# Patient Record
Sex: FEMALE | Race: BLACK OR AFRICAN AMERICAN | NOT HISPANIC OR LATINO | Employment: OTHER | URBAN - METROPOLITAN AREA
[De-identification: names, ages, dates, MRNs, and addresses within clinical notes are randomized per-mention and may not be internally consistent; named-entity substitution may affect disease eponyms.]

---

## 2017-10-13 ENCOUNTER — HOSPITAL ENCOUNTER (EMERGENCY)
Facility: HOSPITAL | Age: 72
Discharge: HOME | End: 2017-10-13
Attending: EMERGENCY MEDICINE | Admitting: OPHTHALMOLOGY
Payer: COMMERCIAL

## 2017-10-13 VITALS
TEMPERATURE: 99 F | BODY MASS INDEX: 23.99 KG/M2 | HEIGHT: 65 IN | DIASTOLIC BLOOD PRESSURE: 88 MMHG | HEART RATE: 62 BPM | SYSTOLIC BLOOD PRESSURE: 176 MMHG | OXYGEN SATURATION: 99 % | WEIGHT: 144 LBS | RESPIRATION RATE: 18 BRPM

## 2017-10-13 DIAGNOSIS — H21.01 HYPHEMA OF RIGHT EYE: ICD-10-CM

## 2017-10-13 DIAGNOSIS — H20.9 TRAUMATIC IRITIS: Primary | ICD-10-CM

## 2017-10-13 PROBLEM — S05.11XA TRAUMATIC HYPHEMA OF RIGHT EYE: Status: ACTIVE | Noted: 2017-10-13

## 2017-10-13 PROCEDURE — 25000003 PHARM REV CODE 250: Performed by: EMERGENCY MEDICINE

## 2017-10-13 PROCEDURE — 99284 EMERGENCY DEPT VISIT MOD MDM: CPT | Mod: ,,, | Performed by: EMERGENCY MEDICINE

## 2017-10-13 PROCEDURE — 99284 EMERGENCY DEPT VISIT MOD MDM: CPT

## 2017-10-13 RX ORDER — ATROPINE SULFATE 10 MG/ML
1 SOLUTION/ DROPS OPHTHALMIC 2 TIMES DAILY
Qty: 1 BOTTLE | Refills: 0 | Status: SHIPPED | OUTPATIENT
Start: 2017-10-13 | End: 2017-10-14

## 2017-10-13 RX ORDER — HYDROCHLOROTHIAZIDE 12.5 MG/1
12.5 TABLET ORAL DAILY
COMMUNITY
End: 2017-10-16

## 2017-10-13 RX ORDER — METOPROLOL TARTRATE 100 MG/1
100 TABLET ORAL 2 TIMES DAILY
COMMUNITY
End: 2017-10-16

## 2017-10-13 RX ORDER — TIMOLOL MALEATE 5 MG/ML
1 SOLUTION/ DROPS OPHTHALMIC 2 TIMES DAILY
Qty: 10 ML | Refills: 0 | Status: SHIPPED | OUTPATIENT
Start: 2017-10-13 | End: 2017-10-14 | Stop reason: ALTCHOICE

## 2017-10-13 RX ORDER — ACETAMINOPHEN 500 MG
1000 TABLET ORAL
Status: COMPLETED | OUTPATIENT
Start: 2017-10-13 | End: 2017-10-13

## 2017-10-13 RX ORDER — PREDNISOLONE ACETATE 10 MG/ML
1 SUSPENSION/ DROPS OPHTHALMIC EVERY 4 HOURS
Qty: 1 BOTTLE | Refills: 0 | Status: SHIPPED | OUTPATIENT
Start: 2017-10-13 | End: 2017-10-14

## 2017-10-13 RX ORDER — PREDNISOLONE ACETATE 10 MG/ML
1 SUSPENSION/ DROPS OPHTHALMIC
Status: DISCONTINUED | OUTPATIENT
Start: 2017-10-13 | End: 2017-10-14 | Stop reason: HOSPADM

## 2017-10-13 RX ORDER — PROPARACAINE HYDROCHLORIDE 5 MG/ML
1 SOLUTION/ DROPS OPHTHALMIC
Status: COMPLETED | OUTPATIENT
Start: 2017-10-13 | End: 2017-10-13

## 2017-10-13 RX ORDER — ATROPINE SULFATE 10 MG/ML
1 SOLUTION/ DROPS OPHTHALMIC 2 TIMES DAILY
Status: DISCONTINUED | OUTPATIENT
Start: 2017-10-13 | End: 2017-10-14 | Stop reason: HOSPADM

## 2017-10-13 RX ORDER — AMLODIPINE AND BENAZEPRIL HYDROCHLORIDE 10; 20 MG/1; MG/1
1 CAPSULE ORAL DAILY
COMMUNITY
End: 2017-10-16

## 2017-10-13 RX ORDER — PROPARACAINE HYDROCHLORIDE 5 MG/ML
1 SOLUTION/ DROPS OPHTHALMIC
Status: DISCONTINUED | OUTPATIENT
Start: 2017-10-13 | End: 2017-10-13

## 2017-10-13 RX ORDER — TIMOLOL MALEATE 5 MG/ML
1 SOLUTION/ DROPS OPHTHALMIC 2 TIMES DAILY
Status: DISCONTINUED | OUTPATIENT
Start: 2017-10-13 | End: 2017-10-14 | Stop reason: HOSPADM

## 2017-10-13 RX ORDER — ASPIRIN 81 MG/1
81 TABLET ORAL DAILY
COMMUNITY
End: 2019-01-16

## 2017-10-13 RX ORDER — ROSUVASTATIN CALCIUM 5 MG/1
5 TABLET, COATED ORAL DAILY
COMMUNITY
End: 2017-10-16

## 2017-10-13 RX ADMIN — FLUORESCEIN SODIUM 1 EACH: 1 STRIP OPHTHALMIC at 05:10

## 2017-10-13 RX ADMIN — PROPARACAINE HYDROCHLORIDE 1 DROP: 5 SOLUTION/ DROPS OPHTHALMIC at 05:10

## 2017-10-13 RX ADMIN — ACETAMINOPHEN 1000 MG: 500 TABLET ORAL at 05:10

## 2017-10-13 NOTE — ED PROVIDER NOTES
Encounter Date: 10/13/2017    SCRIBE #1 NOTE: I, Ledy Bass, am scribing for, and in the presence of,  Dr. Tyler. I have scribed the entire note.       History     Chief Complaint   Patient presents with    Eye Problem     R eye, hit in R eye w/ exercise band x1.5 hr ago. progressive vision loss in R eye gradual whitening of foeld from lateral to medial     Time seen by provider: 4:51 PM    This is a 72 y.o. female who presents with complaint of right eye problem s/p trauma. She reports onset of incident was about 1.5 hr ago. The patient states she was exercising with elastic band when she accidentally struck herself in the eye. She states within an half hour after trauma she began to have changes in vision. The patient notes she has cloudiness extending from the outer portion of visual field to interior which is gradually worsening. The patient notes she can not distinctly see objects only light and shadows. She reports usually she is able to see with nearly perfect vision. The patient denies any eyelid swelling, headache, neck pain, nausea or vomiting but admits right eye pain      The history is provided by the patient.     Review of patient's allergies indicates:  No Known Allergies  Past Medical History:   Diagnosis Date    Anemia     GERD (gastroesophageal reflux disease)     Hyperlipidemia     Hypertension      Past Surgical History:   Procedure Laterality Date    ANKLE FRACTURE SURGERY      HYSTERECTOMY       No family history on file.  Social History   Substance Use Topics    Smoking status: Never Smoker    Smokeless tobacco: Never Used    Alcohol use No     Review of Systems   Constitutional: Negative for chills and fever.   Eyes: Positive for pain (right) and visual disturbance (right).   Respiratory: Negative for cough and shortness of breath.    Cardiovascular: Negative for chest pain and palpitations.   Gastrointestinal: Negative for abdominal pain, diarrhea and vomiting.    Genitourinary: Negative for dysuria and vaginal discharge.   Musculoskeletal: Negative for joint swelling, neck pain and neck stiffness.   Skin: Negative for rash.   Neurological: Negative for weakness, numbness and headaches.   Psychiatric/Behavioral: Negative for confusion.       Physical Exam     Initial Vitals [10/13/17 1643]   BP Pulse Resp Temp SpO2   (!) 140/82 66 16 98.4 °F (36.9 °C) 98 %      MAP       101.33         Physical Exam    Nursing note and vitals reviewed.  Constitutional: She appears well-developed and well-nourished. She is not diaphoretic. No distress.   HENT:   Head: Normocephalic and atraumatic.   Right Ear: External ear normal.   Left Ear: External ear normal.   Eyes: EOM are normal.   Slit lamp exam:       The right eye shows no corneal abrasion and no fluorescein uptake.   Pupils are equal bilaterally. Left pupil is reactive to light. Right pupil is minimally reactive to light. Anterior chamber is cloudy to direct visualization. On slit exam the patient has 100% hyphema. Intraocular pressure is 17 on right and 30 on left. Visual acuity is 20/50 on left. Movement in black and white on the right   Neck: Normal range of motion. Neck supple.   Cardiovascular: Normal rate, regular rhythm and normal heart sounds. Exam reveals no gallop and no friction rub.    No murmur heard.  Pulmonary/Chest: Breath sounds normal. She has no wheezes. She has no rhonchi. She has no rales.   Abdominal: Soft. Bowel sounds are normal. There is no tenderness. There is no rebound and no guarding.   Musculoskeletal: Normal range of motion. She exhibits no edema or tenderness.   Lymphadenopathy:     She has no cervical adenopathy.   Neurological: She is alert and oriented to person, place, and time. She has normal strength.   Skin: Skin is warm and dry. No rash noted.         ED Course   Procedures  Labs Reviewed - No data to display     ---------------------------BEDSIDE US LIMITED---------------------  Interpreted  by the emergency provider  Time: 5:00 PM  Indication: eye trauma  Structures/organs investigated: right eye  Interpretation: blood in anterior chamber of eye  Post-procedure: Patient tolerated the procedure well with no immediate complications.        Medical Decision Making:   ED Management:  Based on my assessment the patient has a traumatic hyphema possible iritis.  Based on the ultrasound I do not see any signs of a retinal detachment.  We'll speak with ophthalmology.    5:10 PM Called transfer center to discuss case with Opthalmology.    5:17 PM Case discussed with Dr. Marrero, states the patient can come to [facility] for evaluation today or be seen as outpatient in clinic on Monday    5:35 PM I have discussed with family about options, the patient will go to [facility] for evaluation.     5:36 PM Spoke with Freida from transfer center states they will be awaiting the patient to come by private vehicle for evaluation.             Scribe Attestation:   Scribe #1: I performed the above scribed service and the documentation accurately describes the services I performed. I attest to the accuracy of the note.    I, Dr. Adama Tyler, personally performed the services described in this documentation. All medical record entries made by the scribe were at my direction and in my presence.  I have reviewed the chart and agree that the record reflects my personal performance and is accurate and complete. Adama Tyler DO.  5:53 PM 10/13/2017         ED Course      Clinical Impression:     1. Hyphema of right eye    2. Traumatic iritis                               Adama Tyler DO  10/13/17 1754

## 2017-10-13 NOTE — ED NOTES
Pt ambulatory out of department, instructed to drive directly to [facility]. Pt v/u. Ambulatory without difficulty, family present to drive pt to hospital.

## 2017-10-13 NOTE — ED PROVIDER NOTES
Encounter Date: 10/13/2017       History     Chief Complaint   Patient presents with    Transfer: Eye Problem     R eye, hit in R eye w/ exercise band x1.5 hr ago. progressive vision loss in R eye gradual whitening of foeld from lateral to medial     Cc: Eye pain    History of present illness:  72 y.o. female 's medical history of hypertension presents with right eye blindness and pain after an exercise band hit her in the face.  Seen outside institution and transferred to our institution for operative evaluation.  She feels like her right eye vision has a haziness to it.           Review of patient's allergies indicates:  No Known Allergies  Past Medical History:   Diagnosis Date    Anemia     GERD (gastroesophageal reflux disease)     Hyperlipidemia     Hypertension      Past Surgical History:   Procedure Laterality Date    ANKLE FRACTURE SURGERY      HYSTERECTOMY       No family history on file.  Social History   Substance Use Topics    Smoking status: Never Smoker    Smokeless tobacco: Never Used    Alcohol use No     Review of Systems   HENT: Negative for ear discharge and ear pain.    Eyes: Positive for pain, redness and visual disturbance. Negative for photophobia, discharge and itching.   Respiratory: Negative for apnea, cough, choking and chest tightness.    Cardiovascular: Negative.    Gastrointestinal: Negative for abdominal distention, abdominal pain and anal bleeding.   Neurological: Negative.    Psychiatric/Behavioral: Negative for agitation, behavioral problems, confusion and decreased concentration.   All other systems reviewed and are negative.      Physical Exam     Initial Vitals [10/13/17 1643]   BP Pulse Resp Temp SpO2   (!) 140/82 66 16 98.4 °F (36.9 °C) 98 %      MAP       101.33         Physical Exam  PHYSICAL EXAM  Vital signs and nurse note to review.    GEN:  NAD, A & O X 3, atraumatic, well appearing, nontoxic  HEENT:   R periorbital swelling, PERRLA, EOMI, nl conjunciva, no  node/nodule, neck supple, no rigidity, moist membranes  CV:   RRR 2+ radial pulses, <2sec cap refill  RESP:  No obvious wheezing, equal and bilateral chest rise, no respiratory distress  ABD:   soft, Nondistended,  no guarding/rebound  BACK:  FROM, no midline tenderness, no paraspinal tenderness  :   Deferred  EXT:   FROM, [place]UZ x 4, no edema, no calf tenderness, no swelling  LYMPH:  no gross adenopathy  NEURO:  CN II-XII grossly intact, no obvious motor/sensory deficit, no tremor ,negative Romberg,  nl gait/coordination  PSYCH:   no SI/HI, no anxiety, nl mood/affect, nl judgement/thought process  SKIN:  Warm, dry, intact, no rashes/lesions or masses, nl color, no pallor      ED Course   Procedures  Labs Reviewed - No data to display          Medical Decision Making:   Differential Diagnosis:   Red eye differential diagnosis includes but not exclusive to: open globe, hyphema conjunctivitis (viral, allergic or bacterial), keratitis, iritis, angle closure glaucoma    ED Management:  Treatment plan includes physical exam,  pain control,  and supportive care.  1850 Ophthalmology consult pending.    Patient improved after treatment and tolerating PO    Family and patient updated on care and given return precautions.                     ED Course      Clinical Impression:   The primary encounter diagnosis was Traumatic iritis. A diagnosis of Hyphema of right eye was also pertinent to this visit.    Disposition:   Disposition: Discharged  Condition: Stable                        Raul Lyn MD  10/18/17 6219

## 2017-10-13 NOTE — ED NOTES
Verbal report given to JYOTI Sarmiento, at [facility] ER. Pt to be transported via private vehicle.

## 2017-10-13 NOTE — ED TRIAGE NOTES
"C/o "cloudiness" to vision in right eye s/p getting hit with exercise band 1.5 hours ago. Reports initial loss of peripheral vision, but now states "fogginess" has spread to entire field of vision. Denies loc. Questionable swelling under right eye. Mild redness to sclera. No conjunctival hemorrhage noted.  "

## 2017-10-14 RX ORDER — PREDNISOLONE ACETATE 10 MG/ML
1 SUSPENSION/ DROPS OPHTHALMIC EVERY 4 HOURS
Qty: 15 ML | Refills: 1 | Status: SHIPPED | OUTPATIENT
Start: 2017-10-14 | End: 2017-10-24

## 2017-10-14 RX ORDER — ATROPINE SULFATE 10 MG/ML
1 SOLUTION/ DROPS OPHTHALMIC 2 TIMES DAILY
Qty: 1 BOTTLE | Refills: 1 | Status: SHIPPED | OUTPATIENT
Start: 2017-10-14 | End: 2017-10-16 | Stop reason: ALTCHOICE

## 2017-10-14 NOTE — CONSULTS
"[facility]  Ophthalmology  Consult Note    Patient Name: Valarie Colindres  MRN: [id number]  Admission Date: 10/13/2017  Hospital Length of Stay: 0 days  Attending Provider: No att. providers found   Primary Care Physician: No primary care provider on file.  Principal Problem:<principal problem not specified>    Inpatient consult to Ophthalmology  Consult performed by: FIDEL BARTLETT  Consult ordered by: MRATIN TORRES        Subjective:     Chief Complaint:  R eye pain after injury     HPI:   72 y.o. F with PMHx HTN presents with right eye fogginess and pain after an exercise band hit her in the face. Patient states she was using an exercise elastic band to stretch her le around 4:30pm , but it accidentally slipped and hit her across the eyes. Right after, patient states her R temporal vision was foggy and then her entire R visual field was foggy "as if looking through a cloud." Patient presented to [facility] ED and was transferred to [facility] [place]ho eval. Patient states she had macular degeneration OD years ago, which resolved with laser with Dr. Maldonado. Receives annual eye exams and wears glasses. No previous eye surgeries or injections. Positive fam hx of glaucoma in uncle.     No new subjective & objective note has been filed under this hospital service since the last note was generated.      Base Eye Exam     Visual Acuity (Snellen - Linear)       Right Left    Dist cc 20/800 20/25 -2    Correction:  Glasses          Tonometry (tonopen , 10:24 PM)       Right Left    Pressure 27 16          Pupils       Dark Light Shape React APD    Right 2 1.5 Round Sluggish None    Left 3 2 Round Brisk none           Visual Fields       Right Left      Full    Unable in R eye due to decreased vision          Extraocular Movement       Right Left     Full, Ortho Full, Ortho          Dilation     Both eyes:  1.0% Mydriacyl, 2.5% phenylephrine @ 10:24 PM            Additional Notes     B scan OD shows " vit heme, no RD     Gonioscopy shows narrow, but open angles OU    Slit Lamp and Fundus Exam     External Exam       Right Left    External mild periorbital edema wnl          Slit Lamp Exam       Right Left    Lids/Lashes mild periorbital edema wnl    Conjunctiva/Sclera clear clear    Cornea clear clear    Anterior Chamber 3+ cell/flare with inferior settling hyphema quiet    Iris flat flat, no NVI     Lens 2+ NSC 2+ NSC    Vitreous VH clear          Fundus Exam       Right Left    C/D Ratio  0.3    Macula  flat    Vessels  wnl    Periphery  wnl    Poor view in right eye              Assessment and Plan:     Traumatic hyphema of right eye    -patient presents after trauma to R eye with elastic exercise band   -OD no open globe, IOP elevated with iritis and settling hyphema ~10%  -patient reports no hx of sickle cell disease and no breathing problems  -will start pred forte q4hrs, atropine BID, and timolol BID  -bedrest, no strenuous activity, no heavy lifting, no bending or valsalva maneuvers, keep head of bed elevated  -eye shield provided   -will follow patient closely and have patient follow up in clinic tomorrow. Contact information exchanged.     Discussed with Dr. Gardner.             Thank you for your consult. I will follow-up with patient. Please contact us if you have any additional questions.    Maranda Mcneil MD  Ophthalmology  [facility]

## 2017-10-14 NOTE — PROGRESS NOTES
Patient examined in clinic today. VA improved OD 20/25-2, Ta 14, no hyphema, still 3+ cell/2+ flare. Patient did not get eyedrops last night. No need for timolol at this time. Pred forte q4hrs and atropine BID prescribed to patient's pharmacy. Will have patient follow up on Monday. Discussed with Dr. Abdi.     Maranda Mcneil MD  PGY-2

## 2017-10-14 NOTE — ED NOTES
"Pt states "I don't want to wait for the eyedrops here, I would prefer to go home and fill my prescriptions myself". Dr. DOE notified and received clearance to discharge patient.  "

## 2017-10-14 NOTE — HPI
"72 y.o. F with PMHx HTN presents with right eye fogginess and pain after an exercise band hit her in the face. Patient states she was using an exercise elastic band to stretch her le around 4:30pm , but it accidentally slipped and hit her across the eyes. Right after, patient states her R temporal vision was foggy and then her entire R visual field was foggy "as if looking through a cloud." Patient presented to [facility] ED and was transferred to [facility] for ophtho eval. Patient states she had macular degeneration OD years ago, which resolved with laser with Dr. Maldonado. Receives annual eye exams and wears glasses. No previous eye surgeries or injections. Positive fam hx of glaucoma in uncle.   "

## 2017-10-14 NOTE — ASSESSMENT & PLAN NOTE
-patient presents after trauma to R eye with elastic exercise band   -OD no open globe, IOP elevated with iritis and settling hyphema ~10%  -patient reports no hx of sickle cell disease and no breathing problems  -will start pred forte q4hrs, atropine BID, and timolol BID  -bedrest, no strenuous activity, no heavy lifting, no bending or valsalva maneuvers, keep head of bed elevated  -eye shield provided   -will follow patient closely and have patient follow up in clinic tomorrow. Contact information exchanged.     Discussed with Dr. Gardner.

## 2017-10-16 ENCOUNTER — OFFICE VISIT (OUTPATIENT)
Dept: OPHTHALMOLOGY | Facility: CLINIC | Age: 72
End: 2017-10-16
Payer: COMMERCIAL

## 2017-10-16 DIAGNOSIS — S05.11XD TRAUMATIC HYPHEMA OF RIGHT EYE, SUBSEQUENT ENCOUNTER: Primary | ICD-10-CM

## 2017-10-16 PROCEDURE — 99999 PR PBB SHADOW E&M-EST. PATIENT-LVL III: CPT | Mod: PBBFAC,,, | Performed by: OPHTHALMOLOGY

## 2017-10-16 PROCEDURE — 92004 COMPRE OPH EXAM NEW PT 1/>: CPT | Mod: S$GLB,,, | Performed by: OPHTHALMOLOGY

## 2017-10-16 RX ORDER — THYROID, PORCINE 120 MG/1
TABLET ORAL
COMMUNITY
Start: 2017-10-12 | End: 2018-09-20 | Stop reason: SDUPTHER

## 2017-10-16 RX ORDER — PANTOPRAZOLE SODIUM 40 MG/1
TABLET, DELAYED RELEASE ORAL
COMMUNITY
Start: 2017-10-15 | End: 2018-05-24

## 2017-10-16 RX ORDER — ALENDRONATE SODIUM 70 MG/1
TABLET ORAL
COMMUNITY
Start: 2017-10-01 | End: 2018-09-20 | Stop reason: SDUPTHER

## 2017-10-16 RX ORDER — CHLORHEXIDINE GLUCONATE ORAL RINSE 1.2 MG/ML
SOLUTION DENTAL
Refills: 1 | COMMUNITY
Start: 2017-10-06 | End: 2018-05-24

## 2017-10-16 RX ORDER — AMLODIPINE BESYLATE 10 MG/1
TABLET ORAL
COMMUNITY
Start: 2017-10-01 | End: 2018-05-24 | Stop reason: ALTCHOICE

## 2017-10-16 NOTE — PROGRESS NOTES
HPI     Follow-up    Additional comments: Hyphema of right eye            Comments   Patient here for ED follow up OU traumatic iritis.  Pt states here for follow up hyphema of OD.  Pt states OD has improved with vision, but still slightly blurry.  3 on pain scale.    Eye Drops:Atropine BID OD                    Pred Q4H OD    I have personally interviewed the patient, reviewed the history and   examined the patient and agree with the technician's exam.       Last edited by Phill Capone MD on 10/16/2017  1:51 PM. (History)            Assessment /Plan     For exam results, see Encounter Report.    Traumatic hyphema of right eye, subsequent encounter      Doing well. Still at risk for delayed retinal detachment and angle recession glaucoma.  Discontinue atropine drops.  Decrease prednisolone to four times a day for two days, then three times a day for two days, then twice a day for two days, then once a day for two days, then stop.  Appointments with retina and glaucoma services in one month for scleral depressed exam and gonioscopy.

## 2017-10-16 NOTE — PATIENT INSTRUCTIONS
Discontinue atropine drops.  Decrease prednisolone to four times a day for two days, then three times a day for two days, then twice a day for two days, then once a day for two days, then stop.  Appointments with retina and glaucoma services in one month for scleral depressed exam and gonioscopy.

## 2017-11-30 ENCOUNTER — INITIAL CONSULT (OUTPATIENT)
Dept: OPHTHALMOLOGY | Facility: CLINIC | Age: 72
End: 2017-11-30
Payer: COMMERCIAL

## 2017-11-30 DIAGNOSIS — H43.811 POSTERIOR VITREOUS DETACHMENT OF RIGHT EYE: Primary | ICD-10-CM

## 2017-11-30 DIAGNOSIS — H35.363 RETINAL DRUSEN OF BOTH EYES: ICD-10-CM

## 2017-11-30 DIAGNOSIS — H25.13 NUCLEAR SCLEROSIS OF BOTH EYES: ICD-10-CM

## 2017-11-30 PROCEDURE — 92226 PR SPECIAL EYE EXAM, SUBSEQUENT: CPT | Mod: RT,S$GLB,, | Performed by: OPHTHALMOLOGY

## 2017-11-30 PROCEDURE — 99999 PR PBB SHADOW E&M-EST. PATIENT-LVL II: CPT | Mod: PBBFAC,,, | Performed by: OPHTHALMOLOGY

## 2017-11-30 PROCEDURE — 92014 COMPRE OPH EXAM EST PT 1/>: CPT | Mod: S$GLB,,, | Performed by: OPHTHALMOLOGY

## 2017-11-30 NOTE — PROGRESS NOTES
"HPI     Eye Problem    Additional comments: Traume OU           Comments   73 y/o female presents for retina evaluation s/p Trauma OU, Traumatic   Hyphema OD per Dr Capone.   Pt states 1 month ago while working out hit herself in OU with exercise   band.  At the time was "blind" in OD.  Came in to ED was for Traumatic   Iritis/Hyphema OD and put her on tapering dose of Prednisolone.  Has been   off of now for x 5 wks.  Feels OU for the most part have returned to   normal, still slightly blurry.  Although OD has a "deadening" feeling   behind it.  Hard to describe.  Intermittent longstanding floaters OU   without change.  No flashes OU.  Gets glare at night but otherwise vision   good.    AT's prn OU        Last edited by Torsten Hsu MD on 11/30/2017  9:33 AM. (History)        OCT good contour OU.  Documentation purposes only    Assessment /Plan     For exam results, see Encounter Report.    Posterior vitreous detachment of right eye    Nuclear sclerosis of both eyes    Retinal drusen of both eyes      RD precautions, observe.  F/u Retina PRN.      H/o blunt trauma/hyphema OD.  Has gl appt sched for next wk.    NS OU with good Va.  Observe                     "

## 2017-11-30 NOTE — LETTER
November 30, 2017      Phill Capone MD  [address]  [facility] [address]           [facility]  [address]  [facility] [address]  Phone: [phone]  Fax: [phone]          Patient: Valarie Colindres   MR Number: [id number]   YOB: 1945   Date of Visit: 11/30/2017       Dear Dr. Phill Capone:    Thank you for referring Valarie Colindres to me for evaluation. Attached you will find relevant portions of my assessment and plan of care.    If you have questions, please do not hesitate to call me. I look forward to following Valarie Colindres along with you.    Sincerely,    Torsten Hsu MD    Enclosure  CC:  No Recipients    If you would like to receive this communication electronically, please contact [email or web address] or [phone] to request more information on [facility] [employer] Link access.    For providers and/or their staff who would like to refer a patient to [facility], please contact us through our one-stop-shop provider referral line, [facility], at [phone].    If you feel you have received this communication in error or would no longer like to receive these types of communications, please e-mail [email or web address]

## 2017-12-04 ENCOUNTER — INITIAL CONSULT (OUTPATIENT)
Dept: OPHTHALMOLOGY | Facility: CLINIC | Age: 72
End: 2017-12-04
Payer: COMMERCIAL

## 2017-12-04 DIAGNOSIS — S05.11XD TRAUMATIC HYPHEMA OF RIGHT EYE, SUBSEQUENT ENCOUNTER: ICD-10-CM

## 2017-12-04 DIAGNOSIS — H21.551 ANGLE RECESSION OF RIGHT EYE: Primary | ICD-10-CM

## 2017-12-04 DIAGNOSIS — H25.13 NUCLEAR SCLEROTIC CATARACT OF BOTH EYES: ICD-10-CM

## 2017-12-04 PROCEDURE — 92012 INTRM OPH EXAM EST PATIENT: CPT | Mod: S$GLB,,, | Performed by: OPHTHALMOLOGY

## 2017-12-04 PROCEDURE — 99999 PR PBB SHADOW E&M-EST. PATIENT-LVL I: CPT | Mod: PBBFAC,,, | Performed by: OPHTHALMOLOGY

## 2017-12-04 PROCEDURE — 92020 GONIOSCOPY: CPT | Mod: S$GLB,,, | Performed by: OPHTHALMOLOGY

## 2017-12-04 NOTE — LETTER
December 4, 2017      Phill Capone MD  [address]  [facility] [id number]           [facility]  [address]  [facility] [address]  Phone: [phone]  Fax: [phone]          Patient: Valraie Colindres   MR Number: [id number]   YOB: 1945   Date of Visit: 12/4/2017       Dear Dr. Phill Capone:    Thank you for referring Valarie Colindres to me for evaluation. Attached you will find relevant portions of my assessment and plan of care.    If you have questions, please do not hesitate to call me. I look forward to following Valarie Colindres along with you.    Sincerely,    Serg Jarrett MD    Enclosure  CC:  No Recipients    If you would like to receive this communication electronically, please contact [email or web address] or [phone] to request more information on If[employer] Link access.    For providers and/or their staff who would like to refer a patient to [facility], please contact us through our one-stop-shop provider referral line, [facility], at [phone].    If you feel you have received this communication in error or would no longer like to receive these types of communications, please e-mail [email or web address]

## 2017-12-04 NOTE — PROGRESS NOTES
HPI     72 year old female  DLS: 11/30/2017-Aracelis  10/16/2017-Estelita  Pt has a history trauma to both eyes-hit eye with exercise band  Traumatic [id number] checked by Dr. Aracelis Capone request glaucoma evaluation   Uncle has a history of glaucoma        Last edited by Francesca Sanabria on 12/4/2017  9:40 AM. (History)            Assessment /Plan     For exam results, see Encounter Report.    Angle recession of right eye    Nuclear sclerotic cataract of both eyes    Traumatic hyphema of right eye, subsequent encounter        Remedial       Hx Blunt Trauma OU    Hyphema OD  Resolved    Small recession OD  + Uncle glc    Observation    NSC OU  Observe    RD precautions:  Discussed symptoms of RD with increased flashes, floaters, decreasing vision.  Patient/Family to call and return immediately to clinic should the symptoms of RD occur. Voiced good understanding with Q & A.      Plan  RTC 6 month IOP  Keep Retina fu 1 year  Consider Optometry FU  RTC sooner prn with good understanding

## 2017-12-29 LAB — CRC RECOMMENDATION EXT: NORMAL

## 2018-05-24 ENCOUNTER — OFFICE VISIT (OUTPATIENT)
Dept: OPHTHALMOLOGY | Facility: CLINIC | Age: 73
End: 2018-05-24
Payer: COMMERCIAL

## 2018-05-24 DIAGNOSIS — H25.13 NUCLEAR SCLEROSIS OF BOTH EYES: ICD-10-CM

## 2018-05-24 DIAGNOSIS — H35.363 RETINAL DRUSEN OF BOTH EYES: ICD-10-CM

## 2018-05-24 DIAGNOSIS — H02.103 ECTROPION DUE TO LAXITY OF EYELID, RIGHT: Primary | ICD-10-CM

## 2018-05-24 DIAGNOSIS — H43.811 POSTERIOR VITREOUS DETACHMENT OF RIGHT EYE: ICD-10-CM

## 2018-05-24 DIAGNOSIS — H21.551 ANGLE RECESSION OF RIGHT EYE: ICD-10-CM

## 2018-05-24 PROCEDURE — 92226 PR SPECIAL EYE EXAM, SUBSEQUENT: CPT | Mod: LT,S$GLB,, | Performed by: OPHTHALMOLOGY

## 2018-05-24 PROCEDURE — 92014 COMPRE OPH EXAM EST PT 1/>: CPT | Mod: S$GLB,,, | Performed by: OPHTHALMOLOGY

## 2018-05-24 PROCEDURE — 99999 PR PBB SHADOW E&M-EST. PATIENT-LVL II: CPT | Mod: PBBFAC,,, | Performed by: OPHTHALMOLOGY

## 2018-05-24 RX ORDER — VALSARTAN 160 MG/1
160 TABLET ORAL DAILY
Refills: 5 | COMMUNITY
Start: 2018-04-25 | End: 2018-09-20

## 2018-05-24 RX ORDER — FLUCONAZOLE 150 MG/1
TABLET ORAL
COMMUNITY
End: 2018-05-24

## 2018-05-24 NOTE — PROGRESS NOTES
HPI     71 y/o female  Here to day for c/o pressure feeling and 4 wks of tearing   OD.  Had tearing OS which stopped.     Va stable OU.   No f/f/pain OU.    Pt c/o 4 weeks of tearing    (-)Pain  (-)Flashes  (-)Floaters  (-)Double Vision     Eye Med(s): none    Last edited by Torsten Hsu MD on 5/29/2018  9:38 AM. (History)            Assessment /Plan     For exam results, see Encounter Report.    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  Could be associated with ectropion of punctum RLL.  Will refer to Dr Grewal for eval and management    Has h/o angle recession OD secondary to trauma.  IOP good and no sign of glaucoma.  Needs ongoing comprehensive care for periodic evaluation (at least yearly)    Retinal findings stable.  No pathology needing Rx and this time.    RD precautions.  RTC retina PRN only    NS OU.  Good Va.  Recommend observation

## 2018-05-30 ENCOUNTER — TELEPHONE (OUTPATIENT)
Dept: OPHTHALMOLOGY | Facility: CLINIC | Age: 73
End: 2018-05-30

## 2018-05-30 NOTE — TELEPHONE ENCOUNTER
----- Message from Mili Ceballos MA sent at 5/30/2018  4:25 PM CDT -----  Good afternoon ladies,    Dr. Hsu want the above mentioned pt to see Dr. Grewal for Ectropion I tried scheduling it the way you guys showed me I got the appointment it showed September 2018,  Pt states she could wait that long to see if we can speak to the doctor to get her in sooner. Can you guys please assist.    Thank you,   Mili

## 2018-09-19 PROBLEM — K58.9 IRRITABLE BOWEL SYNDROME: Status: ACTIVE | Noted: 2018-09-19

## 2018-09-19 PROBLEM — I10 ESSENTIAL HYPERTENSION: Status: ACTIVE | Noted: 2018-09-19

## 2018-09-19 PROBLEM — M54.2 NECK PAIN: Status: ACTIVE | Noted: 2018-09-19

## 2018-09-19 PROBLEM — M54.50 LOW BACK PAIN: Status: ACTIVE | Noted: 2018-09-19

## 2018-09-19 PROBLEM — Z87.11 HISTORY OF GASTRIC ULCER: Status: ACTIVE | Noted: 2018-09-19

## 2018-09-19 PROBLEM — K21.9 GASTROESOPHAGEAL REFLUX DISEASE: Status: ACTIVE | Noted: 2018-09-19

## 2018-09-20 ENCOUNTER — OFFICE VISIT (OUTPATIENT)
Dept: INTERNAL MEDICINE | Facility: CLINIC | Age: 73
End: 2018-09-20
Payer: MEDICARE

## 2018-09-20 VITALS
HEART RATE: 79 BPM | HEIGHT: 65 IN | WEIGHT: 149.25 LBS | BODY MASS INDEX: 24.87 KG/M2 | OXYGEN SATURATION: 97 % | SYSTOLIC BLOOD PRESSURE: 140 MMHG | DIASTOLIC BLOOD PRESSURE: 80 MMHG

## 2018-09-20 DIAGNOSIS — R10.13 EPIGASTRIC ABDOMINAL PAIN: ICD-10-CM

## 2018-09-20 DIAGNOSIS — E03.9 HYPOTHYROIDISM, UNSPECIFIED TYPE: ICD-10-CM

## 2018-09-20 DIAGNOSIS — L98.9 SKIN LESION OF RIGHT ARM: ICD-10-CM

## 2018-09-20 DIAGNOSIS — K21.9 GASTROESOPHAGEAL REFLUX DISEASE, ESOPHAGITIS PRESENCE NOT SPECIFIED: ICD-10-CM

## 2018-09-20 DIAGNOSIS — I10 ESSENTIAL HYPERTENSION: Primary | ICD-10-CM

## 2018-09-20 DIAGNOSIS — M85.80 OSTEOPENIA, UNSPECIFIED LOCATION: ICD-10-CM

## 2018-09-20 DIAGNOSIS — Z78.0 ASYMPTOMATIC MENOPAUSAL STATE: ICD-10-CM

## 2018-09-20 PROCEDURE — 99214 OFFICE O/P EST MOD 30 MIN: CPT | Mod: PBBFAC | Performed by: FAMILY MEDICINE

## 2018-09-20 PROCEDURE — 99204 OFFICE O/P NEW MOD 45 MIN: CPT | Mod: S$GLB,,, | Performed by: FAMILY MEDICINE

## 2018-09-20 PROCEDURE — 99999 PR PBB SHADOW E&M-EST. PATIENT-LVL IV: CPT | Mod: PBBFAC,,, | Performed by: FAMILY MEDICINE

## 2018-09-20 RX ORDER — HYDROCHLOROTHIAZIDE 12.5 MG/1
12.5 TABLET ORAL DAILY
Qty: 30 TABLET | Refills: 1 | Status: SHIPPED | OUTPATIENT
Start: 2018-09-20 | End: 2018-11-18 | Stop reason: SDUPTHER

## 2018-09-20 RX ORDER — LEVOTHYROXINE SODIUM 125 UG/1
125 TABLET ORAL
Qty: 30 TABLET | Refills: 1 | Status: SHIPPED | OUTPATIENT
Start: 2018-09-20 | End: 2018-11-18 | Stop reason: SDUPTHER

## 2018-09-20 RX ORDER — VALSARTAN 320 MG/1
TABLET ORAL
COMMUNITY
Start: 2018-09-05 | End: 2019-01-10 | Stop reason: SDUPTHER

## 2018-09-20 RX ORDER — ALENDRONATE SODIUM 70 MG/1
70 TABLET ORAL
Qty: 4 TABLET | Refills: 2 | Status: SHIPPED | OUTPATIENT
Start: 2018-09-20 | End: 2019-05-30

## 2018-09-20 NOTE — PROGRESS NOTES
Subjective:       Patient ID: Valarie Colindres is a 73 y.o. female.    Chief Complaint: Establish Care; Rash (charisma on right arm); GI Problem (upper-3 months); Night Sweats (3-4 night a week- 1 years ); and Hypertension (uncontrolled- on bp medication )    HPI  This patient is new to me.   Valarie Colindres is a 73 y.o. year old female with HTN, hypothyroid who presents today to establish care. She also complains of chronic epigastric pain, HTN, and a lesion on her right arm.     Epigastric pain - first began many years ago (since childhood). Comes and goes. Lasts for 1hr or up to whole day. Not every time with eating. Not associated with certain foods. Had colonoscopy done last year (17) - polyp found - repeat in 5 years. Denies diarrhea.   Patient made diet changes - which helped. Eats less red meat, more veggies/fruit, more grains, more fish/chicken.   Patient is lactose intolerant. Mostly avoids dairy.   Does have Hx of GERD - was taking Prilosec. Stopped after a few months. Thinks maybe it helped some. Typically gets burning in throat with GERD flares.   Recently saw some streaks of blood in toilet. Sometimes eats beets, cherries. No rectal pain. Not straining. She does have internal hemorrhoids found on colonoscopy.   Had CT abd/pelvis 2018 at outside facility - no abnormalities  Has abdominal U/S 2016 - normal   No Hx pancreatitis    Right forearm skin lesion - Present for many years. Now having some lines/marks inside it. Not getting bigger. No pain or tenderness.     HTN - at home BP is in 140s systolic. Takes valsartan 320mg daily. Doesn't add salt to foods. Rarely eats meals out. Doesn't use canned foods. Rare frozen dinner.     Hypothyroid - patient takes [place] thyroid 120mg daily. Has been taking for years. Unsure when last TSH was.     Exercise - line dancing, strength training     Sees optho yearly - normal.     OB/GYN History     Patient denies history of pregnancy complications.  Patient  "is post-menopausal.  Sexually active: no  Last Pap smear: s/p hysterectomy due to uterine CA/precancerous lesion? Last pap 2018 - normal  Mammogram: 2018 - normal   DEXA - 2 years ago - osteopenia    I personally reviewed Past Medical History, Past Surgical History, Social History, and Family History    Review of Systems   Constitutional: Negative for chills, fatigue, fever and unexpected weight change.   HENT: Negative for congestion, hearing loss, rhinorrhea and sore throat.    Eyes: Negative for visual disturbance.   Respiratory: Negative for cough, shortness of breath and wheezing.    Cardiovascular: Negative for chest pain, palpitations and leg swelling.   Gastrointestinal: Positive for blood in stool. Negative for constipation, diarrhea, nausea and vomiting.        +chronic epigastric pain   Genitourinary: Negative for dysuria, frequency and urgency.   Musculoskeletal: Negative for arthralgias and myalgias.   Skin:        + skin lesion   Allergic/Immunologic: Positive for environmental allergies.   Neurological: Negative for dizziness, syncope and headaches.   Psychiatric/Behavioral: Negative for dysphoric mood and sleep disturbance. The patient is not nervous/anxious.        Objective:      Vitals:    09/20/18 1407 09/20/18 1902   BP: (!) 142/70 (!) 140/80   Pulse: 79    SpO2: 97%    Weight: 67.7 kg (149 lb 4 oz)    Height: 5' 5" (1.651 m)      Physical Exam   Constitutional: She is oriented to person, place, and time. She appears well-developed and well-nourished. No distress.   HENT:   Head: Normocephalic and atraumatic.   Right Ear: Hearing and external ear normal.   Left Ear: Hearing and external ear normal.   Nose: Nose normal.   Mouth/Throat: Oropharynx is clear and moist and mucous membranes are normal. No oropharyngeal exudate.   Eyes: Conjunctivae and lids are normal. Pupils are equal, round, and reactive to light.   Neck: Normal range of motion. No thyroid mass and no thyromegaly present. "   Cardiovascular: Normal rate, regular rhythm, S1 normal, S2 normal and intact distal pulses.   No murmur heard.  No lower extremity edema.    Pulmonary/Chest: Effort normal and breath sounds normal. No respiratory distress.   Abdominal: Soft. Normal appearance and bowel sounds are normal. There is no tenderness.   Lymphadenopathy:     She has no cervical adenopathy.        Right: No supraclavicular adenopathy present.        Left: No supraclavicular adenopathy present.   Neurological: She is alert and oriented to person, place, and time.   Skin: Skin is warm and dry. No rash noted.   4mm circular lesion on medial aspect of right forearm. Lesion is hyperpigmented, flat. Presence of scale/flaky skin over top of the lesion.    Psychiatric: She has a normal mood and affect. Her behavior is normal. Thought content normal.   Nursing note and vitals reviewed.      Assessment:       1. Essential hypertension    2. Epigastric abdominal pain    3. Hypothyroidism, unspecified type    4. Skin lesion of right arm    5. Gastroesophageal reflux disease, esophagitis presence not specified    6. Osteopenia, unspecified location    7. Asymptomatic menopausal state         Plan:   Diagnoses and all orders for this visit:    Essential hypertension      - Not controlled. Will add HCTZ 12.5mg daily. Continue valsartan 320mg daily. RTC in 1-2 weeks for BP check.       - Counseled patient on DASH diet and low salt diet. Also recommended patient check blood pressure at home with an arm cuff and keep a log.    -     CBC auto differential; Future  -     Comprehensive metabolic panel; Future  -     Lipid panel; Future  -     Microalbumin/creatinine urine ratio; Future  -     TSH; Future  -     hydroCHLOROthiazide (HYDRODIURIL) 12.5 MG Tab; Take 1 tablet (12.5 mg total) by mouth once daily.    Epigastric abdominal pain        - Has been chronic for many years. Patient has had this worked up in the past with colonoscopy, CT abd/pelvis,  endoscopy, and abdominal U/S without significant findings. Patient does have a Hx of GERD - pain possibly related to this. Will try 6-8 weeks of ranitidine. Also recommended avoiding GERD trigger foods.        - Will also check lipase -consider chronic pancreatitis, although this is less likely   -     Lipase; Future       - advised patient that I would like for her to follow-up with GI in the next few months to make sure occasional blood in stool is related to internal hemorrhoids vs other. Also to consider further GI workup for epigastric pain.     Hypothyroidism, unspecified type       - Patient's insurance does not cover [place] thyroid - will switch to levothyroxine. Will likely need to titrate levothyroxine up - will monitor with TSH now and repeat in 2 months.   -     TSH; Future  -     levothyroxine (SYNTHROID) 125 MCG tablet; Take 1 tablet (125 mcg total) by mouth before breakfast.    Skin lesion of right arm        - Likely seborrheic keratosis. Offered shave biopsy, but patient would like to have derm eval.   -     Ambulatory referral to Dermatology    Gastroesophageal reflux disease, esophagitis presence not specified  -     ranitidine (ZANTAC) 150 MG tablet; Take 1 tablet (150 mg total) by mouth 2 (two) times daily.  - GERD diet changes also discussed    Osteopenia, unspecified location  Asymptomatic menopausal state   -     DXA Bone Density Spine And Hip; Future  -     alendronate (FOSAMAX) 70 MG tablet; Take 1 tablet (70 mg total) by mouth every 7 days.

## 2018-09-20 NOTE — PATIENT INSTRUCTIONS
Low-Salt Diet  This diet removes foods that are high in salt. It also limits the amount of salt you use when cooking. It is most often used for people with high blood pressure, edema (fluid retention), and kidney, liver, or heart disease.  Table salt contains the mineral sodium. Your body needs sodium to work normally. But too much sodium can make your health problems worse. Your healthcare provider is recommending a low-salt (also called low-sodium) diet for you. Your total daily allowance of salt is 1,500 to 2,300 milligrams (mg). It is less than 1 teaspoon of table salt. This means you can have only about 500 to 700 mg of sodium at each meal. People with certain health problems should limit salt intake to the lower end of the recommended range.    When you cook, dont add much salt. If you can cook without using salt, even better. Dont add salt to your food at the table.  When shopping, read food labels. Salt is often called sodium on the label. Choose foods that are salt-free, low salt, or very low salt. Note that foods with reduced salt may not lower your salt intake enough.    Beans, potatoes, and pasta  Ok: Dry beans, split peas, lentils, potatoes, rice, macaroni, pasta, spaghetti without added salt  Avoid: Potato chips, tortilla chips, and similar products  Breads and cereals  Ok: Low-sodium breads, rolls, cereals, and cakes; low-salt crackers, matzo crackers  Avoid: Salted crackers, pretzels, popcorn, Portuguese toast, pancakes, muffins  Dairy  Ok: Milk, chocolate milk, hot chocolate mix, low-salt cheeses, and yogurt  Avoid: Processed cheese and cheese spreads; Roquefort, Camembert, and cottage cheese; buttermilk, instant breakfast drink  Desserts  Ok: Ice cream, frozen yogurt, juice bars, gelatin, cookies and pies, sugar, honey, jelly, hard candy  Avoid: Most pies, cakes and cookies prepared or processed with salt; instant pudding  Drinks  Ok: Tea, coffee, fizzy (carbonated) drinks, juices  Avoid: Flavored  coffees, electrolyte replacement drinks, sports drinks  Meats  Ok: All fresh meat, fish, poultry, low-salt tuna, eggs, egg substitute  Avoid: Smoked, pickled, brine-cured, or salted meats and fish. This includes bautista, chipped beef, corned beef, hot dogs, deli meats, ham, kosher meats, salt pork, sausage, canned tuna, salted codfish, smoked salmon, herring, sardines, or anchovies.  Seasonings and spices  Ok: Most seasonings are okay. Good substitutes for salt include: fresh herb blends, hot sauce, lemon, garlic, chambers, vinegar, dry mustard, parsley, cilantro, horseradish, tomato paste, regular margarine, mayonnaise, unsalted butter, cream cheese, vegetable oil, cream, low-salt salad dressing and gravy.  Avoid: Regular ketchup, relishes, pickles, soy sauce, teriyaki sauce, Worcestershire sauce, BBQ sauce, tartar sauce, meat tenderizer, chili sauce, regular gravy, regular salad dressing, salted butter  Soups  Ok: Low-salt soups and broths made with allowed foods  Avoid: Bouillon cubes, soups with smoked or salted meats, regular soup and broth  Vegetables  Ok: Most vegetables are okay; also low-salt tomato and vegetable juices  Avoid: Sauerkraut and other brine-soaked vegetables; pickles and other pickled vegetables; tomato juice, olives  Date Last Reviewed: 8/1/2016 © 2000-2017 [employer]. [address] [place] [address]. All rights reserved. This information is not intended as a substitute for professional medical care. Always follow your healthcare professional's instructions.        Tips for Using Less Salt    Most people with heart problems need to eat less salt (sodium). Reducing the amount of salt you eat may help control your blood pressure. The higher your blood pressure, the greater your risk for heart disease, stroke, blindness, and kidney problems.  At the store  · Make low-salt choices by reading labels carefully. Look for the total amount of sodium per serving.  · Use more fresh  food. Buy more fruits and vegetables. Select lean meats, fish, and poultry.  · Use fewer frozen, canned, and packaged foods which often contain a lot of sodium.  · Use plain frozen vegetables without sauces or toppings. These products are often low- or no-sodium.  · Opt for reduced-sodium or no-salt-added versions of canned vegetables and soups.  In the kitchen  · Don't add salt to food when you're cooking. Season with flavorings such as onion, garlic, pepper, salt-free herbal blends, and lemon or lime juice.  · Use a cookbook containing low-salt recipes. It can give you ideas for tasty meals that are healthy for your heart.  · Sprinkle salt-free herbal blends on vegetables and meat.  · Drain and rinse canned foods, such as canned beans and vegetables, before cooking or eating.  Eating out  · Tell the  you're on a low-salt diet. Ask questions about the menu.  · Order fish, chicken, and meat broiled, baked, poached, or grilled without salt, butter, or breading.  · Use lemon, pepper, and salt-free herb mixes to add flavor.  · Choose plain steamed rice, boiled noodles, and baked or boiled potatoes. Top potatoes with chives and a little sour cream.     Beware! Salt goes by many other names. Limit foods with these words listed as ingredients: salt, sodium, soy sauce, baking soda, baking powder, MSG, monosodium, Na (the chemical symbol for sodium). Some antacids are also high in salt.   Date Last Reviewed: 6/19/2015  © 9940-7579 [employer]. [address] [place] [address], [place] [address]. All rights reserved. This information is not intended as a substitute for professional medical care. Always follow your healthcare professional's instructions.

## 2018-09-24 ENCOUNTER — HOSPITAL ENCOUNTER (OUTPATIENT)
Dept: RADIOLOGY | Facility: OTHER | Age: 73
Discharge: HOME | End: 2018-09-24
Attending: FAMILY MEDICINE
Payer: MEDICARE

## 2018-09-24 ENCOUNTER — TELEPHONE (OUTPATIENT)
Dept: INTERNAL MEDICINE | Facility: CLINIC | Age: 73
End: 2018-09-24

## 2018-09-24 DIAGNOSIS — Z78.0 ASYMPTOMATIC MENOPAUSAL STATE: ICD-10-CM

## 2018-09-24 DIAGNOSIS — M85.80 OSTEOPENIA, UNSPECIFIED LOCATION: ICD-10-CM

## 2018-09-24 PROCEDURE — 77080 DXA BONE DENSITY AXIAL: CPT | Mod: TC

## 2018-09-24 PROCEDURE — 77080 DXA BONE DENSITY AXIAL: CPT | Mod: 26,,, | Performed by: RADIOLOGY

## 2018-09-24 NOTE — TELEPHONE ENCOUNTER
Called pt and informed her that io have to schedule an 2 weeks bp check. She informed me that she will be home in 10 mins and give her an call back since she is driving.

## 2018-09-26 ENCOUNTER — TELEPHONE (OUTPATIENT)
Dept: INTERNAL MEDICINE | Facility: CLINIC | Age: 73
End: 2018-09-26

## 2018-09-26 NOTE — TELEPHONE ENCOUNTER
This patient was referred to dermatology at her last visit. I just spoke with the patient and she was wondering if she needs to call them for an appointment or if they will call her.  Please let patient know what to do.    thanks

## 2018-09-26 NOTE — TELEPHONE ENCOUNTER
Spoke with patient about her lab results. Will recheck TSH in 2-3 months. Patient was previously taking [place] Thyroid and was switched to levothyroxine at last visit. [place] thyroid dose likely too high.   Also, advised patient her ASCVD risk is elevated and she meets criteria to start a statin. Patient not interested at this time. Will focus on lifestyle changes. Recommended mediterranean diet.   Also, patient has thalassemia, which explains her chronic anemia.    vomiting

## 2018-09-26 NOTE — TELEPHONE ENCOUNTER
lvm for pt to call office back in regards of   Call to schedule pt derma appt please schedule pt for dermatology

## 2018-10-08 ENCOUNTER — CLINICAL SUPPORT (OUTPATIENT)
Dept: INTERNAL MEDICINE | Facility: CLINIC | Age: 73
End: 2018-10-08
Payer: MEDICARE

## 2018-10-08 VITALS — DIASTOLIC BLOOD PRESSURE: 80 MMHG | OXYGEN SATURATION: 98 % | HEART RATE: 81 BPM | SYSTOLIC BLOOD PRESSURE: 110 MMHG

## 2018-10-08 PROCEDURE — 99999 PR PBB SHADOW E&M-EST. PATIENT-LVL II: CPT | Mod: PBBFAC,,,

## 2018-10-08 PROCEDURE — 99212 OFFICE O/P EST SF 10 MIN: CPT | Mod: PBBFAC

## 2018-10-08 NOTE — PATIENT INSTRUCTIONS
Valarie Colindres 73 y.o. female is here today for Blood Pressure check.   History of HTN yes.    Review of patient's allergies indicates:  No Known Allergies  Creatinine   Date Value Ref Range Status   09/24/2018 0.8 0.5 - 1.4 mg/dL Final     Sodium   Date Value Ref Range Status   09/24/2018 141 136 - 145 mmol/L Final     Potassium   Date Value Ref Range Status   09/24/2018 4.1 3.5 - 5.1 mmol/L Final   ]  Patient verifies taking blood pressure medications on a regular basis at the same time of the day.     Current Outpatient Medications:     hydroCHLOROthiazide (HYDRODIURIL) 12.5 MG Tab, Take 1 tablet (12.5 mg total) by mouth once daily., Disp: 30 tablet, Rfl: 1    valsartan (DIOVAN) 320 MG tablet, , Disp: , Rfl:     alendronate (FOSAMAX) 70 MG tablet, Take 1 tablet (70 mg total) by mouth every 7 days., Disp: 4 tablet, Rfl: 2    aspirin (ECOTRIN) 81 MG EC tablet, Take 81 mg by mouth once daily., Disp: , Rfl:     levothyroxine (SYNTHROID) 125 MCG tablet, Take 1 tablet (125 mcg total) by mouth before breakfast., Disp: 30 tablet, Rfl: 1    ranitidine (ZANTAC) 150 MG tablet, Take 1 tablet (150 mg total) by mouth 2 (two) times daily., Disp: 60 tablet, Rfl: 2  Does patient have record of home blood pressure readings yes. Readings have been averaging 109/72, 11/73, 129/81.   Last dose of blood pressure medication was taken at 7:00 am.  Patient is asymptomatic.   Complains of being a little dizzy at times. Denies any c/o headaches, chest pains, nausea , sob, numbness or tingling to extremities.    Pt brought in home blood pressure monitor. Was educated on the use of monitor.     BP: 110/80 , Pulse: 81 .     notified.

## 2018-10-18 ENCOUNTER — TELEPHONE (OUTPATIENT)
Dept: INTERNAL MEDICINE | Facility: CLINIC | Age: 73
End: 2018-10-18

## 2018-10-18 DIAGNOSIS — I10 ESSENTIAL HYPERTENSION: Primary | ICD-10-CM

## 2018-10-18 NOTE — TELEPHONE ENCOUNTER
----- Message from Nury Luciano sent at 10/18/2018 11:40 AM CDT -----  Contact: pt   Name of Who is Calling: VALARIE DENT [[id number]]    What is the request in detail: Patient is requesting a referral for [facility].....Please contact to further discuss and advise      Can the clinic reply by [facility]: No    What Number to Call Back if not [facility]: [phone]./[phone].

## 2018-10-19 NOTE — TELEPHONE ENCOUNTER
Called pt and inforemd her that her fitness referral have been enter.  pt showed verbal understanding

## 2018-11-18 ENCOUNTER — TELEPHONE (OUTPATIENT)
Dept: INTERNAL MEDICINE | Facility: CLINIC | Age: 73
End: 2018-11-18

## 2018-11-18 DIAGNOSIS — E03.9 HYPOTHYROIDISM, UNSPECIFIED TYPE: ICD-10-CM

## 2018-11-18 DIAGNOSIS — I10 ESSENTIAL HYPERTENSION: ICD-10-CM

## 2018-11-19 RX ORDER — HYDROCHLOROTHIAZIDE 12.5 MG/1
TABLET ORAL
Qty: 30 TABLET | Refills: 2 | Status: SHIPPED | OUTPATIENT
Start: 2018-11-19 | End: 2019-01-10 | Stop reason: SDUPTHER

## 2018-11-19 RX ORDER — LEVOTHYROXINE SODIUM 125 UG/1
125 TABLET ORAL
Qty: 30 TABLET | Refills: 0 | Status: SHIPPED | OUTPATIENT
Start: 2018-11-19 | End: 2018-11-27 | Stop reason: DRUGHIGH

## 2018-11-19 NOTE — TELEPHONE ENCOUNTER
Please call patient and inform that she is due to have her thyroid level checked, since we had to change her medication back in September. Please help patient schedule time to have lab done.    thanks

## 2018-11-20 NOTE — TELEPHONE ENCOUNTER
Called pt and informed her that due to have her thyroid level checked, since we had to change her medication back in September.  Schedule pt for an appt for 11/23/18.  pt showed verbal understanding

## 2018-11-23 ENCOUNTER — LAB VISIT (OUTPATIENT)
Dept: LAB | Facility: OTHER | Age: 73
End: 2018-11-23
Payer: MEDICARE

## 2018-11-23 DIAGNOSIS — E03.9 HYPOTHYROIDISM, UNSPECIFIED TYPE: ICD-10-CM

## 2018-11-23 LAB
T4 FREE SERPL-MCNC: 1.02 NG/DL
TSH SERPL DL<=0.005 MIU/L-ACNC: 0.26 UIU/ML

## 2018-11-23 PROCEDURE — 84439 ASSAY OF FREE THYROXINE: CPT

## 2018-11-23 PROCEDURE — 36415 COLL VENOUS BLD VENIPUNCTURE: CPT

## 2018-11-23 PROCEDURE — 84443 ASSAY THYROID STIM HORMONE: CPT

## 2018-11-27 ENCOUNTER — PATIENT MESSAGE (OUTPATIENT)
Dept: INTERNAL MEDICINE | Facility: CLINIC | Age: 73
End: 2018-11-27

## 2018-11-27 RX ORDER — LEVOTHYROXINE SODIUM 100 UG/1
100 TABLET ORAL DAILY
Qty: 30 TABLET | Refills: 2 | Status: SHIPPED | OUTPATIENT
Start: 2018-11-27 | End: 2019-03-02 | Stop reason: SDUPTHER

## 2018-11-27 NOTE — TELEPHONE ENCOUNTER
Please call patient and make sure she reads the [employer] message. It is about changing her thyroid medication.    thanks

## 2018-11-27 NOTE — TELEPHONE ENCOUNTER
lvm for pt to call office back in regards of   To take a look at her my chart Dr. Marinelli sent her a message

## 2018-11-29 ENCOUNTER — PATIENT MESSAGE (OUTPATIENT)
Dept: INTERNAL MEDICINE | Facility: CLINIC | Age: 73
End: 2018-11-29

## 2018-12-22 DIAGNOSIS — K21.9 GASTROESOPHAGEAL REFLUX DISEASE, ESOPHAGITIS PRESENCE NOT SPECIFIED: ICD-10-CM

## 2019-01-01 ENCOUNTER — PATIENT MESSAGE (OUTPATIENT)
Dept: INTERNAL MEDICINE | Facility: CLINIC | Age: 74
End: 2019-01-01

## 2019-01-10 ENCOUNTER — OFFICE VISIT (OUTPATIENT)
Dept: INTERNAL MEDICINE | Facility: CLINIC | Age: 74
End: 2019-01-10
Payer: MEDICARE

## 2019-01-10 VITALS
WEIGHT: 149.69 LBS | BODY MASS INDEX: 24.94 KG/M2 | HEIGHT: 65 IN | HEART RATE: 75 BPM | SYSTOLIC BLOOD PRESSURE: 116 MMHG | DIASTOLIC BLOOD PRESSURE: 68 MMHG | OXYGEN SATURATION: 98 %

## 2019-01-10 DIAGNOSIS — I10 ESSENTIAL HYPERTENSION: ICD-10-CM

## 2019-01-10 DIAGNOSIS — J02.9 SORE THROAT: Primary | ICD-10-CM

## 2019-01-10 DIAGNOSIS — M85.88 OSTEOPENIA OF LUMBAR SPINE: ICD-10-CM

## 2019-01-10 DIAGNOSIS — Z13.5 SCREENING FOR EYE CONDITION: ICD-10-CM

## 2019-01-10 DIAGNOSIS — E03.9 HYPOTHYROIDISM, UNSPECIFIED TYPE: ICD-10-CM

## 2019-01-10 DIAGNOSIS — R09.81 NASAL CONGESTION: ICD-10-CM

## 2019-01-10 PROBLEM — M54.2 NECK PAIN: Status: RESOLVED | Noted: 2018-09-19 | Resolved: 2019-01-10

## 2019-01-10 PROBLEM — S05.11XA TRAUMATIC HYPHEMA OF RIGHT EYE: Status: RESOLVED | Noted: 2017-10-13 | Resolved: 2019-01-10

## 2019-01-10 PROBLEM — H21.551: Status: RESOLVED | Noted: 2017-12-04 | Resolved: 2019-01-10

## 2019-01-10 PROBLEM — M54.50 LOW BACK PAIN: Status: RESOLVED | Noted: 2018-09-19 | Resolved: 2019-01-10

## 2019-01-10 LAB
CTP QC/QA: YES
S PYO RRNA THROAT QL PROBE: NEGATIVE

## 2019-01-10 PROCEDURE — 87880 STREP A ASSAY W/OPTIC: CPT | Mod: QW,S$GLB,, | Performed by: FAMILY MEDICINE

## 2019-01-10 PROCEDURE — 1101F PR PT FALLS ASSESS DOC 0-1 FALLS W/OUT INJ PAST YR: ICD-10-PCS | Mod: S$GLB,,, | Performed by: FAMILY MEDICINE

## 2019-01-10 PROCEDURE — 3078F PR MOST RECENT DIASTOLIC BLOOD PRESSURE < 80 MM HG: ICD-10-PCS | Mod: S$GLB,,, | Performed by: FAMILY MEDICINE

## 2019-01-10 PROCEDURE — 87880 POCT RAPID STREP A: ICD-10-PCS | Mod: QW,S$GLB,, | Performed by: FAMILY MEDICINE

## 2019-01-10 PROCEDURE — 99999 PR PBB SHADOW E&M-EST. PATIENT-LVL IV: CPT | Mod: PBBFAC,,, | Performed by: FAMILY MEDICINE

## 2019-01-10 PROCEDURE — 3074F SYST BP LT 130 MM HG: CPT | Mod: S$GLB,,, | Performed by: FAMILY MEDICINE

## 2019-01-10 PROCEDURE — 3074F PR MOST RECENT SYSTOLIC BLOOD PRESSURE < 130 MM HG: ICD-10-PCS | Mod: S$GLB,,, | Performed by: FAMILY MEDICINE

## 2019-01-10 PROCEDURE — 99214 OFFICE O/P EST MOD 30 MIN: CPT | Mod: S$GLB,,, | Performed by: FAMILY MEDICINE

## 2019-01-10 PROCEDURE — 99999 PR PBB SHADOW E&M-EST. PATIENT-LVL IV: ICD-10-PCS | Mod: PBBFAC,,, | Performed by: FAMILY MEDICINE

## 2019-01-10 PROCEDURE — 99214 PR OFFICE/OUTPT VISIT, EST, LEVL IV, 30-39 MIN: ICD-10-PCS | Mod: S$GLB,,, | Performed by: FAMILY MEDICINE

## 2019-01-10 PROCEDURE — 1101F PT FALLS ASSESS-DOCD LE1/YR: CPT | Mod: S$GLB,,, | Performed by: FAMILY MEDICINE

## 2019-01-10 PROCEDURE — 3078F DIAST BP <80 MM HG: CPT | Mod: S$GLB,,, | Performed by: FAMILY MEDICINE

## 2019-01-10 RX ORDER — VALSARTAN 320 MG/1
320 TABLET ORAL DAILY
Qty: 90 TABLET | Refills: 1 | Status: SHIPPED | OUTPATIENT
Start: 2019-01-10 | End: 2019-05-30 | Stop reason: SDUPTHER

## 2019-01-10 RX ORDER — HYDROCHLOROTHIAZIDE 12.5 MG/1
12.5 TABLET ORAL DAILY
Qty: 90 TABLET | Refills: 1 | Status: SHIPPED | OUTPATIENT
Start: 2019-01-10 | End: 2019-05-30 | Stop reason: SDUPTHER

## 2019-01-10 NOTE — PROGRESS NOTES
Patient ID: Valarie Colindres is a 73 y.o. female.    Chief Complaint: Sore Throat    HPI  Valarie Colindres is a 73 y.o. year old female with HTN, hypothyroid, osteopenia who presents today complaining of sore throat and nasal congestion.     Complains of loss of voice, nasal congestion, sore throat, and left neck soreness. Also frequently clearing throat, watery/itchy eyes. Began 2 weeks ago. Feeling better overall now. Voice is now back, much of the symptoms have improved. She wanted to make sure everything is ok.  Felt warm one night, but never checked temp.   Tried cough drops, Vicks VapoRub, Vicks cough drop - helps  Coricidin - helps  Benadryl - helps  Had sick contact with a family member with strep throat and a viral URI.     Osteopenia - has been taking Fosamax for about 5 years. Patient is unsure exactly, but thinks she has records at home.     Hypothyroid - dose was decreased to 100 mcg. Began taking about 1 month ago.   Lab Results   Component Value Date    TSH 0.258 (L) 2018     Hypertension - compliant with meds.     Exercise - [facility]: aerobic exercise, strength training   Diet - Doesn't add salt to foods. Rarely eats meals out. Doesn't use canned foods. Rare frozen dinner.     OB/GYN History     Patient denies history of pregnancy complications.  Patient is post-menopausal.  Sexually active: no  Last Pap smear: s/p hysterectomy due to uterine CA/precancerous lesion? Last pap 2018 - normal  Mammogram: 2018 - normal   DEXA - 18 - osteopenia of lumbar spine     Refused all vaccines today.     I personally reviewed Past Medical History, Past Surgical History, Social History, and Family History    Review of Systems   Constitutional: Negative for appetite change, chills, fatigue, fever and unexpected weight change.   HENT: Positive for congestion, rhinorrhea, sinus pressure, sneezing and sore throat. Negative for ear pain, hearing loss, sinus pain and trouble swallowing.   "  Eyes: Negative for visual disturbance.        +watery eyes   Respiratory: Negative for cough, shortness of breath and wheezing.    Cardiovascular: Negative for chest pain, palpitations and leg swelling.   Gastrointestinal: Negative for abdominal pain, constipation, diarrhea, nausea and vomiting.   Genitourinary: Negative for dysuria, frequency, menstrual problem and urgency.   Musculoskeletal: Negative for arthralgias and myalgias.   Skin: Negative for rash.   Neurological: Negative for dizziness, syncope and headaches.   Psychiatric/Behavioral: Negative for dysphoric mood and sleep disturbance. The patient is not nervous/anxious.        Objective:      Vitals:    01/10/19 1135   BP: 116/68   Pulse: 75   SpO2: 98%   Weight: 67.9 kg (149 lb 11.1 oz)   Height: 5' 5" (1.651 m)     Physical Exam   Constitutional: She is oriented to person, place, and time. She appears well-developed and well-nourished. No distress.   HENT:   Head: Normocephalic and atraumatic.   Right Ear: Hearing, tympanic membrane, external ear and ear canal normal.   Left Ear: Hearing, tympanic membrane, external ear and ear canal normal.   Nose: Nose normal.   Mouth/Throat: Oropharynx is clear and moist and mucous membranes are normal. No oropharyngeal exudate.   Eyes: Conjunctivae and lids are normal. Pupils are equal, round, and reactive to light.   Neck: Normal range of motion. No thyroid mass and no thyromegaly present.   Cardiovascular: Normal rate, regular rhythm, S1 normal, S2 normal and intact distal pulses.   No murmur heard.  No lower extremity edema.    Pulmonary/Chest: Effort normal and breath sounds normal. No respiratory distress.   Lymphadenopathy:     She has no cervical adenopathy.        Right: No supraclavicular adenopathy present.        Left: No supraclavicular adenopathy present.   Neurological: She is alert and oriented to person, place, and time.   Skin: Skin is warm and dry. No rash noted.   Psychiatric: She has a normal " mood and affect. Her behavior is normal. Thought content normal.   Nursing note and vitals reviewed.      Assessment:       1. Sore throat    2. Nasal congestion    3. Essential hypertension    4. Hypothyroidism, unspecified type    5. Osteopenia of lumbar spine    6. Screening for eye condition        Plan:   Diagnoses and all orders for this visit:    Sore throat  Nasal congestion        - Rapid strep negative today. Patient is improving and no concerning physical exam findings. Symptoms likely due to viral URI vs allergies vs both. Recommended she take anti-histamine until she feels better. Also try Mucinex. Discussed using humidity, warm liquids, hydration to help as well.  RTC if worsening or not improving.  -     POCT Rapid Strep A    Essential hypertension         - Controlled. Continue current medication regimen.   -     hydroCHLOROthiazide (HYDRODIURIL) 12.5 MG Tab; Take 1 tablet (12.5 mg total) by mouth once daily.  -     valsartan (DIOVAN) 320 MG tablet; Take 1 tablet (320 mg total) by mouth once daily.  -     Ambulatory Referral to Nutrition - [facility]    Hypothyroidism, unspecified type  - Continue levothyroxine 100 mcg for another month, then recheck TSH  -     TSH; Future     Osteopenia of lumbar spine        - Patient will look to see in her records how long she has been taking Fosamax for. May be time to stop the medication. Continue exercise and healthy diet.     Screening for eye condition  -     Ambulatory Referral to Optometry

## 2019-01-11 ENCOUNTER — TELEPHONE (OUTPATIENT)
Dept: INTERNAL MEDICINE | Facility: CLINIC | Age: 74
End: 2019-01-11

## 2019-01-16 ENCOUNTER — OFFICE VISIT (OUTPATIENT)
Dept: OPTOMETRY | Facility: CLINIC | Age: 74
End: 2019-01-16
Payer: MEDICARE

## 2019-01-16 DIAGNOSIS — I10 ESSENTIAL HYPERTENSION: ICD-10-CM

## 2019-01-16 DIAGNOSIS — H52.4 HYPEROPIA WITH PRESBYOPIA OF BOTH EYES: ICD-10-CM

## 2019-01-16 DIAGNOSIS — H25.13 NUCLEAR SCLEROSIS OF BOTH EYES: Primary | ICD-10-CM

## 2019-01-16 DIAGNOSIS — H02.132 SENILE ECTROPION OF RIGHT LOWER EYELID: ICD-10-CM

## 2019-01-16 DIAGNOSIS — H21.551 ANGLE RECESSION OF RIGHT EYE: ICD-10-CM

## 2019-01-16 DIAGNOSIS — H52.03 HYPEROPIA WITH PRESBYOPIA OF BOTH EYES: ICD-10-CM

## 2019-01-16 PROCEDURE — 99999 PR PBB SHADOW E&M-EST. PATIENT-LVL II: CPT | Mod: PBBFAC,,, | Performed by: OPTOMETRIST

## 2019-01-16 PROCEDURE — 92015 PR REFRACTION: ICD-10-PCS | Mod: S$GLB,,, | Performed by: OPTOMETRIST

## 2019-01-16 PROCEDURE — 99999 PR PBB SHADOW E&M-EST. PATIENT-LVL II: ICD-10-PCS | Mod: PBBFAC,,, | Performed by: OPTOMETRIST

## 2019-01-16 PROCEDURE — 92014 PR EYE EXAM, EST PATIENT,COMPREHESV: ICD-10-PCS | Mod: S$GLB,,, | Performed by: OPTOMETRIST

## 2019-01-16 PROCEDURE — 92014 COMPRE OPH EXAM EST PT 1/>: CPT | Mod: S$GLB,,, | Performed by: OPTOMETRIST

## 2019-01-16 PROCEDURE — 92015 DETERMINE REFRACTIVE STATE: CPT | Mod: S$GLB,,, | Performed by: OPTOMETRIST

## 2019-01-16 NOTE — LETTER
January 16, 2019      Shannan Marinelli MD  [address]  [address]  [facility] [address]           [facility]  [address]  [facility] [address]  Phone: [phone]  Fax: [phone]          Patient: Valarie Colindres   MR Number: [id number]   YOB: 1945   Date of Visit: 1/16/2019       Dear Dr. Shannan Marinelli:    Thank you for referring Valarie Colindres to me for evaluation. Attached you will find relevant portions of my assessment and plan of care.    If you have questions, please do not hesitate to call me. I look forward to following Valarie Colindres along with you.    Sincerely,    Zonia Cruz, OD    Enclosure  CC:  No Recipients    If you would like to receive this communication electronically, please contact [email or web address] or [phone] to request more information on [employer] access.    For providers and/or their staff who would like to refer a patient to [facility], please contact us through our one-stop-shop provider referral line, [facility], at [phone].    If you feel you have received this communication in error or would no longer like to receive these types of communications, please e-mail [email or web address]

## 2019-01-16 NOTE — PROGRESS NOTES
HPI     Last eye exam was 5/24/18 with Dr. Hsu.  Patient states most recent glasses broke and wanted to get an updated rx   today. No vision complaints prior to glasses breaking. Occasionally sees   floaters OU. Will also gets headaches but thinks they are related to   sinuses. Wakes up and feels like a film over OD but goes away after   blinking a few times.   Patient denies diplopia, flashes, and pain.        Last edited by Leonila Phillips on 1/16/2019  9:49 AM. (History)            Assessment /Plan     For exam results, see Encounter Report.    Nuclear sclerosis of both eyes    Essential hypertension    Senile ectropion of right lower eyelid    Angle recession of right eye    Hyperopia with presbyopia of both eyes            1.  Educated on cataracts and affects on vision.  Early-monitor.  2.  No retinopathy--monitor yearly.  BP control.  3.  Ectropion repair done last year but still has mild punta eversion.  Patient reports some tearing.  Monitor.  4.  Mild angle recession secondary to trauma--hit with exercise band.  IOPs normal and optic nerves healthy.  Monitor.   5.  Bifocal rx given

## 2019-01-25 ENCOUNTER — NUTRITION (OUTPATIENT)
Dept: NUTRITION | Facility: CLINIC | Age: 74
End: 2019-01-25
Payer: MEDICARE

## 2019-01-25 DIAGNOSIS — I10 ESSENTIAL HYPERTENSION: Primary | ICD-10-CM

## 2019-01-25 DIAGNOSIS — Z71.3 DIETARY COUNSELING AND SURVEILLANCE: ICD-10-CM

## 2019-01-25 PROCEDURE — 97802 MEDICAL NUTRITION INDIV IN: CPT | Mod: S$GLB,,, | Performed by: DIETITIAN, REGISTERED

## 2019-01-25 PROCEDURE — 97802 PR MED NUTR THER, 1ST, INDIV, EA 15 MIN: ICD-10-PCS | Mod: S$GLB,,, | Performed by: DIETITIAN, REGISTERED

## 2019-01-25 NOTE — PROGRESS NOTES
"Nutrition Assessment for Initial Medical Nutrition Therapy    Referring professional: self    Reason for MNT visit: Pt in for education and nutrition counseling regarding dietary approaches for hypertension.       ASSESSMENT    Age: 73 y.o.  Wt:   Wt Readings from Last 1 Encounters:   01/10/19 67.9 kg (149 lb 11.1 oz)     Ht:   Ht Readings from Last 1 Encounters:   01/10/19 5' 5" (1.651 m)     BMI:   BMI Readings from Last 1 Encounters:   01/10/19 24.91 kg/m²       Clinical signs/symptoms:   N/V/D: denies  Appetite: good     Medical History:   Past Medical History:   Diagnosis Date    Angle recession of right eye     Blunt trauma of both eyes     hit across eyes with bungee exercise band    Cataract     Ectropion due to laxity of eyelid, right     GERD (gastroesophageal reflux disease)     Hyperlipidemia     Hypertension     PVD (posterior vitreous detachment), right eye     Retinal drusen of both eyes     Thalassemia major      Problem List           Resolved    Nuclear sclerotic cataract of both eyes         Essential hypertension         Gastroesophageal reflux disease         History of gastric ulcer         Irritable bowel syndrome         Hypothyroidism         Osteopenia of lumbar spine               Medications:   Current Outpatient Medications:     alendronate (FOSAMAX) 70 MG tablet, Take 1 tablet (70 mg total) by mouth every 7 days., Disp: 4 tablet, Rfl: 2    hydroCHLOROthiazide (HYDRODIURIL) 12.5 MG Tab, Take 1 tablet (12.5 mg total) by mouth once daily., Disp: 90 tablet, Rfl: 1    levothyroxine (SYNTHROID) 100 MCG tablet, Take 1 tablet (100 mcg total) by mouth once daily., Disp: 30 tablet, Rfl: 2    ranitidine (ZANTAC) 150 MG tablet, TAKE 1 TABLET BY MOUTH TWICE A DAY, Disp: 60 tablet, Rfl: 0    valsartan (DIOVAN) 320 MG tablet, Take 1 tablet (320 mg total) by mouth once daily., Disp: 90 tablet, Rfl: 1    Supplements: [employer] power pack herbs 3 times per week "     Food/medication interactions:   Hydrodiuril- Avoid natural licorice; caution with Ca or Vit D supplement, may need K or Mg supp (check, is taking KCl)  losartan - Caution w/ K supplement or salt sub; decrease sodium, decrease calories may be recommended; Avoid natural licorice; Caution w/ grapefruit + related citrus  Synthroid - Take Fe, Ca, or Mg supplement separately from drug by >/ 4 hr (may decrease absorption); Decreased absorption also reported w/ soy, soy milk, soy infant formula, walnuts, cottonseed meal and high fiber foods; Caution w/ grapefruit + related citrus.      Food allergies  intolerances: lactose     Labs:  Reviewed   No results found for: HGBA1C  Cholesterol   Date Value Ref Range Status   09/24/2018 210 (H) 120 - 199 mg/dL Final     Comment:     The National Cholesterol Education Program (NCEP) has set the  following guidelines (reference ranges) for Cholesterol:  Optimal.....................<200 mg/dL  Borderline High.............200-239 mg/dL  High........................> or = 240 mg/dL       Triglycerides   Date Value Ref Range Status   09/24/2018 87 30 - 150 mg/dL Final     Comment:     The National Cholesterol Education Program (NCEP) has set the  following guidelines (reference values) for triglycerides:  Normal......................<150 mg/dL  Borderline High.............150-199 mg/dL  High........................200-499 mg/dL       HDL   Date Value Ref Range Status   09/24/2018 60 40 - 75 mg/dL Final     Comment:     The National Cholesterol Education Program (NCEP) has set the  following guidelines (reference values) for HDL Cholesterol:  Low...............<40 mg/dL  Optimal...........>60 mg/dL       LDL Cholesterol   Date Value Ref Range Status   09/24/2018 132.6 63.0 - 159.0 mg/dL Final     Comment:     The National Cholesterol Education Program (NCEP) has set the  following guidelines (reference values) for LDL Cholesterol:  Optimal.......................<130 mg/dL  Borderline  High...............130-159 mg/dL  High..........................160-189 mg/dL  Very High.....................>190 mg/dL         Typical day recall:    Not consistent- sometimes very little- sometimes a lot on weekly basis she reports   Up around 8am    8-9am  Breakfast   Gadiel chicken chris or apple sausage - 3 links  With waffle or two (eggo blueberry)  Or   Pancakes- mix - Bisquik Heart Smart with egg + lactaid sprinkle cinnamon or vanilla - sometimes honey   Coconut nectar from whole foods     Homemade juice or green drink as she called it with kale- nelly-parsley- celery,  pineapple (3-4 sticks), apples, oranges,  Could have any time of day- am, afternoon, with meal, etc    1-2pm lunch   5-7pm dinner  Hart with mashed potatoes sometimes, broccoli or mixed veggies  Doesnt have a set type of food for a set time of day   Typically 3meals with a bedtime snack of something like grilled cheese or PB & J  - Natures Own Honey Wheat or 100% wheat - lately. Sometimes Brioche   Sunchips     8pm -9pm   Has a sweet tooth,   If buys at the store then its gone - no self control -   Dark chocolate   Brownie  Chocolate cake  Apple cake  Couple days, whenever feeling hits  Not daily, not weekly though    10-12pm bed     Beverages:     Water  green tea, peppermint, chamomile, (honey in tea and abad when has)   rarely coke   Sometimes ice tea (Nena)  lactaid  Protein drink but not often -plant based powder from whole foods. mixed with banana   Bnanana, blueberries or strawberries not even weekly       Dining out: very selfom    Alcohol: Toddy if has a cold, but not often     Lifestyle Influences  Support System: self, sister    Meal preparation/shopping: self; [employer], [employer], [employer]     Current Activity Level: 3-4 days per week at the fitness center plus 1 day per week strength training    Patient motivation, anticipated barriers, expected compliance: Patient verbalized her understanding and intent to comply      DIAGNOSIS   Problem:  Food and nutrition related knowledge deficit  Etiology: lack of prior exposure to accurate nutrition related information   Signs/Symptoms: patient reports as related to blood pressure, sweets consumption, whole grains, etc  hyperlipidemia  hypertension     INTERVENTION  Nutrition prescription: estimated energy requirements: 5054-5600 calories per day (22 kcal/kg)   Estimated minimum protein needs: 54-68 g/kg bw (.8-1 g/kg bw)    Recommendations & Goals:  Patient goals and recommendations are tailored to the specific patient's needs, readiness to change, lifestyle, culture, skills, resources, & abilities. Strategies to help achieve these nutrition-related goals were discussed which can include but are not limited to SMART goal setting & mindful eating.    Continue with regular exercise  Continue to eat breakfast within 1-2 hours of waking up  Aim to have meals or snacks approx every 3-4 hours, trying to incorporate fruits and /or veggies (specific examples were discussed and provided in customized meal plan)  At each meal and snack, try to include a source of fiber + lean protein + healthy fat.   Discussed lower sodium techniques for when cooking (I.e. Pinch of sea salt at the end of cooking, not using the salt shaker, no salt, no sugar seasoning blends)  Discussed DASH diet principles  Discussed heart healthier red meats that can be incorporated per patient questions as well as how to optimize plant-based iron intake with dark leafy greens + vitamin C rich foods  Discussed goal of 75 oz fluid per day    Written Materials Provided  These resources are intended to assist the patient in making it easier to choose recommended options when eating out & to identify better-for-you brands at the grocery store:     Customized meal plan    Fast Food Lite Guide   Eat Fit Grocery Product list    RD contact information- for patient to contact regarding any questions, needs, and/or concerns that  may arise     MONITORING & EVALUATION    Communicated with healthcare provider: cc'd PCP on note     Documented plan for referral to appropriate agency/healthcare provider as needed:  Not needed at this time     Comprehension: good     Motivation to change: high    Follow-up: 2-3 months     Counseling time: 1 Hour 30 Minutes

## 2019-03-02 DIAGNOSIS — E03.9 HYPOTHYROIDISM, UNSPECIFIED TYPE: Primary | ICD-10-CM

## 2019-03-03 RX ORDER — LEVOTHYROXINE SODIUM 100 UG/1
TABLET ORAL
Qty: 30 TABLET | Refills: 0 | Status: SHIPPED | OUTPATIENT
Start: 2019-03-03 | End: 2019-04-07 | Stop reason: SDUPTHER

## 2019-03-03 NOTE — TELEPHONE ENCOUNTER
Please inform patient that she is due to have her thyroid lab rechecked. Order is in. Please have her go to the lab as soon as she can    Thanks!

## 2019-03-04 ENCOUNTER — PATIENT MESSAGE (OUTPATIENT)
Dept: INTERNAL MEDICINE | Facility: CLINIC | Age: 74
End: 2019-03-04

## 2019-04-02 RX ORDER — LEVOTHYROXINE SODIUM 100 UG/1
TABLET ORAL
Qty: 30 TABLET | Refills: 0 | OUTPATIENT
Start: 2019-04-02

## 2019-04-02 NOTE — TELEPHONE ENCOUNTER
Please contact patient and inform that she is due to have her thyroid lab rechecked prior to refills. Since we had to adjust her medication last time.   She can go to the lab at any time to have this done.     thanks

## 2019-04-02 NOTE — TELEPHONE ENCOUNTER
zoilam for pt to call office back in regards of   Crow, this is Micky calling from  clinic at [facility]. I just wanted to let you know that she is due for a thyroid medication refill but we need her labs done before medication can be refill

## 2019-04-07 RX ORDER — LEVOTHYROXINE SODIUM 100 UG/1
TABLET ORAL
Qty: 30 TABLET | Refills: 0 | Status: SHIPPED | OUTPATIENT
Start: 2019-04-07 | End: 2019-05-12 | Stop reason: SDUPTHER

## 2019-04-07 NOTE — TELEPHONE ENCOUNTER
Please CALL (not mychart message) patient and inform that she is due to come in to have her thyroid lab rechecked. She can come to the lab anytime to have that done.    thanks

## 2019-04-09 ENCOUNTER — PATIENT MESSAGE (OUTPATIENT)
Dept: INTERNAL MEDICINE | Facility: CLINIC | Age: 74
End: 2019-04-09

## 2019-04-09 ENCOUNTER — LAB VISIT (OUTPATIENT)
Dept: LAB | Facility: OTHER | Age: 74
End: 2019-04-09
Attending: FAMILY MEDICINE
Payer: MEDICARE

## 2019-04-09 DIAGNOSIS — E03.9 HYPOTHYROIDISM, UNSPECIFIED TYPE: ICD-10-CM

## 2019-04-09 LAB — TSH SERPL DL<=0.005 MIU/L-ACNC: 0.75 UIU/ML (ref 0.4–4)

## 2019-04-09 PROCEDURE — 36415 COLL VENOUS BLD VENIPUNCTURE: CPT

## 2019-04-09 PROCEDURE — 84443 ASSAY THYROID STIM HORMONE: CPT

## 2019-04-09 NOTE — TELEPHONE ENCOUNTER
Please CALL (not mychart) patient and inform that her thyroid blood test was normal. She can continue taking levothyroxine 100 mcg daily and we will recheck her thyroid in 1 year.    Thanks!

## 2019-04-09 NOTE — TELEPHONE ENCOUNTER
zeynep for pt to call office back in regards of     Hello, this is Micky calling from  clinic at [facility]. I just wanted to let you know that your thyroid lab came back normal and you can continue taking levothyroxine 100 mcg daily and we will recheck her thyroid in 1 year.

## 2019-05-12 RX ORDER — LEVOTHYROXINE SODIUM 100 UG/1
TABLET ORAL
Qty: 30 TABLET | Refills: 9 | Status: SHIPPED | OUTPATIENT
Start: 2019-05-12 | End: 2019-05-30

## 2019-05-27 ENCOUNTER — TELEPHONE (OUTPATIENT)
Dept: INTERNAL MEDICINE | Facility: CLINIC | Age: 74
End: 2019-05-27

## 2019-05-27 DIAGNOSIS — Z12.31 ENCOUNTER FOR SCREENING MAMMOGRAM FOR BREAST CANCER: Primary | ICD-10-CM

## 2019-05-27 DIAGNOSIS — Z12.39 SCREENING FOR BREAST CANCER: ICD-10-CM

## 2019-05-27 NOTE — TELEPHONE ENCOUNTER
----- Message from Manuela Garcia sent at 5/24/2019  4:16 PM CDT -----  Contact: Self   Name of Who is Calling: VALARIE DENT [[id number]]      What is the request in detail: Pt would like to get a order so that she can get her annual mammo, last one was done at [facility] in 4/18/2018. Please contact to further discuss and advise.      Can the clinic reply by [facility]: N       What Number to Call Back [facility] not [facility]: [phone]

## 2019-05-27 NOTE — TELEPHONE ENCOUNTER
Signed pt mammo orders      lvm for pt to call office back in regards of       Crow, this is Micky calling from Dr.Voithofer oleary at [facility]. I just wanted to let you know that your mammo orders have been signed and can call [phone] to schedule a appt    Sent my chart message.

## 2019-05-29 ENCOUNTER — PATIENT OUTREACH (OUTPATIENT)
Dept: ADMINISTRATIVE | Facility: HOSPITAL | Age: 74
End: 2019-05-29

## 2019-05-30 ENCOUNTER — OFFICE VISIT (OUTPATIENT)
Dept: INTERNAL MEDICINE | Facility: CLINIC | Age: 74
End: 2019-05-30
Payer: MEDICARE

## 2019-05-30 ENCOUNTER — LAB VISIT (OUTPATIENT)
Dept: LAB | Facility: OTHER | Age: 74
End: 2019-05-30
Attending: FAMILY MEDICINE
Payer: MEDICARE

## 2019-05-30 VITALS
BODY MASS INDEX: 24.72 KG/M2 | OXYGEN SATURATION: 98 % | WEIGHT: 148.38 LBS | DIASTOLIC BLOOD PRESSURE: 70 MMHG | HEIGHT: 65 IN | SYSTOLIC BLOOD PRESSURE: 102 MMHG | HEART RATE: 60 BPM

## 2019-05-30 DIAGNOSIS — M85.88 OSTEOPENIA OF LUMBAR SPINE: ICD-10-CM

## 2019-05-30 DIAGNOSIS — I10 ESSENTIAL HYPERTENSION: ICD-10-CM

## 2019-05-30 DIAGNOSIS — M70.61 TROCHANTERIC BURSITIS OF RIGHT HIP: Primary | ICD-10-CM

## 2019-05-30 DIAGNOSIS — E03.9 ACQUIRED HYPOTHYROIDISM: ICD-10-CM

## 2019-05-30 DIAGNOSIS — Z01.419 VISIT FOR PELVIC EXAM: ICD-10-CM

## 2019-05-30 LAB
25(OH)D3+25(OH)D2 SERPL-MCNC: 31 NG/ML (ref 30–96)
ALBUMIN SERPL BCP-MCNC: 4.3 G/DL (ref 3.5–5.2)
ALP SERPL-CCNC: 40 U/L (ref 55–135)
ALT SERPL W/O P-5'-P-CCNC: 12 U/L (ref 10–44)
ANION GAP SERPL CALC-SCNC: 8 MMOL/L (ref 8–16)
AST SERPL-CCNC: 15 U/L (ref 10–40)
BILIRUB SERPL-MCNC: 0.5 MG/DL (ref 0.1–1)
BUN SERPL-MCNC: 19 MG/DL (ref 8–23)
CALCIUM SERPL-MCNC: 9.8 MG/DL (ref 8.7–10.5)
CHLORIDE SERPL-SCNC: 101 MMOL/L (ref 95–110)
CO2 SERPL-SCNC: 31 MMOL/L (ref 23–29)
CREAT SERPL-MCNC: 0.9 MG/DL (ref 0.5–1.4)
EST. GFR  (AFRICAN AMERICAN): >60 ML/MIN/1.73 M^2
EST. GFR  (NON AFRICAN AMERICAN): >60 ML/MIN/1.73 M^2
GLUCOSE SERPL-MCNC: 95 MG/DL (ref 70–110)
POTASSIUM SERPL-SCNC: 4.3 MMOL/L (ref 3.5–5.1)
PROT SERPL-MCNC: 7.7 G/DL (ref 6–8.4)
SODIUM SERPL-SCNC: 140 MMOL/L (ref 136–145)

## 2019-05-30 PROCEDURE — 99214 PR OFFICE/OUTPT VISIT, EST, LEVL IV, 30-39 MIN: ICD-10-PCS | Mod: S$GLB,,, | Performed by: FAMILY MEDICINE

## 2019-05-30 PROCEDURE — 3078F PR MOST RECENT DIASTOLIC BLOOD PRESSURE < 80 MM HG: ICD-10-PCS | Mod: S$GLB,,, | Performed by: FAMILY MEDICINE

## 2019-05-30 PROCEDURE — 99999 PR PBB SHADOW E&M-EST. PATIENT-LVL IV: ICD-10-PCS | Mod: PBBFAC,,, | Performed by: FAMILY MEDICINE

## 2019-05-30 PROCEDURE — 82306 VITAMIN D 25 HYDROXY: CPT

## 2019-05-30 PROCEDURE — 3078F DIAST BP <80 MM HG: CPT | Mod: S$GLB,,, | Performed by: FAMILY MEDICINE

## 2019-05-30 PROCEDURE — 1101F PT FALLS ASSESS-DOCD LE1/YR: CPT | Mod: S$GLB,,, | Performed by: FAMILY MEDICINE

## 2019-05-30 PROCEDURE — 99214 OFFICE O/P EST MOD 30 MIN: CPT | Mod: S$GLB,,, | Performed by: FAMILY MEDICINE

## 2019-05-30 PROCEDURE — 36415 COLL VENOUS BLD VENIPUNCTURE: CPT

## 2019-05-30 PROCEDURE — 3074F PR MOST RECENT SYSTOLIC BLOOD PRESSURE < 130 MM HG: ICD-10-PCS | Mod: S$GLB,,, | Performed by: FAMILY MEDICINE

## 2019-05-30 PROCEDURE — 3074F SYST BP LT 130 MM HG: CPT | Mod: S$GLB,,, | Performed by: FAMILY MEDICINE

## 2019-05-30 PROCEDURE — 99999 PR PBB SHADOW E&M-EST. PATIENT-LVL IV: CPT | Mod: PBBFAC,,, | Performed by: FAMILY MEDICINE

## 2019-05-30 PROCEDURE — 80053 COMPREHEN METABOLIC PANEL: CPT

## 2019-05-30 PROCEDURE — 1101F PR PT FALLS ASSESS DOC 0-1 FALLS W/OUT INJ PAST YR: ICD-10-PCS | Mod: S$GLB,,, | Performed by: FAMILY MEDICINE

## 2019-05-30 RX ORDER — LEVOTHYROXINE SODIUM 100 UG/1
100 TABLET ORAL DAILY
Qty: 30 TABLET | Refills: 10 | Status: SHIPPED | OUTPATIENT
Start: 2019-05-30 | End: 2020-06-08

## 2019-05-30 RX ORDER — DICLOFENAC SODIUM 10 MG/G
4 GEL TOPICAL 4 TIMES DAILY PRN
Qty: 100 G | Refills: 1 | Status: SHIPPED | OUTPATIENT
Start: 2019-05-30 | End: 2019-05-31

## 2019-05-30 RX ORDER — THYROID 120 MG/1
TABLET ORAL
COMMUNITY
Start: 2018-09-21 | End: 2019-05-30

## 2019-05-30 RX ORDER — HYDROCHLOROTHIAZIDE 12.5 MG/1
12.5 TABLET ORAL DAILY
Qty: 30 TABLET | Refills: 5 | Status: SHIPPED | OUTPATIENT
Start: 2019-05-30 | End: 2019-11-18 | Stop reason: SDUPTHER

## 2019-05-30 RX ORDER — VALSARTAN 320 MG/1
320 TABLET ORAL DAILY
Qty: 30 TABLET | Refills: 5 | Status: SHIPPED | OUTPATIENT
Start: 2019-05-30 | End: 2019-11-18 | Stop reason: SDUPTHER

## 2019-05-30 NOTE — PATIENT INSTRUCTIONS
Understanding Trochanteric Bursitis    A bursa is a thin, slippery, sac-like film. It contains a small amount of fluid. This structure is found between bones and soft tissues in and around joints. A bursa cushions and protects a joint. It keeps parts of a joint from rubbing against each other. If a bursa becomes inflamed and irritated, it is known as bursitis.  The trochanteric bursa is found on the hip joint. It lies on top of the bump at the top of the thighbone called the greater trochanter. Inflammation of this bursa is called trochanteric bursitis.     How to say it  [id number]   Causes of trochanteric bursitis  Causes may include:  · Overuse of the hip during running or other sports, dance, or work  · Falling on or irritation to the side of the hip  This condition may occur along with other problems, such as osteoarthritis of the hip or knee, or low back problems. In rare cases, it may occur after hip surgery.  Symptoms of trochanteric bursitis  · Pain or aching on the side of the hip. The pain may travel down the leg.  · Swelling, tenderness, or warmth on the side of the hip at the bony bump at the top of the thigh  Treatment for trochanteric bursitis  These may include:  · Resting the hip. This allows the bursa to heal.  · Prescription or over-the-counter pain medicines. These help reduce inflammation, swelling, and pain.  · Cold packs and heat packs. These help reduce pain and swelling.  · Stretching and strengthening exercises. These improve flexibility and strength around the hip.  · Physical therapy. This includes exercises or other treatments.  · Injections of medicine into the bursa. This may help reduce inflammation and relieve symptoms.  Possible complications  If you dont give your hip time to heal, the problem may not go away, may return, or may get worse. Rest and treat your hip as directed.     When to call your healthcare provider  Call your healthcare provider right away if you have  any of these:  · Fever of 100.4°F (38°C) or higher, or as directed  · Redness, swelling, or warmth that gets worse  · Symptoms that dont get better with prescribed medicines, or get worse  · New symptoms   Date Last Reviewed: 3/29/2016  © 1556-7323 [employer] [address] [place] [address], [place] [address]. All rights reserved. This information is not intended as a substitute for professional medical care. Always follow your healthcare professional's instructions.

## 2019-05-30 NOTE — PROGRESS NOTES
Patient ID: Valarie Colindres is a 74 y.o. female.    Chief Complaint: Hip Pain (3 months )    HPI  Valarie Colindres is a 74 y.o. year old female with HTN, hypothyroid, osteopenia who presents today complaining of right hip pain.     Hip Pain    Incident onset: Pain began 2.5 months ago. There was no injury mechanism. The pain is present in the right hip. Located on lateral aspect. Pain worse when she lays on the right side. The quality of the pain is described as aching. The pain is at a severity of 7/10. The pain has been constant since onset. Pertinent negatives include no inability to bear weight, muscle weakness, numbness or tingling. Nothing aggravates the symptoms. She has tried nothing for the symptoms.     Osteopenia - currently taking Fosamax for at least 5 years.      Hypothyroid - compliant with levothyroxine 100 mcg daily  Lab Results   Component Value Date    TSH 0.752 2019     Hypertension - compliant with meds.      Exercise - [employer] [employer]: aerobic exercise, strength training   Diet - Doesn't add salt to foods. Rarely eats meals out. Doesn't use canned foods. Rare frozen dinner.     OB/GYN History     Patient denies history of pregnancy complications.  Patient is post-menopausal.  Sexually active: no    Health Maintenance  Pap smear: s/p hysterectomy due to uterine CA/precancerous lesion? Last pap 2018 - normal  Mammogram: 2018 - normal. Has another scheduled.   Colon Cancer Screening: colonoscopy 4 years ago or so - normal. Done with Dr. Mabry (EJ)  DEXA:  18 - osteopenia of lumbar spine  Hepatitis C screening: n/a  Flu vaccine:   Tetanus vaccine:  PNA vaccine:   Shingles vaccine:      Refused all vaccines today.      I personally reviewed Past Medical History, Past Surgical History, Social History, and Family History    Review of Systems   Constitutional: Negative for chills, fatigue, fever and unexpected weight change.   HENT: Negative for congestion, hearing  "loss, rhinorrhea and sore throat.    Eyes: Negative for visual disturbance.   Respiratory: Negative for cough, shortness of breath and wheezing.    Cardiovascular: Negative for chest pain, palpitations and leg swelling.   Gastrointestinal: Negative for abdominal pain, constipation, diarrhea, nausea and vomiting.   Genitourinary: Negative for dysuria, frequency, menstrual problem and urgency.   Musculoskeletal: Positive for neck pain. Negative for arthralgias and myalgias.        + right thigh pain   Skin: Negative for rash.   Neurological: Negative for dizziness, tingling, syncope, numbness and headaches.   Psychiatric/Behavioral: Negative for dysphoric mood and sleep disturbance. The patient is not nervous/anxious.        Objective:      Vitals:    05/30/19 0919   BP: 102/70   Pulse: 60   SpO2: 98%   Weight: 67.3 kg (148 lb 5.9 oz)   Height: 5' 5" (1.651 m)     Physical Exam   Constitutional: She is oriented to person, place, and time. She appears well-developed and well-nourished. No distress.   HENT:   Head: Normocephalic and atraumatic.   Eyes: Conjunctivae and EOM are normal.   Neck: Normal range of motion.   Cardiovascular: Normal rate, regular rhythm and normal heart sounds.   No murmur heard.  Pulmonary/Chest: Effort normal and breath sounds normal. No respiratory distress.   Musculoskeletal:        Right hip: She exhibits normal range of motion, normal strength and no tenderness.        Cervical back: She exhibits no tenderness.   Tenderness to palpation of right lateral hip over trochanteric bursa    Mild TTP of left shoulder/trapezius, presence of muscle spasm    Neurological: She is alert and oriented to person, place, and time.   Skin: Skin is warm and dry. No rash noted.   Psychiatric: She has a normal mood and affect. Her behavior is normal. Thought content normal.   Nursing note and vitals reviewed.      Assessment:       1. Trochanteric bursitis of right hip    2. Essential hypertension    3. " Osteopenia of lumbar spine    4. Acquired hypothyroidism    5. Visit for pelvic exam        Plan:   Valarie was seen today for hip pain.    Diagnoses and all orders for this visit:    Trochanteric bursitis of right hip  - Patient not interested in steroid injection into the bursa or oral steroids. She will try topical medications - will try voltaren gel. Unsure if it will penetrate to the area. Also gave handout on home Tx to try. RTC if worsening or not improving.  -     diclofenac sodium (VOLTAREN) 1 % Gel; Apply 4 g topically 4 (four) times daily as needed.    Essential hypertension  - Controlled. Continue current medication regimen.   -     hydroCHLOROthiazide (HYDRODIURIL) 12.5 MG Tab; Take 1 tablet (12.5 mg total) by mouth once daily.  -     valsartan (DIOVAN) 320 MG tablet; Take 1 tablet (320 mg total) by mouth once daily.  -     Comprehensive metabolic panel; Future    Osteopenia of lumbar spine  - Advised patient to stop alendronate due to duration of therapy. Will check vitamin D. Will recheck DEXA in 2020. If worse, will consider another agent other than bisphosphate.   -     Vitamin D; Future    Acquired hypothyroidism  - Controlled. Continue current medication regimen.   -     levothyroxine (SYNTHROID) 100 MCG tablet; Take 1 tablet (100 mcg total) by mouth once daily.    Visit for pelvic exam  -     Ambulatory Referral to Obstetrics / Gynecology

## 2019-05-31 ENCOUNTER — HOSPITAL ENCOUNTER (OUTPATIENT)
Dept: RADIOLOGY | Facility: OTHER | Age: 74
Discharge: HOME | End: 2019-05-31
Attending: FAMILY MEDICINE
Payer: MEDICARE

## 2019-05-31 ENCOUNTER — OFFICE VISIT (OUTPATIENT)
Dept: OBSTETRICS AND GYNECOLOGY | Facility: CLINIC | Age: 74
End: 2019-05-31
Payer: MEDICARE

## 2019-05-31 ENCOUNTER — PATIENT MESSAGE (OUTPATIENT)
Dept: INTERNAL MEDICINE | Facility: CLINIC | Age: 74
End: 2019-05-31

## 2019-05-31 VITALS — SYSTOLIC BLOOD PRESSURE: 102 MMHG | DIASTOLIC BLOOD PRESSURE: 58 MMHG

## 2019-05-31 DIAGNOSIS — Z01.419 WOMEN'S ANNUAL ROUTINE GYNECOLOGICAL EXAMINATION: Primary | ICD-10-CM

## 2019-05-31 DIAGNOSIS — Z12.31 ENCOUNTER FOR SCREENING MAMMOGRAM FOR BREAST CANCER: ICD-10-CM

## 2019-05-31 PROCEDURE — G0101 PR CA SCREEN;PELVIC/BREAST EXAM: ICD-10-PCS | Mod: S$GLB,,, | Performed by: NURSE PRACTITIONER

## 2019-05-31 PROCEDURE — 3078F DIAST BP <80 MM HG: CPT | Mod: S$GLB,,, | Performed by: NURSE PRACTITIONER

## 2019-05-31 PROCEDURE — 3078F PR MOST RECENT DIASTOLIC BLOOD PRESSURE < 80 MM HG: ICD-10-PCS | Mod: S$GLB,,, | Performed by: NURSE PRACTITIONER

## 2019-05-31 PROCEDURE — 77063 MAMMO DIGITAL SCREENING BILAT WITH TOMOSYNTHESIS_CAD: ICD-10-PCS | Mod: 26,,, | Performed by: INTERNAL MEDICINE

## 2019-05-31 PROCEDURE — 77063 BREAST TOMOSYNTHESIS BI: CPT | Mod: 26,,, | Performed by: INTERNAL MEDICINE

## 2019-05-31 PROCEDURE — 3074F PR MOST RECENT SYSTOLIC BLOOD PRESSURE < 130 MM HG: ICD-10-PCS | Mod: S$GLB,,, | Performed by: NURSE PRACTITIONER

## 2019-05-31 PROCEDURE — 99999 PR PBB SHADOW E&M-EST. PATIENT-LVL II: ICD-10-PCS | Mod: PBBFAC,,, | Performed by: NURSE PRACTITIONER

## 2019-05-31 PROCEDURE — 99999 PR PBB SHADOW E&M-EST. PATIENT-LVL II: CPT | Mod: PBBFAC,,, | Performed by: NURSE PRACTITIONER

## 2019-05-31 PROCEDURE — 77067 MAMMO DIGITAL SCREENING BILAT WITH TOMOSYNTHESIS_CAD: ICD-10-PCS | Mod: 26,,, | Performed by: INTERNAL MEDICINE

## 2019-05-31 PROCEDURE — G0101 CA SCREEN;PELVIC/BREAST EXAM: HCPCS | Mod: S$GLB,,, | Performed by: NURSE PRACTITIONER

## 2019-05-31 PROCEDURE — 77067 SCR MAMMO BI INCL CAD: CPT | Mod: TC

## 2019-05-31 PROCEDURE — 3074F SYST BP LT 130 MM HG: CPT | Mod: S$GLB,,, | Performed by: NURSE PRACTITIONER

## 2019-05-31 PROCEDURE — 77067 SCR MAMMO BI INCL CAD: CPT | Mod: 26,,, | Performed by: INTERNAL MEDICINE

## 2019-05-31 NOTE — PROGRESS NOTES
CC: Annual  HPI: Pt is a 74 y.o.  female who presents for routine annual exam. She is s/p hysterectomy in  due to abnormal pap and cone biopsy- ovaries remain. She does not want STD screening.  She is no longer sexually active-  in  19 yrs ago from lung cancer.  They were  4 yrs. Daughter and grand kids live in [place] - kids visit every summer.    Denies any GYN complaints.  The patient participates in regular exercise: yws.  The patient does not smoke.  The patient wears seatbelts.   Pt denies any domestic violence.  Screening mammo done today- WNL.  Colonoscopy is UTD- - with need to repeat in 10 years. DEXA scan in  with osteopenia.      FH:  Breast cancer: maternal aunt  Colon cancer: father and brother X 2  Ovarian cancer: none  Endometrial cancer: none      ROS:  GENERAL: Feeling well overall.   SKIN: Denies rash or lesions.   HEAD: Denies head injury or headache.   NODES: Denies enlarged lymph nodes.   CHEST: Denies chest pain or shortness of breath.   CARDIOVASCULAR: Denies palpitations or left sided chest pain.   ABDOMEN: No abdominal pain, nausea, vomiting or rectal bleeding.   URINARY: No dysuria or hematuria.  REPRODUCTIVE: See HPI.   BREASTS: Denies pain, lumps, or nipple discharge.   HEMATOLOGIC: No easy bruisability or excessive bleeding.   MUSCULOSKELETAL: Denies joint pain or swelling.   NEUROLOGIC: Denies syncope or weakness.   PSYCHIATRIC: Denies depression.    PE:    APPEARANCE: Well nourished, well developed, in no acute distress.  NODES: No inguinal lymph node enlargement.  ABDOMEN: Soft. No tenderness or masses. No hernias.  BREASTS: Symmetrical, no skin changes or visible lesions. No palpable masses, nipple discharge or adenopathy bilaterally.  PELVIC: Normal external female genitalia without lesions. Normal hair distribution. Adequate perineal body, normal urethral meatus. Vagina without lesions or discharge. No significant cystocele or rectocele. Uterus and  cervix surgically absent. Bimanual exam revealed no masses, tenderness or abnormality.  ANUS: Normal.    Diagnosis:  1. Women's annual routine gynecological examination          PLAN:  Discussed Ca and vit D supplementation and weight bear exercises for bone health/ reduction of fracture rate  Mammo due 5/2020         Patient was counseled today on postmenopausal issues.     Follow-up in 1 year.    Mary Arshad, VIDAL-C

## 2019-05-31 NOTE — TELEPHONE ENCOUNTER
Please contact patient and inform that her lab work looked good.  Her vitamin D is at a good level. I would recommend taking 800 units of vitamin D daily to maintain that level.     thanks

## 2019-05-31 NOTE — LETTER
May 31, 2019      Shannan Marinelli MD  [address]  [address]  [facility] [address]           [facility] [id number]  [address] [facility] [address]  [facility] [address]  Phone: [phone]  Fax: [phone]          Patient: Valarie Colindres   MR Number: [id number]   YOB: 1945   Date of Visit: 5/31/2019       Dear Dr. Shannan Marinelli:    Thank you for referring Valarie Colindres to me for evaluation. Attached you will find relevant portions of my assessment and plan of care.    If you have questions, please do not hesitate to call me. I look forward to following Valarie Colindres along with you.    Sincerely,    Mary Arshad, NP    Enclosure  CC:  No Recipients    If you would like to receive this communication electronically, please contact [email or web address] or [phone] to request more information on [employer] Link access.    For providers and/or their staff who would like to refer a patient to [facility], please contact us through our one-stop-shop provider referral line, [facility], at [phone].    If you feel you have received this communication in error or would no longer like to receive these types of communications, please e-mail [email or web address]

## 2019-05-31 NOTE — TELEPHONE ENCOUNTER
Spoke to the Pt and told her MD states labs are good but to maintain her vitamin D please take 800 units daily

## 2019-06-25 ENCOUNTER — HOSPITAL ENCOUNTER (OUTPATIENT)
Dept: RADIOLOGY | Facility: OTHER | Age: 74
Discharge: HOME | End: 2019-06-25
Attending: INTERNAL MEDICINE
Payer: MEDICARE

## 2019-06-25 ENCOUNTER — TELEPHONE (OUTPATIENT)
Dept: INTERNAL MEDICINE | Facility: CLINIC | Age: 74
End: 2019-06-25

## 2019-06-25 DIAGNOSIS — M70.61 TROCHANTERIC BURSITIS OF RIGHT HIP: Primary | ICD-10-CM

## 2019-06-25 DIAGNOSIS — M70.61 TROCHANTERIC BURSITIS OF RIGHT HIP: ICD-10-CM

## 2019-06-25 DIAGNOSIS — M25.551 PAIN OF RIGHT HIP JOINT: ICD-10-CM

## 2019-06-25 PROCEDURE — 73502 X-RAY EXAM HIP UNI 2-3 VIEWS: CPT | Mod: TC,FY,RT

## 2019-06-25 PROCEDURE — 73502 X-RAY EXAM HIP UNI 2-3 VIEWS: CPT | Mod: 26,RT,, | Performed by: RADIOLOGY

## 2019-06-25 PROCEDURE — 73502 XR HIP 2 VIEW RIGHT: ICD-10-PCS | Mod: 26,RT,, | Performed by: RADIOLOGY

## 2019-06-25 NOTE — TELEPHONE ENCOUNTER
Spoke to pt and she just had her hip xray done  Pt is scheduled to see ortho at [place] at 3pm tomorrow after she works out at the fitness center

## 2019-06-26 ENCOUNTER — OFFICE VISIT (OUTPATIENT)
Dept: SPORTS MEDICINE | Facility: CLINIC | Age: 74
End: 2019-06-26
Payer: MEDICARE

## 2019-06-26 VITALS
HEART RATE: 76 BPM | BODY MASS INDEX: 24.66 KG/M2 | WEIGHT: 148 LBS | HEIGHT: 65 IN | SYSTOLIC BLOOD PRESSURE: 110 MMHG | DIASTOLIC BLOOD PRESSURE: 67 MMHG

## 2019-06-26 DIAGNOSIS — M70.61 TROCHANTERIC BURSITIS, RIGHT HIP: Primary | ICD-10-CM

## 2019-06-26 DIAGNOSIS — M25.551 HIP PAIN, ACUTE, RIGHT: ICD-10-CM

## 2019-06-26 PROCEDURE — 99999 PR PBB SHADOW E&M-EST. PATIENT-LVL III: CPT | Mod: PBBFAC,,, | Performed by: PHYSICIAN ASSISTANT

## 2019-06-26 PROCEDURE — 99999 PR PBB SHADOW E&M-EST. PATIENT-LVL III: ICD-10-PCS | Mod: PBBFAC,,, | Performed by: PHYSICIAN ASSISTANT

## 2019-06-26 PROCEDURE — 3078F DIAST BP <80 MM HG: CPT | Mod: S$GLB,,, | Performed by: PHYSICIAN ASSISTANT

## 2019-06-26 PROCEDURE — 3074F PR MOST RECENT SYSTOLIC BLOOD PRESSURE < 130 MM HG: ICD-10-PCS | Mod: S$GLB,,, | Performed by: PHYSICIAN ASSISTANT

## 2019-06-26 PROCEDURE — 99204 PR OFFICE/OUTPT VISIT, NEW, LEVL IV, 45-59 MIN: ICD-10-PCS | Mod: 25,S$GLB,, | Performed by: PHYSICIAN ASSISTANT

## 2019-06-26 PROCEDURE — 3074F SYST BP LT 130 MM HG: CPT | Mod: S$GLB,,, | Performed by: PHYSICIAN ASSISTANT

## 2019-06-26 PROCEDURE — 1101F PR PT FALLS ASSESS DOC 0-1 FALLS W/OUT INJ PAST YR: ICD-10-PCS | Mod: S$GLB,,, | Performed by: PHYSICIAN ASSISTANT

## 2019-06-26 PROCEDURE — 99204 OFFICE O/P NEW MOD 45 MIN: CPT | Mod: 25,S$GLB,, | Performed by: PHYSICIAN ASSISTANT

## 2019-06-26 PROCEDURE — 3078F PR MOST RECENT DIASTOLIC BLOOD PRESSURE < 80 MM HG: ICD-10-PCS | Mod: S$GLB,,, | Performed by: PHYSICIAN ASSISTANT

## 2019-06-26 PROCEDURE — 20610 PR DRAIN/INJECT LARGE JOINT/BURSA: ICD-10-PCS | Mod: RT,S$GLB,, | Performed by: PHYSICIAN ASSISTANT

## 2019-06-26 PROCEDURE — 1101F PT FALLS ASSESS-DOCD LE1/YR: CPT | Mod: S$GLB,,, | Performed by: PHYSICIAN ASSISTANT

## 2019-06-26 PROCEDURE — 20610 DRAIN/INJ JOINT/BURSA W/O US: CPT | Mod: RT,S$GLB,, | Performed by: PHYSICIAN ASSISTANT

## 2019-06-26 RX ORDER — LIDOCAINE HYDROCHLORIDE 10 MG/ML
2 INJECTION INFILTRATION; PERINEURAL
Status: COMPLETED | OUTPATIENT
Start: 2019-06-26 | End: 2019-06-26

## 2019-06-26 RX ORDER — BUPIVACAINE HYDROCHLORIDE 2.5 MG/ML
2 INJECTION, SOLUTION INFILTRATION; PERINEURAL
Status: COMPLETED | OUTPATIENT
Start: 2019-06-26 | End: 2019-06-26

## 2019-06-26 RX ORDER — TRIAMCINOLONE ACETONIDE 40 MG/ML
40 INJECTION, SUSPENSION INTRA-ARTICULAR; INTRAMUSCULAR
Status: COMPLETED | OUTPATIENT
Start: 2019-06-26 | End: 2019-06-26

## 2019-06-26 RX ADMIN — BUPIVACAINE HYDROCHLORIDE 5 MG: 2.5 INJECTION, SOLUTION INFILTRATION; PERINEURAL at 05:06

## 2019-06-26 RX ADMIN — TRIAMCINOLONE ACETONIDE 40 MG: 40 INJECTION, SUSPENSION INTRA-ARTICULAR; INTRAMUSCULAR at 05:06

## 2019-06-26 RX ADMIN — LIDOCAINE HYDROCHLORIDE 2 ML: 10 INJECTION INFILTRATION; PERINEURAL at 05:06

## 2019-06-26 NOTE — LETTER
June 26, 2019      Kayy Frankel MD  [address]  [address]  [facility] [address]           [facility] [place]  [address]  [facility] [address]  Phone: [phone]          Patient: Valraie Colindres   MR Number: [id number]   YOB: 1945   Date of Visit: 6/26/2019       Dear Dr. Kayy Frankel:    Thank you for referring Valarie Colindres to me for evaluation. Attached you will find relevant portions of my assessment and plan of care.    If you have questions, please do not hesitate to call me. I look forward to following Valarie Colindres along with you.    Sincerely,    Jovani Valencia PA-C    Enclosure  CC:  No Recipients    If you would like to receive this communication electronically, please contact [email or web address] or [phone] to request more information on [employer] Link access.    For providers and/or their staff who would like to refer a patient to [facility], please contact us through our one-stop-shop provider referral line, [facility] , at [phone].    If you feel you have received this communication in error or would no longer like to receive these types of communications, please e-mail [email or web address]

## 2019-06-26 NOTE — PROGRESS NOTES
Subjective:          Chief Complaint: Valarie Colindres is a 74 y.o. female who had concerns including Pain of the Right Hip.    Valarie Colindres is an active pleasant retiree.The pain started 3-4 months ago and is becoming progressively worse last few months. Pain is located over (points to) lateral hip. She reports that the pain is a 5 /10 aching, throbbing and radiating pain today and not responding adequately to conservative measures which have included activity modifications, rest, and oral/topical medication. Is affecting ADLs and limiting desired level of activity. Denies numbness, tingling, radiation, and inability to bear weight. Baseline lower back pain   Pain is 7 /10 at its worst    Mechanical symptoms: none  Subjective instability: (--)   Worse with palpation  Better with rest.   Nocturnal symptoms: (+)    No previous surgeries or trauma on hips            Review of Systems   Constitution: Negative for chills and fever.   HENT: Negative for congestion and sore throat.    Eyes: Negative for discharge and double vision.   Cardiovascular: Negative for chest pain, palpitations and syncope.   Respiratory: Negative for cough and shortness of breath.    Endocrine: Negative for cold intolerance and heat intolerance.   Skin: Negative for dry skin and rash.   Musculoskeletal: Positive for joint pain. Negative for falls, gout and joint swelling.   Gastrointestinal: Negative for abdominal pain, nausea and vomiting.   Neurological: Negative for focal weakness, numbness and paresthesias.       Pain Related Questions  Over the past 3 days, what was your average pain during activity? (I.e. running, jogging, walking, climbing stairs, getting dressed, ect.): 8  Over the past 3 days, what was your highest pain level?: 8  Over the past 3 days, what was your lowest pain level? : 7    Other  How many nights a week are you awakened by your affected body part?: 2  Was the patient's HEIGHT measured or patient reported?: Patient  Reported  Was the patient's WEIGHT measured or patient reported?: Measured      Objective:        General: [place] is well-developed, well-nourished, appears stated age, in no acute distress, alert and oriented to time, place and person.     General    Constitutional: She is oriented to person, place, and time. She appears well-developed and well-nourished. No distress.   HENT:   Head: Normocephalic and atraumatic.   Nose: Nose normal.   Eyes: Conjunctivae and EOM are normal. Pupils are equal, round, and reactive to light.   Neck: No JVD present.   Cardiovascular: Normal rate, regular rhythm and normal heart sounds.    Pulmonary/Chest: Effort normal and breath sounds normal. No respiratory distress.   Abdominal: Soft. Bowel sounds are normal. She exhibits no distension. There is no tenderness.   Neurological: She is alert and oriented to person, place, and time. She has normal reflexes. Coordination normal.   Psychiatric: She has a normal mood and affect. Her behavior is normal. Judgment and thought content normal.     General Musculoskeletal Exam   Gait: normal       Right Knee Exam     Inspection   Effusion: absent    Left Knee Exam     Inspection   Effusion: absent    Right Hip Exam     Inspection   Swelling: absent  Bruising: absent  No deformity of hip.  Erythema: absent    Tenderness   The patient tender to palpation of the trochanteric bursa.    Range of Motion   Abduction: 45   Adduction: 30   Extension: 10   Flexion: 120   External rotation: 60   Internal rotation: 30     Tests   Pain w/ forced internal rotation (DONNA): absent  Pain w/ forced external rotation (FADIR): absent  Boo: negative  Log Roll: negative  Step-down test: negative  Left Hip Exam     Inspection   Swelling: absent  No deformity of hip.  Erythema: absent  Bruising: absent    Range of Motion   Abduction: 45   Adduction: 30   Extension: 10   Flexion: 120   External rotation: 60   Internal rotation: 30     Tests   Pain w/ forced internal  rotation (DONNA): absent  Pain w/ forced external rotation (FADIR): absent  Stinchfield test: negative  Log Roll: negative  Step-down test: negative          Muscle Strength   Right Lower Extremity   Hip Abduction: 5/5   Hip Adduction: 5/5   Hip Flexion: 5/5   Ankle Dorsiflexion:  5/5   Left Lower Extremity   Hip Abduction: 5/5   Hip Adduction: 5/5   Hip Flexion: 5/5   Ankle Dorsiflexion:  5/5     Radiographic Findings 06/26/2019:    There is no evidence of acute fracture, dislocation, or bone destruction.  There is mild degenerative change at the sacroiliac joints and lower lumbar spine.  Phleboliths are present in the pelvis.    Xrays of the hips/pelvis were reviewed by me today. These findings were discussed and reviewed with the patient.        Assessment:       Encounter Diagnoses   Name Primary?    Trochanteric bursitis, right hip Yes    Hip pain, acute, right           Plan:       The total face-to-face encounter time with this patient was 45 min and greater than 50% of of the encounter time was spent counseling the patient, coordinating care, and education regarding the pathology and natural history of her diagnosis. We have discussed a variety of treatment options including medications, injections, physical therapy and other alternative treatments. Pt is requesting CSI at this time.    I made the decision to obtain old records of the patient including previous notes and imaging. I independently reviewed and interpreted lab results today as well as prior imaging.     1. Injection Procedure  A time out was performed, including verification of patient ID, procedure, site and side, availability of information and equipment, review of safety issues, and agreement with consent, the procedure site was marked.    After time out was performed, the patient was prepped aseptically with chloraprep swabsticks. A diagnostic and therapeutic injection of 1:4cc Kenalog/Lidocaine/Marcaine was given under sterile technique  using a 22g x 1.5 needle from the Lateral  aspect of the right Greater trochanteric bursa in the left lateral position.      Valarie Colindres had no adverse reactions to the medication. Pain decreased. She was instructed to apply ice to the joint for 20 minutes and avoid strenuous activities for 24-36 hours following the injection. She was warned of possible blood sugar and/or blood pressure changes during that time. Following that time, she can resume regular activities.    She was reminded to call the clinic immediately for any adverse side effects as explained in clinic today.       Valarie Colindres had no adverse reactions to the medication. Pain decreased. She was instructed to apply ice to the joint for 20 minutes and avoid strenuous activities for 24-36 hours following the injection. She was warned of possible blood sugar and/or blood pressure changes during that time. Following that time, she can resume regular activities.    She was reminded to call the clinic immediately for any adverse side effects as explained in clinic today.    2. Continue topical NSAID as needed.  3. Ice compress to the affected area 2-3x a day for 15-20 minutes as needed for pain management.  4. RTC to see Michael Valencia PA-C in 8 weeks for follow-up.    All of the patient's questions were answered and the patient will contact us if they have any questions or concerns in the interim.          Patient questionnaires may have been collected.

## 2019-07-15 ENCOUNTER — HOSPITAL ENCOUNTER (OUTPATIENT)
Dept: RADIOLOGY | Facility: OTHER | Age: 74
Discharge: HOME | End: 2019-07-15
Attending: FAMILY MEDICINE
Payer: MEDICARE

## 2019-07-15 ENCOUNTER — PATIENT MESSAGE (OUTPATIENT)
Dept: INTERNAL MEDICINE | Facility: CLINIC | Age: 74
End: 2019-07-15

## 2019-07-15 ENCOUNTER — OFFICE VISIT (OUTPATIENT)
Dept: INTERNAL MEDICINE | Facility: CLINIC | Age: 74
End: 2019-07-15
Attending: FAMILY MEDICINE
Payer: MEDICARE

## 2019-07-15 VITALS
OXYGEN SATURATION: 98 % | HEART RATE: 67 BPM | DIASTOLIC BLOOD PRESSURE: 60 MMHG | WEIGHT: 151.44 LBS | SYSTOLIC BLOOD PRESSURE: 92 MMHG | BODY MASS INDEX: 25.23 KG/M2 | HEIGHT: 65 IN

## 2019-07-15 DIAGNOSIS — T14.8XXA MUSCLE STRAIN: ICD-10-CM

## 2019-07-15 DIAGNOSIS — I10 HYPERTENSION, UNSPECIFIED TYPE: Primary | ICD-10-CM

## 2019-07-15 DIAGNOSIS — D50.9 MICROCYTIC ANEMIA: Primary | ICD-10-CM

## 2019-07-15 DIAGNOSIS — I10 HYPERTENSION, UNSPECIFIED TYPE: ICD-10-CM

## 2019-07-15 DIAGNOSIS — S29.011D MUSCLE STRAIN OF CHEST WALL, SUBSEQUENT ENCOUNTER: ICD-10-CM

## 2019-07-15 DIAGNOSIS — R07.9 CHEST PAIN, UNSPECIFIED TYPE: ICD-10-CM

## 2019-07-15 PROCEDURE — 3078F PR MOST RECENT DIASTOLIC BLOOD PRESSURE < 80 MM HG: ICD-10-PCS | Mod: S$GLB,,, | Performed by: FAMILY MEDICINE

## 2019-07-15 PROCEDURE — 99999 PR PBB SHADOW E&M-EST. PATIENT-LVL III: ICD-10-PCS | Mod: PBBFAC,,, | Performed by: FAMILY MEDICINE

## 2019-07-15 PROCEDURE — 3078F DIAST BP <80 MM HG: CPT | Mod: S$GLB,,, | Performed by: FAMILY MEDICINE

## 2019-07-15 PROCEDURE — 99214 PR OFFICE/OUTPT VISIT, EST, LEVL IV, 30-39 MIN: ICD-10-PCS | Mod: S$GLB,,, | Performed by: FAMILY MEDICINE

## 2019-07-15 PROCEDURE — 1101F PR PT FALLS ASSESS DOC 0-1 FALLS W/OUT INJ PAST YR: ICD-10-PCS | Mod: S$GLB,,, | Performed by: FAMILY MEDICINE

## 2019-07-15 PROCEDURE — 3074F SYST BP LT 130 MM HG: CPT | Mod: S$GLB,,, | Performed by: FAMILY MEDICINE

## 2019-07-15 PROCEDURE — 71046 XR CHEST PA AND LATERAL: ICD-10-PCS | Mod: 26,,, | Performed by: RADIOLOGY

## 2019-07-15 PROCEDURE — 99999 PR PBB SHADOW E&M-EST. PATIENT-LVL III: CPT | Mod: PBBFAC,,, | Performed by: FAMILY MEDICINE

## 2019-07-15 PROCEDURE — 1101F PT FALLS ASSESS-DOCD LE1/YR: CPT | Mod: S$GLB,,, | Performed by: FAMILY MEDICINE

## 2019-07-15 PROCEDURE — 3074F PR MOST RECENT SYSTOLIC BLOOD PRESSURE < 130 MM HG: ICD-10-PCS | Mod: S$GLB,,, | Performed by: FAMILY MEDICINE

## 2019-07-15 PROCEDURE — 71046 X-RAY EXAM CHEST 2 VIEWS: CPT | Mod: TC,FY

## 2019-07-15 PROCEDURE — 99214 OFFICE O/P EST MOD 30 MIN: CPT | Mod: S$GLB,,, | Performed by: FAMILY MEDICINE

## 2019-07-15 PROCEDURE — 71046 X-RAY EXAM CHEST 2 VIEWS: CPT | Mod: 26,,, | Performed by: RADIOLOGY

## 2019-07-15 RX ORDER — ACETAMINOPHEN 500 MG
1000 TABLET ORAL EVERY 8 HOURS PRN
Qty: 90 TABLET | Refills: 1 | Status: SHIPPED | OUTPATIENT
Start: 2019-07-15 | End: 2020-10-15

## 2019-07-15 RX ORDER — CYCLOBENZAPRINE HCL 5 MG
5-10 TABLET ORAL NIGHTLY PRN
Qty: 30 TABLET | Refills: 1 | Status: SHIPPED | OUTPATIENT
Start: 2019-07-15 | End: 2019-07-25

## 2019-07-15 NOTE — PROGRESS NOTES
"Subjective:      Patient ID: Valarie Colindres is a 74 y.o. female.    Chief Complaint: Lumbar Spine Pain (L-Spine) (left side; somewhat feels like a pulled muscle; lasting a week now)    HPI   This is a new patient to me. Patient here today for one week of left side/back pain, ache pain daily. Movement to the left side does make it worse, she has been exercising and using machinary with stretching the back, chest presses. No pain with urination or constipation. She has tried ice and heat and this has helped  No coughing or difficutly breathing. 2 weeks ago noticed only with walking pain in right thigh. She is drinking about 64 fl oz water daily     Review of Systems   Constitutional: Negative for activity change, appetite change, chills, diaphoresis, fatigue, fever and unexpected weight change.   HENT: Negative for congestion, ear discharge, ear pain, hearing loss, postnasal drip, rhinorrhea, sinus pressure and sore throat.    Respiratory: Negative for cough, shortness of breath and wheezing.    Cardiovascular: Negative for chest pain.   Gastrointestinal: Negative for abdominal pain, constipation, diarrhea, nausea and vomiting.   Genitourinary: Negative for dysuria and frequency.   Musculoskeletal: Positive for arthralgias.   Psychiatric/Behavioral: Negative for suicidal ideas.     I personally reviewed Past Medical History, Past Surgical history,  Past Social History and Family History      Objective:   BP 92/60 (BP Location: Left arm, Patient Position: Sitting)   Pulse 67   Ht 5' 5" (1.651 m)   Wt 68.7 kg (151 lb 7.3 oz)   SpO2 98%   BMI 25.20 kg/m²     Physical Exam   Constitutional: She is oriented to person, place, and time. She appears well-developed and well-nourished. No distress.   HENT:   Head: Normocephalic and atraumatic.   Right Ear: Hearing, tympanic membrane, external ear and ear canal normal.   Left Ear: Hearing, tympanic membrane, external ear and ear canal normal.   Nose: Nose normal. "   Mouth/Throat: Uvula is midline and oropharynx is clear and moist. No oropharyngeal exudate.   Eyes: Pupils are equal, round, and reactive to light. Conjunctivae and EOM are normal. Right eye exhibits no discharge. Left eye exhibits no discharge. No scleral icterus.   Neck: Normal range of motion. Neck supple.   Cardiovascular: Normal rate, regular rhythm, normal heart sounds and intact distal pulses. Exam reveals no gallop.   No murmur heard.  Pulmonary/Chest: Effort normal and breath sounds normal. No respiratory distress. She has no wheezes. She has no rales. She exhibits no tenderness.   Abdominal: Soft. Bowel sounds are normal. She exhibits no distension and no mass. There is no tenderness. There is no rebound and no guarding.   Musculoskeletal:        Cervical back: Normal.        Thoracic back: Normal.        Lumbar back: Normal.        Right upper leg: Normal.        Left upper leg: Normal.   Neurological: She is alert and oriented to person, place, and time.   Skin: Skin is warm and dry.   Vitals reviewed.      1. Hypertension, unspecified type    2. Chest pain, unspecified type    3. Muscle strain    4. Muscle strain of chest wall, subsequent encounter        1. stable, cont current regimen   2. Will check stress test for typical chest pain   3.labs, cxr, handout of exercises given, call if any worsening or no improvement in 2 weeks    Orders Placed This Encounter   Procedures    X-Ray Chest PA And Lateral    Basic metabolic panel    CK    CBC auto differential    Echocardiogram stress test     Medications Ordered This Encounter   Medications    acetaminophen (TYLENOL) 500 MG tablet     Sig: Take 2 tablets (1,000 mg total) by mouth every 8 (eight) hours as needed for Pain.     Dispense:  90 tablet     Refill:  1    cyclobenzaprine (FLEXERIL) 5 MG tablet     Sig: Take 1-2 tablets (5-10 mg total) by mouth nightly as needed for Muscle spasms.     Dispense:  30 tablet     Refill:  1

## 2019-07-16 ENCOUNTER — INITIAL CONSULT (OUTPATIENT)
Dept: HEMATOLOGY/ONCOLOGY | Facility: CLINIC | Age: 74
End: 2019-07-16
Attending: FAMILY MEDICINE
Payer: MEDICARE

## 2019-07-16 ENCOUNTER — TELEPHONE (OUTPATIENT)
Dept: INTERNAL MEDICINE | Facility: CLINIC | Age: 74
End: 2019-07-16

## 2019-07-16 ENCOUNTER — HOSPITAL ENCOUNTER (OUTPATIENT)
Dept: CARDIOLOGY | Facility: CLINIC | Age: 74
Discharge: HOME | End: 2019-07-16
Attending: FAMILY MEDICINE
Payer: MEDICARE

## 2019-07-16 VITALS
WEIGHT: 151 LBS | TEMPERATURE: 98 F | BODY MASS INDEX: 25.16 KG/M2 | BODY MASS INDEX: 24.72 KG/M2 | HEART RATE: 73 BPM | SYSTOLIC BLOOD PRESSURE: 97 MMHG | DIASTOLIC BLOOD PRESSURE: 55 MMHG | WEIGHT: 148.38 LBS | HEIGHT: 65 IN | OXYGEN SATURATION: 98 % | RESPIRATION RATE: 16 BRPM | HEIGHT: 65 IN

## 2019-07-16 DIAGNOSIS — T14.8XXA MUSCLE STRAIN: ICD-10-CM

## 2019-07-16 DIAGNOSIS — I10 HYPERTENSION, UNSPECIFIED TYPE: ICD-10-CM

## 2019-07-16 DIAGNOSIS — D64.9 ANEMIA, UNSPECIFIED TYPE: Primary | ICD-10-CM

## 2019-07-16 DIAGNOSIS — D56.3 THALASSEMIA MINOR: ICD-10-CM

## 2019-07-16 DIAGNOSIS — R07.9 CHEST PAIN, UNSPECIFIED TYPE: ICD-10-CM

## 2019-07-16 DIAGNOSIS — S29.011D MUSCLE STRAIN OF CHEST WALL, SUBSEQUENT ENCOUNTER: ICD-10-CM

## 2019-07-16 LAB
ASCENDING AORTA: 3.14 CM
BSA FOR ECHO PROCEDURE: 1.77 M2
CV ECHO LV RWT: 0.34 CM
CV STRESS BASE HR: 62 BPM
DIASTOLIC BLOOD PRESSURE: 75 MMHG
DOP CALC LVOT AREA: 3 CM2
DOP CALC LVOT DIAMETER: 1.97 CM
DOP CALC LVOT PEAK VEL: 0.98 M/S
DOP CALC LVOT STROKE VOLUME: 59.07 CM3
DOP CALCLVOT PEAK VEL VTI: 19.39 CM
E WAVE DECELERATION TIME: 278.28 MSEC
E/A RATIO: 0.56
E/E' RATIO: 6.77 M/S
ECHO LV POSTERIOR WALL: 0.76 CM (ref 0.6–1.1)
FRACTIONAL SHORTENING: 37 % (ref 28–44)
INTERVENTRICULAR SEPTUM: 0.89 CM (ref 0.6–1.1)
LA MAJOR: 4.63 CM
LA MINOR: 4.6 CM
LA WIDTH: 2.86 CM
LEFT ATRIUM SIZE: 3.54 CM
LEFT ATRIUM VOLUME INDEX: 22.6 ML/M2
LEFT ATRIUM VOLUME: 39.72 CM3
LEFT INTERNAL DIMENSION IN SYSTOLE: 2.78 CM (ref 2.1–4)
LEFT VENTRICLE DIASTOLIC VOLUME INDEX: 50.52 ML/M2
LEFT VENTRICLE DIASTOLIC VOLUME: 88.68 ML
LEFT VENTRICLE MASS INDEX: 65 G/M2
LEFT VENTRICLE SYSTOLIC VOLUME INDEX: 16.6 ML/M2
LEFT VENTRICLE SYSTOLIC VOLUME: 29.11 ML
LEFT VENTRICULAR INTERNAL DIMENSION IN DIASTOLE: 4.42 CM (ref 3.5–6)
LEFT VENTRICULAR MASS: 114.83 G
LV LATERAL E/E' RATIO: 7.33 M/S
LV SEPTAL E/E' RATIO: 6.29 M/S
MV PEAK A VEL: 0.78 M/S
MV PEAK E VEL: 0.44 M/S
OHS CV CPX 1 MINUTE RECOVERY HEART RATE: 105 BPM
OHS CV CPX 85 PERCENT MAX PREDICTED HEART RATE MALE: 119
OHS CV CPX ESTIMATED METS: 9
OHS CV CPX MAX PREDICTED HEART RATE: 140
OHS CV CPX PATIENT IS FEMALE: 1
OHS CV CPX PATIENT IS MALE: 0
OHS CV CPX PEAK DIASTOLIC BLOOD PRESSURE: 64 MMHG
OHS CV CPX PEAK HEAR RATE: 133 BPM
OHS CV CPX PEAK RATE PRESSURE PRODUCT: NORMAL
OHS CV CPX PEAK SYSTOLIC BLOOD PRESSURE: 171 MMHG
OHS CV CPX PERCENT MAX PREDICTED HEART RATE ACHIEVED: 95
OHS CV CPX RATE PRESSURE PRODUCT PRESENTING: 6882
PULM VEIN S/D RATIO: 2.37
PV PEAK D VEL: 0.19 M/S
PV PEAK S VEL: 0.45 M/S
RA MAJOR: 4.26 CM
RA WIDTH: 2.18 CM
RV TISSUE DOPPLER FREE WALL SYSTOLIC VELOCITY 1 (APICAL 4 CHAMBER VIEW): 10.9 CM/S
SINUS: 2.7 CM
STJ: 2.79 CM
STRESS ECHO POST EXERCISE DUR MIN: 5 MINUTES
STRESS ECHO POST EXERCISE DUR SEC: 54 SECONDS
SYSTOLIC BLOOD PRESSURE: 111 MMHG
TDI LATERAL: 0.06 M/S
TDI SEPTAL: 0.07 M/S
TDI: 0.07 M/S
TRICUSPID ANNULAR PLANE SYSTOLIC EXCURSION: 1.74 CM

## 2019-07-16 PROCEDURE — 99204 OFFICE O/P NEW MOD 45 MIN: CPT | Mod: S$GLB,,, | Performed by: INTERNAL MEDICINE

## 2019-07-16 PROCEDURE — 93351 STRESS TTE COMPLETE: CPT

## 2019-07-16 PROCEDURE — 3074F PR MOST RECENT SYSTOLIC BLOOD PRESSURE < 130 MM HG: ICD-10-PCS | Mod: S$GLB,,, | Performed by: INTERNAL MEDICINE

## 2019-07-16 PROCEDURE — 93351 STRESS TTE COMPLETE: CPT | Mod: 26,,, | Performed by: INTERNAL MEDICINE

## 2019-07-16 PROCEDURE — 1101F PT FALLS ASSESS-DOCD LE1/YR: CPT | Mod: S$GLB,,, | Performed by: INTERNAL MEDICINE

## 2019-07-16 PROCEDURE — 1101F PR PT FALLS ASSESS DOC 0-1 FALLS W/OUT INJ PAST YR: ICD-10-PCS | Mod: S$GLB,,, | Performed by: INTERNAL MEDICINE

## 2019-07-16 PROCEDURE — 93351 ECHOCARDIOGRAM STRESS TEST (CUPID ONLY): ICD-10-PCS | Mod: 26,,, | Performed by: INTERNAL MEDICINE

## 2019-07-16 PROCEDURE — 99204 PR OFFICE/OUTPT VISIT, NEW, LEVL IV, 45-59 MIN: ICD-10-PCS | Mod: S$GLB,,, | Performed by: INTERNAL MEDICINE

## 2019-07-16 PROCEDURE — 3074F SYST BP LT 130 MM HG: CPT | Mod: S$GLB,,, | Performed by: INTERNAL MEDICINE

## 2019-07-16 PROCEDURE — 3078F DIAST BP <80 MM HG: CPT | Mod: S$GLB,,, | Performed by: INTERNAL MEDICINE

## 2019-07-16 PROCEDURE — 99999 PR PBB SHADOW E&M-EST. PATIENT-LVL III: ICD-10-PCS | Mod: PBBFAC,,, | Performed by: INTERNAL MEDICINE

## 2019-07-16 PROCEDURE — 99999 PR PBB SHADOW E&M-EST. PATIENT-LVL III: CPT | Mod: PBBFAC,,, | Performed by: INTERNAL MEDICINE

## 2019-07-16 PROCEDURE — 3078F PR MOST RECENT DIASTOLIC BLOOD PRESSURE < 80 MM HG: ICD-10-PCS | Mod: S$GLB,,, | Performed by: INTERNAL MEDICINE

## 2019-07-16 RX ORDER — CHLORHEXIDINE GLUCONATE ORAL RINSE 1.2 MG/ML
SOLUTION DENTAL
Refills: 1 | COMMUNITY
Start: 2019-06-17

## 2019-07-16 RX ORDER — ASPIRIN 81 MG/1
81 TABLET ORAL DAILY
COMMUNITY
End: 2022-07-25 | Stop reason: ALTCHOICE

## 2019-07-16 NOTE — PROGRESS NOTES
CC:  anemia    HPI:  Ms Colindres is a 73 yo woman who presents today for further evaluation of microcytic anemia. Her Hb was 11.4 on 18 with MCV of 61. CBC on 7/15/19 showed WBC 7.15, Hb 10.7, MCV 62, plt count 260. CMP on 19 was normal. TSH on 19 normal. Patient currently feels fine. 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 Was told that no treatment is needed. She has occasional fatigue but goes to the gym 3 times a week. She had chest pain after she ate Fritos which did not recur after she stopped eating Fritos. She just had a stress test done.     ECO     Past Medical History:   Diagnosis Date    Angle recession of right eye     Blunt trauma of both eyes     hit across eyes with bungee exercise band    Cataract     Ectropion due to laxity of eyelid, right     GERD (gastroesophageal reflux disease)     Hyperlipidemia     Hypertension     PVD (posterior vitreous detachment), right eye     Retinal drusen of both eyes     Thalassemia minor         Past Surgical History:   Procedure Laterality Date    ABDOMINAL ADHESION SURGERY      ANKLE FRACTURE SURGERY Right 1996    ECTROPION REPAIR Bilateral 2018    Dr. Flaherty    HYSTERECTOMY  1977    possible cancerous lesion       Family History   Problem Relation Age of Onset    Stroke Mother     Heart disease Mother     Prostate cancer Father     Colon cancer Father     Prostate cancer Brother     Prostate cancer Brother     Glaucoma Maternal Uncle     Blindness Maternal Uncle     Breast cancer Maternal Aunt     Cataracts Neg Hx     Macular degeneration Neg Hx        Social History     Socioeconomic History    Marital status:      Spouse name: Not on file    Number of children: Not on file    Years of education: Not on file    Highest education level: Not on file   Occupational History     Comment: Retired in 2018 -    Social Needs    Financial resource strain: Not on file    Food  insecurity:     Worry: Not on file     Inability: Not on file    Transportation needs:     Medical: Not on file     Non-medical: Not on file   Tobacco Use    Smoking status: Never Smoker    Smokeless tobacco: Never Used   Substance and Sexual Activity    Alcohol use: Yes     Frequency: Monthly or less     Drinks per session: 1 or 2    Drug use: No    Sexual activity: Not Currently   Lifestyle    Physical activity:     Days per week: Not on file     Minutes per session: Not on file    Stress: Not on file   Relationships    Social connections:     Talks on phone: Not on file     Gets together: Not on file     Attends [employer] service: Not on file     Active member of club or organization: Not on file     Attends meetings of clubs or organizations: Not on file     Relationship status: Not on file   Other Topics Concern    Not on file   Social History Narrative    Not on file       Review of Systems   Constitutional: Positive for fatigue (intermittent). Negative for appetite change, chills, fever and unexpected weight change.   HENT: Negative for mouth sores, nosebleeds, tinnitus, trouble swallowing and voice change.    Eyes: Negative for pain, redness and visual disturbance.   Respiratory: Negative for cough, shortness of breath and wheezing.    Cardiovascular: Positive for chest pain (resolved after she stopped eating Frito). Negative for palpitations and leg swelling.   Gastrointestinal: Negative for abdominal distention, abdominal pain, blood in stool, constipation, diarrhea, nausea and vomiting.   Endocrine: Negative for polydipsia, polyphagia and polyuria.   Genitourinary: Negative for flank pain, frequency and hematuria.   Musculoskeletal: Negative for arthralgias, back pain, gait problem, joint swelling, myalgias, neck pain and neck stiffness.   Skin: Negative for color change, pallor, rash and wound.   Neurological: Negative for tremors, seizures, syncope, speech difficulty, weakness,  light-headedness, numbness and headaches.   Hematological: Negative for adenopathy. Does not bruise/bleed easily.   Psychiatric/Behavioral: Negative for confusion, dysphoric mood and self-injury. The patient is not nervous/anxious.    All other systems reviewed and are negative.      Objective:  Physical Exam   Constitutional: She is oriented to person, place, and time. She appears well-developed and well-nourished. No distress.   HENT:   Head: Normocephalic and atraumatic.   Mouth/Throat: Oropharynx is clear and moist. No oropharyngeal exudate.   Eyes: Pupils are equal, round, and reactive to light. Conjunctivae and EOM are normal. No scleral icterus.   Neck: Normal range of motion. Neck supple. No JVD present. No thyromegaly present.   Cardiovascular: Normal rate, regular rhythm, normal heart sounds and intact distal pulses. Exam reveals no gallop and no friction rub.   No murmur heard.  Pulmonary/Chest: Effort normal and breath sounds normal. No respiratory distress. She has no wheezes. She has no rales.   Abdominal: Soft. Bowel sounds are normal. She exhibits no distension and no mass. There is no hepatosplenomegaly. There is no tenderness. There is no rebound. No hernia.   Musculoskeletal: Normal range of motion. She exhibits no edema, tenderness or deformity.   Lymphadenopathy:     She has no cervical adenopathy.        Right: No supraclavicular adenopathy present.        Left: No supraclavicular adenopathy present.   Neurological: She is alert and oriented to person, place, and time. No cranial nerve deficit. She exhibits normal muscle tone.   Skin: Skin is warm and dry. No rash noted. She is not diaphoretic. No erythema. No pallor.   Psychiatric: She has a normal mood and affect. Her behavior is normal. Judgment and thought content normal.   Vitals reviewed.      Labs:  Her Hb was 11.4 on 9/24/18 with MCV of 61. CBC on 7/15/19 showed WBC 7.15, Hb 10.7, MCV 62, plt count 260. CMP on 5/30/19 was normal. TSH on  4/9/19 normal.      Assessment and plan:  1. Anemia, unspecified type    2. Thalassemia minor      - Ms Colindres is a 73 yo woman who presents today for further evaluation of chronic microcytic anemia. She was told that she has thalassemia minor (thalassmie trait) in her 20s. She is feeling well with no symptoms from anemia  - check ferritin, iron, B12, folate, hapto, LDH, SPEP/IFX, free light chains, Hb analysis, alpha globulin gene analysis  - discussed with patient that I will call her with the results. If results are consistent with thalassemia trait and no other abnormalities, she does not need any treatment or intervention. I plan to monitor CBC in 6 months to ensure stability and see her back in the office in 12 months in that case  - all questions were answered in the office. Knows to call should issues arise.

## 2019-07-16 NOTE — LETTER
July 16, 2019      Sarah Sultana MD  [address]  [facility] [address]           [facility] [place] [address]  [address], [address]  [facility] [address]  Phone: [phone]          Patient: Valarie Colindres   MR Number: [id number]   YOB: 1945   Date of Visit: 7/16/2019       Dear Dr. Sarah Sultana:    Thank you for referring Valarie Colindres to me for evaluation. Attached you will find relevant portions of my assessment and plan of care.    If you have questions, please do not hesitate to call me. I look forward to following Valarie Colindres along with you.    Sincerely,    Pedro Kirk MD    Enclosure  CC:  No Recipients    If you would like to receive this communication electronically, please contact [email or web address] or [phone] to request more information on [employer] Link access.    For providers and/or their staff who would like to refer a patient to [facility], please contact us through our one-stop-shop provider referral line, [facility], at [phone].    If you feel you have received this communication in error or would no longer like to receive these types of communications, please e-mail [email or web address]

## 2019-07-16 NOTE — Clinical Note
Check CMP, iron, ferritin, hapto, LDH, B12, folate, SPEP, immunofixation, free light chain, Hb electrophoresis, alpha globulin test today. Check CBC in 6 months. RTC in 12 months with CBC

## 2019-07-16 NOTE — TELEPHONE ENCOUNTER
Patient brought in colonoscopy records from [facility].    Colonoscopy done 12/29/17 - polyp found in cecum, diverticulosis, internal hemorrhoids    Repeat in 5 years

## 2019-07-29 ENCOUNTER — TELEPHONE (OUTPATIENT)
Dept: HEMATOLOGY/ONCOLOGY | Facility: CLINIC | Age: 74
End: 2019-07-29

## 2019-07-29 NOTE — TELEPHONE ENCOUNTER
----- Message from Pedro Kirk MD sent at 7/29/2019  2:57 PM CDT -----  Please schedule patient to return this Friday morning to discuss test results. Please call patient and let her know

## 2019-07-30 ENCOUNTER — OFFICE VISIT (OUTPATIENT)
Dept: INTERNAL MEDICINE | Facility: CLINIC | Age: 74
End: 2019-07-30
Payer: MEDICARE

## 2019-07-30 VITALS
BODY MASS INDEX: 24.91 KG/M2 | HEART RATE: 63 BPM | WEIGHT: 149.69 LBS | SYSTOLIC BLOOD PRESSURE: 122 MMHG | OXYGEN SATURATION: 98 % | DIASTOLIC BLOOD PRESSURE: 76 MMHG

## 2019-07-30 DIAGNOSIS — I10 ESSENTIAL HYPERTENSION: ICD-10-CM

## 2019-07-30 DIAGNOSIS — E03.9 ACQUIRED HYPOTHYROIDISM: ICD-10-CM

## 2019-07-30 DIAGNOSIS — M25.551 RIGHT HIP PAIN: Primary | ICD-10-CM

## 2019-07-30 PROCEDURE — 3078F PR MOST RECENT DIASTOLIC BLOOD PRESSURE < 80 MM HG: ICD-10-PCS | Mod: S$GLB,,, | Performed by: FAMILY MEDICINE

## 2019-07-30 PROCEDURE — 99999 PR PBB SHADOW E&M-EST. PATIENT-LVL III: CPT | Mod: PBBFAC,,, | Performed by: FAMILY MEDICINE

## 2019-07-30 PROCEDURE — 99214 OFFICE O/P EST MOD 30 MIN: CPT | Mod: S$GLB,,, | Performed by: FAMILY MEDICINE

## 2019-07-30 PROCEDURE — 1101F PT FALLS ASSESS-DOCD LE1/YR: CPT | Mod: S$GLB,,, | Performed by: FAMILY MEDICINE

## 2019-07-30 PROCEDURE — 1101F PR PT FALLS ASSESS DOC 0-1 FALLS W/OUT INJ PAST YR: ICD-10-PCS | Mod: S$GLB,,, | Performed by: FAMILY MEDICINE

## 2019-07-30 PROCEDURE — 3074F PR MOST RECENT SYSTOLIC BLOOD PRESSURE < 130 MM HG: ICD-10-PCS | Mod: S$GLB,,, | Performed by: FAMILY MEDICINE

## 2019-07-30 PROCEDURE — 3074F SYST BP LT 130 MM HG: CPT | Mod: S$GLB,,, | Performed by: FAMILY MEDICINE

## 2019-07-30 PROCEDURE — 99999 PR PBB SHADOW E&M-EST. PATIENT-LVL III: ICD-10-PCS | Mod: PBBFAC,,, | Performed by: FAMILY MEDICINE

## 2019-07-30 PROCEDURE — 3078F DIAST BP <80 MM HG: CPT | Mod: S$GLB,,, | Performed by: FAMILY MEDICINE

## 2019-07-30 PROCEDURE — 99214 PR OFFICE/OUTPT VISIT, EST, LEVL IV, 30-39 MIN: ICD-10-PCS | Mod: S$GLB,,, | Performed by: FAMILY MEDICINE

## 2019-07-30 NOTE — PROGRESS NOTES
Patient ID: Valarie Colindres is a 74 y.o. female.    Chief Complaint: Hip Pain    HPI  Valarie Colindres is a 74 y.o. year old female with HTN, hypothyroid, osteopenia, microcytic anemia who presents today complaining of right hip pain.     Patient found to have trochanteric bursitis on the right. She is s/p CS injection by ortho, which helped. Says that is still feeling better, but that she now has right hip pain on the side that extends down the side of her leg. Says it is impairing her functional ability and her regular exercise is limited now. The pain often wakes her up at night. Pain is worse when walking. Better when sitting. Pain is 8/10 in severity. Pain is overall constant. Denies groin pain. No recent trauma.   Has tried heat and Salon Pas patch - helps  voltaren gel - not helping    Right hip xray 19 - Mild degenerative change of the hips.  No acute fracture.    Osteopenia - currently taking Fosamax for at least 5 years.      Hypothyroid - compliant with levothyroxine 100 mcg daily  Lab Results   Component Value Date    TSH 0.752 2019     Hypertension - compliant with HCTZ 12.5 mg and valsartan 320 mg daily     Exercise - [employer] [place]: aerobic exercise, strength training   Diet - Doesn't add salt to foods. Rarely eats meals out. Doesn't use canned foods. Rare frozen dinner.     OB/GYN History     Patient denies history of pregnancy complications.  Patient is post-menopausal.  Sexually active: no    Health Maintenance  Pap smear: s/p hysterectomy due to uterine CA/precancerous lesion? Last pap 2018 - normal  Mammogram:19 - normal   Colon Cancer Screening: colonoscopy 4 years ago or so - normal. Done with Dr. Mabry (EJ)  DEXA:  18 - osteopenia of lumbar spine  Hepatitis C screening: n/a  Flu vaccine:   Tetanus vaccine:  PNA vaccine:   Shingles vaccine:    Refused all vaccines today     I personally reviewed Past Medical History, Past Surgical History, Social History,  and Family History    Review of Systems   Constitutional: Negative for chills, fatigue, fever and unexpected weight change.   HENT: Negative for congestion, hearing loss, rhinorrhea and sore throat.    Eyes: Negative for visual disturbance.   Respiratory: Negative for cough, shortness of breath and wheezing.    Cardiovascular: Negative for chest pain, palpitations and leg swelling.   Gastrointestinal: Negative for abdominal pain, constipation, diarrhea, nausea and vomiting.   Genitourinary: Negative for dysuria, frequency, menstrual problem and urgency.   Musculoskeletal: Negative for arthralgias, myalgias and neck pain.        + right hip pain   Skin: Negative for rash.   Neurological: Negative for dizziness, syncope, numbness and headaches.   Psychiatric/Behavioral: Negative for dysphoric mood and sleep disturbance. The patient is not nervous/anxious.        Objective:      Vitals:    07/30/19 0812   BP: 122/76   Pulse: 63   SpO2: 98%   Weight: 67.9 kg (149 lb 11.1 oz)     Physical Exam   Constitutional: She is oriented to person, place, and time. She appears well-developed and well-nourished. No distress.   HENT:   Head: Normocephalic and atraumatic.   Eyes: Conjunctivae and EOM are normal.   Neck: Normal range of motion.   Cardiovascular: Normal rate, regular rhythm and normal heart sounds.   No murmur heard.  Pulmonary/Chest: Effort normal and breath sounds normal. No respiratory distress.   Musculoskeletal:        Right hip: She exhibits normal range of motion and normal strength.        Legs:  Full ROM of hips. No groin pain with passive ROM of hips.      Neurological: She is alert and oriented to person, place, and time.   Skin: Skin is warm and dry. No rash noted.   Psychiatric: She has a normal mood and affect. Her behavior is normal. Thought content normal.   Nursing note and vitals reviewed.      Assessment:       1. Right hip pain    2. Essential hypertension    3. Acquired hypothyroidism        Plan:      Right hip pain  - Possibly tendonitis vs overuse from exercise vs IT band syndrome. Not trochanteric bursitis. No pain in groin to suggest hip OA. Will refer to PT. Advised to use ice/heat. Try Tylenol.   -     Ambulatory Referral to Physical/Occupational Therapy    Essential hypertension  - Controlled. Continue current medication regimen.     Acquired hypothyroidism  - Controlled. Continue current medication regimen.

## 2019-08-02 ENCOUNTER — OFFICE VISIT (OUTPATIENT)
Dept: HEMATOLOGY/ONCOLOGY | Facility: CLINIC | Age: 74
End: 2019-08-02
Payer: MEDICARE

## 2019-08-02 VITALS
HEART RATE: 70 BPM | SYSTOLIC BLOOD PRESSURE: 127 MMHG | BODY MASS INDEX: 24.75 KG/M2 | DIASTOLIC BLOOD PRESSURE: 66 MMHG | WEIGHT: 148.56 LBS | TEMPERATURE: 98 F | RESPIRATION RATE: 16 BRPM | HEIGHT: 65 IN | OXYGEN SATURATION: 97 %

## 2019-08-02 DIAGNOSIS — D56.3 THALASSEMIA TRAIT: ICD-10-CM

## 2019-08-02 DIAGNOSIS — D64.9 ANEMIA, UNSPECIFIED TYPE: Primary | ICD-10-CM

## 2019-08-02 DIAGNOSIS — D47.2 MGUS (MONOCLONAL GAMMOPATHY OF UNKNOWN SIGNIFICANCE): ICD-10-CM

## 2019-08-02 PROCEDURE — 3074F SYST BP LT 130 MM HG: CPT | Mod: S$GLB,,, | Performed by: INTERNAL MEDICINE

## 2019-08-02 PROCEDURE — 3074F PR MOST RECENT SYSTOLIC BLOOD PRESSURE < 130 MM HG: ICD-10-PCS | Mod: S$GLB,,, | Performed by: INTERNAL MEDICINE

## 2019-08-02 PROCEDURE — 99214 OFFICE O/P EST MOD 30 MIN: CPT | Mod: S$GLB,,, | Performed by: INTERNAL MEDICINE

## 2019-08-02 PROCEDURE — 99999 PR PBB SHADOW E&M-EST. PATIENT-LVL III: ICD-10-PCS | Mod: PBBFAC,,, | Performed by: INTERNAL MEDICINE

## 2019-08-02 PROCEDURE — 1101F PR PT FALLS ASSESS DOC 0-1 FALLS W/OUT INJ PAST YR: ICD-10-PCS | Mod: S$GLB,,, | Performed by: INTERNAL MEDICINE

## 2019-08-02 PROCEDURE — 3078F PR MOST RECENT DIASTOLIC BLOOD PRESSURE < 80 MM HG: ICD-10-PCS | Mod: S$GLB,,, | Performed by: INTERNAL MEDICINE

## 2019-08-02 PROCEDURE — 99999 PR PBB SHADOW E&M-EST. PATIENT-LVL III: CPT | Mod: PBBFAC,,, | Performed by: INTERNAL MEDICINE

## 2019-08-02 PROCEDURE — 1101F PT FALLS ASSESS-DOCD LE1/YR: CPT | Mod: S$GLB,,, | Performed by: INTERNAL MEDICINE

## 2019-08-02 PROCEDURE — 3078F DIAST BP <80 MM HG: CPT | Mod: S$GLB,,, | Performed by: INTERNAL MEDICINE

## 2019-08-02 PROCEDURE — 99214 PR OFFICE/OUTPT VISIT, EST, LEVL IV, 30-39 MIN: ICD-10-PCS | Mod: S$GLB,,, | Performed by: INTERNAL MEDICINE

## 2019-08-02 NOTE — Clinical Note
Please schedule for skeletal survey next week, Monday preferred. RTC in 6 months. Get CBC, CMP, immunoglobulins, free light chain, SPEP one week prior

## 2019-08-02 NOTE — PROGRESS NOTES
PROGRESS NOTE    Subjective:       Patient ID: Valarie Colindres is a 74 y.o. female.    Chief Complaint: follow up for test results    Diagnosis:  1. Thalassemia trait  2. IgG kappa monoclonal gammopathy of undetermined significance    Hematologic History:  1. Ms Colindres is a 73 yo woman who initially saw me on 7/16/19 for further evaluation of microcytic anemia. Her Hb was 11.4 on 9/24/18 with MCV of 61. CBC on 7/15/19 showed WBC 7.15, Hb 10.7, MCV 62, plt count 260. CMP on 5/30/19 was normal. TSH on 4/9/19 normal. Patient currently feels fine. 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 Was told that no treatment is needed. She has occasional fatigue but goes to the gym 3 times a week. She had chest pain after she ate Fritos which did not recur after she stopped eating Fritos. She just had a stress test done.   2. Labs on 7/16/19 showed creatinine 1.0, calcium 9.8, albumin 4.3, protein 7.5, ferritin 128, folate 7.2, vitamin B12 424, haptoglobin 66, iron 86, TIBC 340, iron saturation 25%, . SPEP showed a 0.77 g/dL M protein at IgG kappa. kappa light chain normal 1.11, lambda 0.97, K/L ratio 1.14. Hb electrophoresis showed 4.7% A2, 95.3% A, c/w beta thal trait. Alpha globulin-gene analysis showed one copy of gene deletion.    Interval History:   Ms Colindres returns to discuss test results. Feeling well. Chronic hip pain from degenerative hip disease but denies other complaints.       ROS:   A ten-point system review is obtained and negative except for what was stated in the Interval History.     Physical Examination:   Vital signs reviewed.   General: well hydrated, well developed, in no acute distress  HEENT: normocephalic, PERRLA, EOMI, anicteric sclerae, oropharynx clear  Neck: supple, no JVD, thyromegaly, cervical or supraclavicular lymphadenopathy  Lungs: clear breath sounds bilaterally, no wheezing, rales, or rhonchi  Heart: RRR, no  M/R/G  Abdomen: soft, no tenderness, non-distended, no hepatosplenomegaly, mass, or hernia. BS present  Extremities: no clubbing, cyanosis, or edema  Skin: no rash, ulcer, or open wounds  Neuro: alert and oriented x 4, no focal neuro deficit  Psych: pleasant and appropriate mood and affect    Objective:     Laboratory Data:   Labs on 7/16/19 showed creatinine 1.0, calcium 9.8, albumin 4.3, protein 7.5, ferritin 128, folate 7.2, vitamin B12 424, haptoglobin 66, iron 86, TIBC 340, iron saturation 25%, . SPEP showed a 0.77 g/dL M protein at IgG kappa. kappa light chain normal 1.11, lambda 0.97, K/L ratio 1.14. Hb electrophoresis showed 4.7% A2, 95.3% A, c/w beta thal trait. Alpha globulin-gene analysis showed one copy of gene deletion.      Assessment and Plan:     1. Anemia, unspecified type    2. Thalassemia trait    3. MGUS (monoclonal gammopathy of unknown significance)      1-2  - Discussed with Ms Colindres that she has thalassemia trait with deletion in both beta and alpha globulin. This should not cause her any problem.     3.  - discussed with Ms Colindres re lab results. She has monoclonal gammopathy, low risk disease. No e/o end organ damage. Will get skeletal survey to r/o bone lesions  - we discussed the pathophysiology and clinical manifestations of multiple myeloma. She has low risk MGUS. I recommend monitoring with repeat labs in 6 months. She understands and agrees with the plan.   - all questions were answered in the clinic.     Follow-up:     RTC in 6 months  Knows to call in the interval if any problems arise.    Electronically signed by Pedro Kirk

## 2019-08-05 ENCOUNTER — TELEPHONE (OUTPATIENT)
Dept: HEMATOLOGY/ONCOLOGY | Facility: CLINIC | Age: 74
End: 2019-08-05

## 2019-08-05 ENCOUNTER — HOSPITAL ENCOUNTER (OUTPATIENT)
Dept: RADIOLOGY | Facility: HOSPITAL | Age: 74
Discharge: HOME | End: 2019-08-05
Attending: INTERNAL MEDICINE
Payer: MEDICARE

## 2019-08-05 DIAGNOSIS — D47.2 MGUS (MONOCLONAL GAMMOPATHY OF UNKNOWN SIGNIFICANCE): ICD-10-CM

## 2019-08-05 PROCEDURE — 77075 RADEX OSSEOUS SURVEY COMPL: CPT | Mod: TC,PN

## 2019-08-05 PROCEDURE — 77075 RADEX OSSEOUS SURVEY COMPL: CPT | Mod: 26,,, | Performed by: RADIOLOGY

## 2019-08-05 PROCEDURE — 77075 XR METASTATIC SURVEY: ICD-10-PCS | Mod: 26,,, | Performed by: RADIOLOGY

## 2019-08-05 NOTE — TELEPHONE ENCOUNTER
Called Ms Colindres. NA. Left VM. Xray today did not show any bone lesions. She does have degenerative spine disease and weakened bones in some part. But no concerning findings for cancer. Will see her in 3 months. Encouraged to call should questions arise.

## 2019-08-15 ENCOUNTER — CLINICAL SUPPORT (OUTPATIENT)
Dept: REHABILITATION | Facility: OTHER | Age: 74
End: 2019-08-15
Attending: FAMILY MEDICINE
Payer: MEDICARE

## 2019-08-15 DIAGNOSIS — M25.551 RIGHT HIP PAIN: ICD-10-CM

## 2019-08-15 DIAGNOSIS — R53.1 WEAKNESS: ICD-10-CM

## 2019-08-15 PROCEDURE — 97161 PT EVAL LOW COMPLEX 20 MIN: CPT | Mod: PN | Performed by: PHYSICAL THERAPIST

## 2019-08-15 PROCEDURE — G8978 MOBILITY CURRENT STATUS: HCPCS | Mod: CJ,PN | Performed by: PHYSICAL THERAPIST

## 2019-08-15 PROCEDURE — G8979 MOBILITY GOAL STATUS: HCPCS | Mod: CJ,PN | Performed by: PHYSICAL THERAPIST

## 2019-08-15 NOTE — PLAN OF CARE
[facility]  Physical Therapy Initial Evaluation    Name: Valarie Colindres  Clinic Number: [id number]    Therapy Diagnosis:   Encounter Diagnoses   Name Primary?    Right hip pain     Weakness      Physician: Shannan Marinelli, *    Physician Orders: PT Eval and Treat   Medical Diagnosis from Referral: M25.551 (ICD-10-CM) - Right hip pain   Evaluation Date: 8/15/2019  Authorization Period Expiration: 12/31/2019  Plan of Care Expiration: 11/8/2019  Visit # / Visits authorized: 1/ 12    Time In: 2:10 PM  Time Out: 3:00 PM  Total Billable Time: 50 minutes    Precautions: Standard    Subjective   Date of onset: July 2019  History of current condition - Valarie reports: was having R GT bursitis in June, went to [facility] and had injection which helped. A few weeks later she started having pain to lateral hip and thigh. She has been alternating heat and ice which give some relief. She tried taking a Tylenol but did not see relief. She was going to [facility] 3 days a week: walked 1 mile on track, weights for legs (leg extension, abd/adductor, leg press), back extension machine, chest press, abdominal curl machine, bicep curl machine. She has backed off of exercise with R hip pain  - she says weights weren't bothering her, but walking was very painful. She says she has also cut back on her usual housework. Pain was waking her up at night initially, but that has improved over the past several day       Past Medical History:   Diagnosis Date    Angle recession of right eye     Blunt trauma of both eyes     hit across eyes with bungee exercise band    Cataract     Ectropion due to laxity of eyelid, right     GERD (gastroesophageal reflux disease)     Hyperlipidemia     Hypertension     PVD (posterior vitreous detachment), right eye     Retinal drusen of both eyes     Thalassemia major      Valarie Colindres  has a past surgical history that includes Hysterectomy (1977); Ankle fracture surgery  "(Right, 1996); Abdominal adhesion surgery (1978); and Ectropion repair (Bilateral, 06/2018).    Valarie has a current medication list which includes the following prescription(s): acetaminophen, aspirin, chlorhexidine, diphenhydramine hcl, hydrochlorothiazide, levothyroxine, ranitidine, and valsartan.    Review of patient's allergies indicates:  No Known Allergies     Imaging, bone scan films: FINDINGS:  There is no evidence of acute fracture, dislocation, or bone destruction.  There is mild degenerative change at the sacroiliac joints and lower lumbar spine.  Phleboliths are present in the pelvis       Prior Therapy: none for c/c  Social History: Pt lives with her sister in single story home. Prior to onset of pain, went to [facility] 3x/week for walking and weight lifting  Occupation: retired  Prior Level of Function: I with all ADL's and driving, very active  Current Level of Function: I with ADL's and driving. Some pain getting in/out of car. Pain with prolonged walking and feels gait is not quite right. Has decreased housework due to pain. Has stopped going to gym due to pain     Pain:  Current 5/10, worst 8/10, best 3/10   Location: R lateral hip, sometimes down lateral thigh to just distal to knee  Description: "like a toothache"   Aggravating Factors: Standing and Walking  Easing Factors: ice, hot shower, Salonpas patch    Pts goals: get back to her gym routine    Objective     Observation: pt is very pleasant, alert and oriented, good historian      Hip Range of Motion: WFL and equal B. Min c/o pain to R hip           Lower Extremity Strength  Right LE  Left LE    Knee extension: 5/5 Knee extension: 5/5   Knee flexion: 5/5 Knee flexion: 5/5   Hip flexion: 5/5 Hip flexion: 5/5   Hip Internal Rotation:  4/5    Hip Internal Rotation: 5/5      Hip External Rotation: 4/5    Hip External Rotation: 5/5      Hip extension:  4/5 Hip extension: 4+/5   Hip abduction: 4/5 Hip abduction: 5/5   Hip adduction: 4/5 Hip adduction " "5/5   Ankle dorsiflexion: 5/5 Ankle dorsiflexion: 5/5   Ankle plantarflexion: 5/5 Ankle plantarflexion: 5/5         Special Tests:  DONNA: motion WFL B, min c/o posterolateral R hip pain  FADIR: -    Flexibility:    Hamstring: R = full; L = full   Quad: R = slight; L = slight   Piriformis: R: slight; L full   Boo's test: R = mild ITB restriction, min c/o pain ; L = mild ITB restriction       Joint Mobility: WFL B    Palpation: tenderness to B ITB, TFL, glut med (R>L)    Sensation: grossly intact to light touch B LE    Edema: none noted          CMS Impairment/Limitation/Restriction for FOTO Hip Survey    Therapist reviewed FOTO scores for Valarie Colindres on 8/15/2019.   FOTO documents entered into [employer] - see Media section.    Limitation Score: 34%  Category: Mobility    Current : CJ = at least 20% but < 40% impaired, limited or restricted  Goal: CJ = at least 20% but < 40% impaired, limited or restricted  Discharge: n/a         TREATMENT   Treatment Time In: 2:55 PM  Treatment Time Out: 3:00 PM  Total Treatment time separate from Evaluation: 5 minutes    Valarie received therapeutic exercises to develop strength and ROM for 5 minutes including:  Reviewed and demonstrated sidelying clam with 5" hold for HEP      Home Exercises and Patient Education Provided    Education provided:   - Therapy rationale and plan of care. Discussed option of trigger point dry needling for pain relief.     Written Home Exercises Provided: yes.  Exercises were reviewed and Valarie was able to demonstrate them prior to the end of the session.  Valarie demonstrated good  understanding of the education provided.     See EMR under Patient Instructions for exercises provided 8/15/2019.    Assessment   Valarie is a 74 y.o. female referred to outpatient Physical Therapy with a medical diagnosis of M25.551 (ICD-10-CM) - Right hip pain. Pt presents with pain to R TFL and ITB. ITB tightness and tenderness to L LE as well, though this side is " asymptomatic. Mild weakness to R hip with MMT. Mildly antalgic gait pattern. Pt has limited household work and ceased gym exercise and walking due to pain.     Pt prognosis is Good.   Pt will benefit from skilled outpatient Physical Therapy to address the deficits stated above and in the chart below, provide pt/family education, and to maximize pt's level of independence.     Plan of care discussed with patient: Yes  Pt's spiritual, cultural and educational needs considered and patient is agreeable to the plan of care and goals as stated below:     Anticipated Barriers for therapy: none    Medical Necessity is demonstrated by the following  History  Co-morbidities and personal factors that may impact the plan of care Co-morbidities:   HTN    Personal Factors:   no deficits     low   Examination  Body Structures and Functions, activity limitations and participation restrictions that may impact the plan of care Body Regions:   lower extremities    Body Systems:    gross symmetry  ROM  strength  balance  gait  transfers    Participation Restrictions:   Walking, standing, transfers    Activity limitations:   Learning and applying knowledge  no deficits    General Tasks and Commands  no deficits    Communication  no deficits    Mobility  walking    Self care  no deficits    Domestic Life  doing house work (cleaning house, washing dishes, laundry)    Interactions/Relationships  no deficits    Life Areas  no deficits    Community and Social Life  community life  recreation and leisure         moderate   Clinical Presentation evolving clinical presentation with changing clinical characteristics moderate   Decision Making/ Complexity Score: low     Goals:  Short Term Goals (4 Weeks):  1. Pt able to stand >=30 minutes with ADLs with <4/10 pain.  2. Pt able to walk >=30 minutes with household chores with <4/10 pain.  3. Pt will demonstrate increased strength by a half grade to allow patient to walk with increased ease.  4. Pt  to demonstrate improved functional ability with FOTO limitation <=32% disability.    Long Term Goals (12 Weeks):  1. Pt able to stand >=1 hour with with ADLs with <2/10 pain.  2. Pt able to walk >=1 hour with household chores with <2/10 pain.  3. Pt able to sleep a full night without pain disturbance.  4. Pt demonstrates strength WFL / WNL to allow patient to ambulate with min to no difficulty.  5. Pt able to return to full gym activities with min difficulty and pain <2/10.  6. Pt will be independent with HEP and self management of symptoms.   7. Pt to demonstrate improved functional ability with FOTO limitation <=28% disability.    Plan   Plan of care Certification: 8/15/2019 to 11/8/2019.    Outpatient Physical Therapy 2 times weekly for 12 weeks to include the following interventions: Manual Therapy, Moist Heat/ Ice, Neuromuscular Re-ed, Patient Education, Therapeutic Exercise and Dry Needling.     Domi Mcneil, PT   no

## 2019-08-21 ENCOUNTER — OFFICE VISIT (OUTPATIENT)
Dept: SPORTS MEDICINE | Facility: CLINIC | Age: 74
End: 2019-08-21
Payer: MEDICARE

## 2019-08-21 VITALS
SYSTOLIC BLOOD PRESSURE: 111 MMHG | DIASTOLIC BLOOD PRESSURE: 68 MMHG | HEART RATE: 70 BPM | BODY MASS INDEX: 24.66 KG/M2 | HEIGHT: 65 IN | WEIGHT: 148 LBS

## 2019-08-21 DIAGNOSIS — M70.61 TROCHANTERIC BURSITIS, RIGHT HIP: Primary | ICD-10-CM

## 2019-08-21 DIAGNOSIS — M25.551 HIP PAIN, ACUTE, RIGHT: ICD-10-CM

## 2019-08-21 PROCEDURE — 3078F DIAST BP <80 MM HG: CPT | Mod: S$GLB,,, | Performed by: PHYSICIAN ASSISTANT

## 2019-08-21 PROCEDURE — 99213 PR OFFICE/OUTPT VISIT, EST, LEVL III, 20-29 MIN: ICD-10-PCS | Mod: S$GLB,,, | Performed by: PHYSICIAN ASSISTANT

## 2019-08-21 PROCEDURE — 1101F PT FALLS ASSESS-DOCD LE1/YR: CPT | Mod: S$GLB,,, | Performed by: PHYSICIAN ASSISTANT

## 2019-08-21 PROCEDURE — 99999 PR PBB SHADOW E&M-EST. PATIENT-LVL III: ICD-10-PCS | Mod: PBBFAC,,, | Performed by: PHYSICIAN ASSISTANT

## 2019-08-21 PROCEDURE — 3078F PR MOST RECENT DIASTOLIC BLOOD PRESSURE < 80 MM HG: ICD-10-PCS | Mod: S$GLB,,, | Performed by: PHYSICIAN ASSISTANT

## 2019-08-21 PROCEDURE — 99213 OFFICE O/P EST LOW 20 MIN: CPT | Mod: S$GLB,,, | Performed by: PHYSICIAN ASSISTANT

## 2019-08-21 PROCEDURE — 99999 PR PBB SHADOW E&M-EST. PATIENT-LVL III: CPT | Mod: PBBFAC,,, | Performed by: PHYSICIAN ASSISTANT

## 2019-08-21 PROCEDURE — 3074F SYST BP LT 130 MM HG: CPT | Mod: S$GLB,,, | Performed by: PHYSICIAN ASSISTANT

## 2019-08-21 PROCEDURE — 1101F PR PT FALLS ASSESS DOC 0-1 FALLS W/OUT INJ PAST YR: ICD-10-PCS | Mod: S$GLB,,, | Performed by: PHYSICIAN ASSISTANT

## 2019-08-21 PROCEDURE — 3074F PR MOST RECENT SYSTOLIC BLOOD PRESSURE < 130 MM HG: ICD-10-PCS | Mod: S$GLB,,, | Performed by: PHYSICIAN ASSISTANT

## 2019-08-21 NOTE — PROGRESS NOTES
"Subjective:          Chief Complaint: Valarie Colindres is a 74 y.o. female who had concerns including Pain of the Right Hip.    Valarie Colindres is an active pleasant retiree.    She reports 100% relief of lateral hip pain after trochanteric bursa injection. She has not started PT yet due to scheduling conflicts. Her first appt is scheduled this week. Pain now present "inside hip".    Previous HPI: The pain started 3-4 months ago and is becoming progressively worse last few months. Pain is located over (points to) lateral hip. She reports that the pain is a 5 /10 aching, throbbing and radiating pain today and not responding adequately to conservative measures which have included activity modifications, rest, and oral/topical medication. Is affecting ADLs and limiting desired level of activity. Denies numbness, tingling, radiation, and inability to bear weight. Baseline lower back pain   Pain is 7 /10 at its worst    Mechanical symptoms: none  Subjective instability: (--)   Worse with palpation  Better with rest.   Nocturnal symptoms: (+)    No previous surgeries or trauma on hips            Review of Systems   Constitution: Negative for chills and fever.   HENT: Negative for congestion and sore throat.    Eyes: Negative for discharge and double vision.   Cardiovascular: Negative for chest pain, palpitations and syncope.   Respiratory: Negative for cough and shortness of breath.    Endocrine: Negative for cold intolerance and heat intolerance.   Skin: Negative for dry skin and rash.   Musculoskeletal: Positive for joint pain. Negative for falls, gout and joint swelling.   Gastrointestinal: Negative for abdominal pain, nausea and vomiting.   Neurological: Negative for focal weakness, numbness and paresthesias.                   Objective:        General: Valarie is well-developed, well-nourished, appears stated age, in no acute distress, alert and oriented to time, place and person.     General    Constitutional: She " is oriented to person, place, and time. She appears well-developed and well-nourished. No distress.   HENT:   Head: Normocephalic and atraumatic.   Nose: Nose normal.   Eyes: Conjunctivae and EOM are normal. Pupils are equal, round, and reactive to light.   Neck: No JVD present.   Cardiovascular: Normal rate, regular rhythm and normal heart sounds.    Pulmonary/Chest: Effort normal and breath sounds normal. No respiratory distress.   Abdominal: Soft. Bowel sounds are normal. She exhibits no distension. There is no tenderness.   Neurological: She is alert and oriented to person, place, and time. She has normal reflexes. Coordination normal.   Psychiatric: She has a normal mood and affect. Her behavior is normal. Judgment and thought content normal.     General Musculoskeletal Exam   Gait: normal       Right Knee Exam     Inspection   Effusion: absent    Left Knee Exam     Inspection   Effusion: absent    Right Hip Exam     Inspection   Swelling: absent  Bruising: absent  No deformity of hip.  Erythema: absent    Range of Motion   Abduction: 45   Adduction: 30   Extension: 10   Flexion: 120   External rotation: 60   Internal rotation: 30     Tests   Pain w/ forced internal rotation (DONNA): present  Pain w/ forced external rotation (FADIR): absent  Boo: negative  Log Roll: negative  Step-down test: negative    Other   Sensation: normal    Comments:  +TTP over gluteus medius  +bridge test.  Left Hip Exam     Inspection   Swelling: absent  No deformity of hip.  Erythema: absent  Bruising: absent    Range of Motion   Abduction: 45   Adduction: 30   Extension: 10   Flexion: 120   External rotation: 60   Internal rotation: 30     Tests   Pain w/ forced internal rotation (DONNA): absent  Pain w/ forced external rotation (FADIR): absent  Stinchfield test: negative  Log Roll: negative  Step-down test: negative    Other   Sensation: normal          Muscle Strength   Right Lower Extremity   Hip Abduction: 5/5   Hip Adduction:  5/5   Hip Flexion: 5/5   Ankle Dorsiflexion:  5/5   Left Lower Extremity   Hip Abduction: 5/5   Hip Adduction: 5/5   Hip Flexion: 5/5   Ankle Dorsiflexion:  5/5     Vascular Exam     Right Pulses  Dorsalis Pedis:      2+  Posterior Tibial:      2+        Left Pulses    Posterior Tibial:      2+        Capillary Refill  Right Hand: normal capillary refill  Left Hand: normal capillary refill    Radiographic Findings:    There is no evidence of acute fracture, dislocation, or bone destruction.  There is mild degenerative change at the sacroiliac joints and lower lumbar spine.  Phleboliths are present in the pelvis.    Xrays of the hips/pelvis were reviewed by me today. These findings were discussed and reviewed with the patient.        Assessment:       Encounter Diagnoses   Name Primary?    Trochanteric bursitis, right hip Yes    Hip pain, acute, right           Plan:       The total face-to-face encounter time with this patient was 45 min and greater than 50% of of the encounter time was spent counseling the patient, coordinating care, and education regarding the pathology and natural history of her diagnosis. We have discussed a variety of treatment options including medications, injections, physical therapy and other alternative treatments. Pt wants to try PT at this time.    I made the decision to obtain old records of the patient including previous notes and imaging. I independently reviewed and interpreted lab results today as well as prior imaging.     1. Initiate PT for hip strength and conditioning, dry needling.  2. Continue topical NSAID as needed.  3. Ice compress to the affected area 2-3x a day for 15-20 minutes as needed for pain management.  4. RTC to see Michael Valencia PA-C in 3 months for follow-up.    All of the patient's questions were answered and the patient will contact us if they have any questions or concerns in the interim.          Patient questionnaires may have been collected.

## 2019-08-26 ENCOUNTER — CLINICAL SUPPORT (OUTPATIENT)
Dept: REHABILITATION | Facility: OTHER | Age: 74
End: 2019-08-26
Payer: MEDICARE

## 2019-08-26 DIAGNOSIS — M25.551 RIGHT HIP PAIN: ICD-10-CM

## 2019-08-26 DIAGNOSIS — R53.1 WEAKNESS: ICD-10-CM

## 2019-08-26 PROCEDURE — 97110 THERAPEUTIC EXERCISES: CPT | Mod: PN

## 2019-08-26 NOTE — PROGRESS NOTES
"                            Physical Therapy Daily Treatment Note     Name: Valarie Colindres  Clinic Number: [id number]    Therapy Diagnosis:   Encounter Diagnoses   Name Primary?    Right hip pain     Weakness      Physician: Shannan Marinelli, *    Visit Date: 8/26/2019  Physician Orders: PT Eval and Treat   Medical Diagnosis from Referral: M25.551 (ICD-10-CM) - Right hip pain   Evaluation Date: 8/15/2019  Authorization Period Expiration: 12/31/2019  Plan of Care Expiration: 11/8/2019  Visit # / Visits authorized: 2/ 12    Time In: 9:04 AM  Time Out: 9:55 AM  Total Billable Time: 51 minutes    Precautions: Standard    Subjective   Pt reports: she has started her HEP and hasn't noticed a change in her pain. Patient inquiring about walking on the track. States she has less pain in her hip when she would do the exercise machines..  She was compliant with home exercise program.  Response to previous treatment: no adverse effects  Functional change: no change reported     Pain: 6/10  Location: hip/LE  right    Objective     Valarie received therapeutic exercises to develop strength, ROM and flexibility for 51 minutes including:  The stick stm to R IT band x 5'  IT band stretch 3 x 30"  Piriformis stretch 3 x 30"  Side lying clams 20 x 3"  Reverse clams 20 x 3"  Side lying hip abd 2 x 10 reps  Gluteal sets 10 x 5"  Bridging 10 x 5"    Home Exercises Provided and Patient Education Provided     Education provided:   - discussed use of ice or heating pad for pain reduction. Discussed dry needling     Piriformis stretch   Reverse clams  Side lying hip abd   Gluteal sets   Bridging     Written Home Exercises Provided: yes.  Exercises were reviewed and Valarie was able to demonstrate them prior to the end of the session.  Valarie demonstrated good  understanding of the education provided.     See EMR under Patient Instructions for exercises provided 8/26/2019.    Assessment     Patient interested in dry needling. PT " discussed use and goals with dry needling. Pt indicated she would be interested next session in proceeding. Added exercises to HEP.    Valarie is progressing well towards her goals.   Pt prognosis is Good.     Pt will continue to benefit from skilled outpatient physical therapy to address the deficits listed in the problem list box on initial evaluation, provide pt/family education and to maximize pt's level of independence in the home and community environment.     Pt's spiritual, cultural and educational needs considered and pt agreeable to plan of care and goals.    Anticipated barriers to physical therapy: none    Goals:   Short Term Goals (4 Weeks):  1. Pt able to stand >=30 minutes with ADLs with <4/10 pain. (in progress)  2. Pt able to walk >=30 minutes with household chores with <4/10 pain. (in progress)  3. Pt will demonstrate increased strength by a half grade to allow patient to walk with increased ease. (in progress)  4. Pt to demonstrate improved functional ability with FOTO limitation <=32% disability. (in progress)     Long Term Goals (12 Weeks):  1. Pt able to stand >=1 hour with with ADLs with <2/10 pain. (in progress)   2. Pt able to walk >=1 hour with household chores with <2/10 pain. (in progress)  3. Pt able to sleep a full night without pain disturbance. (in progress)  4. Pt demonstrates strength WFL / WNL to allow patient to ambulate with min to no difficulty. (in progress)  5. Pt able to return to full gym activities with min difficulty and pain <2/10. (in progress)  6. Pt will be independent with HEP and self management of symptoms. (in progress)  7. Pt to demonstrate improved functional ability with FOTO limitation <=28% disability. (in progress)    Plan     Continue with B LE strengthening and stretching. Patient interested in dry needling.    Jovani Gong, PT

## 2019-08-26 NOTE — PATIENT INSTRUCTIONS
"Piriformis Stretch         Lie on your back, back straight, one leg straight, other leg bent, ankle on opposite thigh. Grasp knee and ankle of crossed leg. Pull leg toward trunk. Feel stretch in gluteals.     Hold _30_ seconds. Repeat _3_ times per session. Do _1-2__ sessions per day.    Copyright © I. All rights reserved.     SIDELYING REVERSE CLAM SHELL - REVERSE CLAMSHELL        While lying on your side with your knees bent, raise your top foot towards the ceiling while keeping contact of your knees together. Then, lower back down to original position.     Do not let your pelvis roll forward during the lifting movement.      Hold for 3-5 seconds, Perform 20 reps, 1-2 times a day.    HIP ABDUCTION - SIDELYING        While lying on your side, slowly raise up your top leg to the side. Keep your knee straight and maintain your toes pointed forward the entire time. Keep your leg in-line with your body.    The bottom leg can be bent to stabilize your body.    Hold for 3 seconds, Perform 2 sets of 10 reps, 1-2 times a day.    Copyright © 4884-5695 [employer]    PRONE HIP EXTENSION        While lying face down with your knee straight, slowly raise up leg off the ground. Maintain a straight knee the entire time.     Hold for 3 seconds, Perform 2 sets of 10 reps, 1-2 times a day.    Copyright © 1382-6637 [employer]    GLUTE SET - SUPINE        While lying on your back, squeeze your buttocks and hold. Repeat. The opposite knee can be bent or straight at your therapists discretion. Perform 10 reps holding for 5 seconds.    Perform twice a day.    Copyright © 5383-8719 [employer]    BRIDGING        While lying on your back, tighten your lower abdominals, squeeze your buttocks and then raise your buttocks off the floor/bed as creating a "Bridge" with your body. Hold and then lower yourself and repeat. Perform 10 reps holding for 5 seconds.    Perform twice a day.    Copyright © 6248-6890 [employer]      HIP ABDUCTION - " SIDELYING            While lying on your side, slowly raise up your top leg to the side. Keep your knee straight and maintain your toes pointed forward the entire time. Keep your leg in-line with your body.  The bottom leg can be bent to stabilize your body.    Hold and then lower yourself and repeat. Perform 2 sets of 10 reps holding for 3 seconds.    Perform 1-2 a day.    Copyright © 7907-9913 [employer]

## 2019-09-04 ENCOUNTER — CLINICAL SUPPORT (OUTPATIENT)
Dept: REHABILITATION | Facility: OTHER | Age: 74
End: 2019-09-04
Payer: MEDICARE

## 2019-09-04 DIAGNOSIS — M25.551 RIGHT HIP PAIN: ICD-10-CM

## 2019-09-04 DIAGNOSIS — R53.1 WEAKNESS: ICD-10-CM

## 2019-09-04 PROCEDURE — 97110 THERAPEUTIC EXERCISES: CPT | Mod: PN | Performed by: PHYSICAL THERAPIST

## 2019-09-04 NOTE — PROGRESS NOTES
"                            Physical Therapy Daily Treatment Note     Name: Valarie Colindres  Clinic Number: [id number]    Therapy Diagnosis:   Encounter Diagnoses   Name Primary?    Right hip pain     Weakness      Physician: Shannan Marinelli, *    Visit Date: 2019  Physician Orders: PT Eval and Treat   Medical Diagnosis from Referral: M25.551 (ICD-10-CM) - Right hip pain   Evaluation Date: 8/15/2019  Authorization Period Expiration: 2019  Plan of Care Expiration: 2019  Visit # / Visits authorized: 3/ 12    Time In: 3:05 PM  Time Out: 4:05 PM  Total Billable Time: 60 minutes    Precautions: Standard    Subjective   Pt reports: "it comes and goes. It's not as bad today." Pt requests to trial dry needling today.   She was compliant with home exercise program.  Response to previous treatment: no adverse effects  Functional change: no change reported     Pain: 5/10  Location: hip/LE  right    Objective     Valarie received therapeutic exercises to develop strength, ROM and flexibility for 40 minutes including:  The stick stm to R IT band x 5' - NP  IT band stretch 3 x 30"  Piriformis stretch 3 x 30"  Side lying clams 20 x 3" - NP  Reverse clams 20 x 3" - NP  Side lying hip abd 2 x 10 reps  Gluteal sets 10 x 5"  Bridging 2 x 10 x 5"      Valarie received manual therapy to R hip includin min x Application of TDN: Pt educated on benefits and potential side effects of dry needling. Educated pt on benefits, precautions, side effects following TDN. Educated pt to use heat following treatment sessions if pt is experiencing pain or soreness. Pt verbalized good understanding of education.  Pt signed written consent to dry needling. Pt gave verbal consent for DN    Pt received dry needling to the below listed muscles using 60 mm needles.  In sidelying: R glut med/max at iliac crest, TFL, proximal and distal piriformis    Winding performed every 5 minutes during treatment.    Home Exercises Provided and " Patient Education Provided     Education provided:   - discussed use of ice or heating pad for pain reduction. Discussed dry needling     Piriformis stretch   Reverse clams  Side lying hip abd   Gluteal sets   Bridging     Written Home Exercises Provided: yes.  Exercises were reviewed and Valarie was able to demonstrate them prior to the end of the session.  Valarie demonstrated good  understanding of the education provided.     See EMR under Patient Instructions for exercises provided 8/26/2019.    Assessment     Dry needling initiated today with pt's written and verbal consent. Good soft tissue response to dry needling evident by increased grasp with unilateral winding at all insertion points, particularly at TFL. Winding performed every 5 minutes during treatment. No adverse effects following treatment.    Valarie is progressing well towards her goals.   Pt prognosis is Good.     Pt will continue to benefit from skilled outpatient physical therapy to address the deficits listed in the problem list box on initial evaluation, provide pt/family education and to maximize pt's level of independence in the home and community environment.     Pt's spiritual, cultural and educational needs considered and pt agreeable to plan of care and goals.    Anticipated barriers to physical therapy: none    Goals:   Short Term Goals (4 Weeks):  1. Pt able to stand >=30 minutes with ADLs with <4/10 pain. (in progress)  2. Pt able to walk >=30 minutes with household chores with <4/10 pain. (in progress)  3. Pt will demonstrate increased strength by a half grade to allow patient to walk with increased ease. (in progress)  4. Pt to demonstrate improved functional ability with FOTO limitation <=32% disability. (in progress)     Long Term Goals (12 Weeks):  1. Pt able to stand >=1 hour with with ADLs with <2/10 pain. (in progress)   2. Pt able to walk >=1 hour with household chores with <2/10 pain. (in progress)  3. Pt able to sleep a  full night without pain disturbance. (in progress)  4. Pt demonstrates strength WFL / WNL to allow patient to ambulate with min to no difficulty. (in progress)  5. Pt able to return to full gym activities with min difficulty and pain <2/10. (in progress)  6. Pt will be independent with HEP and self management of symptoms. (in progress)  7. Pt to demonstrate improved functional ability with FOTO limitation <=28% disability. (in progress)    Plan     Continue with B LE strengthening and stretching. Patient interested in dry needling.    Domi Mcneil, PT

## 2019-09-05 NOTE — PROGRESS NOTES
"                            Physical Therapy Daily Treatment Note     Name: Valarie Colindres  Clinic Number: [id number]    Therapy Diagnosis:   Encounter Diagnoses   Name Primary?    Right hip pain     Weakness      Physician: Shannan Marinelli, *    Visit Date: 9/6/2019  Physician Orders: PT Eval and Treat   Medical Diagnosis from Referral: M25.551 (ICD-10-CM) - Right hip pain   Evaluation Date: 8/15/2019  Authorization Period Expiration: 12/31/2019  Plan of Care Expiration: 11/8/2019  Visit # / Visits authorized: 4/ 12    Time In: 10:00 AM  Time Out: 11:00 AM  Total Billable Time: 60 minutes    Precautions: Standard    Subjective   Pt reports: she's still feeling a little sore from the dry needling, and would like to hold off on that today. She says she purchased a rolling pin and feels it is helping with the tightness along the side of her leg.    She was compliant with home exercise program.  Response to previous treatment: no adverse effects  Functional change: no change reported     Pain: 6/10  Location: hip/LE  right    Objective     Valarie received therapeutic exercises to develop strength, ROM and flexibility for 40 minutes including:    IT band stretch 3 x 30"  Piriformis stretch 3 x 30"  Side lying clams 20 x 3" - NP  Reverse clams 20 x 3" - NP  Side lying hip abd 2 x 10 reps  Gluteal sets 10 x 5"  Bridging 2 x 10 x 5"      Valarie received manual therapy to R hip including:  15 min x The stick stm to R IT band, and manual release to piriformis    00 min x Application of TDN: Pt educated on benefits and potential side effects of dry needling. Educated pt on benefits, precautions, side effects following TDN. Educated pt to use heat following treatment sessions if pt is experiencing pain or soreness. Pt verbalized good understanding of education.  Pt signed written consent to dry needling. Pt gave verbal consent for DN    Pt received dry needling to the below listed muscles using 60 mm needles.  In " sidelying: R glut med/max at iliac crest, TFL, proximal and distal piriformis    Winding performed every 5 minutes during treatment.    Home Exercises Provided and Patient Education Provided     Education provided:   - discussed use of ice or heating pad for pain reduction. Discussed dry needling     Piriformis stretch   Reverse clams  Side lying hip abd   Gluteal sets   Bridging     Written Home Exercises Provided: yes.  Exercises were reviewed and Valarie was able to demonstrate them prior to the end of the session.  Valarie demonstrated good  understanding of the education provided.     See EMR under Patient Instructions for exercises provided 8/26/2019.    Assessment     Good tolerance to treatment today. Tissue restriction noted to R piriformis and ITB with STM. Verbal and tactile cuing for pelvic stability with hip ER/IR isotonics, with good return demonstration.     Valarie is progressing well towards her goals.   Pt prognosis is Good.     Pt will continue to benefit from skilled outpatient physical therapy to address the deficits listed in the problem list box on initial evaluation, provide pt/family education and to maximize pt's level of independence in the home and community environment.     Pt's spiritual, cultural and educational needs considered and pt agreeable to plan of care and goals.    Anticipated barriers to physical therapy: none    Goals:   Short Term Goals (4 Weeks):  1. Pt able to stand >=30 minutes with ADLs with <4/10 pain. (in progress)  2. Pt able to walk >=30 minutes with household chores with <4/10 pain. (in progress)  3. Pt will demonstrate increased strength by a half grade to allow patient to walk with increased ease. (in progress)  4. Pt to demonstrate improved functional ability with FOTO limitation <=32% disability. (in progress)     Long Term Goals (12 Weeks):  1. Pt able to stand >=1 hour with with ADLs with <2/10 pain. (in progress)   2. Pt able to walk >=1 hour with household  chores with <2/10 pain. (in progress)  3. Pt able to sleep a full night without pain disturbance. (in progress)  4. Pt demonstrates strength WFL / WNL to allow patient to ambulate with min to no difficulty. (in progress)  5. Pt able to return to full gym activities with min difficulty and pain <2/10. (in progress)  6. Pt will be independent with HEP and self management of symptoms. (in progress)  7. Pt to demonstrate improved functional ability with FOTO limitation <=28% disability. (in progress)    Plan     Continue with B LE strengthening and stretching, dry needling as needed.     Domi Mcneil, PT

## 2019-09-06 ENCOUNTER — CLINICAL SUPPORT (OUTPATIENT)
Dept: REHABILITATION | Facility: OTHER | Age: 74
End: 2019-09-06
Payer: MEDICARE

## 2019-09-06 DIAGNOSIS — R53.1 WEAKNESS: ICD-10-CM

## 2019-09-06 DIAGNOSIS — M25.551 RIGHT HIP PAIN: ICD-10-CM

## 2019-09-06 PROCEDURE — 97140 MANUAL THERAPY 1/> REGIONS: CPT | Mod: PN | Performed by: PHYSICAL THERAPIST

## 2019-09-06 PROCEDURE — 97110 THERAPEUTIC EXERCISES: CPT | Mod: PN | Performed by: PHYSICAL THERAPIST

## 2019-09-11 ENCOUNTER — CLINICAL SUPPORT (OUTPATIENT)
Dept: REHABILITATION | Facility: OTHER | Age: 74
End: 2019-09-11
Payer: MEDICARE

## 2019-09-11 DIAGNOSIS — R53.1 WEAKNESS: ICD-10-CM

## 2019-09-11 DIAGNOSIS — M25.551 RIGHT HIP PAIN: ICD-10-CM

## 2019-09-11 PROCEDURE — 97110 THERAPEUTIC EXERCISES: CPT | Mod: PN | Performed by: PHYSICAL THERAPIST

## 2019-09-11 PROCEDURE — 97140 MANUAL THERAPY 1/> REGIONS: CPT | Mod: PN | Performed by: PHYSICAL THERAPIST

## 2019-09-11 NOTE — PROGRESS NOTES
"                            Physical Therapy Daily Treatment Note     Name: Valarie Colindres  Clinic Number: [id number]    Therapy Diagnosis:   Encounter Diagnoses   Name Primary?    Right hip pain     Weakness      Physician: Shannan Marinelli, *    Visit Date: 9/11/2019  Physician Orders: PT Eval and Treat   Medical Diagnosis from Referral: M25.551 (ICD-10-CM) - Right hip pain   Evaluation Date: 8/15/2019  Authorization Period Expiration: 12/31/2019  Plan of Care Expiration: 11/8/2019  Visit # / Visits authorized: 5/ 12    Time In: 11:00 AM  Time Out: 12:00 PM  Total Billable Time: 60 minutes    Precautions: Standard    Subjective   Pt reports: she is feeling some improvement in severity and duration of pain, but continues with aching pain to R hip. She would like to hold on dry needling today and perform on Friday.     She was compliant with home exercise program.  Response to previous treatment: no adverse effects  Functional change: no change reported     Pain: 5/10  Location: hip/LE  right    Objective     Valarie received therapeutic exercises to develop strength, ROM and flexibility for 35 minutes including:    IT band stretch 3 x 30"  Piriformis stretch 3 x 30"  Side lying clams 20 x 3" - NP  Reverse clams 20 x 3" - NP  Side lying hip abd 2 x 10 reps  Gluteal sets 10 x 5"  Bridging 2 x 10 x 5"      Valarie received manual therapy to R hip including:  15 min x The stick stm to R IT band, lacrosse ball to TFL, and manual release to piriformis and ITB    00 min x Application of TDN: Pt educated on benefits and potential side effects of dry needling. Educated pt on benefits, precautions, side effects following TDN. Educated pt to use heat following treatment sessions if pt is experiencing pain or soreness. Pt verbalized good understanding of education.  Pt signed written consent to dry needling. Pt gave verbal consent for DN    Pt received dry needling to the below listed muscles using 60 mm needles.  In " sidelying: R glut med/max at iliac crest, TFL, proximal and distal piriformis. Winding performed every 5 minutes during treatment.      Valarie received hot back to R hip in sidelying for 10 minutes    Home Exercises Provided and Patient Education Provided     Education provided:   - discussed use of ice or heating pad for pain reduction. Discussed dry needling     Piriformis stretch   Reverse clams  Side lying hip abd   Gluteal sets   Bridging     Written Home Exercises Provided: yes.  Exercises were reviewed and Valarie was able to demonstrate them prior to the end of the session.  Valarie demonstrated good  understanding of the education provided.     See EMR under Patient Instructions for exercises provided 8/26/2019.    Assessment     Good tolerance to treatment today. Pt continues with tissue restriction noted to R piriformis, TFL, and ITB. Reports feeling improved flexibility with piriformis stretch following manual therapy.     Valarie is progressing well towards her goals.   Pt prognosis is Good.     Pt will continue to benefit from skilled outpatient physical therapy to address the deficits listed in the problem list box on initial evaluation, provide pt/family education and to maximize pt's level of independence in the home and community environment.     Pt's spiritual, cultural and educational needs considered and pt agreeable to plan of care and goals.    Anticipated barriers to physical therapy: none    Goals:   Short Term Goals (4 Weeks):  1. Pt able to stand >=30 minutes with ADLs with <4/10 pain. (in progress)  2. Pt able to walk >=30 minutes with household chores with <4/10 pain. (in progress)  3. Pt will demonstrate increased strength by a half grade to allow patient to walk with increased ease. (in progress)  4. Pt to demonstrate improved functional ability with FOTO limitation <=32% disability. (in progress)     Long Term Goals (12 Weeks):  1. Pt able to stand >=1 hour with with ADLs with <2/10  pain. (in progress)   2. Pt able to walk >=1 hour with household chores with <2/10 pain. (in progress)  3. Pt able to sleep a full night without pain disturbance. (in progress)  4. Pt demonstrates strength WFL / WNL to allow patient to ambulate with min to no difficulty. (in progress)  5. Pt able to return to full gym activities with min difficulty and pain <2/10. (in progress)  6. Pt will be independent with HEP and self management of symptoms. (in progress)  7. Pt to demonstrate improved functional ability with FOTO limitation <=28% disability. (in progress)    Plan     Continue with B LE strengthening and stretching, dry needling as needed.     Domi Mcneil, PT

## 2019-09-13 ENCOUNTER — TELEPHONE (OUTPATIENT)
Dept: INTERNAL MEDICINE | Facility: CLINIC | Age: 74
End: 2019-09-13

## 2019-09-13 ENCOUNTER — CLINICAL SUPPORT (OUTPATIENT)
Dept: REHABILITATION | Facility: OTHER | Age: 74
End: 2019-09-13
Payer: MEDICARE

## 2019-09-13 DIAGNOSIS — M25.551 RIGHT HIP PAIN: ICD-10-CM

## 2019-09-13 DIAGNOSIS — R53.1 WEAKNESS: ICD-10-CM

## 2019-09-13 PROCEDURE — 97140 MANUAL THERAPY 1/> REGIONS: CPT | Mod: PN | Performed by: PHYSICAL THERAPIST

## 2019-09-13 PROCEDURE — 97110 THERAPEUTIC EXERCISES: CPT | Mod: PN | Performed by: PHYSICAL THERAPIST

## 2019-09-13 NOTE — PROGRESS NOTES
"                            Physical Therapy Daily Treatment Note     Name: Valarie Colindres  Clinic Number: [id number]    Therapy Diagnosis:   Encounter Diagnoses   Name Primary?    Right hip pain     Weakness      Physician: Shannan Marinelli, *    Visit Date: 2019  Physician Orders: PT Eval and Treat   Medical Diagnosis from Referral: M25.551 (ICD-10-CM) - Right hip pain   Evaluation Date: 8/15/2019  Authorization Period Expiration: 2019  Plan of Care Expiration: 2019  Visit # / Visits authorized:     Time In: 10:00 AM  Time Out: 11:00 AM  Total Billable Time: 60 minutes    Precautions: Standard    Subjective   Pt reports: feeling " a tad bit better today."     She was compliant with home exercise program.  Response to previous treatment: no adverse effects  Functional change: no change reported     Pain: 5/10  Location: hip/LE  right    Objective     Valarie received therapeutic exercises to develop strength, ROM and flexibility for 45 minutes including:    IT band stretch 3 x 30" - standing  Piriformis stretch 3 x 30"  Side lying clams 20 x 3"   Reverse clams 20 x 3"   Side lying hip abd 2 x 10 reps  Gluteal sets 10 x 5" - NP  Bridging 2 x 10 x 5"      Valarie received manual therapy to R hip includin min x The stick stm to R IT band, lacrosse ball to TFL, and manual release to piriformis and ITB    15 min x Application of TDN: Pt educated on benefits and potential side effects of dry needling. Educated pt on benefits, precautions, side effects following TDN. Educated pt to use heat following treatment sessions if pt is experiencing pain or soreness. Pt verbalized good understanding of education.  Pt signed written consent to dry needling. Pt gave verbal consent for DN    Pt received dry needling to the below listed muscles using 60 mm needles.  In sidelying: R glut med/max at iliac crest, TFL, proximal and distal piriformis. Winding performed every 5 minutes during " treatment.      Valarie received hot back to R hip in sidelying for 00 minutes    Home Exercises Provided and Patient Education Provided     Education provided:   - discussed use of ice or heating pad for pain reduction. Discussed dry needling     Piriformis stretch   Reverse clams  Side lying hip abd   Gluteal sets   Bridging     Written Home Exercises Provided: yes.  Exercises were reviewed and Valarie was able to demonstrate them prior to the end of the session.  Valarie demonstrated good  understanding of the education provided.     See EMR under Patient Instructions for exercises provided 8/26/2019.    Assessment     Good tolerance to treatment today. Min c/o fatigue with sidelying isotonics, but able to complete. Dry needling performed today at pt request. Good soft tissue response to dry needling evident by increased grasp with unilateral winding at all insertion points. Winding performed every 5 minutes during treatment. No adverse effects following treatment.    Valarie is progressing well towards her goals.   Pt prognosis is Good.     Pt will continue to benefit from skilled outpatient physical therapy to address the deficits listed in the problem list box on initial evaluation, provide pt/family education and to maximize pt's level of independence in the home and community environment.     Pt's spiritual, cultural and educational needs considered and pt agreeable to plan of care and goals.    Anticipated barriers to physical therapy: none    Goals:   Short Term Goals (4 Weeks):  1. Pt able to stand >=30 minutes with ADLs with <4/10 pain. (in progress)  2. Pt able to walk >=30 minutes with household chores with <4/10 pain. (in progress)  3. Pt will demonstrate increased strength by a half grade to allow patient to walk with increased ease. (in progress)  4. Pt to demonstrate improved functional ability with FOTO limitation <=32% disability. (in progress)     Long Term Goals (12 Weeks):  1. Pt able to stand  >=1 hour with with ADLs with <2/10 pain. (in progress)   2. Pt able to walk >=1 hour with household chores with <2/10 pain. (in progress)  3. Pt able to sleep a full night without pain disturbance. (in progress)  4. Pt demonstrates strength WFL / WNL to allow patient to ambulate with min to no difficulty. (in progress)  5. Pt able to return to full gym activities with min difficulty and pain <2/10. (in progress)  6. Pt will be independent with HEP and self management of symptoms. (in progress)  7. Pt to demonstrate improved functional ability with FOTO limitation <=28% disability. (in progress)    Plan     Continue with B LE strengthening and stretching, dry needling as needed.     Domi Mcneil, PT

## 2019-09-17 NOTE — PROGRESS NOTES
"                            Physical Therapy Daily Treatment Note     Name: Valarie Colindres  Clinic Number: [id number]    Therapy Diagnosis:   Encounter Diagnoses   Name Primary?    Right hip pain     Weakness      Physician: Shannan Marinelli, *    Visit Date: 9/18/2019  Physician Orders: PT Eval and Treat   Medical Diagnosis from Referral: M25.551 (ICD-10-CM) - Right hip pain   Evaluation Date: 8/15/2019  Authorization Period Expiration: 12/31/2019  Plan of Care Expiration: 11/8/2019  Visit # / Visits authorized: 7/ 12    Time In: 9:00 AM  Time Out: 10:00 AM  Total Billable Time: 60 minutes    Precautions: Standard    Subjective   Pt reports: "It's feeling a tad bit better." She says she went to [facility] and had a stretch session, and feels the combination and that and therapy have been helping.    She was compliant with home exercise program.  Response to previous treatment: no soreness following dry needling last visit  Functional change: no change reported     Pain: 4/10  Location: hip/LE  right    Objective     Valarie received therapeutic exercises to develop strength, ROM and flexibility for 40 minutes including:    IT band stretch 3 x 30" - standing  Piriformis stretch 3 x 30"  Side lying clams 20 x 3"   Reverse clams 20 x 3"   Side lying hip abd 2 x 10 reps  Gluteal sets 10 x 5" - NP  Bridging 2 x 10 x 5"      Valarie received manual therapy to R hip including:  10 min x The stick stm to R IT band, lacrosse ball to TFL, and manual release to piriformis and ITB    00 min x Application of TDN: Pt educated on benefits and potential side effects of dry needling. Educated pt on benefits, precautions, side effects following TDN. Educated pt to use heat following treatment sessions if pt is experiencing pain or soreness. Pt verbalized good understanding of education.  Pt signed written consent to dry needling. Pt gave verbal consent for DN    Pt received dry needling to the below listed muscles using 60 " mm needles.  In sidelying: R glut med/max at iliac crest, TFL, proximal and distal piriformis. Winding performed every 5 minutes during treatment.      Valarie received hot back to R hip in sidelying for 10 minutes    Home Exercises Provided and Patient Education Provided     Education provided:   - discussed use of ice or heating pad for pain reduction. Discussed dry needling     Piriformis stretch   Reverse clams  Side lying hip abd   Gluteal sets   Bridging     Written Home Exercises Provided: yes.  Exercises were reviewed and Valarie was able to demonstrate them prior to the end of the session.  Valarie demonstrated good  understanding of the education provided.     See EMR under Patient Instructions for exercises provided 8/26/2019.    Assessment     Good tolerance to all treatment today. Verbal cuing with set up of therex, demonstrates good training effect with continued performance. Pt continues with tightness along length of ITB, particularly distally. Tenderness to TFL and piriformis is gradually improving.     Valarie is progressing well towards her goals.   Pt prognosis is Good.     Pt will continue to benefit from skilled outpatient physical therapy to address the deficits listed in the problem list box on initial evaluation, provide pt/family education and to maximize pt's level of independence in the home and community environment.     Pt's spiritual, cultural and educational needs considered and pt agreeable to plan of care and goals.    Anticipated barriers to physical therapy: none    Goals:   Short Term Goals (4 Weeks):  1. Pt able to stand >=30 minutes with ADLs with <4/10 pain. (in progress)  2. Pt able to walk >=30 minutes with household chores with <4/10 pain. (in progress)  3. Pt will demonstrate increased strength by a half grade to allow patient to walk with increased ease. (in progress)  4. Pt to demonstrate improved functional ability with FOTO limitation <=32% disability. (in  progress)     Long Term Goals (12 Weeks):  1. Pt able to stand >=1 hour with with ADLs with <2/10 pain. (in progress)   2. Pt able to walk >=1 hour with household chores with <2/10 pain. (in progress)  3. Pt able to sleep a full night without pain disturbance. (in progress)  4. Pt demonstrates strength WFL / WNL to allow patient to ambulate with min to no difficulty. (in progress)  5. Pt able to return to full gym activities with min difficulty and pain <2/10. (in progress)  6. Pt will be independent with HEP and self management of symptoms. (in progress)  7. Pt to demonstrate improved functional ability with FOTO limitation <=28% disability. (in progress)    Plan     Continue with B LE strengthening and stretching, dry needling as needed.     Domi Mcneil, PT

## 2019-09-18 ENCOUNTER — CLINICAL SUPPORT (OUTPATIENT)
Dept: REHABILITATION | Facility: OTHER | Age: 74
End: 2019-09-18
Payer: MEDICARE

## 2019-09-18 DIAGNOSIS — M25.551 RIGHT HIP PAIN: ICD-10-CM

## 2019-09-18 DIAGNOSIS — R53.1 WEAKNESS: ICD-10-CM

## 2019-09-18 PROCEDURE — 97110 THERAPEUTIC EXERCISES: CPT | Mod: PN | Performed by: PHYSICAL THERAPIST

## 2019-09-20 ENCOUNTER — CLINICAL SUPPORT (OUTPATIENT)
Dept: REHABILITATION | Facility: OTHER | Age: 74
End: 2019-09-20
Payer: MEDICARE

## 2019-09-20 DIAGNOSIS — M25.551 RIGHT HIP PAIN: ICD-10-CM

## 2019-09-20 DIAGNOSIS — R53.1 WEAKNESS: ICD-10-CM

## 2019-09-20 PROCEDURE — 97110 THERAPEUTIC EXERCISES: CPT | Mod: PN | Performed by: PHYSICAL THERAPIST

## 2019-09-20 NOTE — PROGRESS NOTES
"                            Physical Therapy Daily Treatment Note     Name: Valarie Colindres  Clinic Number: [id number]    Therapy Diagnosis:   Encounter Diagnoses   Name Primary?    Right hip pain     Weakness      Physician: Shannan Marinelli, *    Visit Date: 9/20/2019  Physician Orders: PT Eval and Treat   Medical Diagnosis from Referral: M25.551 (ICD-10-CM) - Right hip pain   Evaluation Date: 8/15/2019  Authorization Period Expiration: 12/31/2019  Plan of Care Expiration: 11/8/2019  Visit # / Visits authorized: 9/ 12    Time In: 9:10 AM  Time Out: 10:10 AM  Total Billable Time: 60 minutes    Precautions: Standard    Subjective   Pt reports: noticing more flexibility in R hip. Pain is about the same and interested in trying the dry needling a 3rd time.   She was compliant with home exercise program.  Response to previous treatment: no soreness following dry needling last visit  Functional change: no change reported     Pain: 4/10  Location: hip/LE  right    Objective     Valarie received therapeutic exercises to develop strength, ROM and flexibility for 40 minutes including:    IT band stretch 3 x 30" - standing  Piriformis stretch 3 x 30"  Side lying clams 20 x 3"   Reverse clams 20 x 3"   Side lying hip abd 2 x 10 reps  Gluteal sets 10 x 5" - NP  Bridging 2 x 10 x 5"      Valarie received manual therapy to R hip including:    10 min x Application of TDN: Pt educated on benefits and potential side effects of dry needling. Educated pt on benefits, precautions, side effects following TDN. Educated pt to use heat following treatment sessions if pt is experiencing pain or soreness. Pt verbalized good understanding of education.  Pt signed written consent to dry needling. Pt gave verbal consent for DN    Pt received dry needling to the below listed muscles using 60 mm needles.  In sidelying: R glut med/max at iliac crest, TFL, proximal and distal piriformis. Winding performed every 5 minutes during " treatment.      Valarie received hot back to R hip in sidelying for 10 minutes    Home Exercises Provided and Patient Education Provided     Education provided:   - discussed use of ice or heating pad for pain reduction. Discussed dry needling     Piriformis stretch   Reverse clams  Side lying hip abd   Gluteal sets   Bridging     Written Home Exercises Provided: yes.  Exercises were reviewed and Valarie was able to demonstrate them prior to the end of the session.  Valarie demonstrated good  understanding of the education provided.     See EMR under Patient Instructions for exercises provided 8/26/2019.    Assessment     Good tolerance to dry needling without adverse effects noted. Some tenderness and muscle guarding to glute med. Increased grasps all trigger points. Demonstrating good recall of exercises and compliance with HEP    Valarie is progressing well towards her goals.   Pt prognosis is Good.     Pt will continue to benefit from skilled outpatient physical therapy to address the deficits listed in the problem list box on initial evaluation, provide pt/family education and to maximize pt's level of independence in the home and community environment.     Pt's spiritual, cultural and educational needs considered and pt agreeable to plan of care and goals.    Anticipated barriers to physical therapy: none    Goals:   Short Term Goals (4 Weeks):  1. Pt able to stand >=30 minutes with ADLs with <4/10 pain. (in progress)  2. Pt able to walk >=30 minutes with household chores with <4/10 pain. (in progress)  3. Pt will demonstrate increased strength by a half grade to allow patient to walk with increased ease. (in progress)  4. Pt to demonstrate improved functional ability with FOTO limitation <=32% disability. (in progress)     Long Term Goals (12 Weeks):  1. Pt able to stand >=1 hour with with ADLs with <2/10 pain. (in progress)   2. Pt able to walk >=1 hour with household chores with <2/10 pain. (in  progress)  3. Pt able to sleep a full night without pain disturbance. (in progress)  4. Pt demonstrates strength WFL / WNL to allow patient to ambulate with min to no difficulty. (in progress)  5. Pt able to return to full gym activities with min difficulty and pain <2/10. (in progress)  6. Pt will be independent with HEP and self management of symptoms. (in progress)  7. Pt to demonstrate improved functional ability with FOTO limitation <=28% disability. (in progress)    Plan     Continue with B LE strengthening and stretching, dry needling as needed.     Mely Cotter, PT

## 2019-09-25 ENCOUNTER — CLINICAL SUPPORT (OUTPATIENT)
Dept: REHABILITATION | Facility: OTHER | Age: 74
End: 2019-09-25
Payer: MEDICARE

## 2019-09-25 DIAGNOSIS — M25.551 RIGHT HIP PAIN: ICD-10-CM

## 2019-09-25 DIAGNOSIS — R53.1 WEAKNESS: ICD-10-CM

## 2019-09-25 PROCEDURE — 97110 THERAPEUTIC EXERCISES: CPT | Mod: PN

## 2019-09-25 PROCEDURE — 97140 MANUAL THERAPY 1/> REGIONS: CPT | Mod: PN

## 2019-09-25 NOTE — PROGRESS NOTES
"                            Physical Therapy Daily Treatment Note     Name: Valarie Colindres  Clinic Number: [id number]    Therapy Diagnosis:   Encounter Diagnoses   Name Primary?    Right hip pain     Weakness      Physician: Shannan Marinelli, *    Visit Date: 9/25/2019  Physician Orders: PT Eval and Treat   Medical Diagnosis from Referral: M25.551 (ICD-10-CM) - Right hip pain   Evaluation Date: 8/15/2019  Authorization Period Expiration: 12/31/2019  Plan of Care Expiration: 11/8/2019  Visit # / Visits authorized: 10/ 12    Time In: 9:05 AM  Time Out: 9:58 AM  Total Billable Time: 26 minutes    Precautions: Standard    Subjective   Pt states she had R hip pain this morning at 4/10, but she has been moving around this morning and her pain is a 6/10 at the start of the session. States her pain is not as concentrated in her R hip joint. Having pain in her anterior thigh. States one day last week she had stephanie little pain, but then the next day it came back "double."    She was compliant with home exercise program.  Response to previous treatment: no adverse effects  Functional change: feeling more flexible    Pain: 6/10  Location: hip/LE  right    Objective     Valarie received therapeutic exercises to develop strength, ROM and flexibility for 32 minutes including:    IT band stretch 3 x 30" (manually by PT)  +Side lying hip flexor stretch 3 x 30" (manually by PT)  Piriformis stretch 3 x 30"  Side lying clams 20 x 3"   Reverse clams 20 x 3"   Side lying hip abd 2 x 10 reps  +Prone hip ext 2 x 10 x 3"  Bridging 2 x 10 x 5"      Valarie received manual therapy to R hip including:    IASTM to R IT band, R quads, and R glutes x 10 minutes    00 min x Application of TDN: Pt educated on benefits and potential side effects of dry needling. Educated pt on benefits, precautions, side effects following TDN. Educated pt to use heat following treatment sessions if pt is experiencing pain or soreness. Pt verbalized good " understanding of education.  Pt signed written consent to dry needling. Pt gave verbal consent for DN    Pt received dry needling to the below listed muscles using 60 mm needles.  In sidelying: R glut med/max at iliac crest, TFL, proximal and distal piriformis. Winding performed every 5 minutes during treatment.      Valarie received hot pack to R hip in sidelying for 10 minutes    Home Exercises Provided and Patient Education Provided     Education provided:   - discussed use of ice or heating pad for pain reduction. Discussed dry needling       Written Home Exercises Provided: yes.  Exercises were reviewed and Valarie was able to demonstrate them prior to the end of the session.  Valarie demonstrated good  understanding of the education provided.     See EMR under Patient Instructions for exercises provided 8/26/2019.    Assessment     R IT band and R quad tightness and tenderness to palpation noted today.    Valarie is progressing well towards her goals.   Pt prognosis is Good.     Pt will continue to benefit from skilled outpatient physical therapy to address the deficits listed in the problem list box on initial evaluation, provide pt/family education and to maximize pt's level of independence in the home and community environment.     Pt's spiritual, cultural and educational needs considered and pt agreeable to plan of care and goals.    Anticipated barriers to physical therapy: none    Goals:   Short Term Goals (4 Weeks):  1. Pt able to stand >=30 minutes with ADLs with <4/10 pain. (in progress)  2. Pt able to walk >=30 minutes with household chores with <4/10 pain. (in progress)  3. Pt will demonstrate increased strength by a half grade to allow patient to walk with increased ease. (in progress)  4. Pt to demonstrate improved functional ability with FOTO limitation <=32% disability. (in progress)     Long Term Goals (12 Weeks):  1. Pt able to stand >=1 hour with with ADLs with <2/10 pain. (in progress)   2.  Pt able to walk >=1 hour with household chores with <2/10 pain. (in progress)  3. Pt able to sleep a full night without pain disturbance. (in progress)  4. Pt demonstrates strength WFL / WNL to allow patient to ambulate with min to no difficulty. (in progress)  5. Pt able to return to full gym activities with min difficulty and pain <2/10. (in progress)  6. Pt will be independent with HEP and self management of symptoms. (in progress)  7. Pt to demonstrate improved functional ability with FOTO limitation <=28% disability. (in progress)    Plan     Continue with B LE strengthening and stretching, dry needling as needed. Continue with dry needling next visit. May benefit from IT band dry needling.    Jovani Gong, PT

## 2019-09-27 ENCOUNTER — CLINICAL SUPPORT (OUTPATIENT)
Dept: REHABILITATION | Facility: OTHER | Age: 74
End: 2019-09-27
Payer: MEDICARE

## 2019-09-27 DIAGNOSIS — R53.1 WEAKNESS: ICD-10-CM

## 2019-09-27 DIAGNOSIS — M25.551 RIGHT HIP PAIN: ICD-10-CM

## 2019-09-27 PROCEDURE — 97140 MANUAL THERAPY 1/> REGIONS: CPT | Mod: PN

## 2019-09-27 NOTE — PROGRESS NOTES
"                            Physical Therapy Daily Treatment Note     Name: Valarie Colindres  Clinic Number: [id number]    Therapy Diagnosis:   Encounter Diagnoses   Name Primary?    Right hip pain     Weakness      Physician: Shannan Marinelli, *    Visit Date: 9/27/2019  Physician Orders: PT Eval and Treat   Medical Diagnosis from Referral: M25.551 (ICD-10-CM) - Right hip pain   Evaluation Date: 8/15/2019  Authorization Period Expiration: 12/31/2019  Plan of Care Expiration: 11/8/2019  Visit # / Visits authorized: 11/ 12    Time In: 9:04 AM  Time Out: 9:52 AM  Total Billable Time:30 minutes    Precautions: Standard    Subjective   Pt reports she was pain free yesterday for at least 3 or 4 hours. "I was doing stuff and realized 'Hey, I'm not hurting,'" this morning the pain has returned,    She was compliant with home exercise program.  Response to previous treatment: pain reduction  Functional change: feeling more flexible    Pain: 5/10  Location: hip/LE  right    Objective     Valarie received therapeutic exercises to develop strength, ROM and flexibility for 5 minutes including:  The stick stm to R quad and R IT band  IT band stretch 3 x 30" (manually by PT)    Not performed today:  +Side lying hip flexor stretch 3 x 30" (manually by PT)  Piriformis stretch 3 x 30"  Side lying clams 20 x 3"   Reverse clams 20 x 3"   Side lying hip abd 2 x 10 reps  +Prone hip ext 2 x 10 x 3"  Bridging 2 x 10 x 5"      Valarie received manual therapy to R hip including x 25 minutes:    IASTM to R IT band, R quads, and R glutes x 10 minutes    15 min x Application and assessment of TDN: Pt educated on benefits and potential side effects of dry needling. Educated pt on benefits, precautions, side effects following TDN. Educated pt to use heat following treatment sessions if pt is experiencing pain or soreness. Pt verbalized good understanding of education.  Pt signed written consent to dry needling. Pt gave verbal consent for " DN    Pt received dry needling to the below listed muscles using 40 mm needles.  In [place]upine:   R vastus lateralis (3)  R mid IT band  R distal IT band    Winding performed every 5 minutes during treatment.      Valarie received hot pack to R hip in sidelying for 10 minutes    Home Exercises Provided and Patient Education Provided     Education provided:   - discussed use of ice or heating pad for pain reduction. Discussed dry needling       Written Home Exercises Provided: yes.  Exercises were reviewed and Valarie was able to demonstrate them prior to the end of the session.  Valarie demonstrated good  understanding of the education provided.     See EMR under Patient Instructions for exercises provided 8/26/2019.    Assessment     Initiated dry needling to IT band today. Good soft tissue response to dry needling evident by increased grasp with unilateral winding at all insertion points. Winding performed every 5 minutes during treatment. No adverse effects following treatment. Experienced pain reduction following last session with IT band stretches      Valarie is progressing well towards her goals.   Pt prognosis is Good.     Pt will continue to benefit from skilled outpatient physical therapy to address the deficits listed in the problem list box on initial evaluation, provide pt/family education and to maximize pt's level of independence in the home and community environment.     Pt's spiritual, cultural and educational needs considered and pt agreeable to plan of care and goals.    Anticipated barriers to physical therapy: none    Goals:   Short Term Goals (4 Weeks):  1. Pt able to stand >=30 minutes with ADLs with <4/10 pain. (in progress)  2. Pt able to walk >=30 minutes with household chores with <4/10 pain. (in progress)  3. Pt will demonstrate increased strength by a half grade to allow patient to walk with increased ease. (in progress)  4. Pt to demonstrate improved functional ability with FOTO limitation  <=32% disability. (in progress)     Long Term Goals (12 Weeks):  1. Pt able to stand >=1 hour with with ADLs with <2/10 pain. (in progress)   2. Pt able to walk >=1 hour with household chores with <2/10 pain. (in progress)  3. Pt able to sleep a full night without pain disturbance. (in progress)  4. Pt demonstrates strength WFL / WNL to allow patient to ambulate with min to no difficulty. (in progress)  5. Pt able to return to full gym activities with min difficulty and pain <2/10. (in progress)  6. Pt will be independent with HEP and self management of symptoms. (in progress)  7. Pt to demonstrate improved functional ability with FOTO limitation <=28% disability. (in progress)    Plan     Continue with B LE strengthening and stretching, dry needling as needed. Continue with dry needling next visit.     Jovani Gong, PT

## 2019-10-02 ENCOUNTER — CLINICAL SUPPORT (OUTPATIENT)
Dept: REHABILITATION | Facility: OTHER | Age: 74
End: 2019-10-02
Payer: MEDICARE

## 2019-10-02 DIAGNOSIS — R53.1 WEAKNESS: ICD-10-CM

## 2019-10-02 DIAGNOSIS — M25.551 RIGHT HIP PAIN: ICD-10-CM

## 2019-10-02 PROCEDURE — 97110 THERAPEUTIC EXERCISES: CPT | Mod: PN | Performed by: PHYSICAL THERAPIST

## 2019-10-02 NOTE — PROGRESS NOTES
"                            Physical Therapy Daily Treatment Note     Name: Valarie Colindres  Clinic Number: [id number]    Therapy Diagnosis:   Encounter Diagnoses   Name Primary?    Right hip pain     Weakness      Physician: Shannan Marinelli, *    Visit Date: 10/2/2019  Physician Orders: PT Eval and Treat   Medical Diagnosis from Referral: M25.551 (ICD-10-CM) - Right hip pain   Evaluation Date: 8/15/2019  Authorization Period Expiration: 12/31/2019  Plan of Care Expiration: 11/8/2019  Visit # / Visits authorized: 12/ 12    Time In: 9:10 AM  Time Out: 10:00 AM  Total Billable Time: 50 minutes    Precautions: Standard    Subjective   Pt reports she went up and down 2 flights of stairs twice yesterday, and had increased soreness this morning.     She was compliant with home exercise program.  Response to previous treatment: pain reduction  Functional change: feeling more flexible    Pain: 5/10  Location: hip/LE  right    Objective     10/2/2019 Reassessment   Palpation: continued tenderness to R TFL and ITB    10/2/2019 MMT:  R hip adduction 4+/5  R hip abduction 4+/5  R hip extension 4+/5  R hip ER 4/5  R hip IR 4/5        Valarie received therapeutic exercises to develop strength, ROM and flexibility for 35 minutes including:  Piriformis stretch 3 x 30"      Not performed today:  Side lying hip flexor stretch 3 x 30" (manually by PT)  The stick stm to R quad and R IT band  IT band stretch 3 x 30" (manually by PT)  Side lying clams 20 x 3"   Reverse clams 20 x 3"   Side lying hip abd 2 x 10 reps  Prone hip ext 2 x 10 x 3"  Bridging 2 x 10 x 5"    (add next visit: step ups and downs (FMT for glut recruitment), shuttle, monster walks)      Valarie received manual therapy to R hip including x 15 minutes:    IASTM to R IT band, R quads, and R glutes x 00 minutes    15 min x Application and assessment of TDN: Pt educated on benefits and potential side effects of dry needling. Educated pt on benefits, precautions, " side effects following TDN. Educated pt to use heat following treatment sessions if pt is experiencing pain or soreness. Pt verbalized good understanding of education.  Pt signed written consent to dry needling. Pt gave verbal consent for DN    Pt received dry needling to the below listed muscles using 40 mm needles.  In sidelying:  RTFL   R vastus lateralis (3)  R mid IT band  R distal IT band    Winding performed every 5 minutes during treatment.      [place] received hot pack to R hip in sidelying for 10 minutes    Home Exercises Provided and Patient Education Provided     Education provided:   - discussed use of ice or heating pad for pain reduction. Discussed dry needling       Written Home Exercises Provided: yes.  Exercises were reviewed and Valarie was able to demonstrate them prior to the end of the session.  Valarie demonstrated good  understanding of the education provided.     See EMR under Patient Instructions for exercises provided 8/26/2019, 10/2/2019.    Assessment     Overall pt is progressing well with therapy. She is reporting times where she is pain free. She continues to report pain in AM, and pain following exertion. She continues to have pain and difficulty with stair climbing. With MMT today pt demonstrates some improvement with abduction and extension strength, but continues with weakness to hip ER and IR. Pt would benefit from continued intervention at this time to progress strength and improve soft tissue mobility for decreased pain with mobility in home and community.       Valarie is progressing slowly towards her goals.   Pt prognosis is Good.     Pt will continue to benefit from skilled outpatient physical therapy to address the deficits listed in the problem list box on initial evaluation, provide pt/family education and to maximize pt's level of independence in the home and community environment.     Pt's spiritual, cultural and educational needs considered and pt agreeable to plan  of care and goals.    Anticipated barriers to physical therapy: none    Goals:   Short Term Goals (4 Weeks):  1. Pt able to stand >=30 minutes with ADLs with <4/10 pain. (in progress)  2. Pt able to walk >=30 minutes with household chores with <4/10 pain. (in progress)  3. Pt will demonstrate increased strength by a half grade to allow patient to walk with increased ease. (in progress)  4. Pt to demonstrate improved functional ability with FOTO limitation <=32% disability. (in progress)     Long Term Goals (12 Weeks):  1. Pt able to stand >=1 hour with with ADLs with <2/10 pain. (in progress)   2. Pt able to walk >=1 hour with household chores with <2/10 pain. (in progress)  3. Pt able to sleep a full night without pain disturbance. (in progress)  4. Pt demonstrates strength WFL / WNL to allow patient to ambulate with min to no difficulty. (in progress)  5. Pt able to return to full gym activities with min difficulty and pain <2/10. (in progress)  6. Pt will be independent with HEP and self management of symptoms. (in progress)  7. Pt to demonstrate improved functional ability with FOTO limitation <=28% disability. (in progress)    Plan     Continue with B LE strengthening and stretching, dry needling as needed. Progress strengthening as tolerated.      Domi Mcneil, PT

## 2019-10-03 NOTE — PROGRESS NOTES
"                            Physical Therapy Daily Treatment Note     Name: Valarie Colindres  Clinic Number: [id number]    Therapy Diagnosis:   Encounter Diagnoses   Name Primary?    Right hip pain     Weakness      Physician: Shannan Marinelli, *    Visit Date: 10/4/2019  Physician Orders: PT Eval and Treat   Medical Diagnosis from Referral: M25.551 (ICD-10-CM) - Right hip pain   Evaluation Date: 8/15/2019  Authorization Period Expiration: 12/31/2019  Plan of Care Expiration: 11/8/2019  Visit # / Visits authorized: 13/ 13    Time In: 9:05 AM  Time Out: 10:05 AM  Total Billable Time: 60 minutes    Precautions: Standard    Subjective   Pt reports "I had some good moments yesterday, and some good moments this morning."     She was compliant with home exercise program.  Response to previous treatment: pain reduction  Functional change: feeling more flexible    Pain: 5/10  Location: hip/LE  right    Objective     10/2/2019 Reassessment   Palpation: continued tenderness to R TFL and ITB    10/2/2019 MMT:  R hip adduction 4+/5  R hip abduction 4+/5  R hip extension 4+/5  R hip ER 4/5  R hip IR 4/5        Valarie received therapeutic exercises to develop strength, ROM and flexibility for 35 minutes including:  Piriformis stretch 3 x 30"  Side lying hip flexor stretch 3 x 30" (manually by PT)  The stick stm to R quad and R IT band - NP  IT band stretch 3 x 30" (manually by PT)  Side lying clams 20 x 3"   Reverse clams 20 x 3"  - NP  Side lying hip abd 2 x 10 reps  Prone hip ext 2 x 10 x 3" - NP  Bridging 2 x 10 x 5" with block squeeze  +Sidestepping with OTB x 2 laps  +Shuttle with OTB at knees 2 x 10, 2 black cords  +Lateral step ups 4" step 2 x 10    (add next visit: step ups and downs (FMT for glut recruitment?), )      Valarie received manual therapy to R hip including x 00 minutes:    IASTM to R IT band, R quads, and R glutes x 00 minutes    00 min x Application and assessment of TDN: Pt educated on benefits and " potential side effects of dry needling. Educated pt on benefits, precautions, side effects following TDN. Educated pt to use heat following treatment sessions if pt is experiencing pain or soreness. Pt verbalized good understanding of education.  Pt signed written consent to dry needling. Pt gave verbal consent for DN    Pt received dry needling to the below listed muscles using 40 mm needles.  In sidelying:  RTFL   R vastus lateralis (3)  R mid IT band  R distal IT band  Winding performed every 5 minutes during treatment.      Valarie received hot pack to R hip in sidelying for 10 minutes    Home Exercises Provided and Patient Education Provided     Education provided:   - discussed use of ice or heating pad for pain reduction. Discussed dry needling       Written Home Exercises Provided: yes.  Exercises were reviewed and Valarie was able to demonstrate them prior to the end of the session.  Valarie demonstrated good  understanding of the education provided.     See EMR under Patient Instructions for exercises provided 8/26/2019, 10/2/2019.    Assessment     Good tolerance to introduction of new therex today, mild c/o mm fatigue but no increase in pain. Heat provided at end of session per pt request.        Valarie is progressing slowly towards her goals.   Pt prognosis is Good.     Pt will continue to benefit from skilled outpatient physical therapy to address the deficits listed in the problem list box on initial evaluation, provide pt/family education and to maximize pt's level of independence in the home and community environment.     Pt's spiritual, cultural and educational needs considered and pt agreeable to plan of care and goals.    Anticipated barriers to physical therapy: none    Goals:   Short Term Goals (4 Weeks):  1. Pt able to stand >=30 minutes with ADLs with <4/10 pain. (in progress)  2. Pt able to walk >=30 minutes with household chores with <4/10 pain. (in progress)  3. Pt will demonstrate  increased strength by a half grade to allow patient to walk with increased ease. (in progress)  4. Pt to demonstrate improved functional ability with FOTO limitation <=32% disability. (in progress)     Long Term Goals (12 Weeks):  1. Pt able to stand >=1 hour with with ADLs with <2/10 pain. (in progress)   2. Pt able to walk >=1 hour with household chores with <2/10 pain. (in progress)  3. Pt able to sleep a full night without pain disturbance. (in progress)  4. Pt demonstrates strength WFL / WNL to allow patient to ambulate with min to no difficulty. (in progress)  5. Pt able to return to full gym activities with min difficulty and pain <2/10. (in progress)  6. Pt will be independent with HEP and self management of symptoms. (in progress)  7. Pt to demonstrate improved functional ability with FOTO limitation <=28% disability. (in progress)    Plan     Continue with B LE strengthening and stretching, dry needling as needed. Progress strengthening as tolerated.      Domi Mcneil, PT

## 2019-10-04 ENCOUNTER — CLINICAL SUPPORT (OUTPATIENT)
Dept: REHABILITATION | Facility: OTHER | Age: 74
End: 2019-10-04
Payer: MEDICARE

## 2019-10-04 DIAGNOSIS — M25.551 RIGHT HIP PAIN: ICD-10-CM

## 2019-10-04 DIAGNOSIS — R53.1 WEAKNESS: ICD-10-CM

## 2019-10-04 PROCEDURE — 97110 THERAPEUTIC EXERCISES: CPT | Mod: PN | Performed by: PHYSICAL THERAPIST

## 2019-10-07 ENCOUNTER — CLINICAL SUPPORT (OUTPATIENT)
Dept: REHABILITATION | Facility: OTHER | Age: 74
End: 2019-10-07
Payer: MEDICARE

## 2019-10-07 DIAGNOSIS — R53.1 WEAKNESS: ICD-10-CM

## 2019-10-07 DIAGNOSIS — M25.551 RIGHT HIP PAIN: ICD-10-CM

## 2019-10-07 PROCEDURE — 97110 THERAPEUTIC EXERCISES: CPT | Mod: PN

## 2019-10-07 NOTE — PROGRESS NOTES
"                            Physical Therapy Daily Treatment Note     Name: Valarie Colindres  Clinic Number: [id number]    Therapy Diagnosis:   Encounter Diagnoses   Name Primary?    Right hip pain     Weakness      Physician: Shannan Marinelli, *    Visit Date: 10/7/2019  Physician Orders: PT Eval and Treat   Medical Diagnosis from Referral: M25.551 (ICD-10-CM) - Right hip pain   Evaluation Date: 8/15/2019  Authorization Period Expiration: 12/31/2019  Plan of Care Expiration: 11/8/2019  Visit # / Visits authorized: 14/ 13    Time In: 8:10 AM  Time Out: 9:05 AM  Total Billable Time: 45 minutes    Precautions: Standard    Subjective     Pt reports: "I'm feeling good today, I had a good morning." She says she was hurting yesterday after doing increased housework on Saturday.    She was compliant with home exercise program.  Response to previous treatment: pain reduction  Functional change: feeling more flexible    Pain: 5/10  Location: hip/LE  right    Objective     10/2/2019 Reassessment   Palpation: continued tenderness to R TFL and ITB    10/2/2019 MMT:  R hip adduction 4+/5  R hip abduction 4+/5  R hip extension 4+/5  R hip ER 4/5  R hip IR 4/5        Valarie received therapeutic exercises to develop strength, ROM and flexibility for 45 minutes including:  Piriformis stretch 3 x 30"  Side lying hip flexor stretch 3 x 30" (manually by PT)  The stick stm to R quad and R IT band - NP  IT band stretch 3 x 30" (manually by PT)  Side lying clams 20 x 3"   Reverse clams 20 x 3"  - NP  Side lying hip abd 2 x 10 reps  Prone hip ext 2 x 10 x 3" - NP  Bridging 2 x 10 x 5" with block squeeze  Sidestepping with OTB x 2 laps  Shuttle with OTB at knees 2 x 10, 2 black cords  Lateral step ups 4" step 2 x 10    (add next visit: step ups and downs (FMT for glut recruitment?), )      Valarie received manual therapy to R hip including x 00 minutes:    IASTM to R IT band, R quads, and R glutes x 00 minutes    00 min x Application " and assessment of TDN: Pt educated on benefits and potential side effects of dry needling. Educated pt on benefits, precautions, side effects following TDN. Educated pt to use heat following treatment sessions if pt is experiencing pain or soreness. Pt verbalized good understanding of education.  Pt signed written consent to dry needling. Pt gave verbal consent for DN    Pt received dry needling to the below listed muscles using 40 mm needles.  In sidelying:  RTFL   R vastus lateralis (3)  R mid IT band  R distal IT band  Winding performed every 5 minutes during treatment.      [place] received hot pack to R hip in sidelying for 10 minutes    Home Exercises Provided and Patient Education Provided     Education provided:   - discussed use of ice or heating pad for pain reduction. Discussed dry needling       Written Home Exercises Provided: yes.  Exercises were reviewed and Valarie was able to demonstrate them prior to the end of the session.  Valarie demonstrated good  understanding of the education provided.     See EMR under Patient Instructions for exercises provided 8/26/2019, 10/2/2019.    Assessment     Good training effect noted throughout therx today. Mild hip drop observed during lateral step ups. Heat provided at end of session per pt request.      Valarie is progressing slowly towards her goals.   Pt prognosis is Good.     Pt will continue to benefit from skilled outpatient physical therapy to address the deficits listed in the problem list box on initial evaluation, provide pt/family education and to maximize pt's level of independence in the home and community environment.     Pt's spiritual, cultural and educational needs considered and pt agreeable to plan of care and goals.    Anticipated barriers to physical therapy: none    Goals:   Short Term Goals (4 Weeks):  1. Pt able to stand >=30 minutes with ADLs with <4/10 pain. (in progress)  2. Pt able to walk >=30 minutes with household chores with <4/10  pain. (in progress)  3. Pt will demonstrate increased strength by a half grade to allow patient to walk with increased ease. (in progress)  4. Pt to demonstrate improved functional ability with FOTO limitation <=32% disability. (in progress)     Long Term Goals (12 Weeks):  1. Pt able to stand >=1 hour with with ADLs with <2/10 pain. (in progress)   2. Pt able to walk >=1 hour with household chores with <2/10 pain. (in progress)  3. Pt able to sleep a full night without pain disturbance. (in progress)  4. Pt demonstrates strength WFL / WNL to allow patient to ambulate with min to no difficulty. (in progress)  5. Pt able to return to full gym activities with min difficulty and pain <2/10. (in progress)  6. Pt will be independent with HEP and self management of symptoms. (in progress)  7. Pt to demonstrate improved functional ability with FOTO limitation <=28% disability. (in progress)    Plan     Continue with B LE strengthening and stretching, dry needling as needed. Progress strengthening as tolerated.      Clarita Thorne, PT

## 2019-10-21 ENCOUNTER — CLINICAL SUPPORT (OUTPATIENT)
Dept: REHABILITATION | Facility: OTHER | Age: 74
End: 2019-10-21
Payer: MEDICARE

## 2019-10-21 DIAGNOSIS — M25.551 RIGHT HIP PAIN: ICD-10-CM

## 2019-10-21 DIAGNOSIS — R53.1 WEAKNESS: ICD-10-CM

## 2019-10-21 PROCEDURE — 97110 THERAPEUTIC EXERCISES: CPT | Mod: PN | Performed by: PHYSICAL THERAPIST

## 2019-10-21 PROCEDURE — 97110 THERAPEUTIC EXERCISES: CPT | Mod: PN

## 2019-10-21 NOTE — PROGRESS NOTES
"                            Physical Therapy Daily Treatment Note     Name: Valarie Colindres  Clinic Number: [id number]    Therapy Diagnosis:   Encounter Diagnoses   Name Primary?    Right hip pain     Weakness      Physician: Shannan Marinelli, *    Visit Date: 10/21/2019  Physician Orders: PT Eval and Treat   Medical Diagnosis from Referral: M25.551 (ICD-10-CM) - Right hip pain   Evaluation Date: 8/15/2019  Authorization Period Expiration: 12/31/2019  Plan of Care Expiration: 11/8/2019  Visit # / Visits authorized: 15/ 13    Time In: 2:00 PM  Time Out: 3:00 PM  Total Billable Time: 45 minutes    Precautions: Standard    Subjective     Pt reports: that she does not need the needling today; "I've been doing well without it." She says that she is feeling better and can tell that she is getting stronger as she is able to do more at home with less pain.    She was compliant with home exercise program.  Response to previous treatment: pain reduction  Functional change: feeling more flexible    Pain: 3-4/10  Location: hip/LE  right    Objective     10/2/2019 Reassessment   Palpation: continued tenderness to R TFL and ITB    10/2/2019 MMT:  R hip adduction 4+/5  R hip abduction 4+/5  R hip extension 4+/5  R hip ER 4/5  R hip IR 4/5        Valarie received therapeutic exercises to develop strength, ROM and flexibility for 45 minutes including:    Piriformis stretch 3 x 30"  Side lying hip flexor stretch 3 x 30" (manually by PT) - NP  The stick stm to R quad and R IT band - NP  IT band stretch 3 x 30" (manually by PT)  Side lying clams 20 x 3"   Reverse clams 20 x 3"  - NP  Side lying hip abd 2 x 10 reps  Prone hip ext 2 x 10 x 3" - NP  Bridging 2 x 10 x 5" with block squeeze  Sidestepping with OTB x 2 laps  Shuttle with OTB at knees 2 x 10, 2 black/1 red cords  Lateral step ups 4" step 2 x 10 - NP  +Lateral step downs (heel taps) 2 x 10 on 2" step    (add next visit: step ups and downs (FMT for glut recruitment?), " )      Valarie received manual therapy to R hip including x 00 minutes:    IASTM to R IT band, R quads, and R glutes x 00 minutes    00 min x Application and assessment of TDN: Pt educated on benefits and potential side effects of dry needling. Educated pt on benefits, precautions, side effects following TDN. Educated pt to use heat following treatment sessions if pt is experiencing pain or soreness. Pt verbalized good understanding of education.  Pt signed written consent to dry needling. Pt gave verbal consent for DN    Pt received dry needling to the below listed muscles using 40 mm needles.  In sidelying:  RTFL   R vastus lateralis (3)  R mid IT band  R distal IT band  Winding performed every 5 minutes during treatment.      Valarie received hot pack to R hip in sidelying for 10 minutes    Home Exercises Provided and Patient Education Provided     Education provided:   - discussed use of ice or heating pad for pain reduction. Discussed dry needling       Written Home Exercises Provided: yes.  Exercises were reviewed and Valarie was able to demonstrate them prior to the end of the session.  Valarie demonstrated good  understanding of the education provided.     See EMR under Patient Instructions for exercises provided 8/26/2019, 10/2/2019.    Assessment     Good training effect noted throughout therx today. Mild hip drop observed during lateral step ups. Heat provided at end of session per pt request.    Treatment taken over by Domi Mcneil PT at 2:30 PM    Valarie is progressing slowly towards her goals.   Pt prognosis is Good.     Pt will continue to benefit from skilled outpatient physical therapy to address the deficits listed in the problem list box on initial evaluation, provide pt/family education and to maximize pt's level of independence in the home and community environment.     Pt's spiritual, cultural and educational needs considered and pt agreeable to plan of care and goals.    Anticipated barriers  to physical therapy: none    Goals:   Short Term Goals (4 Weeks):  1. Pt able to stand >=30 minutes with ADLs with <4/10 pain. (in progress)  2. Pt able to walk >=30 minutes with household chores with <4/10 pain. (in progress)  3. Pt will demonstrate increased strength by a half grade to allow patient to walk with increased ease. (in progress)  4. Pt to demonstrate improved functional ability with FOTO limitation <=32% disability. (in progress)     Long Term Goals (12 Weeks):  1. Pt able to stand >=1 hour with with ADLs with <2/10 pain. (in progress)   2. Pt able to walk >=1 hour with household chores with <2/10 pain. (in progress)  3. Pt able to sleep a full night without pain disturbance. (in progress)  4. Pt demonstrates strength WFL / WNL to allow patient to ambulate with min to no difficulty. (in progress)  5. Pt able to return to full gym activities with min difficulty and pain <2/10. (in progress)  6. Pt will be independent with HEP and self management of symptoms. (in progress)  7. Pt to demonstrate improved functional ability with FOTO limitation <=28% disability. (in progress)    Plan     Continue with B LE strengthening and stretching, dry needling as needed. Progress strengthening as tolerated.      Clarita Thorne, PT   Domi Mcneil, PT

## 2019-11-06 ENCOUNTER — CLINICAL SUPPORT (OUTPATIENT)
Dept: REHABILITATION | Facility: OTHER | Age: 74
End: 2019-11-06
Payer: MEDICARE

## 2019-11-06 DIAGNOSIS — M25.551 RIGHT HIP PAIN: ICD-10-CM

## 2019-11-06 DIAGNOSIS — R53.1 WEAKNESS: ICD-10-CM

## 2019-11-06 PROCEDURE — 97110 THERAPEUTIC EXERCISES: CPT | Mod: PN

## 2019-11-06 NOTE — PROGRESS NOTES
"                            Physical Therapy Daily Treatment Note     Name: Valarie Colindres  Clinic Number: [id number]    Therapy Diagnosis:   Encounter Diagnoses   Name Primary?    Right hip pain     Weakness      Physician: Shannan Marinelli, *    Visit Date: 11/6/2019  Physician Orders: PT Eval and Treat   Medical Diagnosis from Referral: M25.551 (ICD-10-CM) - Right hip pain   Evaluation Date: 8/15/2019  Authorization Period Expiration: 12/31/2019  Plan of Care Expiration: 11/8/2019  Visit # / Visits authorized: 16/ 13    Time In: 8;00 am  Time Out: 9:00 am  Total Billable Time: 30 minutes    Precautions: Standard    Subjective     Pt reports: being OOT x 2 weeks to take care of daughter after she had foot surgery.  Stair climbing is still tiring but has gotten easier. Still not able to walk >1 city block. Goal is [facility] a [facility] the fitness center.    She was compliant with home exercise program.  Response to previous treatment: pain reduction  Functional change: feeling more flexible    Pain: 3-4/10  Location: hip/LE  right    Objective     11/6/2019 Reassessment     Palpation: continued tenderness to R TFL and ITB    11/6/2019 MMT:  R hip adduction 5/5  R hip abduction 4+/5  R hip extension 4+/5  R hip ER 4+/5  R hip IR 4+/5    Special tests:  DONNA: -    Flexibility:  Quads = slight  Piriformis = full       CMS Impairment/Limitation/Restriction for FOTO Hip Survey     Therapist reviewed FOTO scores for Valarie Colindres on 11/6/2019.   FOTO documents entered into [employer] - see Media section.     Limitation Score: 44%  Category: Mobility     Current : CK = at least 40% but < 60% impaired, limited or restricted  Goal: CJ = at least 20% but < 40% impaired, limited or restricted  Discharge: n/a             Valarie received therapeutic exercises to develop strength, ROM and flexibility for 60 minutes including:    Piriformis stretch 3 x 30"  Side lying hip flexor stretch 3 x 30" (manually by PT) - NP  The stick " "stm to R quad and R IT band - NP  IT band stretch 3 x 30" (manually by PT)  Side lying clams 30 x 3" x YTB at knees  Reverse clams 30 x 3"   Side lying hip abd 3 x 10   Prone hip ext 2 x 10 x 3" - NP  Bridging 3 x 10 x 5" x YT at knees    Sidestepping with OTB x 2 laps  Shuttle with GTB at knees 3 x 10, 2 black/1 red cords  Lateral step ups 4" step 2 x 10   Lateral step downs (heel taps) 2 x 10 on 2" step    [place] received manual therapy to R hip including x 00 minutes:    IASTM to R IT band, R quads, and R glutes x 00 minutes    00 min x Application and assessment of TDN: Pt educated on benefits and potential side effects of dry needling. Educated pt on benefits, precautions, side effects following TDN. Educated pt to use heat following treatment sessions if pt is experiencing pain or soreness. Pt verbalized good understanding of education.  Pt signed written consent to dry needling. Pt gave verbal consent for DN    Pt received dry needling to the below listed muscles using 40 mm needles.  In sidelying:  RTFL   R vastus lateralis (3)  R mid IT band  R distal IT band  Winding performed every 5 minutes during treatment.      [place] received hot pack to R hip in sidelying for 10 minutes    Home Exercises Provided and Patient Education Provided     Education provided:   - discussed use of ice or heating pad for pain reduction. Discussed dry needling       Written Home Exercises Provided: yes.  Exercises were reviewed and Moonia was able to demonstrate them prior to the end of the session.  [place] demonstrated good  understanding of the education provided.     See EMR under Patient Instructions for exercises provided 8/26/2019, 10/2/2019.    Assessment     Good improvements in strength, but continues lack sufficient strength and muscular endurance to perform ADLs for long periods of time. Continue to progress program to address functional strength and endurance. Continue 2-3 weeks and then consider D/C at the end " of the month.    Valarie is progressing slowly towards her goals.   Pt prognosis is Good.     Pt will continue to benefit from skilled outpatient physical therapy to address the deficits listed in the problem list box on initial evaluation, provide pt/family education and to maximize pt's level of independence in the home and community environment.     Pt's spiritual, cultural and educational needs considered and pt agreeable to plan of care and goals.    Anticipated barriers to physical therapy: none    Goals:   Short Term Goals (4 Weeks):  1. Pt able to stand >=30 minutes with ADLs with <4/10 pain. (in progress)  2. Pt able to walk >=30 minutes with household chores with <4/10 pain. (in progress)  3. Pt will demonstrate increased strength by a half grade to allow patient to walk with increased ease. (in progress)  4. Pt to demonstrate improved functional ability with FOTO limitation <=32% disability. (in progress)     Long Term Goals (12 Weeks):  1. Pt able to stand >=1 hour with with ADLs with <2/10 pain. (in progress)   2. Pt able to walk >=1 hour with household chores with <2/10 pain. (in progress)  3. Pt able to sleep a full night without pain disturbance. (in progress)  4. Pt demonstrates strength WFL / WNL to allow patient to ambulate with min to no difficulty. (in progress)  5. Pt able to return to full gym activities with min difficulty and pain <2/10. (in progress)  6. Pt will be independent with HEP and self management of symptoms. (in progress)  7. Pt to demonstrate improved functional ability with FOTO limitation <=28% disability. (in progress)    Plan     Continue with B LE strengthening and stretching, dry needling as needed. Progress strengthening as tolerated.    Continue PT x 2-3 weeks and then progress to D/C    Clarita Thorne, PT

## 2019-11-08 ENCOUNTER — CLINICAL SUPPORT (OUTPATIENT)
Dept: REHABILITATION | Facility: OTHER | Age: 74
End: 2019-11-08
Payer: MEDICARE

## 2019-11-08 DIAGNOSIS — M25.551 RIGHT HIP PAIN: ICD-10-CM

## 2019-11-08 DIAGNOSIS — R53.1 WEAKNESS: ICD-10-CM

## 2019-11-08 PROCEDURE — 97110 THERAPEUTIC EXERCISES: CPT | Mod: PN

## 2019-11-08 NOTE — PROGRESS NOTES
"                            Physical Therapy Daily Treatment Note     Name: Valarie Colindres  Clinic Number: [id number]    Therapy Diagnosis:   Encounter Diagnoses   Name Primary?    Right hip pain     Weakness      Physician: Shannan Marinelli, *    Visit Date: 11/8/2019  Physician Orders: PT Eval and Treat   Medical Diagnosis from Referral: M25.551 (ICD-10-CM) - Right hip pain   Evaluation Date: 8/15/2019  Authorization Period Expiration: 11/15/2019  Plan of Care Expiration: 11/27/2019  Visit # / Visits authorized: 17 (4/8)    Time In: 10:00 am  Time Out: 11:00 am  Total Billable Time: 25 minutes    Precautions: Standard    Subjective     Pt reports: she realizes she has weakness on her L side as well  She was compliant with home exercise program.  Response to previous treatment: pain reduction  Functional change: feeling more flexible    Pain: 4/10  Location: hip/LE  right    Objective     11/6/2019 Reassessment     Palpation: continued tenderness to R TFL and ITB    11/6/2019 MMT:  R hip adduction 5/5  R hip abduction 4+/5  R hip extension 4+/5  R hip ER 4+/5  R hip IR 4+/5    Special tests:  DONNA: -    Flexibility:  Quads = slight  Piriformis = full       CMS Impairment/Limitation/Restriction for FOTO Hip Survey     Therapist reviewed FOTO scores for Valarie Colindres on 11/6/2019.   FOTO documents entered into [employer] - see Media section.     Limitation Score: 44%  Category: Mobility     Current : CK = at least 40% but < 60% impaired, limited or restricted  Goal: CJ = at least 20% but < 40% impaired, limited or restricted  Discharge: n/a             Valarie received therapeutic exercises to develop strength, ROM and flexibility for 50 minutes including:    Piriformis stretch 3 x 30"  Side lying hip flexor stretch 3 x 30" (manually by PT)   The stick stm to R quad and R IT band - NP  IT band stretch 3 x 30" (manually by PT)  Side lying clams 30 x 3" x YTB at knees  Reverse clams 30 x 3"   Side lying hip abd 3 x " "10   Prone hip ext 2 x 10 x 3" - NP  Bridging 3 x 10 x 5" x YT at knees    Sidestepping with OTB x 2 laps  Shuttle with GTB at knees 3 x 10, 2 black/1 red cords  Lateral step ups 4" step 2 x 10   Lateral step downs (heel taps) 2 x 10 on 2" step    [place] received manual therapy to R hip including x 00 minutes:    IASTM to R IT band, R quads, and R glutes x 00 minutes    00 min x Application and assessment of TDN: Pt educated on benefits and potential side effects of dry needling. Educated pt on benefits, precautions, side effects following TDN. Educated pt to use heat following treatment sessions if pt is experiencing pain or soreness. Pt verbalized good understanding of education.  Pt signed written consent to dry needling. Pt gave verbal consent for DN    Pt received dry needling to the below listed muscles using 40 mm needles.  In sidelying:  RTFL   R vastus lateralis (3)  R mid IT band  R distal IT band  Winding performed every 5 minutes during treatment.      Valarie received hot pack to R hip in sidelying for 10 minutes    Home Exercises Provided and Patient Education Provided     Education provided:   - discussed use of ice or heating pad for pain reduction. Discussed dry needling       Written Home Exercises Provided: yes.  Exercises were reviewed and Valarie was able to demonstrate them prior to the end of the session.  Valarie demonstrated good  understanding of the education provided.     See EMR under Patient Instructions for exercises provided 8/26/2019, 10/2/2019.    Assessment     Continuing to benefit from B LE strengthening to promote decreased pain with functional activities at her home.    Valarie is progressing slowly towards her goals.   Pt prognosis is Good.     Pt will continue to benefit from skilled outpatient physical therapy to address the deficits listed in the problem list box on initial evaluation, provide pt/family education and to maximize pt's level of independence in the home and " community environment.     Pt's spiritual, cultural and educational needs considered and pt agreeable to plan of care and goals.    Anticipated barriers to physical therapy: none    Goals:   Short Term Goals (4 Weeks):  1. Pt able to stand >=30 minutes with ADLs with <4/10 pain. (in progress)  2. Pt able to walk >=30 minutes with household chores with <4/10 pain. (in progress)  3. Pt will demonstrate increased strength by a half grade to allow patient to walk with increased ease. (in progress)  4. Pt to demonstrate improved functional ability with FOTO limitation <=32% disability. (in progress)     Long Term Goals (12 Weeks):  1. Pt able to stand >=1 hour with with ADLs with <2/10 pain. (in progress)   2. Pt able to walk >=1 hour with household chores with <2/10 pain. (in progress)  3. Pt able to sleep a full night without pain disturbance. (in progress)  4. Pt demonstrates strength WFL / WNL to allow patient to ambulate with min to no difficulty. (in progress)  5. Pt able to return to full gym activities with min difficulty and pain <2/10. (in progress)  6. Pt will be independent with HEP and self management of symptoms. (in progress)  7. Pt to demonstrate improved functional ability with FOTO limitation <=28% disability. (in progress)    Plan     Continue with B LE strengthening and stretching, dry needling as needed. Progress strengthening as tolerated.    Continue PT x 2-3 weeks and then progress to D/C    Jovani Gong, PT

## 2019-11-13 ENCOUNTER — CLINICAL SUPPORT (OUTPATIENT)
Dept: REHABILITATION | Facility: OTHER | Age: 74
End: 2019-11-13
Payer: MEDICARE

## 2019-11-13 DIAGNOSIS — M25.551 RIGHT HIP PAIN: ICD-10-CM

## 2019-11-13 DIAGNOSIS — R53.1 WEAKNESS: ICD-10-CM

## 2019-11-13 PROCEDURE — 97110 THERAPEUTIC EXERCISES: CPT | Mod: PN

## 2019-11-13 NOTE — PROGRESS NOTES
"                            Physical Therapy Daily Treatment Note     Name: Valarie Colindres  Clinic Number: [id number]    Therapy Diagnosis:   Encounter Diagnoses   Name Primary?    Right hip pain     Weakness      Physician: Shannan Marinelli, *    Visit Date: 11/13/2019  Physician Orders: PT Eval and Treat   Medical Diagnosis from Referral: M25.551 (ICD-10-CM) - Right hip pain   Evaluation Date: 8/15/2019  Authorization Period Expiration: 11/15/2019  Plan of Care Expiration: 11/27/2019  Visit # / Visits authorized: 18 (5/8)    Time In: 10:07 am  Time Out: 11:00 am  Total Billable Time: 43 minutes    Precautions: Standard    Subjective     Pt reports she had increased pain on her left side the day after her last session from exercising that side. "It was a good pain."    She was compliant with home exercise program.  Response to previous treatment: muscle [id number] the day after session.  Functional change: feeling more flexible    Pain: 4/10  Location: hip/LE  right    Objective     11/6/2019 Reassessment     Palpation: continued tenderness to R TFL and ITB    11/6/2019 MMT:  R hip adduction 5/5  R hip abduction 4+/5  R hip extension 4+/5  R hip ER 4+/5  R hip IR 4+/5    Special tests:  DONNA: -    Flexibility:  Quads = slight  Piriformis = full       CMS Impairment/Limitation/Restriction for FOTO Hip Survey     Therapist reviewed FOTO scores for Valarie Colindres on 11/6/2019.   FOTO documents entered into [employer] - see Media section.     Limitation Score: 44%  Category: Mobility     Current : CK = at least 40% but < 60% impaired, limited or restricted  Goal: CJ = at least 20% but < 40% impaired, limited or restricted  Discharge: n/a             Valarie received therapeutic exercises to develop strength, ROM and flexibility for minutes including:    Piriformis stretch 3 x 30"  Side lying hip flexor stretch 3 x 30" (manually by PT)   The stick stm to R quad and R IT band - NP  IT band stretch 3 x 30" (manually by " "PT)  Side lying clams 30 x 3" x YTB at knees  Reverse clams 30 x 3"   Side lying hip abd 3 x 10   Prone hip ext 2 x 10 x 3" - NP  Bridging 3 x 10 x 5" x YT at knees    Sidestepping with OTB x 2 laps  Shuttle with GTB at knees 3 x 10, 2 black/1 red cords  Lateral step ups 4" step 2 x 10   Lateral step downs (heel taps) 2 x 10 on 2" step  Cone taps alternating LEs x 20 reps    [place] received manual therapy to R hip including x 00 minutes:    IASTM to R IT band, R quads, and R glutes x 00 minutes    00 min x Application and assessment of TDN: Pt educated on benefits and potential side effects of dry needling. Educated pt on benefits, precautions, side effects following TDN. Educated pt to use heat following treatment sessions if pt is experiencing pain or soreness. Pt verbalized good understanding of education.  Pt signed written consent to dry needling. Pt gave verbal consent for DN    Pt received dry needling to the below listed muscles using 40 mm needles.  In sidelying:  RTFL   R vastus lateralis (3)  R mid IT band  R distal IT band  Winding performed every 5 minutes during treatment.      [place] received hot pack to L hip in sidelying for 10 minutes    Home Exercises Provided and Patient Education Provided     Education provided:   - discussed use of ice or heating pad for pain reduction. Discussed dry needling       Written Home Exercises Provided: yes.  Exercises were reviewed and Valarie was able to demonstrate them prior to the end of the session.  Valarie demonstrated good  understanding of the education provided.     See EMR under Patient Instructions for exercises provided 8/26/2019, 10/2/2019.    Assessment     Patient progressing in therapy and will may soon be ready for discharge and to continue with HEP.    Valarie is progressing slowly towards her goals.   Pt prognosis is Good.     Pt will continue to benefit from skilled outpatient physical therapy to address the deficits listed in the problem list " box on initial evaluation, provide pt/family education and to maximize pt's level of independence in the home and community environment.     Pt's spiritual, cultural and educational needs considered and pt agreeable to plan of care and goals.    Anticipated barriers to physical therapy: none    Goals:   Short Term Goals (4 Weeks):  1. Pt able to stand >=30 minutes with ADLs with <4/10 pain. (in progress)  2. Pt able to walk >=30 minutes with household chores with <4/10 pain. (in progress)  3. Pt will demonstrate increased strength by a half grade to allow patient to walk with increased ease. (in progress)  4. Pt to demonstrate improved functional ability with FOTO limitation <=32% disability. (in progress)     Long Term Goals (12 Weeks):  1. Pt able to stand >=1 hour with with ADLs with <2/10 pain. (in progress)   2. Pt able to walk >=1 hour with household chores with <2/10 pain. (in progress)  3. Pt able to sleep a full night without pain disturbance. (in progress)  4. Pt demonstrates strength WFL / WNL to allow patient to ambulate with min to no difficulty. (in progress)  5. Pt able to return to full gym activities with min difficulty and pain <2/10. (in progress)  6. Pt will be independent with HEP and self management of symptoms. (in progress)  7. Pt to demonstrate improved functional ability with FOTO limitation <=28% disability. (in progress)    Plan     Continue with B LE strengthening and stretching, dry needling as needed. Progress strengthening as tolerated.    Continue PT x 2-3 weeks and then progress to D/C    Jovani Gong, PT

## 2019-11-15 ENCOUNTER — CLINICAL SUPPORT (OUTPATIENT)
Dept: REHABILITATION | Facility: OTHER | Age: 74
End: 2019-11-15
Payer: MEDICARE

## 2019-11-15 DIAGNOSIS — R53.1 WEAKNESS: ICD-10-CM

## 2019-11-15 DIAGNOSIS — M25.551 RIGHT HIP PAIN: ICD-10-CM

## 2019-11-15 PROCEDURE — G8980 MOBILITY D/C STATUS: HCPCS | Mod: CJ,PN

## 2019-11-15 PROCEDURE — G8979 MOBILITY GOAL STATUS: HCPCS | Mod: CJ,PN

## 2019-11-15 PROCEDURE — 97110 THERAPEUTIC EXERCISES: CPT | Mod: PN

## 2019-11-15 NOTE — PROGRESS NOTES
"  Outpatient Therapy Discharge Summary     Name: Valarie Colindres  Clinic Number: [id number]    Therapy Diagnosis:   Encounter Diagnoses   Name Primary?    Right hip pain     Weakness      Physician: Shannan Marinelli, *    Physician Orders: PT Eval and Treat   Medical Diagnosis from Referral: M25.551 (ICD-10-CM) - Right hip pain   Evaluation Date: 8/15/2019    Date of Last visit: 11/15/2019  Total Visits Received: 19  Cancelled Visits: 0  No Show Visits: 0    Time In: 10:00 am  Time Out: 10:58 am  Total Billable Time: 28 minutes    Precautions: Standard    Subjective     States her range of motion and strength was better after last session when she performed her HEP. Also states she did not have as much pain.    She was compliant with home exercise program.  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    Pain: 1/10. 3/10 L hip  Location: hip/LE  right    Objective       MMT R L   Hip flexion 5/5 5/5   Hip abduction 5/5 5/5   Hip extension 5/5 5/5   Hip ER 5/5 5/5   Hip IR 5/5 5/5   Knee extension 5/5 5/5   Knee flexion 5/5 5/5   Ankle dorsiflexion 5/5 5/5   Ankle plantar flexion 5/5 5/5        CMS Impairment/Limitation/Restriction for FOTO Hip Survey     Therapist reviewed FOTO scores for Valarie Colindres on 11/15/2019.   FOTO documents entered into [facility] - see Media section.     Limitation Score: 38%  Category: Mobility     Goal: CJ = at least 20% but < 40% impaired, limited or restricted  Discharge: CJ at least 20% < 40% impaired, limited or restricted         Valarie received therapeutic exercises to develop strength, ROM and flexibility for 48 minutes including:    Piriformis stretch 3 x 30"  Side lying hip flexor stretch 3 x 30" (manually by PT)   The stick stm to R quad and R IT band - NP  IT band stretch 3 x 30" (manually by PT)  Side lying clams 30 x 3" x OTB at knees  Reverse clams 30 x 3"   Side lying hip abd 3 x 10 " "  Prone hip ext 2 x 10 x 3" - NP  Bridging 3 x 10 x 5" x YT at knees    Sidestepping with GTB x 2 laps  Shuttle with GTB at knees 3 x 10, 2 black/1 red cords  Lateral step ups 6" step 2 x 10 reps  Forward step ups on 6" step x 10 reps each LE  Lateral step downs (heel taps) 2 x 10 on 2" step  6" step tapping alternating LEs 2 x 20 reps    [place] received hot pack to L hip in sidelying for 10 minutes    Assessment      Patient has progressed well in OP PT and achieved below listed goals. Patient to continue with HEP at this time.    Goals:   Short Term Goals (4 Weeks):  1. Pt able to stand >=30 minutes with ADLs with <4/10 pain. (met)  2. Pt able to walk >=30 minutes with household chores with <4/10 pain. (met)  3. Pt will demonstrate increased strength by a half grade to allow patient to walk with increased ease. (met)  4. Pt to demonstrate improved functional ability with FOTO limitation <=32% disability. (not met)     Long Term Goals (12 Weeks):  1. Pt able to stand >=1 hour with with ADLs with <2/10 pain. (met)   2. Pt able to walk >=1 hour with household chores with <2/10 pain. (met)  3. Pt able to sleep a full night without pain disturbance. (met)  4. Pt demonstrates strength WFL / WNL to allow patient to ambulate with min to no difficulty. (met)  5. Pt able to return to full gym activities with min difficulty and pain <2/10. (has not started)  6. Pt will be independent with HEP and self management of symptoms. (met)  7. Pt to demonstrate improved functional ability with FOTO limitation <=28% disability. (not met)      Discharge reason: Patient has met all of her goals as noted above    Plan   This patient is discharged from Physical Therapy              "

## 2019-11-18 ENCOUNTER — OFFICE VISIT (OUTPATIENT)
Dept: INTERNAL MEDICINE | Facility: CLINIC | Age: 74
End: 2019-11-18
Payer: MEDICARE

## 2019-11-18 VITALS
DIASTOLIC BLOOD PRESSURE: 70 MMHG | SYSTOLIC BLOOD PRESSURE: 108 MMHG | BODY MASS INDEX: 25.13 KG/M2 | HEART RATE: 65 BPM | HEIGHT: 65 IN | OXYGEN SATURATION: 96 % | WEIGHT: 150.81 LBS

## 2019-11-18 DIAGNOSIS — E03.9 ACQUIRED HYPOTHYROIDISM: ICD-10-CM

## 2019-11-18 DIAGNOSIS — M21.619 BUNION: ICD-10-CM

## 2019-11-18 DIAGNOSIS — I10 ESSENTIAL HYPERTENSION: Primary | ICD-10-CM

## 2019-11-18 PROCEDURE — 1159F PR MEDICATION LIST DOCUMENTED IN MEDICAL RECORD: ICD-10-PCS | Mod: S$GLB,,, | Performed by: FAMILY MEDICINE

## 2019-11-18 PROCEDURE — 99214 PR OFFICE/OUTPT VISIT, EST, LEVL IV, 30-39 MIN: ICD-10-PCS | Mod: S$GLB,,, | Performed by: FAMILY MEDICINE

## 2019-11-18 PROCEDURE — 1101F PR PT FALLS ASSESS DOC 0-1 FALLS W/OUT INJ PAST YR: ICD-10-PCS | Mod: S$GLB,,, | Performed by: FAMILY MEDICINE

## 2019-11-18 PROCEDURE — 3074F SYST BP LT 130 MM HG: CPT | Mod: S$GLB,,, | Performed by: FAMILY MEDICINE

## 2019-11-18 PROCEDURE — 3078F PR MOST RECENT DIASTOLIC BLOOD PRESSURE < 80 MM HG: ICD-10-PCS | Mod: S$GLB,,, | Performed by: FAMILY MEDICINE

## 2019-11-18 PROCEDURE — 99214 OFFICE O/P EST MOD 30 MIN: CPT | Mod: S$GLB,,, | Performed by: FAMILY MEDICINE

## 2019-11-18 PROCEDURE — 3078F DIAST BP <80 MM HG: CPT | Mod: S$GLB,,, | Performed by: FAMILY MEDICINE

## 2019-11-18 PROCEDURE — 1101F PT FALLS ASSESS-DOCD LE1/YR: CPT | Mod: S$GLB,,, | Performed by: FAMILY MEDICINE

## 2019-11-18 PROCEDURE — 99999 PR PBB SHADOW E&M-EST. PATIENT-LVL III: ICD-10-PCS | Mod: PBBFAC,,, | Performed by: FAMILY MEDICINE

## 2019-11-18 PROCEDURE — 3074F PR MOST RECENT SYSTOLIC BLOOD PRESSURE < 130 MM HG: ICD-10-PCS | Mod: S$GLB,,, | Performed by: FAMILY MEDICINE

## 2019-11-18 PROCEDURE — 99999 PR PBB SHADOW E&M-EST. PATIENT-LVL III: CPT | Mod: PBBFAC,,, | Performed by: FAMILY MEDICINE

## 2019-11-18 PROCEDURE — 1159F MED LIST DOCD IN RCRD: CPT | Mod: S$GLB,,, | Performed by: FAMILY MEDICINE

## 2019-11-18 RX ORDER — VALSARTAN 320 MG/1
320 TABLET ORAL DAILY
Qty: 30 TABLET | Refills: 5 | Status: SHIPPED | OUTPATIENT
Start: 2019-11-18 | End: 2020-12-17

## 2019-11-18 RX ORDER — HYDROCHLOROTHIAZIDE 12.5 MG/1
12.5 TABLET ORAL DAILY
Qty: 30 TABLET | Refills: 5 | Status: SHIPPED | OUTPATIENT
Start: 2019-11-18 | End: 2020-08-24

## 2019-11-18 NOTE — PROGRESS NOTES
Patient ID: Valarie Colindres is a 74 y.o. female.    Chief Complaint: Follow-up HTN    HPI  Valarie Colindres is a 74 y.o. year old female with HTN, MGUS, hypothyroid, osteopenia, microcytic anemia, thalassemia trait who presents today for hypertension follow-up.  She also complains of sinus congestion.    Complains of postnasal drip, watery eyes, sneezing, mild cough, tickle in back of throat, mild headache (frontal). Began 2 months or so. Gets better than then worse with weather changes.     Patient is following with Hematology/Oncology for anemia.  She was found to have thalassemia trait as well as monoclonal gammopathy, low risk disease. Skeletal survey normal.  She will be monitored periodically with lab work.    Osteopenia - patient previously took Fosamax for over 5 years.        Hypothyroid - compliant with levothyroxine 100 mcg daily  Lab Results   Component Value Date    TSH 0.752 2019     Hypertension - compliant with HCTZ 12.5 mg and valsartan 320 mg daily     Exercise - [facility]: aerobic exercise, strength training   Diet - Doesn't add salt to foods. Rarely eats meals out. Doesn't use canned foods. Rare frozen dinner.     OB/GYN History     Patient is post-menopausal.  Sexually active: no    Health Maintenance  Pap smear: s/p hysterectomy due to uterine CA/precancerous lesion? Last pap 2018 - normal  Mammogram: 19 - normal   Colon Cancer Screening: colonoscopy 17 - repeat in 5 years. Done with Dr. Mabry ()  DEXA:  18 - osteopenia of lumbar spine  Hepatitis C screening: n/a  Flu vaccine: refused  Tetanus vaccine:  PNA vaccine: refused  Shingles vaccine:  refused     I personally reviewed Past Medical History, Past Surgical History, Social History, and Family History    Review of Systems   Constitutional: Negative for chills, fatigue and fever.   HENT: Positive for postnasal drip and sneezing. Negative for congestion, hearing loss and rhinorrhea.    Eyes:  "Negative for visual disturbance.   Respiratory: Negative for cough, shortness of breath and wheezing.    Cardiovascular: Negative for chest pain.   Gastrointestinal: Negative for abdominal pain, constipation, diarrhea, nausea and vomiting.   Skin: Negative for rash.   Neurological: Negative for dizziness, syncope and headaches.   Psychiatric/Behavioral: Negative for dysphoric mood and sleep disturbance. The patient is not nervous/anxious.        Objective:      Vitals:    11/18/19 1529   BP: 108/70   Pulse: 65   SpO2: 96%   Weight: 68.4 kg (150 lb 12.7 oz)   Height: 5' 5" (1.651 m)     Physical Exam   Constitutional: She is oriented to person, place, and time. She appears well-developed and well-nourished. No distress.   HENT:   Head: Normocephalic and atraumatic.   Eyes: Conjunctivae and EOM are normal.   Neck: Normal range of motion.   Cardiovascular: Normal rate, regular rhythm and normal heart sounds.   No murmur heard.  Pulmonary/Chest: Effort normal and breath sounds normal. No respiratory distress.   Musculoskeletal:   Bunions present on toes bilaterally.   Neurological: She is alert and oriented to person, place, and time.   Skin: Skin is warm and dry. No rash noted.   Psychiatric: She has a normal mood and affect. Her behavior is normal. Thought content normal.   Nursing note and vitals reviewed.      Assessment:       1. Essential hypertension    2. Bunion    3. Acquired hypothyroidism        Plan:     Essential hypertension  Controlled. Continue current medication regimen.   -     hydroCHLOROthiazide (HYDRODIURIL) 12.5 MG Tab; Take 1 tablet (12.5 mg total) by mouth once daily.  Dispense: 30 tablet; Refill: 5  -     valsartan (DIOVAN) 320 MG tablet; Take 1 tablet (320 mg total) by mouth once daily.  Dispense: 30 tablet; Refill: 5    Bunion  -     Ambulatory Referral to Podiatry    Acquired hypothyroidism  Repeat TSH April 2020.  Continue current levothyroxine dose.                        "

## 2019-11-22 ENCOUNTER — PATIENT MESSAGE (OUTPATIENT)
Dept: SPORTS MEDICINE | Facility: CLINIC | Age: 74
End: 2019-11-22

## 2019-11-22 ENCOUNTER — OFFICE VISIT (OUTPATIENT)
Dept: SPORTS MEDICINE | Facility: CLINIC | Age: 74
End: 2019-11-22
Payer: MEDICARE

## 2019-11-22 VITALS
BODY MASS INDEX: 24.99 KG/M2 | DIASTOLIC BLOOD PRESSURE: 75 MMHG | HEART RATE: 68 BPM | TEMPERATURE: 98 F | HEIGHT: 65 IN | SYSTOLIC BLOOD PRESSURE: 124 MMHG | WEIGHT: 150 LBS

## 2019-11-22 DIAGNOSIS — M25.551 HIP PAIN, ACUTE, RIGHT: ICD-10-CM

## 2019-11-22 DIAGNOSIS — M70.61 TROCHANTERIC BURSITIS, RIGHT HIP: Primary | ICD-10-CM

## 2019-11-22 PROCEDURE — 1101F PR PT FALLS ASSESS DOC 0-1 FALLS W/OUT INJ PAST YR: ICD-10-PCS | Mod: S$GLB,,, | Performed by: PHYSICIAN ASSISTANT

## 2019-11-22 PROCEDURE — 99999 PR PBB SHADOW E&M-EST. PATIENT-LVL III: CPT | Mod: PBBFAC,,, | Performed by: PHYSICIAN ASSISTANT

## 2019-11-22 PROCEDURE — 1125F AMNT PAIN NOTED PAIN PRSNT: CPT | Mod: S$GLB,,, | Performed by: PHYSICIAN ASSISTANT

## 2019-11-22 PROCEDURE — 99999 PR PBB SHADOW E&M-EST. PATIENT-LVL III: ICD-10-PCS | Mod: PBBFAC,,, | Performed by: PHYSICIAN ASSISTANT

## 2019-11-22 PROCEDURE — 1101F PT FALLS ASSESS-DOCD LE1/YR: CPT | Mod: S$GLB,,, | Performed by: PHYSICIAN ASSISTANT

## 2019-11-22 PROCEDURE — 1159F PR MEDICATION LIST DOCUMENTED IN MEDICAL RECORD: ICD-10-PCS | Mod: S$GLB,,, | Performed by: PHYSICIAN ASSISTANT

## 2019-11-22 PROCEDURE — 3078F DIAST BP <80 MM HG: CPT | Mod: S$GLB,,, | Performed by: PHYSICIAN ASSISTANT

## 2019-11-22 PROCEDURE — 3074F PR MOST RECENT SYSTOLIC BLOOD PRESSURE < 130 MM HG: ICD-10-PCS | Mod: S$GLB,,, | Performed by: PHYSICIAN ASSISTANT

## 2019-11-22 PROCEDURE — 3074F SYST BP LT 130 MM HG: CPT | Mod: S$GLB,,, | Performed by: PHYSICIAN ASSISTANT

## 2019-11-22 PROCEDURE — 99213 PR OFFICE/OUTPT VISIT, EST, LEVL III, 20-29 MIN: ICD-10-PCS | Mod: S$GLB,,, | Performed by: PHYSICIAN ASSISTANT

## 2019-11-22 PROCEDURE — 1159F MED LIST DOCD IN RCRD: CPT | Mod: S$GLB,,, | Performed by: PHYSICIAN ASSISTANT

## 2019-11-22 PROCEDURE — 3078F PR MOST RECENT DIASTOLIC BLOOD PRESSURE < 80 MM HG: ICD-10-PCS | Mod: S$GLB,,, | Performed by: PHYSICIAN ASSISTANT

## 2019-11-22 PROCEDURE — 1125F PR PAIN SEVERITY QUANTIFIED, PAIN PRESENT: ICD-10-PCS | Mod: S$GLB,,, | Performed by: PHYSICIAN ASSISTANT

## 2019-11-22 PROCEDURE — 99213 OFFICE O/P EST LOW 20 MIN: CPT | Mod: S$GLB,,, | Performed by: PHYSICIAN ASSISTANT

## 2019-11-22 NOTE — PROGRESS NOTES
"Subjective:          Chief Complaint: Valarie Colindres is a 74 y.o. female who had concerns including Pain of the Right Hip.    Valarie Colinders is an active pleasant retiree.    Interval hx: She reports continued relief with physical therapy. 0/10 pain today.    8/21/2019: She reports 100% relief of lateral hip pain after trochanteric bursa injection. She has not started PT yet due to scheduling conflicts. Her first appt is scheduled this week. Pain now present "inside hip".    Previous HPI: The pain started 3-4 months ago and is becoming progressively worse last few months. Pain is located over (points to) lateral hip. She reports that the pain is a 5 /10 aching, throbbing and radiating pain today and not responding adequately to conservative measures which have included activity modifications, rest, and oral/topical medication. Is affecting ADLs and limiting desired level of activity. Denies numbness, tingling, radiation, and inability to bear weight. Baseline lower back pain   Pain is 7 /10 at its worst    Mechanical symptoms: none  Subjective instability: (--)   Worse with palpation  Better with rest.   Nocturnal symptoms: (+)    No previous surgeries or trauma on hips        Pain   Pertinent negatives include no abdominal pain, chest pain, chills, congestion, coughing, fever, joint swelling, nausea, numbness, rash, sore throat or vomiting.       Review of Systems   Constitution: Negative for chills and fever.   HENT: Negative for congestion and sore throat.    Eyes: Negative for discharge and double vision.   Cardiovascular: Negative for chest pain, palpitations and syncope.   Respiratory: Negative for cough and shortness of breath.    Endocrine: Negative for cold intolerance and heat intolerance.   Skin: Negative for dry skin and rash.   Musculoskeletal: Negative for falls, gout, joint pain and joint swelling.   Gastrointestinal: Negative for abdominal pain, nausea and vomiting.   Neurological: Negative for " focal weakness, numbness and paresthesias.                   Objective:        General: [place] is well-developed, well-nourished, appears stated age, in no acute distress, alert and oriented to time, place and person.     General    Constitutional: She is oriented to person, place, and time. She appears well-developed and well-nourished. No distress.   HENT:   Head: Normocephalic and atraumatic.   Nose: Nose normal.   Eyes: Conjunctivae and EOM are normal. Pupils are equal, round, and reactive to light.   Neck: No JVD present.   Cardiovascular: Normal rate, regular rhythm and normal heart sounds.    Pulmonary/Chest: Effort normal and breath sounds normal. No respiratory distress.   Abdominal: Soft. Bowel sounds are normal. She exhibits no distension. There is no tenderness.   Neurological: She is alert and oriented to person, place, and time. She has normal reflexes. Coordination normal.   Psychiatric: She has a normal mood and affect. Her behavior is normal. Judgment and thought content normal.     General Musculoskeletal Exam   Gait: normal       Right Knee Exam     Inspection   Effusion: absent    Left Knee Exam     Inspection   Effusion: absent    Right Hip Exam     Inspection   Swelling: absent  Bruising: absent  No deformity of hip.  Erythema: absent    Range of Motion   Abduction: 45   Adduction: 30   Extension: 10   Flexion: 120   External rotation: 60   Internal rotation: 30     Tests   Pain w/ forced internal rotation (DONNA): present  Pain w/ forced external rotation (FADIR): absent  Boo: negative  Log Roll: negative  Step-down test: negative    Other   Sensation: normal    Comments:  +TTP over gluteus medius  +bridge test.  Left Hip Exam     Inspection   Swelling: absent  No deformity of hip.  Erythema: absent  Bruising: absent    Range of Motion   Abduction: 45   Adduction: 30   Extension: 10   Flexion: 120   External rotation: 60   Internal rotation: 30     Tests   Pain w/ forced internal rotation  (DONNA): absent  Pain w/ forced external rotation (FADIR): absent  Stinchfield test: negative  Log Roll: negative  Step-down test: negative    Other   Sensation: normal          Muscle Strength   Right Lower Extremity   Hip Abduction: 5/5   Hip Adduction: 5/5   Hip Flexion: 5/5   Ankle Dorsiflexion:  5/5   Left Lower Extremity   Hip Abduction: 5/5   Hip Adduction: 5/5   Hip Flexion: 5/5   Ankle Dorsiflexion:  5/5     Vascular Exam     Right Pulses  Dorsalis Pedis:      2+  Posterior Tibial:      2+        Left Pulses    Posterior Tibial:      2+        Capillary Refill  Right Hand: normal capillary refill  Left Hand: normal capillary refill    Radiographic Findings:    There is no evidence of acute fracture, dislocation, or bone destruction.  There is mild degenerative change at the sacroiliac joints and lower lumbar spine.  Phleboliths are present in the pelvis.    Xrays of the hips/pelvis were reviewed by me today. These findings were discussed and reviewed with the patient.        Assessment:       Encounter Diagnoses   Name Primary?    Trochanteric bursitis, right hip Yes    Hip pain, acute, right           Plan:       1. Continue HEP and PT for hip strength and conditioning, dry needling.  2. Continue topical NSAID as needed.  3. Ice compress to the affected area 2-3x a day for 15-20 minutes as needed for pain management.  4. RTC to see Michael Valencia PA-C as needed for follow-up.     All of the patient's questions were answered and the patient will contact us if they have any questions or concerns in the interim.          Patient questionnaires may have been collected.

## 2019-12-02 ENCOUNTER — PATIENT OUTREACH (OUTPATIENT)
Dept: ADMINISTRATIVE | Facility: OTHER | Age: 74
End: 2019-12-02

## 2019-12-05 ENCOUNTER — OFFICE VISIT (OUTPATIENT)
Dept: PODIATRY | Facility: CLINIC | Age: 74
End: 2019-12-05
Payer: MEDICARE

## 2019-12-05 ENCOUNTER — APPOINTMENT (OUTPATIENT)
Dept: RADIOLOGY | Facility: OTHER | Age: 74
End: 2019-12-05
Attending: PODIATRIST
Payer: MEDICARE

## 2019-12-05 VITALS
WEIGHT: 150 LBS | HEIGHT: 65 IN | SYSTOLIC BLOOD PRESSURE: 102 MMHG | BODY MASS INDEX: 24.99 KG/M2 | HEART RATE: 77 BPM | DIASTOLIC BLOOD PRESSURE: 59 MMHG

## 2019-12-05 DIAGNOSIS — M21.619 BUNION: ICD-10-CM

## 2019-12-05 DIAGNOSIS — M21.619 BUNION: Primary | ICD-10-CM

## 2019-12-05 PROCEDURE — 3078F PR MOST RECENT DIASTOLIC BLOOD PRESSURE < 80 MM HG: ICD-10-PCS | Mod: S$GLB,,, | Performed by: PODIATRIST

## 2019-12-05 PROCEDURE — 73630 XR FOOT COMPLETE 3 VIEW BILATERAL: ICD-10-PCS | Mod: 26,50,, | Performed by: RADIOLOGY

## 2019-12-05 PROCEDURE — 1101F PT FALLS ASSESS-DOCD LE1/YR: CPT | Mod: S$GLB,,, | Performed by: PODIATRIST

## 2019-12-05 PROCEDURE — 73630 X-RAY EXAM OF FOOT: CPT | Mod: 26,50,, | Performed by: RADIOLOGY

## 2019-12-05 PROCEDURE — 73630 X-RAY EXAM OF FOOT: CPT | Mod: 50,TC

## 2019-12-05 PROCEDURE — 3078F DIAST BP <80 MM HG: CPT | Mod: S$GLB,,, | Performed by: PODIATRIST

## 2019-12-05 PROCEDURE — 3074F SYST BP LT 130 MM HG: CPT | Mod: S$GLB,,, | Performed by: PODIATRIST

## 2019-12-05 PROCEDURE — 99999 PR PBB SHADOW E&M-EST. PATIENT-LVL III: CPT | Mod: PBBFAC,,, | Performed by: PODIATRIST

## 2019-12-05 PROCEDURE — 1125F PR PAIN SEVERITY QUANTIFIED, PAIN PRESENT: ICD-10-PCS | Mod: S$GLB,,, | Performed by: PODIATRIST

## 2019-12-05 PROCEDURE — 99204 PR OFFICE/OUTPT VISIT, NEW, LEVL IV, 45-59 MIN: ICD-10-PCS | Mod: S$GLB,,, | Performed by: PODIATRIST

## 2019-12-05 PROCEDURE — 99999 PR PBB SHADOW E&M-EST. PATIENT-LVL III: ICD-10-PCS | Mod: PBBFAC,,, | Performed by: PODIATRIST

## 2019-12-05 PROCEDURE — 1159F MED LIST DOCD IN RCRD: CPT | Mod: S$GLB,,, | Performed by: PODIATRIST

## 2019-12-05 PROCEDURE — 1159F PR MEDICATION LIST DOCUMENTED IN MEDICAL RECORD: ICD-10-PCS | Mod: S$GLB,,, | Performed by: PODIATRIST

## 2019-12-05 PROCEDURE — 99204 OFFICE O/P NEW MOD 45 MIN: CPT | Mod: S$GLB,,, | Performed by: PODIATRIST

## 2019-12-05 PROCEDURE — 1125F AMNT PAIN NOTED PAIN PRSNT: CPT | Mod: S$GLB,,, | Performed by: PODIATRIST

## 2019-12-05 PROCEDURE — 1101F PR PT FALLS ASSESS DOC 0-1 FALLS W/OUT INJ PAST YR: ICD-10-PCS | Mod: S$GLB,,, | Performed by: PODIATRIST

## 2019-12-05 PROCEDURE — 3074F PR MOST RECENT SYSTOLIC BLOOD PRESSURE < 130 MM HG: ICD-10-PCS | Mod: S$GLB,,, | Performed by: PODIATRIST

## 2019-12-05 NOTE — PROGRESS NOTES
Chief Complaint   Patient presents with    Bunions     Bilateral Feet             HPI: Valarie Colindres is a 74 y.o. female  with complaints of painful left > right bunion. Patient  relates progression of deformity over the previous several years. Pt described pain as sharp.  Pt denies trauma to the area. Pt has tried wider shoes without significant relief.   She is interested in padding/bracing to reduce the bunion.         Patient Active Problem List   Diagnosis    Nuclear sclerotic cataract of both eyes    Essential hypertension    Gastroesophageal reflux disease    History of gastric ulcer    Irritable bowel syndrome    Hypothyroidism    Osteopenia of lumbar spine    Thalassemia trait         Current Outpatient Medications on File Prior to Visit   Medication Sig Dispense Refill    aspirin (ECOTRIN) 81 MG EC tablet Take 81 mg by mouth once daily.      chlorhexidine (PERIDEX) 0.12 % solution RINSE AND SWISH ONE CAPFUL BID  1    diphenhydramine HCl (BENADRYL ALLERGY ORAL) Take by mouth.      hydroCHLOROthiazide (HYDRODIURIL) 12.5 MG Tab Take 1 tablet (12.5 mg total) by mouth once daily. 30 tablet 5    levothyroxine (SYNTHROID) 100 MCG tablet Take 1 tablet (100 mcg total) by mouth once daily. 30 tablet 10    valsartan (DIOVAN) 320 MG tablet Take 1 tablet (320 mg total) by mouth once daily. 30 tablet 5    acetaminophen (TYLENOL) 500 MG tablet Take 2 tablets (1,000 mg total) by mouth every 8 (eight) hours as needed for Pain. (Patient not taking: Reported on 12/5/2019) 90 tablet 1     No current facility-administered medications on file prior to visit.          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   Social Needs    Financial resource strain: Not on file    Food insecurity:     Worry: Not on file     Inability: Not on file    Transportation needs:     Medical: Not on file     Non-medical: Not on file   Tobacco Use    Smoking status: Never Smoker    Smokeless tobacco: Never Used   Substance and Sexual Activity    Alcohol use: Yes     Frequency: Monthly or less     Drinks per session: 1 or 2    Drug use: No    Sexual activity: Not Currently   Lifestyle    Physical activity:     Days per week: Not on file     Minutes per session: Not on file    Stress: Not on file   Relationships    Social connections:     Talks on phone: Not on file     Gets together: Not on file     Attends Taoist service: Not on file     Active member of club or organization: Not on file     Attends meetings of clubs or organizations: Not on file     Relationship status: Not on file   Other Topics Concern    Not on file   Social History Narrative    Not on file           ROS:   General ROS: negative  Respiratory ROS: no cough, shortness of breath, or wheezing  Cardiovascular ROS: no chest pain or dyspnea on exertion  Musculoskeletal ROS:  negative for - joint stiffness, joint swelling, muscle pain or muscular weakness  Neurological ROS: no TIA or stroke symptoms  Dermatological ROS: negative for eczema, pruritus and rash        FOOT EXAM:     Vitals:    12/05/19 1356   BP: (!) 102/59   Pulse: 77   Weight: 68 kg (150 lb)   Height: 5' 5" (1.651 m)         Vasc:    2/4 DP and PT pulses   Capillary refill: 3 seconds to toes  Skin temperature: cool to touch  Edema:  Present bilaterally      Neuro:   Gross sensation intact .  vibratory Present bilaterally  refelexes Present bilaterally  Tinels absent  Mulders absent      Derm:   Slight redness 2/2 irritation over medial 1st MPJ aspect left > right .  Wounds/ulcers:   absent  Skin:  Intact, dry  No bruising.       MSK:   Bony prominence at medial 1st met head, with laterally " deviated hallux that is tracking .  There is mild erythema 2/2 shoe irritation   mild Hypermobile 1st MCJ range of motion.    Hammered digits 2nd,  flexible.  mild crepitus noted with 1st MPJ ROM.   1st MPJ ROM approx 50-60 degrees.  no pain with 1st MPJ ROM.      Xray Imaging:  Impression       1. Hallux valgus of the left great toe with moderate degenerative changes of the bilateral great toe metatarsophalangeal joints.  2. Linear radiopaque density overlying the proximal phalanx of the left great toe which may represent a foreign body.         Has foreign body present since her 30s.  Does not bother her.           ASSESSMENT/PLAN       Problem List Items Addressed This Visit     None      Visit Diagnoses     Bunion    -  Primary    Relevant Orders    X-Ray Foot Complete Bilateral (Completed)    ORTHOTIC DEVICE (DME)            · I counseled the patient on the patient's conditions, their implications and medical management.   · Discussed bunion deformity and treatment options.   Discussed shoe choice.  Recommended orthotics.  Discussed the pros and cons of surgery and elected to hold off for now.  Call or return to clinic prn if these symptoms worsen or fail to improve as anticipated.

## 2019-12-05 NOTE — LETTER
December 5, 2019      Shannan Marinelli MD  [address]  [address]  [facility] [address]           [facility]  [address] [address]  Phone: [phone]  Fax: [phone]          Patient: Valarie Colindres   MR Number: [id number]   YOB: 1945   Date of Visit: 12/5/2019       Dear Dr. Shannan Marinelli:    Thank you for referring Valarie Colindres to me for evaluation. Attached you will find relevant portions of my assessment and plan of care.    If you have questions, please do not hesitate to call me. I look forward to following Valarie Colindres along with you.    Sincerely,    Antonia Marino, KRISTIN    Enclosure  CC:  No Recipients    If you would like to receive this communication electronically, please contact [email or web address] or [phone] to request more information on [facility] access.    For providers and/or their staff who would like to refer a patient to [facility], please contact us through our one-stop-shop provider referral line, [facility], at [phone].    If you feel you have received this communication in error or would no longer like to receive these types of communications, please e-mail [email or web address]

## 2020-01-16 ENCOUNTER — LAB VISIT (OUTPATIENT)
Dept: LAB | Facility: HOSPITAL | Age: 75
End: 2020-01-16
Attending: INTERNAL MEDICINE
Payer: MEDICARE

## 2020-01-16 DIAGNOSIS — D64.9 ANEMIA, UNSPECIFIED TYPE: ICD-10-CM

## 2020-01-16 LAB
BASOPHILS # BLD AUTO: 0.04 K/UL (ref 0–0.2)
BASOPHILS NFR BLD: 0.6 % (ref 0–1.9)
DIFFERENTIAL METHOD: ABNORMAL
EOSINOPHIL # BLD AUTO: 0.1 K/UL (ref 0–0.5)
EOSINOPHIL NFR BLD: 1.5 % (ref 0–8)
ERYTHROCYTE [DISTWIDTH] IN BLOOD BY AUTOMATED COUNT: 17.7 % (ref 11.5–14.5)
HCT VFR BLD AUTO: 34.9 % (ref 37–48.5)
HGB BLD-MCNC: 10.6 G/DL (ref 12–16)
IMM GRANULOCYTES # BLD AUTO: 0.01 K/UL (ref 0–0.04)
IMM GRANULOCYTES NFR BLD AUTO: 0.2 % (ref 0–0.5)
LYMPHOCYTES # BLD AUTO: 3.7 K/UL (ref 1–4.8)
LYMPHOCYTES NFR BLD: 55.4 % (ref 18–48)
MCH RBC QN AUTO: 19.3 PG (ref 27–31)
MCHC RBC AUTO-ENTMCNC: 30.4 G/DL (ref 32–36)
MCV RBC AUTO: 64 FL (ref 82–98)
MONOCYTES # BLD AUTO: 0.5 K/UL (ref 0.3–1)
MONOCYTES NFR BLD: 8 % (ref 4–15)
NEUTROPHILS # BLD AUTO: 2.3 K/UL (ref 1.8–7.7)
NEUTROPHILS NFR BLD: 34.3 % (ref 38–73)
NRBC BLD-RTO: 0 /100 WBC
PLATELET # BLD AUTO: 237 K/UL (ref 150–350)
PMV BLD AUTO: 10.8 FL (ref 9.2–12.9)
RBC # BLD AUTO: 5.49 M/UL (ref 4–5.4)
WBC # BLD AUTO: 6.66 K/UL (ref 3.9–12.7)

## 2020-01-16 PROCEDURE — 36415 COLL VENOUS BLD VENIPUNCTURE: CPT | Mod: PN

## 2020-01-16 PROCEDURE — 85025 COMPLETE CBC W/AUTO DIFF WBC: CPT

## 2020-01-20 DIAGNOSIS — D47.2 MONOCLONAL GAMMOPATHY: Primary | ICD-10-CM

## 2020-02-03 ENCOUNTER — LAB VISIT (OUTPATIENT)
Dept: LAB | Facility: HOSPITAL | Age: 75
End: 2020-02-03
Attending: INTERNAL MEDICINE
Payer: MEDICARE

## 2020-02-03 DIAGNOSIS — D47.2 MONOCLONAL GAMMOPATHY: ICD-10-CM

## 2020-02-03 LAB
ALBUMIN SERPL BCP-MCNC: 3.9 G/DL (ref 3.5–5.2)
ALP SERPL-CCNC: 42 U/L (ref 55–135)
ALT SERPL W/O P-5'-P-CCNC: 9 U/L (ref 10–44)
ANION GAP SERPL CALC-SCNC: 8 MMOL/L (ref 8–16)
AST SERPL-CCNC: 15 U/L (ref 10–40)
BILIRUB SERPL-MCNC: 0.5 MG/DL (ref 0.1–1)
BUN SERPL-MCNC: 14 MG/DL (ref 8–23)
CALCIUM SERPL-MCNC: 9.5 MG/DL (ref 8.7–10.5)
CHLORIDE SERPL-SCNC: 102 MMOL/L (ref 95–110)
CO2 SERPL-SCNC: 32 MMOL/L (ref 23–29)
CREAT SERPL-MCNC: 0.9 MG/DL (ref 0.5–1.4)
EST. GFR  (AFRICAN AMERICAN): >60 ML/MIN/1.73 M^2
EST. GFR  (NON AFRICAN AMERICAN): >60 ML/MIN/1.73 M^2
GLUCOSE SERPL-MCNC: 92 MG/DL (ref 70–110)
IGA SERPL-MCNC: 131 MG/DL (ref 40–350)
IGG SERPL-MCNC: 1350 MG/DL (ref 650–1600)
IGM SERPL-MCNC: 78 MG/DL (ref 50–300)
POTASSIUM SERPL-SCNC: 4 MMOL/L (ref 3.5–5.1)
PROT SERPL-MCNC: 7.2 G/DL (ref 6–8.4)
SODIUM SERPL-SCNC: 142 MMOL/L (ref 136–145)

## 2020-02-03 PROCEDURE — 84165 PROTEIN E-PHORESIS SERUM: CPT | Mod: 26,,, | Performed by: PATHOLOGY

## 2020-02-03 PROCEDURE — 86334 IMMUNOFIX E-PHORESIS SERUM: CPT | Mod: 26,,, | Performed by: PATHOLOGY

## 2020-02-03 PROCEDURE — 84165 PATHOLOGIST INTERPRETATION SPE: ICD-10-PCS | Mod: 26,,, | Performed by: PATHOLOGY

## 2020-02-03 PROCEDURE — 82784 ASSAY IGA/IGD/IGG/IGM EACH: CPT | Mod: 59

## 2020-02-03 PROCEDURE — 86334 PATHOLOGIST INTERPRETATION IFE: ICD-10-PCS | Mod: 26,,, | Performed by: PATHOLOGY

## 2020-02-03 PROCEDURE — 36415 COLL VENOUS BLD VENIPUNCTURE: CPT | Mod: PN

## 2020-02-03 PROCEDURE — 83520 IMMUNOASSAY QUANT NOS NONAB: CPT | Mod: 59

## 2020-02-03 PROCEDURE — 80053 COMPREHEN METABOLIC PANEL: CPT

## 2020-02-03 PROCEDURE — 84165 PROTEIN E-PHORESIS SERUM: CPT

## 2020-02-03 PROCEDURE — 86334 IMMUNOFIX E-PHORESIS SERUM: CPT

## 2020-02-04 LAB
ALBUMIN SERPL ELPH-MCNC: 4.31 G/DL (ref 3.35–5.55)
ALPHA1 GLOB SERPL ELPH-MCNC: 0.26 G/DL (ref 0.17–0.41)
ALPHA2 GLOB SERPL ELPH-MCNC: 0.64 G/DL (ref 0.43–0.99)
B-GLOBULIN SERPL ELPH-MCNC: 0.55 G/DL (ref 0.5–1.1)
GAMMA GLOB SERPL ELPH-MCNC: 1.25 G/DL (ref 0.67–1.58)
INTERPRETATION SERPL IFE-IMP: NORMAL
KAPPA LC SER QL IA: 1.2 MG/DL (ref 0.33–1.94)
KAPPA LC/LAMBDA SER IA: 1.45 (ref 0.26–1.65)
LAMBDA LC SER QL IA: 0.83 MG/DL (ref 0.57–2.63)
PATHOLOGIST INTERPRETATION IFE: NORMAL
PATHOLOGIST INTERPRETATION SPE: NORMAL
PROT SERPL-MCNC: 7 G/DL (ref 6–8.4)

## 2020-02-07 ENCOUNTER — TELEPHONE (OUTPATIENT)
Dept: HEMATOLOGY/ONCOLOGY | Facility: CLINIC | Age: 75
End: 2020-02-07

## 2020-02-07 NOTE — TELEPHONE ENCOUNTER
Called and spoke with Ms Colindres. Ms Colindres is scheduled on Monday 2/10 with Dr Kirk at 10:30. Informed patient has appointment was moved to 9:00 instead. Please states that was Ok, she will see on Monday at 9:00 am.

## 2020-02-10 ENCOUNTER — OFFICE VISIT (OUTPATIENT)
Dept: HEMATOLOGY/ONCOLOGY | Facility: CLINIC | Age: 75
End: 2020-02-10
Payer: MEDICARE

## 2020-02-10 VITALS
BODY MASS INDEX: 25.13 KG/M2 | WEIGHT: 150.81 LBS | TEMPERATURE: 98 F | SYSTOLIC BLOOD PRESSURE: 113 MMHG | HEIGHT: 65 IN | HEART RATE: 69 BPM | RESPIRATION RATE: 16 BRPM | OXYGEN SATURATION: 98 % | DIASTOLIC BLOOD PRESSURE: 57 MMHG

## 2020-02-10 DIAGNOSIS — D56.3 THALASSEMIA TRAIT: ICD-10-CM

## 2020-02-10 DIAGNOSIS — D47.2 MGUS (MONOCLONAL GAMMOPATHY OF UNKNOWN SIGNIFICANCE): Primary | ICD-10-CM

## 2020-02-10 DIAGNOSIS — D64.9 ANEMIA, UNSPECIFIED TYPE: ICD-10-CM

## 2020-02-10 PROCEDURE — 99214 PR OFFICE/OUTPT VISIT, EST, LEVL IV, 30-39 MIN: ICD-10-PCS | Mod: S$GLB,,, | Performed by: INTERNAL MEDICINE

## 2020-02-10 PROCEDURE — 1101F PT FALLS ASSESS-DOCD LE1/YR: CPT | Mod: CPTII,S$GLB,, | Performed by: INTERNAL MEDICINE

## 2020-02-10 PROCEDURE — 3074F PR MOST RECENT SYSTOLIC BLOOD PRESSURE < 130 MM HG: ICD-10-PCS | Mod: CPTII,S$GLB,, | Performed by: INTERNAL MEDICINE

## 2020-02-10 PROCEDURE — 1125F PR PAIN SEVERITY QUANTIFIED, PAIN PRESENT: ICD-10-PCS | Mod: S$GLB,,, | Performed by: INTERNAL MEDICINE

## 2020-02-10 PROCEDURE — 1101F PR PT FALLS ASSESS DOC 0-1 FALLS W/OUT INJ PAST YR: ICD-10-PCS | Mod: CPTII,S$GLB,, | Performed by: INTERNAL MEDICINE

## 2020-02-10 PROCEDURE — 1159F MED LIST DOCD IN RCRD: CPT | Mod: S$GLB,,, | Performed by: INTERNAL MEDICINE

## 2020-02-10 PROCEDURE — 1159F PR MEDICATION LIST DOCUMENTED IN MEDICAL RECORD: ICD-10-PCS | Mod: S$GLB,,, | Performed by: INTERNAL MEDICINE

## 2020-02-10 PROCEDURE — 99999 PR PBB SHADOW E&M-EST. PATIENT-LVL III: ICD-10-PCS | Mod: PBBFAC,,, | Performed by: INTERNAL MEDICINE

## 2020-02-10 PROCEDURE — 99999 PR PBB SHADOW E&M-EST. PATIENT-LVL III: CPT | Mod: PBBFAC,,, | Performed by: INTERNAL MEDICINE

## 2020-02-10 PROCEDURE — 3074F SYST BP LT 130 MM HG: CPT | Mod: CPTII,S$GLB,, | Performed by: INTERNAL MEDICINE

## 2020-02-10 PROCEDURE — 1125F AMNT PAIN NOTED PAIN PRSNT: CPT | Mod: S$GLB,,, | Performed by: INTERNAL MEDICINE

## 2020-02-10 PROCEDURE — 99214 OFFICE O/P EST MOD 30 MIN: CPT | Mod: S$GLB,,, | Performed by: INTERNAL MEDICINE

## 2020-02-10 PROCEDURE — 3078F DIAST BP <80 MM HG: CPT | Mod: CPTII,S$GLB,, | Performed by: INTERNAL MEDICINE

## 2020-02-10 PROCEDURE — 3078F PR MOST RECENT DIASTOLIC BLOOD PRESSURE < 80 MM HG: ICD-10-PCS | Mod: CPTII,S$GLB,, | Performed by: INTERNAL MEDICINE

## 2020-02-10 NOTE — Clinical Note
RTC in 6 months with CBC, CMP, immunoglobulin, SPEP, immunofixation, serum free light chains checked one week prior to return

## 2020-02-10 NOTE — PROGRESS NOTES
Subjective:       Patient ID: Valarie Colindres is a 74 y.o. female.     Chief Complaint: follow up for MGUS     Diagnosis:  1. Alpha and beta thalassemia trait  2. IgG kappa monoclonal gammopathy of unknown significance     Hematologic History:  1. Ms Colindres is a 75 yo woman who initially saw me on 7/16/19 for further evaluation of microcytic anemia. Her Hb was 11.4 on 9/24/18 with MCV of 61. CBC on 7/15/19 showed WBC 7.15, Hb 10.7, MCV 62, plt count 260. CMP on 5/30/19 was normal. TSH on 4/9/19 normal. Patient currently feels fine. 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 Was told that no treatment is needed. She has occasional fatigue but goes to the gym 3 times a week. She had chest pain after she ate Fritos which did not recur after she stopped eating Fritos. She just had a stress test done.   2. Labs on 7/16/19 showed creatinine 1.0, calcium 9.8, albumin 4.3, protein 7.5, ferritin 128, folate 7.2, vitamin B12 424, haptoglobin 66, iron 86, TIBC 340, iron saturation 25%, . SPEP showed a 0.77 g/dL M protein at IgG kappa. kappa light chain normal 1.11, lambda 0.97, K/L ratio 1.14. Hb electrophoresis showed 4.7% A2, 95.3% A, c/w beta thal trait. Alpha globulin-gene analysis showed one copy of gene deletion.     Interval History:   Ms Colindres returns for follow up. Feeling well. No complaints.         ROS:   A ten-point system review is obtained and negative except for what was stated in the Interval History.      Physical Examination:   Vital signs reviewed.   General: well hydrated, well developed, in no acute distress  HEENT: normocephalic, PERRLA, EOMI, anicteric sclerae, oropharynx clear  Neck: supple, no JVD, thyromegaly, cervical or supraclavicular lymphadenopathy  Lungs: clear breath sounds bilaterally, no wheezing, rales, or rhonchi  Heart: RRR, no M/R/G  Abdomen: soft, no tenderness, non-distended, no hepatosplenomegaly, mass, or hernia. BS present  Extremities: no clubbing,  cyanosis, or edema  Skin: no rash, ulcer, or open wounds  Neuro: alert and oriented x 4, no focal neuro deficit  Psych: pleasant and appropriate mood and affect     Objective:      Laboratory Data:   Labs on 7/16/19 showed creatinine 1.0, calcium 9.8, albumin 4.3, protein 7.5, ferritin 128, folate 7.2, vitamin B12 424, haptoglobin 66, iron 86, TIBC 340, iron saturation 25%, . SPEP showed a 0.77 g/dL M protein at IgG kappa. kappa light chain normal 1.11, lambda 0.97, K/L ratio 1.14. Hb electrophoresis showed 4.7% A2, 95.3% A, c/w beta thal trait. Alpha globulin-gene analysis showed one copy of gene deletion.    Labs from 2/3/2020 showed Hb 10.6, CMP normal. IgG normal 1350, SPEP showed IgG kaapa 0.70 g/dL. Kappa light chain normal 1.2, lambda normal 0.83, K/L ratio normal 1.45.      Imaging Data:  Skeletal survey 8/5/2019 did not show multiple myeloma bone lesions       Assessment and Plan:      1. MGUS (monoclonal gammopathy of unknown significance)    2. Thalassemia trait    3. Anemia, unspecified type        1  - IgG kappa monoclonal gammopathy. Low risk disease. No end organ damage.   - monitor in 6 months      2.3  - thalassemia trait with deletion in both beta and alpha globulin  - anemia mild  - monitor       Follow-up:      RTC in 6 months  Her multiple questions were answered in the clinic. Encouraged to call should issues arise.

## 2020-05-18 ENCOUNTER — OFFICE VISIT (OUTPATIENT)
Dept: INTERNAL MEDICINE | Facility: CLINIC | Age: 75
End: 2020-05-18
Payer: MEDICARE

## 2020-05-18 DIAGNOSIS — E03.9 ACQUIRED HYPOTHYROIDISM: ICD-10-CM

## 2020-05-18 DIAGNOSIS — D47.2 MGUS (MONOCLONAL GAMMOPATHY OF UNKNOWN SIGNIFICANCE): ICD-10-CM

## 2020-05-18 DIAGNOSIS — D56.3 THALASSEMIA TRAIT: ICD-10-CM

## 2020-05-18 DIAGNOSIS — I10 ESSENTIAL HYPERTENSION: Primary | ICD-10-CM

## 2020-05-18 PROCEDURE — 99442 PR PHYSICIAN TELEPHONE EVALUATION 11-20 MIN: ICD-10-PCS | Mod: 95,,, | Performed by: FAMILY MEDICINE

## 2020-05-18 PROCEDURE — 99442 PR PHYSICIAN TELEPHONE EVALUATION 11-20 MIN: CPT | Mod: 95,,, | Performed by: FAMILY MEDICINE

## 2020-05-18 NOTE — PROGRESS NOTES
Established Patient - Audio Only Telehealth Visit     The patient location is:  Patient's home in [place]  The chief complaint leading to consultation is:  Hypertension follow-up  Visit type: Virtual visit with audio only (telephone)  Total time spent with patient: 17 mins        The reason for the audio only service rather than synchronous audio and video virtual visit was related to technical difficulties or patient preference/necessity.     Each patient to whom I provide medical services by telemedicine is:  (1) informed of the relationship between the physician and patient and the respective role of any other health care provider with respect to management of the patient; and (2) notified that they may decline to receive medical services by telemedicine and may withdraw from such care at any time. Patient verbally consented to receive this service via voice-only telephone call.       HPI: Valarie Colindres is a 75 y.o. year old female with HTN, MGUS, hypothyroid, osteopenia, microcytic anemia, thalassemia trait who presents today for a blood pressure follow-up.     Patient is following with Hematology/Oncology for anemia.  She was found to have thalassemia trait as well as monoclonal gammopathy, low risk disease. Skeletal survey normal.  She will be monitored periodically with lab work.     Osteopenia - patient previously took Fosamax for over 5 years.        Hypothyroid - compliant with levothyroxine 100 mcg daily  Lab Results   Component Value Date    TSH 0.752 2019     Hypertension - compliant with HCTZ 12.5 mg and valsartan 320 mg daily.  She is looking for her blood pressure cuff and will check it soon and send me the results.    OB/GYN History     Patient is post-menopausal.  Sexually active: no     Health Maintenance  Pap smear: s/p hysterectomy due to uterine CA/precancerous lesion? Last pap 2018 - normal  Mammogram: 19 - normal   Colon Cancer Screening: colonoscopy 17 - repeat in 5  years. Done with Dr. Mabry ([facility])  DEXA:  9/24/18 - osteopenia of lumbar spine  Hepatitis C screening: n/a  Flu vaccine: refused  Tetanus vaccine: due  PNA vaccine: refused  Shingles vaccine:  refused     I personally reviewed Past Medical History, Past Surgical History, Social History, and Family History     PE:  No acute distress, speaking in complete sentences, no respiratory distress    Assessment and plan:     Essential hypertension  Patient will check blood pressure at home and send me the results through the computer.  Continue current medications at this time.  Advised her to follow-up in the office in the next couple months for an exam.    Acquired hypothyroidism  Last TSH over 1 year ago was normal.  When patient comes into the office again we will obtain lab work.  Continue current levothyroxine dose.    MGUS (monoclonal gammopathy of unknown significance)  Thalassemia trait  Follow-up with hematology.           This service was not originating from a related E/M service provided within the previous 7 days nor will  to an E/M service or procedure within the next 24 hours or my soonest available appointment.  Prevailing standard of care was able to be met in this audio-only visit.

## 2020-06-23 ENCOUNTER — TELEPHONE (OUTPATIENT)
Dept: INTERNAL MEDICINE | Facility: CLINIC | Age: 75
End: 2020-06-23

## 2020-06-23 DIAGNOSIS — Z12.39 SCREENING FOR BREAST CANCER: Primary | ICD-10-CM

## 2020-06-23 DIAGNOSIS — Z12.31 ENCOUNTER FOR SCREENING MAMMOGRAM FOR MALIGNANT NEOPLASM OF BREAST: ICD-10-CM

## 2020-06-23 NOTE — TELEPHONE ENCOUNTER
----- Message from Bri Dove sent at 6/23/2020  1:43 PM CDT -----  Regarding: Patient Access  Name of Who is Calling:  Valarie Colindres      What is the request in detail:  Patient called requesting to schedule her annual mammogram; please put the orders in at your earliest convenience and further advise.       THANKS!      Reply by [facility]: no    Call Back: [phone]

## 2020-06-24 ENCOUNTER — HOSPITAL ENCOUNTER (OUTPATIENT)
Dept: RADIOLOGY | Facility: OTHER | Age: 75
Discharge: HOME | End: 2020-06-24
Attending: FAMILY MEDICINE
Payer: MEDICARE

## 2020-06-24 VITALS — HEIGHT: 65 IN | BODY MASS INDEX: 25.13 KG/M2 | WEIGHT: 150.81 LBS

## 2020-06-24 DIAGNOSIS — Z12.31 ENCOUNTER FOR SCREENING MAMMOGRAM FOR MALIGNANT NEOPLASM OF BREAST: ICD-10-CM

## 2020-06-24 DIAGNOSIS — Z12.39 SCREENING FOR BREAST CANCER: ICD-10-CM

## 2020-06-24 PROCEDURE — 77067 SCR MAMMO BI INCL CAD: CPT | Mod: TC

## 2020-06-24 PROCEDURE — 77067 SCR MAMMO BI INCL CAD: CPT | Mod: 26,,, | Performed by: RADIOLOGY

## 2020-06-24 PROCEDURE — 77067 MAMMO DIGITAL SCREENING BILAT WITH TOMOSYNTHESIS_CAD: ICD-10-PCS | Mod: 26,,, | Performed by: RADIOLOGY

## 2020-06-24 PROCEDURE — 77063 BREAST TOMOSYNTHESIS BI: CPT | Mod: 26,,, | Performed by: RADIOLOGY

## 2020-06-24 PROCEDURE — 77063 MAMMO DIGITAL SCREENING BILAT WITH TOMOSYNTHESIS_CAD: ICD-10-PCS | Mod: 26,,, | Performed by: RADIOLOGY

## 2020-08-03 ENCOUNTER — TELEPHONE (OUTPATIENT)
Dept: HEMATOLOGY/ONCOLOGY | Facility: CLINIC | Age: 75
End: 2020-08-03

## 2020-08-04 ENCOUNTER — TELEPHONE (OUTPATIENT)
Dept: HEMATOLOGY/ONCOLOGY | Facility: CLINIC | Age: 75
End: 2020-08-04

## 2020-08-04 NOTE — TELEPHONE ENCOUNTER
Call returned to patient, patient requesting earlier appt in the day. Appt changed to 820am. Patient confirmed.

## 2020-08-04 NOTE — TELEPHONE ENCOUNTER
----- Message from Shawn Heaton sent at 8/4/2020 10:58 AM CDT -----  Contact: pt  Calling to see about getting a earlier appt    Call back: [phone]

## 2020-08-11 ENCOUNTER — LAB VISIT (OUTPATIENT)
Dept: LAB | Facility: HOSPITAL | Age: 75
End: 2020-08-11
Attending: INTERNAL MEDICINE
Payer: MEDICARE

## 2020-08-11 DIAGNOSIS — D47.2 MGUS (MONOCLONAL GAMMOPATHY OF UNKNOWN SIGNIFICANCE): ICD-10-CM

## 2020-08-11 LAB
ALBUMIN SERPL BCP-MCNC: 3.8 G/DL (ref 3.5–5.2)
ALP SERPL-CCNC: 49 U/L (ref 55–135)
ALT SERPL W/O P-5'-P-CCNC: 8 U/L (ref 10–44)
ANION GAP SERPL CALC-SCNC: 7 MMOL/L (ref 8–16)
AST SERPL-CCNC: 13 U/L (ref 10–40)
BASOPHILS # BLD AUTO: 0.02 K/UL (ref 0–0.2)
BASOPHILS NFR BLD: 0.3 % (ref 0–1.9)
BILIRUB SERPL-MCNC: 0.5 MG/DL (ref 0.1–1)
BUN SERPL-MCNC: 9 MG/DL (ref 8–23)
CALCIUM SERPL-MCNC: 9.2 MG/DL (ref 8.7–10.5)
CHLORIDE SERPL-SCNC: 99 MMOL/L (ref 95–110)
CO2 SERPL-SCNC: 29 MMOL/L (ref 23–29)
CREAT SERPL-MCNC: 0.8 MG/DL (ref 0.5–1.4)
DIFFERENTIAL METHOD: ABNORMAL
EOSINOPHIL # BLD AUTO: 0.1 K/UL (ref 0–0.5)
EOSINOPHIL NFR BLD: 1.5 % (ref 0–8)
ERYTHROCYTE [DISTWIDTH] IN BLOOD BY AUTOMATED COUNT: 16.9 % (ref 11.5–14.5)
EST. GFR  (AFRICAN AMERICAN): >60 ML/MIN/1.73 M^2
EST. GFR  (NON AFRICAN AMERICAN): >60 ML/MIN/1.73 M^2
GLUCOSE SERPL-MCNC: 87 MG/DL (ref 70–110)
HCT VFR BLD AUTO: 34.4 % (ref 37–48.5)
HGB BLD-MCNC: 10.5 G/DL (ref 12–16)
IGA SERPL-MCNC: 121 MG/DL (ref 40–350)
IGG SERPL-MCNC: 1343 MG/DL (ref 650–1600)
IGM SERPL-MCNC: 79 MG/DL (ref 50–300)
IMM GRANULOCYTES # BLD AUTO: 0.01 K/UL (ref 0–0.04)
IMM GRANULOCYTES NFR BLD AUTO: 0.2 % (ref 0–0.5)
LYMPHOCYTES # BLD AUTO: 3.4 K/UL (ref 1–4.8)
LYMPHOCYTES NFR BLD: 57.2 % (ref 18–48)
MCH RBC QN AUTO: 18.8 PG (ref 27–31)
MCHC RBC AUTO-ENTMCNC: 30.5 G/DL (ref 32–36)
MCV RBC AUTO: 62 FL (ref 82–98)
MONOCYTES # BLD AUTO: 0.5 K/UL (ref 0.3–1)
MONOCYTES NFR BLD: 8.2 % (ref 4–15)
NEUTROPHILS # BLD AUTO: 1.9 K/UL (ref 1.8–7.7)
NEUTROPHILS NFR BLD: 32.6 % (ref 38–73)
NRBC BLD-RTO: 0 /100 WBC
PLATELET # BLD AUTO: 299 K/UL (ref 150–350)
PMV BLD AUTO: 11 FL (ref 9.2–12.9)
POTASSIUM SERPL-SCNC: 4.1 MMOL/L (ref 3.5–5.1)
PROT SERPL-MCNC: 6.7 G/DL (ref 6–8.4)
RBC # BLD AUTO: 5.59 M/UL (ref 4–5.4)
SODIUM SERPL-SCNC: 135 MMOL/L (ref 136–145)
WBC # BLD AUTO: 5.86 K/UL (ref 3.9–12.7)

## 2020-08-11 PROCEDURE — 85025 COMPLETE CBC W/AUTO DIFF WBC: CPT

## 2020-08-11 PROCEDURE — 83520 IMMUNOASSAY QUANT NOS NONAB: CPT

## 2020-08-11 PROCEDURE — 84165 PROTEIN E-PHORESIS SERUM: CPT

## 2020-08-11 PROCEDURE — 84165 PROTEIN E-PHORESIS SERUM: CPT | Mod: 26,,, | Performed by: PATHOLOGY

## 2020-08-11 PROCEDURE — 36415 COLL VENOUS BLD VENIPUNCTURE: CPT | Mod: PN

## 2020-08-11 PROCEDURE — 80053 COMPREHEN METABOLIC PANEL: CPT

## 2020-08-11 PROCEDURE — 84165 PATHOLOGIST INTERPRETATION SPE: ICD-10-PCS | Mod: 26,,, | Performed by: PATHOLOGY

## 2020-08-11 PROCEDURE — 86334 PATHOLOGIST INTERPRETATION IFE: ICD-10-PCS | Mod: 26,,, | Performed by: PATHOLOGY

## 2020-08-11 PROCEDURE — 82784 ASSAY IGA/IGD/IGG/IGM EACH: CPT | Mod: 59

## 2020-08-11 PROCEDURE — 86334 IMMUNOFIX E-PHORESIS SERUM: CPT

## 2020-08-11 PROCEDURE — 86334 IMMUNOFIX E-PHORESIS SERUM: CPT | Mod: 26,,, | Performed by: PATHOLOGY

## 2020-08-12 LAB
ALBUMIN SERPL ELPH-MCNC: 4.07 G/DL (ref 3.35–5.55)
ALPHA1 GLOB SERPL ELPH-MCNC: 0.23 G/DL (ref 0.17–0.41)
ALPHA2 GLOB SERPL ELPH-MCNC: 0.6 G/DL (ref 0.43–0.99)
B-GLOBULIN SERPL ELPH-MCNC: 0.51 G/DL (ref 0.5–1.1)
GAMMA GLOB SERPL ELPH-MCNC: 1.19 G/DL (ref 0.67–1.58)
INTERPRETATION SERPL IFE-IMP: NORMAL
KAPPA LC SER QL IA: 1.07 MG/DL (ref 0.33–1.94)
KAPPA LC/LAMBDA SER IA: 1.06 (ref 0.26–1.65)
LAMBDA LC SER QL IA: 1.01 MG/DL (ref 0.57–2.63)
PATHOLOGIST INTERPRETATION IFE: NORMAL
PATHOLOGIST INTERPRETATION SPE: NORMAL
PROT SERPL-MCNC: 6.6 G/DL (ref 6–8.4)

## 2020-08-18 ENCOUNTER — OFFICE VISIT (OUTPATIENT)
Dept: HEMATOLOGY/ONCOLOGY | Facility: CLINIC | Age: 75
End: 2020-08-18
Payer: MEDICARE

## 2020-08-18 VITALS
OXYGEN SATURATION: 97 % | HEART RATE: 70 BPM | RESPIRATION RATE: 16 BRPM | TEMPERATURE: 99 F | BODY MASS INDEX: 25.39 KG/M2 | SYSTOLIC BLOOD PRESSURE: 120 MMHG | WEIGHT: 152.56 LBS | DIASTOLIC BLOOD PRESSURE: 73 MMHG

## 2020-08-18 DIAGNOSIS — I10 ESSENTIAL HYPERTENSION: ICD-10-CM

## 2020-08-18 DIAGNOSIS — D47.2 MGUS (MONOCLONAL GAMMOPATHY OF UNKNOWN SIGNIFICANCE): Primary | ICD-10-CM

## 2020-08-18 DIAGNOSIS — D56.3 THALASSEMIA TRAIT: ICD-10-CM

## 2020-08-18 PROCEDURE — 1126F AMNT PAIN NOTED NONE PRSNT: CPT | Mod: S$GLB,,, | Performed by: INTERNAL MEDICINE

## 2020-08-18 PROCEDURE — 99499 UNLISTED E&M SERVICE: CPT | Mod: S$GLB,,, | Performed by: INTERNAL MEDICINE

## 2020-08-18 PROCEDURE — 1126F PR PAIN SEVERITY QUANTIFIED, NO PAIN PRESENT: ICD-10-PCS | Mod: S$GLB,,, | Performed by: INTERNAL MEDICINE

## 2020-08-18 PROCEDURE — 99214 OFFICE O/P EST MOD 30 MIN: CPT | Mod: S$GLB,,, | Performed by: INTERNAL MEDICINE

## 2020-08-18 PROCEDURE — 99999 PR PBB SHADOW E&M-EST. PATIENT-LVL III: CPT | Mod: PBBFAC,,, | Performed by: INTERNAL MEDICINE

## 2020-08-18 PROCEDURE — 99214 PR OFFICE/OUTPT VISIT, EST, LEVL IV, 30-39 MIN: ICD-10-PCS | Mod: S$GLB,,, | Performed by: INTERNAL MEDICINE

## 2020-08-18 PROCEDURE — 99499 RISK ADDL DX/OHS AUDIT: ICD-10-PCS | Mod: S$GLB,,, | Performed by: INTERNAL MEDICINE

## 2020-08-18 PROCEDURE — 3074F PR MOST RECENT SYSTOLIC BLOOD PRESSURE < 130 MM HG: ICD-10-PCS | Mod: CPTII,S$GLB,, | Performed by: INTERNAL MEDICINE

## 2020-08-18 PROCEDURE — 99999 PR PBB SHADOW E&M-EST. PATIENT-LVL III: ICD-10-PCS | Mod: PBBFAC,,, | Performed by: INTERNAL MEDICINE

## 2020-08-18 PROCEDURE — 1159F PR MEDICATION LIST DOCUMENTED IN MEDICAL RECORD: ICD-10-PCS | Mod: S$GLB,,, | Performed by: INTERNAL MEDICINE

## 2020-08-18 PROCEDURE — 3078F DIAST BP <80 MM HG: CPT | Mod: CPTII,S$GLB,, | Performed by: INTERNAL MEDICINE

## 2020-08-18 PROCEDURE — 1159F MED LIST DOCD IN RCRD: CPT | Mod: S$GLB,,, | Performed by: INTERNAL MEDICINE

## 2020-08-18 PROCEDURE — 1101F PT FALLS ASSESS-DOCD LE1/YR: CPT | Mod: CPTII,S$GLB,, | Performed by: INTERNAL MEDICINE

## 2020-08-18 PROCEDURE — 3074F SYST BP LT 130 MM HG: CPT | Mod: CPTII,S$GLB,, | Performed by: INTERNAL MEDICINE

## 2020-08-18 PROCEDURE — 1101F PR PT FALLS ASSESS DOC 0-1 FALLS W/OUT INJ PAST YR: ICD-10-PCS | Mod: CPTII,S$GLB,, | Performed by: INTERNAL MEDICINE

## 2020-08-18 PROCEDURE — 3078F PR MOST RECENT DIASTOLIC BLOOD PRESSURE < 80 MM HG: ICD-10-PCS | Mod: CPTII,S$GLB,, | Performed by: INTERNAL MEDICINE

## 2020-08-18 RX ORDER — LORATADINE 10 MG/1
10 TABLET ORAL DAILY
COMMUNITY
End: 2020-10-15

## 2020-08-18 NOTE — PROGRESS NOTES
Patient ID: Valarie Colindres is a 75 y.o. female.     Chief Complaint: follow up for MGUS     Diagnosis:  1. Alpha and beta thalassemia trait  2. IgG kappa monoclonal gammopathy of unknown significance     Hematologic History:  1. Ms Colindres is a 75 yo woman who initially saw me on 7/16/19 for further evaluation of microcytic anemia. Her Hb was 11.4 on 9/24/18 with MCV of 61. CBC on 7/15/19 showed WBC 7.15, Hb 10.7, MCV 62, plt count 260. CMP on 5/30/19 was normal. TSH on 4/9/19 normal. Patient currently feels fine. 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 Was told that no treatment is needed. She has occasional fatigue but goes to the gym 3 times a week. She had chest pain after she ate Fritos which did not recur after she stopped eating Fritos. She just had a stress test done.   2. Labs on 7/16/19 showed creatinine 1.0, calcium 9.8, albumin 4.3, protein 7.5, ferritin 128, folate 7.2, vitamin B12 424, haptoglobin 66, iron 86, TIBC 340, iron saturation 25%, . SPEP showed a 0.77 g/dL M protein at IgG kappa. kappa light chain normal 1.11, lambda 0.97, K/L ratio 1.14. Hb electrophoresis showed 4.7% A2, 95.3% A, c/w beta thal trait. Alpha globulin-gene analysis showed one copy of gene deletion.     Interval History:   Ms Colindres returns for follow up. Feeling well. No complaints.         ROS:   A ten-point system review is obtained and negative except for what was stated in the Interval History.      Physical Examination:   Vital signs reviewed.   General: well hydrated, well developed, in no acute distress  HEENT: normocephalic, PERRLA, EOMI, anicteric sclerae, oropharynx clear  Neck: supple, no JVD, thyromegaly, cervical or supraclavicular lymphadenopathy  Lungs: clear breath sounds bilaterally, no wheezing, rales, or rhonchi  Heart: RRR, no M/R/G  Abdomen: soft, no tenderness, non-distended, no hepatosplenomegaly, mass, or hernia. BS present  Extremities: no clubbing, cyanosis, or  edema  Skin: no rash, ulcer, or open wounds  Neuro: alert and oriented x 4, no focal neuro deficit  Psych: pleasant and appropriate mood and affect     Objective:      Laboratory Data:   Labs on 7/16/19 showed creatinine 1.0, calcium 9.8, albumin 4.3, protein 7.5, ferritin 128, folate 7.2, vitamin B12 424, haptoglobin 66, iron 86, TIBC 340, iron saturation 25%, . SPEP showed a 0.77 g/dL M protein at IgG kappa. kappa light chain normal 1.11, lambda 0.97, K/L ratio 1.14. Hb electrophoresis showed 4.7% A2, 95.3% A, c/w beta thal trait. Alpha globulin-gene analysis showed one copy of gene deletion.     Labs from 2/3/2020 showed Hb 10.6, CMP normal. IgG normal 1350, SPEP showed IgG kappa 0.70 g/dL. Kappa light chain normal 1.2, lambda normal 0.83, K/L ratio normal 1.45.     Labs from 8/11/2020 showed Hb 10.5, CMP unremarkable, IgG normal 1343, SPEP showed IgG kappa 0.69 g/dL, kappa normal 1.07, lambda normal 1.01, K/L ratio normal 1.06     Imaging Data:  Skeletal survey 8/5/2019 did not show multiple myeloma bone lesions         Assessment and Plan:      1. MGUS (monoclonal gammopathy of unknown significance)    2. Thalassemia trait    3. Essential hypertension        1  - IgG kappa monoclonal gammopathy. Low risk disease. No end organ damage.   - Patient feeling well. No symptoms  - Long discussion re the diagnosis and prognosis of MGUS again. Discussed the rationale and strategy of surveillance  - monitor in 6 months      2.3  - thalassemia trait with deletion in both beta and alpha globulin  - anemia mild  - monitor        Follow-up:      RTC in 6 months  Her multiple questions were answered in the clinic. Encouraged to call should issues arise.

## 2020-08-18 NOTE — Clinical Note
Virtual visit in 6 months. Get CBC, CMP, SPEP, IFX, immunoglobulins, free light chain one week before visit

## 2020-10-02 RX ORDER — LEVOTHYROXINE SODIUM 100 UG/1
TABLET ORAL
Qty: 90 TABLET | Refills: 0 | Status: SHIPPED | OUTPATIENT
Start: 2020-10-02 | End: 2021-04-06

## 2020-10-02 NOTE — TELEPHONE ENCOUNTER
Please contact the patient and inform that she is due for a follow-up visit in the office.  She is also due for blood work.  Please help her establish care with a new provider.    Thanks!

## 2020-10-06 ENCOUNTER — PATIENT OUTREACH (OUTPATIENT)
Dept: ADMINISTRATIVE | Facility: OTHER | Age: 75
End: 2020-10-06

## 2020-10-07 ENCOUNTER — HOSPITAL ENCOUNTER (OUTPATIENT)
Dept: RADIOLOGY | Facility: HOSPITAL | Age: 75
Discharge: HOME | End: 2020-10-07
Attending: PHYSICIAN ASSISTANT
Payer: MEDICARE

## 2020-10-07 ENCOUNTER — OFFICE VISIT (OUTPATIENT)
Dept: SPORTS MEDICINE | Facility: CLINIC | Age: 75
End: 2020-10-07
Payer: MEDICARE

## 2020-10-07 VITALS — BODY MASS INDEX: 24.99 KG/M2 | HEIGHT: 65 IN | WEIGHT: 150 LBS

## 2020-10-07 DIAGNOSIS — M25.551 RIGHT HIP PAIN: ICD-10-CM

## 2020-10-07 DIAGNOSIS — M70.61 TROCHANTERIC BURSITIS OF BOTH HIPS: ICD-10-CM

## 2020-10-07 DIAGNOSIS — M70.62 TROCHANTERIC BURSITIS OF BOTH HIPS: ICD-10-CM

## 2020-10-07 DIAGNOSIS — M54.16 LUMBAR RADICULOPATHY: ICD-10-CM

## 2020-10-07 DIAGNOSIS — M70.62 TROCHANTERIC BURSITIS OF LEFT HIP: Primary | ICD-10-CM

## 2020-10-07 PROCEDURE — 1159F MED LIST DOCD IN RCRD: CPT | Mod: S$GLB,,, | Performed by: PHYSICIAN ASSISTANT

## 2020-10-07 PROCEDURE — 20610 PR DRAIN/INJECT LARGE JOINT/BURSA: ICD-10-PCS | Mod: LT,S$GLB,, | Performed by: PHYSICIAN ASSISTANT

## 2020-10-07 PROCEDURE — 99999 PR PBB SHADOW E&M-EST. PATIENT-LVL IV: ICD-10-PCS | Mod: PBBFAC,,, | Performed by: PHYSICIAN ASSISTANT

## 2020-10-07 PROCEDURE — 99213 OFFICE O/P EST LOW 20 MIN: CPT | Mod: 25,S$GLB,, | Performed by: PHYSICIAN ASSISTANT

## 2020-10-07 PROCEDURE — 1125F PR PAIN SEVERITY QUANTIFIED, PAIN PRESENT: ICD-10-PCS | Mod: S$GLB,,, | Performed by: PHYSICIAN ASSISTANT

## 2020-10-07 PROCEDURE — 1159F PR MEDICATION LIST DOCUMENTED IN MEDICAL RECORD: ICD-10-PCS | Mod: S$GLB,,, | Performed by: PHYSICIAN ASSISTANT

## 2020-10-07 PROCEDURE — 73521 X-RAY EXAM HIPS BI 2 VIEWS: CPT | Mod: TC

## 2020-10-07 PROCEDURE — 1125F AMNT PAIN NOTED PAIN PRSNT: CPT | Mod: S$GLB,,, | Performed by: PHYSICIAN ASSISTANT

## 2020-10-07 PROCEDURE — 99213 PR OFFICE/OUTPT VISIT, EST, LEVL III, 20-29 MIN: ICD-10-PCS | Mod: 25,S$GLB,, | Performed by: PHYSICIAN ASSISTANT

## 2020-10-07 PROCEDURE — 73521 XR HIPS BILATERAL 2 VIEW INCL AP PELVIS: ICD-10-PCS | Mod: 26,,, | Performed by: RADIOLOGY

## 2020-10-07 PROCEDURE — 1101F PR PT FALLS ASSESS DOC 0-1 FALLS W/OUT INJ PAST YR: ICD-10-PCS | Mod: CPTII,S$GLB,, | Performed by: PHYSICIAN ASSISTANT

## 2020-10-07 PROCEDURE — 1101F PT FALLS ASSESS-DOCD LE1/YR: CPT | Mod: CPTII,S$GLB,, | Performed by: PHYSICIAN ASSISTANT

## 2020-10-07 PROCEDURE — 99999 PR PBB SHADOW E&M-EST. PATIENT-LVL IV: CPT | Mod: PBBFAC,,, | Performed by: PHYSICIAN ASSISTANT

## 2020-10-07 PROCEDURE — 73521 X-RAY EXAM HIPS BI 2 VIEWS: CPT | Mod: 26,,, | Performed by: RADIOLOGY

## 2020-10-07 PROCEDURE — 20610 DRAIN/INJ JOINT/BURSA W/O US: CPT | Mod: LT,S$GLB,, | Performed by: PHYSICIAN ASSISTANT

## 2020-10-07 RX ORDER — TRIAMCINOLONE ACETONIDE 40 MG/ML
40 INJECTION, SUSPENSION INTRA-ARTICULAR; INTRAMUSCULAR
Status: COMPLETED | OUTPATIENT
Start: 2020-10-07 | End: 2020-10-07

## 2020-10-07 RX ORDER — DICLOFENAC SODIUM 10 MG/G
2 GEL TOPICAL 4 TIMES DAILY
Qty: 1 TUBE | Refills: 2 | Status: SHIPPED | OUTPATIENT
Start: 2020-10-07 | End: 2023-01-25 | Stop reason: SDUPTHER

## 2020-10-07 RX ADMIN — TRIAMCINOLONE ACETONIDE 40 MG: 40 INJECTION, SUSPENSION INTRA-ARTICULAR; INTRAMUSCULAR at 08:10

## 2020-10-07 NOTE — PROGRESS NOTES
"Subjective:          Chief Complaint: Valarie Colindres is a 75 y.o. female who had concerns including Pain of the Right Hip.    Valarie Colindres is an active pleasant retiree.    Patient returns with right hip pain.  She reports the left hip hurts as well pain worse in the left hip.  Similar symptoms from previous hip.  She is no longer doing HEP frequently.  Patient does endorse some lower back pain with left lower side numbness.    11/22/2019: She reports continued relief with physical therapy. 0/10 pain today.    8/21/2019: She reports 100% relief of lateral hip pain after trochanteric bursa injection. She has not started PT yet due to scheduling conflicts. Her first appt is scheduled this week. Pain now present "inside hip".    Previous HPI: The pain started 3-4 months ago and is becoming progressively worse last few months. Pain is located over (points to) lateral hip. She reports that the pain is a 5 /10 aching, throbbing and radiating pain today and not responding adequately to conservative measures which have included activity modifications, rest, and oral/topical medication. Is affecting ADLs and limiting desired level of activity. Denies numbness, tingling, radiation, and inability to bear weight. Baseline lower back pain   Pain is 7 /10 at its worst    Mechanical symptoms: none  Subjective instability: (--)   Worse with palpation  Better with rest.   Nocturnal symptoms: (+)    No previous surgeries or trauma on hips        Pain  Pertinent negatives include no abdominal pain, chest pain, chills, congestion, coughing, fever, joint swelling, nausea, numbness, rash, sore throat or vomiting.       Review of Systems   Constitution: Negative for chills and fever.   HENT: Negative for congestion and sore throat.    Eyes: Negative for discharge and double vision.   Cardiovascular: Negative for chest pain, palpitations and syncope.   Respiratory: Negative for cough and shortness of breath.    Endocrine: Negative " for cold intolerance and heat intolerance.   Skin: Negative for dry skin and rash.   Musculoskeletal: Negative for falls, gout, joint pain and joint swelling.   Gastrointestinal: Negative for abdominal pain, nausea and vomiting.   Neurological: Negative for focal weakness, numbness and paresthesias.                   Objective:        General: [place] is well-developed, well-nourished, appears stated age, in no acute distress, alert and oriented to time, place and person.     General    Constitutional: She is oriented to person, place, and time. She appears well-developed and well-nourished. No distress.   HENT:   Head: Normocephalic and atraumatic.   Nose: Nose normal.   Eyes: Conjunctivae and EOM are normal. Pupils are equal, round, and reactive to light.   Neck: No JVD present.   Cardiovascular: Normal rate, regular rhythm and normal heart sounds.    Pulmonary/Chest: Effort normal and breath sounds normal. No respiratory distress.   Abdominal: Soft. Bowel sounds are normal. She exhibits no distension. There is no abdominal tenderness.   Neurological: She is alert and oriented to person, place, and time. She has normal reflexes. Coordination normal.   Psychiatric: She has a normal mood and affect. Her behavior is normal. Judgment and thought content normal.     General Musculoskeletal Exam   Gait: normal   Pelvic Obliquity: mild      Right Knee Exam     Inspection   Effusion: absent    Left Knee Exam     Inspection   Effusion: absent    Right Hip Exam     Inspection   Swelling: absent  Bruising: absent  No deformity of hip.  Erythema: absent    Tenderness   The patient tender to palpation of the trochanteric bursa.    Range of Motion   Abduction: 45   Adduction: 30   Extension: 10   Flexion: 120   External rotation: 60   Internal rotation: 30     Tests   Pain w/ forced internal rotation (DONNA): present  Pain w/ forced external rotation (FADIR): absent  Boo: negative  Log Roll: negative  Step-down test:  negative    Other   Sensation: normal    Comments:  +TTP over gluteus medius  +bridge test.  Left Hip Exam     Inspection   Swelling: absent  No deformity of hip.  Erythema: absent  Bruising: absent    Tenderness   The patient tender to palpation of the trochanteric bursa.    Range of Motion   Abduction: 45   Adduction: 30   Extension: 10   Flexion: 120   External rotation: 60   Internal rotation: 30     Tests   Pain w/ forced internal rotation (DONNA): absent  Pain w/ forced external rotation (FADIR): absent  Stinchfield test: negative  Log Roll: negative  Step-down test: negative    Other   Sensation: normal      Back (L-Spine & T-Spine) / Neck (C-Spine) Exam     Tenderness Posterior midline palpation reveals tenderness of the Lower T-Spine and Upper L-Spine.       Muscle Strength   Right Lower Extremity   Hip Abduction: 5/5   Hip Adduction: 5/5   Hip Flexion: 5/5   Ankle Dorsiflexion:  5/5   Left Lower Extremity   Hip Abduction: 5/5   Hip Adduction: 5/5   Hip Flexion: 5/5   Ankle Dorsiflexion:  5/5     Vascular Exam     Right Pulses  Dorsalis Pedis:      2+  Posterior Tibial:      2+        Left Pulses    Posterior Tibial:      2+        Capillary Refill  Right Hand: normal capillary refill  Left Hand: normal capillary refill    Radiographic Findings 10/07/2020:    Bone, joint soft tissues stable.  Transition vertebral development lumbosacral junction with degenerative disc spondylosis and levoscoliosis.    Xrays of the bilateral hips/pelvis were ordered and reviewed by me today. These findings were discussed and reviewed with the patient.          Assessment:       Encounter Diagnoses   Name Primary?    Trochanteric bursitis, right hip Yes    Hip pain, acute, right     Right hip pain           Plan:       We have discussed a variety of treatment options including medications, injections, physical therapy and other alternative treatments. I also explained the indications, risks and benefits of surgery. Given  the patients hx and examination today, I believe she would benefit from physical therapy for lower back and possible CSI for hip. Pt agrees and would like to proceed with physical therapy and trochanteric bursa CSI    I made the decision to obtain old records of the patient including previous notes and imaging. I independently reviewed and interpreted lab results today as well as prior imaging.     1. Injection Procedure  A time out was performed, including verification of patient ID, procedure, site and side, availability of information and equipment, review of safety issues, and agreement with consent, the procedure site was marked.    After time out was performed, the patient was prepped aseptically with chloraprep swabsticks. A diagnostic and therapeutic injection of 1:4cc Kenalog/Marcaine was given under sterile technique using a 22g x 1.5 spinal needle from the Lateral  aspect of the left Greater trochanteric bursa in the right lateral position.      Patient had no adverse reactions to the medication. Pain decreased. She was instructed to apply ice to the joint for 20 minutes and avoid strenuous activities for 24-36 hours following the injection. She was warned of possible blood sugar and/or blood pressure changes during that time. Following that time, she can resume regular activities.    She was reminded to call the clinic immediately for any adverse side effects as explained in clinic today.    2. Diclofenac gel TID as needed for pain management.  3. Ambulatory referral to Healthy Back program for lumbar radiculopathy.  4. Ice compress to the affected area 2-3x a day for 15-20 minutes as needed for pain management.  5. RTC to see Michael Valencia PA-C as needed.      All of the patient's questions were answered and the patient will contact us if they have any questions or concerns in the interim.          Patient questionnaires may have been collected.

## 2020-10-15 ENCOUNTER — OFFICE VISIT (OUTPATIENT)
Dept: PRIMARY CARE CLINIC | Facility: CLINIC | Age: 75
End: 2020-10-15
Payer: MEDICARE

## 2020-10-15 ENCOUNTER — LAB VISIT (OUTPATIENT)
Dept: LAB | Facility: HOSPITAL | Age: 75
End: 2020-10-15
Attending: FAMILY MEDICINE
Payer: MEDICARE

## 2020-10-15 VITALS
WEIGHT: 151.25 LBS | DIASTOLIC BLOOD PRESSURE: 65 MMHG | BODY MASS INDEX: 25.82 KG/M2 | HEIGHT: 64 IN | HEART RATE: 71 BPM | SYSTOLIC BLOOD PRESSURE: 112 MMHG | OXYGEN SATURATION: 97 % | TEMPERATURE: 98 F

## 2020-10-15 DIAGNOSIS — R61 NIGHT SWEAT: ICD-10-CM

## 2020-10-15 DIAGNOSIS — R42 INTERMITTENT LIGHTHEADEDNESS: ICD-10-CM

## 2020-10-15 DIAGNOSIS — M54.9 CHRONIC BACK PAIN GREATER THAN 3 MONTHS DURATION: Primary | ICD-10-CM

## 2020-10-15 DIAGNOSIS — E03.9 ACQUIRED HYPOTHYROIDISM: ICD-10-CM

## 2020-10-15 DIAGNOSIS — G89.29 CHRONIC BACK PAIN GREATER THAN 3 MONTHS DURATION: Primary | ICD-10-CM

## 2020-10-15 DIAGNOSIS — D47.2 MGUS (MONOCLONAL GAMMOPATHY OF UNKNOWN SIGNIFICANCE): ICD-10-CM

## 2020-10-15 DIAGNOSIS — N95.1 VASOMOTOR SYMPTOMS DUE TO MENOPAUSE: ICD-10-CM

## 2020-10-15 DIAGNOSIS — R39.12 POOR URINARY STREAM: ICD-10-CM

## 2020-10-15 DIAGNOSIS — I10 ESSENTIAL HYPERTENSION: ICD-10-CM

## 2020-10-15 DIAGNOSIS — R20.0 LEFT LEG NUMBNESS: ICD-10-CM

## 2020-10-15 DIAGNOSIS — D56.3 THALASSEMIA TRAIT: ICD-10-CM

## 2020-10-15 LAB
ALBUMIN SERPL BCP-MCNC: 4.4 G/DL (ref 3.5–5.2)
ALP SERPL-CCNC: 54 U/L (ref 55–135)
ALT SERPL W/O P-5'-P-CCNC: 8 U/L (ref 10–44)
ANION GAP SERPL CALC-SCNC: 8 MMOL/L (ref 8–16)
AST SERPL-CCNC: 12 U/L (ref 10–40)
BACTERIA #/AREA URNS AUTO: NORMAL /HPF
BILIRUB SERPL-MCNC: 0.6 MG/DL (ref 0.1–1)
BILIRUB UR QL STRIP: NEGATIVE
BUN SERPL-MCNC: 15 MG/DL (ref 8–23)
CALCIUM SERPL-MCNC: 9.7 MG/DL (ref 8.7–10.5)
CHLORIDE SERPL-SCNC: 98 MMOL/L (ref 95–110)
CLARITY UR REFRACT.AUTO: CLEAR
CO2 SERPL-SCNC: 30 MMOL/L (ref 23–29)
COLOR UR AUTO: YELLOW
CREAT SERPL-MCNC: 1 MG/DL (ref 0.5–1.4)
ERYTHROCYTE [DISTWIDTH] IN BLOOD BY AUTOMATED COUNT: 18.6 % (ref 11.5–14.5)
EST. GFR  (AFRICAN AMERICAN): >60 ML/MIN/1.73 M^2
EST. GFR  (NON AFRICAN AMERICAN): 54.9 ML/MIN/1.73 M^2
GLUCOSE SERPL-MCNC: 85 MG/DL (ref 70–110)
GLUCOSE UR QL STRIP: NEGATIVE
HCT VFR BLD AUTO: 37.8 % (ref 37–48.5)
HGB BLD-MCNC: 11.3 G/DL (ref 12–16)
HGB UR QL STRIP: ABNORMAL
KETONES UR QL STRIP: NEGATIVE
LEUKOCYTE ESTERASE UR QL STRIP: NEGATIVE
MAGNESIUM SERPL-MCNC: 2.3 MG/DL (ref 1.6–2.6)
MCH RBC QN AUTO: 18.9 PG (ref 27–31)
MCHC RBC AUTO-ENTMCNC: 29.9 G/DL (ref 32–36)
MCV RBC AUTO: 63 FL (ref 82–98)
MICROSCOPIC COMMENT: NORMAL
NITRITE UR QL STRIP: NEGATIVE
PH UR STRIP: 6 [PH] (ref 5–8)
PLATELET # BLD AUTO: 318 K/UL (ref 150–350)
PMV BLD AUTO: 11 FL (ref 9.2–12.9)
POTASSIUM SERPL-SCNC: 4.3 MMOL/L (ref 3.5–5.1)
PROT SERPL-MCNC: 7.6 G/DL (ref 6–8.4)
PROT UR QL STRIP: NEGATIVE
RBC # BLD AUTO: 5.98 M/UL (ref 4–5.4)
RBC #/AREA URNS AUTO: 1 /HPF (ref 0–4)
SODIUM SERPL-SCNC: 136 MMOL/L (ref 136–145)
SP GR UR STRIP: 1.01 (ref 1–1.03)
SQUAMOUS #/AREA URNS AUTO: 0 /HPF
T3 SERPL-MCNC: 57 NG/DL (ref 60–180)
T4 FREE SERPL-MCNC: 1.18 NG/DL (ref 0.71–1.51)
TSH SERPL DL<=0.005 MIU/L-ACNC: 1.16 UIU/ML (ref 0.4–4)
URN SPEC COLLECT METH UR: ABNORMAL
WBC # BLD AUTO: 8.94 K/UL (ref 3.9–12.7)
WBC #/AREA URNS AUTO: 1 /HPF (ref 0–5)

## 2020-10-15 PROCEDURE — 85027 COMPLETE CBC AUTOMATED: CPT

## 2020-10-15 PROCEDURE — 1159F MED LIST DOCD IN RCRD: CPT | Mod: S$GLB,,, | Performed by: FAMILY MEDICINE

## 2020-10-15 PROCEDURE — 99999 PR PBB SHADOW E&M-EST. PATIENT-LVL V: CPT | Mod: PBBFAC,,, | Performed by: FAMILY MEDICINE

## 2020-10-15 PROCEDURE — 99214 PR OFFICE/OUTPT VISIT, EST, LEVL IV, 30-39 MIN: ICD-10-PCS | Mod: S$GLB,,, | Performed by: FAMILY MEDICINE

## 2020-10-15 PROCEDURE — 80053 COMPREHEN METABOLIC PANEL: CPT

## 2020-10-15 PROCEDURE — 99214 OFFICE O/P EST MOD 30 MIN: CPT | Mod: S$GLB,,, | Performed by: FAMILY MEDICINE

## 2020-10-15 PROCEDURE — 87086 URINE CULTURE/COLONY COUNT: CPT

## 2020-10-15 PROCEDURE — 3074F SYST BP LT 130 MM HG: CPT | Mod: CPTII,S$GLB,, | Performed by: FAMILY MEDICINE

## 2020-10-15 PROCEDURE — 36415 COLL VENOUS BLD VENIPUNCTURE: CPT | Mod: PN

## 2020-10-15 PROCEDURE — 84439 ASSAY OF FREE THYROXINE: CPT

## 2020-10-15 PROCEDURE — 3078F DIAST BP <80 MM HG: CPT | Mod: CPTII,S$GLB,, | Performed by: FAMILY MEDICINE

## 2020-10-15 PROCEDURE — 1101F PT FALLS ASSESS-DOCD LE1/YR: CPT | Mod: CPTII,S$GLB,, | Performed by: FAMILY MEDICINE

## 2020-10-15 PROCEDURE — 99999 PR PBB SHADOW E&M-EST. PATIENT-LVL V: ICD-10-PCS | Mod: PBBFAC,,, | Performed by: FAMILY MEDICINE

## 2020-10-15 PROCEDURE — 83735 ASSAY OF MAGNESIUM: CPT

## 2020-10-15 PROCEDURE — 84480 ASSAY TRIIODOTHYRONINE (T3): CPT

## 2020-10-15 PROCEDURE — 3074F PR MOST RECENT SYSTOLIC BLOOD PRESSURE < 130 MM HG: ICD-10-PCS | Mod: CPTII,S$GLB,, | Performed by: FAMILY MEDICINE

## 2020-10-15 PROCEDURE — 1159F PR MEDICATION LIST DOCUMENTED IN MEDICAL RECORD: ICD-10-PCS | Mod: S$GLB,,, | Performed by: FAMILY MEDICINE

## 2020-10-15 PROCEDURE — 3078F PR MOST RECENT DIASTOLIC BLOOD PRESSURE < 80 MM HG: ICD-10-PCS | Mod: CPTII,S$GLB,, | Performed by: FAMILY MEDICINE

## 2020-10-15 PROCEDURE — 84443 ASSAY THYROID STIM HORMONE: CPT

## 2020-10-15 PROCEDURE — 1125F PR PAIN SEVERITY QUANTIFIED, PAIN PRESENT: ICD-10-PCS | Mod: S$GLB,,, | Performed by: FAMILY MEDICINE

## 2020-10-15 PROCEDURE — 1101F PR PT FALLS ASSESS DOC 0-1 FALLS W/OUT INJ PAST YR: ICD-10-PCS | Mod: CPTII,S$GLB,, | Performed by: FAMILY MEDICINE

## 2020-10-15 PROCEDURE — 81001 URINALYSIS AUTO W/SCOPE: CPT

## 2020-10-15 PROCEDURE — 1125F AMNT PAIN NOTED PAIN PRSNT: CPT | Mod: S$GLB,,, | Performed by: FAMILY MEDICINE

## 2020-10-15 NOTE — PROGRESS NOTES
Subjective:       Patient ID: Valarie Colindres is a 75 y.o. female.    Chief Complaint: Establish Care, Dizziness, Numbness (legs), Cramping (legs), Night Sweats, Back Pain, and Urinary Retention (slow stream)      76 yo female presents with multiple concerns.    She has a past medical history of Essential hypertension, hypothyroidism, Alpha and beta thalassemia trait, MGUS (monoclonal gammopathy of unknown significance and Skeletal survey 8/5/2019 did not show multiple myeloma bone lesions), foreign body of proximal phalanx of the left great toe, Hallux valgus of the left great toe with moderate degenerative changes of the bilateral great toe metatarsophalangeal joints; osteopenia, trochanteric bursitis, S/p hysterectomy in 1977 and ovaries remained.     She has dizziness which is described as lightheadedness and occasional numbness & , cramps in left lower leg. She has chronic intermittent night sweats which can last 3 consecutive days only to go away for a few extra days before returning. She reports thoracic back pain at present but in the past complained of lumbar back pain and has an upcoming appointment with the healthy back program. She follows with the sports medicine specialist and physical therapy for trochanteric bursitis of the hip. She reports that her urine stream is sometimes weak but at other times normal; this may be related to how much she drinks.    The following portions of the patient's history were reviewed and updated as appropriate: allergies, current medications, past family history, past medical history, past social history, past surgical history and problem list.    Review of Systems   Constitutional: Negative for activity change, appetite change, chills, diaphoresis, fatigue, fever and unexpected weight change.        Night sweats   HENT: Negative for nasal congestion, dental problem, facial swelling, nosebleeds, postnasal drip, rhinorrhea, sore throat, tinnitus and trouble swallowing.   "  Eyes: Negative for pain, discharge, itching and visual disturbance.   Respiratory: Negative for apnea, chest tightness, shortness of breath, wheezing and stridor.    Cardiovascular: Negative for chest pain, palpitations and leg swelling.   Gastrointestinal: Negative for abdominal distention, abdominal pain, constipation, diarrhea, nausea, rectal pain and vomiting.   Endocrine: Negative for cold intolerance, heat intolerance and polyuria.   Genitourinary: Negative for difficulty urinating, dysuria, frequency, hematuria and urgency.   Musculoskeletal: Positive for arthralgias, back pain and myalgias.   Integumentary:  Negative for color change and rash.   Neurological: Positive for light-headedness and numbness. Negative for tremors, seizures, syncope, facial asymmetry, weakness and headaches.   Hematological: Negative for adenopathy. Does not bruise/bleed easily.   Psychiatric/Behavioral: Negative for agitation, confusion, hallucinations, self-injury and suicidal ideas. The patient is not hyperactive.           Objective:       Vitals:    10/15/20 0909 10/15/20 0955 10/15/20 0957 10/15/20 0959   BP: 104/72 101/63 115/66 112/65   BP Location:  Left arm Left arm Left arm   Patient Position:  Lying Sitting Standing   BP Method:  Medium (Automatic) Medium (Automatic) Medium (Automatic)   Pulse: 72 61 69 71   Temp: 98.3 °F (36.8 °C)      TempSrc: Oral      SpO2: 96% 98% 98% 97%   Weight: 68.6 kg (151 lb 3.8 oz)      Height: 5' 3.75" (1.619 m)        Physical Exam  Constitutional:       General: She is not in acute distress.     Appearance: She is well-developed. She is not diaphoretic.   HENT:      Head: Normocephalic and atraumatic.      Right Ear: External ear normal.      Left Ear: External ear normal.   Eyes:      General: No scleral icterus.        Right eye: No discharge.         Left eye: No discharge.      Conjunctiva/sclera: Conjunctivae normal.   Neck:      Musculoskeletal: Neck supple.      Thyroid: No " thyromegaly.   Cardiovascular:      Rate and Rhythm: Normal rate and regular rhythm.      Heart sounds: Normal heart sounds. No murmur. No friction rub. No gallop.    Pulmonary:      Effort: Pulmonary effort is normal. No respiratory distress.      Breath sounds: Normal breath sounds.   Chest:      Chest wall: No tenderness.   Abdominal:      General: Bowel sounds are normal.      Palpations: Abdomen is soft. There is no mass.      Tenderness: There is no abdominal tenderness. There is no guarding or rebound.   Musculoskeletal: Normal range of motion.   Lymphadenopathy:      Cervical: No cervical adenopathy.   Skin:     General: Skin is warm.      Findings: No rash.   Neurological:      General: No focal deficit present.      Mental Status: She is alert and oriented to person, place, and time.      Motor: No abnormal muscle tone.   Psychiatric:         Thought Content: Thought content normal.         Judgment: Judgment normal.         Assessment:       1. Chronic back pain greater than 3 months duration    2. Left leg numbness    3. Vasomotor symptoms due to menopause    4. Night sweat    5. Thalassemia trait    6. MGUS (monoclonal gammopathy of unknown significance)    7. Acquired hypothyroidism    8. Essential hypertension    9. Intermittent lightheadedness    10. Poor urinary stream        Plan:       1. Chronic back pain greater than 3 months duration  Activity modification. Heat/ cold therapy; topical Biofreeze, Aspercreme, Lidocaine OTC patches, Diclofenac gel. Tylenol and/or NSAIDs as needed. Careful with GI and renal adverse events with NSAIDs.   Exercises for strengthening and increased range of motion; supervised vs home vs combination physical therapy. Consider imaging and/or specialist consultation if symptoms do not improve.    10/26/2020 Healthy Back program.    2. Left leg numbness  -     Magnesium; Future; Expected date: 10/15/2020. Suggest trial of magnesium oxide for spasms/ cramps.  -      Ambulatory referral/consult to Physical Medicine Rehab; Future; Expected date: 10/22/2020    3. Vasomotor symptoms due to menopause  May contribute to breakthrough     4. Night sweat  -     CBC Without Differential; Future; Expected date: 10/15/2020  -     Comprehensive Metabolic Panel; Future; Expected date: 10/15/2020  -     TSH; Future; Expected date: 10/15/2020  -     T3; Future; Expected date: 10/15/2020  -     T4, Free; Future; Expected date: 10/15/2020  Consider medication side effect    5. Thalassemia trait  No acute symptoms    6. MGUS (monoclonal gammopathy of unknown significance)  MGUS is usually asymptomatic and would not contribute to patients symptoms of night sweats.    7. Acquired hypothyroidism  Repeat TSH on synthroid.    8. Essential hypertension  On Valsartan and HCTZ; consider reducing therapeutic burden. Blood pressure below goal.    9. Intermittent lightheadedness  No orthostatic hypotension. Encourage adequate hydration. Adjust BP medication.  If symptoms persist with consider specialist input.    10. Poor urinary stream  -     Ambulatory referral/consult to Urogynecology; Future; Expected date: 10/22/2020 for persistent symptoms. It is anticipated that with less than adequate hydration she will produce a weaker stream. No sings or symptoms of bladder distension.  -     Urinalysis  -     Urine culture    Disclaimer: This note has been generated using voice-recognition software. There may be typographical errors that have been missed during proof-reading

## 2020-10-16 ENCOUNTER — TELEPHONE (OUTPATIENT)
Dept: PRIMARY CARE CLINIC | Facility: CLINIC | Age: 75
End: 2020-10-16

## 2020-10-16 LAB — BACTERIA UR CULT: NO GROWTH

## 2020-10-16 NOTE — TELEPHONE ENCOUNTER
----- Message from Viky Sosa sent at 10/16/2020 12:52 PM CDT -----  Contact: [phone]  Pt is returning call regarding test results

## 2020-10-16 NOTE — TELEPHONE ENCOUNTER
Left a voicemail requesting a return phone call.  Letter sent with information as well.  Patient has not reviewed results via [facility] as of yet.

## 2020-10-16 NOTE — LETTER
October 16, 2020    Valarie Colindres  [address] [place] [address]             [facility]  [address] [facility] [address]  Phone: [phone]  Fax: [phone] Dear Mrs. Colindres:    Below are the results from your recent visit:    Resulted Orders   CBC Without Differential   Result Value Ref Range    WBC 8.94 3.90 - 12.70 K/uL    RBC 5.98 (H) 4.00 - 5.40 M/uL    Hemoglobin 11.3 (L) 12.0 - 16.0 g/dL    Hematocrit 37.8 37.0 - 48.5 %    Mean Corpuscular Volume 63 (L) 82 - 98 fL    Mean Corpuscular Hemoglobin 18.9 (L) 27.0 - 31.0 pg    Mean Corpuscular Hemoglobin Conc 29.9 (L) 32.0 - 36.0 g/dL    RDW 18.6 (H) 11.5 - 14.5 %    Platelets 318 150 - 350 K/uL    MPV 11.0 9.2 - 12.9 fL   Comprehensive Metabolic Panel   Result Value Ref Range    Sodium 136 136 - 145 mmol/L    Potassium 4.3 3.5 - 5.1 mmol/L    Chloride 98 95 - 110 mmol/L    CO2 30 (H) 23 - 29 mmol/L    Glucose 85 70 - 110 mg/dL    BUN, Bld 15 8 - 23 mg/dL    Creatinine 1.0 0.5 - 1.4 mg/dL    Calcium 9.7 8.7 - 10.5 mg/dL    Total Protein 7.6 6.0 - 8.4 g/dL    Albumin 4.4 3.5 - 5.2 g/dL    Total Bilirubin 0.6 0.1 - 1.0 mg/dL      Comment:      For infants and newborns, interpretation of results should be based  on gestational age, weight and in agreement with clinical  observations.  Premature Infant recommended reference ranges:  Up to 24 hours.............<8.0 mg/dL  Up to 48 hours............<12.0 mg/dL  3-5 days..................<15.0 mg/dL  6-29 days.................<15.0 mg/dL      Alkaline Phosphatase 54 (L) 55 - 135 U/L    AST 12 10 - 40 U/L    ALT 8 (L) 10 - 44 U/L    Anion Gap 8 8 - 16 mmol/L    eGFR if African American >60.0 >60 mL/min/1.73 m^2    eGFR if non African American 54.9 (A) >60 mL/min/1.73 m^2      Comment:      Calculation used to obtain the estimated glomerular filtration  rate (eGFR) is the CKD-EPI equation.      Magnesium   Result Value Ref Range    Magnesium 2.3 1.6 - 2.6 mg/dL   TSH    Result Value Ref Range    TSH 1.163 0.400 - 4.000 uIU/mL   T3   Result Value Ref Range    T3, Total 57 (L) 60 - 180 ng/dL   T4, Free   Result Value Ref Range    Free T4 1.18 0.71 - 1.51 ng/dL       Dizziness/Lightheadedness    You have anemia.  No further intervention is necessary (you have thalassemia minor and have been assessed by hematology).  Anemia can contribute to dizziness/lightheadedness but your anemia level has actually increased from that measured 2 months ago.  I would like to consider adjusting your blood pressure medication.  You are currently on valsartan and hydrochlorothiazide.  If you have access to a blood pressure machine at home, I would like you to send me a follow-up in 2 weeks regarding your blood pressure readings- the plan is to discontinue one of your blood pressure medications.  Aim for systolic (top number) blood pressure of less than 130 mmHg.  A neurologist or cardiologist consultation is also feasible.              Night Sweats    Your thyroid function, TSH, is on target but T3 is a little abnormal.    No need to adjust Synthroid. I am considering a medication side effect verses an endocrine abnormality vs hormonal (likely due to lack of production of estrogen).  I would like you to think really hard:  Could there be a potential association of your night sweats to use of valsartan?  If so, I would like to eliminate valsartan from your medication regimen.  If there is no clear association between night sweats with valsartan then I would like to eliminate hydrochlorothiazide from your blood pressure regimen.  If your night sweats continue to persist after 4 weeks,  the next step will be consulting with endocrinology.    Muscle Spasm    No concerns with electrolytes, kidney function or liver function.  Normal magnesium.  Consider 4 week trial of magnesium- magnesium oxide 1 tablet twice daily over the counter. Encourage adequate hydration.    Back Pain and Numbness of Leg    Keep  appointment with healthy back.  Follow-up with physical medicine and rehabilitation if no improvement.    Slow Urinary Stream  Encourage adequate hydration.  Follow-up with urogynecology if no improvement    Follow up again in 1 month.    Please don't hesitate to call our office if you have any questions or concerns.      Sincerely,        Stacy Gallegos MD

## 2020-10-16 NOTE — TELEPHONE ENCOUNTER
----- Message from Stacy Gallegos MD sent at 10/15/2020  9:00 PM CDT -----  Please let patient know of message below    Hello,    The analysis of your urine shows that there are no significant red blood cells.  This is good!.  No further intervention is necessary.   I am expecting that urine culture would also be negative.      Take care!

## 2020-10-16 NOTE — TELEPHONE ENCOUNTER
----- Message from Stacy Gallegos MD sent at 10/15/2020  8:59 PM CDT -----  Please let patient know that result was sent to portal.  She should eliminate valsartan if she noticed an association between night sweats with valsartan.  She should eliminate hydrochlorothiazide if NO association of night sweats with valsartan.  She should let me know her blood pressure readings in 2 weeks after medication change.  If she does not have access to a blood pressure machine then a nurse only visit can be arranged.  Encourage adequate hydration.  A trial of magnesium oxide over-the-counter may be considered.  If she continues to have night sweats beyond 4 weeks then an endocrinology referral can be placed.  If she continues to have lightheadedness seeing a neurologist or cardiologist is feasible but she should come in for re-evaluation so that we can definitively fine-tune her plan.    I would like to see her back in 1 month for face-to-face evaluation.    Message sent to portal.    Dizziness/lightheadedness  You have anemia.  No further intervention is necessary (you have thalassemia minor and have been assessed by hematology).  Anemia can contribute to dizziness/lightheadedness but your anemia level has actually increased from that measured 2 months ago.  I would like to consider adjusting your blood pressure medication.  You are currently on valsartan and hydrochlorothiazide.  If you have access to a blood pressure machine at home, I would like you to send me a follow-up in 2 weeks regarding your blood pressure readings- the plan is to discontinue one of your blood pressure medications.  Aim for systolic (top number) blood pressure of less than 130 mmHg.  A neurologist or cardiologist consultation is also feasible.    Night sweats  Your thyroid function, TSH, is on target but T3 is a little abnormal.  No need to adjust Synthroid.  I am considering a medication side effect verses an endocrine abnormality vs hormonal  (likely due to lack of production of estrogen).  I would like you to think really hard:  Could there be a potential association of your night sweats to use of valsartan?  If so, I would like to eliminate valsartan from your medication regimen.  If there is no clear association between night sweats with valsartan then I would like to eliminate hydrochlorothiazide from your blood pressure regimen.  If your night sweats continue to persist after 4 weeks,  the next step will be consulting with endocrinology.    Muscle spasm  No concerns with electrolytes, kidney function or liver function.  Normal magnesium.  Consider 4 week trial of magnesium- magnesium oxide 1 tablet twice daily over the counter. Encourage adequate hydration.    Back pain and numbness of leg  Keep appointment with healthy back.  Follow-up with physical medicine and rehabilitation if no improvement.    Slow urinary stream  Encourage adequate hydration.  Follow-up with urogynecology if no improvement    See me again in 1 month but keep me posted in the interim.

## 2020-10-19 ENCOUNTER — TELEPHONE (OUTPATIENT)
Dept: PRIMARY CARE CLINIC | Facility: CLINIC | Age: 75
End: 2020-10-19

## 2020-10-20 NOTE — TELEPHONE ENCOUNTER
Patient has been informed, and verbalized understanding.    Patient will stop HCTZ as the night sweats have been going on since before starting Valsartan.     Patient agreed to taking Magnesium, she will start QD & go to BID. Appointment scheduled for 1 month, and patient agreed to send BP readings in 2 weeks.

## 2020-10-22 ENCOUNTER — INITIAL CONSULT (OUTPATIENT)
Dept: UROGYNECOLOGY | Facility: CLINIC | Age: 75
End: 2020-10-22
Payer: MEDICARE

## 2020-10-22 VITALS
BODY MASS INDEX: 26.12 KG/M2 | SYSTOLIC BLOOD PRESSURE: 120 MMHG | WEIGHT: 153 LBS | DIASTOLIC BLOOD PRESSURE: 70 MMHG | HEIGHT: 64 IN

## 2020-10-22 DIAGNOSIS — N39.41 URGE INCONTINENCE: ICD-10-CM

## 2020-10-22 DIAGNOSIS — R39.12 POOR URINARY STREAM: Primary | ICD-10-CM

## 2020-10-22 DIAGNOSIS — N81.11 MIDLINE CYSTOCELE: ICD-10-CM

## 2020-10-22 DIAGNOSIS — K59.00 CONSTIPATION, UNSPECIFIED CONSTIPATION TYPE: ICD-10-CM

## 2020-10-22 DIAGNOSIS — N95.2 VAGINAL ATROPHY: ICD-10-CM

## 2020-10-22 DIAGNOSIS — Z12.72 SCREENING FOR VAGINAL CANCER: ICD-10-CM

## 2020-10-22 DIAGNOSIS — N81.6 RECTOCELE: ICD-10-CM

## 2020-10-22 PROCEDURE — 1126F PR PAIN SEVERITY QUANTIFIED, NO PAIN PRESENT: ICD-10-PCS | Mod: S$GLB,,, | Performed by: NURSE PRACTITIONER

## 2020-10-22 PROCEDURE — 3078F DIAST BP <80 MM HG: CPT | Mod: CPTII,S$GLB,, | Performed by: NURSE PRACTITIONER

## 2020-10-22 PROCEDURE — 87086 URINE CULTURE/COLONY COUNT: CPT

## 2020-10-22 PROCEDURE — 3078F PR MOST RECENT DIASTOLIC BLOOD PRESSURE < 80 MM HG: ICD-10-PCS | Mod: CPTII,S$GLB,, | Performed by: NURSE PRACTITIONER

## 2020-10-22 PROCEDURE — 88175 CYTOPATH C/V AUTO FLUID REDO: CPT

## 2020-10-22 PROCEDURE — 1126F AMNT PAIN NOTED NONE PRSNT: CPT | Mod: S$GLB,,, | Performed by: NURSE PRACTITIONER

## 2020-10-22 PROCEDURE — 1101F PT FALLS ASSESS-DOCD LE1/YR: CPT | Mod: CPTII,S$GLB,, | Performed by: NURSE PRACTITIONER

## 2020-10-22 PROCEDURE — 1159F PR MEDICATION LIST DOCUMENTED IN MEDICAL RECORD: ICD-10-PCS | Mod: S$GLB,,, | Performed by: NURSE PRACTITIONER

## 2020-10-22 PROCEDURE — 51701 PR INSERTION OF NON-INDWELLING BLADDER CATHETERIZATION FOR RESIDUAL UR: ICD-10-PCS | Mod: S$GLB,,, | Performed by: NURSE PRACTITIONER

## 2020-10-22 PROCEDURE — 3074F PR MOST RECENT SYSTOLIC BLOOD PRESSURE < 130 MM HG: ICD-10-PCS | Mod: CPTII,S$GLB,, | Performed by: NURSE PRACTITIONER

## 2020-10-22 PROCEDURE — 99204 OFFICE O/P NEW MOD 45 MIN: CPT | Mod: 25,S$GLB,, | Performed by: NURSE PRACTITIONER

## 2020-10-22 PROCEDURE — 51701 INSERT BLADDER CATHETER: CPT | Mod: S$GLB,,, | Performed by: NURSE PRACTITIONER

## 2020-10-22 PROCEDURE — 3074F SYST BP LT 130 MM HG: CPT | Mod: CPTII,S$GLB,, | Performed by: NURSE PRACTITIONER

## 2020-10-22 PROCEDURE — 87624 HPV HI-RISK TYP POOLED RSLT: CPT

## 2020-10-22 PROCEDURE — 99999 PR PBB SHADOW E&M-EST. PATIENT-LVL IV: CPT | Mod: PBBFAC,,, | Performed by: NURSE PRACTITIONER

## 2020-10-22 PROCEDURE — 1159F MED LIST DOCD IN RCRD: CPT | Mod: S$GLB,,, | Performed by: NURSE PRACTITIONER

## 2020-10-22 PROCEDURE — 1101F PR PT FALLS ASSESS DOC 0-1 FALLS W/OUT INJ PAST YR: ICD-10-PCS | Mod: CPTII,S$GLB,, | Performed by: NURSE PRACTITIONER

## 2020-10-22 PROCEDURE — 99999 PR PBB SHADOW E&M-EST. PATIENT-LVL IV: ICD-10-PCS | Mod: PBBFAC,,, | Performed by: NURSE PRACTITIONER

## 2020-10-22 PROCEDURE — 99204 PR OFFICE/OUTPT VISIT, NEW, LEVL IV, 45-59 MIN: ICD-10-PCS | Mod: 25,S$GLB,, | Performed by: NURSE PRACTITIONER

## 2020-10-22 NOTE — LETTER
October 22, 2020      Stacy Gallegos MD  [address]  [facility] [address]           [id number]   [address] [place] [id number]  Phone: [phone]          Patient: Valarie Colindres   MR Number: [id number]   YOB: 1945   Date of Visit: 10/22/2020       Dear Dr. Stacy Gallegos:    Thank you for referring Valarie Cloindres to me for evaluation. Attached you will find relevant portions of my assessment and plan of care.    If you have questions, please do not hesitate to call me. I look forward to following Valarie Colindres along with you.    Sincerely,    Sarah Knapp, [place]    Enclosure  CC:  No Recipients    If you would like to receive this communication electronically, please contact [email or web address] or [phone] to request more information on [facility] access.    For providers and/or their staff who would like to refer a patient to [facility], please contact us through our one-stop-shop provider referral line, [facility], at [phone].    If you feel you have received this communication in error or would no longer like to receive these types of communications, please e-mail [email or web address]

## 2020-10-22 NOTE — PATIENT INSTRUCTIONS
1) rare urge incontinence   --Empty bladder every 3 hours.  Empty well: wait a minute, lean forward on toilet.    --Avoid dietary irritants (see sheet).  Keep diary x 3-5 days to determine your irritants.  --KEGELS: do 10 in AM and 10 in PM, holding each x 10 seconds.  When you feel urge to go, STOP, KEGEL, and when urge has passed, then go to bathroom.  Consider PT in future.    --URGE: consider anticholinergic. Not really an issue at this time  --STRESS:  Pessary vs. Sling.   --urine culture  --pap today    2. Midline cystocele stage 1/ rectocele stage 2  --likely cause of splayed stream  --will continue to monitor  --controlling constipation will help    3. Intermittent constipation/ smearing  --Take 1 fiber pill/day x 3 days.  Then 2 pills/day x 3 days.  Then 3 pills/day x 3 days...increasing in this fashion to max 6 pills a day.  STOP when you find dose that makes stool easy to pass (this may be 1 pill a day or may be 4 pills/day).  Continue at this dose FOREVER.  Additionally, take 1-2 stool softener pills (EX: colace) OTC daily.  AVOID laxatives.     4. Vaginal atrophy (dryness):.  Use REPLENS or REFRESH OTC: 1/2 applicator full in vagina twice a week.      5. RTC 3 months follow up    ______________________________________________________________    Bladder Irritants  Certain foods and drinks have been associated with worsening symptoms of urinary frequency, urgency, urge incontinence, or  bladder pain. If you suffer from any of these conditions, you may wish to try eliminating one or more of these foods from your  diet and see if your symptoms improve. If bladder symptoms are related to dietary factors, strict adherence to a diet that  eliminates the food should bring marked relief in 10 days. Once you are feeling better, you can begin to add foods back into  your diet, one at a time. If symptoms return, you will be able to identify the irritant. As you add foods back to your diet it is  very important  that you drink significant amounts of water.  Low-acid fruit substitutions include apricots, papaya, pears and watermelon. Coffee drinkers can drink Kava or other lowacid  instant drinks. Tea drinkers can substitute non-citrus herbal and sun brewed teas. Calcium carbonate co-buffered with  calcium ascorbate can be substituted for Vitamin C. Prelief is a dietary supplement that works as an acid blocker for the  bladder.  Where to get more information:   Overcoming Bladder Disorders by Fela Stewart and Libia Kruse, 1990   You Dont Have to Live with Cystitis! By Tana Garza, 1988  List of Common Bladder Irritants*  Alcoholic beverages  Apples and apple juice  Cantaloupe  Carbonated beverages  Chili and spicy foods  Chocolate  Citrus fruit  Coffee (including decaffeinated)  Cranberries and cranberry juice  Grapes  Guava  Milk Products: milk, cheese, cottage cheese, yogurt, ice cream  Peaches  Pineapple  Plums  Strawberries  Sugar especially artificial sweeteners, saccharin, aspartame, corn sweeteners, honey, fructose, sucrose, lactose  Tea  Tomatoes and tomato juice  Vitamin B complex  Vinegar  *Most people are not sensitive to ALL of these products; your goal is to find the foods that make YOUR  symptoms worse

## 2020-10-22 NOTE — PROGRESS NOTES
[id number]   [address] [address]    Valarie Colindres  [id number]  1945 October 22, 2020    Consulting Physician: Stacy Gallegos,*   GYN: none  Primary M.D.: Stacy Gallegos MD    Chief Complaint   Patient presents with    Consult     concerned about urine stream       HPI:     1)  UI:  (--) LINDA (+) UUI  (+) liner pads: with minimal wetness if out for long period of time.  Daytime frequency: Q 1 1/2  hours.  Nocturia: Yes: 2-4/night.  Does not limit fluids prior to bedtime (--) dysuria,  (+) hematuria ? intermittently,  (--) frequent UTIs.  (+) complete bladder emptying. Reports change in urine stream over the past year.    2)  POP:  Present. above introitus.  Symptoms:(+)  Vaginal pressure.  (--) vaginal bleeding. (--) vaginal discharge. (--) sexually active. (--)  Vaginal dryness.  (--) vaginal estrogen use.     3)  BM:  (--) constipation/straining.  (--) chronic diarrhea. (--) hematochezia.  (--) fecal incontinence.  (+) fecal smearing/urgency.  (+) complete evacuation.      Past Medical History  Past Medical History:   Diagnosis Date    Angle recession of right eye     Blunt trauma of both eyes     hit across eyes with bungee exercise band    Cataract     Ectropion due to laxity of eyelid, right     GERD (gastroesophageal reflux disease)     Hyperlipidemia     Hypertension     PVD (posterior vitreous detachment), right eye     Retinal drusen of both eyes     Thalassemia major         Past Surgical History  Past Surgical History:   Procedure Laterality Date    ABDOMINAL ADHESION SURGERY  1978    ANKLE FRACTURE SURGERY Right 1996    ECTROPION REPAIR Bilateral 06/2018    Dr. Flaherty    HYSTERECTOMY  1977    possible cancerous lesion        Hysterectomy: Yes -   Date: 1977  Indication: abnormal pap   Type: xlap  Cervix present: No  Ovaries present: Yes -   Other procedures at time of hysterectomy:  appendectomy    Past Ob  History     x 1.     Largest infant weight: 6# 11 oz  Yes FAVD. no episiotomy.      Gynecologic History  LMP: No LMP recorded. Patient has had a hysterectomy.  Age of menarche: 12  Age of menopause: 32  Menstrual history: normal per patient report  Pap test post hysterectomy-- unsure of last pap  History of abnormal paps: Yes - .  History of STIs:  No  Mammogram: Date of last: 2020.  Result: Normal  Colonoscopy: Date of last: .  Result:  Normal per patient report.    DEXA:  Date of last: 2018.  Result:  There is a 6.2% risk of a major osteoporotic fracture and a 0.7% risk of hip fracture in the next 10 years (FRAX).     IMPRESSION:   Osteopenia of the spine.  Normal bone density of the hips.    Family History  Family History   Problem Relation Age of Onset    Stroke Mother     Heart disease Mother     Prostate cancer Father     Colon cancer Father     Prostate cancer Brother     Prostate cancer Brother     Glaucoma Maternal Uncle     Blindness Maternal Uncle     Breast cancer Maternal Aunt     Cataracts Neg Hx     Macular degeneration Neg Hx       Colon CA: Yes - sister/ father  Breast CA: Yes - maternal aunt  GYN CA: No   CA: Yes - father prostate    Social History  Social History     Tobacco Use   Smoking Status Never Smoker   Smokeless Tobacco Never Used   .  .   Social History     Substance and Sexual Activity   Alcohol Use Yes    Frequency: Monthly or less    Drinks per session: 1 or 2   .    Social History     Substance and Sexual Activity   Drug Use No   .  The patient is .  Resides in [place] [address].  Employment status: retired teacher    Allergies  Review of patient's allergies indicates:  No Known Allergies    Medications  Current Outpatient Medications on File Prior to Visit   Medication Sig Dispense Refill    aspirin (ECOTRIN) 81 MG EC tablet Take 81 mg by mouth once daily.      chlorhexidine (PERIDEX) 0.12 % solution RINSE AND SWISH ONE CAPFUL BID  1     "diclofenac sodium (VOLTAREN) 1 % Gel Apply 2 g topically 4 (four) times daily. 1 Tube 2    levothyroxine (SYNTHROID) 100 MCG tablet TAKE 1 TABLET BY MOUTH EVERY DAY 90 tablet 0    valsartan (DIOVAN) 320 MG tablet Take 1 tablet (320 mg total) by mouth once daily. 30 tablet 5    hydroCHLOROthiazide (HYDRODIURIL) 12.5 MG Tab TAKE 1 TABLET BY MOUTH EVERY DAY (Patient not taking: Reported on 10/22/2020) 90 tablet 1     No current facility-administered medications on file prior to visit.        Review of Systems A 14 point ROS was reviewed with pertinent positives as noted above in the history of present illness.      Constitutional: negative  Eyes: negative  Endocrine: negative  Gastrointestinal: negative  Cardiovascular: negative  Respiratory: negative  Allergic/Immunologic: negative  Integumentary: negative  Psychiatric: negative  Musculoskeletal: negative   Ear/Nose/Throat: negative  Neurologic: negative  Genitourinary: SEE HPI  Hematologic/Lymphatic: negative   Breast: negative    Urogynecologic Exam  /70 (BP Location: Left arm, Patient Position: Sitting)   Ht 5' 3.75" (1.619 m)   Wt 69.4 kg (153 lb)   BMI 26.47 kg/m²     GENERAL APPEARANCE:  The patient is well-developed, well-nourished.  Neck:  Supple with no thyromegaly, no carotid bruits.  Heart:  Regular rate and rhythm, no murmurs, rubs or gallops.  Lungs:  Clear.  No CVA tenderness.  Abdomen:  Soft, nontender, nondistended, no hepatosplenomegaly.  Incisions: well healed    PELVIC:    External genitalia:  Normal Bartholins, Skenes and labia bilaterally.    Urethra:  No caruncle, diverticulum or masses.  (+) hypermobility.    Vagina:  Atrophy (+) , no bladder masses or tender, no discharge.    Cervix:  removed surgically  Uterus: uterus absent  Adnexa: Not palpable.    POP-Q:  Aa -2; Ba -2; C -6; Ap 0; Bp 0; D n/a.  Genital hiatus 3, perineal body 2 total vaginal length 9.      NEUROLOGIC:  Cranial nerves 2 through 12 intact.  Strength 5/5.  DTRs 2+ " lower extremities.  S2 through 4 normal.  Sacral reflexes intact.    EXT: [place]UZ, 2+ pulses bilaterally, no C/C/E    COUGH STRESS TEST:  negative  KEGEL: 4 /5    RECTAL:    External:  Normal, (--) hemorrhoids, (--) dovetailing.   Internal:  deferred    PVR: 40 mL    Impression    1. Poor urinary stream    2. Urge incontinence    3. Screening for vaginal cancer    4. Midline cystocele    5. Rectocele    6. Constipation, unspecified constipation type    7. Vaginal atrophy        Initial Plan  The patient was counseled regarding these issues. The patient was given a summary sheet containing each of these issues with possible options for evaluation and management. When appropriate, we also reviewed computer-generated diagrams specific to their diagnoses..  All questions were addressed to the patient's satisfaction.     1) rare urge incontinence   --Empty bladder every 3 hours.  Empty well: wait a minute, lean forward on toilet.    --Avoid dietary irritants (see sheet).  Keep diary x 3-5 days to determine your irritants.  --KEGELS: do 10 in AM and 10 in PM, holding each x 10 seconds.  When you feel urge to go, STOP, KEGEL, and when urge has passed, then go to bathroom.  Consider PT in future.    --URGE: consider anticholinergic. Not really an issue at this time  --STRESS:  Pessary vs. Sling.   --pap today    2. Midline cystocele stage 1/ rectocele stage 2  --likely cause of splayed stream  --will continue to monitor  --controlling constipation will help    3. Intermittent constipation/ smearing  --Take 1 fiber pill/day x 3 days.  Then 2 pills/day x 3 days.  Then 3 pills/day x 3 days...increasing in this fashion to max 6 pills a day.  STOP when you find dose that makes stool easy to pass (this may be 1 pill a day or may be 4 pills/day).  Continue at this dose FOREVER.  Additionally, take 1-2 stool softener pills (EX: colace) OTC daily.  AVOID laxatives.     4. Vaginal atrophy (dryness):.  Use REPLENS or REFRESH OTC: 1/2  applicator full in vagina twice a week.      5. RTC 3 months follow up      Approximately 30 min were spent in consult, 90 % in discussion.     Thank you for requesting consultation of your patient.  I look forward to participating in their care.    Sarah Knapp  Female Pelvic Medicine and Reconstructive Surgery  [facility] [place], [place]

## 2020-10-24 LAB — BACTERIA UR CULT: NO GROWTH

## 2020-10-26 ENCOUNTER — CLINICAL SUPPORT (OUTPATIENT)
Dept: REHABILITATION | Facility: OTHER | Age: 75
End: 2020-10-26
Attending: FAMILY MEDICINE
Payer: MEDICARE

## 2020-10-26 ENCOUNTER — PATIENT MESSAGE (OUTPATIENT)
Dept: UROGYNECOLOGY | Facility: CLINIC | Age: 75
End: 2020-10-26

## 2020-10-26 DIAGNOSIS — M25.69 DECREASED RANGE OF MOTION OF TRUNK AND BACK: ICD-10-CM

## 2020-10-26 DIAGNOSIS — R29.898 DECREASED STRENGTH OF TRUNK AND BACK: ICD-10-CM

## 2020-10-26 DIAGNOSIS — M54.16 LUMBAR RADICULOPATHY: ICD-10-CM

## 2020-10-26 DIAGNOSIS — R29.3 POOR POSTURE: ICD-10-CM

## 2020-10-26 PROCEDURE — 97161 PT EVAL LOW COMPLEX 20 MIN: CPT

## 2020-10-26 PROCEDURE — 97110 THERAPEUTIC EXERCISES: CPT

## 2020-10-27 NOTE — PLAN OF CARE
[facility] - PHYSICAL THERAPY EVALUATION     Name: Valarie Colindres   Clinic Number: [id number]    Therapy Diagnosis:   Encounter Diagnoses   Name Primary?    Lumbar radiculopathy     Decreased range of motion of trunk and back     Decreased strength of trunk and back     Poor posture      Physician: Jovani Valencia, *    Physician Orders: PT Eval and Treat   Medical Diagnosis from Referral: M54.16 (ICD-10-CM) - Lumbar radiculopathy  Evaluation Date: 10/26/2020  Authorization Period Expiration: 12/26/2020  Plan of Care Expiration: 01/26/2021  Reassessment Due: 11/26/2020  Visit # / Visits authorized: 1/ 26    Time In: 2:00  Time Out: 3:30  Total Billable Time: 90 minutes    Precautions: Standard    Pattern of pain determined: 1 PEN      Subjective   Date of onset: multiple years  History of current condition - Valarie reports: has been having back pain for multiple years, no DIANA. The pain is intermittent and back dominant although it does go into the legs (stops above the knee with pain). She states some numbness in the S1 dermatome possibly for the past 2 weeksRight foot sural nerve for the past 2 weeks numbness. The pain is described as achy, throbbing; like a tooth ache.      Medical History:   Past Medical History:   Diagnosis Date    Angle recession of right eye     Blunt trauma of both eyes     hit across eyes with bungee exercise band    Cataract     Ectropion due to laxity of eyelid, right     GERD (gastroesophageal reflux disease)     Hyperlipidemia     Hypertension     PVD (posterior vitreous detachment), right eye     Retinal drusen of both eyes     Thalassemia major        Surgical History:   Valarie Colindres  has a past surgical history that includes Hysterectomy (1977); Ankle fracture surgery (Right, 1996); Abdominal adhesion surgery (1978); and Ectropion repair (Bilateral, 06/2018).    Medications:   Valarie has a current medication list which includes the following  prescription(s): aspirin, chlorhexidine, diclofenac sodium, hydrochlorothiazide, levothyroxine, and valsartan.    Allergies:   Review of patient's allergies indicates:  No Known Allergies     Imaging, Xrays:   EXAMINATION:  XR HIPS BILATERAL 2 VIEW INCL AP PELVIS     CLINICAL HISTORY:  Pain in right hip     TECHNIQUE:  AP view of the pelvis and frogleg lateral views of both hips were performed.     COMPARISON:  Two thousand nineteen     FINDINGS:  Bone, joint soft tissues stable.  Transition vertebral development lumbosacral junction with degenerative disc spondylosis and levoscoliosis.     Impression:     No new fracture dislocation or other change.     Electronically signed by: James Hamilton MD  Date:                                            10/07/2020  Time:                                           08:25    Prior Therapy: Yes @ [facility] for hip pain   Prior Treatment: chiropractor with relief  Social History: 1 story home 2 steps to enter lives with their family  Occupation: Retired   Leisure: Walking with her sister, art, piano       Prior Level of Function: Independent with all activities  Current Level of Function: able to do all activities with modifications  DME owned/used: LS brace she wears about 1-2x/wk if the pain is         Pain:  Current 0/10, worst 8/10, best 0/10   Location: bilateral back   Description: Aching and Throbbing  Aggravating Factors: Sitting, Standing and Getting out of bed/chair  Easing Factors: ice, heating pad and rest  Disturbed Sleep: trouble falling asleep sometimes        Pattern of pain questions:  1.  Where is your pain the worst? Low back   2.  Is your pain constant or intermittent? Constant  3.  Does bending forward make your typical pain worse? Yes  4.  Since the start of your back pain, has there been a change in your bowel or bladder? no  5.  What can't you do now that you use to be able to do? Sitting/ standing for long periods, unable to do housework  without increased pain       Pts goals: To decrease pain      Red Flag Screening:   Cough  Sneeze  Strain: (--)  Bladder/ bowel: (--)  Falls: (--)  Night pain: (--)  Unexplained weight loss: (--)  General health: good    OBJECTIVE     Postural examination/scapula alignment: Rounded shoulder and Slouched posture  Joint integrity: Hypomobility of thoracic and lumbar spine noted with PA glides  Sitting: fair  Standing: Pt with trunk/ neck shifted to R  Correction of posture: no effect with lumbar roll    MOVEMENT LOSS    ROM Loss   Flexion moderate loss   Extension minimal loss   Side bending Right minimal loss   Side bending Left minimal loss   Rotation Right minimal loss   Rotation Left minimal loss     Lower Extremity Strength  Right LE  Left LE    Hip flexion: 5/5 Hip flexion: 5/5   Hip extension:  4/5 Hip extension: 4/5   Hip abduction: 5/5 Hip abduction: 5/5   Hip adduction:  5/5 Hip adduction:  5/5   Hip Internal rotation   4/5 Hip Internal rotation 4/5   Knee Flexion 4+/5 Knee Flexion 4+/5   Knee Extension 5/5 Knee Extension 5/5   Ankle dorsiflexion: 5/5 Ankle dorsiflexion: 5/5   Ankle plantarflexion: 5/5 Ankle plantarflexion: 5/5       GAIT:  Assistive Device used: none  Level of Assistance: independent  Patient displays the following gait deviations:  decreased step length.     Special Tests:   Test Name  Test Result   Prone Instability Test (--)   SI Joint Provocation Test (--)   Straight Leg Raise (--)   Neural Tension Test (--)   Crossed Straight Leg Raise (--)   Walking on toes (--)   Walking on heels  (--)       NEUROLOGICAL SCREENING     Sensory deficit: diminished light touch to S1 dermatome on R    Reflexes:    Left Right   Patella Tendon 2+ 2+   Achilles Tendon 2+ 1+   Babinski  (--) (--)   Clonus (--) (--)     REPEATED TEST MOVEMENTS:  Repeated Flexion in lying better   Repeated Extension in lying  produced       STATIC TESTS   Sitting slouched  no effect   Sitting erect better   Lying prone in  extension  produced       Baseline Isometric Testing on Med X equipment: Testing administered by PT  Date of testing: 10/26/2020   ROM 0-45 deg   Max Peak Torque 99    Min Peak Torque 37    Flex/Ext Ratio 2.38:1   % below normative data -34%         Limitation/Restriction for FOTO lumbar spine Survey    Therapist reviewed FOTO scores for Valarie Colindres on 10/26/2020.   FOTO documents entered into [employer] - see Media section.    Limitation Score: 23%  Goal: 23#           Treatment   Treatment Time In: 3:00  Treatment Time Out: 3:30  Total Treatment time separate from Evaluation: 3- minutes      Valarie received therapeutic exercises to develop/improve posture, lumbar/cervical ROM, strength and muscular endurance for 30 minutes including the following exercises:     Med x dynamic exercise and baseline IM test    [facility] 10/26/2020   Visit Number 1   VAS Pain Rating 0   Extension in Lying 10   Flexion in Lying 10   Lumbar Extension Seat Pad 1   Femur Restraint 5   Top Dead Center 24   Counterweight 152   Lumbar Flexion 45   Lumbar Extension 0   Lumbar Peak Torque 88   Min Torque 37   Test Percent Below Normative Data 34   Ice - Z Lie (in min.) 10     DKTC  LTR  PPT  Slouch corrects    Written Home Exercises Provided: yes.  Exercises were reviewed and Valarie was able to demonstrate them prior to the end of the session.  Valarie demonstrated fair  understanding of the education provided.     See EMR under Patient Instructions for exercises provided 10/26/2020.      Education provided:   - Patient received education regarding proper posture and body mechanics.  Patient was given top [facility] Visit 1 handouts which discuss what to expect in therapy, the purpose and opportunity for health coaching, the program,  wellness when discharged from therapy, back education and care specifically for posture seated, standing, lifting correctly, components of exercise, importance of nutrition and hydration, and  importance of sleep.   Information on lumbar rolls provided.  - Delores roll tried, recommended, and purchase information was provided.    - Patient received a handout regarding anticipated muscular soreness following the isometric test and strategies for management were reviewed with patient including stretching, using ice and scheduled rest.   - Patient received education on the [facility] program, purpose of the isometric test, progression of back strengthening as well as wellness approach and systemic strengthening.  Details of the program were discussed.  Reviewed that patient should feel support/pressure from med ex restraints but no pain or discomfort and patient expressed understanding.    Valarie received cold pack for 10 minutes to low back.    Assessment   Valarie is a 75 y.o. female referred to [facility] with a medical diagnosis of Lumbar radiculopathy. Pt presents with signs and symptoms including: increased low back pain, decreased lumbar ROM, decreased lumbar strength, decreased LE Strength, soft tissue dysfunction, postural imbalance,impaired joint mobility. Isometric testing indicates that she is approximately 34% weaker compared to normative data.  Her deficits decrease her tolerance to functional activities.       Pain Pattern: 1 PEN       Pt prognosis is Excellent.   Pt will benefit from skilled outpatient Physical Therapy to address the deficits stated above and in the chart below, provide pt/family education, and to maximize pt's level of independence. Based on the above history and physical examination an active physical therapy program is recommended.  Pt will continue to benefit from skilled outpatient physical therapy to address the deficits listed below in the chart, provide pt/family education and to maximize pt's level of independence in the home and community environment. .       Plan of care discussed with patient: Yes  Pt's spiritual, cultural and educational needs  considered and patient is agreeable to the plan of care and goals as stated below:     Anticipated Barriers for therapy: none    PT Evaluation Completed? Yes    Medical necessity is demonstrated by the following problem list.    Pt presents with the following impairments:     History  Co-morbidities and personal factors that may impact the plan of care Co-morbidities:   advanced age and HTN    Personal Factors:   no deficits     low   Examination  Body Structures and Functions, activity limitations and participation restrictions that may impact the plan of care Body Regions:   back  lower extremities    Body Systems:    gross symmetry  ROM  strength  gross coordinated movement  balance  gait    Participation Restrictions:   n/a    Activity limitations:   Learning and applying knowledge  no deficits    General Tasks and Commands  no deficits    Communication  no deficits    Mobility  lifting and carrying objects  walking    Self care  no deficits    Domestic Life  shopping  cooking  doing house work (cleaning house, washing dishes, laundry)    Interactions/Relationships  no deficits    Life Areas  no deficits    Community and Social Life  no deficits         moderate   Clinical Presentation evolving clinical presentation with changing clinical characteristics moderate   Decision Making/ Complexity Score: low       GOALS: Pt is in agreement with the following goals.    Short term goals:  6 weeks or 10 visits   1.  Pt will demonstrate increased lumbar ROM by at least 6 degrees from the initial ROM value with improvements noted in functional ROM and ability to perform ADLs.  2.  Pt will demonstrate increased MedX average isometric strength value  by 20% from initial test resulting in improved ability to perform bending, lifting, and carrying activities safely, confidently.    3.  Patient report a reduction in worst pain score by 1-2 points for improved tolerance for completing household chores.  4.  Pt able to perform  "HEP correctly with minimal cueing or supervision from therapist to encourage independent management of symptoms.       Long term goals: 10 weeks or 20 visits   1. Pt will demonstrate increased lumbar ROM by at least 9 degrees from initial ROM value, resulting in improved ability to perform functional fwd bending while standing and sitting.   2. Pt will demonstrate increased MedX average isometric strength value  by 30% from initial test resulting in improved ability to perform bending, lifting, and carrying activities safely, confidently.  3. Pt to demonstrate ability to independently control and reduce their pain through posture positioning and mechanical movements throughout a typical day.  4.  Pt will demonstrate reduced pain and improved functional outcomes as reported on the FOTO by reaching a limitation score of < or = 20% or less in order to demonstrate subjective improvement in pt's condition.    5. Pt will demonstrate independence with the HEP at discharge      Plan   Outpatient physical therapy 2x week for 10 weeks or 20 visits to include the following:   - Patient education  - Therapeutic exercise  - Manual therapy  - Performance testing   - Neuromuscular Re-education  - Therapeutic activity   - Modalities    Pt may be seen by PTA as part of the rehabilitation team.     Therapist: Dontae Franks, PT  10/26/2020    "I certify the need for these services furnished under this plan of treatment and while under my care."    ____________________________________  Physician/Referring Practitioner    _______________  Date of Signature            "

## 2020-10-29 ENCOUNTER — TELEPHONE (OUTPATIENT)
Dept: PODIATRY | Facility: CLINIC | Age: 75
End: 2020-10-29

## 2020-10-29 NOTE — TELEPHONE ENCOUNTER
Attempted to contact pt about rescheduling her appointment. Pt didn't answer and no voicemail box was set up.

## 2020-10-30 ENCOUNTER — PATIENT MESSAGE (OUTPATIENT)
Dept: PRIMARY CARE CLINIC | Facility: CLINIC | Age: 75
End: 2020-10-30

## 2020-11-05 ENCOUNTER — OFFICE VISIT (OUTPATIENT)
Dept: PODIATRY | Facility: CLINIC | Age: 75
End: 2020-11-05
Payer: MEDICARE

## 2020-11-05 VITALS
HEART RATE: 86 BPM | HEIGHT: 63 IN | BODY MASS INDEX: 27.11 KG/M2 | SYSTOLIC BLOOD PRESSURE: 126 MMHG | WEIGHT: 153 LBS | DIASTOLIC BLOOD PRESSURE: 70 MMHG

## 2020-11-05 DIAGNOSIS — G60.9 IDIOPATHIC PERIPHERAL NEUROPATHY: Primary | ICD-10-CM

## 2020-11-05 LAB
HPV HR 12 DNA SPEC QL NAA+PROBE: NEGATIVE
HPV16 AG SPEC QL: NEGATIVE
HPV18 DNA SPEC QL NAA+PROBE: NEGATIVE

## 2020-11-05 PROCEDURE — 1159F MED LIST DOCD IN RCRD: CPT | Mod: S$GLB,,, | Performed by: PODIATRIST

## 2020-11-05 PROCEDURE — 3074F PR MOST RECENT SYSTOLIC BLOOD PRESSURE < 130 MM HG: ICD-10-PCS | Mod: CPTII,S$GLB,, | Performed by: PODIATRIST

## 2020-11-05 PROCEDURE — 1159F PR MEDICATION LIST DOCUMENTED IN MEDICAL RECORD: ICD-10-PCS | Mod: S$GLB,,, | Performed by: PODIATRIST

## 2020-11-05 PROCEDURE — 99213 OFFICE O/P EST LOW 20 MIN: CPT | Mod: S$GLB,,, | Performed by: PODIATRIST

## 2020-11-05 PROCEDURE — 1101F PT FALLS ASSESS-DOCD LE1/YR: CPT | Mod: CPTII,S$GLB,, | Performed by: PODIATRIST

## 2020-11-05 PROCEDURE — 99213 PR OFFICE/OUTPT VISIT, EST, LEVL III, 20-29 MIN: ICD-10-PCS | Mod: S$GLB,,, | Performed by: PODIATRIST

## 2020-11-05 PROCEDURE — 99499 RISK ADDL DX/OHS AUDIT: ICD-10-PCS | Mod: S$GLB,,, | Performed by: PODIATRIST

## 2020-11-05 PROCEDURE — 3078F DIAST BP <80 MM HG: CPT | Mod: CPTII,S$GLB,, | Performed by: PODIATRIST

## 2020-11-05 PROCEDURE — 99999 PR PBB SHADOW E&M-EST. PATIENT-LVL III: CPT | Mod: PBBFAC,,, | Performed by: PODIATRIST

## 2020-11-05 PROCEDURE — 1125F AMNT PAIN NOTED PAIN PRSNT: CPT | Mod: S$GLB,,, | Performed by: PODIATRIST

## 2020-11-05 PROCEDURE — 1125F PR PAIN SEVERITY QUANTIFIED, PAIN PRESENT: ICD-10-PCS | Mod: S$GLB,,, | Performed by: PODIATRIST

## 2020-11-05 PROCEDURE — 99999 PR PBB SHADOW E&M-EST. PATIENT-LVL III: ICD-10-PCS | Mod: PBBFAC,,, | Performed by: PODIATRIST

## 2020-11-05 PROCEDURE — 3078F PR MOST RECENT DIASTOLIC BLOOD PRESSURE < 80 MM HG: ICD-10-PCS | Mod: CPTII,S$GLB,, | Performed by: PODIATRIST

## 2020-11-05 PROCEDURE — 3074F SYST BP LT 130 MM HG: CPT | Mod: CPTII,S$GLB,, | Performed by: PODIATRIST

## 2020-11-05 PROCEDURE — 1101F PR PT FALLS ASSESS DOC 0-1 FALLS W/OUT INJ PAST YR: ICD-10-PCS | Mod: CPTII,S$GLB,, | Performed by: PODIATRIST

## 2020-11-05 PROCEDURE — 99499 UNLISTED E&M SERVICE: CPT | Mod: S$GLB,,, | Performed by: PODIATRIST

## 2020-11-05 NOTE — PROGRESS NOTES
Chief Complaint   Patient presents with    Heel Pain     Right Foot    Numbness             HPI: Valarie Colindres is a 75 y.o. female  with complaints of numbness to the right lateral heel and foot.  Present for about 6 months.  She recalls no acute trauma the area.  No self-treatment.  The numbness is uncomfortable and is present all the time.  Shoes, activities and positional changes do not improve or worsen the numbness in the area but she does report that it seems to be spreading.          Patient Active Problem List   Diagnosis    Nuclear sclerotic cataract of both eyes    Essential hypertension    Gastroesophageal reflux disease    History of gastric ulcer    Irritable bowel syndrome    Hypothyroidism    Osteopenia of lumbar spine    Thalassemia trait    MGUS (monoclonal gammopathy of unknown significance)    Decreased range of motion of trunk and back    Decreased strength of trunk and back    Poor posture    Vaginal atrophy    Retinal drusen of both eyes    Rectocele    Cystocele, midline    Urge incontinence         Current Outpatient Medications on File Prior to Visit   Medication Sig Dispense Refill    aspirin (ECOTRIN) 81 MG EC tablet Take 81 mg by mouth once daily.      chlorhexidine (PERIDEX) 0.12 % solution RINSE AND SWISH ONE CAPFUL BID  1    diclofenac sodium (VOLTAREN) 1 % Gel Apply 2 g topically 4 (four) times daily. 1 Tube 2    hydroCHLOROthiazide (HYDRODIURIL) 12.5 MG Tab TAKE 1 TABLET BY MOUTH EVERY DAY 90 tablet 1    levothyroxine (SYNTHROID) 100 MCG tablet TAKE 1 TABLET BY MOUTH EVERY DAY 90 tablet 0    valsartan (DIOVAN) 320 MG tablet Take 1 tablet (320 mg total) by mouth once daily. 30 tablet 5     No current facility-administered medications on file prior to visit.          Review of patient's allergies indicates:  No Known Allergies          Social History     Socioeconomic History    Marital status:      Spouse name: Not on file    Number of children:  "Not on file    Years of education: Not on file    Highest education level: Not on file   Occupational History     Comment: Retired in 2018 -    Social Needs    Financial resource strain: Not on file    Food insecurity     Worry: Not on file     Inability: Not on file    Transportation needs     Medical: Not on file     Non-medical: Not on file   Tobacco Use    Smoking status: Never Smoker    Smokeless tobacco: Never Used   Substance and Sexual Activity    Alcohol use: Yes     Frequency: Monthly or less     Drinks per session: 1 or 2    Drug use: No    Sexual activity: Not Currently   Lifestyle    Physical activity     Days per week: Not on file     Minutes per session: Not on file    Stress: Not on file   Relationships    Social connections     Talks on phone: Not on file     Gets together: Not on file     Attends Rastafarian service: Not on file     Active member of club or organization: Not on file     Attends meetings of clubs or organizations: Not on file     Relationship status: Not on file   Other Topics Concern    Not on file   Social History Narrative    Not on file           ROS:   General ROS: negative  Respiratory ROS: no cough, shortness of breath, or wheezing  Cardiovascular ROS: no chest pain or dyspnea on exertion  Musculoskeletal ROS:  negative for - joint stiffness, joint swelling, muscle pain or muscular weakness  Neurological ROS: no TIA or stroke symptoms  Dermatological ROS: negative for eczema, pruritus and rash        FOOT EXAM:     Vitals:    11/05/20 1551   BP: 126/70   Pulse: 86   Weight: 69.4 kg (153 lb)   Height: 5' 3" (1.6 m)         Vasc:    2/4 DP and PT pulses   Capillary refill: 3 seconds to toes  Skin temperature: cool to touch  Edema:  Present bilaterally      Neuro:   Gross sensation intact .  vibratory Present bilaterally  refelexes Present bilaterally  Tinels absent.  Mulders absent      Derm:   Slight redness 2/2 irritation over medial 1st MPJ " aspect left > right .  Wounds/ulcers:   absent  Skin:  Intact, dry  No bruising.       MSK:   Bilateral bunions noted.  Mild pes planus.  5/5 muscle strength.  No pain with inversion or eversion of the foot.  No pain with direct palpation to the affected area.      12/05/2019 Xray Imaging:  Impression reports       1. Hallux valgus of the left great toe with moderate degenerative changes of the bilateral great toe metatarsophalangeal joints.  2. Linear radiopaque density overlying the proximal phalanx of the left great toe which may represent a foreign body.         Has foreign body present since her 30s.  Does not bother her.           ASSESSMENT/PLAN       Problem List Items Addressed This Visit     None      Visit Diagnoses     Idiopathic peripheral neuropathy    -  Primary    Relevant Orders    EMG W/ ULTRASOUND AND NERVE CONDUCTION TEST 1 Extremity            · I counseled the patient on the patient's conditions, their implications and medical management.   · X-rays from 12/05/2019 were reviewed with the patient.  No acute adverse interval trauma since her last visit.  · EMG ordered.  Will call results.  Shoe and activity modification as needed for relief.  May consider topical anti-inflammatories.  Available over-the-counter.  Patient is amenable to plan.

## 2020-11-11 ENCOUNTER — PATIENT MESSAGE (OUTPATIENT)
Dept: PRIMARY CARE CLINIC | Facility: CLINIC | Age: 75
End: 2020-11-11

## 2020-11-11 ENCOUNTER — CLINICAL SUPPORT (OUTPATIENT)
Dept: REHABILITATION | Facility: OTHER | Age: 75
End: 2020-11-11
Attending: FAMILY MEDICINE
Payer: MEDICARE

## 2020-11-11 DIAGNOSIS — R29.3 POOR POSTURE: ICD-10-CM

## 2020-11-11 DIAGNOSIS — R29.898 DECREASED STRENGTH OF TRUNK AND BACK: ICD-10-CM

## 2020-11-11 DIAGNOSIS — M25.69 DECREASED RANGE OF MOTION OF TRUNK AND BACK: Primary | ICD-10-CM

## 2020-11-11 PROCEDURE — 97110 THERAPEUTIC EXERCISES: CPT

## 2020-11-11 NOTE — PROGRESS NOTES
"[facility]      Name: Valarie Colindres  Clinic Number: [id number]    Therapy Diagnosis:   Encounter Diagnoses   Name Primary?    Decreased range of motion of trunk and back Yes    Decreased strength of trunk and back     Poor posture      Physician: Jovani Valencia, *    Visit Date: 11/11/2020    Physician Orders: PT Eval and Treat   Medical Diagnosis from Referral: M54.16 (ICD-10-CM) - Lumbar radiculopathy  Evaluation Date: 10/26/2020  Authorization Period Expiration: 12/26/2020  Plan of Care Expiration: 01/26/2021  Reassessment Due: 11/26/2020  Visit # / Visits authorized: 2/ 26    Time In: 10:45  Time Out: 11:30  Total Billable Time: 45 minutes    Precautions: Standard     Pattern of pain determined: 1 PEN    Subjective   Sedonia reports slight soreness after evaluation.      Patient reports tolerating previous visit fair  Patient reports their pain to be 2/10 on a 0-10 scale with 0 being no pain and 10 being the worst pain imaginable.  Location: bilateral back      Occupation: Retired   Leisure: Walking with her sister, art, piano   Pt goals: to decrease pain    Objective     Baseline IM Testing Results:   Date of testing: 10/26/2020   ROM 0-45 deg   Max Peak Torque 88    Min Peak Torque 37    Flex/Ext Ratio 2.38:1   % below normative data -34%          Outcomes:  Initial score: 23%  Visit 5 score:  Goal: 23%      Treatment    Pt was instructed in and performed the following:     Valarie received therapeutic exercises to develop/improved posture, cardiovascular endurance, muscular endurance, lumbar/cervical ROM, strength and muscular endurance for 45 minutes including the following exercises:     DKTCx 10  LTR x10  PPT x10  Slouch corrects x10  HL TA activation 3" hold x10    [facility] 11/11/2020   Visit Number 2   VAS Pain Rating 2   Time 5   Extension in Lying -   Flexion in Lying 10   Lumbar Extension Seat Pad -   Femur Restraint -   Top " "Dead Center -   Counterweight -   Lumbar Flexion -   Lumbar Extension -   Lumbar Peak Torque -   Min Torque -   Test Percent Below Normative Data -   Lumbar Weight 45   Repetitions 20   Rating of Perceived Exertion 5   Ice - Z Lie (in min.) 5         Peripheral muscle strengthening which included 1 set of 15-20 repetitions at a slow, controlled 10-13 second per rep pace focused on strengthening supporting musculature for improved body mechanics and functional mobility.  Pt and therapist focused on proper form during treatment to ensure optimal strengthening of each targeted muscle group.  Machines were utilized including torso rotation, leg extension, leg curl, chest press, upright row. Tricep extension, bicep curl, leg press, and hip abduction added visit 3    S[place] received the following manual therapy techniques: Joint mobilizations, Manual traction and Myofacial release were applied to the: none for none minutes.         Home Exercises Provided and Patient Education Provided   Home exercises include:DKTC  LTR  PPT  Slouch corrects  Cardio program: Bike    Education provided:   - - Patient received education in benefits of progressing mobility and strengthening gradually  - Discussed exertion scale, slow progressive resistance protocol to promote safe and healthy strengthening of supportive musculature  by performing 15-20 reps, 7 sec per rep, and increasing weight by 5 % at 20 reps only if there ex done safely, slowly, using correct musculature, exertion and no pain.  Patient expressed understanding.  -Pt educated on strategies to safely perform examination and exercise using "Stop Light" analogy to describe how to avoid or stop irritating motions, proceed with caution with some movements, and progress positive exercises.       Written Home Exercises Provided: Patient instructed to cont prior HEP.  Exercises were reviewed and [place] was able to demonstrate them prior to the end of the session.  S[place]onia " demonstrated good  understanding of the education provided.     See EMR under Patient Instructions for exercises provided prior visit.      Assessment   Pt presents to second healthy back visit reporting slight soreness after evaluation, was able to demo HEP with Mod VC for form. Pt was able to start strengthening and endurance training on the lumbar MedX at 50% of max peak torque according to the initial visit isometric test. Pt was able to complete 20 reps, with 5/10 RPE. Pt was also able to complete half of the peripheral strengthening exercises without increased discomfort and will complete the complete circuit next visit as tolerated.    Patient is making good progress towards established goals.  Pt will continue to benefit from skilled outpatient physical therapy to address the deficits stated in the impairment chart, provide pt/family education and to maximize pt's level of independence in the home and community environment.     Anticipated Barriers for therapy: none  Pt's spiritual, cultural and educational needs considered and pt agreeable to plan of care and goals as stated below:       GOALS: Pt is in agreement with the following goals.     Short term goals:  6 weeks or 10 visits   1.  Pt will demonstrate increased lumbar ROM by at least 6 degrees from the initial ROM value with improvements noted in functional ROM and ability to perform ADLs. (appropriate, progressing)   2.  Pt will demonstrate increased MedX average isometric strength value  by 20% from initial test resulting in improved ability to perform bending, lifting, and carrying activities safely, confidently. (appropriate, progressing)      3.  Patient report a reduction in worst pain score by 1-2 points for improved tolerance for completing household chores.(appropriate, progressing)   4.  Pt able to perform HEP correctly with minimal cueing or supervision from therapist to encourage independent management of symptoms. (appropriate,  progressing)         Long term goals: 10 weeks or 20 visits   1. Pt will demonstrate increased lumbar ROM by at least 9 degrees from initial ROM value, resulting in improved ability to perform functional fwd bending while standing and sitting.   2. Pt will demonstrate increased MedX average isometric strength value  by 30% from initial test resulting in improved ability to perform bending, lifting, and carrying activities safely, confidently.  3. Pt to demonstrate ability to independently control and reduce their pain through posture positioning and mechanical movements throughout a typical day.  4.  Pt will demonstrate reduced pain and improved functional outcomes as reported on the FOTO by reaching a limitation score of < or = 20% or less in order to demonstrate subjective improvement in pt's condition.    5. Pt will demonstrate independence with the HEP at discharge       Plan   Continue with established Plan of Care towards established PT goals.     Dontae Franks, PT  11/11/2020

## 2020-11-13 ENCOUNTER — CLINICAL SUPPORT (OUTPATIENT)
Dept: REHABILITATION | Facility: OTHER | Age: 75
End: 2020-11-13
Attending: FAMILY MEDICINE
Payer: MEDICARE

## 2020-11-13 ENCOUNTER — PATIENT MESSAGE (OUTPATIENT)
Dept: PODIATRY | Facility: CLINIC | Age: 75
End: 2020-11-13

## 2020-11-13 DIAGNOSIS — M25.69 DECREASED RANGE OF MOTION OF TRUNK AND BACK: Primary | ICD-10-CM

## 2020-11-13 DIAGNOSIS — R29.898 DECREASED STRENGTH OF TRUNK AND BACK: ICD-10-CM

## 2020-11-13 DIAGNOSIS — R29.3 POOR POSTURE: ICD-10-CM

## 2020-11-13 PROCEDURE — 97110 THERAPEUTIC EXERCISES: CPT | Mod: CQ

## 2020-11-13 NOTE — PROGRESS NOTES
"[facility]      Name: Valarie Colindres  Clinic Number: [id number]    Therapy Diagnosis:   Encounter Diagnoses   Name Primary?    Decreased range of motion of trunk and back Yes    Decreased strength of trunk and back     Poor posture      Physician: Jovani Valencia, *    Visit Date: 11/13/2020    Physician Orders: PT Eval and Treat   Medical Diagnosis from Referral: M54.16 (ICD-10-CM) - Lumbar radiculopathy  Evaluation Date: 10/26/2020  Authorization Period Expiration: 12/26/2020  Plan of Care Expiration: 01/26/2021  Reassessment Due: 11/26/2020  Visit # / Visits authorized: 3/ 26    Time In: 10:45  Time Out: 11:30  Total Billable Time: 45 minutes    Precautions: Standard     Pattern of pain determined: 1 PEN    Subjective   Valarie reports minimal R LBP today with numbness in R later side of foot.     Patient reports tolerating previous visit fair  Patient reports their pain to be 5/10 on a 0-10 scale with 0 being no pain and 10 being the worst pain imaginable.  Location: bilateral back      Occupation: Retired   Leisure: Walking with her sister, art, piano   Pt goals: to decrease pain    Objective     Baseline IM Testing Results:   Date of testing: 10/26/2020   ROM 0-45 deg   Max Peak Torque 88    Min Peak Torque 37    Flex/Ext Ratio 2.38:1   % below normative data -34%          Outcomes:  Initial score: 23%  Visit 5 score:  Goal: 23%      Treatment    Pt was instructed in and performed the following:     Valarie received therapeutic exercises to develop/improved posture, cardiovascular endurance, muscular endurance, lumbar/cervical ROM, strength and muscular endurance for 45 minutes including the following exercises:     DKTCx 10  LTR x10  PPT x10  Slouch corrects x10  HL TA activation 3" hold x10  Piriformis 3x 20 secs  [facility] 11/13/2020   Visit Number 3   VAS Pain Rating 5   Time -   Extension in Lying -   Flexion in Lying -   Lumbar " "Extension Seat Pad -   Femur Restraint -   Top Dead Center -   Counterweight -   Lumbar Flexion -   Lumbar Extension -   Lumbar Peak Torque -   Min Torque -   Test Percent Below Normative Data -   Lumbar Weight 48   Repetitions 15   Rating of Perceived Exertion 5   Ice - Z Lie (in min.) 5           Peripheral muscle strengthening which included 1 set of 15-20 repetitions at a slow, controlled 10-13 second per rep pace focused on strengthening supporting musculature for improved body mechanics and functional mobility.  Pt and therapist focused on proper form during treatment to ensure optimal strengthening of each targeted muscle group.  Machines were utilized including torso rotation, leg extension, leg curl, chest press, upright row. Tricep extension, bicep curl, leg press, and hip abduction added visit 3    S[place] received the following manual therapy techniques: Joint mobilizations, Manual traction and Myofacial release were applied to the: none for none minutes.         Home Exercises Provided and Patient Education Provided   Home exercises include:DKTC  LTR  PPT  Slouch corrects  Cardio program: Bike    Education provided:   - - Patient received education in benefits of progressing mobility and strengthening gradually  - Discussed exertion scale, slow progressive resistance protocol to promote safe and healthy strengthening of supportive musculature  by performing 15-20 reps, 7 sec per rep, and increasing weight by 5 % at 20 reps only if there ex done safely, slowly, using correct musculature, exertion and no pain.  Patient expressed understanding.  -Pt educated on strategies to safely perform examination and exercise using "Stop Light" analogy to describe how to avoid or stop irritating motions, proceed with caution with some movements, and progress positive exercises.       Written Home Exercises Provided: Patient instructed to cont prior HEP.  Exercises were reviewed and [place] was able to demonstrate them " prior to the end of the session.  Valarie demonstrated good  understanding of the education provided.     See EMR under Patient Instructions for exercises provided prior visit.      Assessment   Pt with less pain post session.  Pt was able to complete 15 reps with a weight increase, with 5/10 RPE. Added piriformis stretch due R LB and minimal into glut pain. No c/o pain.  Pt was also able to complete all of the peripheral strengthening exercises without increased discomfort and will complete the complete circuit next visit as tolerated.     Patient is making good progress towards established goals.  Pt will continue to benefit from skilled outpatient physical therapy to address the deficits stated in the impairment chart, provide pt/family education and to maximize pt's level of independence in the home and community environment.     Anticipated Barriers for therapy: none  Pt's spiritual, cultural and educational needs considered and pt agreeable to plan of care and goals as stated below:       GOALS: Pt is in agreement with the following goals.     Short term goals:  6 weeks or 10 visits   1.  Pt will demonstrate increased lumbar ROM by at least 6 degrees from the initial ROM value with improvements noted in functional ROM and ability to perform ADLs. (appropriate, progressing)   2.  Pt will demonstrate increased MedX average isometric strength value  by 20% from initial test resulting in improved ability to perform bending, lifting, and carrying activities safely, confidently. (appropriate, progressing)      3.  Patient report a reduction in worst pain score by 1-2 points for improved tolerance for completing household chores.(appropriate, progressing)   4.  Pt able to perform HEP correctly with minimal cueing or supervision from therapist to encourage independent management of symptoms. (appropriate, progressing)         Long term goals: 10 weeks or 20 visits   1. Pt will demonstrate increased lumbar ROM by at  least 9 degrees from initial ROM value, resulting in improved ability to perform functional fwd bending while standing and sitting.   2. Pt will demonstrate increased MedX average isometric strength value  by 30% from initial test resulting in improved ability to perform bending, lifting, and carrying activities safely, confidently.  3. Pt to demonstrate ability to independently control and reduce their pain through posture positioning and mechanical movements throughout a typical day.  4.  Pt will demonstrate reduced pain and improved functional outcomes as reported on the FOTO by reaching a limitation score of < or = 20% or less in order to demonstrate subjective improvement in pt's condition.    5. Pt will demonstrate independence with the HEP at discharge       Plan   Continue with established Plan of Care towards established PT goals.     Domi Claudio, PTA  11/13/2020

## 2020-11-16 ENCOUNTER — IMMUNIZATION (OUTPATIENT)
Dept: PHARMACY | Facility: CLINIC | Age: 75
End: 2020-11-16
Payer: MEDICARE

## 2020-11-16 ENCOUNTER — OFFICE VISIT (OUTPATIENT)
Dept: PRIMARY CARE CLINIC | Facility: CLINIC | Age: 75
End: 2020-11-16
Payer: MEDICARE

## 2020-11-16 VITALS
WEIGHT: 151.88 LBS | SYSTOLIC BLOOD PRESSURE: 120 MMHG | OXYGEN SATURATION: 99 % | TEMPERATURE: 98 F | BODY MASS INDEX: 26.91 KG/M2 | DIASTOLIC BLOOD PRESSURE: 68 MMHG | HEART RATE: 71 BPM | HEIGHT: 63 IN

## 2020-11-16 DIAGNOSIS — M89.9 DISORDER OF BONE, UNSPECIFIED: ICD-10-CM

## 2020-11-16 DIAGNOSIS — Z13.220 ENCOUNTER FOR LIPID SCREENING FOR CARDIOVASCULAR DISEASE: ICD-10-CM

## 2020-11-16 DIAGNOSIS — M85.88 OSTEOPENIA OF LUMBAR SPINE: ICD-10-CM

## 2020-11-16 DIAGNOSIS — N81.6 RECTOCELE: ICD-10-CM

## 2020-11-16 DIAGNOSIS — G60.9 IDIOPATHIC PERIPHERAL NEUROPATHY: Primary | ICD-10-CM

## 2020-11-16 DIAGNOSIS — I10 ESSENTIAL HYPERTENSION: ICD-10-CM

## 2020-11-16 DIAGNOSIS — M70.61 TROCHANTERIC BURSITIS OF RIGHT HIP: ICD-10-CM

## 2020-11-16 DIAGNOSIS — N39.41 URGE INCONTINENCE: ICD-10-CM

## 2020-11-16 DIAGNOSIS — N81.11 CYSTOCELE, MIDLINE: ICD-10-CM

## 2020-11-16 DIAGNOSIS — E03.9 ACQUIRED HYPOTHYROIDISM: ICD-10-CM

## 2020-11-16 DIAGNOSIS — Z13.6 ENCOUNTER FOR LIPID SCREENING FOR CARDIOVASCULAR DISEASE: ICD-10-CM

## 2020-11-16 DIAGNOSIS — R29.3 POOR POSTURE: Primary | ICD-10-CM

## 2020-11-16 DIAGNOSIS — Z11.59 NEED FOR HEPATITIS C SCREENING TEST: ICD-10-CM

## 2020-11-16 DIAGNOSIS — M20.12 ACQUIRED HALLUX VALGUS OF LEFT FOOT: ICD-10-CM

## 2020-11-16 DIAGNOSIS — D47.2 MGUS (MONOCLONAL GAMMOPATHY OF UNKNOWN SIGNIFICANCE): ICD-10-CM

## 2020-11-16 DIAGNOSIS — D56.3 THALASSEMIA TRAIT: ICD-10-CM

## 2020-11-16 PROCEDURE — 1159F MED LIST DOCD IN RCRD: CPT | Mod: S$GLB,,, | Performed by: FAMILY MEDICINE

## 2020-11-16 PROCEDURE — 1126F AMNT PAIN NOTED NONE PRSNT: CPT | Mod: S$GLB,,, | Performed by: FAMILY MEDICINE

## 2020-11-16 PROCEDURE — 3288F PR FALLS RISK ASSESSMENT DOCUMENTED: ICD-10-PCS | Mod: CPTII,S$GLB,, | Performed by: FAMILY MEDICINE

## 2020-11-16 PROCEDURE — 3288F FALL RISK ASSESSMENT DOCD: CPT | Mod: CPTII,S$GLB,, | Performed by: FAMILY MEDICINE

## 2020-11-16 PROCEDURE — 3078F PR MOST RECENT DIASTOLIC BLOOD PRESSURE < 80 MM HG: ICD-10-PCS | Mod: CPTII,S$GLB,, | Performed by: FAMILY MEDICINE

## 2020-11-16 PROCEDURE — 3078F DIAST BP <80 MM HG: CPT | Mod: CPTII,S$GLB,, | Performed by: FAMILY MEDICINE

## 2020-11-16 PROCEDURE — 99999 PR PBB SHADOW E&M-EST. PATIENT-LVL IV: CPT | Mod: PBBFAC,,, | Performed by: FAMILY MEDICINE

## 2020-11-16 PROCEDURE — 99214 PR OFFICE/OUTPT VISIT, EST, LEVL IV, 30-39 MIN: ICD-10-PCS | Mod: S$GLB,,, | Performed by: FAMILY MEDICINE

## 2020-11-16 PROCEDURE — 3074F PR MOST RECENT SYSTOLIC BLOOD PRESSURE < 130 MM HG: ICD-10-PCS | Mod: CPTII,S$GLB,, | Performed by: FAMILY MEDICINE

## 2020-11-16 PROCEDURE — 1159F PR MEDICATION LIST DOCUMENTED IN MEDICAL RECORD: ICD-10-PCS | Mod: S$GLB,,, | Performed by: FAMILY MEDICINE

## 2020-11-16 PROCEDURE — 1101F PT FALLS ASSESS-DOCD LE1/YR: CPT | Mod: CPTII,S$GLB,, | Performed by: FAMILY MEDICINE

## 2020-11-16 PROCEDURE — 1126F PR PAIN SEVERITY QUANTIFIED, NO PAIN PRESENT: ICD-10-PCS | Mod: S$GLB,,, | Performed by: FAMILY MEDICINE

## 2020-11-16 PROCEDURE — 99999 PR PBB SHADOW E&M-EST. PATIENT-LVL IV: ICD-10-PCS | Mod: PBBFAC,,, | Performed by: FAMILY MEDICINE

## 2020-11-16 PROCEDURE — 3074F SYST BP LT 130 MM HG: CPT | Mod: CPTII,S$GLB,, | Performed by: FAMILY MEDICINE

## 2020-11-16 PROCEDURE — 1101F PR PT FALLS ASSESS DOC 0-1 FALLS W/OUT INJ PAST YR: ICD-10-PCS | Mod: CPTII,S$GLB,, | Performed by: FAMILY MEDICINE

## 2020-11-16 PROCEDURE — 99214 OFFICE O/P EST MOD 30 MIN: CPT | Mod: S$GLB,,, | Performed by: FAMILY MEDICINE

## 2020-11-16 NOTE — PATIENT INSTRUCTIONS

## 2020-11-16 NOTE — PROGRESS NOTES
Subjective:       Patient ID: Valarie Colindres is a 75 y.o. female.    Chief Complaint: Follow-up      HPI  Review of Systems       Objective:      Physical Exam    Assessment:       No diagnosis found.    Plan:       ***

## 2020-11-16 NOTE — PROGRESS NOTES
Subjective:       Patient ID: Valarie Colindres is a 75 y.o. female.    Chief Complaint: Follow-up      76 yo female presents with multiple concerns.    She has a past medical history of Essential hypertension, hypothyroidism, Alpha and beta thalassemia trait, MGUS (monoclonal gammopathy of unknown significance and Skeletal survey 8/5/2019 did not show multiple myeloma bone lesions), urge incontinence with cystocele and rectocele (follows with urogynecology); foreign body of proximal phalanx of the left great toe, Hallux valgus of the left great toe with moderate degenerative changes of the bilateral great toe metatarsophalangeal joints; osteopenia, trochanteric bursitis, S/p hysterectomy in 1977 and ovaries remained.     She is managing overall well. Continues physical therapy.  She has been employing techniques discussed at urogynecology appointment.    She is concerned about the appearance of her left trazepius. Working on her posture with healthy back program. She is bending knees but not lifting at her knees when picking up objects.    The following portions of the patient's history were reviewed and updated as appropriate: allergies, current medications, past family history, past medical history, past social history, past surgical history and problem list.    Follow-up  Associated symptoms include arthralgias, myalgias and numbness. Pertinent negatives include no abdominal pain, chest pain, chills, congestion, diaphoresis, fatigue, fever, headaches, nausea, rash, sore throat, vomiting or weakness.     Review of Systems   Constitutional: Negative for activity change, appetite change, chills, diaphoresis, fatigue, fever and unexpected weight change.        Night sweats   HENT: Negative for nasal congestion, dental problem, facial swelling, nosebleeds, postnasal drip, rhinorrhea, sore throat, tinnitus and trouble swallowing.    Eyes: Negative for pain, discharge, itching and visual disturbance.   Respiratory:  "Negative for apnea, chest tightness, shortness of breath, wheezing and stridor.    Cardiovascular: Negative for chest pain, palpitations and leg swelling.   Gastrointestinal: Negative for abdominal distention, abdominal pain, constipation, diarrhea, nausea, rectal pain and vomiting.   Endocrine: Negative for cold intolerance, heat intolerance and polyuria.   Genitourinary: Negative for difficulty urinating, dysuria, frequency, hematuria and urgency.   Musculoskeletal: Positive for arthralgias, back pain and myalgias.   Integumentary:  Negative for color change and rash.   Neurological: Positive for light-headedness and numbness. Negative for tremors, seizures, syncope, facial asymmetry, weakness and headaches.   Hematological: Negative for adenopathy. Does not bruise/bleed easily.   Psychiatric/Behavioral: Negative for agitation, confusion, hallucinations, self-injury and suicidal ideas. The patient is not hyperactive.           Objective:       Vitals:    11/16/20 1033   BP: 120/68   Pulse: 71   Temp: 98.2 °F (36.8 °C)   TempSrc: Oral   SpO2: 99%   Weight: 68.9 kg (151 lb 14.4 oz)   Height: 5' 3" (1.6 m)     Physical Exam  Constitutional:       General: She is not in acute distress.     Appearance: She is well-developed. She is not diaphoretic.   HENT:      Head: Normocephalic and atraumatic.      Right Ear: External ear normal.      Left Ear: External ear normal.   Eyes:      General: No scleral icterus.        Right eye: No discharge.         Left eye: No discharge.      Conjunctiva/sclera: Conjunctivae normal.   Neck:      Musculoskeletal: Neck supple.      Thyroid: No thyromegaly.   Cardiovascular:      Rate and Rhythm: Normal rate and regular rhythm.      Heart sounds: Normal heart sounds. No murmur. No friction rub. No gallop.    Pulmonary:      Effort: Pulmonary effort is normal. No respiratory distress.      Breath sounds: Normal breath sounds.   Chest:      Chest wall: No tenderness.   Abdominal:      " General: Bowel sounds are normal.      Palpations: Abdomen is soft.      Tenderness: There is no rebound.   Musculoskeletal: Normal range of motion.   Lymphadenopathy:      Cervical: No cervical adenopathy.   Neurological:      General: No focal deficit present.      Mental Status: She is alert and oriented to person, place, and time.      Motor: No abnormal muscle tone.   Psychiatric:         Thought Content: Thought content normal.         Judgment: Judgment normal.         Assessment:       1. Poor posture    2. Osteopenia of lumbar spine    3. Disorder of bone, unspecified     4. Trochanteric bursitis of right hip    5. Urge incontinence    6. Cystocele, midline    7. Rectocele    8. Acquired hypothyroidism    9. Essential hypertension    10. MGUS (monoclonal gammopathy of unknown significance)    11. Thalassemia trait    12. Acquired hallux valgus of left foot    13. Encounter for lipid screening for cardiovascular disease    14. Need for hepatitis C screening test        Plan:         1. Poor posture  Her concern for the elevation of left Trapezius is related to posture. Continue to work with PT for Back exercises and ensure proper lifting technique.    2. Osteopenia of lumbar spine  -     Vitamin D; Future  Encourage adequate calcium and vitamin D    3. Disorder of bone, unspecified   -     Vitamin D; Future    4. Trochanteric bursitis  Follows with sports medicine    5. Urge incontinence  6. Cystocele, midline  7. Rectocele  Follows with urogynecology.     8. Acquired hypothyroidism  On Synthroid  Monitor TSH    9. Essential hypertension  -     CBC Without Differential; Future  -     Comprehensive Metabolic Panel; Future  -     TSH; Future  BP on target. Continue valsartan and HCTZ    10. MGUS (monoclonal gammopathy of unknown significance)  11. Thalassemia trait  Follows with hematology:  - IgG kappa monoclonal gammopathy. Low risk disease. No end organ damage.   - thalassemia trait with deletion in both  beta and alpha globulin  - anemia mild  No acute intervention    12. Acquired hallux valgus of left foot  Follows with podiatry.    13. Encounter for lipid screening for cardiovascular disease  -     Lipid Panel; Future    14. Need for hepatitis C screening test  -     Hepatitis C Antibody; Future      Follow up in 6 months.    Disclaimer: This note has been generated using voice-recognition software. There may be typographical errors that have been missed during proof-reading

## 2020-11-17 ENCOUNTER — TELEPHONE (OUTPATIENT)
Dept: PODIATRY | Facility: CLINIC | Age: 75
End: 2020-11-17

## 2020-11-17 ENCOUNTER — CLINICAL SUPPORT (OUTPATIENT)
Dept: REHABILITATION | Facility: OTHER | Age: 75
End: 2020-11-17
Attending: FAMILY MEDICINE
Payer: MEDICARE

## 2020-11-17 DIAGNOSIS — M25.69 DECREASED RANGE OF MOTION OF TRUNK AND BACK: Primary | ICD-10-CM

## 2020-11-17 DIAGNOSIS — R29.3 POOR POSTURE: ICD-10-CM

## 2020-11-17 DIAGNOSIS — R29.898 DECREASED STRENGTH OF TRUNK AND BACK: ICD-10-CM

## 2020-11-17 PROCEDURE — 97110 THERAPEUTIC EXERCISES: CPT

## 2020-11-17 NOTE — PROGRESS NOTES
"[facility]      Name: Valarie Colindres  Clinic Number: [id number]    Therapy Diagnosis:   Encounter Diagnoses   Name Primary?    Decreased range of motion of trunk and back Yes    Decreased strength of trunk and back     Poor posture      Physician: Jovani Valencia, *    Visit Date: 11/17/2020    Physician Orders: PT Eval and Treat   Medical Diagnosis from Referral: M54.16 (ICD-10-CM) - Lumbar radiculopathy  Evaluation Date: 10/26/2020  Authorization Period Expiration: 12/26/2020  Plan of Care Expiration: 01/26/2021  Reassessment Due: 11/26/2020  Visit # / Visits authorized: 4/ 26    Time In: 10:15  Time Out:1115am  Total Billable Time: 50 minutes    Precautions: Standard     Pattern of pain determined: 1 PEN    Subjective   [place] reports LBP today, no R LE numbness today    Patient reports tolerating previous visit fair  Patient reports their pain to be 5/10 on a 0-10 scale with 0 being no pain and 10 being the worst pain imaginable.  Location: bilateral back      Occupation: Retired   Leisure: Walking with her sister, art, piano   Pt goals: to decrease pain    Objective     Baseline IM Testing Results:   Date of testing: 10/26/2020   ROM 0-45 deg   Max Peak Torque 88    Min Peak Torque 37    Flex/Ext Ratio 2.38:1   % below normative data -34%          Outcomes:  Initial score: 23%  Visit 5 score:  Goal: 23%      Treatment    Pt was instructed in and performed the following:     Valarie received therapeutic exercises to develop/improved posture, cardiovascular endurance, muscular endurance, lumbar/cervical ROM, strength and muscular endurance for 60 minutes including the following exercises:     DKTCx 10  LTR x10  PPT x10  Slouch corrects x10  HL TA activation 3" hold x10  Piriformis 3x 20 secs  [facility] 11/17/2020   Visit Number 4   VAS Pain Rating 5   Time 5   Lumbar Stretches - Slouch Overcorrection 10   Extension in Lying -   Flexion in " "Lying 10   Lumbar Extension Seat Pad -   Femur Restraint -   Top Dead Center -   Counterweight -   Lumbar Flexion -   Lumbar Extension -   Lumbar Peak Torque -   Min Torque -   Test Percent Below Normative Data -   Lumbar Weight 48   Repetitions 20   Rating of Perceived Exertion 3   Ice - Z Lie (in min.) 5         Peripheral muscle strengthening which included 1 set of 15-20 repetitions at a slow, controlled 10-13 second per rep pace focused on strengthening supporting musculature for improved body mechanics and functional mobility.  Pt and therapist focused on proper form during treatment to ensure optimal strengthening of each targeted muscle group.  Machines were utilized including torso rotation, leg extension, leg curl, chest press, upright row. Tricep extension, bicep curl, leg press, and hip abduction added visit 3    [place] received the following manual therapy techniques: Joint mobilizations, Manual traction and Myofacial release were applied to the: none for none minutes.         Home Exercises Provided and Patient Education Provided   Home exercises include:DKTC  LTR  PPT  Slouch corrects  Cardio program: Bike, walking    Education provided:   - - Patient received education in benefits of progressing mobility and strengthening gradually  - Discussed exertion scale, slow progressive resistance protocol to promote safe and healthy strengthening of supportive musculature  by performing 15-20 reps, 7 sec per rep, and increasing weight by 5 % at 20 reps only if there ex done safely, slowly, using correct musculature, exertion and no pain.  Patient expressed understanding.  -Pt educated on strategies to safely perform examination and exercise using "Stop Light" analogy to describe how to avoid or stop irritating motions, proceed with caution with some movements, and progress positive exercises.       Written Home Exercises Provided: Patient instructed to cont prior HEP.  Exercises were reviewed and [place] was " able to demonstrate them prior to the end of the session.  Valarie demonstrated good  understanding of the education provided.   Issued piriformis stretch for HEP, in wrap up section of chart and printed for patient  See EMR under Patient Instructions for exercises provided prior visit.      Assessment   Patient arrives today and reports LBP at 5/10 without numbness in R lateral foot.  Reviewed piriforms stretch and printed for patient. Pt able to complete 20 reps on Med X with 3/10 exertion level. Inc 10% next visit on Med X weight.  Patient is making good progress towards established goals.  Pt will continue to benefit from skilled outpatient physical therapy to address the deficits stated in the impairment chart, provide pt/family education and to maximize pt's level of independence in the home and community environment.     Anticipated Barriers for therapy: none  Pt's spiritual, cultural and educational needs considered and pt agreeable to plan of care and goals as stated below:       GOALS: Pt is in agreement with the following goals.     Short term goals:  6 weeks or 10 visits   1.  Pt will demonstrate increased lumbar ROM by at least 6 degrees from the initial ROM value with improvements noted in functional ROM and ability to perform ADLs. (appropriate, progressing)   2.  Pt will demonstrate increased MedX average isometric strength value  by 20% from initial test resulting in improved ability to perform bending, lifting, and carrying activities safely, confidently. (appropriate, progressing)      3.  Patient report a reduction in worst pain score by 1-2 points for improved tolerance for completing household chores.(appropriate, progressing)   4.  Pt able to perform HEP correctly with minimal cueing or supervision from therapist to encourage independent management of symptoms. (appropriate, progressing)         Long term goals: 10 weeks or 20 visits   1. Pt will demonstrate increased lumbar ROM by at least 9  degrees from initial ROM value, resulting in improved ability to perform functional fwd bending while standing and sitting.   2. Pt will demonstrate increased MedX average isometric strength value  by 30% from initial test resulting in improved ability to perform bending, lifting, and carrying activities safely, confidently.  3. Pt to demonstrate ability to independently control and reduce their pain through posture positioning and mechanical movements throughout a typical day.  4.  Pt will demonstrate reduced pain and improved functional outcomes as reported on the FOTO by reaching a limitation score of < or = 20% or less in order to demonstrate subjective improvement in pt's condition.    5. Pt will demonstrate independence with the HEP at discharge       Plan   Continue with established Plan of Care towards established PT goals.     Valorie Sarmiento, PT  11/17/2020

## 2020-11-17 NOTE — TELEPHONE ENCOUNTER
SPoke to pt and informed her that Dr. Marino put in a new order and her appointment is on 11/20. Pt voiced understanding and call ended.

## 2020-11-17 NOTE — TELEPHONE ENCOUNTER
----- Message from Antonia Marino DPM sent at 11/16/2020  3:53 PM CST -----    She told me it was the right leg.    Also the current EMG appt needs to be cancelled before epic will let me sign a new order.    ----- Message -----  From: Michael Heaton LPN  Sent: 11/16/2020   2:43 PM CST  To: Antonia Marino DPM     Pt sent a MO email that her EMG order be for both extremities. Pt states she's having numbness in both legs not just 1. Please put in new order and I will attach it to pt's appointment

## 2020-11-20 ENCOUNTER — PROCEDURE VISIT (OUTPATIENT)
Dept: NEUROLOGY | Facility: CLINIC | Age: 75
End: 2020-11-20
Payer: MEDICARE

## 2020-11-20 DIAGNOSIS — G60.9 IDIOPATHIC PERIPHERAL NEUROPATHY: ICD-10-CM

## 2020-11-20 PROCEDURE — 95886 MUSC TEST DONE W/N TEST COMP: CPT | Mod: S$GLB,,, | Performed by: PSYCHIATRY & NEUROLOGY

## 2020-11-20 PROCEDURE — 95886 PR EMG COMPLETE, W/ NERVE CONDUCTION STUDIES, 5+ MUSCLES: ICD-10-PCS | Mod: S$GLB,,, | Performed by: PSYCHIATRY & NEUROLOGY

## 2020-11-20 PROCEDURE — 95910 NRV CNDJ TEST 7-8 STUDIES: CPT | Mod: S$GLB,,, | Performed by: PSYCHIATRY & NEUROLOGY

## 2020-11-20 PROCEDURE — 95910 PR NERVE CONDUCTION STUDY; 7-8 STUDIES: ICD-10-PCS | Mod: S$GLB,,, | Performed by: PSYCHIATRY & NEUROLOGY

## 2020-11-23 ENCOUNTER — CLINICAL SUPPORT (OUTPATIENT)
Dept: REHABILITATION | Facility: OTHER | Age: 75
End: 2020-11-23
Attending: FAMILY MEDICINE
Payer: MEDICARE

## 2020-11-23 ENCOUNTER — PATIENT MESSAGE (OUTPATIENT)
Dept: PODIATRY | Facility: CLINIC | Age: 75
End: 2020-11-23

## 2020-11-23 DIAGNOSIS — M25.69 DECREASED RANGE OF MOTION OF TRUNK AND BACK: Primary | ICD-10-CM

## 2020-11-23 DIAGNOSIS — R29.3 POOR POSTURE: ICD-10-CM

## 2020-11-23 DIAGNOSIS — R29.898 DECREASED STRENGTH OF TRUNK AND BACK: ICD-10-CM

## 2020-11-23 PROCEDURE — 97110 THERAPEUTIC EXERCISES: CPT | Mod: CQ

## 2020-11-23 NOTE — PROGRESS NOTES
" [facility]      Name: Valarie Colindres  Clinic Number: [id number]    Therapy Diagnosis:   Encounter Diagnoses   Name Primary?    Decreased range of motion of trunk and back Yes    Decreased strength of trunk and back     Poor posture      Physician: Jovani Valencia, *    Visit Date: 11/23/2020    Physician Orders: PT Eval and Treat   Medical Diagnosis from Referral: M54.16 (ICD-10-CM) - Lumbar radiculopathy  Evaluation Date: 10/26/2020  Authorization Period Expiration: 12/26/2020  Plan of Care Expiration: 01/26/2021  Reassessment Due: 11/26/2020  Visit # / Visits authorized: 5/ 26    Time In: 1:15  Time Out:2:30am  Total Billable Time: 40 minutes    Precautions: Standard  Pattern of pain determined: 1 PEN    Subjective   Valarie reports LBP and R upper back from the row machine.  She still has constant numbess in R foot.    Patient reports tolerating previous visit fair  Patient reports their pain to be 5/10 on a 0-10 scale with 0 being no pain and 10 being the worst pain imaginable.  Location: bilateral back      Occupation: Retired   Leisure: Walking with her sister, art, piano   Pt goals: to decrease pain    Objective     Baseline IM Testing Results:   Date of testing: 10/26/2020   ROM 0-45 deg   Max Peak Torque 88    Min Peak Torque 37    Flex/Ext Ratio 2.38:1   % below normative data -34%          Outcomes:  Initial score: 23%  Visit 5 score:  Goal: 23%      Treatment    Pt was instructed in and performed the following:     Valarie received therapeutic exercises to develop/improved posture, cardiovascular endurance, muscular endurance, lumbar/cervical ROM, strength and muscular endurance for 45 minutes including the following exercises:     DKTCx 10  LTR x10  PPT x10  Slouch corrects x10  HL TA activation 3" hold x10  Piriformis 3x 20 secs  Scapular retractions 10x  [facility] 11/23/2020   Visit Number 5   VAS Pain Rating 4   Time 5   Lumbar " "Stretches - Slouch Overcorrection -   Extension in Lying -   Flexion in Lying -   Lumbar Extension Seat Pad -   Femur Restraint -   Top Dead Center -   Counterweight -   Lumbar Flexion -   Lumbar Extension -   Lumbar Peak Torque -   Min Torque -   Test Percent Below Normative Data -   Lumbar Weight 51   Repetitions 19   Rating of Perceived Exertion 3   Ice - Z Lie (in min.) 5         Peripheral muscle strengthening which included 1 set of 15-20 repetitions at a slow, controlled 10-13 second per rep pace focused on strengthening supporting musculature for improved body mechanics and functional mobility.  Pt and therapist focused on proper form during treatment to ensure optimal strengthening of each targeted muscle group.  Machines were utilized including torso rotation, leg extension, leg curl, chest press, upright row. Tricep extension, bicep curl, leg press, and hip abduction added visit 3    [place] received the following manual therapy techniques: Joint mobilizations, Manual traction and Myofacial release were applied to the: none for none minutes.         Home Exercises Provided and Patient Education Provided   Home exercises include:DKTC  LTR  PPT  Slouch corrects  Cardio program: Bike, walking    Education provided:   - - Patient received education in benefits of progressing mobility and strengthening gradually  - Discussed exertion scale, slow progressive resistance protocol to promote safe and healthy strengthening of supportive musculature  by performing 15-20 reps, 7 sec per rep, and increasing weight by 5 % at 20 reps only if there ex done safely, slowly, using correct musculature, exertion and no pain.  Patient expressed understanding.  -Pt educated on strategies to safely perform examination and exercise using "Stop Light" analogy to describe how to avoid or stop irritating motions, proceed with caution with some movements, and progress positive exercises.       Written Home Exercises Provided: Patient " instructed to cont prior HEP.  Exercises were reviewed and Valarie was able to demonstrate them prior to the end of the session.  [place] demonstrated good  understanding of the education provided.   Issued piriformis stretch for HEP, in wrap up section of chart and printed for patient  See EMR under Patient Instructions for exercises provided prior visit.      Assessment   Patient arrives today and reports LBP at 5/10 without numbness in R lateral foot. Pt able to complete 19 reps on Med X with a weight increase with 3/10 exertion level. Added scapular retractions due to pt had scapular pain. Pt liked.  Patient is making good progress towards established goals.  Pt will continue to benefit from skilled outpatient physical therapy to address the deficits stated in the impairment chart, provide pt/family education and to maximize pt's level of independence in the home and community environment.     Anticipated Barriers for therapy: none  Pt's spiritual, cultural and educational needs considered and pt agreeable to plan of care and goals as stated below:       GOALS: Pt is in agreement with the following goals.     Short term goals:  6 weeks or 10 visits   1.  Pt will demonstrate increased lumbar ROM by at least 6 degrees from the initial ROM value with improvements noted in functional ROM and ability to perform ADLs. (appropriate, progressing)   2.  Pt will demonstrate increased MedX average isometric strength value  by 20% from initial test resulting in improved ability to perform bending, lifting, and carrying activities safely, confidently. (appropriate, progressing)      3.  Patient report a reduction in worst pain score by 1-2 points for improved tolerance for completing household chores.(appropriate, progressing)   4.  Pt able to perform HEP correctly with minimal cueing or supervision from therapist to encourage independent management of symptoms. (appropriate, progressing)         Long term goals: 10 weeks or  20 visits   1. Pt will demonstrate increased lumbar ROM by at least 9 degrees from initial ROM value, resulting in improved ability to perform functional fwd bending while standing and sitting.   2. Pt will demonstrate increased MedX average isometric strength value  by 30% from initial test resulting in improved ability to perform bending, lifting, and carrying activities safely, confidently.  3. Pt to demonstrate ability to independently control and reduce their pain through posture positioning and mechanical movements throughout a typical day.  4.  Pt will demonstrate reduced pain and improved functional outcomes as reported on the FOTO by reaching a limitation score of < or = 20% or less in order to demonstrate subjective improvement in pt's condition.    5. Pt will demonstrate independence with the HEP at discharge       Plan   Continue with established Plan of Care towards established PT goals.     Domi Claudio, PTA  11/23/2020

## 2020-11-24 ENCOUNTER — PATIENT MESSAGE (OUTPATIENT)
Dept: PODIATRY | Facility: CLINIC | Age: 75
End: 2020-11-24

## 2020-11-24 ENCOUNTER — PATIENT MESSAGE (OUTPATIENT)
Dept: UROGYNECOLOGY | Facility: CLINIC | Age: 75
End: 2020-11-24

## 2020-11-24 LAB
FINAL PATHOLOGIC DIAGNOSIS: NORMAL
Lab: NORMAL

## 2020-11-25 ENCOUNTER — CLINICAL SUPPORT (OUTPATIENT)
Dept: REHABILITATION | Facility: OTHER | Age: 75
End: 2020-11-25
Attending: FAMILY MEDICINE
Payer: MEDICARE

## 2020-11-25 DIAGNOSIS — R29.898 DECREASED STRENGTH OF TRUNK AND BACK: ICD-10-CM

## 2020-11-25 DIAGNOSIS — R29.3 POOR POSTURE: ICD-10-CM

## 2020-11-25 DIAGNOSIS — M25.69 DECREASED RANGE OF MOTION OF TRUNK AND BACK: Primary | ICD-10-CM

## 2020-11-25 PROCEDURE — 97110 THERAPEUTIC EXERCISES: CPT

## 2020-11-25 NOTE — PROGRESS NOTES
[facility]      Name: Valarie Colindres  Clinic Number: [id number]    Therapy Diagnosis:   Encounter Diagnoses   Name Primary?    Decreased range of motion of trunk and back Yes    Decreased strength of trunk and back     Poor posture      Physician: Jovani Valencia, *    Visit Date: 11/25/2020    Physician Orders: PT Eval and Treat   Medical Diagnosis from Referral: M54.16 (ICD-10-CM) - Lumbar radiculopathy  Evaluation Date: 10/26/2020  Authorization Period Expiration: 12/26/2020  Plan of Care Expiration: 01/26/2021  Reassessment Due: 11/26/2020  Visit # / Visits authorized: 6/ 26    Time In: 10:30  Time Out: 11:20  Total Billable Time: 45 minutes    Precautions: Standard  Pattern of pain determined: 1 PEN    Subjective   Valarie reports she was having some soreness yesterday after being on the machines, she is feeling better today. No difficulty with exercises but she can really feel them when she is having a sore day, usually the day after coming to therapy.     Patient reports tolerating previous visit fair  Patient reports their pain to be 3/10 on a 0-10 scale with 0 being no pain and 10 being the worst pain imaginable.  Location: bilateral back      Occupation: Retired   Leisure: Walking with her sister, art, piano   Pt goals: to decrease pain    Objective     Baseline IM Testing Results:   Date of testing: 10/26/2020   ROM 0-45 deg   Max Peak Torque 88    Min Peak Torque 37    Flex/Ext Ratio 2.38:1   % below normative data -34%          Outcomes:  Initial score: 23%  Visit 5 score:  Goal: 23%      Treatment    Pt was instructed in and performed the following:     Valarie received therapeutic exercises to develop/improved posture, cardiovascular endurance, muscular endurance, lumbar/cervical ROM, strength and muscular endurance for 45 minutes including the following exercises:     DKTCx 10  LTR x10  PPT x10  Posterior pelvic tilt with marching  "10x  Slouch corrects x10  HL TA activation 3" hold x10  Piriformis 3x 20 secs  Scapular retractions 10x    [facility] 11/25/2020   Visit Number 6   VAS Pain Rating 4   Time 5   Lumbar Stretches - Slouch Overcorrection 10   Extension in Lying -   Flexion in Lying 10   Lumbar Extension Seat Pad -   Test Percent Below Normative Data -   Lumbar Weight 51   Repetitions 20   Rating of Perceived Exertion 3   Ice - Z Lie (in min.) 5           Peripheral muscle strengthening which included 1 set of 15-20 repetitions at a slow, controlled 10-13 second per rep pace focused on strengthening supporting musculature for improved body mechanics and functional mobility.  Pt and therapist focused on proper form during treatment to ensure optimal strengthening of each targeted muscle group.  Machines were utilized including torso rotation, leg extension, leg curl, chest press, upright row. Tricep extension, bicep curl, leg press, and hip abduction added visit 3    [place] received the following manual therapy techniques: Joint mobilizations, Manual traction and Myofacial release were applied to the: none for none minutes.         Home Exercises Provided and Patient Education Provided   Home exercises include:DKTC  LTR  PPT  Slouch corrects  Cardio program: Bike, walking    Education provided:   - - Patient received education in benefits of progressing mobility and strengthening gradually  - Discussed exertion scale, slow progressive resistance protocol to promote safe and healthy strengthening of supportive musculature  by performing 15-20 reps, 7 sec per rep, and increasing weight by 5 % at 20 reps only if there ex done safely, slowly, using correct musculature, exertion and no pain.  Patient expressed understanding.  -Pt educated on strategies to safely perform examination and exercise using "Stop Light" analogy to describe how to avoid or stop irritating motions, proceed with caution with some movements, and progress " positive exercises.       Written Home Exercises Provided: Patient instructed to cont prior HEP.  Exercises were reviewed and Moabdulkadir was able to demonstrate them prior to the end of the session.  [place] demonstrated good  understanding of the education provided.   Issued piriformis stretch for HEP, in wrap up section of chart and printed for patient  See EMR under Patient Instructions for exercises provided prior visit.      Assessment   Patient arrives today with minimal soreness and reports feeling better post therapy. Added core stabilization exercise with marching, she was able to complete with some cues for maintaining pelvic tilt. Resumed resistance on the lumbar medx at 51 ft lbs and she was able to complete 20 repetitions, she required mod verbal cues for pace.  Plan to increase resistance next visit.     Patient is making good progress towards established goals.  Pt will continue to benefit from skilled outpatient physical therapy to address the deficits stated in the impairment chart, provide pt/family education and to maximize pt's level of independence in the home and community environment.     Anticipated Barriers for therapy: none  Pt's spiritual, cultural and educational needs considered and pt agreeable to plan of care and goals as stated below:       GOALS: Pt is in agreement with the following goals.     Short term goals:  6 weeks or 10 visits   1.  Pt will demonstrate increased lumbar ROM by at least 6 degrees from the initial ROM value with improvements noted in functional ROM and ability to perform ADLs. (appropriate, progressing)   2.  Pt will demonstrate increased MedX average isometric strength value  by 20% from initial test resulting in improved ability to perform bending, lifting, and carrying activities safely, confidently. (appropriate, progressing)      3.  Patient report a reduction in worst pain score by 1-2 points for improved tolerance for completing household chores.(appropriate,  progressing)   4.  Pt able to perform HEP correctly with minimal cueing or supervision from therapist to encourage independent management of symptoms. (appropriate, progressing)         Long term goals: 10 weeks or 20 visits   1. Pt will demonstrate increased lumbar ROM by at least 9 degrees from initial ROM value, resulting in improved ability to perform functional fwd bending while standing and sitting.   2. Pt will demonstrate increased MedX average isometric strength value  by 30% from initial test resulting in improved ability to perform bending, lifting, and carrying activities safely, confidently.  3. Pt to demonstrate ability to independently control and reduce their pain through posture positioning and mechanical movements throughout a typical day.  4.  Pt will demonstrate reduced pain and improved functional outcomes as reported on the FOTO by reaching a limitation score of < or = 20% or less in order to demonstrate subjective improvement in pt's condition.    5. Pt will demonstrate independence with the HEP at discharge       Plan   Continue with established Plan of Care towards established PT goals.     Thad Beckham, PT  11/25/2020

## 2020-11-30 ENCOUNTER — CLINICAL SUPPORT (OUTPATIENT)
Dept: REHABILITATION | Facility: OTHER | Age: 75
End: 2020-11-30
Attending: FAMILY MEDICINE
Payer: MEDICARE

## 2020-11-30 DIAGNOSIS — R29.898 DECREASED STRENGTH OF TRUNK AND BACK: ICD-10-CM

## 2020-11-30 DIAGNOSIS — R29.3 POOR POSTURE: ICD-10-CM

## 2020-11-30 DIAGNOSIS — M25.69 DECREASED RANGE OF MOTION OF TRUNK AND BACK: Primary | ICD-10-CM

## 2020-11-30 PROCEDURE — 97110 THERAPEUTIC EXERCISES: CPT | Mod: CQ

## 2020-11-30 NOTE — PROGRESS NOTES
"    [facility]      Name: Valarie Colindres  Clinic Number: [id number]    Therapy Diagnosis:   Encounter Diagnoses   Name Primary?    Decreased range of motion of trunk and back Yes    Decreased strength of trunk and back     Poor posture      Physician: Jovani Valencia, *    Visit Date: 11/30/2020    Physician Orders: PT Eval and Treat   Medical Diagnosis from Referral: M54.16 (ICD-10-CM) - Lumbar radiculopathy  Evaluation Date: 10/26/2020  Authorization Period Expiration: 12/26/2020  Plan of Care Expiration: 01/26/2021  Reassessment Due: 11/26/2020  Visit # / Visits authorized: 7/ 26    Time In: 10:15  Time Out: 11:15  Total Billable Time: 50 minutes    Precautions: Standard  Pattern of pain determined: 1 PEN    Subjective   [place] reports still having more R side LBP. The stretches help but, the pain always comes back.     Patient reports tolerating previous visit fair  Patient reports their pain to be 4/10 on a 0-10 scale with 0 being no pain and 10 being the worst pain imaginable.  Location: bilateral back      Occupation: Retired   Leisure: Walking with her sister, art, piano   Pt goals: to decrease pain    Objective     Baseline IM Testing Results:   Date of testing: 10/26/2020   ROM 0-45 deg   Max Peak Torque 88    Min Peak Torque 37    Flex/Ext Ratio 2.38:1   % below normative data -34%          Outcomes:  Initial score: 23%  Visit 5 score:  Goal: 23%      Treatment    Pt was instructed in and performed the following:     Valarie received therapeutic exercises to develop/improved posture, cardiovascular endurance, muscular endurance, lumbar/cervical ROM, strength and muscular endurance for 60 minutes including the following exercises:     DKTCx 10  LTR x10  PPT x10  Posterior pelvic tilt with marching 10x  Slouch corrects x10  HL TA activation 3" hold x10  Piriformis 3x 20 secs  Scapular retractions 10x    [facility] 11/30/2020   Visit " Number 7   VAS Pain Rating 4   Time 5   Lumbar Stretches - Slouch Overcorrection -   Extension in Lying -   Flexion in Lying -   Manual Therapy 5   Lumbar Extension Seat Pad -   Femur Restraint -   Top Dead Center -   Counterweight -   Lumbar Flexion -   Lumbar Extension -   Lumbar Peak Torque -   Min Torque -   Test Percent Below Normative Data -   Lumbar Weight 54   Repetitions 20   Rating of Perceived Exertion 3   Ice - Z Lie (in min.) 5             Peripheral muscle strengthening which included 1 set of 15-20 repetitions at a slow, controlled 10-13 second per rep pace focused on strengthening supporting musculature for improved body mechanics and functional mobility.  Pt and therapist focused on proper form during treatment to ensure optimal strengthening of each targeted muscle group.  Machines were utilized including torso rotation, leg extension, leg curl, chest press, upright row. Tricep extension, bicep curl, leg press, and hip abduction added visit 3    [place] received the following manual therapy techniques: Vacuum/cupping STM with manual therapy techniques was performed to B LB to decrease muscle tightness, increase circulation and promote healing process. The pt's skin was monitored for redness adjusting pressure as needed. The pt was instructed in possible side effects of bruising and/or soreness. 5 min          Home Exercises Provided and Patient Education Provided   Home exercises include:DKTC  LTR  PPT  Slouch corrects  Cardio program: Bike, walking    Education provided:   - - Patient received education in benefits of progressing mobility and strengthening gradually  - Discussed exertion scale, slow progressive resistance protocol to promote safe and healthy strengthening of supportive musculature  by performing 15-20 reps, 7 sec per rep, and increasing weight by 5 % at 20 reps only if there ex done safely, slowly, using correct musculature, exertion and no pain.  Patient expressed  "understanding.  -Pt educated on strategies to safely perform examination and exercise using "Stop Light" analogy to describe how to avoid or stop irritating motions, proceed with caution with some movements, and progress positive exercises.       Written Home Exercises Provided: Patient instructed to cont prior HEP.  Exercises were reviewed and Valarie was able to demonstrate them prior to the end of the session.  [place] demonstrated good  understanding of the education provided.   Issued piriformis stretch for HEP, in wrap up section of chart and printed for patient  See EMR under Patient Instructions for exercises provided prior visit.      Assessment   Patient arrives today with minimal R LBP that did decreased with cupping and session.  Increased resistance on the lumbar medx at 54 ft lbs and she was able to complete 20 repetitions, she required mod verbal cues for pace.  Plan to increase weight next session. Educated pt on the importance of daily stretch 2x a day to increase the benefit of program and positive results.     Patient is making good progress towards established goals.  Pt will continue to benefit from skilled outpatient physical therapy to address the deficits stated in the impairment chart, provide pt/family education and to maximize pt's level of independence in the home and community environment.     Anticipated Barriers for therapy: none  Pt's spiritual, cultural and educational needs considered and pt agreeable to plan of care and goals as stated below:       GOALS: Pt is in agreement with the following goals.     Short term goals:  6 weeks or 10 visits   1.  Pt will demonstrate increased lumbar ROM by at least 6 degrees from the initial ROM value with improvements noted in functional ROM and ability to perform ADLs. (appropriate, progressing)   2.  Pt will demonstrate increased MedX average isometric strength value  by 20% from initial test resulting in improved ability to perform bending, " lifting, and carrying activities safely, confidently. (appropriate, progressing)      3.  Patient report a reduction in worst pain score by 1-2 points for improved tolerance for completing household chores.(appropriate, progressing)   4.  Pt able to perform HEP correctly with minimal cueing or supervision from therapist to encourage independent management of symptoms. (appropriate, progressing)         Long term goals: 10 weeks or 20 visits   1. Pt will demonstrate increased lumbar ROM by at least 9 degrees from initial ROM value, resulting in improved ability to perform functional fwd bending while standing and sitting.   2. Pt will demonstrate increased MedX average isometric strength value  by 30% from initial test resulting in improved ability to perform bending, lifting, and carrying activities safely, confidently.  3. Pt to demonstrate ability to independently control and reduce their pain through posture positioning and mechanical movements throughout a typical day.  4.  Pt will demonstrate reduced pain and improved functional outcomes as reported on the FOTO by reaching a limitation score of < or = 20% or less in order to demonstrate subjective improvement in pt's condition.    5. Pt will demonstrate independence with the HEP at discharge       Plan   Continue with established Plan of Care towards established PT goals.     Domi Claudio, PTA  11/30/2020

## 2020-12-02 ENCOUNTER — CLINICAL SUPPORT (OUTPATIENT)
Dept: REHABILITATION | Facility: OTHER | Age: 75
End: 2020-12-02
Attending: FAMILY MEDICINE
Payer: MEDICARE

## 2020-12-02 DIAGNOSIS — M25.69 DECREASED RANGE OF MOTION OF TRUNK AND BACK: Primary | ICD-10-CM

## 2020-12-02 DIAGNOSIS — R29.898 DECREASED STRENGTH OF TRUNK AND BACK: ICD-10-CM

## 2020-12-02 DIAGNOSIS — R29.3 POOR POSTURE: ICD-10-CM

## 2020-12-02 PROCEDURE — 97110 THERAPEUTIC EXERCISES: CPT

## 2020-12-02 NOTE — PROGRESS NOTES
"    [facility]      Name: Valarie Colindres  Clinic Number: [id number]    Therapy Diagnosis:   Encounter Diagnoses   Name Primary?    Decreased range of motion of trunk and back Yes    Decreased strength of trunk and back     Poor posture      Physician: Jovani Valencia, *    Visit Date: 12/2/2020    Physician Orders: PT Eval and Treat   Medical Diagnosis from Referral: M54.16 (ICD-10-CM) - Lumbar radiculopathy  Evaluation Date: 10/26/2020  Authorization Period Expiration: 12/26/2020  Plan of Care Expiration: 01/26/2021  Reassessment Due: 1/2/2021  Visit # / Visits authorized: 8/ 26    Time In: 10:15  Time Out: 11:10  Total Billable Time: 55 minutes    Precautions: Standard  Pattern of pain determined: 1 PEN    Subjective   [place] reports still having more R side LBP. The stretches help but, the pain always comes back.     Patient reports tolerating previous visit fair  Patient reports their pain to be 4/10 on a 0-10 scale with 0 being no pain and 10 being the worst pain imaginable.  Location: bilateral back      Occupation: Retired   Leisure: Walking with her sister, art, piano   Pt goals: to decrease pain    Objective     Baseline IM Testing Results:   Date of testing: 10/26/2020   ROM 0-45 deg  0-51 12/02   Max Peak Torque 88    Min Peak Torque 37    Flex/Ext Ratio 2.38:1   % below normative data -34%          Outcomes:  Initial score: 23%  Visit 5 score:  Goal: 23%      Treatment    Pt was instructed in and performed the following:     Valarie received therapeutic exercises to develop/improved posture, cardiovascular endurance, muscular endurance, lumbar/cervical ROM, strength and muscular endurance for 60 minutes including the following exercises:     DKTCx 10  LTR x10  PPT x10  Posterior pelvic tilt with marching 10x  Slouch corrects x10   HL TA activation 3" hold x10  Piriformis 3x 20 secs  Scapular retractions 10x  Bridges x 10    [facility] 12/2/2020   Visit Number 8   VAS Pain Rating 3   Time 5   Lumbar Stretches - Slouch Overcorrection 10   Extension in Lying -   Flexion in Lying 10   Manual Therapy -   Lumbar Extension Seat Pad -   Femur Restraint -   Top Dead Center -   Counterweight -   Lumbar Flexion 51   Lumbar Extension 0   Lumbar Peak Torque -   Min Torque -   Test Percent Below Normative Data -   Lumbar Weight 57   Repetitions 20   Rating of Perceived Exertion 3   Ice - Z Lie (in min.) 5             Peripheral muscle strengthening which included 1 set of 15-20 repetitions at a slow, controlled 10-13 second per rep pace focused on strengthening supporting musculature for improved body mechanics and functional mobility.  Pt and therapist focused on proper form during treatment to ensure optimal strengthening of each targeted muscle group.  Machines were utilized including torso rotation, leg extension, leg curl, chest press, upright row. Tricep extension, bicep curl, leg press, and hip abduction added visit 3    [place] received the following manual therapy techniques: Vacuum/cupping STM with manual therapy techniques was performed to B LB to decrease muscle tightness, increase circulation and promote healing process. The pt's skin was monitored for redness adjusting pressure as needed. The pt was instructed in possible side effects of bruising and/or soreness. 5 min          Home Exercises Provided and Patient Education Provided   Home exercises include:DKTC  LTR  PPT  Slouch corrects  Cardio program: Bike, walking    Education provided:   - - Patient received education in benefits of progressing mobility and strengthening gradually  - Discussed exertion scale, slow progressive resistance protocol to promote safe and healthy strengthening of supportive musculature  by performing 15-20 reps, 7 sec per rep, and increasing weight by 5 % at 20 reps only if there ex done safely, slowly, using correct musculature, exertion and no pain.   "Patient expressed understanding.  -Pt educated on strategies to safely perform examination and exercise using "Stop Light" analogy to describe how to avoid or stop irritating motions, proceed with caution with some movements, and progress positive exercises.       Written Home Exercises Provided: Patient instructed to cont prior HEP.  Exercises were reviewed and Valarie was able to demonstrate them prior to the end of the session.  [place] demonstrated good  understanding of the education provided.   Issued piriformis stretch for HEP, in wrap up section of chart and printed for patient  See EMR under Patient Instructions for exercises provided prior visit.      Assessment   Patient reports relief with cupping last session. She states that she has been able to do more housework lately with less c/o pain. ROM reassessed today on MedX and pt with inc in flex pain free range. Increased resistance on the lumbar medx at 57 ft lbs and she was able to complete 18 repetitions, she required min verbal cues for pace. Educated pt on the importance of daily stretch 2x a day to increase the benefit of program and positive results.     Patient is making good progress towards established goals.  Pt will continue to benefit from skilled outpatient physical therapy to address the deficits stated in the impairment chart, provide pt/family education and to maximize pt's level of independence in the home and community environment.     Anticipated Barriers for therapy: none  Pt's spiritual, cultural and educational needs considered and pt agreeable to plan of care and goals as stated below:       GOALS: Pt is in agreement with the following goals.     Short term goals:  6 weeks or 10 visits   1.  Pt will demonstrate increased lumbar ROM by at least 6 degrees from the initial ROM value with improvements noted in functional ROM and ability to perform ADLs. (appropriate, progressing)   2.  Pt will demonstrate increased MedX average " isometric strength value  by 20% from initial test resulting in improved ability to perform bending, lifting, and carrying activities safely, confidently. (appropriate, progressing)      3.  Patient report a reduction in worst pain score by 1-2 points for improved tolerance for completing household chores.(appropriate, progressing)   4.  Pt able to perform HEP correctly with minimal cueing or supervision from therapist to encourage independent management of symptoms. (appropriate, progressing)         Long term goals: 10 weeks or 20 visits   1. Pt will demonstrate increased lumbar ROM by at least 9 degrees from initial ROM value, resulting in improved ability to perform functional fwd bending while standing and sitting.   2. Pt will demonstrate increased MedX average isometric strength value  by 30% from initial test resulting in improved ability to perform bending, lifting, and carrying activities safely, confidently.  3. Pt to demonstrate ability to independently control and reduce their pain through posture positioning and mechanical movements throughout a typical day.  4.  Pt will demonstrate reduced pain and improved functional outcomes as reported on the FOTO by reaching a limitation score of < or = 20% or less in order to demonstrate subjective improvement in pt's condition.    5. Pt will demonstrate independence with the HEP at discharge       Plan   Continue with established Plan of Care towards established PT goals.     Dontae Franks, PT  12/2/2020

## 2020-12-04 ENCOUNTER — PATIENT MESSAGE (OUTPATIENT)
Dept: PRIMARY CARE CLINIC | Facility: CLINIC | Age: 75
End: 2020-12-04

## 2020-12-04 DIAGNOSIS — G57.93 NEUROPATHY OF BOTH FEET: Primary | ICD-10-CM

## 2020-12-07 NOTE — TELEPHONE ENCOUNTER
I have placed a referral to neurology regarding her concern.    .........................................................................    Hi Ms. Colindres,    You will need to ask if your primary care M.D. can order it.         Last read by Valarie Colindres at 9:42 PM on 12/1/2020.  November 29, 2020  Valarie Colindres  to Antonia Marino DPM          11:16 PM  Dr. Marino,   Can my primary care M.D. order an MRI?    Yes, could you check with the radiologist in reference to the needle in my left large toe.  Thank you,  Valarie Colindres  November 25, 2020  Antonia Marino DPM  to Valarie Colindres          1:24 PM     Hi Ms. Colindres,     Are you seeing a spine doctor?  It is out of my scope of practice to order a lower back and leg MRI but I can order bilateral foot MRIs.  However, I'm not sure if a foot MRI can be done due to the needle in your left foot.   I will check with the radiologist.

## 2020-12-08 ENCOUNTER — TELEPHONE (OUTPATIENT)
Dept: NEUROLOGY | Facility: CLINIC | Age: 75
End: 2020-12-08

## 2020-12-09 ENCOUNTER — CLINICAL SUPPORT (OUTPATIENT)
Dept: REHABILITATION | Facility: OTHER | Age: 75
End: 2020-12-09
Attending: FAMILY MEDICINE
Payer: MEDICARE

## 2020-12-09 DIAGNOSIS — R29.3 POOR POSTURE: ICD-10-CM

## 2020-12-09 DIAGNOSIS — R29.898 DECREASED STRENGTH OF TRUNK AND BACK: ICD-10-CM

## 2020-12-09 DIAGNOSIS — M25.69 DECREASED RANGE OF MOTION OF TRUNK AND BACK: Primary | ICD-10-CM

## 2020-12-09 PROCEDURE — 97110 THERAPEUTIC EXERCISES: CPT

## 2020-12-09 NOTE — PROGRESS NOTES
"  [facility]      Name: Valarie Colindres  Clinic Number: [id number]    Therapy Diagnosis:   Encounter Diagnoses   Name Primary?    Decreased range of motion of trunk and back Yes    Decreased strength of trunk and back     Poor posture      Physician: Jovani Valencia, *    Visit Date: 12/9/2020    Physician Orders: PT Eval and Treat   Medical Diagnosis from Referral: M54.16 (ICD-10-CM) - Lumbar radiculopathy  Evaluation Date: 10/26/2020  Authorization Period Expiration: 12/26/2020  Plan of Care Expiration: 01/26/2021  Reassessment Due: 1/2/2021  Visit # / Visits authorized: 9/ 26    Time In: 10:15  Time Out: 11:10  Total Billable Time: 55 minutes    Precautions: Standard  Pattern of pain determined: 1 PEN    Subjective   [place] reports still having more R side LBP. The stretches help but, the pain always comes back.     Patient reports tolerating previous visit fair  Patient reports their pain to be 4/10 on a 0-10 scale with 0 being no pain and 10 being the worst pain imaginable.  Location: bilateral back      Occupation: Retired   Leisure: Walking with her sister, art, piano   Pt goals: to decrease pain    Objective     Baseline IM Testing Results:   Date of testing: 10/26/2020   ROM 0-45 deg  0-51 12/02   Max Peak Torque 88    Min Peak Torque 37    Flex/Ext Ratio 2.38:1   % below normative data -34%          Outcomes:  Initial score: 23%  Visit 5 score:  Goal: 23%      Treatment    Pt was instructed in and performed the following:     Valarie received therapeutic exercises to develop/improved posture, cardiovascular endurance, muscular endurance, lumbar/cervical ROM, strength and muscular endurance for 60 minutes including the following exercises:     DKTCx 10  LTR x10  PPT x10  Posterior pelvic tilt with marching 10x  Slouch corrects x10   HL TA activation 3" hold x10  Piriformis 3x 20 secs  Scapular retractions 10x  Bridges x 10    [facility] " 12/2/2020   Visit Number 8   VAS Pain Rating 3   Time 5   Lumbar Stretches - Slouch Overcorrection 10   Extension in Lying -   Flexion in Lying 10   Manual Therapy -   Lumbar Extension Seat Pad -   Femur Restraint -   Top Dead Center -   Counterweight -   Lumbar Flexion 51   Lumbar Extension 0   Lumbar Peak Torque -   Min Torque -   Test Percent Below Normative Data -   Lumbar Weight 57   Repetitions 20   Rating of Perceived Exertion 3   Ice - Z Lie (in min.) 5             Peripheral muscle strengthening which included 1 set of 15-20 repetitions at a slow, controlled 10-13 second per rep pace focused on strengthening supporting musculature for improved body mechanics and functional mobility.  Pt and therapist focused on proper form during treatment to ensure optimal strengthening of each targeted muscle group.  Machines were utilized including torso rotation, leg extension, leg curl, chest press, upright row. Tricep extension, bicep curl, leg press, and hip abduction added visit 3    [place] received the following manual therapy techniques: Vacuum/cupping STM with manual therapy techniques was performed to B LB to decrease muscle tightness, increase circulation and promote healing process. The pt's skin was monitored for redness adjusting pressure as needed. The pt was instructed in possible side effects of bruising and/or soreness. 5 min          Home Exercises Provided and Patient Education Provided   Home exercises include:DKTC  LTR  PPT  Slouch corrects  Cardio program: Bike, walking    Education provided:   - - Patient received education in benefits of progressing mobility and strengthening gradually  - Discussed exertion scale, slow progressive resistance protocol to promote safe and healthy strengthening of supportive musculature  by performing 15-20 reps, 7 sec per rep, and increasing weight by 5 % at 20 reps only if there ex done safely, slowly, using correct musculature, exertion and no pain.  Patient  "expressed understanding.  -Pt educated on strategies to safely perform examination and exercise using "Stop Light" analogy to describe how to avoid or stop irritating motions, proceed with caution with some movements, and progress positive exercises.       Written Home Exercises Provided: Patient instructed to cont prior HEP.  Exercises were reviewed and Moabdulkadir was able to demonstrate them prior to the end of the session.  Valarie demonstrated good  understanding of the education provided.   Issued piriformis stretch for HEP, in wrap up section of chart and printed for patient  See EMR under Patient Instructions for exercises provided prior visit.      Assessment   She states that she has been able to do more housework lately with less c/o pain. Increased resistance on the lumbar medx at 57 ft lbs and she was able to complete 20 repetitions, she required min verbal cues for pace. Educated pt on the importance of daily stretch 2x a day to increase the benefit of program and positive results.     Patient is making good progress towards established goals.  Pt will continue to benefit from skilled outpatient physical therapy to address the deficits stated in the impairment chart, provide pt/family education and to maximize pt's level of independence in the home and community environment.     Anticipated Barriers for therapy: none  Pt's spiritual, cultural and educational needs considered and pt agreeable to plan of care and goals as stated below:       GOALS: Pt is in agreement with the following goals.     Short term goals:  6 weeks or 10 visits   1.  Pt will demonstrate increased lumbar ROM by at least 6 degrees from the initial ROM value with improvements noted in functional ROM and ability to perform ADLs. (appropriate, progressing)   2.  Pt will demonstrate increased MedX average isometric strength value  by 20% from initial test resulting in improved ability to perform bending, lifting, and carrying activities " safely, confidently. (appropriate, progressing)      3.  Patient report a reduction in worst pain score by 1-2 points for improved tolerance for completing household chores.(appropriate, progressing)   4.  Pt able to perform HEP correctly with minimal cueing or supervision from therapist to encourage independent management of symptoms. (appropriate, progressing)         Long term goals: 10 weeks or 20 visits   1. Pt will demonstrate increased lumbar ROM by at least 9 degrees from initial ROM value, resulting in improved ability to perform functional fwd bending while standing and sitting.   2. Pt will demonstrate increased MedX average isometric strength value  by 30% from initial test resulting in improved ability to perform bending, lifting, and carrying activities safely, confidently.  3. Pt to demonstrate ability to independently control and reduce their pain through posture positioning and mechanical movements throughout a typical day.  4.  Pt will demonstrate reduced pain and improved functional outcomes as reported on the FOTO by reaching a limitation score of < or = 20% or less in order to demonstrate subjective improvement in pt's condition.    5. Pt will demonstrate independence with the HEP at discharge       Plan   Continue with established Plan of Care towards established PT goals.     Dontae Franks, PT  12/9/2020

## 2020-12-11 ENCOUNTER — CLINICAL SUPPORT (OUTPATIENT)
Dept: REHABILITATION | Facility: OTHER | Age: 75
End: 2020-12-11
Attending: FAMILY MEDICINE
Payer: MEDICARE

## 2020-12-11 DIAGNOSIS — R29.3 POOR POSTURE: ICD-10-CM

## 2020-12-11 DIAGNOSIS — R29.898 DECREASED STRENGTH OF TRUNK AND BACK: ICD-10-CM

## 2020-12-11 DIAGNOSIS — M25.69 DECREASED RANGE OF MOTION OF TRUNK AND BACK: Primary | ICD-10-CM

## 2020-12-11 PROCEDURE — 97110 THERAPEUTIC EXERCISES: CPT

## 2020-12-11 PROCEDURE — 97750 PHYSICAL PERFORMANCE TEST: CPT

## 2020-12-11 NOTE — PROGRESS NOTES
[facility]      Name: Valarie Colindres  Clinic Number: [id number]    Therapy Diagnosis:   Encounter Diagnoses   Name Primary?    Decreased range of motion of trunk and back Yes    Decreased strength of trunk and back     Poor posture      Physician: Jovani Valencia, *    Visit Date: 2020    Physician Orders: PT Eval and Treat   Medical Diagnosis from Referral: M54.16 (ICD-10-CM) - Lumbar radiculopathy  Evaluation Date: 10/26/2020  Authorization Period Expiration: 2020  Plan of Care Expiration: 2021  Reassessment Due: 2021  Visit # / Visits authorized: 10/ 26    Time In: 1125  Time Out: 1215  Total Billable Time: 40 minutes    Precautions: Standard  Pattern of pain determined: 1 PEN    Subjective   Valarie reports she is feeling a discomfort today    Patient reports tolerating previous visit fair  Patient reports their pain to be 3.5/10 on a 0-10 scale with 0 being no pain and 10 being the worst pain imaginable.  Location: bilateral back      Occupation: Retired   Leisure: Walking with her sister, art, piano   Pt goals: to decrease pain    Objective     Baseline IM Testing Results:   Date of testing: 10/26/2020   ROM 0-45 deg  0-51 12/02   Max Peak Torque 88    Min Peak Torque 37    Flex/Ext Ratio 2.38:1   % below normative data -34%      Mid pointIM Testing Results:   Date of testin20  ROM 0-51   Max Peak Torque 133   Min Peak Torque 63   Flex/Ext Ratio 2.11:1   % below normative data -5%    35% strength gain    Outcomes:  Initial score: 23%  Visit 5 score:  Goal: 23%      Treatment    Pt was instructed in and performed the following:     Vaalrie received therapeutic exercises to develop/improved posture, cardiovascular endurance, muscular endurance, lumbar/cervical ROM, strength and muscular endurance for 50 minutes including the following exercises:   [facility] 2020   Visit Number 10   VAS Pain Rating 3.5  "  Time 5   Lumbar Stretches - Slouch Overcorrection 10   Extension in Lying -   Flexion in Lying 10   Manual Therapy -   Lumbar Extension Seat Pad -   Femur Restraint -   Top Dead Center -   Counterweight -   Lumbar Flexion -   Lumbar Extension -   Lumbar Peak Torque 133   Min Torque 63   Test Percent Below Normative Data 5   Test Percent Gain in Strength from Initial  35   Lumbar Weight -   Repetitions -   Rating of Perceived Exertion -   Ice - Z Lie (in min.) 5       DKTCx 10  LTR x10  PPT x10  Posterior pelvic tilt with marching 10x  Slouch corrects x10   HL TA activation 3" hold x10 N/P  Piriformis 3x 20 secs  Scapular retractions 10x N/P  Bridges x 10            Peripheral muscle strengthening which included 1 set of 15-20 repetitions at a slow, controlled 10-13 second per rep pace focused on strengthening supporting musculature for improved body mechanics and functional mobility.  Pt and therapist focused on proper form during treatment to ensure optimal strengthening of each targeted muscle group.  Machines were utilized including torso rotation, leg extension, leg curl, chest press, upright row. Tricep extension, bicep curl, leg press, and hip abduction added visit 3    [place] received the following manual therapy techniques: Vacuum/cupping STM with manual therapy techniques was performed to B LB to decrease muscle tightness, increase circulation and promote healing process. The pt's skin was monitored for redness adjusting pressure as needed. The pt was instructed in possible side effects of bruising and/or soreness. 0min          Home Exercises Provided and Patient Education Provided   Home exercises include:DKTC  LTR  PPT  Slouch corrects  Cardio program: Bike, walking    Education provided:   - - Patient received education in benefits of progressing mobility and strengthening gradually  - Discussed exertion scale, slow progressive resistance protocol to promote safe and healthy strengthening of " "supportive musculature  by performing 15-20 reps, 7 sec per rep, and increasing weight by 5 % at 20 reps only if there ex done safely, slowly, using correct musculature, exertion and no pain.  Patient expressed understanding.  -Pt educated on strategies to safely perform examination and exercise using "Stop Light" analogy to describe how to avoid or stop irritating motions, proceed with caution with some movements, and progress positive exercises.       Written Home Exercises Provided: Patient instructed to cont prior HEP.  Exercises were reviewed and Valarie was able to demonstrate them prior to the end of the session.  [place] demonstrated good  understanding of the education provided.   Issued piriformis stretch for HEP, in wrap up section of chart and printed for patient  See EMR under Patient Instructions for exercises provided prior visit.      Assessment   Patient has attended 10 visits at [facility] which included MD evaluation, PT evaluation with isometric testing, and physical therapy treatment including HEP instruction, education, aerobic work, dynamic strengthening on med ex equipment for the spine, and whole body strengthening on med ex equipment with increasing weight loads.  Patient  is demonstrating increased ability to reduce symptoms, improved posture, improved   ROM, and improved   strength on med ex test by  35 average.    Patient is making good progress towards established goals.  Pt will continue to benefit from skilled outpatient physical therapy to address the deficits stated in the impairment chart, provide pt/family education and to maximize pt's level of independence in the home and community environment.     Anticipated Barriers for therapy: none  Pt's spiritual, cultural and educational needs considered and pt agreeable to plan of care and goals as stated below:       GOALS: Pt is in agreement with the following goals.     Short term goals:  6 weeks or 10 visits   1.  Pt will " demonstrate increased lumbar ROM by at least 6 degrees from the initial ROM value with improvements noted in functional ROM and ability to perform ADLs. MET 12/11/20  2.  Pt will demonstrate increased MedX average isometric strength value  by 20% from initial test resulting in improved ability to perform bending, lifting, and carrying activities safely, confidently. MET  3.  Patient report a reduction in worst pain score by 1-2 points for improved tolerance for completing household chores.(appropriate, progressing)   4.  Pt able to perform HEP correctly with minimal cueing or supervision from therapist to encourage independent management of symptoms. (appropriate, progressing)         Long term goals: 10 weeks or 20 visits   1. Pt will demonstrate increased lumbar ROM by at least 9 degrees from initial ROM value, resulting in improved ability to perform functional fwd bending while standing and sitting.   2. Pt will demonstrate increased MedX average isometric strength value  by 30% from initial test resulting in improved ability to perform bending, lifting, and carrying activities safely, confidently.MET  3. Pt to demonstrate ability to independently control and reduce their pain through posture positioning and mechanical movements throughout a typical day.  4.  Pt will demonstrate reduced pain and improved functional outcomes as reported on the FOTO by reaching a limitation score of < or = 20% or less in order to demonstrate subjective improvement in pt's condition.    5. Pt will demonstrate independence with the HEP at discharge       Plan   Continue with established Plan of Care towards established PT goals.     Valorie Sarmiento, PT  12/11/2020

## 2020-12-15 ENCOUNTER — CLINICAL SUPPORT (OUTPATIENT)
Dept: REHABILITATION | Facility: OTHER | Age: 75
End: 2020-12-15
Attending: FAMILY MEDICINE
Payer: MEDICARE

## 2020-12-15 DIAGNOSIS — R29.898 DECREASED STRENGTH OF TRUNK AND BACK: ICD-10-CM

## 2020-12-15 DIAGNOSIS — M25.69 DECREASED RANGE OF MOTION OF TRUNK AND BACK: Primary | ICD-10-CM

## 2020-12-15 DIAGNOSIS — R29.3 POOR POSTURE: ICD-10-CM

## 2020-12-15 PROCEDURE — 97110 THERAPEUTIC EXERCISES: CPT

## 2020-12-15 NOTE — PROGRESS NOTES
[facility]      Name: Valarie Colindres  Clinic Number: [id number]    Therapy Diagnosis:   Encounter Diagnoses   Name Primary?    Decreased range of motion of trunk and back Yes    Decreased strength of trunk and back     Poor posture      Physician: Jovani Valencia, *    Visit Date: 12/15/2020    Physician Orders: PT Eval and Treat   Medical Diagnosis from Referral: M54.16 (ICD-10-CM) - Lumbar radiculopathy  Evaluation Date: 10/26/2020  Authorization Period Expiration: 2020  Plan of Care Expiration: 2021  Reassessment Due: 2021  Visit # / Visits authorized:     Time In: 1225  Time Out:120pm  Total Billable Time: 45minutes    Precautions: Standard  Pattern of pain determined: 1 PEN    Subjective   Valarie reports she was in her shed yesterday getting decorations out and was bending fwd a lot. Pt reports she had an increase in pain which got better with stretching  Patient reports tolerating previous visit fair  Patient reports their pain to be 4/10 on a 0-10 scale with 0 being no pain and 10 being the worst pain imaginable.  Location: bilateral back      Occupation: Retired   Leisure: Walking with her sister, art, piano   Pt goals: to decrease pain    Objective     Baseline IM Testing Results:   Date of testing: 10/26/2020   ROM 0-45 deg  0-51 12/02   Max Peak Torque 88    Min Peak Torque 37    Flex/Ext Ratio 2.38:1   % below normative data -34%      Mid pointIM Testing Results:   Date of testin20  ROM 0-51   Max Peak Torque 133   Min Peak Torque 63   Flex/Ext Ratio 2.11:1   % below normative data -5%    35% strength gain    Outcomes:  Initial score: 23%  Visit 5 score:  Goal: 23%      Treatment    Pt was instructed in and performed the following:     Valarie received therapeutic exercises to develop/improved posture, cardiovascular endurance, muscular endurance, lumbar/cervical ROM, strength and muscular endurance for 55  minutes including the following exercises:   [facility] 12/15/2020   Visit Number 11   VAS Pain Rating -   Treadmill Time (in min.) 5   Time -   Lumbar Stretches - Slouch Overcorrection 10   Extension in Lying -   Flexion in Lying 10   Manual Therapy -   Lumbar Extension Seat Pad -   Femur Restraint -   Top Dead Center -   Counterweight -   Lumbar Flexion -   Lumbar Extension -   Lumbar Peak Torque -   Min Torque -   Test Percent Below Normative Data -   Test Percent Gain in Strength from Initial  -   Lumbar Weight 62   Repetitions 15   Rating of Perceived Exertion 3   Ice - Z Lie (in min.) 5       DKTCx 10  LTR x10  PPT x10  Posterior pelvic tilt with marching 10x  Slouch corrects x10   Piriformis 3x 20 secs  Bridges x 10 add Tband visit 12            Peripheral muscle strengthening which included 1 set of 15-20 repetitions at a slow, controlled 10-13 second per rep pace focused on strengthening supporting musculature for improved body mechanics and functional mobility.  Pt and therapist focused on proper form during treatment to ensure optimal strengthening of each targeted muscle group.  Machines were utilized including torso rotation, leg extension, leg curl, chest press, upright row. Tricep extension, bicep curl, leg press, and hip abduction added visit 3    [place] received the following manual therapy techniques: Vacuum/cupping STM with manual therapy techniques was performed to B LB to decrease muscle tightness, increase circulation and promote healing process. The pt's skin was monitored for redness adjusting pressure as needed. The pt was instructed in possible side effects of bruising and/or soreness. 0min          Home Exercises Provided and Patient Education Provided   Home exercises include:DKTC  LTR  PPT  Slouch corrects  Cardio program: Bike, walking    Education provided:   - - Patient received education in benefits of progressing mobility and strengthening gradually  - Discussed exertion  "scale, slow progressive resistance protocol to promote safe and healthy strengthening of supportive musculature  by performing 15-20 reps, 7 sec per rep, and increasing weight by 5 % at 20 reps only if there ex done safely, slowly, using correct musculature, exertion and no pain.  Patient expressed understanding.  -Pt educated on strategies to safely perform examination and exercise using "Stop Light" analogy to describe how to avoid or stop irritating motions, proceed with caution with some movements, and progress positive exercises.       Written Home Exercises Provided: Patient instructed to cont prior HEP.  Exercises were reviewed and Valarie was able to demonstrate them prior to the end of the session.  [place] demonstrated good  understanding of the education provided.   Issued piriformis stretch for HEP, in wrap up section of chart and printed for patient  See EMR under Patient Instructions for exercises provided prior visit.      Assessment   Patient arrives today with reports of increased pain.  Discussed stretching and posture during activities to prevent increased pain.  Inc 10% on Med X with pt completed 15 reps with 3/10 exertion level.  Add theraband to bridges next visit to increase challenge.   Patient is making good progress towards established goals.  Pt will continue to benefit from skilled outpatient physical therapy to address the deficits stated in the impairment chart, provide pt/family education and to maximize pt's level of independence in the home and community environment.     Anticipated Barriers for therapy: none  Pt's spiritual, cultural and educational needs considered and pt agreeable to plan of care and goals as stated below:       GOALS: Pt is in agreement with the following goals.     Short term goals:  6 weeks or 10 visits   1.  Pt will demonstrate increased lumbar ROM by at least 6 degrees from the initial ROM value with improvements noted in functional ROM and ability to perform " ADLs. MET 12/11/20  2.  Pt will demonstrate increased MedX average isometric strength value  by 20% from initial test resulting in improved ability to perform bending, lifting, and carrying activities safely, confidently. MET  3.  Patient report a reduction in worst pain score by 1-2 points for improved tolerance for completing household chores.(appropriate, progressing)   4.  Pt able to perform HEP correctly with minimal cueing or supervision from therapist to encourage independent management of symptoms. (appropriate, progressing)         Long term goals: 10 weeks or 20 visits   1. Pt will demonstrate increased lumbar ROM by at least 9 degrees from initial ROM value, resulting in improved ability to perform functional fwd bending while standing and sitting.   2. Pt will demonstrate increased MedX average isometric strength value  by 30% from initial test resulting in improved ability to perform bending, lifting, and carrying activities safely, confidently.MET  3. Pt to demonstrate ability to independently control and reduce their pain through posture positioning and mechanical movements throughout a typical day.  4.  Pt will demonstrate reduced pain and improved functional outcomes as reported on the FOTO by reaching a limitation score of < or = 20% or less in order to demonstrate subjective improvement in pt's condition.    5. Pt will demonstrate independence with the HEP at discharge       Plan   Continue with established Plan of Care towards established PT goals.     Valorie Sarmiento, PT  12/15/2020

## 2020-12-21 ENCOUNTER — CLINICAL SUPPORT (OUTPATIENT)
Dept: REHABILITATION | Facility: OTHER | Age: 75
End: 2020-12-21
Attending: FAMILY MEDICINE
Payer: MEDICARE

## 2020-12-21 DIAGNOSIS — R29.3 POOR POSTURE: ICD-10-CM

## 2020-12-21 DIAGNOSIS — R29.898 DECREASED STRENGTH OF TRUNK AND BACK: ICD-10-CM

## 2020-12-21 DIAGNOSIS — M25.69 DECREASED RANGE OF MOTION OF TRUNK AND BACK: Primary | ICD-10-CM

## 2020-12-21 PROCEDURE — 97110 THERAPEUTIC EXERCISES: CPT

## 2020-12-21 NOTE — PROGRESS NOTES
[facility]      Name: Valarie Colindres  Clinic Number: [id number]    Therapy Diagnosis:   Encounter Diagnoses   Name Primary?    Decreased range of motion of trunk and back Yes    Decreased strength of trunk and back     Poor posture      Physician: Jovani Valencia, *    Visit Date: 2020    Physician Orders: PT Eval and Treat   Medical Diagnosis from Referral: M54.16 (ICD-10-CM) - Lumbar radiculopathy  Evaluation Date: 10/26/2020  Authorization Period Expiration: 2020  Plan of Care Expiration: 2021  Reassessment Due: 2021  Visit # / Visits authorized:     Time In: 1:20 pm  Time Out: 2:20 pm   Total Billable Time: 60 minutes    Precautions: Standard  Pattern of pain determined: 1 PEN    Subjective   Valarie reports slight ache today.  Pt reports inc her stretching/exercise routine including adding clamshells, reverse clamshells and SL hip AB and feels this has helped.   Pt notes slowing down holiday decorating process and taking breaks to dec chance of aggrevating back.      Patient reports tolerating previous visit fair  Patient reports their pain to be 3/10 on a 0-10 scale with 0 being no pain and 10 being the worst pain imaginable.  Location: bilateral back      Occupation: Retired   Leisure: Walking with her sister, art, piano   Pt goals: to decrease pain    Objective     Baseline IM Testing Results:   Date of testing: 10/26/2020   ROM 0-45 deg  0-51 12/02   Max Peak Torque 88    Min Peak Torque 37    Flex/Ext Ratio 2.38:1   % below normative data -34%      Mid pointIM Testing Results:   Date of testin20  ROM 0-51   Max Peak Torque 133   Min Peak Torque 63   Flex/Ext Ratio 2.11:1   % below normative data -5%    35% strength gain    Outcomes:  Initial score: 23%  Visit 5 score:  Visit 10 score:   Goal: 23%      Treatment    Pt was instructed in and performed the following:     Valarie received therapeutic exercises  to develop/improved posture, cardiovascular endurance, muscular endurance, lumbar/cervical ROM, strength and muscular endurance for 60 minutes including the following exercises:     DKTCx 5  LTR  x5  Piriformis figure 4 3x 20 secs B  PPT x5 /c 5 sec hold   Bridges x 10 add Tband visit 12  SL Hip AB cue for toe point, dec hip flexor assist  Slouch corrects x10     NP:   PPT /c marching 10x      [facility] 12/21/2020   Visit Number 12   VAS Pain Rating -   Treadmill Time (in min.) 5   Time -   Lumbar Stretches - Slouch Overcorrection 10   Extension in Lying -   Flexion in Lying 10   Manual Therapy -   Lumbar Extension Seat Pad -   Femur Restraint -   Top Dead Center -   Counterweight -   Lumbar Flexion -   Lumbar Extension -   Lumbar Peak Torque -   Min Torque -   Test Percent Below Normative Data -   Test Percent Gain in Strength from Initial  -   Lumbar Weight 62   Repetitions 20   Rating of Perceived Exertion 4   Ice - Z Lie (in min.) 5       Peripheral muscle strengthening which included 1 set of 15-20 repetitions at a slow, controlled 10-13 second per rep pace focused on strengthening supporting musculature for improved body mechanics and functional mobility.  Pt and therapist focused on proper form during treatment to ensure optimal strengthening of each targeted muscle group.  Machines were utilized including torso rotation, leg extension, leg curl, chest press, upright row. Tricep extension, bicep curl, leg press, and hip abduction added visit 3    [place] received the following manual therapy techniques: Vacuum/cupping STM with manual therapy techniques was performed to B LB to decrease muscle tightness, increase circulation and promote healing process. The pt's skin was monitored for redness adjusting pressure as needed. The pt was instructed in possible side effects of bruising and/or soreness. 0min          Home Exercises Provided and Patient Education Provided   Home exercises  "include:  DKTC  LTR  PPT  Slouch corrects  SL hip AB  Clamshells     Cardio program: Bike, walking    Education provided:   - - Patient received education in benefits of progressing mobility and strengthening gradually  - Discussed exertion scale, slow progressive resistance protocol to promote safe and healthy strengthening of supportive musculature  by performing 15-20 reps, 7 sec per rep, and increasing weight by 5 % at 20 reps only if there ex done safely, slowly, using correct musculature, exertion and no pain.  Patient expressed understanding.  -Pt educated on strategies to safely perform examination and exercise using "Stop Light" analogy to describe how to avoid or stop irritating motions, proceed with caution with some movements, and progress positive exercises.       Written Home Exercises Provided: Patient instructed to cont prior HEP.  Exercises were reviewed and Valarie was able to demonstrate them prior to the end of the session.  Moonia demonstrated good  understanding of the education provided.   Issued piriformis stretch for HEP, in wrap up section of chart and printed for patient  See EMR under Patient Instructions for exercises provided prior visit.      Assessment   To promote walking program, PT inquire about walking schedule. Pt report dec walking due to colder weather, however, pt verbalize understanding that walking program is important and intends to return to walking /c sister.  Pt demo good ROM /c single knee to oppo shoulder piriformis stretch, therefore, modify stretch to figure 4 for inc challenge.  Pt advised to perform modification in HEP.  Pt self add multiple hip strengthening ex including SL hip AB to HEP indicating inc exercise tolerance.  PT cue for technique to inc ex effectiveness.  Also, progressed B hip IM strength and core stability /c Y TB to bridges.  Pt tolerate /s inc LBP, therefore, recommend cont performance at inc reps for strength challenge.     MedX lumbar weight " maintained and pt completed 20 reps at 4 RPE indicating appropriateness of inc weight next visit.      Patient is making good progress towards established goals.  Pt will continue to benefit from skilled outpatient physical therapy to address the deficits stated in the impairment chart, provide pt/family education and to maximize pt's level of independence in the home and community environment.     Anticipated Barriers for therapy: none  Pt's spiritual, cultural and educational needs considered and pt agreeable to plan of care and goals as stated below:       GOALS: Pt is in agreement with the following goals.     Short term goals:  6 weeks or 10 visits   1.  Pt will demonstrate increased lumbar ROM by at least 6 degrees from the initial ROM value with improvements noted in functional ROM and ability to perform ADLs. MET 12/11/20  2.  Pt will demonstrate increased MedX average isometric strength value  by 20% from initial test resulting in improved ability to perform bending, lifting, and carrying activities safely, confidently. MET  3.  Patient report a reduction in worst pain score by 1-2 points for improved tolerance for completing household chores.(appropriate, progressing)   4.  Pt able to perform HEP correctly with minimal cueing or supervision from therapist to encourage independent management of symptoms. (appropriate, progressing)         Long term goals: 10 weeks or 20 visits   1. Pt will demonstrate increased lumbar ROM by at least 9 degrees from initial ROM value, resulting in improved ability to perform functional fwd bending while standing and sitting.   2. Pt will demonstrate increased MedX average isometric strength value  by 30% from initial test resulting in improved ability to perform bending, lifting, and carrying activities safely, confidently.MET  3. Pt to demonstrate ability to independently control and reduce their pain through posture positioning and mechanical movements throughout a typical  day.  4.  Pt will demonstrate reduced pain and improved functional outcomes as reported on the FOTO by reaching a limitation score of < or = 20% or less in order to demonstrate subjective improvement in pt's condition.    5. Pt will demonstrate independence with the HEP at discharge       Plan   Continue with established Plan of Care towards established PT goals.     Abdelrahman Smith, PT  12/21/2020

## 2020-12-23 ENCOUNTER — CLINICAL SUPPORT (OUTPATIENT)
Dept: REHABILITATION | Facility: OTHER | Age: 75
End: 2020-12-23
Attending: FAMILY MEDICINE
Payer: MEDICARE

## 2020-12-23 DIAGNOSIS — R29.3 POOR POSTURE: ICD-10-CM

## 2020-12-23 DIAGNOSIS — R29.898 DECREASED STRENGTH OF TRUNK AND BACK: ICD-10-CM

## 2020-12-23 DIAGNOSIS — M25.69 DECREASED RANGE OF MOTION OF TRUNK AND BACK: Primary | ICD-10-CM

## 2020-12-23 PROCEDURE — 97110 THERAPEUTIC EXERCISES: CPT

## 2020-12-23 NOTE — PROGRESS NOTES
[facility]      Name: Valarie Colindres  Clinic Number: [id number]    Therapy Diagnosis:   Encounter Diagnoses   Name Primary?    Decreased range of motion of trunk and back Yes    Decreased strength of trunk and back     Poor posture      Physician: Jovani Valencia, *    Visit Date: 2020    Physician Orders: PT Eval and Treat   Medical Diagnosis from Referral: M54.16 (ICD-10-CM) - Lumbar radiculopathy  Evaluation Date: 10/26/2020  Authorization Period Expiration: 2020  Plan of Care Expiration: 2021  Reassessment Due: 2021  Visit # / Visits authorized:     Time In: 12:35 pm  Time Out: 1:20 pm   Total Billable Time: 45 minutes    Precautions: Standard  Pattern of pain determined: 1 PEN    Subjective   [place] reports slight ache today.     Pt notes slowing down holiday decorating process and taking breaks to dec chance of aggrevating back.      Patient reports tolerating previous visit fair  Patient reports their pain to be 3/10 on a 0-10 scale with 0 being no pain and 10 being the worst pain imaginable.  Location: bilateral back      Occupation: Retired   Leisure: Walking with her sister, art, piano   Pt goals: to decrease pain    Objective     Baseline IM Testing Results:   Date of testing: 10/26/2020   ROM 0-45 deg  0-51 12/02   Max Peak Torque 88    Min Peak Torque 37    Flex/Ext Ratio 2.38:1   % below normative data -34%      Mid pointIM Testing Results:   Date of testin20  ROM 0-51   Max Peak Torque 133   Min Peak Torque 63   Flex/Ext Ratio 2.11:1   % below normative data -5%    35% strength gain    Outcomes:  Initial score: 23%  Visit 5 score:  Visit 10 score:   Goal: 23%      Treatment    Pt was instructed in and performed the following:     Valarie received therapeutic exercises to develop/improved posture, cardiovascular endurance, muscular endurance, lumbar/cervical ROM, strength and muscular endurance for  60 minutes including the following exercises:     DKTCx 5  LTR  x5  Piriformis figure 4 3x 20 secs B  PPT x5 /c 5 sec hold   Bridges x 10 with abd RTB  SL Hip AB cue for toe point, dec hip flexor assist NP  Slouch corrects x10     NP:   PPT /c marching 10x      [facility] 12/23/2020   Visit Number 13   VAS Pain Rating 3   Treadmill Time (in min.) 5   Time -   Lumbar Stretches - Slouch Overcorrection 10   Extension in Lying -   Flexion in Lying 10   Manual Therapy -   Lumbar Extension Seat Pad -   Femur Restraint -   Top Dead Center -   Counterweight -   Lumbar Flexion -   Lumbar Extension -   Lumbar Peak Torque -   Min Torque -   Test Percent Below Normative Data -   Test Percent Gain in Strength from Initial  -   Lumbar Weight 65   Repetitions 15   Rating of Perceived Exertion 4   Ice - Z Lie (in min.) 5         Peripheral muscle strengthening which included 1 set of 15-20 repetitions at a slow, controlled 10-13 second per rep pace focused on strengthening supporting musculature for improved body mechanics and functional mobility.  Pt and therapist focused on proper form during treatment to ensure optimal strengthening of each targeted muscle group.  Machines were utilized including torso rotation, leg extension, leg curl, chest press, upright row. Tricep extension, bicep curl, leg press, and hip abduction added visit 3    [place] received the following manual therapy techniques: Vacuum/cupping STM with manual therapy techniques was performed to B LB to decrease muscle tightness, increase circulation and promote healing process. The pt's skin was monitored for redness adjusting pressure as needed. The pt was instructed in possible side effects of bruising and/or soreness. 0min          Home Exercises Provided and Patient Education Provided   Home exercises include:  DKTC  LTR  PPT  Slouch corrects  SL hip AB  Clamshells     Cardio program: Bike, walking    Education provided:   - - Patient received  "education in benefits of progressing mobility and strengthening gradually  - Discussed exertion scale, slow progressive resistance protocol to promote safe and healthy strengthening of supportive musculature  by performing 15-20 reps, 7 sec per rep, and increasing weight by 5 % at 20 reps only if there ex done safely, slowly, using correct musculature, exertion and no pain.  Patient expressed understanding.  -Pt educated on strategies to safely perform examination and exercise using "Stop Light" analogy to describe how to avoid or stop irritating motions, proceed with caution with some movements, and progress positive exercises.       Written Home Exercises Provided: Patient instructed to cont prior HEP.  Exercises were reviewed and Valarie was able to demonstrate them prior to the end of the session.  [place] demonstrated good  understanding of the education provided.   Issued piriformis stretch for HEP, in wrap up section of chart and printed for patient  See EMR under Patient Instructions for exercises provided prior visit.      Assessment   Pt tolerated session well with no increase in pain with therex. She required min vc for HEP for technique. Weight increased 5% this treatment session. She completed 15 reps with 4/10 RPE. Cont to progress core and hip strengthening.     Patient is making good progress towards established goals.  Pt will continue to benefit from skilled outpatient physical therapy to address the deficits stated in the impairment chart, provide pt/family education and to maximize pt's level of independence in the home and community environment.     Anticipated Barriers for therapy: none  Pt's spiritual, cultural and educational needs considered and pt agreeable to plan of care and goals as stated below:       GOALS: Pt is in agreement with the following goals.     Short term goals:  6 weeks or 10 visits   1.  Pt will demonstrate increased lumbar ROM by at least 6 degrees from the initial ROM " value with improvements noted in functional ROM and ability to perform ADLs. MET 12/11/20  2.  Pt will demonstrate increased MedX average isometric strength value  by 20% from initial test resulting in improved ability to perform bending, lifting, and carrying activities safely, confidently. MET  3.  Patient report a reduction in worst pain score by 1-2 points for improved tolerance for completing household chores.(appropriate, progressing)   4.  Pt able to perform HEP correctly with minimal cueing or supervision from therapist to encourage independent management of symptoms. (appropriate, progressing)         Long term goals: 10 weeks or 20 visits   1. Pt will demonstrate increased lumbar ROM by at least 9 degrees from initial ROM value, resulting in improved ability to perform functional fwd bending while standing and sitting.   2. Pt will demonstrate increased MedX average isometric strength value  by 30% from initial test resulting in improved ability to perform bending, lifting, and carrying activities safely, confidently.MET  3. Pt to demonstrate ability to independently control and reduce their pain through posture positioning and mechanical movements throughout a typical day.  4.  Pt will demonstrate reduced pain and improved functional outcomes as reported on the FOTO by reaching a limitation score of < or = 20% or less in order to demonstrate subjective improvement in pt's condition.    5. Pt will demonstrate independence with the HEP at discharge       Plan   Continue with established Plan of Care towards established PT goals.     Dontae Franks, PT  12/23/2020

## 2020-12-26 ENCOUNTER — PATIENT MESSAGE (OUTPATIENT)
Dept: PRIMARY CARE CLINIC | Facility: CLINIC | Age: 75
End: 2020-12-26

## 2020-12-28 ENCOUNTER — CLINICAL SUPPORT (OUTPATIENT)
Dept: REHABILITATION | Facility: OTHER | Age: 75
End: 2020-12-28
Attending: FAMILY MEDICINE
Payer: MEDICARE

## 2020-12-28 DIAGNOSIS — R29.3 POOR POSTURE: ICD-10-CM

## 2020-12-28 DIAGNOSIS — M25.69 DECREASED RANGE OF MOTION OF TRUNK AND BACK: Primary | ICD-10-CM

## 2020-12-28 DIAGNOSIS — R29.898 DECREASED STRENGTH OF TRUNK AND BACK: ICD-10-CM

## 2020-12-28 PROCEDURE — 97110 THERAPEUTIC EXERCISES: CPT

## 2020-12-28 NOTE — PROGRESS NOTES
[facility]      Name: Valarie Colindres  Clinic Number: [id number]    Therapy Diagnosis:   Encounter Diagnoses   Name Primary?    Decreased range of motion of trunk and back Yes    Decreased strength of trunk and back     Poor posture      Physician: Jovani Valencia, *    Visit Date: 2020    Physician Orders: PT Eval and Treat   Medical Diagnosis from Referral: M54.16 (ICD-10-CM) - Lumbar radiculopathy  Evaluation Date: 10/26/2020  Authorization Period Expiration: 2020  Plan of Care Expiration: 2021  Reassessment Due: 2021  Visit # / Visits authorized:     Time In: 12:30 pm  Time Out: 1:15 pm   Total Billable Time: 45 minutes    Precautions: Standard  Pattern of pain determined: 1 PEN    Subjective   Valarie reports slight ache in back today. She reports overall she has been feeling better.      Patient reports tolerating previous visit fair  Patient reports their pain to be 3/10 on a 0-10 scale with 0 being no pain and 10 being the worst pain imaginable.  Location: bilateral back      Occupation: Retired   Leisure: Walking with her sister, art, piano   Pt goals: to decrease pain    Objective     Baseline IM Testing Results:   Date of testing: 10/26/2020   ROM 0-45 deg  0-51 12/02   Max Peak Torque 88    Min Peak Torque 37    Flex/Ext Ratio 2.38:1   % below normative data -34%      Mid pointIM Testing Results:   Date of testin20  ROM 0-51   Max Peak Torque 133   Min Peak Torque 63   Flex/Ext Ratio 2.11:1   % below normative data -5%    35% strength gain    Outcomes:  Initial score: 23%  Visit 5 score:  Visit 10 score:   Goal: 23%      Treatment    Pt was instructed in and performed the following:     Valarie received therapeutic exercises to develop/improved posture, cardiovascular endurance, muscular endurance, lumbar/cervical ROM, strength and muscular endurance for 60 minutes including the following exercises:      DKTCx 5  LTR  x5  Piriformis figure 4 3x 20 secs B  PPT x5 /c 5 sec hold   Bridges x 10 with abd RTB  SL Hip AB cue for toe point, dec hip flexor assist NP  Slouch corrects x10     NP:   PPT /c marching 10x    [facility] 12/28/2020   Visit Number 14   VAS Pain Rating 3   Treadmill Time (in min.) 5   Time -   Lumbar Stretches - Slouch Overcorrection 10   Extension in Lying -   Flexion in Lying 10   Manual Therapy -   Lumbar Extension Seat Pad -   Femur Restraint -   Top Dead Center -   Counterweight -   Lumbar Flexion -   Lumbar Extension -   Lumbar Peak Torque -   Min Torque -   Test Percent Below Normative Data -   Test Percent Gain in Strength from Initial  -   Lumbar Weight 65   Repetitions 20   Rating of Perceived Exertion 4   Ice - Z Lie (in min.) 5           Peripheral muscle strengthening which included 1 set of 15-20 repetitions at a slow, controlled 10-13 second per rep pace focused on strengthening supporting musculature for improved body mechanics and functional mobility.  Pt and therapist focused on proper form during treatment to ensure optimal strengthening of each targeted muscle group.  Machines were utilized including torso rotation, leg extension, leg curl, chest press, upright row. Tricep extension, bicep curl, leg press, and hip abduction added visit 3    [place] received the following manual therapy techniques: Vacuum/cupping STM with manual therapy techniques was performed to B LB to decrease muscle tightness, increase circulation and promote healing process. The pt's skin was monitored for redness adjusting pressure as needed. The pt was instructed in possible side effects of bruising and/or soreness. 0min          Home Exercises Provided and Patient Education Provided   Home exercises include:  DKTC  LTR  PPT  Slouch corrects  SL hip AB  Clamshells     Cardio program: Bike, walking    Education provided:   - - Patient received education in benefits of progressing mobility and  "strengthening gradually  - Discussed exertion scale, slow progressive resistance protocol to promote safe and healthy strengthening of supportive musculature  by performing 15-20 reps, 7 sec per rep, and increasing weight by 5 % at 20 reps only if there ex done safely, slowly, using correct musculature, exertion and no pain.  Patient expressed understanding.  -Pt educated on strategies to safely perform examination and exercise using "Stop Light" analogy to describe how to avoid or stop irritating motions, proceed with caution with some movements, and progress positive exercises.       Written Home Exercises Provided: Patient instructed to cont prior HEP.  Exercises were reviewed and Valarie was able to demonstrate them prior to the end of the session.  [place] demonstrated good  understanding of the education provided.   Issued piriformis stretch for HEP, in wrap up section of chart and printed for patient  See EMR under Patient Instructions for exercises provided prior visit.      Assessment   Pt tolerated session well with no increase in pain with therex. She required min vc for HEP for technique. Weight maintained this treatment session. She completed 20 reps with 4/10 RPE. Cont to progress core and hip strengthening. Increase weight 5-10% next visit.    Patient is making good progress towards established goals.  Pt will continue to benefit from skilled outpatient physical therapy to address the deficits stated in the impairment chart, provide pt/family education and to maximize pt's level of independence in the home and community environment.     Anticipated Barriers for therapy: none  Pt's spiritual, cultural and educational needs considered and pt agreeable to plan of care and goals as stated below:       GOALS: Pt is in agreement with the following goals.     Short term goals:  6 weeks or 10 visits   1.  Pt will demonstrate increased lumbar ROM by at least 6 degrees from the initial ROM value with " improvements noted in functional ROM and ability to perform ADLs. MET 12/11/20  2.  Pt will demonstrate increased MedX average isometric strength value  by 20% from initial test resulting in improved ability to perform bending, lifting, and carrying activities safely, confidently. MET  3.  Patient report a reduction in worst pain score by 1-2 points for improved tolerance for completing household chores.(appropriate, progressing)   4.  Pt able to perform HEP correctly with minimal cueing or supervision from therapist to encourage independent management of symptoms. (appropriate, progressing)         Long term goals: 10 weeks or 20 visits   1. Pt will demonstrate increased lumbar ROM by at least 9 degrees from initial ROM value, resulting in improved ability to perform functional fwd bending while standing and sitting.   2. Pt will demonstrate increased MedX average isometric strength value  by 30% from initial test resulting in improved ability to perform bending, lifting, and carrying activities safely, confidently.MET  3. Pt to demonstrate ability to independently control and reduce their pain through posture positioning and mechanical movements throughout a typical day.  4.  Pt will demonstrate reduced pain and improved functional outcomes as reported on the FOTO by reaching a limitation score of < or = 20% or less in order to demonstrate subjective improvement in pt's condition.    5. Pt will demonstrate independence with the HEP at discharge       Plan   Continue with established Plan of Care towards established PT goals.     Dontae Franks, PT  12/28/2020

## 2020-12-30 ENCOUNTER — CLINICAL SUPPORT (OUTPATIENT)
Dept: REHABILITATION | Facility: OTHER | Age: 75
End: 2020-12-30
Attending: FAMILY MEDICINE
Payer: MEDICARE

## 2020-12-30 DIAGNOSIS — M51.36 DDD (DEGENERATIVE DISC DISEASE), LUMBAR: Primary | ICD-10-CM

## 2020-12-30 PROCEDURE — 97110 THERAPEUTIC EXERCISES: CPT | Mod: CQ

## 2020-12-30 NOTE — PROGRESS NOTES
[facility]      Name: Valarie Colindres  Clinic Number: [id number]    Therapy Diagnosis:   Encounter Diagnosis   Name Primary?    DDD (degenerative disc disease), lumbar Yes     Physician: Jovani Valencia, *    Visit Date: 2020    Physician Orders: PT Eval and Treat   Medical Diagnosis from Referral: M54.16 (ICD-10-CM) - Lumbar radiculopathy  Evaluation Date: 10/26/2020  Authorization Period Expiration: 2020  Plan of Care Expiration: 2021  Reassessment Due: 2021  Visit # / Visits authorized: 15/ 26    Time In: 1:30 pm  Time Out: 2:20 pm   Total Billable Time: 45 minutes    Precautions: Standard  Pattern of pain determined: 1 PEN    Subjective   Valarie reports no LBP today. Overall she has been feeling better.     Patient reports tolerating previous visit fair  Patient reports their pain to be 3/10 on a 0-10 scale with 0 being no pain and 10 being the worst pain imaginable.  Location: bilateral back      Occupation: Retired   Leisure: Walking with her sister, art, piano   Pt goals: to decrease pain    Objective     Baseline IM Testing Results:   Date of testing: 10/26/2020   ROM 0-45 deg  0-51 12/02   Max Peak Torque 88    Min Peak Torque 37    Flex/Ext Ratio 2.38:1   % below normative data -34%      Mid pointIM Testing Results:   Date of testin20  ROM 0-51   Max Peak Torque 133   Min Peak Torque 63   Flex/Ext Ratio 2.11:1   % below normative data -5%    35% strength gain    Outcomes:  Initial score: 23%  Visit 5 score:  Visit 10 score:   Goal: 23%      Treatment    Pt was instructed in and performed the following:     Valarie received therapeutic exercises to develop/improved posture, cardiovascular endurance, muscular endurance, lumbar/cervical ROM, strength and muscular endurance for 50 minutes including the following exercises:     DKTCx 5  LTR  x5  Piriformis figure 4 3x 20 secs B  PPT x5 /c 5 sec hold   Bridges x 10 with  abd RTB  SL Hip AB cue for toe point, dec hip flexor assist NP  Slouch corrects x10     NP:   PPT /c marching 10x  [facility] 12/30/2020   Visit Number 15   VAS Pain Rating 0   Treadmill Time (in min.) 5   Time -   Lumbar Stretches - Slouch Overcorrection -   Extension in Lying -   Flexion in Lying -   Manual Therapy -   Lumbar Extension Seat Pad -   Femur Restraint -   Top Dead Center -   Counterweight -   Lumbar Flexion -   Lumbar Extension -   Lumbar Peak Torque -   Min Torque -   Test Percent Below Normative Data -   Test Percent Gain in Strength from Initial  -   Lumbar Weight 68   Repetitions 15   Rating of Perceived Exertion 5   Ice - Z Lie (in min.) 5               Peripheral muscle strengthening which included 1 set of 15-20 repetitions at a slow, controlled 10-13 second per rep pace focused on strengthening supporting musculature for improved body mechanics and functional mobility.  Pt and therapist focused on proper form during treatment to ensure optimal strengthening of each targeted muscle group.  Machines were utilized including torso rotation, leg extension, leg curl, chest press, upright row. Tricep extension, bicep curl, leg press, and hip abduction added visit 3    [place] received the following manual therapy techniques: Vacuum/cupping STM with manual therapy techniques was performed to B LB to decrease muscle tightness, increase circulation and promote healing process. The pt's skin was monitored for redness adjusting pressure as needed. The pt was instructed in possible side effects of bruising and/or soreness. 0min          Home Exercises Provided and Patient Education Provided   Home exercises include:  DKTC  LTR  PPT  Slouch corrects  SL hip AB  Clamshells   [facility] 12/30/2020   Visit Number 15   VAS Pain Rating 0   Treadmill Time (in min.) 5   Time -   Lumbar Stretches - Slouch Overcorrection -   Extension in Lying -   Flexion in Lying -   Manual Therapy -   Lumbar  "Extension Seat Pad -   Femur Restraint -   Top Dead Center -   Counterweight -   Lumbar Flexion -   Lumbar Extension -   Lumbar Peak Torque -   Min Torque -   Test Percent Below Normative Data -   Test Percent Gain in Strength from Initial  -   Lumbar Weight 68   Repetitions 15   Rating of Perceived Exertion 5   Ice - Z Lie (in min.) 5       Cardio program: Bike, walking    Education provided:   - - Patient received education in benefits of progressing mobility and strengthening gradually  - Discussed exertion scale, slow progressive resistance protocol to promote safe and healthy strengthening of supportive musculature  by performing 15-20 reps, 7 sec per rep, and increasing weight by 5 % at 20 reps only if there ex done safely, slowly, using correct musculature, exertion and no pain.  Patient expressed understanding.  -Pt educated on strategies to safely perform examination and exercise using "Stop Light" analogy to describe how to avoid or stop irritating motions, proceed with caution with some movements, and progress positive exercises.       Written Home Exercises Provided: Patient instructed to cont prior HEP.  Exercises were reviewed and [place] was able to demonstrate them prior to the end of the session.  [place] demonstrated good  understanding of the education provided.   Issued piriformis stretch for HEP, in wrap up section of chart and printed for patient  See EMR under Patient Instructions for exercises provided prior visit.      Assessment   Pt tolerated session well with no increase in pain with therex. She required min vc for HEP for technique. Weight maintained this treatment session. She completed 20 reps with 4/10 RPE. Cont to progress core and hip strengthening. Increase weight 5-10% next visit.    Patient is making good progress towards established goals.  Pt will continue to benefit from skilled outpatient physical therapy to address the deficits stated in the impairment chart, provide " pt/family education and to maximize pt's level of independence in the home and community environment.     Anticipated Barriers for therapy: none  Pt's spiritual, cultural and educational needs considered and pt agreeable to plan of care and goals as stated below:       GOALS: Pt is in agreement with the following goals.     Short term goals:  6 weeks or 10 visits   1.  Pt will demonstrate increased lumbar ROM by at least 6 degrees from the initial ROM value with improvements noted in functional ROM and ability to perform ADLs. MET 12/11/20  2.  Pt will demonstrate increased MedX average isometric strength value  by 20% from initial test resulting in improved ability to perform bending, lifting, and carrying activities safely, confidently. MET  3.  Patient report a reduction in worst pain score by 1-2 points for improved tolerance for completing household chores.(appropriate, progressing)   4.  Pt able to perform HEP correctly with minimal cueing or supervision from therapist to encourage independent management of symptoms. (appropriate, progressing)         Long term goals: 10 weeks or 20 visits   1. Pt will demonstrate increased lumbar ROM by at least 9 degrees from initial ROM value, resulting in improved ability to perform functional fwd bending while standing and sitting.   2. Pt will demonstrate increased MedX average isometric strength value  by 30% from initial test resulting in improved ability to perform bending, lifting, and carrying activities safely, confidently.MET  3. Pt to demonstrate ability to independently control and reduce their pain through posture positioning and mechanical movements throughout a typical day.  4.  Pt will demonstrate reduced pain and improved functional outcomes as reported on the FOTO by reaching a limitation score of < or = 20% or less in order to demonstrate subjective improvement in pt's condition.    5. Pt will demonstrate independence with the HEP at discharge       Plan    Continue with established Plan of Care towards established PT goals.     Domi Claudio, PTA  12/30/2020

## 2021-01-03 ENCOUNTER — PATIENT MESSAGE (OUTPATIENT)
Dept: PRIMARY CARE CLINIC | Facility: CLINIC | Age: 76
End: 2021-01-03

## 2021-01-05 ENCOUNTER — CLINICAL SUPPORT (OUTPATIENT)
Dept: REHABILITATION | Facility: OTHER | Age: 76
End: 2021-01-05
Attending: FAMILY MEDICINE
Payer: MEDICARE

## 2021-01-05 DIAGNOSIS — M25.69 DECREASED RANGE OF MOTION OF TRUNK AND BACK: Primary | ICD-10-CM

## 2021-01-05 DIAGNOSIS — R29.3 POOR POSTURE: ICD-10-CM

## 2021-01-05 DIAGNOSIS — R29.898 DECREASED STRENGTH OF TRUNK AND BACK: ICD-10-CM

## 2021-01-05 PROCEDURE — 97110 THERAPEUTIC EXERCISES: CPT

## 2021-01-08 ENCOUNTER — CLINICAL SUPPORT (OUTPATIENT)
Dept: REHABILITATION | Facility: OTHER | Age: 76
End: 2021-01-08
Attending: FAMILY MEDICINE
Payer: MEDICARE

## 2021-01-08 DIAGNOSIS — M51.36 DDD (DEGENERATIVE DISC DISEASE), LUMBAR: Primary | ICD-10-CM

## 2021-01-08 PROCEDURE — 97110 THERAPEUTIC EXERCISES: CPT | Mod: CQ

## 2021-01-13 ENCOUNTER — CLINICAL SUPPORT (OUTPATIENT)
Dept: REHABILITATION | Facility: OTHER | Age: 76
End: 2021-01-13
Attending: FAMILY MEDICINE
Payer: MEDICARE

## 2021-01-13 ENCOUNTER — DOCUMENTATION ONLY (OUTPATIENT)
Dept: REHABILITATION | Facility: OTHER | Age: 76
End: 2021-01-13

## 2021-01-13 DIAGNOSIS — M51.36 DDD (DEGENERATIVE DISC DISEASE), LUMBAR: Primary | ICD-10-CM

## 2021-01-13 PROCEDURE — 97110 THERAPEUTIC EXERCISES: CPT | Mod: CQ

## 2021-01-15 ENCOUNTER — CLINICAL SUPPORT (OUTPATIENT)
Dept: REHABILITATION | Facility: OTHER | Age: 76
End: 2021-01-15
Attending: FAMILY MEDICINE
Payer: MEDICARE

## 2021-01-15 DIAGNOSIS — R29.898 DECREASED STRENGTH OF TRUNK AND BACK: ICD-10-CM

## 2021-01-15 DIAGNOSIS — M25.69 DECREASED RANGE OF MOTION OF TRUNK AND BACK: Primary | ICD-10-CM

## 2021-01-15 DIAGNOSIS — R29.3 POOR POSTURE: ICD-10-CM

## 2021-01-15 PROCEDURE — 97110 THERAPEUTIC EXERCISES: CPT

## 2021-01-19 ENCOUNTER — PATIENT MESSAGE (OUTPATIENT)
Dept: SPORTS MEDICINE | Facility: CLINIC | Age: 76
End: 2021-01-19

## 2021-01-25 ENCOUNTER — CLINICAL SUPPORT (OUTPATIENT)
Dept: REHABILITATION | Facility: OTHER | Age: 76
End: 2021-01-25
Attending: FAMILY MEDICINE
Payer: MEDICARE

## 2021-01-25 DIAGNOSIS — M25.69 DECREASED RANGE OF MOTION OF TRUNK AND BACK: Primary | ICD-10-CM

## 2021-01-25 DIAGNOSIS — R29.898 DECREASED STRENGTH OF TRUNK AND BACK: ICD-10-CM

## 2021-01-25 DIAGNOSIS — R29.3 POOR POSTURE: ICD-10-CM

## 2021-01-25 PROCEDURE — 97110 THERAPEUTIC EXERCISES: CPT

## 2021-01-27 ENCOUNTER — PATIENT OUTREACH (OUTPATIENT)
Dept: ADMINISTRATIVE | Facility: OTHER | Age: 76
End: 2021-01-27

## 2021-01-28 ENCOUNTER — OFFICE VISIT (OUTPATIENT)
Dept: NEUROLOGY | Facility: CLINIC | Age: 76
End: 2021-01-28
Payer: MEDICARE

## 2021-01-28 VITALS
BODY MASS INDEX: 26.39 KG/M2 | HEART RATE: 63 BPM | DIASTOLIC BLOOD PRESSURE: 78 MMHG | SYSTOLIC BLOOD PRESSURE: 135 MMHG | WEIGHT: 149 LBS

## 2021-01-28 DIAGNOSIS — R73.03 PREDIABETES: ICD-10-CM

## 2021-01-28 DIAGNOSIS — G57.93 NEUROPATHY OF BOTH FEET: ICD-10-CM

## 2021-01-28 DIAGNOSIS — G60.3 IDIOPATHIC PROGRESSIVE POLYNEUROPATHY: Primary | ICD-10-CM

## 2021-01-28 DIAGNOSIS — R53.82 CHRONIC FATIGUE, UNSPECIFIED: ICD-10-CM

## 2021-01-28 DIAGNOSIS — M48.02 SPINAL STENOSIS, CERVICAL REGION: ICD-10-CM

## 2021-01-28 PROCEDURE — 99999 PR PBB SHADOW E&M-EST. PATIENT-LVL III: ICD-10-PCS | Mod: PBBFAC,,, | Performed by: PSYCHIATRY & NEUROLOGY

## 2021-01-28 PROCEDURE — 99999 PR PBB SHADOW E&M-EST. PATIENT-LVL III: CPT | Mod: PBBFAC,,, | Performed by: PSYCHIATRY & NEUROLOGY

## 2021-01-28 PROCEDURE — 99203 PR OFFICE/OUTPT VISIT, NEW, LEVL III, 30-44 MIN: ICD-10-PCS | Mod: S$GLB,,, | Performed by: PSYCHIATRY & NEUROLOGY

## 2021-01-28 PROCEDURE — 1101F PT FALLS ASSESS-DOCD LE1/YR: CPT | Mod: CPTII,S$GLB,, | Performed by: PSYCHIATRY & NEUROLOGY

## 2021-01-28 PROCEDURE — 3078F DIAST BP <80 MM HG: CPT | Mod: CPTII,S$GLB,, | Performed by: PSYCHIATRY & NEUROLOGY

## 2021-01-28 PROCEDURE — 3078F PR MOST RECENT DIASTOLIC BLOOD PRESSURE < 80 MM HG: ICD-10-PCS | Mod: CPTII,S$GLB,, | Performed by: PSYCHIATRY & NEUROLOGY

## 2021-01-28 PROCEDURE — 3075F SYST BP GE 130 - 139MM HG: CPT | Mod: CPTII,S$GLB,, | Performed by: PSYCHIATRY & NEUROLOGY

## 2021-01-28 PROCEDURE — 99203 OFFICE O/P NEW LOW 30 MIN: CPT | Mod: S$GLB,,, | Performed by: PSYCHIATRY & NEUROLOGY

## 2021-01-28 PROCEDURE — 1159F MED LIST DOCD IN RCRD: CPT | Mod: S$GLB,,, | Performed by: PSYCHIATRY & NEUROLOGY

## 2021-01-28 PROCEDURE — 99499 UNLISTED E&M SERVICE: CPT | Mod: S$GLB,,, | Performed by: PSYCHIATRY & NEUROLOGY

## 2021-01-28 PROCEDURE — 1125F PR PAIN SEVERITY QUANTIFIED, PAIN PRESENT: ICD-10-PCS | Mod: S$GLB,,, | Performed by: PSYCHIATRY & NEUROLOGY

## 2021-01-28 PROCEDURE — 3288F PR FALLS RISK ASSESSMENT DOCUMENTED: ICD-10-PCS | Mod: CPTII,S$GLB,, | Performed by: PSYCHIATRY & NEUROLOGY

## 2021-01-28 PROCEDURE — 3288F FALL RISK ASSESSMENT DOCD: CPT | Mod: CPTII,S$GLB,, | Performed by: PSYCHIATRY & NEUROLOGY

## 2021-01-28 PROCEDURE — 3075F PR MOST RECENT SYSTOLIC BLOOD PRESS GE 130-139MM HG: ICD-10-PCS | Mod: CPTII,S$GLB,, | Performed by: PSYCHIATRY & NEUROLOGY

## 2021-01-28 PROCEDURE — 1101F PR PT FALLS ASSESS DOC 0-1 FALLS W/OUT INJ PAST YR: ICD-10-PCS | Mod: CPTII,S$GLB,, | Performed by: PSYCHIATRY & NEUROLOGY

## 2021-01-28 PROCEDURE — 99499 RISK ADDL DX/OHS AUDIT: ICD-10-PCS | Mod: S$GLB,,, | Performed by: PSYCHIATRY & NEUROLOGY

## 2021-01-28 PROCEDURE — 1125F AMNT PAIN NOTED PAIN PRSNT: CPT | Mod: S$GLB,,, | Performed by: PSYCHIATRY & NEUROLOGY

## 2021-01-28 PROCEDURE — 1159F PR MEDICATION LIST DOCUMENTED IN MEDICAL RECORD: ICD-10-PCS | Mod: S$GLB,,, | Performed by: PSYCHIATRY & NEUROLOGY

## 2021-02-02 ENCOUNTER — LAB VISIT (OUTPATIENT)
Dept: LAB | Facility: HOSPITAL | Age: 76
End: 2021-02-02
Attending: PSYCHIATRY & NEUROLOGY
Payer: MEDICARE

## 2021-02-02 DIAGNOSIS — R73.03 PREDIABETES: ICD-10-CM

## 2021-02-02 DIAGNOSIS — R53.82 CHRONIC FATIGUE, UNSPECIFIED: ICD-10-CM

## 2021-02-02 DIAGNOSIS — G60.3 IDIOPATHIC PROGRESSIVE POLYNEUROPATHY: ICD-10-CM

## 2021-02-02 PROCEDURE — 83036 HEMOGLOBIN GLYCOSYLATED A1C: CPT

## 2021-02-02 PROCEDURE — 84443 ASSAY THYROID STIM HORMONE: CPT

## 2021-02-02 PROCEDURE — 86703 HIV-1/HIV-2 1 RESULT ANTBDY: CPT

## 2021-02-02 PROCEDURE — 82746 ASSAY OF FOLIC ACID SERUM: CPT

## 2021-02-02 PROCEDURE — 83921 ORGANIC ACID SINGLE QUANT: CPT

## 2021-02-02 PROCEDURE — 86592 SYPHILIS TEST NON-TREP QUAL: CPT

## 2021-02-02 PROCEDURE — 84425 ASSAY OF VITAMIN B-1: CPT

## 2021-02-02 PROCEDURE — 82607 VITAMIN B-12: CPT

## 2021-02-02 PROCEDURE — 84207 ASSAY OF VITAMIN B-6: CPT

## 2021-02-02 PROCEDURE — 36415 COLL VENOUS BLD VENIPUNCTURE: CPT | Mod: PN

## 2021-02-03 LAB
ESTIMATED AVG GLUCOSE: 117 MG/DL (ref 68–131)
FOLATE SERPL-MCNC: 5.8 NG/ML (ref 4–24)
HBA1C MFR BLD: 5.7 % (ref 4–5.6)
HIV 1+2 AB+HIV1 P24 AG SERPL QL IA: NEGATIVE
RPR SER QL: NORMAL
TSH SERPL DL<=0.005 MIU/L-ACNC: 1.5 UIU/ML (ref 0.4–4)

## 2021-02-04 ENCOUNTER — PATIENT MESSAGE (OUTPATIENT)
Dept: NEUROLOGY | Facility: CLINIC | Age: 76
End: 2021-02-04

## 2021-02-04 LAB — VIT B12 SERPL-MCNC: 364 NG/L (ref 180–914)

## 2021-02-05 LAB — PYRIDOXAL SERPL-MCNC: 14 UG/L (ref 5–50)

## 2021-02-08 LAB
METHYLMALONATE SERPL-SCNC: 0.2 NMOL/ML
VIT B1 BLD-MCNC: 40 UG/L (ref 38–122)

## 2021-02-12 ENCOUNTER — TELEPHONE (OUTPATIENT)
Dept: REHABILITATION | Facility: OTHER | Age: 76
End: 2021-02-12

## 2021-02-21 ENCOUNTER — PATIENT MESSAGE (OUTPATIENT)
Dept: SPORTS MEDICINE | Facility: CLINIC | Age: 76
End: 2021-02-21

## 2021-02-25 RX ORDER — DIAZEPAM 5 MG/1
5 TABLET ORAL EVERY 6 HOURS PRN
Qty: 2 TABLET | Refills: 0 | Status: SHIPPED | OUTPATIENT
Start: 2021-02-25 | End: 2021-06-24

## 2021-02-26 ENCOUNTER — LAB VISIT (OUTPATIENT)
Dept: LAB | Facility: HOSPITAL | Age: 76
End: 2021-02-26
Attending: INTERNAL MEDICINE
Payer: MEDICARE

## 2021-02-26 DIAGNOSIS — D47.2 MGUS (MONOCLONAL GAMMOPATHY OF UNKNOWN SIGNIFICANCE): ICD-10-CM

## 2021-02-26 LAB
ALBUMIN SERPL BCP-MCNC: 4.3 G/DL (ref 3.5–5.2)
ALP SERPL-CCNC: 54 U/L (ref 55–135)
ALT SERPL W/O P-5'-P-CCNC: 7 U/L (ref 10–44)
ANION GAP SERPL CALC-SCNC: 10 MMOL/L (ref 8–16)
AST SERPL-CCNC: 19 U/L (ref 10–40)
BASOPHILS # BLD AUTO: 0.04 K/UL (ref 0–0.2)
BASOPHILS NFR BLD: 0.6 % (ref 0–1.9)
BILIRUB SERPL-MCNC: 0.5 MG/DL (ref 0.1–1)
BUN SERPL-MCNC: 18 MG/DL (ref 8–23)
CALCIUM SERPL-MCNC: 9.6 MG/DL (ref 8.7–10.5)
CHLORIDE SERPL-SCNC: 100 MMOL/L (ref 95–110)
CO2 SERPL-SCNC: 29 MMOL/L (ref 23–29)
CREAT SERPL-MCNC: 0.8 MG/DL (ref 0.5–1.4)
DIFFERENTIAL METHOD: ABNORMAL
EOSINOPHIL # BLD AUTO: 0.1 K/UL (ref 0–0.5)
EOSINOPHIL NFR BLD: 0.9 % (ref 0–8)
ERYTHROCYTE [DISTWIDTH] IN BLOOD BY AUTOMATED COUNT: 18.2 % (ref 11.5–14.5)
EST. GFR  (AFRICAN AMERICAN): >60 ML/MIN/1.73 M^2
EST. GFR  (NON AFRICAN AMERICAN): >60 ML/MIN/1.73 M^2
GLUCOSE SERPL-MCNC: 85 MG/DL (ref 70–110)
HCT VFR BLD AUTO: 36.3 % (ref 37–48.5)
HGB BLD-MCNC: 10.6 G/DL (ref 12–16)
IGA SERPL-MCNC: 137 MG/DL (ref 40–350)
IGG SERPL-MCNC: 1482 MG/DL (ref 650–1600)
IGM SERPL-MCNC: 95 MG/DL (ref 50–300)
IMM GRANULOCYTES # BLD AUTO: 0.01 K/UL (ref 0–0.04)
IMM GRANULOCYTES NFR BLD AUTO: 0.2 % (ref 0–0.5)
LYMPHOCYTES # BLD AUTO: 3.3 K/UL (ref 1–4.8)
LYMPHOCYTES NFR BLD: 50.4 % (ref 18–48)
MCH RBC QN AUTO: 18.6 PG (ref 27–31)
MCHC RBC AUTO-ENTMCNC: 29.2 G/DL (ref 32–36)
MCV RBC AUTO: 64 FL (ref 82–98)
MONOCYTES # BLD AUTO: 0.6 K/UL (ref 0.3–1)
MONOCYTES NFR BLD: 8.5 % (ref 4–15)
NEUTROPHILS # BLD AUTO: 2.6 K/UL (ref 1.8–7.7)
NEUTROPHILS NFR BLD: 39.4 % (ref 38–73)
NRBC BLD-RTO: 0 /100 WBC
PLATELET # BLD AUTO: 307 K/UL (ref 150–350)
PMV BLD AUTO: 11 FL (ref 9.2–12.9)
POTASSIUM SERPL-SCNC: 3.9 MMOL/L (ref 3.5–5.1)
PROT SERPL-MCNC: 7.4 G/DL (ref 6–8.4)
RBC # BLD AUTO: 5.7 M/UL (ref 4–5.4)
SODIUM SERPL-SCNC: 139 MMOL/L (ref 136–145)
WBC # BLD AUTO: 6.55 K/UL (ref 3.9–12.7)

## 2021-02-26 PROCEDURE — 36415 COLL VENOUS BLD VENIPUNCTURE: CPT | Mod: PN

## 2021-02-26 PROCEDURE — 84165 PROTEIN E-PHORESIS SERUM: CPT

## 2021-02-26 PROCEDURE — 86334 IMMUNOFIX E-PHORESIS SERUM: CPT | Mod: 26,,, | Performed by: PATHOLOGY

## 2021-02-26 PROCEDURE — 80053 COMPREHEN METABOLIC PANEL: CPT

## 2021-02-26 PROCEDURE — 82784 ASSAY IGA/IGD/IGG/IGM EACH: CPT

## 2021-02-26 PROCEDURE — 84165 PATHOLOGIST INTERPRETATION SPE: ICD-10-PCS | Mod: 26,,, | Performed by: PATHOLOGY

## 2021-02-26 PROCEDURE — 85025 COMPLETE CBC W/AUTO DIFF WBC: CPT

## 2021-02-26 PROCEDURE — 84165 PROTEIN E-PHORESIS SERUM: CPT | Mod: 26,,, | Performed by: PATHOLOGY

## 2021-02-26 PROCEDURE — 86334 IMMUNOFIX E-PHORESIS SERUM: CPT

## 2021-02-26 PROCEDURE — 83520 IMMUNOASSAY QUANT NOS NONAB: CPT | Mod: 59

## 2021-02-26 PROCEDURE — 86334 PATHOLOGIST INTERPRETATION IFE: ICD-10-PCS | Mod: 26,,, | Performed by: PATHOLOGY

## 2021-02-28 ENCOUNTER — PATIENT MESSAGE (OUTPATIENT)
Dept: HEMATOLOGY/ONCOLOGY | Facility: CLINIC | Age: 76
End: 2021-02-28

## 2021-03-01 ENCOUNTER — PATIENT MESSAGE (OUTPATIENT)
Dept: NEUROLOGY | Facility: CLINIC | Age: 76
End: 2021-03-01

## 2021-03-01 LAB
ALBUMIN SERPL ELPH-MCNC: 4.33 G/DL (ref 3.35–5.55)
ALPHA1 GLOB SERPL ELPH-MCNC: 0.27 G/DL (ref 0.17–0.41)
ALPHA2 GLOB SERPL ELPH-MCNC: 0.64 G/DL (ref 0.43–0.99)
B-GLOBULIN SERPL ELPH-MCNC: 0.61 G/DL (ref 0.5–1.1)
GAMMA GLOB SERPL ELPH-MCNC: 1.34 G/DL (ref 0.67–1.58)
INTERPRETATION SERPL IFE-IMP: NORMAL
KAPPA LC SER QL IA: 1.39 MG/DL (ref 0.33–1.94)
KAPPA LC/LAMBDA SER IA: 1.26 (ref 0.26–1.65)
LAMBDA LC SER QL IA: 1.1 MG/DL (ref 0.57–2.63)
PATHOLOGIST INTERPRETATION IFE: NORMAL
PATHOLOGIST INTERPRETATION SPE: NORMAL
PROT SERPL-MCNC: 7.2 G/DL (ref 6–8.4)

## 2021-03-02 ENCOUNTER — OFFICE VISIT (OUTPATIENT)
Dept: HEMATOLOGY/ONCOLOGY | Facility: CLINIC | Age: 76
End: 2021-03-02
Payer: MEDICARE

## 2021-03-02 VITALS
RESPIRATION RATE: 16 BRPM | DIASTOLIC BLOOD PRESSURE: 60 MMHG | SYSTOLIC BLOOD PRESSURE: 112 MMHG | HEART RATE: 63 BPM | OXYGEN SATURATION: 99 % | HEIGHT: 63 IN | WEIGHT: 149.13 LBS | BODY MASS INDEX: 26.42 KG/M2 | TEMPERATURE: 98 F

## 2021-03-02 DIAGNOSIS — D47.2 MGUS (MONOCLONAL GAMMOPATHY OF UNKNOWN SIGNIFICANCE): Primary | ICD-10-CM

## 2021-03-02 DIAGNOSIS — M54.12 RADICULOPATHY, CERVICAL REGION: ICD-10-CM

## 2021-03-02 DIAGNOSIS — M54.9 MID BACK PAIN ON RIGHT SIDE: ICD-10-CM

## 2021-03-02 DIAGNOSIS — M54.6 PAIN IN THORACIC SPINE: ICD-10-CM

## 2021-03-02 DIAGNOSIS — M25.511 CHRONIC RIGHT SHOULDER PAIN: ICD-10-CM

## 2021-03-02 DIAGNOSIS — G89.29 CHRONIC RIGHT SHOULDER PAIN: ICD-10-CM

## 2021-03-02 DIAGNOSIS — D56.3 THALASSEMIA TRAIT: ICD-10-CM

## 2021-03-02 PROCEDURE — 99214 PR OFFICE/OUTPT VISIT, EST, LEVL IV, 30-39 MIN: ICD-10-PCS | Mod: S$GLB,,, | Performed by: INTERNAL MEDICINE

## 2021-03-02 PROCEDURE — 3074F SYST BP LT 130 MM HG: CPT | Mod: CPTII,S$GLB,, | Performed by: INTERNAL MEDICINE

## 2021-03-02 PROCEDURE — 3288F PR FALLS RISK ASSESSMENT DOCUMENTED: ICD-10-PCS | Mod: CPTII,S$GLB,, | Performed by: INTERNAL MEDICINE

## 2021-03-02 PROCEDURE — 3288F FALL RISK ASSESSMENT DOCD: CPT | Mod: CPTII,S$GLB,, | Performed by: INTERNAL MEDICINE

## 2021-03-02 PROCEDURE — 3074F PR MOST RECENT SYSTOLIC BLOOD PRESSURE < 130 MM HG: ICD-10-PCS | Mod: CPTII,S$GLB,, | Performed by: INTERNAL MEDICINE

## 2021-03-02 PROCEDURE — 1159F MED LIST DOCD IN RCRD: CPT | Mod: S$GLB,,, | Performed by: INTERNAL MEDICINE

## 2021-03-02 PROCEDURE — 3078F DIAST BP <80 MM HG: CPT | Mod: CPTII,S$GLB,, | Performed by: INTERNAL MEDICINE

## 2021-03-02 PROCEDURE — 1126F AMNT PAIN NOTED NONE PRSNT: CPT | Mod: S$GLB,,, | Performed by: INTERNAL MEDICINE

## 2021-03-02 PROCEDURE — 99999 PR PBB SHADOW E&M-EST. PATIENT-LVL IV: ICD-10-PCS | Mod: PBBFAC,,, | Performed by: INTERNAL MEDICINE

## 2021-03-02 PROCEDURE — 1101F PT FALLS ASSESS-DOCD LE1/YR: CPT | Mod: CPTII,S$GLB,, | Performed by: INTERNAL MEDICINE

## 2021-03-02 PROCEDURE — 99214 OFFICE O/P EST MOD 30 MIN: CPT | Mod: S$GLB,,, | Performed by: INTERNAL MEDICINE

## 2021-03-02 PROCEDURE — 1159F PR MEDICATION LIST DOCUMENTED IN MEDICAL RECORD: ICD-10-PCS | Mod: S$GLB,,, | Performed by: INTERNAL MEDICINE

## 2021-03-02 PROCEDURE — 99999 PR PBB SHADOW E&M-EST. PATIENT-LVL IV: CPT | Mod: PBBFAC,,, | Performed by: INTERNAL MEDICINE

## 2021-03-02 PROCEDURE — 3078F PR MOST RECENT DIASTOLIC BLOOD PRESSURE < 80 MM HG: ICD-10-PCS | Mod: CPTII,S$GLB,, | Performed by: INTERNAL MEDICINE

## 2021-03-02 PROCEDURE — 1126F PR PAIN SEVERITY QUANTIFIED, NO PAIN PRESENT: ICD-10-PCS | Mod: S$GLB,,, | Performed by: INTERNAL MEDICINE

## 2021-03-02 PROCEDURE — 1101F PR PT FALLS ASSESS DOC 0-1 FALLS W/OUT INJ PAST YR: ICD-10-PCS | Mod: CPTII,S$GLB,, | Performed by: INTERNAL MEDICINE

## 2021-03-03 ENCOUNTER — TELEPHONE (OUTPATIENT)
Dept: HEMATOLOGY/ONCOLOGY | Facility: CLINIC | Age: 76
End: 2021-03-03

## 2021-03-05 ENCOUNTER — TELEPHONE (OUTPATIENT)
Dept: HEMATOLOGY/ONCOLOGY | Facility: CLINIC | Age: 76
End: 2021-03-05

## 2021-03-10 ENCOUNTER — TELEPHONE (OUTPATIENT)
Dept: HEMATOLOGY/ONCOLOGY | Facility: CLINIC | Age: 76
End: 2021-03-10

## 2021-03-12 ENCOUNTER — TELEPHONE (OUTPATIENT)
Dept: HEMATOLOGY/ONCOLOGY | Facility: CLINIC | Age: 76
End: 2021-03-12

## 2021-03-24 ENCOUNTER — HOSPITAL ENCOUNTER (OUTPATIENT)
Dept: RADIOLOGY | Facility: OTHER | Age: 76
Discharge: HOME | End: 2021-03-24
Attending: INTERNAL MEDICINE
Payer: MEDICARE

## 2021-03-24 ENCOUNTER — TELEPHONE (OUTPATIENT)
Dept: HEMATOLOGY/ONCOLOGY | Facility: CLINIC | Age: 76
End: 2021-03-24

## 2021-03-24 DIAGNOSIS — M54.6 PAIN IN THORACIC SPINE: ICD-10-CM

## 2021-03-24 DIAGNOSIS — M54.12 RADICULOPATHY, CERVICAL REGION: ICD-10-CM

## 2021-03-24 PROCEDURE — 25500020 PHARM REV CODE 255: Performed by: INTERNAL MEDICINE

## 2021-03-24 PROCEDURE — 72129 CT CHEST SPINE W/DYE: CPT | Mod: TC

## 2021-03-24 PROCEDURE — 72126 CT NECK SPINE W/DYE: CPT | Mod: TC

## 2021-03-24 PROCEDURE — 72126 CT CERVICAL SPINE WITH CONTRAST: ICD-10-PCS | Mod: 26,,, | Performed by: RADIOLOGY

## 2021-03-24 PROCEDURE — 72129 CT CHEST SPINE W/DYE: CPT | Mod: 26,,, | Performed by: RADIOLOGY

## 2021-03-24 PROCEDURE — 72126 CT NECK SPINE W/DYE: CPT | Mod: 26,,, | Performed by: RADIOLOGY

## 2021-03-24 PROCEDURE — 72129 CT THORACIC SPINE WITH CONTRAST: ICD-10-PCS | Mod: 26,,, | Performed by: RADIOLOGY

## 2021-03-24 RX ADMIN — IOHEXOL 75 ML: 350 INJECTION, SOLUTION INTRAVENOUS at 10:03

## 2021-03-30 ENCOUNTER — PATIENT MESSAGE (OUTPATIENT)
Dept: HEMATOLOGY/ONCOLOGY | Facility: CLINIC | Age: 76
End: 2021-03-30

## 2021-05-10 ENCOUNTER — LAB VISIT (OUTPATIENT)
Dept: LAB | Facility: HOSPITAL | Age: 76
End: 2021-05-10
Attending: FAMILY MEDICINE
Payer: MEDICARE

## 2021-05-10 DIAGNOSIS — Z11.59 NEED FOR HEPATITIS C SCREENING TEST: ICD-10-CM

## 2021-05-10 DIAGNOSIS — I10 ESSENTIAL HYPERTENSION: ICD-10-CM

## 2021-05-10 DIAGNOSIS — M89.9 DISORDER OF BONE, UNSPECIFIED: ICD-10-CM

## 2021-05-10 DIAGNOSIS — Z13.6 ENCOUNTER FOR LIPID SCREENING FOR CARDIOVASCULAR DISEASE: ICD-10-CM

## 2021-05-10 DIAGNOSIS — M85.88 OSTEOPENIA OF LUMBAR SPINE: ICD-10-CM

## 2021-05-10 DIAGNOSIS — Z13.220 ENCOUNTER FOR LIPID SCREENING FOR CARDIOVASCULAR DISEASE: ICD-10-CM

## 2021-05-10 LAB
25(OH)D3+25(OH)D2 SERPL-MCNC: 25 NG/ML (ref 30–96)
ALBUMIN SERPL BCP-MCNC: 3.7 G/DL (ref 3.5–5.2)
ALP SERPL-CCNC: 46 U/L (ref 55–135)
ALT SERPL W/O P-5'-P-CCNC: 8 U/L (ref 10–44)
ANION GAP SERPL CALC-SCNC: 5 MMOL/L (ref 8–16)
AST SERPL-CCNC: 15 U/L (ref 10–40)
BILIRUB SERPL-MCNC: 0.4 MG/DL (ref 0.1–1)
BUN SERPL-MCNC: 15 MG/DL (ref 8–23)
CALCIUM SERPL-MCNC: 9.7 MG/DL (ref 8.7–10.5)
CHLORIDE SERPL-SCNC: 104 MMOL/L (ref 95–110)
CHOLEST SERPL-MCNC: 218 MG/DL (ref 120–199)
CHOLEST/HDLC SERPL: 3.8 {RATIO} (ref 2–5)
CO2 SERPL-SCNC: 32 MMOL/L (ref 23–29)
CREAT SERPL-MCNC: 0.8 MG/DL (ref 0.5–1.4)
ERYTHROCYTE [DISTWIDTH] IN BLOOD BY AUTOMATED COUNT: 18.6 % (ref 11.5–14.5)
EST. GFR  (AFRICAN AMERICAN): >60 ML/MIN/1.73 M^2
EST. GFR  (NON AFRICAN AMERICAN): >60 ML/MIN/1.73 M^2
GLUCOSE SERPL-MCNC: 92 MG/DL (ref 70–110)
HCT VFR BLD AUTO: 33.6 % (ref 37–48.5)
HDLC SERPL-MCNC: 58 MG/DL (ref 40–75)
HDLC SERPL: 26.6 % (ref 20–50)
HGB BLD-MCNC: 10.2 G/DL (ref 12–16)
LDLC SERPL CALC-MCNC: 143 MG/DL (ref 63–159)
MCH RBC QN AUTO: 18.6 PG (ref 27–31)
MCHC RBC AUTO-ENTMCNC: 30.4 G/DL (ref 32–36)
MCV RBC AUTO: 61 FL (ref 82–98)
NONHDLC SERPL-MCNC: 160 MG/DL
PLATELET # BLD AUTO: 263 K/UL (ref 150–450)
PMV BLD AUTO: 10.7 FL (ref 9.2–12.9)
POTASSIUM SERPL-SCNC: 4.1 MMOL/L (ref 3.5–5.1)
PROT SERPL-MCNC: 6.9 G/DL (ref 6–8.4)
RBC # BLD AUTO: 5.47 M/UL (ref 4–5.4)
SODIUM SERPL-SCNC: 141 MMOL/L (ref 136–145)
T4 FREE SERPL-MCNC: 1.19 NG/DL (ref 0.71–1.51)
TRIGL SERPL-MCNC: 85 MG/DL (ref 30–150)
TSH SERPL DL<=0.005 MIU/L-ACNC: 0.37 UIU/ML (ref 0.4–4)
WBC # BLD AUTO: 5.71 K/UL (ref 3.9–12.7)

## 2021-05-10 PROCEDURE — 85027 COMPLETE CBC AUTOMATED: CPT | Performed by: FAMILY MEDICINE

## 2021-05-10 PROCEDURE — 84439 ASSAY OF FREE THYROXINE: CPT | Performed by: FAMILY MEDICINE

## 2021-05-10 PROCEDURE — 86803 HEPATITIS C AB TEST: CPT | Performed by: FAMILY MEDICINE

## 2021-05-10 PROCEDURE — 80061 LIPID PANEL: CPT | Performed by: FAMILY MEDICINE

## 2021-05-10 PROCEDURE — 80053 COMPREHEN METABOLIC PANEL: CPT | Performed by: FAMILY MEDICINE

## 2021-05-10 PROCEDURE — 84443 ASSAY THYROID STIM HORMONE: CPT | Performed by: FAMILY MEDICINE

## 2021-05-10 PROCEDURE — 82306 VITAMIN D 25 HYDROXY: CPT | Performed by: FAMILY MEDICINE

## 2021-05-10 PROCEDURE — 36415 COLL VENOUS BLD VENIPUNCTURE: CPT | Mod: PN | Performed by: FAMILY MEDICINE

## 2021-05-11 LAB — HCV AB SERPL QL IA: NEGATIVE

## 2021-05-14 ENCOUNTER — TELEPHONE (OUTPATIENT)
Dept: PRIMARY CARE CLINIC | Facility: CLINIC | Age: 76
End: 2021-05-14

## 2021-05-17 ENCOUNTER — TELEPHONE (OUTPATIENT)
Dept: NEUROLOGY | Facility: CLINIC | Age: 76
End: 2021-05-17

## 2021-05-17 ENCOUNTER — OFFICE VISIT (OUTPATIENT)
Dept: PRIMARY CARE CLINIC | Facility: CLINIC | Age: 76
End: 2021-05-17
Payer: MEDICARE

## 2021-05-17 ENCOUNTER — IMMUNIZATION (OUTPATIENT)
Dept: PHARMACY | Facility: CLINIC | Age: 76
End: 2021-05-17
Payer: MEDICARE

## 2021-05-17 VITALS
OXYGEN SATURATION: 98 % | HEIGHT: 63 IN | WEIGHT: 150.56 LBS | DIASTOLIC BLOOD PRESSURE: 80 MMHG | SYSTOLIC BLOOD PRESSURE: 132 MMHG | TEMPERATURE: 98 F | BODY MASS INDEX: 26.68 KG/M2 | HEART RATE: 65 BPM

## 2021-05-17 DIAGNOSIS — M85.88 OSTEOPENIA OF LUMBAR SPINE: ICD-10-CM

## 2021-05-17 DIAGNOSIS — E55.9 VITAMIN D INSUFFICIENCY: ICD-10-CM

## 2021-05-17 DIAGNOSIS — M70.61 TROCHANTERIC BURSITIS OF RIGHT HIP: ICD-10-CM

## 2021-05-17 DIAGNOSIS — N95.2 VAGINAL ATROPHY: ICD-10-CM

## 2021-05-17 DIAGNOSIS — R29.3 POOR POSTURE: ICD-10-CM

## 2021-05-17 DIAGNOSIS — Z87.11 HISTORY OF GASTRIC ULCER: ICD-10-CM

## 2021-05-17 DIAGNOSIS — L91.8 SKIN TAG: ICD-10-CM

## 2021-05-17 DIAGNOSIS — N39.41 URGE INCONTINENCE: ICD-10-CM

## 2021-05-17 DIAGNOSIS — N81.11 CYSTOCELE, MIDLINE: ICD-10-CM

## 2021-05-17 DIAGNOSIS — K58.9 IRRITABLE BOWEL SYNDROME, UNSPECIFIED TYPE: ICD-10-CM

## 2021-05-17 DIAGNOSIS — D47.2 MGUS (MONOCLONAL GAMMOPATHY OF UNKNOWN SIGNIFICANCE): ICD-10-CM

## 2021-05-17 DIAGNOSIS — D56.3 THALASSEMIA TRAIT: ICD-10-CM

## 2021-05-17 DIAGNOSIS — I10 ESSENTIAL HYPERTENSION: ICD-10-CM

## 2021-05-17 DIAGNOSIS — M20.12 ACQUIRED HALLUX VALGUS OF LEFT FOOT: ICD-10-CM

## 2021-05-17 DIAGNOSIS — K21.9 GASTROESOPHAGEAL REFLUX DISEASE WITHOUT ESOPHAGITIS: ICD-10-CM

## 2021-05-17 DIAGNOSIS — R20.0 NUMBNESS OF FOOT: ICD-10-CM

## 2021-05-17 DIAGNOSIS — N81.6 RECTOCELE: ICD-10-CM

## 2021-05-17 DIAGNOSIS — E03.9 ACQUIRED HYPOTHYROIDISM: Primary | ICD-10-CM

## 2021-05-17 PROCEDURE — 1101F PT FALLS ASSESS-DOCD LE1/YR: CPT | Mod: CPTII,S$GLB,, | Performed by: FAMILY MEDICINE

## 2021-05-17 PROCEDURE — 3288F PR FALLS RISK ASSESSMENT DOCUMENTED: ICD-10-PCS | Mod: CPTII,S$GLB,, | Performed by: FAMILY MEDICINE

## 2021-05-17 PROCEDURE — 1125F AMNT PAIN NOTED PAIN PRSNT: CPT | Mod: S$GLB,,, | Performed by: FAMILY MEDICINE

## 2021-05-17 PROCEDURE — 99999 PR PBB SHADOW E&M-EST. PATIENT-LVL V: CPT | Mod: PBBFAC,,, | Performed by: FAMILY MEDICINE

## 2021-05-17 PROCEDURE — 1159F MED LIST DOCD IN RCRD: CPT | Mod: S$GLB,,, | Performed by: FAMILY MEDICINE

## 2021-05-17 PROCEDURE — 99214 OFFICE O/P EST MOD 30 MIN: CPT | Mod: S$GLB,,, | Performed by: FAMILY MEDICINE

## 2021-05-17 PROCEDURE — 1159F PR MEDICATION LIST DOCUMENTED IN MEDICAL RECORD: ICD-10-PCS | Mod: S$GLB,,, | Performed by: FAMILY MEDICINE

## 2021-05-17 PROCEDURE — 99999 PR PBB SHADOW E&M-EST. PATIENT-LVL V: ICD-10-PCS | Mod: PBBFAC,,, | Performed by: FAMILY MEDICINE

## 2021-05-17 PROCEDURE — 3288F FALL RISK ASSESSMENT DOCD: CPT | Mod: CPTII,S$GLB,, | Performed by: FAMILY MEDICINE

## 2021-05-17 PROCEDURE — 1125F PR PAIN SEVERITY QUANTIFIED, PAIN PRESENT: ICD-10-PCS | Mod: S$GLB,,, | Performed by: FAMILY MEDICINE

## 2021-05-17 PROCEDURE — 99214 PR OFFICE/OUTPT VISIT, EST, LEVL IV, 30-39 MIN: ICD-10-PCS | Mod: S$GLB,,, | Performed by: FAMILY MEDICINE

## 2021-05-17 PROCEDURE — 1101F PR PT FALLS ASSESS DOC 0-1 FALLS W/OUT INJ PAST YR: ICD-10-PCS | Mod: CPTII,S$GLB,, | Performed by: FAMILY MEDICINE

## 2021-05-17 RX ORDER — METHOCARBAMOL 750 MG/1
TABLET, FILM COATED ORAL
COMMUNITY
End: 2022-07-25 | Stop reason: ALTCHOICE

## 2021-05-17 RX ORDER — MUPIROCIN 20 MG/G
OINTMENT TOPICAL
COMMUNITY
Start: 2021-05-04 | End: 2022-07-25 | Stop reason: ALTCHOICE

## 2021-05-21 ENCOUNTER — PATIENT MESSAGE (OUTPATIENT)
Dept: PRIMARY CARE CLINIC | Facility: CLINIC | Age: 76
End: 2021-05-21

## 2021-06-04 ENCOUNTER — PATIENT OUTREACH (OUTPATIENT)
Dept: ADMINISTRATIVE | Facility: OTHER | Age: 76
End: 2021-06-04

## 2021-06-07 ENCOUNTER — OFFICE VISIT (OUTPATIENT)
Dept: NEUROLOGY | Facility: CLINIC | Age: 76
End: 2021-06-07
Payer: MEDICARE

## 2021-06-07 ENCOUNTER — LAB VISIT (OUTPATIENT)
Dept: LAB | Facility: OTHER | Age: 76
End: 2021-06-07
Attending: PSYCHIATRY & NEUROLOGY
Payer: MEDICARE

## 2021-06-07 ENCOUNTER — PATIENT MESSAGE (OUTPATIENT)
Dept: NEUROLOGY | Facility: CLINIC | Age: 76
End: 2021-06-07

## 2021-06-07 VITALS
HEIGHT: 63 IN | HEART RATE: 74 BPM | WEIGHT: 152.13 LBS | SYSTOLIC BLOOD PRESSURE: 110 MMHG | DIASTOLIC BLOOD PRESSURE: 54 MMHG | BODY MASS INDEX: 26.95 KG/M2

## 2021-06-07 DIAGNOSIS — G57.93 NEUROPATHY OF BOTH FEET: ICD-10-CM

## 2021-06-07 DIAGNOSIS — G60.3 IDIOPATHIC PROGRESSIVE POLYNEUROPATHY: ICD-10-CM

## 2021-06-07 DIAGNOSIS — R73.03 PREDIABETES: Primary | ICD-10-CM

## 2021-06-07 DIAGNOSIS — R20.0 ANESTHESIA OF SKIN: ICD-10-CM

## 2021-06-07 DIAGNOSIS — G45.9 TIA (TRANSIENT ISCHEMIC ATTACK): ICD-10-CM

## 2021-06-07 DIAGNOSIS — R73.03 PREDIABETES: ICD-10-CM

## 2021-06-07 LAB
ESTIMATED AVG GLUCOSE: 117 MG/DL (ref 68–131)
HBA1C MFR BLD: 5.7 % (ref 4–5.6)

## 2021-06-07 PROCEDURE — 83036 HEMOGLOBIN GLYCOSYLATED A1C: CPT | Performed by: PSYCHIATRY & NEUROLOGY

## 2021-06-07 PROCEDURE — 1159F PR MEDICATION LIST DOCUMENTED IN MEDICAL RECORD: ICD-10-PCS | Mod: S$GLB,,, | Performed by: PSYCHIATRY & NEUROLOGY

## 2021-06-07 PROCEDURE — 1125F PR PAIN SEVERITY QUANTIFIED, PAIN PRESENT: ICD-10-PCS | Mod: S$GLB,,, | Performed by: PSYCHIATRY & NEUROLOGY

## 2021-06-07 PROCEDURE — 3288F PR FALLS RISK ASSESSMENT DOCUMENTED: ICD-10-PCS | Mod: CPTII,S$GLB,, | Performed by: PSYCHIATRY & NEUROLOGY

## 2021-06-07 PROCEDURE — 1101F PT FALLS ASSESS-DOCD LE1/YR: CPT | Mod: CPTII,S$GLB,, | Performed by: PSYCHIATRY & NEUROLOGY

## 2021-06-07 PROCEDURE — 99214 OFFICE O/P EST MOD 30 MIN: CPT | Mod: S$GLB,,, | Performed by: PSYCHIATRY & NEUROLOGY

## 2021-06-07 PROCEDURE — 36415 COLL VENOUS BLD VENIPUNCTURE: CPT | Performed by: PSYCHIATRY & NEUROLOGY

## 2021-06-07 PROCEDURE — 1125F AMNT PAIN NOTED PAIN PRSNT: CPT | Mod: S$GLB,,, | Performed by: PSYCHIATRY & NEUROLOGY

## 2021-06-07 PROCEDURE — 99999 PR PBB SHADOW E&M-EST. PATIENT-LVL III: ICD-10-PCS | Mod: PBBFAC,,, | Performed by: PSYCHIATRY & NEUROLOGY

## 2021-06-07 PROCEDURE — 1159F MED LIST DOCD IN RCRD: CPT | Mod: S$GLB,,, | Performed by: PSYCHIATRY & NEUROLOGY

## 2021-06-07 PROCEDURE — 82607 VITAMIN B-12: CPT | Performed by: PSYCHIATRY & NEUROLOGY

## 2021-06-07 PROCEDURE — 1101F PR PT FALLS ASSESS DOC 0-1 FALLS W/OUT INJ PAST YR: ICD-10-PCS | Mod: CPTII,S$GLB,, | Performed by: PSYCHIATRY & NEUROLOGY

## 2021-06-07 PROCEDURE — 99214 PR OFFICE/OUTPT VISIT, EST, LEVL IV, 30-39 MIN: ICD-10-PCS | Mod: S$GLB,,, | Performed by: PSYCHIATRY & NEUROLOGY

## 2021-06-07 PROCEDURE — 99999 PR PBB SHADOW E&M-EST. PATIENT-LVL III: CPT | Mod: PBBFAC,,, | Performed by: PSYCHIATRY & NEUROLOGY

## 2021-06-07 PROCEDURE — 3288F FALL RISK ASSESSMENT DOCD: CPT | Mod: CPTII,S$GLB,, | Performed by: PSYCHIATRY & NEUROLOGY

## 2021-06-07 RX ORDER — ATORVASTATIN CALCIUM 10 MG/1
10 TABLET, FILM COATED ORAL NIGHTLY
Qty: 90 TABLET | Refills: 3 | Status: SHIPPED | OUTPATIENT
Start: 2021-06-07 | End: 2022-06-17 | Stop reason: SDUPTHER

## 2021-06-08 LAB — VIT B12 SERPL-MCNC: 453 PG/ML (ref 210–950)

## 2021-06-09 DIAGNOSIS — M48.02 SPINAL STENOSIS, CERVICAL REGION: ICD-10-CM

## 2021-06-09 DIAGNOSIS — G45.9 TRANSIENT CEREBRAL ISCHEMIA, UNSPECIFIED TYPE: ICD-10-CM

## 2021-06-10 ENCOUNTER — HOSPITAL ENCOUNTER (OUTPATIENT)
Dept: CARDIOLOGY | Facility: OTHER | Age: 76
Discharge: HOME | End: 2021-06-10
Attending: PSYCHIATRY & NEUROLOGY
Payer: MEDICARE

## 2021-06-10 ENCOUNTER — PATIENT MESSAGE (OUTPATIENT)
Dept: NEUROLOGY | Facility: CLINIC | Age: 76
End: 2021-06-10

## 2021-06-10 DIAGNOSIS — G45.9 TIA (TRANSIENT ISCHEMIC ATTACK): ICD-10-CM

## 2021-06-10 PROCEDURE — 93010 ELECTROCARDIOGRAM REPORT: CPT | Mod: ,,, | Performed by: INTERNAL MEDICINE

## 2021-06-10 PROCEDURE — 93005 ELECTROCARDIOGRAM TRACING: CPT

## 2021-06-10 PROCEDURE — 93010 EKG 12-LEAD: ICD-10-PCS | Mod: ,,, | Performed by: INTERNAL MEDICINE

## 2021-06-11 ENCOUNTER — TELEPHONE (OUTPATIENT)
Dept: NEUROLOGY | Facility: CLINIC | Age: 76
End: 2021-06-11

## 2021-06-11 ENCOUNTER — PATIENT MESSAGE (OUTPATIENT)
Dept: NEUROLOGY | Facility: CLINIC | Age: 76
End: 2021-06-11

## 2021-06-14 ENCOUNTER — PATIENT MESSAGE (OUTPATIENT)
Dept: NEUROLOGY | Facility: CLINIC | Age: 76
End: 2021-06-14

## 2021-06-14 ENCOUNTER — HOSPITAL ENCOUNTER (OUTPATIENT)
Dept: CARDIOLOGY | Facility: OTHER | Age: 76
Discharge: HOME | End: 2021-06-14
Attending: PSYCHIATRY & NEUROLOGY
Payer: MEDICARE

## 2021-06-14 VITALS
HEIGHT: 63 IN | SYSTOLIC BLOOD PRESSURE: 110 MMHG | BODY MASS INDEX: 26.93 KG/M2 | WEIGHT: 152 LBS | DIASTOLIC BLOOD PRESSURE: 54 MMHG | HEART RATE: 71 BPM

## 2021-06-14 DIAGNOSIS — G45.9 TIA (TRANSIENT ISCHEMIC ATTACK): ICD-10-CM

## 2021-06-14 LAB
AV INDEX (PROSTH): 0.68
AV MEAN GRADIENT: 4 MMHG
AV PEAK GRADIENT: 7 MMHG
AV VALVE AREA: 2.14 CM2
AV VELOCITY RATIO: 0.68
BSA FOR ECHO PROCEDURE: 1.75 M2
CV ECHO LV RWT: 0.35 CM
DOP CALC AO PEAK VEL: 1.35 M/S
DOP CALC AO VTI: 28.61 CM
DOP CALC LVOT AREA: 3.1 CM2
DOP CALC LVOT DIAMETER: 2 CM
DOP CALC LVOT PEAK VEL: 0.92 M/S
DOP CALC LVOT STROKE VOLUME: 61.14 CM3
DOP CALCLVOT PEAK VEL VTI: 19.47 CM
E WAVE DECELERATION TIME: 202.4 MSEC
E/A RATIO: 0.87
E/E' RATIO: 9.29 M/S
ECHO LV POSTERIOR WALL: 0.76 CM (ref 0.6–1.1)
EJECTION FRACTION: 65 %
FRACTIONAL SHORTENING: 32 % (ref 28–44)
INTERVENTRICULAR SEPTUM: 0.84 CM (ref 0.6–1.1)
IVRT: 118.3 MSEC
LA MAJOR: 5.87 CM
LA MINOR: 3.18 CM
LA WIDTH: 3.57 CM
LEFT ATRIUM SIZE: 3.61 CM
LEFT ATRIUM VOLUME INDEX MOD: 32.6 ML/M2
LEFT ATRIUM VOLUME INDEX: 26.3 ML/M2
LEFT ATRIUM VOLUME MOD: 56 CM3
LEFT ATRIUM VOLUME: 45.19 CM3
LEFT INTERNAL DIMENSION IN SYSTOLE: 2.94 CM (ref 2.1–4)
LEFT VENTRICLE DIASTOLIC VOLUME INDEX: 49.23 ML/M2
LEFT VENTRICLE DIASTOLIC VOLUME: 84.68 ML
LEFT VENTRICLE MASS INDEX: 62 G/M2
LEFT VENTRICLE SYSTOLIC VOLUME INDEX: 19.4 ML/M2
LEFT VENTRICLE SYSTOLIC VOLUME: 33.34 ML
LEFT VENTRICULAR INTERNAL DIMENSION IN DIASTOLE: 4.34 CM (ref 3.5–6)
LEFT VENTRICULAR MASS: 106.96 G
LV LATERAL E/E' RATIO: 10.83 M/S
LV SEPTAL E/E' RATIO: 8.13 M/S
MV PEAK A VEL: 0.75 M/S
MV PEAK E VEL: 0.65 M/S
MV STENOSIS PRESSURE HALF TIME: 58.7 MS
MV VALVE AREA P 1/2 METHOD: 3.75 CM2
PISA MRMAX VEL: 0.02 M/S
PISA TR MAX VEL: 2.07 M/S
PULM VEIN S/D RATIO: 1.41
PV PEAK D VEL: 0.46 M/S
PV PEAK S VEL: 0.65 M/S
PV PEAK VELOCITY: 0.62 CM/S
RA MAJOR: 5.41 CM
RA PRESSURE: 3 MMHG
RA WIDTH: 3.48 CM
SINUS: 2.54 CM
TDI LATERAL: 0.06 M/S
TDI SEPTAL: 0.08 M/S
TDI: 0.07 M/S
TR MAX PG: 17 MMHG
TRICUSPID ANNULAR PLANE SYSTOLIC EXCURSION: 1.99 CM
TV REST PULMONARY ARTERY PRESSURE: 20 MMHG

## 2021-06-14 PROCEDURE — 93306 TTE W/DOPPLER COMPLETE: CPT

## 2021-06-14 PROCEDURE — 93306 ECHO (CUPID ONLY): ICD-10-PCS | Mod: 26,,, | Performed by: INTERNAL MEDICINE

## 2021-06-14 PROCEDURE — 93306 TTE W/DOPPLER COMPLETE: CPT | Mod: 26,,, | Performed by: INTERNAL MEDICINE

## 2021-06-16 ENCOUNTER — OFFICE VISIT (OUTPATIENT)
Dept: DERMATOLOGY | Facility: CLINIC | Age: 76
End: 2021-06-16
Payer: MEDICARE

## 2021-06-16 DIAGNOSIS — I78.1 SPIDER VEINS: ICD-10-CM

## 2021-06-16 DIAGNOSIS — L81.4 LENTIGO: Primary | ICD-10-CM

## 2021-06-16 DIAGNOSIS — L91.8 SKIN TAG: ICD-10-CM

## 2021-06-16 DIAGNOSIS — L81.9 HYPERPIGMENTATION: ICD-10-CM

## 2021-06-16 DIAGNOSIS — D18.01 CHERRY ANGIOMA: ICD-10-CM

## 2021-06-16 PROCEDURE — 1159F MED LIST DOCD IN RCRD: CPT | Mod: S$GLB,,, | Performed by: DERMATOLOGY

## 2021-06-16 PROCEDURE — 1101F PR PT FALLS ASSESS DOC 0-1 FALLS W/OUT INJ PAST YR: ICD-10-PCS | Mod: CPTII,S$GLB,, | Performed by: DERMATOLOGY

## 2021-06-16 PROCEDURE — 1126F AMNT PAIN NOTED NONE PRSNT: CPT | Mod: S$GLB,,, | Performed by: DERMATOLOGY

## 2021-06-16 PROCEDURE — 1126F PR PAIN SEVERITY QUANTIFIED, NO PAIN PRESENT: ICD-10-PCS | Mod: S$GLB,,, | Performed by: DERMATOLOGY

## 2021-06-16 PROCEDURE — 3288F FALL RISK ASSESSMENT DOCD: CPT | Mod: CPTII,S$GLB,, | Performed by: DERMATOLOGY

## 2021-06-16 PROCEDURE — 99999 PR PBB SHADOW E&M-EST. PATIENT-LVL III: CPT | Mod: PBBFAC,,, | Performed by: DERMATOLOGY

## 2021-06-16 PROCEDURE — 3288F PR FALLS RISK ASSESSMENT DOCUMENTED: ICD-10-PCS | Mod: CPTII,S$GLB,, | Performed by: DERMATOLOGY

## 2021-06-16 PROCEDURE — 1159F PR MEDICATION LIST DOCUMENTED IN MEDICAL RECORD: ICD-10-PCS | Mod: S$GLB,,, | Performed by: DERMATOLOGY

## 2021-06-16 PROCEDURE — 99999 PR PBB SHADOW E&M-EST. PATIENT-LVL III: ICD-10-PCS | Mod: PBBFAC,,, | Performed by: DERMATOLOGY

## 2021-06-16 PROCEDURE — 99203 OFFICE O/P NEW LOW 30 MIN: CPT | Mod: S$GLB,,, | Performed by: DERMATOLOGY

## 2021-06-16 PROCEDURE — 1101F PT FALLS ASSESS-DOCD LE1/YR: CPT | Mod: CPTII,S$GLB,, | Performed by: DERMATOLOGY

## 2021-06-16 PROCEDURE — 99203 PR OFFICE/OUTPT VISIT, NEW, LEVL III, 30-44 MIN: ICD-10-PCS | Mod: S$GLB,,, | Performed by: DERMATOLOGY

## 2021-06-21 ENCOUNTER — PATIENT MESSAGE (OUTPATIENT)
Dept: NEUROLOGY | Facility: CLINIC | Age: 76
End: 2021-06-21

## 2021-06-23 ENCOUNTER — PATIENT MESSAGE (OUTPATIENT)
Dept: NEUROLOGY | Facility: CLINIC | Age: 76
End: 2021-06-23

## 2021-06-24 ENCOUNTER — PATIENT MESSAGE (OUTPATIENT)
Dept: NEUROLOGY | Facility: CLINIC | Age: 76
End: 2021-06-24

## 2021-06-24 RX ORDER — DIAZEPAM 5 MG/1
5 TABLET ORAL ONCE AS NEEDED
Qty: 2 TABLET | Refills: 0 | Status: SHIPPED | OUTPATIENT
Start: 2021-06-24 | End: 2021-10-19

## 2021-06-28 ENCOUNTER — TELEPHONE (OUTPATIENT)
Dept: NEUROLOGY | Facility: CLINIC | Age: 76
End: 2021-06-28

## 2021-06-28 ENCOUNTER — LAB VISIT (OUTPATIENT)
Dept: LAB | Facility: HOSPITAL | Age: 76
End: 2021-06-28
Attending: FAMILY MEDICINE
Payer: MEDICARE

## 2021-06-28 ENCOUNTER — TELEPHONE (OUTPATIENT)
Dept: PRIMARY CARE CLINIC | Facility: CLINIC | Age: 76
End: 2021-06-28

## 2021-06-28 DIAGNOSIS — E03.9 ACQUIRED HYPOTHYROIDISM: ICD-10-CM

## 2021-06-28 LAB — TSH SERPL DL<=0.005 MIU/L-ACNC: 1.04 UIU/ML (ref 0.4–4)

## 2021-06-28 PROCEDURE — 36415 COLL VENOUS BLD VENIPUNCTURE: CPT | Mod: PN | Performed by: FAMILY MEDICINE

## 2021-06-28 PROCEDURE — 84443 ASSAY THYROID STIM HORMONE: CPT | Performed by: FAMILY MEDICINE

## 2021-06-29 ENCOUNTER — TELEPHONE (OUTPATIENT)
Dept: NEUROLOGY | Facility: CLINIC | Age: 76
End: 2021-06-29

## 2021-07-01 ENCOUNTER — PATIENT MESSAGE (OUTPATIENT)
Dept: NEUROLOGY | Facility: CLINIC | Age: 76
End: 2021-07-01

## 2021-07-02 ENCOUNTER — TELEPHONE (OUTPATIENT)
Dept: NEUROLOGY | Facility: CLINIC | Age: 76
End: 2021-07-02

## 2021-07-05 ENCOUNTER — PATIENT MESSAGE (OUTPATIENT)
Dept: PRIMARY CARE CLINIC | Facility: CLINIC | Age: 76
End: 2021-07-05

## 2021-07-05 DIAGNOSIS — Z12.31 ENCOUNTER FOR SCREENING MAMMOGRAM FOR MALIGNANT NEOPLASM OF BREAST: Primary | ICD-10-CM

## 2021-07-09 ENCOUNTER — PATIENT MESSAGE (OUTPATIENT)
Dept: NEUROLOGY | Facility: CLINIC | Age: 76
End: 2021-07-09

## 2021-07-14 ENCOUNTER — HOSPITAL ENCOUNTER (OUTPATIENT)
Dept: RADIOLOGY | Facility: HOSPITAL | Age: 76
Discharge: HOME | End: 2021-07-14
Attending: FAMILY MEDICINE
Payer: MEDICARE

## 2021-07-14 VITALS — HEIGHT: 63 IN | BODY MASS INDEX: 26.93 KG/M2 | WEIGHT: 152 LBS

## 2021-07-14 DIAGNOSIS — Z12.31 ENCOUNTER FOR SCREENING MAMMOGRAM FOR MALIGNANT NEOPLASM OF BREAST: ICD-10-CM

## 2021-07-14 PROCEDURE — 77063 MAMMO DIGITAL SCREENING BILAT WITH TOMO: ICD-10-PCS | Mod: 26,,, | Performed by: RADIOLOGY

## 2021-07-14 PROCEDURE — 77063 BREAST TOMOSYNTHESIS BI: CPT | Mod: 26,,, | Performed by: RADIOLOGY

## 2021-07-14 PROCEDURE — 77067 SCR MAMMO BI INCL CAD: CPT | Mod: TC,PN

## 2021-07-14 PROCEDURE — 77067 SCR MAMMO BI INCL CAD: CPT | Mod: 26,,, | Performed by: RADIOLOGY

## 2021-07-14 PROCEDURE — 77067 MAMMO DIGITAL SCREENING BILAT WITH TOMO: ICD-10-PCS | Mod: 26,,, | Performed by: RADIOLOGY

## 2021-07-16 ENCOUNTER — PATIENT MESSAGE (OUTPATIENT)
Dept: NEUROLOGY | Facility: CLINIC | Age: 76
End: 2021-07-16

## 2021-07-16 ENCOUNTER — PATIENT MESSAGE (OUTPATIENT)
Dept: PRIMARY CARE CLINIC | Facility: CLINIC | Age: 76
End: 2021-07-16

## 2021-07-19 ENCOUNTER — TELEPHONE (OUTPATIENT)
Dept: RADIOLOGY | Facility: HOSPITAL | Age: 76
End: 2021-07-19

## 2021-07-20 ENCOUNTER — PATIENT OUTREACH (OUTPATIENT)
Dept: ADMINISTRATIVE | Facility: OTHER | Age: 76
End: 2021-07-20

## 2021-07-21 ENCOUNTER — OFFICE VISIT (OUTPATIENT)
Dept: OPTOMETRY | Facility: CLINIC | Age: 76
End: 2021-07-21
Payer: MEDICARE

## 2021-07-21 ENCOUNTER — HOSPITAL ENCOUNTER (OUTPATIENT)
Dept: RADIOLOGY | Facility: HOSPITAL | Age: 76
Discharge: HOME | End: 2021-07-21
Attending: FAMILY MEDICINE
Payer: MEDICARE

## 2021-07-21 DIAGNOSIS — M35.01 KERATITIS SICCA, BOTH EYES: ICD-10-CM

## 2021-07-21 DIAGNOSIS — R92.8 ABNORMAL MAMMOGRAM: ICD-10-CM

## 2021-07-21 DIAGNOSIS — H04.123 DRY EYE SYNDROME OF BOTH EYES: ICD-10-CM

## 2021-07-21 DIAGNOSIS — H57.13 PAIN OF BOTH EYES: Primary | ICD-10-CM

## 2021-07-21 PROCEDURE — 1126F PR PAIN SEVERITY QUANTIFIED, NO PAIN PRESENT: ICD-10-PCS | Mod: CPTII,S$GLB,, | Performed by: OPTOMETRIST

## 2021-07-21 PROCEDURE — 77065 MAMMO DIGITAL DIAGNOSTIC RIGHT WITH TOMO: ICD-10-PCS | Mod: 26,RT,, | Performed by: RADIOLOGY

## 2021-07-21 PROCEDURE — 3288F PR FALLS RISK ASSESSMENT DOCUMENTED: ICD-10-PCS | Mod: CPTII,S$GLB,, | Performed by: OPTOMETRIST

## 2021-07-21 PROCEDURE — 1101F PT FALLS ASSESS-DOCD LE1/YR: CPT | Mod: CPTII,S$GLB,, | Performed by: OPTOMETRIST

## 2021-07-21 PROCEDURE — 77061 BREAST TOMOSYNTHESIS UNI: CPT | Mod: 26,RT,, | Performed by: RADIOLOGY

## 2021-07-21 PROCEDURE — 76642 ULTRASOUND BREAST LIMITED: CPT | Mod: TC,RT

## 2021-07-21 PROCEDURE — 92012 INTRM OPH EXAM EST PATIENT: CPT | Mod: S$GLB,,, | Performed by: OPTOMETRIST

## 2021-07-21 PROCEDURE — 77061 MAMMO DIGITAL DIAGNOSTIC RIGHT WITH TOMO: ICD-10-PCS | Mod: 26,RT,, | Performed by: RADIOLOGY

## 2021-07-21 PROCEDURE — 77061 BREAST TOMOSYNTHESIS UNI: CPT | Mod: TC,RT

## 2021-07-21 PROCEDURE — 76642 US BREAST RIGHT LIMITED: ICD-10-PCS | Mod: 26,RT,, | Performed by: RADIOLOGY

## 2021-07-21 PROCEDURE — 3288F FALL RISK ASSESSMENT DOCD: CPT | Mod: CPTII,S$GLB,, | Performed by: OPTOMETRIST

## 2021-07-21 PROCEDURE — 77065 DX MAMMO INCL CAD UNI: CPT | Mod: 26,RT,, | Performed by: RADIOLOGY

## 2021-07-21 PROCEDURE — 99999 PR PBB SHADOW E&M-EST. PATIENT-LVL III: ICD-10-PCS | Mod: PBBFAC,,, | Performed by: OPTOMETRIST

## 2021-07-21 PROCEDURE — 99999 PR PBB SHADOW E&M-EST. PATIENT-LVL III: CPT | Mod: PBBFAC,,, | Performed by: OPTOMETRIST

## 2021-07-21 PROCEDURE — 1101F PR PT FALLS ASSESS DOC 0-1 FALLS W/OUT INJ PAST YR: ICD-10-PCS | Mod: CPTII,S$GLB,, | Performed by: OPTOMETRIST

## 2021-07-21 PROCEDURE — 92012 PR EYE EXAM, EST PATIENT,INTERMED: ICD-10-PCS | Mod: S$GLB,,, | Performed by: OPTOMETRIST

## 2021-07-21 PROCEDURE — 1126F AMNT PAIN NOTED NONE PRSNT: CPT | Mod: CPTII,S$GLB,, | Performed by: OPTOMETRIST

## 2021-07-21 PROCEDURE — 76642 ULTRASOUND BREAST LIMITED: CPT | Mod: 26,RT,, | Performed by: RADIOLOGY

## 2021-07-21 RX ORDER — PREDNISOLONE ACETATE 10 MG/ML
SUSPENSION/ DROPS OPHTHALMIC
Qty: 1 BOTTLE | Refills: 1 | Status: SHIPPED | OUTPATIENT
Start: 2021-07-21 | End: 2022-11-18

## 2021-07-23 ENCOUNTER — TELEPHONE (OUTPATIENT)
Dept: NEUROLOGY | Facility: CLINIC | Age: 76
End: 2021-07-23

## 2021-07-23 ENCOUNTER — TELEPHONE (OUTPATIENT)
Dept: REHABILITATION | Facility: HOSPITAL | Age: 76
End: 2021-07-23

## 2021-07-28 ENCOUNTER — PATIENT MESSAGE (OUTPATIENT)
Dept: NEUROLOGY | Facility: CLINIC | Age: 76
End: 2021-07-28

## 2021-07-28 ENCOUNTER — PROCEDURE VISIT (OUTPATIENT)
Dept: NEUROLOGY | Facility: CLINIC | Age: 76
End: 2021-07-28
Payer: MEDICARE

## 2021-07-28 DIAGNOSIS — G60.3 IDIOPATHIC PROGRESSIVE POLYNEUROPATHY: ICD-10-CM

## 2021-07-28 PROCEDURE — 95913 NRV CNDJ TEST 13/> STUDIES: CPT | Mod: S$GLB,,, | Performed by: PSYCHIATRY & NEUROLOGY

## 2021-07-28 PROCEDURE — 95913 PR NERVE CONDUCTION STUDY; 13 OR MORE STUDIES: ICD-10-PCS | Mod: S$GLB,,, | Performed by: PSYCHIATRY & NEUROLOGY

## 2021-07-28 PROCEDURE — 95886 MUSC TEST DONE W/N TEST COMP: CPT | Mod: S$GLB,,, | Performed by: PSYCHIATRY & NEUROLOGY

## 2021-07-28 PROCEDURE — 95886 PR EMG COMPLETE, W/ NERVE CONDUCTION STUDIES, 5+ MUSCLES: ICD-10-PCS | Mod: S$GLB,,, | Performed by: PSYCHIATRY & NEUROLOGY

## 2021-07-29 ENCOUNTER — PATIENT MESSAGE (OUTPATIENT)
Dept: NEUROLOGY | Facility: CLINIC | Age: 76
End: 2021-07-29

## 2021-07-29 PROBLEM — M54.17 LUMBOSACRAL RADICULOPATHY AT L5: Status: ACTIVE | Noted: 2021-07-29

## 2021-08-01 ENCOUNTER — PATIENT MESSAGE (OUTPATIENT)
Dept: HEMATOLOGY/ONCOLOGY | Facility: CLINIC | Age: 76
End: 2021-08-01

## 2021-08-05 ENCOUNTER — PATIENT MESSAGE (OUTPATIENT)
Dept: NEUROLOGY | Facility: CLINIC | Age: 76
End: 2021-08-05

## 2021-08-05 ENCOUNTER — OFFICE VISIT (OUTPATIENT)
Dept: UROGYNECOLOGY | Facility: CLINIC | Age: 76
End: 2021-08-05
Payer: MEDICARE

## 2021-08-05 VITALS
HEIGHT: 63 IN | DIASTOLIC BLOOD PRESSURE: 60 MMHG | SYSTOLIC BLOOD PRESSURE: 110 MMHG | WEIGHT: 148.56 LBS | BODY MASS INDEX: 26.32 KG/M2

## 2021-08-05 DIAGNOSIS — N81.11 MIDLINE CYSTOCELE: ICD-10-CM

## 2021-08-05 DIAGNOSIS — N39.41 URGE INCONTINENCE: Primary | ICD-10-CM

## 2021-08-05 DIAGNOSIS — K59.00 CONSTIPATION, UNSPECIFIED CONSTIPATION TYPE: ICD-10-CM

## 2021-08-05 DIAGNOSIS — N81.6 RECTOCELE: ICD-10-CM

## 2021-08-05 DIAGNOSIS — N95.2 VAGINAL ATROPHY: ICD-10-CM

## 2021-08-05 PROCEDURE — 1159F MED LIST DOCD IN RCRD: CPT | Mod: CPTII,S$GLB,, | Performed by: NURSE PRACTITIONER

## 2021-08-05 PROCEDURE — 3078F PR MOST RECENT DIASTOLIC BLOOD PRESSURE < 80 MM HG: ICD-10-PCS | Mod: CPTII,S$GLB,, | Performed by: NURSE PRACTITIONER

## 2021-08-05 PROCEDURE — 1160F RVW MEDS BY RX/DR IN RCRD: CPT | Mod: CPTII,S$GLB,, | Performed by: NURSE PRACTITIONER

## 2021-08-05 PROCEDURE — 1101F PR PT FALLS ASSESS DOC 0-1 FALLS W/OUT INJ PAST YR: ICD-10-PCS | Mod: CPTII,S$GLB,, | Performed by: NURSE PRACTITIONER

## 2021-08-05 PROCEDURE — 3074F SYST BP LT 130 MM HG: CPT | Mod: CPTII,S$GLB,, | Performed by: NURSE PRACTITIONER

## 2021-08-05 PROCEDURE — 3288F FALL RISK ASSESSMENT DOCD: CPT | Mod: CPTII,S$GLB,, | Performed by: NURSE PRACTITIONER

## 2021-08-05 PROCEDURE — 99999 PR PBB SHADOW E&M-EST. PATIENT-LVL III: ICD-10-PCS | Mod: PBBFAC,,, | Performed by: NURSE PRACTITIONER

## 2021-08-05 PROCEDURE — 99999 PR PBB SHADOW E&M-EST. PATIENT-LVL III: CPT | Mod: PBBFAC,,, | Performed by: NURSE PRACTITIONER

## 2021-08-05 PROCEDURE — 3288F PR FALLS RISK ASSESSMENT DOCUMENTED: ICD-10-PCS | Mod: CPTII,S$GLB,, | Performed by: NURSE PRACTITIONER

## 2021-08-05 PROCEDURE — 1126F AMNT PAIN NOTED NONE PRSNT: CPT | Mod: CPTII,S$GLB,, | Performed by: NURSE PRACTITIONER

## 2021-08-05 PROCEDURE — 99213 OFFICE O/P EST LOW 20 MIN: CPT | Mod: S$GLB,,, | Performed by: NURSE PRACTITIONER

## 2021-08-05 PROCEDURE — 1101F PT FALLS ASSESS-DOCD LE1/YR: CPT | Mod: CPTII,S$GLB,, | Performed by: NURSE PRACTITIONER

## 2021-08-05 PROCEDURE — 1160F PR REVIEW ALL MEDS BY PRESCRIBER/CLIN PHARMACIST DOCUMENTED: ICD-10-PCS | Mod: CPTII,S$GLB,, | Performed by: NURSE PRACTITIONER

## 2021-08-05 PROCEDURE — 1159F PR MEDICATION LIST DOCUMENTED IN MEDICAL RECORD: ICD-10-PCS | Mod: CPTII,S$GLB,, | Performed by: NURSE PRACTITIONER

## 2021-08-05 PROCEDURE — 1126F PR PAIN SEVERITY QUANTIFIED, NO PAIN PRESENT: ICD-10-PCS | Mod: CPTII,S$GLB,, | Performed by: NURSE PRACTITIONER

## 2021-08-05 PROCEDURE — 99213 PR OFFICE/OUTPT VISIT, EST, LEVL III, 20-29 MIN: ICD-10-PCS | Mod: S$GLB,,, | Performed by: NURSE PRACTITIONER

## 2021-08-05 PROCEDURE — 3078F DIAST BP <80 MM HG: CPT | Mod: CPTII,S$GLB,, | Performed by: NURSE PRACTITIONER

## 2021-08-05 PROCEDURE — 3074F PR MOST RECENT SYSTOLIC BLOOD PRESSURE < 130 MM HG: ICD-10-PCS | Mod: CPTII,S$GLB,, | Performed by: NURSE PRACTITIONER

## 2021-08-07 ENCOUNTER — PATIENT MESSAGE (OUTPATIENT)
Dept: NEUROLOGY | Facility: CLINIC | Age: 76
End: 2021-08-07

## 2021-08-17 ENCOUNTER — PATIENT MESSAGE (OUTPATIENT)
Dept: UROGYNECOLOGY | Facility: CLINIC | Age: 76
End: 2021-08-17

## 2021-08-18 ENCOUNTER — LAB VISIT (OUTPATIENT)
Dept: LAB | Facility: HOSPITAL | Age: 76
End: 2021-08-18
Attending: INTERNAL MEDICINE
Payer: MEDICARE

## 2021-08-18 DIAGNOSIS — D47.2 MGUS (MONOCLONAL GAMMOPATHY OF UNKNOWN SIGNIFICANCE): ICD-10-CM

## 2021-08-18 LAB
ALBUMIN SERPL BCP-MCNC: 4 G/DL (ref 3.5–5.2)
ALP SERPL-CCNC: 55 U/L (ref 55–135)
ALT SERPL W/O P-5'-P-CCNC: 9 U/L (ref 10–44)
ANION GAP SERPL CALC-SCNC: 9 MMOL/L (ref 8–16)
AST SERPL-CCNC: 15 U/L (ref 10–40)
BILIRUB SERPL-MCNC: 0.5 MG/DL (ref 0.1–1)
BUN SERPL-MCNC: 9 MG/DL (ref 8–23)
CALCIUM SERPL-MCNC: 10.1 MG/DL (ref 8.7–10.5)
CHLORIDE SERPL-SCNC: 103 MMOL/L (ref 95–110)
CO2 SERPL-SCNC: 27 MMOL/L (ref 23–29)
CREAT SERPL-MCNC: 0.7 MG/DL (ref 0.5–1.4)
EST. GFR  (AFRICAN AMERICAN): >60 ML/MIN/1.73 M^2
EST. GFR  (NON AFRICAN AMERICAN): >60 ML/MIN/1.73 M^2
GLUCOSE SERPL-MCNC: 80 MG/DL (ref 70–110)
IGA SERPL-MCNC: 132 MG/DL (ref 40–350)
IGG SERPL-MCNC: 1319 MG/DL (ref 650–1600)
IGM SERPL-MCNC: 91 MG/DL (ref 50–300)
POTASSIUM SERPL-SCNC: 3.8 MMOL/L (ref 3.5–5.1)
PROT SERPL-MCNC: 7 G/DL (ref 6–8.4)
SODIUM SERPL-SCNC: 139 MMOL/L (ref 136–145)

## 2021-08-18 PROCEDURE — 86334 IMMUNOFIX E-PHORESIS SERUM: CPT | Performed by: INTERNAL MEDICINE

## 2021-08-18 PROCEDURE — 84165 PROTEIN E-PHORESIS SERUM: CPT | Performed by: INTERNAL MEDICINE

## 2021-08-18 PROCEDURE — 84165 PATHOLOGIST INTERPRETATION SPE: ICD-10-PCS | Mod: 26,,, | Performed by: PATHOLOGY

## 2021-08-18 PROCEDURE — 85025 COMPLETE CBC W/AUTO DIFF WBC: CPT | Performed by: INTERNAL MEDICINE

## 2021-08-18 PROCEDURE — 86334 IMMUNOFIX E-PHORESIS SERUM: CPT | Mod: 26,,, | Performed by: PATHOLOGY

## 2021-08-18 PROCEDURE — 82784 ASSAY IGA/IGD/IGG/IGM EACH: CPT | Performed by: INTERNAL MEDICINE

## 2021-08-18 PROCEDURE — 83520 IMMUNOASSAY QUANT NOS NONAB: CPT | Mod: 59 | Performed by: INTERNAL MEDICINE

## 2021-08-18 PROCEDURE — 36415 COLL VENOUS BLD VENIPUNCTURE: CPT | Mod: PN | Performed by: INTERNAL MEDICINE

## 2021-08-18 PROCEDURE — 80053 COMPREHEN METABOLIC PANEL: CPT | Performed by: INTERNAL MEDICINE

## 2021-08-18 PROCEDURE — 84165 PROTEIN E-PHORESIS SERUM: CPT | Mod: 26,,, | Performed by: PATHOLOGY

## 2021-08-18 PROCEDURE — 86334 PATHOLOGIST INTERPRETATION IFE: ICD-10-PCS | Mod: 26,,, | Performed by: PATHOLOGY

## 2021-08-19 ENCOUNTER — OFFICE VISIT (OUTPATIENT)
Dept: OPTOMETRY | Facility: CLINIC | Age: 76
End: 2021-08-19
Payer: MEDICARE

## 2021-08-19 ENCOUNTER — OFFICE VISIT (OUTPATIENT)
Dept: NEUROLOGY | Facility: CLINIC | Age: 76
End: 2021-08-19
Payer: MEDICARE

## 2021-08-19 DIAGNOSIS — H04.123 DRY EYE SYNDROME OF BOTH EYES: ICD-10-CM

## 2021-08-19 DIAGNOSIS — H21.551 ANGLE RECESSION OF RIGHT EYE: ICD-10-CM

## 2021-08-19 DIAGNOSIS — H25.13 NUCLEAR SCLEROSIS OF BOTH EYES: Primary | ICD-10-CM

## 2021-08-19 DIAGNOSIS — R20.8 DECREASED SENSATION: Primary | ICD-10-CM

## 2021-08-19 DIAGNOSIS — H52.03 HYPEROPIA WITH PRESBYOPIA OF BOTH EYES: ICD-10-CM

## 2021-08-19 DIAGNOSIS — H52.4 HYPEROPIA WITH PRESBYOPIA OF BOTH EYES: ICD-10-CM

## 2021-08-19 LAB
ALBUMIN SERPL ELPH-MCNC: 4.03 G/DL (ref 3.35–5.55)
ALPHA1 GLOB SERPL ELPH-MCNC: 0.28 G/DL (ref 0.17–0.41)
ALPHA2 GLOB SERPL ELPH-MCNC: 0.65 G/DL (ref 0.43–0.99)
B-GLOBULIN SERPL ELPH-MCNC: 0.56 G/DL (ref 0.5–1.1)
BASOPHILS # BLD AUTO: 0.03 K/UL (ref 0–0.2)
BASOPHILS NFR BLD: 0.5 % (ref 0–1.9)
DIFFERENTIAL METHOD: ABNORMAL
EOSINOPHIL # BLD AUTO: 0.1 K/UL (ref 0–0.5)
EOSINOPHIL NFR BLD: 0.9 % (ref 0–8)
ERYTHROCYTE [DISTWIDTH] IN BLOOD BY AUTOMATED COUNT: 16.9 % (ref 11.5–14.5)
GAMMA GLOB SERPL ELPH-MCNC: 1.27 G/DL (ref 0.67–1.58)
HCT VFR BLD AUTO: 33.1 % (ref 37–48.5)
HGB BLD-MCNC: 10 G/DL (ref 12–16)
IMM GRANULOCYTES # BLD AUTO: 0.02 K/UL (ref 0–0.04)
IMM GRANULOCYTES NFR BLD AUTO: 0.4 % (ref 0–0.5)
INTERPRETATION SERPL IFE-IMP: NORMAL
KAPPA LC SER QL IA: 1.43 MG/DL (ref 0.33–1.94)
KAPPA LC/LAMBDA SER IA: 1.93 (ref 0.26–1.65)
LAMBDA LC SER QL IA: 0.74 MG/DL (ref 0.57–2.63)
LYMPHOCYTES # BLD AUTO: 2.5 K/UL (ref 1–4.8)
LYMPHOCYTES NFR BLD: 45.6 % (ref 18–48)
MCH RBC QN AUTO: 18.9 PG (ref 27–31)
MCHC RBC AUTO-ENTMCNC: 30.2 G/DL (ref 32–36)
MCV RBC AUTO: 63 FL (ref 82–98)
MONOCYTES # BLD AUTO: 0.4 K/UL (ref 0.3–1)
MONOCYTES NFR BLD: 7.4 % (ref 4–15)
NEUTROPHILS # BLD AUTO: 2.5 K/UL (ref 1.8–7.7)
NEUTROPHILS NFR BLD: 45.2 % (ref 38–73)
NRBC BLD-RTO: 0 /100 WBC
PATHOLOGIST INTERPRETATION IFE: NORMAL
PATHOLOGIST INTERPRETATION SPE: NORMAL
PLATELET # BLD AUTO: 220 K/UL (ref 150–450)
PMV BLD AUTO: 11 FL (ref 9.2–12.9)
PROT SERPL-MCNC: 6.8 G/DL (ref 6–8.4)
RBC # BLD AUTO: 5.3 M/UL (ref 4–5.4)
WBC # BLD AUTO: 5.57 K/UL (ref 3.9–12.7)

## 2021-08-19 PROCEDURE — 3288F PR FALLS RISK ASSESSMENT DOCUMENTED: ICD-10-PCS | Mod: CPTII,S$GLB,, | Performed by: OPTOMETRIST

## 2021-08-19 PROCEDURE — 99499 UNLISTED E&M SERVICE: CPT | Mod: S$GLB,,, | Performed by: PSYCHIATRY & NEUROLOGY

## 2021-08-19 PROCEDURE — 1126F PR PAIN SEVERITY QUANTIFIED, NO PAIN PRESENT: ICD-10-PCS | Mod: CPTII,S$GLB,, | Performed by: OPTOMETRIST

## 2021-08-19 PROCEDURE — 11105 PR PUNCH BIOPSY, SKIN, EA ADDTL LESION: ICD-10-PCS | Mod: S$GLB,,, | Performed by: PSYCHIATRY & NEUROLOGY

## 2021-08-19 PROCEDURE — 11104 PR PUNCH BIOPSY, SKIN, SINGLE LESION: ICD-10-PCS | Mod: S$GLB,,, | Performed by: PSYCHIATRY & NEUROLOGY

## 2021-08-19 PROCEDURE — 99499 NO LOS: ICD-10-PCS | Mod: S$GLB,,, | Performed by: PSYCHIATRY & NEUROLOGY

## 2021-08-19 PROCEDURE — 1101F PT FALLS ASSESS-DOCD LE1/YR: CPT | Mod: CPTII,S$GLB,, | Performed by: OPTOMETRIST

## 2021-08-19 PROCEDURE — 11105 PUNCH BX SKIN EA SEP/ADDL: CPT | Mod: S$GLB,,, | Performed by: PSYCHIATRY & NEUROLOGY

## 2021-08-19 PROCEDURE — 3288F FALL RISK ASSESSMENT DOCD: CPT | Mod: CPTII,S$GLB,, | Performed by: OPTOMETRIST

## 2021-08-19 PROCEDURE — 99999 PR PBB SHADOW E&M-EST. PATIENT-LVL III: ICD-10-PCS | Mod: PBBFAC,,, | Performed by: OPTOMETRIST

## 2021-08-19 PROCEDURE — 1126F AMNT PAIN NOTED NONE PRSNT: CPT | Mod: CPTII,S$GLB,, | Performed by: OPTOMETRIST

## 2021-08-19 PROCEDURE — 1159F PR MEDICATION LIST DOCUMENTED IN MEDICAL RECORD: ICD-10-PCS | Mod: CPTII,S$GLB,, | Performed by: OPTOMETRIST

## 2021-08-19 PROCEDURE — 1101F PR PT FALLS ASSESS DOC 0-1 FALLS W/OUT INJ PAST YR: ICD-10-PCS | Mod: CPTII,S$GLB,, | Performed by: OPTOMETRIST

## 2021-08-19 PROCEDURE — 11104 PUNCH BX SKIN SINGLE LESION: CPT | Mod: S$GLB,,, | Performed by: PSYCHIATRY & NEUROLOGY

## 2021-08-19 PROCEDURE — 99999 PR PBB SHADOW E&M-EST. PATIENT-LVL III: CPT | Mod: PBBFAC,,, | Performed by: OPTOMETRIST

## 2021-08-19 PROCEDURE — 92014 COMPRE OPH EXAM EST PT 1/>: CPT | Mod: S$GLB,,, | Performed by: OPTOMETRIST

## 2021-08-19 PROCEDURE — 92014 PR EYE EXAM, EST PATIENT,COMPREHESV: ICD-10-PCS | Mod: S$GLB,,, | Performed by: OPTOMETRIST

## 2021-08-19 PROCEDURE — 1159F MED LIST DOCD IN RCRD: CPT | Mod: CPTII,S$GLB,, | Performed by: OPTOMETRIST

## 2021-08-25 ENCOUNTER — OFFICE VISIT (OUTPATIENT)
Dept: HEMATOLOGY/ONCOLOGY | Facility: CLINIC | Age: 76
End: 2021-08-25
Payer: MEDICARE

## 2021-08-25 VITALS
OXYGEN SATURATION: 98 % | WEIGHT: 145 LBS | DIASTOLIC BLOOD PRESSURE: 57 MMHG | HEART RATE: 60 BPM | TEMPERATURE: 97 F | BODY MASS INDEX: 25.69 KG/M2 | HEIGHT: 63 IN | SYSTOLIC BLOOD PRESSURE: 117 MMHG | RESPIRATION RATE: 18 BRPM

## 2021-08-25 DIAGNOSIS — D47.2 MGUS (MONOCLONAL GAMMOPATHY OF UNKNOWN SIGNIFICANCE): Primary | ICD-10-CM

## 2021-08-25 DIAGNOSIS — D53.9 NUTRITIONAL ANEMIA: ICD-10-CM

## 2021-08-25 DIAGNOSIS — D56.3 THALASSEMIA TRAIT: ICD-10-CM

## 2021-08-25 PROCEDURE — 3078F DIAST BP <80 MM HG: CPT | Mod: CPTII,S$GLB,, | Performed by: INTERNAL MEDICINE

## 2021-08-25 PROCEDURE — 1101F PR PT FALLS ASSESS DOC 0-1 FALLS W/OUT INJ PAST YR: ICD-10-PCS | Mod: CPTII,S$GLB,, | Performed by: INTERNAL MEDICINE

## 2021-08-25 PROCEDURE — 99999 PR PBB SHADOW E&M-EST. PATIENT-LVL IV: ICD-10-PCS | Mod: PBBFAC,,, | Performed by: INTERNAL MEDICINE

## 2021-08-25 PROCEDURE — 99999 PR PBB SHADOW E&M-EST. PATIENT-LVL IV: CPT | Mod: PBBFAC,,, | Performed by: INTERNAL MEDICINE

## 2021-08-25 PROCEDURE — 1160F RVW MEDS BY RX/DR IN RCRD: CPT | Mod: CPTII,S$GLB,, | Performed by: INTERNAL MEDICINE

## 2021-08-25 PROCEDURE — 99214 OFFICE O/P EST MOD 30 MIN: CPT | Mod: S$GLB,,, | Performed by: INTERNAL MEDICINE

## 2021-08-25 PROCEDURE — 3288F FALL RISK ASSESSMENT DOCD: CPT | Mod: CPTII,S$GLB,, | Performed by: INTERNAL MEDICINE

## 2021-08-25 PROCEDURE — 3078F PR MOST RECENT DIASTOLIC BLOOD PRESSURE < 80 MM HG: ICD-10-PCS | Mod: CPTII,S$GLB,, | Performed by: INTERNAL MEDICINE

## 2021-08-25 PROCEDURE — 3074F SYST BP LT 130 MM HG: CPT | Mod: CPTII,S$GLB,, | Performed by: INTERNAL MEDICINE

## 2021-08-25 PROCEDURE — 1101F PT FALLS ASSESS-DOCD LE1/YR: CPT | Mod: CPTII,S$GLB,, | Performed by: INTERNAL MEDICINE

## 2021-08-25 PROCEDURE — 1126F PR PAIN SEVERITY QUANTIFIED, NO PAIN PRESENT: ICD-10-PCS | Mod: CPTII,S$GLB,, | Performed by: INTERNAL MEDICINE

## 2021-08-25 PROCEDURE — 99214 PR OFFICE/OUTPT VISIT, EST, LEVL IV, 30-39 MIN: ICD-10-PCS | Mod: S$GLB,,, | Performed by: INTERNAL MEDICINE

## 2021-08-25 PROCEDURE — 3074F PR MOST RECENT SYSTOLIC BLOOD PRESSURE < 130 MM HG: ICD-10-PCS | Mod: CPTII,S$GLB,, | Performed by: INTERNAL MEDICINE

## 2021-08-25 PROCEDURE — 3288F PR FALLS RISK ASSESSMENT DOCUMENTED: ICD-10-PCS | Mod: CPTII,S$GLB,, | Performed by: INTERNAL MEDICINE

## 2021-08-25 PROCEDURE — 1160F PR REVIEW ALL MEDS BY PRESCRIBER/CLIN PHARMACIST DOCUMENTED: ICD-10-PCS | Mod: CPTII,S$GLB,, | Performed by: INTERNAL MEDICINE

## 2021-08-25 PROCEDURE — 1159F PR MEDICATION LIST DOCUMENTED IN MEDICAL RECORD: ICD-10-PCS | Mod: CPTII,S$GLB,, | Performed by: INTERNAL MEDICINE

## 2021-08-25 PROCEDURE — 1159F MED LIST DOCD IN RCRD: CPT | Mod: CPTII,S$GLB,, | Performed by: INTERNAL MEDICINE

## 2021-08-25 PROCEDURE — 1126F AMNT PAIN NOTED NONE PRSNT: CPT | Mod: CPTII,S$GLB,, | Performed by: INTERNAL MEDICINE

## 2021-09-24 ENCOUNTER — OFFICE VISIT (OUTPATIENT)
Dept: OTOLARYNGOLOGY | Facility: CLINIC | Age: 76
End: 2021-09-24
Payer: MEDICARE

## 2021-09-24 VITALS
BODY MASS INDEX: 25.54 KG/M2 | HEART RATE: 64 BPM | WEIGHT: 144.19 LBS | SYSTOLIC BLOOD PRESSURE: 119 MMHG | TEMPERATURE: 98 F | DIASTOLIC BLOOD PRESSURE: 65 MMHG

## 2021-09-24 DIAGNOSIS — H61.23 BILATERAL IMPACTED CERUMEN: Primary | ICD-10-CM

## 2021-09-24 DIAGNOSIS — R13.10 DYSPHAGIA, UNSPECIFIED TYPE: ICD-10-CM

## 2021-09-24 DIAGNOSIS — J30.9 ALLERGIC RHINITIS, UNSPECIFIED SEASONALITY, UNSPECIFIED TRIGGER: ICD-10-CM

## 2021-09-24 DIAGNOSIS — R09.82 PND (POST-NASAL DRIP): ICD-10-CM

## 2021-09-24 DIAGNOSIS — H93.13 TINNITUS OF BOTH EARS: ICD-10-CM

## 2021-09-24 DIAGNOSIS — L63.0 ALOPECIA (CAPITIS) TOTALIS: ICD-10-CM

## 2021-09-24 DIAGNOSIS — H93.293 ABNORMAL AUDITORY PERCEPTION OF BOTH EARS: ICD-10-CM

## 2021-09-24 DIAGNOSIS — Z01.812 PRE-PROCEDURE LAB EXAM: Primary | ICD-10-CM

## 2021-09-24 PROCEDURE — 1126F AMNT PAIN NOTED NONE PRSNT: CPT | Mod: CPTII,S$GLB,, | Performed by: SPECIALIST

## 2021-09-24 PROCEDURE — 1126F PR PAIN SEVERITY QUANTIFIED, NO PAIN PRESENT: ICD-10-PCS | Mod: CPTII,S$GLB,, | Performed by: SPECIALIST

## 2021-09-24 PROCEDURE — 1101F PR PT FALLS ASSESS DOC 0-1 FALLS W/OUT INJ PAST YR: ICD-10-PCS | Mod: CPTII,S$GLB,, | Performed by: SPECIALIST

## 2021-09-24 PROCEDURE — 3288F FALL RISK ASSESSMENT DOCD: CPT | Mod: CPTII,S$GLB,, | Performed by: SPECIALIST

## 2021-09-24 PROCEDURE — 3074F PR MOST RECENT SYSTOLIC BLOOD PRESSURE < 130 MM HG: ICD-10-PCS | Mod: CPTII,S$GLB,, | Performed by: SPECIALIST

## 2021-09-24 PROCEDURE — 3074F SYST BP LT 130 MM HG: CPT | Mod: CPTII,S$GLB,, | Performed by: SPECIALIST

## 2021-09-24 PROCEDURE — 3078F PR MOST RECENT DIASTOLIC BLOOD PRESSURE < 80 MM HG: ICD-10-PCS | Mod: CPTII,S$GLB,, | Performed by: SPECIALIST

## 2021-09-24 PROCEDURE — 1160F PR REVIEW ALL MEDS BY PRESCRIBER/CLIN PHARMACIST DOCUMENTED: ICD-10-PCS | Mod: CPTII,S$GLB,, | Performed by: SPECIALIST

## 2021-09-24 PROCEDURE — 1159F PR MEDICATION LIST DOCUMENTED IN MEDICAL RECORD: ICD-10-PCS | Mod: CPTII,S$GLB,, | Performed by: SPECIALIST

## 2021-09-24 PROCEDURE — 1160F RVW MEDS BY RX/DR IN RCRD: CPT | Mod: CPTII,S$GLB,, | Performed by: SPECIALIST

## 2021-09-24 PROCEDURE — 3078F DIAST BP <80 MM HG: CPT | Mod: CPTII,S$GLB,, | Performed by: SPECIALIST

## 2021-09-24 PROCEDURE — 1159F MED LIST DOCD IN RCRD: CPT | Mod: CPTII,S$GLB,, | Performed by: SPECIALIST

## 2021-09-24 PROCEDURE — 1101F PT FALLS ASSESS-DOCD LE1/YR: CPT | Mod: CPTII,S$GLB,, | Performed by: SPECIALIST

## 2021-09-24 PROCEDURE — 69210 REMOVE IMPACTED EAR WAX UNI: CPT | Mod: S$GLB,,, | Performed by: SPECIALIST

## 2021-09-24 PROCEDURE — 99204 OFFICE O/P NEW MOD 45 MIN: CPT | Mod: 25,S$GLB,, | Performed by: SPECIALIST

## 2021-09-24 PROCEDURE — 69210 EAR CERUMEN REMOVAL: ICD-10-PCS | Mod: S$GLB,,, | Performed by: SPECIALIST

## 2021-09-24 PROCEDURE — 99204 PR OFFICE/OUTPT VISIT, NEW, LEVL IV, 45-59 MIN: ICD-10-PCS | Mod: 25,S$GLB,, | Performed by: SPECIALIST

## 2021-09-24 PROCEDURE — 99999 PR PBB SHADOW E&M-EST. PATIENT-LVL III: ICD-10-PCS | Mod: PBBFAC,,, | Performed by: SPECIALIST

## 2021-09-24 PROCEDURE — 99999 PR PBB SHADOW E&M-EST. PATIENT-LVL III: CPT | Mod: PBBFAC,,, | Performed by: SPECIALIST

## 2021-09-24 PROCEDURE — 3288F PR FALLS RISK ASSESSMENT DOCUMENTED: ICD-10-PCS | Mod: CPTII,S$GLB,, | Performed by: SPECIALIST

## 2021-09-24 RX ORDER — FLUTICASONE PROPIONATE 50 MCG
2 SPRAY, SUSPENSION (ML) NASAL DAILY
Qty: 18.2 ML | Refills: 11 | Status: SHIPPED | OUTPATIENT
Start: 2021-09-24 | End: 2023-11-14

## 2021-09-24 RX ORDER — LORATADINE 10 MG/1
10 TABLET ORAL DAILY
Qty: 30 TABLET | Refills: 11
Start: 2021-09-24 | End: 2023-11-14

## 2021-09-25 PROBLEM — R13.10 DYSPHAGIA: Status: ACTIVE | Noted: 2021-09-25

## 2021-09-25 PROBLEM — L63.0 ALOPECIA (CAPITIS) TOTALIS: Status: ACTIVE | Noted: 2021-09-25

## 2021-09-25 PROBLEM — J30.9 ALLERGIC RHINITIS: Status: ACTIVE | Noted: 2021-09-25

## 2021-09-25 PROBLEM — H93.13 TINNITUS OF BOTH EARS: Status: ACTIVE | Noted: 2021-09-25

## 2021-10-05 ENCOUNTER — HOSPITAL ENCOUNTER (OUTPATIENT)
Dept: PREADMISSION TESTING | Facility: OTHER | Age: 76
Discharge: HOME | End: 2021-10-05
Attending: SPECIALIST
Payer: MEDICARE

## 2021-10-05 ENCOUNTER — CLINICAL SUPPORT (OUTPATIENT)
Dept: OTOLARYNGOLOGY | Facility: CLINIC | Age: 76
End: 2021-10-05
Payer: MEDICARE

## 2021-10-05 DIAGNOSIS — Z01.812 PRE-PROCEDURE LAB EXAM: ICD-10-CM

## 2021-10-05 DIAGNOSIS — H93.13 BILATERAL TINNITUS: ICD-10-CM

## 2021-10-05 DIAGNOSIS — R29.2 ABNORMAL ACOUSTIC REFLEX: ICD-10-CM

## 2021-10-05 DIAGNOSIS — H90.A32 MIXED CONDUCTIVE AND SENSORINEURAL HEARING LOSS OF LEFT EAR WITH RESTRICTED HEARING OF RIGHT EAR: Primary | ICD-10-CM

## 2021-10-05 DIAGNOSIS — H90.A21 SENSORINEURAL HEARING LOSS (SNHL) OF RIGHT EAR WITH RESTRICTED HEARING OF LEFT EAR: ICD-10-CM

## 2021-10-05 PROCEDURE — U0003 INFECTIOUS AGENT DETECTION BY NUCLEIC ACID (DNA OR RNA); SEVERE ACUTE RESPIRATORY SYNDROME CORONAVIRUS 2 (SARS-COV-2) (CORONAVIRUS DISEASE [COVID-19]), AMPLIFIED PROBE TECHNIQUE, MAKING USE OF HIGH THROUGHPUT TECHNOLOGIES AS DESCRIBED BY CMS-2020-01-R: HCPCS | Performed by: SPECIALIST

## 2021-10-05 PROCEDURE — 92550 TYMPANOMETRY & REFLEX THRESH: CPT | Mod: S$GLB,,, | Performed by: AUDIOLOGIST-HEARING AID FITTER

## 2021-10-05 PROCEDURE — 92557 COMPREHENSIVE HEARING TEST: CPT | Mod: S$GLB,,, | Performed by: AUDIOLOGIST-HEARING AID FITTER

## 2021-10-05 PROCEDURE — U0005 INFEC AGEN DETEC AMPLI PROBE: HCPCS | Performed by: SPECIALIST

## 2021-10-05 PROCEDURE — 92557 PR COMPREHENSIVE HEARING TEST: ICD-10-PCS | Mod: S$GLB,,, | Performed by: AUDIOLOGIST-HEARING AID FITTER

## 2021-10-05 PROCEDURE — 92550 PR TYMPANOMETRY AND REFLEX THRESHOLD MEASUREMENTS: ICD-10-PCS | Mod: S$GLB,,, | Performed by: AUDIOLOGIST-HEARING AID FITTER

## 2021-10-06 ENCOUNTER — PATIENT MESSAGE (OUTPATIENT)
Dept: NEUROLOGY | Facility: CLINIC | Age: 76
End: 2021-10-06

## 2021-10-06 ENCOUNTER — TELEPHONE (OUTPATIENT)
Dept: OTOLARYNGOLOGY | Facility: CLINIC | Age: 76
End: 2021-10-06

## 2021-10-06 LAB
SARS-COV-2 RNA RESP QL NAA+PROBE: NOT DETECTED
SARS-COV-2- CYCLE NUMBER: NORMAL

## 2021-10-08 ENCOUNTER — HOSPITAL ENCOUNTER (OUTPATIENT)
Dept: RADIOLOGY | Facility: HOSPITAL | Age: 76
Discharge: HOME | End: 2021-10-08
Attending: SPECIALIST
Payer: MEDICARE

## 2021-10-08 ENCOUNTER — OFFICE VISIT (OUTPATIENT)
Dept: OTOLARYNGOLOGY | Facility: CLINIC | Age: 76
End: 2021-10-08
Payer: MEDICARE

## 2021-10-08 VITALS
TEMPERATURE: 98 F | SYSTOLIC BLOOD PRESSURE: 134 MMHG | DIASTOLIC BLOOD PRESSURE: 78 MMHG | BODY MASS INDEX: 25.68 KG/M2 | WEIGHT: 144.94 LBS | HEART RATE: 68 BPM

## 2021-10-08 DIAGNOSIS — J38.02 BILATERAL VOCAL CORD PARESIS: Primary | ICD-10-CM

## 2021-10-08 DIAGNOSIS — R09.82 PND (POST-NASAL DRIP): ICD-10-CM

## 2021-10-08 DIAGNOSIS — J38.02 BILATERAL VOCAL CORD PARESIS: ICD-10-CM

## 2021-10-08 DIAGNOSIS — K21.9 LARYNGOPHARYNGEAL REFLUX (LPR): ICD-10-CM

## 2021-10-08 DIAGNOSIS — J30.9 ALLERGIC RHINITIS, UNSPECIFIED SEASONALITY, UNSPECIFIED TRIGGER: ICD-10-CM

## 2021-10-08 DIAGNOSIS — J38.00 VOCAL CORD PARESIS DETERMINED BY LARYNGOSCOPY: ICD-10-CM

## 2021-10-08 DIAGNOSIS — H90.A32 MIXED CONDUCTIVE AND SENSORINEURAL HEARING LOSS OF LEFT EAR WITH RESTRICTED HEARING OF RIGHT EAR: ICD-10-CM

## 2021-10-08 DIAGNOSIS — R13.10 DYSPHAGIA, UNSPECIFIED TYPE: ICD-10-CM

## 2021-10-08 DIAGNOSIS — J34.2 NASAL SEPTAL DEVIATION: ICD-10-CM

## 2021-10-08 PROCEDURE — 1159F MED LIST DOCD IN RCRD: CPT | Mod: CPTII,S$GLB,, | Performed by: SPECIALIST

## 2021-10-08 PROCEDURE — 3288F FALL RISK ASSESSMENT DOCD: CPT | Mod: CPTII,S$GLB,, | Performed by: SPECIALIST

## 2021-10-08 PROCEDURE — 99499 UNLISTED E&M SERVICE: CPT | Mod: S$GLB,,, | Performed by: SPECIALIST

## 2021-10-08 PROCEDURE — 31575 LARYNGOSCOPY: ICD-10-PCS | Mod: S$GLB,,, | Performed by: SPECIALIST

## 2021-10-08 PROCEDURE — 1160F RVW MEDS BY RX/DR IN RCRD: CPT | Mod: CPTII,S$GLB,, | Performed by: SPECIALIST

## 2021-10-08 PROCEDURE — 71046 X-RAY EXAM CHEST 2 VIEWS: CPT | Mod: TC,PN

## 2021-10-08 PROCEDURE — 99214 PR OFFICE/OUTPT VISIT, EST, LEVL IV, 30-39 MIN: ICD-10-PCS | Mod: 25,S$GLB,, | Performed by: SPECIALIST

## 2021-10-08 PROCEDURE — 1101F PR PT FALLS ASSESS DOC 0-1 FALLS W/OUT INJ PAST YR: ICD-10-PCS | Mod: CPTII,S$GLB,, | Performed by: SPECIALIST

## 2021-10-08 PROCEDURE — 3075F PR MOST RECENT SYSTOLIC BLOOD PRESS GE 130-139MM HG: ICD-10-PCS | Mod: CPTII,S$GLB,, | Performed by: SPECIALIST

## 2021-10-08 PROCEDURE — 1159F PR MEDICATION LIST DOCUMENTED IN MEDICAL RECORD: ICD-10-PCS | Mod: CPTII,S$GLB,, | Performed by: SPECIALIST

## 2021-10-08 PROCEDURE — 99214 OFFICE O/P EST MOD 30 MIN: CPT | Mod: 25,S$GLB,, | Performed by: SPECIALIST

## 2021-10-08 PROCEDURE — 3078F PR MOST RECENT DIASTOLIC BLOOD PRESSURE < 80 MM HG: ICD-10-PCS | Mod: CPTII,S$GLB,, | Performed by: SPECIALIST

## 2021-10-08 PROCEDURE — 1160F PR REVIEW ALL MEDS BY PRESCRIBER/CLIN PHARMACIST DOCUMENTED: ICD-10-PCS | Mod: CPTII,S$GLB,, | Performed by: SPECIALIST

## 2021-10-08 PROCEDURE — 1101F PT FALLS ASSESS-DOCD LE1/YR: CPT | Mod: CPTII,S$GLB,, | Performed by: SPECIALIST

## 2021-10-08 PROCEDURE — 1126F PR PAIN SEVERITY QUANTIFIED, NO PAIN PRESENT: ICD-10-PCS | Mod: CPTII,S$GLB,, | Performed by: SPECIALIST

## 2021-10-08 PROCEDURE — 31575 DIAGNOSTIC LARYNGOSCOPY: CPT | Mod: S$GLB,,, | Performed by: SPECIALIST

## 2021-10-08 PROCEDURE — 3075F SYST BP GE 130 - 139MM HG: CPT | Mod: CPTII,S$GLB,, | Performed by: SPECIALIST

## 2021-10-08 PROCEDURE — 3288F PR FALLS RISK ASSESSMENT DOCUMENTED: ICD-10-PCS | Mod: CPTII,S$GLB,, | Performed by: SPECIALIST

## 2021-10-08 PROCEDURE — 71046 XR CHEST PA AND LATERAL: ICD-10-PCS | Mod: 26,,, | Performed by: INTERNAL MEDICINE

## 2021-10-08 PROCEDURE — 99999 PR PBB SHADOW E&M-EST. PATIENT-LVL IV: CPT | Mod: PBBFAC,,, | Performed by: SPECIALIST

## 2021-10-08 PROCEDURE — 99999 PR PBB SHADOW E&M-EST. PATIENT-LVL IV: ICD-10-PCS | Mod: PBBFAC,,, | Performed by: SPECIALIST

## 2021-10-08 PROCEDURE — 1126F AMNT PAIN NOTED NONE PRSNT: CPT | Mod: CPTII,S$GLB,, | Performed by: SPECIALIST

## 2021-10-08 PROCEDURE — 3078F DIAST BP <80 MM HG: CPT | Mod: CPTII,S$GLB,, | Performed by: SPECIALIST

## 2021-10-08 PROCEDURE — 99499 RISK ADDL DX/OHS AUDIT: ICD-10-PCS | Mod: S$GLB,,, | Performed by: SPECIALIST

## 2021-10-08 PROCEDURE — 71046 X-RAY EXAM CHEST 2 VIEWS: CPT | Mod: 26,,, | Performed by: INTERNAL MEDICINE

## 2021-10-08 RX ORDER — FAMOTIDINE 20 MG/1
20 TABLET, FILM COATED ORAL 2 TIMES DAILY
Qty: 60 TABLET | Refills: 11 | Status: SHIPPED | OUTPATIENT
Start: 2021-10-08 | End: 2022-11-02

## 2021-10-12 DIAGNOSIS — J38.02 BILATERAL PARTIAL VOCAL FOLD PARALYSIS: Primary | ICD-10-CM

## 2021-10-18 ENCOUNTER — PATIENT MESSAGE (OUTPATIENT)
Dept: OTOLARYNGOLOGY | Facility: CLINIC | Age: 76
End: 2021-10-18
Payer: MEDICARE

## 2021-10-27 ENCOUNTER — OFFICE VISIT (OUTPATIENT)
Dept: OTOLARYNGOLOGY | Facility: CLINIC | Age: 76
End: 2021-10-27
Payer: MEDICARE

## 2021-10-27 VITALS — DIASTOLIC BLOOD PRESSURE: 68 MMHG | SYSTOLIC BLOOD PRESSURE: 105 MMHG | HEART RATE: 69 BPM

## 2021-10-27 DIAGNOSIS — J38.02 BILATERAL PARTIAL VOCAL FOLD PARALYSIS: Primary | ICD-10-CM

## 2021-10-27 DIAGNOSIS — J38.3 VOCAL FOLD ATROPHY: ICD-10-CM

## 2021-10-27 DIAGNOSIS — R49.0 DYSPHONIA: ICD-10-CM

## 2021-10-27 PROCEDURE — 3074F PR MOST RECENT SYSTOLIC BLOOD PRESSURE < 130 MM HG: ICD-10-PCS | Mod: CPTII,S$GLB,, | Performed by: OTOLARYNGOLOGY

## 2021-10-27 PROCEDURE — 1126F PR PAIN SEVERITY QUANTIFIED, NO PAIN PRESENT: ICD-10-PCS | Mod: CPTII,S$GLB,, | Performed by: OTOLARYNGOLOGY

## 2021-10-27 PROCEDURE — 31579 LARYNGOSCOPY TELESCOPIC: CPT | Mod: S$GLB,,, | Performed by: OTOLARYNGOLOGY

## 2021-10-27 PROCEDURE — 99999 PR PBB SHADOW E&M-EST. PATIENT-LVL V: CPT | Mod: PBBFAC,,, | Performed by: OTOLARYNGOLOGY

## 2021-10-27 PROCEDURE — 1159F MED LIST DOCD IN RCRD: CPT | Mod: CPTII,S$GLB,, | Performed by: OTOLARYNGOLOGY

## 2021-10-27 PROCEDURE — 99213 OFFICE O/P EST LOW 20 MIN: CPT | Mod: 25,S$GLB,, | Performed by: OTOLARYNGOLOGY

## 2021-10-27 PROCEDURE — 31579 PR LARYNGOSCOPY, FLEX/RIGID TELESCOPIC, W/STROBOSCOPY: ICD-10-PCS | Mod: S$GLB,,, | Performed by: OTOLARYNGOLOGY

## 2021-10-27 PROCEDURE — 3078F DIAST BP <80 MM HG: CPT | Mod: CPTII,S$GLB,, | Performed by: OTOLARYNGOLOGY

## 2021-10-27 PROCEDURE — 3074F SYST BP LT 130 MM HG: CPT | Mod: CPTII,S$GLB,, | Performed by: OTOLARYNGOLOGY

## 2021-10-27 PROCEDURE — 1160F RVW MEDS BY RX/DR IN RCRD: CPT | Mod: CPTII,S$GLB,, | Performed by: OTOLARYNGOLOGY

## 2021-10-27 PROCEDURE — 1160F PR REVIEW ALL MEDS BY PRESCRIBER/CLIN PHARMACIST DOCUMENTED: ICD-10-PCS | Mod: CPTII,S$GLB,, | Performed by: OTOLARYNGOLOGY

## 2021-10-27 PROCEDURE — 99213 PR OFFICE/OUTPT VISIT, EST, LEVL III, 20-29 MIN: ICD-10-PCS | Mod: 25,S$GLB,, | Performed by: OTOLARYNGOLOGY

## 2021-10-27 PROCEDURE — 1126F AMNT PAIN NOTED NONE PRSNT: CPT | Mod: CPTII,S$GLB,, | Performed by: OTOLARYNGOLOGY

## 2021-10-27 PROCEDURE — 1159F PR MEDICATION LIST DOCUMENTED IN MEDICAL RECORD: ICD-10-PCS | Mod: CPTII,S$GLB,, | Performed by: OTOLARYNGOLOGY

## 2021-10-27 PROCEDURE — 3078F PR MOST RECENT DIASTOLIC BLOOD PRESSURE < 80 MM HG: ICD-10-PCS | Mod: CPTII,S$GLB,, | Performed by: OTOLARYNGOLOGY

## 2021-10-27 PROCEDURE — 99999 PR PBB SHADOW E&M-EST. PATIENT-LVL V: ICD-10-PCS | Mod: PBBFAC,,, | Performed by: OTOLARYNGOLOGY

## 2021-10-28 ENCOUNTER — TELEPHONE (OUTPATIENT)
Dept: SPEECH THERAPY | Facility: HOSPITAL | Age: 76
End: 2021-10-28
Payer: MEDICARE

## 2021-11-05 ENCOUNTER — OFFICE VISIT (OUTPATIENT)
Dept: OTOLARYNGOLOGY | Facility: CLINIC | Age: 76
End: 2021-11-05
Payer: MEDICARE

## 2021-11-05 VITALS
TEMPERATURE: 98 F | WEIGHT: 141.44 LBS | BODY MASS INDEX: 25.05 KG/M2 | SYSTOLIC BLOOD PRESSURE: 122 MMHG | HEART RATE: 75 BPM | DIASTOLIC BLOOD PRESSURE: 58 MMHG

## 2021-11-05 DIAGNOSIS — R49.0 DYSPHONIA: ICD-10-CM

## 2021-11-05 DIAGNOSIS — R13.10 DYSPHAGIA, UNSPECIFIED TYPE: ICD-10-CM

## 2021-11-05 DIAGNOSIS — R09.82 PND (POST-NASAL DRIP): ICD-10-CM

## 2021-11-05 DIAGNOSIS — J30.9 ALLERGIC RHINITIS, UNSPECIFIED SEASONALITY, UNSPECIFIED TRIGGER: ICD-10-CM

## 2021-11-05 DIAGNOSIS — K21.9 LARYNGOPHARYNGEAL REFLUX (LPR): ICD-10-CM

## 2021-11-05 DIAGNOSIS — J38.3 VOCAL FOLD ATROPHY: Primary | ICD-10-CM

## 2021-11-05 DIAGNOSIS — R27.0 ATAXIA: ICD-10-CM

## 2021-11-05 DIAGNOSIS — J34.2 NASAL SEPTAL DEVIATION: ICD-10-CM

## 2021-11-05 DIAGNOSIS — H90.A32 MIXED CONDUCTIVE AND SENSORINEURAL HEARING LOSS OF LEFT EAR WITH RESTRICTED HEARING OF RIGHT EAR: ICD-10-CM

## 2021-11-05 PROCEDURE — 3078F DIAST BP <80 MM HG: CPT | Mod: CPTII,S$GLB,, | Performed by: SPECIALIST

## 2021-11-05 PROCEDURE — 3074F PR MOST RECENT SYSTOLIC BLOOD PRESSURE < 130 MM HG: ICD-10-PCS | Mod: CPTII,S$GLB,, | Performed by: SPECIALIST

## 2021-11-05 PROCEDURE — 99999 PR PBB SHADOW E&M-EST. PATIENT-LVL III: ICD-10-PCS | Mod: PBBFAC,,, | Performed by: SPECIALIST

## 2021-11-05 PROCEDURE — 3288F PR FALLS RISK ASSESSMENT DOCUMENTED: ICD-10-PCS | Mod: CPTII,S$GLB,, | Performed by: SPECIALIST

## 2021-11-05 PROCEDURE — 1159F MED LIST DOCD IN RCRD: CPT | Mod: CPTII,S$GLB,, | Performed by: SPECIALIST

## 2021-11-05 PROCEDURE — 3288F FALL RISK ASSESSMENT DOCD: CPT | Mod: CPTII,S$GLB,, | Performed by: SPECIALIST

## 2021-11-05 PROCEDURE — 99214 OFFICE O/P EST MOD 30 MIN: CPT | Mod: S$GLB,,, | Performed by: SPECIALIST

## 2021-11-05 PROCEDURE — 99999 PR PBB SHADOW E&M-EST. PATIENT-LVL III: CPT | Mod: PBBFAC,,, | Performed by: SPECIALIST

## 2021-11-05 PROCEDURE — 1125F PR PAIN SEVERITY QUANTIFIED, PAIN PRESENT: ICD-10-PCS | Mod: CPTII,S$GLB,, | Performed by: SPECIALIST

## 2021-11-05 PROCEDURE — 1125F AMNT PAIN NOTED PAIN PRSNT: CPT | Mod: CPTII,S$GLB,, | Performed by: SPECIALIST

## 2021-11-05 PROCEDURE — 99214 PR OFFICE/OUTPT VISIT, EST, LEVL IV, 30-39 MIN: ICD-10-PCS | Mod: S$GLB,,, | Performed by: SPECIALIST

## 2021-11-05 PROCEDURE — 1160F RVW MEDS BY RX/DR IN RCRD: CPT | Mod: CPTII,S$GLB,, | Performed by: SPECIALIST

## 2021-11-05 PROCEDURE — 1159F PR MEDICATION LIST DOCUMENTED IN MEDICAL RECORD: ICD-10-PCS | Mod: CPTII,S$GLB,, | Performed by: SPECIALIST

## 2021-11-05 PROCEDURE — 3074F SYST BP LT 130 MM HG: CPT | Mod: CPTII,S$GLB,, | Performed by: SPECIALIST

## 2021-11-05 PROCEDURE — 1101F PR PT FALLS ASSESS DOC 0-1 FALLS W/OUT INJ PAST YR: ICD-10-PCS | Mod: CPTII,S$GLB,, | Performed by: SPECIALIST

## 2021-11-05 PROCEDURE — 1101F PT FALLS ASSESS-DOCD LE1/YR: CPT | Mod: CPTII,S$GLB,, | Performed by: SPECIALIST

## 2021-11-05 PROCEDURE — 1160F PR REVIEW ALL MEDS BY PRESCRIBER/CLIN PHARMACIST DOCUMENTED: ICD-10-PCS | Mod: CPTII,S$GLB,, | Performed by: SPECIALIST

## 2021-11-05 PROCEDURE — 3078F PR MOST RECENT DIASTOLIC BLOOD PRESSURE < 80 MM HG: ICD-10-PCS | Mod: CPTII,S$GLB,, | Performed by: SPECIALIST

## 2021-11-08 ENCOUNTER — PATIENT OUTREACH (OUTPATIENT)
Dept: ADMINISTRATIVE | Facility: OTHER | Age: 76
End: 2021-11-08
Payer: MEDICARE

## 2021-11-08 ENCOUNTER — OFFICE VISIT (OUTPATIENT)
Dept: PHYSICAL MEDICINE AND REHAB | Facility: CLINIC | Age: 76
End: 2021-11-08
Payer: MEDICARE

## 2021-11-08 VITALS
SYSTOLIC BLOOD PRESSURE: 108 MMHG | HEART RATE: 69 BPM | HEIGHT: 64 IN | BODY MASS INDEX: 24.32 KG/M2 | DIASTOLIC BLOOD PRESSURE: 62 MMHG | WEIGHT: 142.44 LBS

## 2021-11-08 DIAGNOSIS — M48.02 CERVICAL SPINAL STENOSIS: ICD-10-CM

## 2021-11-08 DIAGNOSIS — M54.2 CHRONIC NECK PAIN: Primary | ICD-10-CM

## 2021-11-08 DIAGNOSIS — M47.22 OSTEOARTHRITIS OF SPINE WITH RADICULOPATHY, CERVICAL REGION: ICD-10-CM

## 2021-11-08 DIAGNOSIS — M54.42 CHRONIC BILATERAL LOW BACK PAIN WITH BILATERAL SCIATICA: ICD-10-CM

## 2021-11-08 DIAGNOSIS — G89.29 CHRONIC BILATERAL LOW BACK PAIN WITH BILATERAL SCIATICA: ICD-10-CM

## 2021-11-08 DIAGNOSIS — R20.0 LEFT LEG NUMBNESS: ICD-10-CM

## 2021-11-08 DIAGNOSIS — G89.29 CHRONIC NECK PAIN: Primary | ICD-10-CM

## 2021-11-08 DIAGNOSIS — M48.04 THORACIC SPINAL STENOSIS: ICD-10-CM

## 2021-11-08 DIAGNOSIS — M54.41 CHRONIC BILATERAL LOW BACK PAIN WITH BILATERAL SCIATICA: ICD-10-CM

## 2021-11-08 DIAGNOSIS — R20.0 BILATERAL LEG NUMBNESS: ICD-10-CM

## 2021-11-08 PROCEDURE — 1159F MED LIST DOCD IN RCRD: CPT | Mod: CPTII,S$GLB,, | Performed by: PHYSICAL MEDICINE & REHABILITATION

## 2021-11-08 PROCEDURE — 99999 PR PBB SHADOW E&M-EST. PATIENT-LVL IV: ICD-10-PCS | Mod: PBBFAC,,, | Performed by: PHYSICAL MEDICINE & REHABILITATION

## 2021-11-08 PROCEDURE — 3288F FALL RISK ASSESSMENT DOCD: CPT | Mod: CPTII,S$GLB,, | Performed by: PHYSICAL MEDICINE & REHABILITATION

## 2021-11-08 PROCEDURE — 99999 PR PBB SHADOW E&M-EST. PATIENT-LVL IV: CPT | Mod: PBBFAC,,, | Performed by: PHYSICAL MEDICINE & REHABILITATION

## 2021-11-08 PROCEDURE — 1125F AMNT PAIN NOTED PAIN PRSNT: CPT | Mod: CPTII,S$GLB,, | Performed by: PHYSICAL MEDICINE & REHABILITATION

## 2021-11-08 PROCEDURE — 3074F PR MOST RECENT SYSTOLIC BLOOD PRESSURE < 130 MM HG: ICD-10-PCS | Mod: CPTII,S$GLB,, | Performed by: PHYSICAL MEDICINE & REHABILITATION

## 2021-11-08 PROCEDURE — 1101F PT FALLS ASSESS-DOCD LE1/YR: CPT | Mod: CPTII,S$GLB,, | Performed by: PHYSICAL MEDICINE & REHABILITATION

## 2021-11-08 PROCEDURE — 3078F DIAST BP <80 MM HG: CPT | Mod: CPTII,S$GLB,, | Performed by: PHYSICAL MEDICINE & REHABILITATION

## 2021-11-08 PROCEDURE — 1159F PR MEDICATION LIST DOCUMENTED IN MEDICAL RECORD: ICD-10-PCS | Mod: CPTII,S$GLB,, | Performed by: PHYSICAL MEDICINE & REHABILITATION

## 2021-11-08 PROCEDURE — 3288F PR FALLS RISK ASSESSMENT DOCUMENTED: ICD-10-PCS | Mod: CPTII,S$GLB,, | Performed by: PHYSICAL MEDICINE & REHABILITATION

## 2021-11-08 PROCEDURE — 99204 OFFICE O/P NEW MOD 45 MIN: CPT | Mod: S$GLB,,, | Performed by: PHYSICAL MEDICINE & REHABILITATION

## 2021-11-08 PROCEDURE — 99204 PR OFFICE/OUTPT VISIT, NEW, LEVL IV, 45-59 MIN: ICD-10-PCS | Mod: S$GLB,,, | Performed by: PHYSICAL MEDICINE & REHABILITATION

## 2021-11-08 PROCEDURE — 1125F PR PAIN SEVERITY QUANTIFIED, PAIN PRESENT: ICD-10-PCS | Mod: CPTII,S$GLB,, | Performed by: PHYSICAL MEDICINE & REHABILITATION

## 2021-11-08 PROCEDURE — 1101F PR PT FALLS ASSESS DOC 0-1 FALLS W/OUT INJ PAST YR: ICD-10-PCS | Mod: CPTII,S$GLB,, | Performed by: PHYSICAL MEDICINE & REHABILITATION

## 2021-11-08 PROCEDURE — 3078F PR MOST RECENT DIASTOLIC BLOOD PRESSURE < 80 MM HG: ICD-10-PCS | Mod: CPTII,S$GLB,, | Performed by: PHYSICAL MEDICINE & REHABILITATION

## 2021-11-08 PROCEDURE — 3074F SYST BP LT 130 MM HG: CPT | Mod: CPTII,S$GLB,, | Performed by: PHYSICAL MEDICINE & REHABILITATION

## 2021-11-08 RX ORDER — ACETAMINOPHEN 500 MG
500-1000 TABLET ORAL 3 TIMES DAILY PRN
Refills: 0 | COMMUNITY
Start: 2021-11-08

## 2021-11-08 RX ORDER — GABAPENTIN 300 MG/1
300 CAPSULE ORAL NIGHTLY
Qty: 90 CAPSULE | Refills: 2 | Status: SHIPPED | OUTPATIENT
Start: 2021-11-08 | End: 2022-07-25 | Stop reason: ALTCHOICE

## 2021-11-09 ENCOUNTER — HOSPITAL ENCOUNTER (OUTPATIENT)
Dept: RADIOLOGY | Facility: HOSPITAL | Age: 76
Discharge: HOME | End: 2021-11-09
Attending: PHYSICAL MEDICINE & REHABILITATION
Payer: MEDICARE

## 2021-11-09 DIAGNOSIS — M54.42 CHRONIC BILATERAL LOW BACK PAIN WITH BILATERAL SCIATICA: ICD-10-CM

## 2021-11-09 DIAGNOSIS — G89.29 CHRONIC BILATERAL LOW BACK PAIN WITH BILATERAL SCIATICA: ICD-10-CM

## 2021-11-09 DIAGNOSIS — M54.41 CHRONIC BILATERAL LOW BACK PAIN WITH BILATERAL SCIATICA: ICD-10-CM

## 2021-11-09 PROCEDURE — 72110 XR LUMBAR SPINE AP AND LAT WITH FLEX/EXT: ICD-10-PCS | Mod: 26,,, | Performed by: RADIOLOGY

## 2021-11-09 PROCEDURE — 72110 X-RAY EXAM L-2 SPINE 4/>VWS: CPT | Mod: 26,,, | Performed by: RADIOLOGY

## 2021-11-09 PROCEDURE — 72110 X-RAY EXAM L-2 SPINE 4/>VWS: CPT | Mod: TC,PN

## 2021-11-17 ENCOUNTER — OFFICE VISIT (OUTPATIENT)
Dept: PRIMARY CARE CLINIC | Facility: CLINIC | Age: 76
End: 2021-11-17
Payer: MEDICARE

## 2021-11-17 ENCOUNTER — PATIENT MESSAGE (OUTPATIENT)
Dept: SPEECH THERAPY | Facility: HOSPITAL | Age: 76
End: 2021-11-17

## 2021-11-17 ENCOUNTER — CLINICAL SUPPORT (OUTPATIENT)
Dept: SPEECH THERAPY | Facility: HOSPITAL | Age: 76
End: 2021-11-17
Attending: OTOLARYNGOLOGY
Payer: MEDICARE

## 2021-11-17 VITALS
TEMPERATURE: 98 F | OXYGEN SATURATION: 97 % | SYSTOLIC BLOOD PRESSURE: 104 MMHG | DIASTOLIC BLOOD PRESSURE: 60 MMHG | WEIGHT: 143.94 LBS | HEART RATE: 67 BPM | BODY MASS INDEX: 24.57 KG/M2 | HEIGHT: 64 IN

## 2021-11-17 DIAGNOSIS — M48.02 CERVICAL STENOSIS OF SPINE: ICD-10-CM

## 2021-11-17 DIAGNOSIS — K21.9 LARYNGOPHARYNGEAL REFLUX (LPR): ICD-10-CM

## 2021-11-17 DIAGNOSIS — E78.2 MIXED HYPERLIPIDEMIA: ICD-10-CM

## 2021-11-17 DIAGNOSIS — Z90.710 S/P HYSTERECTOMY: ICD-10-CM

## 2021-11-17 DIAGNOSIS — Z13.6 ENCOUNTER FOR LIPID SCREENING FOR CARDIOVASCULAR DISEASE: ICD-10-CM

## 2021-11-17 DIAGNOSIS — H52.4 HYPEROPIA WITH PRESBYOPIA OF BOTH EYES: ICD-10-CM

## 2021-11-17 DIAGNOSIS — I78.1 SPIDER VEINS: ICD-10-CM

## 2021-11-17 DIAGNOSIS — N39.41 URGE INCONTINENCE: ICD-10-CM

## 2021-11-17 DIAGNOSIS — D47.2 MGUS (MONOCLONAL GAMMOPATHY OF UNKNOWN SIGNIFICANCE): ICD-10-CM

## 2021-11-17 DIAGNOSIS — H21.551 ANGLE RECESSION OF RIGHT EYE: ICD-10-CM

## 2021-11-17 DIAGNOSIS — Z87.11 HISTORY OF GASTRIC ULCER: ICD-10-CM

## 2021-11-17 DIAGNOSIS — M85.88 OSTEOPENIA OF LUMBAR SPINE: ICD-10-CM

## 2021-11-17 DIAGNOSIS — H04.123 DRY EYE SYNDROME OF BOTH EYES: ICD-10-CM

## 2021-11-17 DIAGNOSIS — J34.2 NASAL SEPTAL DEVIATION: ICD-10-CM

## 2021-11-17 DIAGNOSIS — Z13.220 ENCOUNTER FOR LIPID SCREENING FOR CARDIOVASCULAR DISEASE: ICD-10-CM

## 2021-11-17 DIAGNOSIS — H25.13 NUCLEAR SCLEROTIC CATARACT OF BOTH EYES: ICD-10-CM

## 2021-11-17 DIAGNOSIS — R73.03 PREDIABETES: ICD-10-CM

## 2021-11-17 DIAGNOSIS — H93.13 TINNITUS OF BOTH EARS: ICD-10-CM

## 2021-11-17 DIAGNOSIS — K59.09 CHRONIC CONSTIPATION: ICD-10-CM

## 2021-11-17 DIAGNOSIS — H90.A32 MIXED CONDUCTIVE AND SENSORINEURAL HEARING LOSS OF LEFT EAR WITH RESTRICTED HEARING OF RIGHT EAR: ICD-10-CM

## 2021-11-17 DIAGNOSIS — M47.22 OSTEOARTHRITIS OF SPINE WITH RADICULOPATHY, CERVICAL REGION: ICD-10-CM

## 2021-11-17 DIAGNOSIS — J30.9 ALLERGIC RHINITIS, UNSPECIFIED SEASONALITY, UNSPECIFIED TRIGGER: ICD-10-CM

## 2021-11-17 DIAGNOSIS — J38.02 BILATERAL PARTIAL VOCAL FOLD PARALYSIS: ICD-10-CM

## 2021-11-17 DIAGNOSIS — H52.03 HYPEROPIA WITH PRESBYOPIA OF BOTH EYES: ICD-10-CM

## 2021-11-17 DIAGNOSIS — E55.9 VITAMIN D INSUFFICIENCY: ICD-10-CM

## 2021-11-17 DIAGNOSIS — G60.3 IDIOPATHIC PROGRESSIVE POLYNEUROPATHY: ICD-10-CM

## 2021-11-17 DIAGNOSIS — N81.6 RECTOCELE: ICD-10-CM

## 2021-11-17 DIAGNOSIS — J38.7 PRESBYLARYNGES: ICD-10-CM

## 2021-11-17 DIAGNOSIS — I10 ESSENTIAL HYPERTENSION: Primary | ICD-10-CM

## 2021-11-17 DIAGNOSIS — N95.2 VAGINAL ATROPHY: ICD-10-CM

## 2021-11-17 DIAGNOSIS — R09.82 PND (POST-NASAL DRIP): ICD-10-CM

## 2021-11-17 DIAGNOSIS — R49.0 DYSPHONIA: Primary | ICD-10-CM

## 2021-11-17 DIAGNOSIS — J38.3 VOCAL FOLD ATROPHY: ICD-10-CM

## 2021-11-17 DIAGNOSIS — D56.3 THALASSEMIA TRAIT: ICD-10-CM

## 2021-11-17 DIAGNOSIS — M35.01 KERATITIS SICCA, BOTH EYES: ICD-10-CM

## 2021-11-17 DIAGNOSIS — M48.04 NEURAL FORAMINAL STENOSIS OF THORACIC SPINE: ICD-10-CM

## 2021-11-17 DIAGNOSIS — N81.11 MIDLINE CYSTOCELE: ICD-10-CM

## 2021-11-17 DIAGNOSIS — J38.02 BILATERAL VOCAL CORD PARESIS: ICD-10-CM

## 2021-11-17 DIAGNOSIS — Z87.19 HISTORY OF IBS: ICD-10-CM

## 2021-11-17 DIAGNOSIS — E03.9 ACQUIRED HYPOTHYROIDISM: ICD-10-CM

## 2021-11-17 DIAGNOSIS — M89.9 BONE DISORDER: ICD-10-CM

## 2021-11-17 DIAGNOSIS — J38.00 VOCAL CORD PARESIS DETERMINED BY LARYNGOSCOPY: ICD-10-CM

## 2021-11-17 DIAGNOSIS — K21.9 GASTROESOPHAGEAL REFLUX DISEASE WITHOUT ESOPHAGITIS: ICD-10-CM

## 2021-11-17 DIAGNOSIS — M20.12 ACQUIRED HALLUX VALGUS OF LEFT FOOT: ICD-10-CM

## 2021-11-17 DIAGNOSIS — M70.61 TROCHANTERIC BURSITIS OF RIGHT HIP: ICD-10-CM

## 2021-11-17 PROBLEM — H16.223 KERATITIS SICCA, BOTH EYES: Status: ACTIVE | Noted: 2021-11-17

## 2021-11-17 PROBLEM — N81.9 PROLAPSE OF FEMALE GENITAL ORGANS: Status: ACTIVE | Noted: 2021-11-17

## 2021-11-17 PROCEDURE — 92524 BEHAVRAL QUALIT ANALYS VOICE: CPT | Mod: GN

## 2021-11-17 PROCEDURE — 3078F PR MOST RECENT DIASTOLIC BLOOD PRESSURE < 80 MM HG: ICD-10-PCS | Mod: CPTII,S$GLB,, | Performed by: FAMILY MEDICINE

## 2021-11-17 PROCEDURE — 99214 OFFICE O/P EST MOD 30 MIN: CPT | Mod: S$GLB,,, | Performed by: FAMILY MEDICINE

## 2021-11-17 PROCEDURE — 1159F MED LIST DOCD IN RCRD: CPT | Mod: CPTII,S$GLB,, | Performed by: FAMILY MEDICINE

## 2021-11-17 PROCEDURE — 1160F RVW MEDS BY RX/DR IN RCRD: CPT | Mod: CPTII,S$GLB,, | Performed by: FAMILY MEDICINE

## 2021-11-17 PROCEDURE — 1101F PT FALLS ASSESS-DOCD LE1/YR: CPT | Mod: CPTII,S$GLB,, | Performed by: FAMILY MEDICINE

## 2021-11-17 PROCEDURE — 99999 PR PBB SHADOW E&M-EST. PATIENT-LVL V: CPT | Mod: PBBFAC,,, | Performed by: FAMILY MEDICINE

## 2021-11-17 PROCEDURE — 1159F PR MEDICATION LIST DOCUMENTED IN MEDICAL RECORD: ICD-10-PCS | Mod: CPTII,S$GLB,, | Performed by: FAMILY MEDICINE

## 2021-11-17 PROCEDURE — 3078F DIAST BP <80 MM HG: CPT | Mod: CPTII,S$GLB,, | Performed by: FAMILY MEDICINE

## 2021-11-17 PROCEDURE — 99999 PR PBB SHADOW E&M-EST. PATIENT-LVL V: ICD-10-PCS | Mod: PBBFAC,,, | Performed by: FAMILY MEDICINE

## 2021-11-17 PROCEDURE — 1125F AMNT PAIN NOTED PAIN PRSNT: CPT | Mod: CPTII,S$GLB,, | Performed by: FAMILY MEDICINE

## 2021-11-17 PROCEDURE — 1160F PR REVIEW ALL MEDS BY PRESCRIBER/CLIN PHARMACIST DOCUMENTED: ICD-10-PCS | Mod: CPTII,S$GLB,, | Performed by: FAMILY MEDICINE

## 2021-11-17 PROCEDURE — 99214 PR OFFICE/OUTPT VISIT, EST, LEVL IV, 30-39 MIN: ICD-10-PCS | Mod: S$GLB,,, | Performed by: FAMILY MEDICINE

## 2021-11-17 PROCEDURE — 3288F PR FALLS RISK ASSESSMENT DOCUMENTED: ICD-10-PCS | Mod: CPTII,S$GLB,, | Performed by: FAMILY MEDICINE

## 2021-11-17 PROCEDURE — 1125F PR PAIN SEVERITY QUANTIFIED, PAIN PRESENT: ICD-10-PCS | Mod: CPTII,S$GLB,, | Performed by: FAMILY MEDICINE

## 2021-11-17 PROCEDURE — 3074F PR MOST RECENT SYSTOLIC BLOOD PRESSURE < 130 MM HG: ICD-10-PCS | Mod: CPTII,S$GLB,, | Performed by: FAMILY MEDICINE

## 2021-11-17 PROCEDURE — 3074F SYST BP LT 130 MM HG: CPT | Mod: CPTII,S$GLB,, | Performed by: FAMILY MEDICINE

## 2021-11-17 PROCEDURE — 1101F PR PT FALLS ASSESS DOC 0-1 FALLS W/OUT INJ PAST YR: ICD-10-PCS | Mod: CPTII,S$GLB,, | Performed by: FAMILY MEDICINE

## 2021-11-17 PROCEDURE — 3288F FALL RISK ASSESSMENT DOCD: CPT | Mod: CPTII,S$GLB,, | Performed by: FAMILY MEDICINE

## 2021-11-17 RX ORDER — VALSARTAN 320 MG/1
320 TABLET ORAL DAILY
Qty: 90 TABLET | Refills: 3 | Status: SHIPPED | OUTPATIENT
Start: 2021-11-17 | End: 2022-06-17 | Stop reason: SDUPTHER

## 2021-11-17 RX ORDER — HYDROCHLOROTHIAZIDE 12.5 MG/1
12.5 TABLET ORAL DAILY
Qty: 90 TABLET | Refills: 3 | Status: SHIPPED | OUTPATIENT
Start: 2021-11-17 | End: 2022-06-17 | Stop reason: SDUPTHER

## 2021-12-03 ENCOUNTER — CLINICAL SUPPORT (OUTPATIENT)
Dept: REHABILITATION | Facility: OTHER | Age: 76
End: 2021-12-03
Payer: MEDICARE

## 2021-12-03 DIAGNOSIS — Z74.09 DECREASED STRENGTH, ENDURANCE, AND MOBILITY: ICD-10-CM

## 2021-12-03 DIAGNOSIS — M54.9 CHRONIC NECK AND BACK PAIN: ICD-10-CM

## 2021-12-03 DIAGNOSIS — G89.29 CHRONIC NECK AND BACK PAIN: ICD-10-CM

## 2021-12-03 DIAGNOSIS — M54.2 CHRONIC NECK AND BACK PAIN: ICD-10-CM

## 2021-12-03 DIAGNOSIS — R68.89 DECREASED STRENGTH, ENDURANCE, AND MOBILITY: ICD-10-CM

## 2021-12-03 DIAGNOSIS — R53.1 DECREASED STRENGTH, ENDURANCE, AND MOBILITY: ICD-10-CM

## 2021-12-03 PROCEDURE — 97110 THERAPEUTIC EXERCISES: CPT | Mod: PN

## 2021-12-03 PROCEDURE — 97161 PT EVAL LOW COMPLEX 20 MIN: CPT | Mod: PN

## 2021-12-03 PROCEDURE — 97140 MANUAL THERAPY 1/> REGIONS: CPT | Mod: PN

## 2021-12-06 ENCOUNTER — CLINICAL SUPPORT (OUTPATIENT)
Dept: SPEECH THERAPY | Facility: HOSPITAL | Age: 76
End: 2021-12-06
Attending: OTOLARYNGOLOGY
Payer: MEDICARE

## 2021-12-06 DIAGNOSIS — J38.3 VOCAL FOLD ATROPHY: ICD-10-CM

## 2021-12-06 DIAGNOSIS — R49.0 DYSPHONIA: Primary | ICD-10-CM

## 2021-12-06 DIAGNOSIS — J38.7 PRESBYLARYNGES: ICD-10-CM

## 2021-12-06 PROCEDURE — 92507 TX SP LANG VOICE COMM INDIV: CPT | Mod: GN

## 2021-12-07 DIAGNOSIS — J38.02 BILATERAL PARTIAL VOCAL FOLD PARALYSIS: ICD-10-CM

## 2021-12-07 DIAGNOSIS — R49.0 DYSPHONIA: Primary | ICD-10-CM

## 2021-12-07 DIAGNOSIS — J38.3 VOCAL FOLD ATROPHY: ICD-10-CM

## 2021-12-08 ENCOUNTER — CLINICAL SUPPORT (OUTPATIENT)
Dept: REHABILITATION | Facility: OTHER | Age: 76
End: 2021-12-08
Payer: MEDICARE

## 2021-12-08 DIAGNOSIS — M54.9 CHRONIC NECK AND BACK PAIN: ICD-10-CM

## 2021-12-08 DIAGNOSIS — Z74.09 DECREASED STRENGTH, ENDURANCE, AND MOBILITY: ICD-10-CM

## 2021-12-08 DIAGNOSIS — G89.29 CHRONIC NECK AND BACK PAIN: ICD-10-CM

## 2021-12-08 DIAGNOSIS — M54.2 CHRONIC NECK AND BACK PAIN: ICD-10-CM

## 2021-12-08 DIAGNOSIS — R68.89 DECREASED STRENGTH, ENDURANCE, AND MOBILITY: ICD-10-CM

## 2021-12-08 DIAGNOSIS — R53.1 DECREASED STRENGTH, ENDURANCE, AND MOBILITY: ICD-10-CM

## 2021-12-08 PROBLEM — R29.3 POOR POSTURE: Status: RESOLVED | Noted: 2020-10-26 | Resolved: 2021-12-08

## 2021-12-08 PROBLEM — M25.69 DECREASED RANGE OF MOTION OF TRUNK AND BACK: Status: RESOLVED | Noted: 2020-10-26 | Resolved: 2021-12-08

## 2021-12-08 PROBLEM — R29.898 DECREASED STRENGTH OF TRUNK AND BACK: Status: RESOLVED | Noted: 2020-10-26 | Resolved: 2021-12-08

## 2021-12-08 PROCEDURE — 97140 MANUAL THERAPY 1/> REGIONS: CPT | Mod: KX,PN

## 2021-12-08 PROCEDURE — 97110 THERAPEUTIC EXERCISES: CPT | Mod: KX,PN

## 2021-12-10 ENCOUNTER — HOSPITAL ENCOUNTER (OUTPATIENT)
Dept: RADIOLOGY | Facility: OTHER | Age: 76
Discharge: HOME | End: 2021-12-10
Attending: FAMILY MEDICINE
Payer: MEDICARE

## 2021-12-10 DIAGNOSIS — M85.88 OSTEOPENIA OF LUMBAR SPINE: ICD-10-CM

## 2021-12-10 PROCEDURE — 77080 DXA BONE DENSITY AXIAL: CPT | Mod: TC

## 2021-12-10 PROCEDURE — 77080 DEXA BONE DENSITY SPINE HIP: ICD-10-PCS | Mod: 26,,, | Performed by: RADIOLOGY

## 2021-12-10 PROCEDURE — 77080 DXA BONE DENSITY AXIAL: CPT | Mod: 26,,, | Performed by: RADIOLOGY

## 2021-12-15 ENCOUNTER — CLINICAL SUPPORT (OUTPATIENT)
Dept: REHABILITATION | Facility: OTHER | Age: 76
End: 2021-12-15
Payer: MEDICARE

## 2021-12-15 DIAGNOSIS — G89.29 CHRONIC NECK AND BACK PAIN: ICD-10-CM

## 2021-12-15 DIAGNOSIS — M54.9 CHRONIC NECK AND BACK PAIN: ICD-10-CM

## 2021-12-15 DIAGNOSIS — R53.1 DECREASED STRENGTH, ENDURANCE, AND MOBILITY: ICD-10-CM

## 2021-12-15 DIAGNOSIS — Z74.09 DECREASED STRENGTH, ENDURANCE, AND MOBILITY: ICD-10-CM

## 2021-12-15 DIAGNOSIS — R68.89 DECREASED STRENGTH, ENDURANCE, AND MOBILITY: ICD-10-CM

## 2021-12-15 DIAGNOSIS — M54.2 CHRONIC NECK AND BACK PAIN: ICD-10-CM

## 2021-12-15 PROCEDURE — 97140 MANUAL THERAPY 1/> REGIONS: CPT | Mod: KX,PN

## 2021-12-15 PROCEDURE — 97110 THERAPEUTIC EXERCISES: CPT | Mod: KX,PN

## 2021-12-22 ENCOUNTER — CLINICAL SUPPORT (OUTPATIENT)
Dept: REHABILITATION | Facility: OTHER | Age: 76
End: 2021-12-22
Payer: MEDICARE

## 2021-12-22 DIAGNOSIS — M54.9 CHRONIC NECK AND BACK PAIN: ICD-10-CM

## 2021-12-22 DIAGNOSIS — R49.0 DYSPHONIA: Primary | ICD-10-CM

## 2021-12-22 DIAGNOSIS — R68.89 DECREASED STRENGTH, ENDURANCE, AND MOBILITY: ICD-10-CM

## 2021-12-22 DIAGNOSIS — J38.3 VOCAL FOLD ATROPHY: ICD-10-CM

## 2021-12-22 DIAGNOSIS — J38.02 BILATERAL PARTIAL VOCAL FOLD PARALYSIS: ICD-10-CM

## 2021-12-22 DIAGNOSIS — Z74.09 DECREASED STRENGTH, ENDURANCE, AND MOBILITY: ICD-10-CM

## 2021-12-22 DIAGNOSIS — M54.2 CHRONIC NECK AND BACK PAIN: ICD-10-CM

## 2021-12-22 DIAGNOSIS — R53.1 DECREASED STRENGTH, ENDURANCE, AND MOBILITY: ICD-10-CM

## 2021-12-22 DIAGNOSIS — G89.29 CHRONIC NECK AND BACK PAIN: ICD-10-CM

## 2021-12-22 PROCEDURE — 97140 MANUAL THERAPY 1/> REGIONS: CPT | Mod: KX,PN

## 2021-12-22 PROCEDURE — 97110 THERAPEUTIC EXERCISES: CPT | Mod: KX,PN

## 2021-12-29 ENCOUNTER — CLINICAL SUPPORT (OUTPATIENT)
Dept: REHABILITATION | Facility: OTHER | Age: 76
End: 2021-12-29
Payer: MEDICARE

## 2021-12-29 DIAGNOSIS — R68.89 DECREASED STRENGTH, ENDURANCE, AND MOBILITY: ICD-10-CM

## 2021-12-29 DIAGNOSIS — R53.1 DECREASED STRENGTH, ENDURANCE, AND MOBILITY: ICD-10-CM

## 2021-12-29 DIAGNOSIS — Z74.09 DECREASED STRENGTH, ENDURANCE, AND MOBILITY: ICD-10-CM

## 2021-12-29 DIAGNOSIS — G89.29 CHRONIC NECK AND BACK PAIN: ICD-10-CM

## 2021-12-29 DIAGNOSIS — M54.9 CHRONIC NECK AND BACK PAIN: ICD-10-CM

## 2021-12-29 DIAGNOSIS — M54.2 CHRONIC NECK AND BACK PAIN: ICD-10-CM

## 2021-12-29 PROCEDURE — 97140 MANUAL THERAPY 1/> REGIONS: CPT | Mod: PN

## 2021-12-29 PROCEDURE — 97110 THERAPEUTIC EXERCISES: CPT | Mod: PN

## 2021-12-30 ENCOUNTER — CLINICAL SUPPORT (OUTPATIENT)
Dept: REHABILITATION | Facility: OTHER | Age: 76
End: 2021-12-30
Payer: MEDICARE

## 2021-12-30 DIAGNOSIS — R68.89 DECREASED STRENGTH, ENDURANCE, AND MOBILITY: ICD-10-CM

## 2021-12-30 DIAGNOSIS — M54.2 CHRONIC NECK AND BACK PAIN: ICD-10-CM

## 2021-12-30 DIAGNOSIS — Z74.09 DECREASED STRENGTH, ENDURANCE, AND MOBILITY: ICD-10-CM

## 2021-12-30 DIAGNOSIS — R53.1 DECREASED STRENGTH, ENDURANCE, AND MOBILITY: ICD-10-CM

## 2021-12-30 DIAGNOSIS — G89.29 CHRONIC NECK AND BACK PAIN: ICD-10-CM

## 2021-12-30 DIAGNOSIS — M54.9 CHRONIC NECK AND BACK PAIN: ICD-10-CM

## 2021-12-30 PROCEDURE — 97140 MANUAL THERAPY 1/> REGIONS: CPT | Mod: PN

## 2021-12-30 PROCEDURE — 97110 THERAPEUTIC EXERCISES: CPT | Mod: PN

## 2022-01-03 ENCOUNTER — TELEPHONE (OUTPATIENT)
Dept: OBSTETRICS AND GYNECOLOGY | Facility: CLINIC | Age: 77
End: 2022-01-03
Payer: MEDICARE

## 2022-01-03 NOTE — TELEPHONE ENCOUNTER
Returned pt call and she informs us that she was exposed to someone that tested positive to covid on 12/25/2021 and chose to quartine for 2 weeks. Schedule appointment for 1/12/2022 for vaginal irritation. Pt verbalized understanding.

## 2022-01-03 NOTE — TELEPHONE ENCOUNTER
----- Message from Anusha Hinojosa sent at 1/3/2022  1:01 PM CST -----  Regarding: Patient Call Back  Who Called:VALARIE DENT [[id number]]    What is the reason for the call:Would like a call back in regards to being seen for vaginal irritations states this has been going on for the past 3 weeks.      Can patient be contacted on [employer]:Yes     Call back number:[phone]    Pharmacy:[facility]/pharmacy #[id number] - [place] - [address]   Phone:  [phone]  Fax:  [phone]

## 2022-01-10 ENCOUNTER — PATIENT OUTREACH (OUTPATIENT)
Dept: ADMINISTRATIVE | Facility: OTHER | Age: 77
End: 2022-01-10
Payer: MEDICARE

## 2022-01-10 NOTE — PROGRESS NOTES
Health Maintenance Due   Topic Date Due    Shingles Vaccine (1 of 2) Never done    TETANUS VACCINE  01/01/2014     Updates were requested from care everywhere.  Chart was reviewed for overdue Proactive [facility] Encounters (JACK[facility]) topics (CRS, Breast Cancer Screening, Eye exam)  Health Maintenance has been updated.  [place] immunization registry triggered.  Immunizations were reconciled.

## 2022-01-13 NOTE — PROGRESS NOTES
"    Physical Therapy Treatment Note     Name: Valarie Colindres  Clinic Number: [id number]    Therapy Diagnosis:   Encounter Diagnoses   Name Primary?    Chronic neck and back pain     Decreased strength, endurance, and mobility      Physician: An Hoover MD    Visit Date: 1/14/2022    Physician Orders: PT Eval and Treat - (M47.812) Osteoarthritis of cervical spine, unspecified spinal osteoarthritis complication status  (primary encounter diagnosis)  (R20.0) Left leg numbness  (M54.2,  G89.29) Chronic neck pain  (M48.02) Cervical spinal stenosis (C3-4, moderate)  (M48.04) Thoracic spinal stenosis (T11-12, moderate)  (M54.42,  M54.41,  G89.29) Chronic bilateral low back pain with bilateral sciatica  (R20.0) Bilateral leg numbness  Medical Diagnosis: Chronic bilateral low back pain with bilateral sciatica (M54.42,M54.41,G89.29); Chronic neck pain (M54.2,G89.29)  Evaluation Date: 12/3/2021  Authorization Period Expiration: 12/17/2021  Plan of Care Certification Period: 12/3/2021 - 3/3/2022  Visit #/Visits authorized: 6/ 6 (5 visits total)  Re-assessment: n/a  FOTO: 12/3/2021   PT/PTA visit: 0/6    Time In: 4:50 am   Time Out: 4:38 am  Total Billable Time: 48 minutes    Precautions: Standard and HTN, osteopenia    Subjective     Pt reports: she does not like the self massage with the LAX ball at home but does admit that it helps. As she was seen yesterday at PT she would like to defer dry needling today.    She was compliant with home exercise program.  Response to previous treatment: no adverse effects  Functional change: none    Pain: 6/10 in neck/shoulders (8/10 in low back)  Location: bilateral neck, R>L shoulders, and low back      Objective     Valarie received therapeutic exercises to develop strength, ROM, flexibility and posture for 45 minutes including:      Pec stretch over 1/2 foam x3 minutes  +LTR x 2'  +DKTC 4 x 20"  Upper trap stretch 2x 30 seconds B  Levator scap stretch 2x 30 seconds B " "  Horizontal ABD with GTB over 1/2 foam x30 - changed to sitting today  No money over 1/2 foam with GTB x30 - changed to sitting today    standing rows x 2 x 15 x GTB  standing SALPD x 2 x 15 x OTB  +standing open books x 10 HERB    Plan to progress hip, RTC strength    [place] received the following manual therapy techniques: Joint mobilizations, Myofacial release and Soft tissue Mobilization, Dry needling were applied to the: Neck and R shoulder for 00 minutes, includin min x Cupping to B levator scap static and dynamic with movement through upper traps and cervical paraspinals with medium clear silicone cups   STM to Levator scap, cervical paraspinals and occiput     Not performed today:   Application of TDN: Pt educated on benefits and potential side effects of dry needling. Educated pt on benefits, precautions, side effects following TDN. Educated pt to use heat following treatment sessions if pt is experiencing pain or soreness. Pt verbalized good understanding of education.  Pt signed written consent to dry needling. Pt gave verbal consent for DN    Pt received dry needling to the below listed muscles using 30 mm needles.  B Cervical paraspinals along BL at C7  B Levator scap  B Upper trap x2  B GB 20    Winding performed every 5 minutes during treatment.    Defer dry needling today due to time constraints, plan to resume next visit per pt request    Not performed today:  P/A Tsp mobilizations   kinesiotape for UT inhibition, herb LSP paraspinal inhibition. Patient received education regarding appropriate care and removal of Kinesiotape. Patient instructed in proper removal techniques if skin irritation occurs.     [place] participated in neuromuscular re-education activities to improve: Balance and Posture for 00 minutes. The following activities were included:    TA activation 10 x 10"     Plan to progress TA activation series, scapular stabilization    Modalities: 10 min x MHP to LSP performed " simultaneous with supine therex.    Home Exercises Provided and Patient Education Provided     Education provided:   - dry needling purpose, benefits and risks  - use of heat at home post needling for soreness  - Exercise form and purpose    Written Home Exercises Provided: Patient instructed to cont prior HEP.  Exercises were reviewed and Valarie was able to demonstrate them prior to the end of the session.  Valarie demonstrated good  understanding of the education provided.     See EMR under Patient Instructions for exercises provided prior visit.    Assessment     Modified program today, add LTR and DKTC for trunk AROM, and modalities used to reduce c/o pain and improve overall trunk mobility. Pt notes marked improvement in c/o LBP by end of session following use of MHP. Plan to resume dry needling next visit to CSP per pt request, while progressing core and scapular strengthening to promote upright posture and reducing stress/strain on cervical structures.    Valarie is progressing well towards her goals.   Pt prognosis is Good.     Pt will continue to benefit from skilled outpatient physical therapy to address the deficits listed in the problem list box on initial evaluation, provide pt/family education and to maximize pt's level of independence in the home and community environment.     Pt's spiritual, cultural and educational needs considered and pt agreeable to plan of care and goals.     Anticipated barriers to physical therapy: standard, chronicity of symptoms    Goals:   Short Term Goals (4 Weeks):  1. Pt able to stand >=30 minutes with ADLs with <4/10 pain.  2. Pt able to walk >=30 minutes with household chores with <4/10 pain.  3. Pt able to turn head while driving with 25% improvement of ROM with <4/10 pain.  4. Pt will demonstrate increased strength by a half grade to allow patient to walk with increased ease.  5. Pt to demonstrate improved functional ability with FOTO limitation <=32%  disability.     Long Term Goals (12 Weeks):  1. Pt able to stand >=1 hour with with ADLs with <2/10 pain.  2. Pt able to walk >=1 hour with household chores with <2/10 pain.  3. Pt able to sleep a full night without pain disturbance.  4. Pt demonstrates strength WFL / WNL to allow patient to ambulate with min to no difficulty.  5. Pt able to return to full gym activities with min difficulty and pain <2/10.  6. Pt will be independent with HEP and self management of symptoms.   7. Pt to demonstrate improved functional ability with FOTO limitation <=28% disability.  8. Pt able to turn head while driving with 50% improvement and pain <2/10.    Plan     Continue with established PT POC and progress per pt tolerance. Monitor effects of dry needling; Cupping at next visit     Clarita Thorne, PT

## 2022-01-14 ENCOUNTER — CLINICAL SUPPORT (OUTPATIENT)
Dept: REHABILITATION | Facility: OTHER | Age: 77
End: 2022-01-14
Payer: MEDICARE

## 2022-01-14 DIAGNOSIS — R53.1 DECREASED STRENGTH, ENDURANCE, AND MOBILITY: ICD-10-CM

## 2022-01-14 DIAGNOSIS — M54.2 CHRONIC NECK AND BACK PAIN: ICD-10-CM

## 2022-01-14 DIAGNOSIS — M54.9 CHRONIC NECK AND BACK PAIN: ICD-10-CM

## 2022-01-14 DIAGNOSIS — Z74.09 DECREASED STRENGTH, ENDURANCE, AND MOBILITY: ICD-10-CM

## 2022-01-14 DIAGNOSIS — R68.89 DECREASED STRENGTH, ENDURANCE, AND MOBILITY: ICD-10-CM

## 2022-01-14 DIAGNOSIS — G89.29 CHRONIC NECK AND BACK PAIN: ICD-10-CM

## 2022-01-14 PROCEDURE — 97110 THERAPEUTIC EXERCISES: CPT | Mod: PN

## 2022-01-19 ENCOUNTER — CLINICAL SUPPORT (OUTPATIENT)
Dept: REHABILITATION | Facility: OTHER | Age: 77
End: 2022-01-19
Payer: MEDICARE

## 2022-01-19 DIAGNOSIS — G89.29 CHRONIC NECK AND BACK PAIN: ICD-10-CM

## 2022-01-19 DIAGNOSIS — M54.2 CHRONIC NECK AND BACK PAIN: ICD-10-CM

## 2022-01-19 DIAGNOSIS — R53.1 DECREASED STRENGTH, ENDURANCE, AND MOBILITY: ICD-10-CM

## 2022-01-19 DIAGNOSIS — M54.9 CHRONIC NECK AND BACK PAIN: ICD-10-CM

## 2022-01-19 DIAGNOSIS — R68.89 DECREASED STRENGTH, ENDURANCE, AND MOBILITY: ICD-10-CM

## 2022-01-19 DIAGNOSIS — Z74.09 DECREASED STRENGTH, ENDURANCE, AND MOBILITY: ICD-10-CM

## 2022-01-19 PROCEDURE — 97110 THERAPEUTIC EXERCISES: CPT | Mod: PN

## 2022-01-19 PROCEDURE — 97140 MANUAL THERAPY 1/> REGIONS: CPT | Mod: PN

## 2022-01-19 NOTE — PROGRESS NOTES
"    Physical Therapy Treatment Note     Name: Valarie Colindres  Clinic Number: [id number]    Therapy Diagnosis:   Encounter Diagnoses   Name Primary?    Chronic neck and back pain     Decreased strength, endurance, and mobility      Physician: An Hoover MD    Visit Date: 1/19/2022    Physician Orders: PT Eval and Treat - (M47.812) Osteoarthritis of cervical spine, unspecified spinal osteoarthritis complication status  (primary encounter diagnosis)  (R20.0) Left leg numbness  (M54.2,  G89.29) Chronic neck pain  (M48.02) Cervical spinal stenosis (C3-4, moderate)  (M48.04) Thoracic spinal stenosis (T11-12, moderate)  (M54.42,  M54.41,  G89.29) Chronic bilateral low back pain with bilateral sciatica  (R20.0) Bilateral leg numbness  Medical Diagnosis: Chronic bilateral low back pain with bilateral sciatica (M54.42,M54.41,G89.29); Chronic neck pain (M54.2,G89.29)  Evaluation Date: 12/3/2021  Authorization Period Expiration: 12/17/2021  Plan of Care Certification Period: 12/3/2021 - 3/3/2022  Visit #/Visits authorized: 6/ 6 (5 visits total)  Re-assessment: n/a  FOTO: 12/3/2021   PT/PTA visit: 0/6    Time In: 10:45 am   Time Out: 11:40 am  Total Billable Time: 55 minutes    Precautions: Standard and HTN, osteopenia    Subjective     Pt reports: she does not like the self massage with the LAX ball at home but does admit that it helps. As she was seen yesterday at PT she would like to defer dry needling today.    She was compliant with home exercise program.  Response to previous treatment: no adverse effects  Functional change: none    Pain: 5-6/10 in neck/shoulders (5-6/10 in low back)  Location: bilateral neck, R>L shoulders, and low back      Objective     Valarie received therapeutic exercises to develop strength, ROM, flexibility and posture for 40 minutes including:      Pec stretch over 1/2 foam x3 minutes  LTR x 2'  DKTC 4 x 20"  +TA activation 10 x 10" holds  +TA with BKFO 2 x 10 - coordinate with " "breathing  +TA + SLR using pilates ring 2 x 10 HERB    Upper trap stretch 2x 30 seconds B  Levator scap stretch 2x 30 seconds B   Horizontal ABD with GTB over 1/2 foam x30 - supine over MPH, with TA today  No money over 1/2 foam with GTB x30 - supine over MPH, with TA today    standing rows x 2 x 15 x GTB  standing SALPD x 2 x 15 x OTB  standing open books x 10 HERB    Plan to progress hip, RTC strength    [place] received the following manual therapy techniques: Joint mobilizations, Myofacial release and Soft tissue Mobilization, Dry needling were applied to the: Neck and R shoulder for 15 minutes, including:      Application of TDN: Pt educated on benefits and potential side effects of dry needling. Educated pt on benefits, precautions, side effects following TDN. Educated pt to use heat following treatment sessions if pt is experiencing pain or soreness. Pt verbalized good understanding of education.  Pt signed written consent to dry needling. Pt gave verbal consent for DN    Pt received dry needling to the below listed muscles using 30 mm needles.  B Cervical paraspinals along BL at C7  B Levator scap  B Upper trap x2  B GB 20    Winding performed every 5 minutes during treatment.      Not performed today:  P/A Tsp mobilizations   kinesiotape for UT inhibition, herb LSP paraspinal inhibition. Patient received education regarding appropriate care and removal of Kinesiotape. Patient instructed in proper removal techniques if skin irritation occurs.   00 min x Cupping to B levator scap static and dynamic with movement through upper traps and cervical paraspinals with medium clear silicone cups   STM to Levator scap, cervical paraspinals and occiput     [place] participated in neuromuscular re-education activities to improve: Balance and Posture for 00 minutes. The following activities were included:    TA activation 10 x 10"     Plan to progress TA activation series, scapular stabilization    Modalities: 10 min x MHP " to LSP performed simultaneous with supine therex.    Home Exercises Provided and Patient Education Provided     Education provided:   - dry needling purpose, benefits and risks  - use of heat at home post needling for soreness  - Exercise form and purpose    Written Home Exercises Provided: Patient instructed to cont prior HEP.  Exercises were reviewed and Valarie was able to demonstrate them prior to the end of the session.  Valarie demonstrated good  understanding of the education provided.     See EMR under Patient Instructions for exercises provided prior visit.    Assessment     Resumed dry needling per pt request to R neck. Resumed use of MHP and modalities during supine therex per pt request as she notes this has helped reduce her c/o LBP. Introduced core activation exercises to promote stabilization during ADLs. Good TA activation with min compensations with rib flares or PPT. Educated to use TA activation during ADLs, such as brushing teeth, for back stabilization and motor control during dual tasks. Pt verbalized understanding.      Valarie is progressing well towards her goals.   Pt prognosis is Good.     Pt will continue to benefit from skilled outpatient physical therapy to address the deficits listed in the problem list box on initial evaluation, provide pt/family education and to maximize pt's level of independence in the home and community environment.     Pt's spiritual, cultural and educational needs considered and pt agreeable to plan of care and goals.     Anticipated barriers to physical therapy: standard, chronicity of symptoms    Goals:   Short Term Goals (4 Weeks):  1. Pt able to stand >=30 minutes with ADLs with <4/10 pain.  2. Pt able to walk >=30 minutes with household chores with <4/10 pain.  3. Pt able to turn head while driving with 25% improvement of ROM with <4/10 pain.  4. Pt will demonstrate increased strength by a half grade to allow patient to walk with increased ease.  5. Pt to  demonstrate improved functional ability with FOTO limitation <=32% disability.     Long Term Goals (12 Weeks):  1. Pt able to stand >=1 hour with with ADLs with <2/10 pain.  2. Pt able to walk >=1 hour with household chores with <2/10 pain.  3. Pt able to sleep a full night without pain disturbance.  4. Pt demonstrates strength WFL / WNL to allow patient to ambulate with min to no difficulty.  5. Pt able to return to full gym activities with min difficulty and pain <2/10.  6. Pt will be independent with HEP and self management of symptoms.   7. Pt to demonstrate improved functional ability with FOTO limitation <=28% disability.  8. Pt able to turn head while driving with 50% improvement and pain <2/10.    Plan     Continue with established PT POC and progress per pt tolerance. Monitor effects of dry needling; Cupping at next visit     Clarita Thorne, PT

## 2022-01-20 ENCOUNTER — OFFICE VISIT (OUTPATIENT)
Dept: OBSTETRICS AND GYNECOLOGY | Facility: CLINIC | Age: 77
End: 2022-01-20
Attending: OBSTETRICS & GYNECOLOGY
Payer: MEDICARE

## 2022-01-20 VITALS
HEIGHT: 64 IN | WEIGHT: 141.44 LBS | DIASTOLIC BLOOD PRESSURE: 70 MMHG | SYSTOLIC BLOOD PRESSURE: 104 MMHG | BODY MASS INDEX: 24.15 KG/M2

## 2022-01-20 DIAGNOSIS — M54.9 BACK PAIN, UNSPECIFIED BACK LOCATION, UNSPECIFIED BACK PAIN LATERALITY, UNSPECIFIED CHRONICITY: ICD-10-CM

## 2022-01-20 DIAGNOSIS — M54.50 ACUTE RIGHT-SIDED LOW BACK PAIN WITHOUT SCIATICA: ICD-10-CM

## 2022-01-20 DIAGNOSIS — N89.8 VAGINAL DISCHARGE: Primary | ICD-10-CM

## 2022-01-20 PROCEDURE — 87086 URINE CULTURE/COLONY COUNT: CPT | Performed by: OBSTETRICS & GYNECOLOGY

## 2022-01-20 PROCEDURE — 99213 PR OFFICE/OUTPT VISIT, EST, LEVL III, 20-29 MIN: ICD-10-PCS | Mod: S$GLB,,, | Performed by: OBSTETRICS & GYNECOLOGY

## 2022-01-20 PROCEDURE — 3078F PR MOST RECENT DIASTOLIC BLOOD PRESSURE < 80 MM HG: ICD-10-PCS | Mod: CPTII,S$GLB,, | Performed by: OBSTETRICS & GYNECOLOGY

## 2022-01-20 PROCEDURE — 3078F DIAST BP <80 MM HG: CPT | Mod: CPTII,S$GLB,, | Performed by: OBSTETRICS & GYNECOLOGY

## 2022-01-20 PROCEDURE — 99999 PR PBB SHADOW E&M-EST. PATIENT-LVL III: ICD-10-PCS | Mod: PBBFAC,,, | Performed by: OBSTETRICS & GYNECOLOGY

## 2022-01-20 PROCEDURE — 1126F AMNT PAIN NOTED NONE PRSNT: CPT | Mod: CPTII,S$GLB,, | Performed by: OBSTETRICS & GYNECOLOGY

## 2022-01-20 PROCEDURE — 3074F SYST BP LT 130 MM HG: CPT | Mod: CPTII,S$GLB,, | Performed by: OBSTETRICS & GYNECOLOGY

## 2022-01-20 PROCEDURE — 87481 CANDIDA DNA AMP PROBE: CPT | Mod: 59 | Performed by: OBSTETRICS & GYNECOLOGY

## 2022-01-20 PROCEDURE — 99999 PR PBB SHADOW E&M-EST. PATIENT-LVL III: CPT | Mod: PBBFAC,,, | Performed by: OBSTETRICS & GYNECOLOGY

## 2022-01-20 PROCEDURE — 99213 OFFICE O/P EST LOW 20 MIN: CPT | Mod: S$GLB,,, | Performed by: OBSTETRICS & GYNECOLOGY

## 2022-01-20 PROCEDURE — 3288F PR FALLS RISK ASSESSMENT DOCUMENTED: ICD-10-PCS | Mod: CPTII,S$GLB,, | Performed by: OBSTETRICS & GYNECOLOGY

## 2022-01-20 PROCEDURE — 1159F PR MEDICATION LIST DOCUMENTED IN MEDICAL RECORD: ICD-10-PCS | Mod: CPTII,S$GLB,, | Performed by: OBSTETRICS & GYNECOLOGY

## 2022-01-20 PROCEDURE — 1101F PR PT FALLS ASSESS DOC 0-1 FALLS W/OUT INJ PAST YR: ICD-10-PCS | Mod: CPTII,S$GLB,, | Performed by: OBSTETRICS & GYNECOLOGY

## 2022-01-20 PROCEDURE — 1126F PR PAIN SEVERITY QUANTIFIED, NO PAIN PRESENT: ICD-10-PCS | Mod: CPTII,S$GLB,, | Performed by: OBSTETRICS & GYNECOLOGY

## 2022-01-20 PROCEDURE — 3288F FALL RISK ASSESSMENT DOCD: CPT | Mod: CPTII,S$GLB,, | Performed by: OBSTETRICS & GYNECOLOGY

## 2022-01-20 PROCEDURE — 1159F MED LIST DOCD IN RCRD: CPT | Mod: CPTII,S$GLB,, | Performed by: OBSTETRICS & GYNECOLOGY

## 2022-01-20 PROCEDURE — 1101F PT FALLS ASSESS-DOCD LE1/YR: CPT | Mod: CPTII,S$GLB,, | Performed by: OBSTETRICS & GYNECOLOGY

## 2022-01-20 PROCEDURE — 3074F PR MOST RECENT SYSTOLIC BLOOD PRESSURE < 130 MM HG: ICD-10-PCS | Mod: CPTII,S$GLB,, | Performed by: OBSTETRICS & GYNECOLOGY

## 2022-01-21 ENCOUNTER — CLINICAL SUPPORT (OUTPATIENT)
Dept: REHABILITATION | Facility: OTHER | Age: 77
End: 2022-01-21
Payer: MEDICARE

## 2022-01-21 DIAGNOSIS — R53.1 DECREASED STRENGTH, ENDURANCE, AND MOBILITY: ICD-10-CM

## 2022-01-21 DIAGNOSIS — R68.89 DECREASED STRENGTH, ENDURANCE, AND MOBILITY: ICD-10-CM

## 2022-01-21 DIAGNOSIS — M54.2 CHRONIC NECK AND BACK PAIN: ICD-10-CM

## 2022-01-21 DIAGNOSIS — G89.29 CHRONIC NECK AND BACK PAIN: ICD-10-CM

## 2022-01-21 DIAGNOSIS — Z74.09 DECREASED STRENGTH, ENDURANCE, AND MOBILITY: ICD-10-CM

## 2022-01-21 DIAGNOSIS — M54.9 CHRONIC NECK AND BACK PAIN: ICD-10-CM

## 2022-01-21 LAB — BACTERIA UR CULT: NO GROWTH

## 2022-01-21 PROCEDURE — 97110 THERAPEUTIC EXERCISES: CPT | Mod: PN

## 2022-01-21 NOTE — PROGRESS NOTES
"    Physical Therapy Treatment Note     Name: Valarie Colindres  Clinic Number: [id number]    Therapy Diagnosis:   Encounter Diagnoses   Name Primary?    Chronic neck and back pain     Decreased strength, endurance, and mobility      Physician: An Hoover MD    Visit Date: 1/21/2022    Physician Orders: PT Eval and Treat - (M47.812) Osteoarthritis of cervical spine, unspecified spinal osteoarthritis complication status  (primary encounter diagnosis)  (R20.0) Left leg numbness  (M54.2,  G89.29) Chronic neck pain  (M48.02) Cervical spinal stenosis (C3-4, moderate)  (M48.04) Thoracic spinal stenosis (T11-12, moderate)  (M54.42,  M54.41,  G89.29) Chronic bilateral low back pain with bilateral sciatica  (R20.0) Bilateral leg numbness  Medical Diagnosis: Chronic bilateral low back pain with bilateral sciatica (M54.42,M54.41,G89.29); Chronic neck pain (M54.2,G89.29)  Evaluation Date: 12/3/2021  Authorization Period Expiration: 12/17/2021  Plan of Care Certification Period: 12/3/2021 - 3/3/2022  Visit #/Visits authorized: 7/ 6 (6 visits total)  Re-assessment: n/a  FOTO: 12/3/2021   PT/PTA visit: 0/6    Time In: 10:38 am   Time Out: 11:40 am  Total Billable Time: 62 minutes (1:1 38 min)    Precautions: Standard and HTN, osteopenia    Subjective     Pt reports: increased soreness in her R Upper trap after needling last visit. Says that she would like to defer needling due to soreness. Also says that she is looking into buying a home heating pad similar to one in therapy.     She was compliant with home exercise program.  Response to previous treatment: no adverse effects  Functional change: none    Pain: 6/10 in neck/shoulders (5-6/10 in low back)  Location: bilateral neck, R>L shoulders, and low back      Objective     Valarie received therapeutic exercises to develop strength, ROM, flexibility and posture for 62 minutes including:      Pec stretch over 1/2 foam x3 minutes  LTR x 2'  DKTC 4 x 20"  TA activation 10 " "x 10" holds  TA with BKFO 2 x 10 - coordinate with breathing  TA + SLR using pilates ring 2 x 10 HERB    Upper trap stretch 2x 30 seconds B  Levator scap stretch 2x 30 seconds B   Horizontal ABD with GTB over 1/2 foam x30 - supine over MPH, with TA today  No money over 1/2 foam with GTB x30 - supine over MPH, with TA today    standing rows x 2 x 15 x GTB  standing SALPD x 2 x 15 x OTB  standing open books x 10 HERB  +anti rotations 1 x 15 x OTB  +standing 3-way hip 20 x OTB ankles    Plan to progress hip, RTC strength    [place] received the following manual therapy techniques: Joint mobilizations, Myofacial release and Soft tissue Mobilization, Dry needling were applied to the: Neck and R shoulder for 00 minutes, including:      Application of TDN: Pt educated on benefits and potential side effects of dry needling. Educated pt on benefits, precautions, side effects following TDN. Educated pt to use heat following treatment sessions if pt is experiencing pain or soreness. Pt verbalized good understanding of education.  Pt signed written consent to dry needling. Pt gave verbal consent for DN    Pt received dry needling to the below listed muscles using 30 mm needles.  B Cervical paraspinals along BL at C7  B Levator scap  B Upper trap x2  B GB 20    Winding performed every 5 minutes during treatment.      Not performed today:  P/A Tsp mobilizations   kinesiotape for UT inhibition, herb LSP paraspinal inhibition. Patient received education regarding appropriate care and removal of Kinesiotape. Patient instructed in proper removal techniques if skin irritation occurs.   00 min x Cupping to B levator scap static and dynamic with movement through upper traps and cervical paraspinals with medium clear silicone cups   STM to Levator scap, cervical paraspinals and occiput       Modalities: 10 min x MHP to LSP and CSP performed simultaneous with supine therex.    Home Exercises Provided and Patient Education Provided "     Education provided:   - dry needling purpose, benefits and risks  - use of heat at home post needling for soreness  - Exercise form and purpose    Written Home Exercises Provided: Patient instructed to cont prior HEP.  Exercises were reviewed and Valarie was able to demonstrate them prior to the end of the session.  Valarie demonstrated good  understanding of the education provided.     See EMR under Patient Instructions for exercises provided prior visit.    Assessment     Defer dry needling per pt request due to c/o soreness. Able to progress postural strength and endurance today. Pt notes fatigue with new exercises but with good training effect.    Valarie is progressing well towards her goals.   Pt prognosis is Good.     Pt will continue to benefit from skilled outpatient physical therapy to address the deficits listed in the problem list box on initial evaluation, provide pt/family education and to maximize pt's level of independence in the home and community environment.     Pt's spiritual, cultural and educational needs considered and pt agreeable to plan of care and goals.     Anticipated barriers to physical therapy: standard, chronicity of symptoms    Goals:   Short Term Goals (4 Weeks):  1. Pt able to stand >=30 minutes with ADLs with <4/10 pain.  2. Pt able to walk >=30 minutes with household chores with <4/10 pain.  3. Pt able to turn head while driving with 25% improvement of ROM with <4/10 pain.  4. Pt will demonstrate increased strength by a half grade to allow patient to walk with increased ease.  5. Pt to demonstrate improved functional ability with FOTO limitation <=32% disability.     Long Term Goals (12 Weeks):  1. Pt able to stand >=1 hour with with ADLs with <2/10 pain.  2. Pt able to walk >=1 hour with household chores with <2/10 pain.  3. Pt able to sleep a full night without pain disturbance.  4. Pt demonstrates strength WFL / WNL to allow patient to ambulate with min to no  difficulty.  5. Pt able to return to full gym activities with min difficulty and pain <2/10.  6. Pt will be independent with HEP and self management of symptoms.   7. Pt to demonstrate improved functional ability with FOTO limitation <=28% disability.  8. Pt able to turn head while driving with 50% improvement and pain <2/10.    Plan     Continue with established PT POC and progress per pt tolerance. Monitor effects of dry needling; Cupping at next visit     Clarita Thorne, PT

## 2022-01-24 ENCOUNTER — CLINICAL SUPPORT (OUTPATIENT)
Dept: REHABILITATION | Facility: OTHER | Age: 77
End: 2022-01-24
Payer: MEDICARE

## 2022-01-24 DIAGNOSIS — M54.9 CHRONIC NECK AND BACK PAIN: Primary | ICD-10-CM

## 2022-01-24 DIAGNOSIS — M54.2 CHRONIC NECK AND BACK PAIN: Primary | ICD-10-CM

## 2022-01-24 DIAGNOSIS — R68.89 DECREASED STRENGTH, ENDURANCE, AND MOBILITY: ICD-10-CM

## 2022-01-24 DIAGNOSIS — R53.1 DECREASED STRENGTH, ENDURANCE, AND MOBILITY: ICD-10-CM

## 2022-01-24 DIAGNOSIS — G89.29 CHRONIC NECK AND BACK PAIN: Primary | ICD-10-CM

## 2022-01-24 DIAGNOSIS — Z74.09 DECREASED STRENGTH, ENDURANCE, AND MOBILITY: ICD-10-CM

## 2022-01-24 LAB
BACTERIAL VAGINOSIS DNA: NEGATIVE
CANDIDA GLABRATA DNA: NEGATIVE
CANDIDA KRUSEI DNA: NEGATIVE
CANDIDA RRNA VAG QL PROBE: NEGATIVE
T VAGINALIS RRNA GENITAL QL PROBE: NEGATIVE

## 2022-01-24 PROCEDURE — 97140 MANUAL THERAPY 1/> REGIONS: CPT | Mod: PN | Performed by: PHYSICAL THERAPIST

## 2022-01-24 PROCEDURE — 97110 THERAPEUTIC EXERCISES: CPT | Mod: PN | Performed by: PHYSICAL THERAPIST

## 2022-01-24 NOTE — PROGRESS NOTES
Physical Therapy Treatment Note     Name: Valarie Colindres  [facility] Number: [id number]    Therapy Diagnosis:   Encounter Diagnoses   Name Primary?    Chronic neck and back pain Yes    Decreased strength, endurance, and mobility      Physician: An Hoover MD    Visit Date: 1/24/2022    Physician Orders: PT Eval and Treat - (M47.812) Osteoarthritis of cervical spine, unspecified spinal osteoarthritis complication status  (primary encounter diagnosis)  (R20.0) Left leg numbness  (M54.2,  G89.29) Chronic neck pain  (M48.02) Cervical spinal stenosis (C3-4, moderate)  (M48.04) Thoracic spinal stenosis (T11-12, moderate)  (M54.42,  M54.41,  G89.29) Chronic bilateral low back pain with bilateral sciatica  (R20.0) Bilateral leg numbness  Medical Diagnosis: Chronic bilateral low back pain with bilateral sciatica (M54.42,M54.41,G89.29); Chronic neck pain (M54.2,G89.29)  Evaluation Date: 12/3/2021  Authorization Period Expiration: 12/17/2021  Plan of Care Certification Period: 12/3/2021 - 3/3/2022  Visit #/Visits authorized: 8/ 14  Re-assessment: n/a  FOTO: 12/3/2021   PT/PTA visit: 0/6    Time In: 1045   Time Out: 1130  Total Billable Time: 45 minutes (1:1 45 min)    Precautions: Standard and HTN, osteopenia    Subjective     Pt reports: she had a spasm this morning, but otherwise doing well. Requests to try dry needling to low back this morning. She says she's seeing good progress overall. Feeling significantly better with turning her head to drive. She says her pain is better on rising if she only sits for 6-7 minutes. She reports frequently sitting on floor while watching TV or sorting through papers.     She was compliant with home exercise program.  Response to previous treatment: no adverse effects  Functional change: none    Pain: 5/10   Location: bilateral neck, R>L shoulders, and low back      Objective     1/24/2022    Activity tolerance: has started doing walking tape (walking exercise for 1 mile).  "Pain with walking is better if she limits time sitting    Cervical ROM: rotation slight limitation B, no pain reported    Lower Extremity Strength  Right LE   Left LE     Hip flexion: 4+/5 Hip flexion: 4+/5   Hip abduction: 4+/5 Hip abduction: 4+/5   Hip Internal rotation    4+/5 Hip Internal rotation 4+/5          CMS Impairment/Limitation/Restriction for FOTO Lumbar Spine Survey    Therapist reviewed FOTO scores for Valarie Colindres on 1/24/2022.   FOTO documents entered into [employer] - see Media section.    Evaluation: 64%    Current : 30%    Goal: 48%             [place] received therapeutic exercises to develop strength, ROM, flexibility and posture for 30 minutes including:    Reassessment   Pec stretch over 1/2 foam x3 minutes  LTR x 2'  DKTC 4 x 20"  TA activation 10 x 10" holds  TA with BKFO 2 x 10 - coordinate with breathing  TA + SLR using pilates ring 2 x 10 HERB    Upper trap stretch 2x 30 seconds B  Levator scap stretch 2x 30 seconds B   Horizontal ABD with GTB over 1/2 foam x30 - supine over MPH, with TA today  No money over 1/2 foam with GTB x30 - supine over MPH, with TA today    standing rows x 2 x 15 x GTB  standing SALPD x 2 x 15 x OTB  standing open books x 10 HERB  anti rotations 1 x 15 x OTB  standing 3-way hip 20 x OTB ankles    Plan to progress hip, RTC strength    Valarie received the following manual therapy techniques: Joint mobilizations, Myofacial release and Soft tissue Mobilization, Dry needling were applied to the: Neck and R shoulder for 15 minutes, including:      Application of TDN: Pt educated on benefits and potential side effects of dry needling. Educated pt on benefits, precautions, side effects following TDN. Educated pt to use heat following treatment sessions if pt is experiencing pain or soreness. Pt verbalized good understanding of education.  Pt signed written consent to dry needling. Pt gave verbal consent for DN    Pt received dry needling to the below listed muscles using " 50 mm needles.  B Cervical paraspinals along BL at C7  B Levator scap  B Upper trap x2  B GB 20  B S1  B L5  B L4  Winding performed every 5 minutes during treatment.      Not performed today:  P/A Tsp mobilizations   kinesiotape for UT inhibition, juan ramon LSP paraspinal inhibition. Patient received education regarding appropriate care and removal of Kinesiotape. Patient instructed in proper removal techniques if skin irritation occurs.   00 min x Cupping to B levator scap static and dynamic with movement through upper traps and cervical paraspinals with medium clear silicone cups   STM to Levator scap, cervical paraspinals and occiput       Modalities: 10 min x MHP to LSP and CSP performed simultaneous with supine therex.    Home Exercises Provided and Patient Education Provided     Education provided:   - dry needling purpose, benefits and risks  - use of heat at home post needling for soreness  - Exercise form and purpose  -1/24/22 use of pillow and lumbar support with sitting, consider sitting at table instead of on floor to sort papers.     Written Home Exercises Provided: Patient instructed to cont prior HEP.  Exercises were reviewed and Valarie was able to demonstrate them prior to the end of the session.  Valarie demonstrated good  understanding of the education provided.     See EMR under Patient Instructions for exercises provided prior visit.    Assessment     Good tolerance to treatment today. Dry needling modified today to address lumbar pain, good soft tissue response noted at all insertion points with increased grasp with unilateral winding. With assessment today pt demonstrates excellent improvement in cervical ROM and FOTO limitation score. Reports good tolerance to walking and movement, but continues with limited tolerance to sitting (no pain with sitting, but pain with rising if she sits 10 minutes or more).    Valarie is progressing well towards her goals.   Pt prognosis is Good.     Pt will continue  to benefit from skilled outpatient physical therapy to address the deficits listed in the problem list box on initial evaluation, provide pt/family education and to maximize pt's level of independence in the home and community environment.     Pt's spiritual, cultural and educational needs considered and pt agreeable to plan of care and goals.     Anticipated barriers to physical therapy: standard, chronicity of symptoms    Goals:   Short Term Goals (4 Weeks):   1. Pt able to stand >=30 minutes with ADLs with <4/10 pain. Met 1/24/2022  2. Pt able to walk >=30 minutes with household chores with <4/10 pain. Met 1/24/2022  3. Pt able to turn head while driving with 25% improvement of ROM with <4/10 pain. Met 1/24/2022  4. Pt will demonstrate increased strength by a half grade to allow patient to walk with increased ease. Met 1/24/2022  5. Pt to demonstrate improved functional ability with FOTO limitation <=32% disability. Met 1/24/2022     Long Term Goals (12 Weeks):  1. Pt able to stand >=1 hour with with ADLs with <2/10 pain. Progressing, not met  2. Pt able to walk >=1 hour with household chores with <2/10 pain. Progressing, not met  3. Pt able to sleep a full night without pain disturbance. Progressing, not met  4. Pt demonstrates strength WFL / WNL to allow patient to ambulate with min to no difficulty. Progressing, not metv  5. Pt able to return to full gym activities with min difficulty and pain <2/10. Progressing, not met  6. Pt will be independent with HEP and self management of symptoms.  Progressing, not met  7. Pt to demonstrate improved functional ability with FOTO limitation <=28% disability. Progressing, not met  8. Pt able to turn head while driving with 50% improvement and pain <2/10. Met 1/24/2022    Plan     Continue with established PT POC and progress per pt tolerance. Monitor effects of dry needling;     Domi Mcneil, PT

## 2022-01-26 ENCOUNTER — CLINICAL SUPPORT (OUTPATIENT)
Dept: REHABILITATION | Facility: OTHER | Age: 77
End: 2022-01-26
Payer: MEDICARE

## 2022-01-26 DIAGNOSIS — R53.1 DECREASED STRENGTH, ENDURANCE, AND MOBILITY: ICD-10-CM

## 2022-01-26 DIAGNOSIS — M54.9 CHRONIC NECK AND BACK PAIN: ICD-10-CM

## 2022-01-26 DIAGNOSIS — Z74.09 DECREASED STRENGTH, ENDURANCE, AND MOBILITY: ICD-10-CM

## 2022-01-26 DIAGNOSIS — R68.89 DECREASED STRENGTH, ENDURANCE, AND MOBILITY: ICD-10-CM

## 2022-01-26 DIAGNOSIS — G89.29 CHRONIC NECK AND BACK PAIN: ICD-10-CM

## 2022-01-26 DIAGNOSIS — M54.2 CHRONIC NECK AND BACK PAIN: ICD-10-CM

## 2022-01-26 PROCEDURE — 97110 THERAPEUTIC EXERCISES: CPT | Mod: PN

## 2022-01-26 NOTE — PROGRESS NOTES
"    Physical Therapy Treatment Note     Name: Valarie Colindres  Clinic Number: [id number]    Therapy Diagnosis:   Encounter Diagnoses   Name Primary?    Chronic neck and back pain     Decreased strength, endurance, and mobility      Physician: An Hoover MD    Visit Date: 1/26/2022    Physician Orders: PT Eval and Treat - (M47.812) Osteoarthritis of cervical spine, unspecified spinal osteoarthritis complication status  (primary encounter diagnosis)  (R20.0) Left leg numbness  (M54.2,  G89.29) Chronic neck pain  (M48.02) Cervical spinal stenosis (C3-4, moderate)  (M48.04) Thoracic spinal stenosis (T11-12, moderate)  (M54.42,  M54.41,  G89.29) Chronic bilateral low back pain with bilateral sciatica  (R20.0) Bilateral leg numbness  Medical Diagnosis: Chronic bilateral low back pain with bilateral sciatica (M54.42,M54.41,G89.29); Chronic neck pain (M54.2,G89.29)  Evaluation Date: 12/3/2021  Authorization Period Expiration: 12/17/2021  Plan of Care Certification Period: 12/3/2021 - 3/3/2022  Visit #/Visits authorized: 9/ 14  Re-assessment: 1/24/2022  FOTO: 1/24/2022  PT/PTA visit: 0/6    Time In: 1045   Time Out: 1130  Total Billable Time: 45 minutes (1:1 45 min)    Precautions: Standard and HTN, osteopenia    Subjective     Pt reports: she was sore for 2 days after previous visit. "it's finally starting to calm down today."    She was compliant with home exercise program.  Response to previous treatment: no adverse effects  Functional change: none    Pain: 4.5/10   Location: bilateral neck, R>L shoulders, and low back      Objective     1/24/2022    Activity tolerance: has started doing walking tape (walking exercise for 1 mile). Pain with walking is better if she limits time sitting    Cervical ROM: rotation slight limitation B, no pain reported    Lower Extremity Strength  Right LE   Left LE     Hip flexion: 4+/5 Hip flexion: 4+/5   Hip abduction: 4+/5 Hip abduction: 4+/5   Hip Internal rotation    4+/5 Hip " "Internal rotation 4+/5          CMS Impairment/Limitation/Restriction for FOTO Lumbar Spine Survey    Therapist reviewed FOTO scores for Valarie Colindres on 1/24/2022.   FOTO documents entered into [employer] - see Media section.    Evaluation: 64%    Current : 30%    Goal: 48%             [place] received therapeutic exercises to develop strength, ROM, flexibility and posture for 45 minutes including:    Pec stretch over 1/2 foam x3 minutes  LTR x 2'  DKTC 4 x 20"  TA activation 10 x 10" holds  TA with BKFO 2 x 10  +TA with heel slide 20x  TA + SLR using pilates ring 2 x 10 HEBR    +No monies 2 x 10 x GTB  +supine 3-way pull apart 1 x 10 x GTB      standing rows x 2 x 15 x GTB  standing SALPD x 2 x 15 x OTB  standing open books x 10 HERB  anti rotations 1 x 15 x OTB  standing 3-way hip 20 x OTB ankles    Plan to progress hip, RTC strength    [place] received the following manual therapy techniques: Joint mobilizations, Myofacial release and Soft tissue Mobilization, Dry needling were applied to the: Neck and R shoulder for 00 minutes, including:      Application of TDN: Pt educated on benefits and potential side effects of dry needling. Educated pt on benefits, precautions, side effects following TDN. Educated pt to use heat following treatment sessions if pt is experiencing pain or soreness. Pt verbalized good understanding of education.  Pt signed written consent to dry needling. Pt gave verbal consent for DN    Pt received dry needling to the below listed muscles using 50 mm needles.  B Cervical paraspinals along BL at C7  B Levator scap  B Upper trap x2  B GB 20  B S1  B L5  B L4  Winding performed every 5 minutes during treatment.      Not performed today:  P/A Tsp mobilizations   kinesiotape for UT inhibition, herb LSP paraspinal inhibition. Patient received education regarding appropriate care and removal of Kinesiotape. Patient instructed in proper removal techniques if skin irritation occurs.   00 min x Cupping " to B levator scap static and dynamic with movement through upper traps and cervical paraspinals with medium clear silicone cups   STM to Levator scap, cervical paraspinals and occiput       Modalities: 10 min x MHP to LSP and CSP performed simultaneous with supine therex.    Home Exercises Provided and Patient Education Provided     Education provided:   - dry needling purpose, benefits and risks  - use of heat at home post needling for soreness  - Exercise form and purpose  -1/24/22 use of pillow and lumbar support with sitting, consider sitting at table instead of on floor to sort papers.     Written Home Exercises Provided: Patient instructed to cont prior HEP.  Exercises were reviewed and Valarie was able to demonstrate them prior to the end of the session.  Valarie demonstrated good  understanding of the education provided.     See EMR under Patient Instructions for exercises provided prior visit.    Assessment     Define supine therex today due to c/o soreness from last visit. Progressed core strengthening today with heel slides, with pt noting increased fatigue and difficulty maintaining neutral spine without compensations. Resumed scapular strengthening today with good tolerance overall.      Valarie is progressing well towards her goals.   Pt prognosis is Good.     Pt will continue to benefit from skilled outpatient physical therapy to address the deficits listed in the problem list box on initial evaluation, provide pt/family education and to maximize pt's level of independence in the home and community environment.     Pt's spiritual, cultural and educational needs considered and pt agreeable to plan of care and goals.     Anticipated barriers to physical therapy: standard, chronicity of symptoms    Goals:   Short Term Goals (4 Weeks):   1. Pt able to stand >=30 minutes with ADLs with <4/10 pain. Met 1/24/2022  2. Pt able to walk >=30 minutes with household chores with <4/10 pain. Met 1/24/2022  3. Pt  able to turn head while driving with 25% improvement of ROM with <4/10 pain. Met 1/24/2022  4. Pt will demonstrate increased strength by a half grade to allow patient to walk with increased ease. Met 1/24/2022  5. Pt to demonstrate improved functional ability with FOTO limitation <=32% disability. Met 1/24/2022     Long Term Goals (12 Weeks):  1. Pt able to stand >=1 hour with with ADLs with <2/10 pain. Progressing, not met  2. Pt able to walk >=1 hour with household chores with <2/10 pain. Progressing, not met  3. Pt able to sleep a full night without pain disturbance. Progressing, not met  4. Pt demonstrates strength WFL / WNL to allow patient to ambulate with min to no difficulty. Progressing, not metv  5. Pt able to return to full gym activities with min difficulty and pain <2/10. Progressing, not met  6. Pt will be independent with HEP and self management of symptoms.  Progressing, not met  7. Pt to demonstrate improved functional ability with FOTO limitation <=28% disability. Progressing, not met  8. Pt able to turn head while driving with 50% improvement and pain <2/10. Met 1/24/2022    Plan     Continue with established PT POC and progress per pt tolerance. Monitor effects of dry needling;     Clarita Thorne, PT

## 2022-02-02 ENCOUNTER — CLINICAL SUPPORT (OUTPATIENT)
Dept: REHABILITATION | Facility: OTHER | Age: 77
End: 2022-02-02
Payer: MEDICARE

## 2022-02-02 DIAGNOSIS — M54.9 CHRONIC NECK AND BACK PAIN: Primary | ICD-10-CM

## 2022-02-02 DIAGNOSIS — G89.29 CHRONIC NECK AND BACK PAIN: Primary | ICD-10-CM

## 2022-02-02 DIAGNOSIS — R68.89 DECREASED STRENGTH, ENDURANCE, AND MOBILITY: ICD-10-CM

## 2022-02-02 DIAGNOSIS — R53.1 DECREASED STRENGTH, ENDURANCE, AND MOBILITY: ICD-10-CM

## 2022-02-02 DIAGNOSIS — M54.2 CHRONIC NECK AND BACK PAIN: Primary | ICD-10-CM

## 2022-02-02 DIAGNOSIS — Z74.09 DECREASED STRENGTH, ENDURANCE, AND MOBILITY: ICD-10-CM

## 2022-02-02 PROCEDURE — 97140 MANUAL THERAPY 1/> REGIONS: CPT | Mod: PN | Performed by: PHYSICAL THERAPIST

## 2022-02-02 PROCEDURE — 97110 THERAPEUTIC EXERCISES: CPT | Mod: PN | Performed by: PHYSICAL THERAPIST

## 2022-02-02 NOTE — PROGRESS NOTES
Physical Therapy Treatment Note     Name: Valarie Colindres  [facility] Number: [id number]    Therapy Diagnosis:   Encounter Diagnoses   Name Primary?    Chronic neck and back pain Yes    Decreased strength, endurance, and mobility      Physician: An Hoover MD    Visit Date: 2/2/2022    Physician Orders: PT Eval and Treat - (M47.812) Osteoarthritis of cervical spine, unspecified spinal osteoarthritis complication status  (primary encounter diagnosis)  (R20.0) Left leg numbness  (M54.2,  G89.29) Chronic neck pain  (M48.02) Cervical spinal stenosis (C3-4, moderate)  (M48.04) Thoracic spinal stenosis (T11-12, moderate)  (M54.42,  M54.41,  G89.29) Chronic bilateral low back pain with bilateral sciatica  (R20.0) Bilateral leg numbness  Medical Diagnosis: Chronic bilateral low back pain with bilateral sciatica (M54.42,M54.41,G89.29); Chronic neck pain (M54.2,G89.29)  Evaluation Date: 12/3/2021  Authorization Period Expiration: 12/17/2021  Plan of Care Certification Period: 12/3/2021 - 3/3/2022  Visit #/Visits authorized: 10/ 14  Re-assessment: 1/24/2022  FOTO: 1/24/2022  PT/PTA visit: 0/6    Time In: 1050  Time Out: 1135  Total Billable Time: 45 minutes     Precautions: Standard and HTN, osteopenia    Subjective     Pt reports: overall she's feeling good improvement. She says she was able to completely clean her room on Saturday with out pain. She says she has had one spasm recently with pain up to a 6/10, but mostly her pain has been down to a 4 or 5. Her worst time is getting out of bed in the morning, but says this is better if she does her stretches first, and improves as she moves around. She requests dry needling to shoulders and heat today.     She was compliant with home exercise program.  Response to previous treatment: no adverse effects  Functional change: none    Pain: 4.5/10   Location: bilateral neck, R>L shoulders, and low back      Objective     1/24/2022    Activity tolerance: has started doing  "walking tape (walking exercise for 1 mile). Pain with walking is better if she limits time sitting    Cervical ROM: rotation slight limitation B, no pain reported    Lower Extremity Strength  Right LE   Left LE     Hip flexion: 4+/5 Hip flexion: 4+/5   Hip abduction: 4+/5 Hip abduction: 4+/5   Hip Internal rotation    4+/5 Hip Internal rotation 4+/5          CMS Impairment/Limitation/Restriction for FOTO Lumbar Spine Survey    Therapist reviewed FOTO scores for Valarie Colindres on 1/24/2022.   FOTO documents entered into [employer] - see Media section.    Evaluation: 64%    Current : 30%    Goal: 48%             Valarie received therapeutic exercises to develop strength, ROM, flexibility and posture for 30 minutes including:  Exercises in bold performed today:     Pec stretch over 1/2 foam x3 minutes  LTR x 2'  DKTC 4 x 20"  TA activation 10 x 10" holds  TA with BKFO 2 x 10  TA with heel slide 20x  TA + SLR using pilates ring 2 x 10 HERB    No monies 2 x 10 x GTB  supine 3-way pull apart 1 x 10 x GTB      standing rows x 2 x 15 x GTB  standing SALPD x 2 x 15 x OTB  standing open books x 10 HERB  anti rotations 1 x 15 x OTB  standing 3-way hip 20 x OTB ankles    Plan to progress hip, RTC strength    Valarie received the following manual therapy techniques: Joint mobilizations, Myofacial release and Soft tissue Mobilization, Dry needling were applied to the: Neck and R shoulder for 15 minutes, including:      Application of TDN: Pt educated on benefits and potential side effects of dry needling. Educated pt on benefits, precautions, side effects following TDN. Educated pt to use heat following treatment sessions if pt is experiencing pain or soreness. Pt verbalized good understanding of education.  Pt signed written consent to dry needling. Pt gave verbal consent for DN    Pt received dry needling to the below listed muscles using 50 mm needles.  B Cervical paraspinals along BL at C7  B Levator scap  B Upper trap x2  B GB " 20  B S1  B L5  B L4  Winding performed every 5 minutes during treatment.      Not performed today:  P/A Tsp mobilizations   kinesiotape for UT inhibition, juan ramon LSP paraspinal inhibition. Patient received education regarding appropriate care and removal of Kinesiotape. Patient instructed in proper removal techniques if skin irritation occurs.   00 min x Cupping to B levator scap static and dynamic with movement through upper traps and cervical paraspinals with medium clear silicone cups   STM to Levator scap, cervical paraspinals and occiput       Modalities: 10 min x MHP to LSP and CSP performed simultaneous with supine therex.    Home Exercises Provided and Patient Education Provided     Education provided:   - dry needling purpose, benefits and risks  - use of heat at home post needling for soreness  - Exercise form and purpose  -1/24/22 use of pillow and lumbar support with sitting, consider sitting at table instead of on floor to sort papers.     Written Home Exercises Provided: Patient instructed to cont prior HEP.  Exercises were reviewed and Valarie was able to demonstrate them prior to the end of the session.  Valarie demonstrated good  understanding of the education provided.     See EMR under Patient Instructions for exercises provided prior visit.    Assessment     Dry needling to B upper trap and levator scap today (pt requests these insertion points only) with good soft tissue response noted. Local twitch response noted at upper traps, L>R. Heat provided to neck and low back during supine therex per pt request. Good training effect with therex, minimal verbal cuing for technique with good return demonstration.       Valarie is progressing well towards her goals.   Pt prognosis is Good.     Pt will continue to benefit from skilled outpatient physical therapy to address the deficits listed in the problem list box on initial evaluation, provide pt/family education and to maximize pt's level of independence  in the home and community environment.     Pt's spiritual, cultural and educational needs considered and pt agreeable to plan of care and goals.     Anticipated barriers to physical therapy: standard, chronicity of symptoms    Goals:   Short Term Goals (4 Weeks):   1. Pt able to stand >=30 minutes with ADLs with <4/10 pain. Met 1/24/2022  2. Pt able to walk >=30 minutes with household chores with <4/10 pain. Met 1/24/2022  3. Pt able to turn head while driving with 25% improvement of ROM with <4/10 pain. Met 1/24/2022  4. Pt will demonstrate increased strength by a half grade to allow patient to walk with increased ease. Met 1/24/2022  5. Pt to demonstrate improved functional ability with FOTO limitation <=32% disability. Met 1/24/2022     Long Term Goals (12 Weeks):  1. Pt able to stand >=1 hour with with ADLs with <2/10 pain. Progressing, not met  2. Pt able to walk >=1 hour with household chores with <2/10 pain. Progressing, not met  3. Pt able to sleep a full night without pain disturbance. Progressing, not met  4. Pt demonstrates strength WFL / WNL to allow patient to ambulate with min to no difficulty. Progressing, not metv  5. Pt able to return to full gym activities with min difficulty and pain <2/10. Progressing, not met  6. Pt will be independent with HEP and self management of symptoms.  Progressing, not met  7. Pt to demonstrate improved functional ability with FOTO limitation <=28% disability. Progressing, not met  8. Pt able to turn head while driving with 50% improvement and pain <2/10. Met 1/24/2022    Plan     Continue with established PT POC and progress per pt tolerance. Monitor effects of dry needling;     Domi Mcneil, PT

## 2022-02-03 ENCOUNTER — TELEPHONE (OUTPATIENT)
Dept: OTOLARYNGOLOGY | Facility: CLINIC | Age: 77
End: 2022-02-03
Payer: MEDICARE

## 2022-02-03 DIAGNOSIS — Z01.818 PRE-OP TESTING: ICD-10-CM

## 2022-02-03 NOTE — TELEPHONE ENCOUNTER
Returned pt call. Rescheduled pt's appointment with provider as well as scheduled pt for covid test 3 days prior to procedure.       ----- Message from Bubba Hopper sent at 2/3/2022 10:35 AM CST -----  Contact: @[phone]  Patient has a dried fever blister inside of her left nostril that's scabbed she has a concern of being seen still today, Please return call to discuss further

## 2022-02-04 ENCOUNTER — CLINICAL SUPPORT (OUTPATIENT)
Dept: REHABILITATION | Facility: OTHER | Age: 77
End: 2022-02-04
Payer: MEDICARE

## 2022-02-04 DIAGNOSIS — M54.9 CHRONIC NECK AND BACK PAIN: ICD-10-CM

## 2022-02-04 DIAGNOSIS — R68.89 DECREASED STRENGTH, ENDURANCE, AND MOBILITY: ICD-10-CM

## 2022-02-04 DIAGNOSIS — G89.29 CHRONIC NECK AND BACK PAIN: ICD-10-CM

## 2022-02-04 DIAGNOSIS — R53.1 DECREASED STRENGTH, ENDURANCE, AND MOBILITY: ICD-10-CM

## 2022-02-04 DIAGNOSIS — M54.2 CHRONIC NECK AND BACK PAIN: ICD-10-CM

## 2022-02-04 DIAGNOSIS — Z74.09 DECREASED STRENGTH, ENDURANCE, AND MOBILITY: ICD-10-CM

## 2022-02-04 PROCEDURE — 97110 THERAPEUTIC EXERCISES: CPT | Mod: PN

## 2022-02-04 NOTE — PROGRESS NOTES
"  Physical Therapy Treatment Note     Name: Valarie Colindres  [facility] Number: [id number]    Therapy Diagnosis:   Encounter Diagnoses   Name Primary?    Chronic neck and back pain     Decreased strength, endurance, and mobility      Physician: An Hoover MD    Visit Date: 2/4/2022    Physician Orders: PT Eval and Treat - (M47.812) Osteoarthritis of cervical spine, unspecified spinal osteoarthritis complication status  (primary encounter diagnosis)  (R20.0) Left leg numbness  (M54.2,  G89.29) Chronic neck pain  (M48.02) Cervical spinal stenosis (C3-4, moderate)  (M48.04) Thoracic spinal stenosis (T11-12, moderate)  (M54.42,  M54.41,  G89.29) Chronic bilateral low back pain with bilateral sciatica  (R20.0) Bilateral leg numbness  Medical Diagnosis: Chronic bilateral low back pain with bilateral sciatica (M54.42,M54.41,G89.29); Chronic neck pain (M54.2,G89.29)  Evaluation Date: 12/3/2021  Authorization Period Expiration: 12/17/2021  Plan of Care Certification Period: 12/3/2021 - 3/3/2022  Visit #/Visits authorized: 10/ 14  Re-assessment: 1/24/2022  FOTO: 1/24/2022  PT/PTA visit: 0/6    Time In: 1050  Time Out: 1135  Total Billable Time: 45 minutes     Precautions: Standard and HTN, osteopenia    Subjective     Pt reports: her pain is much better than it used to be; "even though it's a 5 [out of 10] this is really good for me!" She has moved her project from the floor to the kitchen table as PT advised and notes marked improvements as she is not leaning over anymore. Is feeling sore in the shoulder from the dry needling and would like to defer this today. Would like next visit to be her last visit as she is feeling better and says "I will use my heating pad and do my exercises on my own at home."    She was compliant with home exercise program.  Response to previous treatment: no adverse effects  Functional change: none    Pain: 5/10   Location: bilateral neck, R>L shoulders, and low back      Objective " "    1/24/2022    Activity tolerance: has started doing walking tape (walking exercise for 1 mile). Pain with walking is better if she limits time sitting    Cervical ROM: rotation slight limitation B, no pain reported    Lower Extremity Strength  Right LE   Left LE     Hip flexion: 4+/5 Hip flexion: 4+/5   Hip abduction: 4+/5 Hip abduction: 4+/5   Hip Internal rotation    4+/5 Hip Internal rotation 4+/5          CMS Impairment/Limitation/Restriction for FOTO Lumbar Spine Survey    Therapist reviewed FOTO scores for Valarie Colindres on 1/24/2022.   FOTO documents entered into [employer] - see Media section.    Evaluation: 64%    Current : 30%    Goal: 48%             Valarie received therapeutic exercises to develop strength, ROM, flexibility and posture for 45 minutes including:  Exercises in bold performed today:     Pec stretch over 1/2 foam x3 minutes  LTR x 2'  DKTC 4 x 20"  TA activation 10 x 10" holds  TA with BKFO 2 x 10  TA with heel slide 20x  TA + SLR using pilates ring 2 x 10 HERB    No monies 2 x 10 x GTB  supine 3-way pull apart 1 x 10 x GTB      standing rows x 2 x 15 x GTB  standing SALPD x 2 x 15 x OTB  standing open books x 10 HERB  anti rotations 1 x 15 x OTB  standing 3-way hip 20 x OTB ankles    Plan to progress hip, RTC strength    Valarie received the following manual therapy techniques: Joint mobilizations, Myofacial release and Soft tissue Mobilization, Dry needling were applied to the: Neck and R shoulder for 00 minutes, including:      Application of TDN: Pt educated on benefits and potential side effects of dry needling. Educated pt on benefits, precautions, side effects following TDN. Educated pt to use heat following treatment sessions if pt is experiencing pain or soreness. Pt verbalized good understanding of education.  Pt signed written consent to dry needling. Pt gave verbal consent for DN    Pt received dry needling to the below listed muscles using 50 mm needles.  B Cervical paraspinals " along BL at C7  B Levator scap  B Upper trap x2  B GB 20  B S1  B L5  B L4  Winding performed every 5 minutes during treatment.      Modalities: 10 min x MHP to LSP and CSP performed simultaneous with supine therex.    Home Exercises Provided and Patient Education Provided     Education provided:   - dry needling purpose, benefits and risks  - use of heat at home post needling for soreness  - Exercise form and purpose  -1/24/22 use of pillow and lumbar support with sitting, consider sitting at table instead of on floor to sort papers.     Written Home Exercises Provided: Patient instructed to cont prior HEP.  Exercises were reviewed and Valarie was able to demonstrate them prior to the end of the session.  Valarie demonstrated good  understanding of the education provided.     See EMR under Patient Instructions for exercises provided prior visit.    Assessment     Defer dry needling per pt request. Resume trunk and UE strengthening to promote strength and postural stability. Good tolerance with overall therex with noted appropriate training effect. Reviewed HEP for understanding and technique; pt verbalized understanding and has good return technique.      Valarie is progressing well towards her goals.   Pt prognosis is Good.     Pt will continue to benefit from skilled outpatient physical therapy to address the deficits listed in the problem list box on initial evaluation, provide pt/family education and to maximize pt's level of independence in the home and community environment.     Pt's spiritual, cultural and educational needs considered and pt agreeable to plan of care and goals.     Anticipated barriers to physical therapy: standard, chronicity of symptoms    Goals:   Short Term Goals (4 Weeks):   1. Pt able to stand >=30 minutes with ADLs with <4/10 pain. Met 1/24/2022  2. Pt able to walk >=30 minutes with household chores with <4/10 pain. Met 1/24/2022  3. Pt able to turn head while driving with 25%  improvement of ROM with <4/10 pain. Met 1/24/2022  4. Pt will demonstrate increased strength by a half grade to allow patient to walk with increased ease. Met 1/24/2022  5. Pt to demonstrate improved functional ability with FOTO limitation <=32% disability. Met 1/24/2022     Long Term Goals (12 Weeks):  1. Pt able to stand >=1 hour with with ADLs with <2/10 pain. Progressing, not met  2. Pt able to walk >=1 hour with household chores with <2/10 pain. Progressing, not met  3. Pt able to sleep a full night without pain disturbance. Progressing, not met  4. Pt demonstrates strength WFL / WNL to allow patient to ambulate with min to no difficulty. Progressing, not metv  5. Pt able to return to full gym activities with min difficulty and pain <2/10. Progressing, not met  6. Pt will be independent with HEP and self management of symptoms.  Progressing, not met  7. Pt to demonstrate improved functional ability with FOTO limitation <=28% disability. Progressing, not met  8. Pt able to turn head while driving with 50% improvement and pain <2/10. Met 1/24/2022    Plan     Continue with established PT POC and progress per pt tolerance. Monitor effects of dry needling;     Clarita Thorne, PT

## 2022-02-08 ENCOUNTER — CLINICAL SUPPORT (OUTPATIENT)
Dept: REHABILITATION | Facility: OTHER | Age: 77
End: 2022-02-08
Payer: MEDICARE

## 2022-02-08 DIAGNOSIS — M54.2 CHRONIC NECK AND BACK PAIN: Primary | ICD-10-CM

## 2022-02-08 DIAGNOSIS — R68.89 DECREASED STRENGTH, ENDURANCE, AND MOBILITY: ICD-10-CM

## 2022-02-08 DIAGNOSIS — Z74.09 DECREASED STRENGTH, ENDURANCE, AND MOBILITY: ICD-10-CM

## 2022-02-08 DIAGNOSIS — G89.29 CHRONIC NECK AND BACK PAIN: Primary | ICD-10-CM

## 2022-02-08 DIAGNOSIS — M54.9 CHRONIC NECK AND BACK PAIN: Primary | ICD-10-CM

## 2022-02-08 DIAGNOSIS — R53.1 DECREASED STRENGTH, ENDURANCE, AND MOBILITY: ICD-10-CM

## 2022-02-08 PROCEDURE — 97140 MANUAL THERAPY 1/> REGIONS: CPT | Mod: PN | Performed by: PHYSICAL THERAPIST

## 2022-02-08 PROCEDURE — 97110 THERAPEUTIC EXERCISES: CPT | Mod: PN | Performed by: PHYSICAL THERAPIST

## 2022-02-08 NOTE — PROGRESS NOTES
Physical Therapy Treatment Note     Name: Valarie Colindres  [facility] Number: [id number]    Therapy Diagnosis:   Encounter Diagnoses   Name Primary?    Chronic neck and back pain Yes    Decreased strength, endurance, and mobility      Physician: An Hoover MD    Visit Date: 2/8/2022    Physician Orders: PT Eval and Treat - (M47.812) Osteoarthritis of cervical spine, unspecified spinal osteoarthritis complication status  (primary encounter diagnosis)  (R20.0) Left leg numbness  (M54.2,  G89.29) Chronic neck pain  (M48.02) Cervical spinal stenosis (C3-4, moderate)  (M48.04) Thoracic spinal stenosis (T11-12, moderate)  (M54.42,  M54.41,  G89.29) Chronic bilateral low back pain with bilateral sciatica  (R20.0) Bilateral leg numbness  Medical Diagnosis: Chronic bilateral low back pain with bilateral sciatica (M54.42,M54.41,G89.29); Chronic neck pain (M54.2,G89.29)  Evaluation Date: 12/3/2021  Authorization Period Expiration: 12/17/2021  Plan of Care Certification Period: 12/3/2021 - 3/3/2022  Visit #/Visits authorized: 11/ 14  Re-assessment: 1/24/2022, 2/8/2022  FOTO: 1/24/2022, 2/8/2022  PT/PTA visit: 0/6    Time In: 1200  Time Out: 1245  Total Billable Time: 45 minutes     Precautions: Standard and HTN, osteopenia    Subjective     Pt reports: she's feeling much better and feels ready to finish with therapy today. She says she and her sister don't feel comfortable being around all of the people at the gym, but have been walking and doing yoga and generally increasing activity.     She was compliant with home exercise program.  Response to previous treatment: no adverse effects  Functional change: none    Pain: 3/10   Location: bilateral neck, R>L shoulders, and low back      Objective     2/8/2022    Activity tolerance: has started doing walking tape (walking exercise for 1 mile). Pain with walking is better if she limits time sitting    Cervical ROM: rotation slight limitation B, no pain reported    Lower  "Extremity Strength  Right LE   Left LE     Hip flexion: 4+/5 Hip flexion: 4+/5   Hip abduction: 4+/5 Hip abduction: 4+/5   Hip Internal rotation    4+/5 Hip Internal rotation 4+/5          CMS Impairment/Limitation/Restriction for FOTO Lumbar Spine Survey    Therapist reviewed FOTO scores for Valarie Colindres on 2/8/2022.   FOTO documents entered into [employer] - see Media section.    Evaluation: 64%    Current : 17%    Goal: 48%             [place] received therapeutic exercises to develop strength, ROM, flexibility and posture for 20 minutes including:  Exercises in bold performed today:     Reassessment and discharge  Pec stretch over 1/2 foam x3 minutes  LTR x 2'  DKTC 4 x 20"  TA activation 10 x 10" holds  TA with BKFO 2 x 10  TA with heel slide 20x  TA + SLR using pilates ring 2 x 10 HERB    No monies 2 x 10 x GTB  supine 3-way pull apart 1 x 10 x GTB      standing rows x 2 x 15 x GTB  standing SALPD x 2 x 15 x OTB  standing open books x 10 HERB  anti rotations 1 x 15 x OTB  standing 3-way hip 20 x OTB ankles        [place] received the following manual therapy techniques: Joint mobilizations, Myofacial release and Soft tissue Mobilization, Dry needling were applied to the: Neck and R shoulder for 15 minutes, including:      Application of TDN: Pt educated on benefits and potential side effects of dry needling. Educated pt on benefits, precautions, side effects following TDN. Educated pt to use heat following treatment sessions if pt is experiencing pain or soreness. Pt verbalized good understanding of education.  Pt signed written consent to dry needling. Pt gave verbal consent for DN    Pt received dry needling to the below listed muscles using 40 mm needles.  B Cervical paraspinals along BL at C7  B Levator scap  B Upper trap   B GB 20  B S1  B L5  B L4  Winding performed every 5 minutes during treatment.      Modalities: 10 min x MHP to LSP and CSP     Home Exercises Provided and Patient Education Provided "     Education provided:   - dry needling purpose, benefits and risks  - use of heat at home post needling for soreness  - Exercise form and purpose  -1/24/22 use of pillow and lumbar support with sitting, consider sitting at table instead of on floor to sort papers.     Written Home Exercises Provided: Patient instructed to cont prior HEP.  Exercises were reviewed and Valarie was able to demonstrate them prior to the end of the session.  Valarie demonstrated good  understanding of the education provided.     See EMR under Patient Instructions for exercises provided prior visit.    Assessment     Overall Valarie has made excellent progress with therapy. She reports excellent functional improvement with FOTO limitation score, and reports good increase in activity in home and community without pain exacerbation. Good improvement noted in cervical motion and B LE strength with MMT. Based on progress, presentation, and pt request, recommend discharge to independent [facility] at this time.  Dry needling performed per pt request today, good soft tissue response noted and no adverse effects following treatment. Moist heat applied to neck and low back per pt request as well.       Pt's spiritual, cultural and educational needs considered and pt agreeable to plan of care and goals.     Anticipated barriers to physical therapy: standard, chronicity of symptoms    Goals:   Short Term Goals (4 Weeks):   1. Pt able to stand >=30 minutes with ADLs with <4/10 pain. Met 1/24/2022  2. Pt able to walk >=30 minutes with household chores with <4/10 pain. Met 1/24/2022  3. Pt able to turn head while driving with 25% improvement of ROM with <4/10 pain. Met 1/24/2022  4. Pt will demonstrate increased strength by a half grade to allow patient to walk with increased ease. Met 1/24/2022  5. Pt to demonstrate improved functional ability with FOTO limitation <=32% disability. Met 1/24/2022     Long Term Goals (12 Weeks):  1. Pt able to stand >=1 hour  with with ADLs with <2/10 pain. Progressing, not met (reports does not feel limited, pain 3-4)  2. Pt able to walk >=1 hour with household chores with <2/10 pain. Progressing, not met (reports does not feel limited, pain 3-4)  3. Pt able to sleep a full night without pain disturbance. Met 2/8/2022  4. Pt demonstrates strength WFL / WNL to allow patient to ambulate with min to no difficulty. Met 1/24/2022  5. Pt able to return to full gym activities with min difficulty and pain <2/10. Met 2/8/2022 (from home, not comfortable returning to gym to be around people)  6. Pt will be independent with HEP and self management of symptoms.  Met 2/8/2022  7. Pt to demonstrate improved functional ability with FOTO limitation <=28% disability. Met 2/8/2022  8. Pt able to turn head while driving with 50% improvement and pain <2/10. Met 1/24/2022    Plan     Discharge to independent HEP    Domi Mcneil, PT

## 2022-02-08 NOTE — PLAN OF CARE
[facility]  Physical Therapy Discharge Note    Name: Valarie Colindres  Clinic Number: [id number]    Therapy Diagnosis:   Encounter Diagnoses   Name Primary?    Chronic neck and back pain Yes    Decreased strength, endurance, and mobility      Physician: An Hoover MD    Physician Orders: PT Eval and Treat - (M47.812) Osteoarthritis of cervical spine, unspecified spinal osteoarthritis complication status  (primary encounter diagnosis)  (R20.0) Left leg numbness  (M54.2,  G89.29) Chronic neck pain  (M48.02) Cervical spinal stenosis (C3-4, moderate)  (M48.04) Thoracic spinal stenosis (T11-12, moderate)  (M54.42,  M54.41,  G89.29) Chronic bilateral low back pain with bilateral sciatica  (R20.0) Bilateral leg numbness  Medical Diagnosis: Chronic bilateral low back pain with bilateral sciatica (M54.42,M54.41,G89.29); Chronic neck pain (M54.2,G89.29)  Evaluation Date: 12/3/2021        Date of Last visit: 2/8/2022  Total Visits Received: 11    ASSESSMENT      Overall Valarie has made excellent progress with therapy. She reports excellent functional improvement with FOTO limitation score, and reports good increase in activity in home and community without pain exacerbation. Good improvement noted in cervical motion and B LE strength with MMT. Based on progress, presentation, and pt request, recommend discharge to independent [facility] at this time.      Discharge reason: Patient has reached the maximum rehab potential for the present time and Patient requested discharge    Discharge FOTO Score: 17%    Goals: Short Term Goals (4 Weeks):   1. Pt able to stand >=30 minutes with ADLs with <4/10 pain. Met 1/24/2022  2. Pt able to walk >=30 minutes with household chores with <4/10 pain. Met 1/24/2022  3. Pt able to turn head while driving with 25% improvement of ROM with <4/10 pain. Met 1/24/2022  4. Pt will demonstrate increased strength by a half grade to allow patient to walk with increased  ease. Met 1/24/2022  5. Pt to demonstrate improved functional ability with FOTO limitation <=32% disability. Met 1/24/2022     Long Term Goals (12 Weeks):  1. Pt able to stand >=1 hour with with ADLs with <2/10 pain. Progressing, not met (reports does not feel limited, pain 3-4)  2. Pt able to walk >=1 hour with household chores with <2/10 pain. Progressing, not met (reports does not feel limited, pain 3-4)  3. Pt able to sleep a full night without pain disturbance. Met 2/8/2022  4. Pt demonstrates strength WFL / WNL to allow patient to ambulate with min to no difficulty. Met 1/24/2022  5. Pt able to return to full gym activities with min difficulty and pain <2/10. Met 2/8/2022 (from home, not comfortable returning to gym to be around people)  6. Pt will be independent with HEP and self management of symptoms.  Met 2/8/2022  7. Pt to demonstrate improved functional ability with FOTO limitation <=28% disability. Met 2/8/2022  8. Pt able to turn head while driving with 50% improvement and pain <2/10. Met 1/24/2022      PLAN   This patient is discharged from Physical Therapy      Domi Mcneil, PT

## 2022-02-17 RX ORDER — DIAZEPAM 5 MG/1
5 TABLET ORAL ONCE AS NEEDED
Qty: 2 TABLET | Refills: 0 | OUTPATIENT
Start: 2022-02-17 | End: 2022-02-17

## 2022-02-17 NOTE — TELEPHONE ENCOUNTER
----- Message from Briana Yang MA sent at 2/17/2022  2:15 PM CST -----  Regarding: pt request a call back  Name of Who is Calling: VALARIE DENT [[id number]]           What is the request in detail: pt request a call back needs to refill a medication that is not on med list in chart            Can the clinic reply by [facility]: n           What Number to Call Back if not in [facility]: [phone]

## 2022-02-17 NOTE — TELEPHONE ENCOUNTER
Staff informed patient medication can't be refilled for a procedure provider hasn't ordered. Provider that's conducting/ordered procedure has to do so

## 2022-02-18 ENCOUNTER — HOSPITAL ENCOUNTER (OUTPATIENT)
Dept: PREADMISSION TESTING | Facility: OTHER | Age: 77
Discharge: HOME | End: 2022-02-18
Attending: ANESTHESIOLOGY
Payer: MEDICARE

## 2022-02-18 DIAGNOSIS — Z01.818 PRE-OP TESTING: ICD-10-CM

## 2022-02-18 LAB — SARS-COV-2 RNA RESP QL NAA+PROBE: NOT DETECTED

## 2022-02-18 PROCEDURE — U0005 INFEC AGEN DETEC AMPLI PROBE: HCPCS | Performed by: SPECIALIST

## 2022-02-18 PROCEDURE — U0003 INFECTIOUS AGENT DETECTION BY NUCLEIC ACID (DNA OR RNA); SEVERE ACUTE RESPIRATORY SYNDROME CORONAVIRUS 2 (SARS-COV-2) (CORONAVIRUS DISEASE [COVID-19]), AMPLIFIED PROBE TECHNIQUE, MAKING USE OF HIGH THROUGHPUT TECHNOLOGIES AS DESCRIBED BY CMS-2020-01-R: HCPCS | Performed by: SPECIALIST

## 2022-02-21 ENCOUNTER — TELEPHONE (OUTPATIENT)
Dept: OTOLARYNGOLOGY | Facility: CLINIC | Age: 77
End: 2022-02-21
Payer: MEDICARE

## 2022-02-21 ENCOUNTER — OFFICE VISIT (OUTPATIENT)
Dept: OTOLARYNGOLOGY | Facility: CLINIC | Age: 77
End: 2022-02-21
Payer: MEDICARE

## 2022-02-21 VITALS
TEMPERATURE: 98 F | BODY MASS INDEX: 24.24 KG/M2 | DIASTOLIC BLOOD PRESSURE: 61 MMHG | WEIGHT: 141.19 LBS | SYSTOLIC BLOOD PRESSURE: 109 MMHG | HEART RATE: 73 BPM

## 2022-02-21 DIAGNOSIS — J38.02 BILATERAL VOCAL CORD PARESIS: Primary | ICD-10-CM

## 2022-02-21 DIAGNOSIS — J30.9 ALLERGIC RHINITIS, UNSPECIFIED SEASONALITY, UNSPECIFIED TRIGGER: ICD-10-CM

## 2022-02-21 DIAGNOSIS — R27.0 ATAXIA: ICD-10-CM

## 2022-02-21 DIAGNOSIS — R49.0 DYSPHONIA: ICD-10-CM

## 2022-02-21 DIAGNOSIS — J34.2 NASAL SEPTAL DEVIATION: ICD-10-CM

## 2022-02-21 DIAGNOSIS — K21.9 LARYNGOPHARYNGEAL REFLUX (LPR): ICD-10-CM

## 2022-02-21 DIAGNOSIS — H90.A21 SENSORINEURAL HEARING LOSS (SNHL) OF RIGHT EAR WITH RESTRICTED HEARING OF LEFT EAR: ICD-10-CM

## 2022-02-21 DIAGNOSIS — H93.13 BILATERAL TINNITUS: ICD-10-CM

## 2022-02-21 PROCEDURE — 99214 OFFICE O/P EST MOD 30 MIN: CPT | Mod: 25,S$GLB,, | Performed by: SPECIALIST

## 2022-02-21 PROCEDURE — 1126F AMNT PAIN NOTED NONE PRSNT: CPT | Mod: CPTII,S$GLB,, | Performed by: SPECIALIST

## 2022-02-21 PROCEDURE — 3078F PR MOST RECENT DIASTOLIC BLOOD PRESSURE < 80 MM HG: ICD-10-PCS | Mod: CPTII,S$GLB,, | Performed by: SPECIALIST

## 2022-02-21 PROCEDURE — 1159F PR MEDICATION LIST DOCUMENTED IN MEDICAL RECORD: ICD-10-PCS | Mod: CPTII,S$GLB,, | Performed by: SPECIALIST

## 2022-02-21 PROCEDURE — 3074F SYST BP LT 130 MM HG: CPT | Mod: CPTII,S$GLB,, | Performed by: SPECIALIST

## 2022-02-21 PROCEDURE — 3288F PR FALLS RISK ASSESSMENT DOCUMENTED: ICD-10-PCS | Mod: CPTII,S$GLB,, | Performed by: SPECIALIST

## 2022-02-21 PROCEDURE — 1101F PR PT FALLS ASSESS DOC 0-1 FALLS W/OUT INJ PAST YR: ICD-10-PCS | Mod: CPTII,S$GLB,, | Performed by: SPECIALIST

## 2022-02-21 PROCEDURE — 31575 DIAGNOSTIC LARYNGOSCOPY: CPT | Mod: S$GLB,,, | Performed by: SPECIALIST

## 2022-02-21 PROCEDURE — 1126F PR PAIN SEVERITY QUANTIFIED, NO PAIN PRESENT: ICD-10-PCS | Mod: CPTII,S$GLB,, | Performed by: SPECIALIST

## 2022-02-21 PROCEDURE — 3078F DIAST BP <80 MM HG: CPT | Mod: CPTII,S$GLB,, | Performed by: SPECIALIST

## 2022-02-21 PROCEDURE — 99214 PR OFFICE/OUTPT VISIT, EST, LEVL IV, 30-39 MIN: ICD-10-PCS | Mod: 25,S$GLB,, | Performed by: SPECIALIST

## 2022-02-21 PROCEDURE — 1101F PT FALLS ASSESS-DOCD LE1/YR: CPT | Mod: CPTII,S$GLB,, | Performed by: SPECIALIST

## 2022-02-21 PROCEDURE — 1159F MED LIST DOCD IN RCRD: CPT | Mod: CPTII,S$GLB,, | Performed by: SPECIALIST

## 2022-02-21 PROCEDURE — 1160F PR REVIEW ALL MEDS BY PRESCRIBER/CLIN PHARMACIST DOCUMENTED: ICD-10-PCS | Mod: CPTII,S$GLB,, | Performed by: SPECIALIST

## 2022-02-21 PROCEDURE — 99999 PR PBB SHADOW E&M-EST. PATIENT-LVL III: CPT | Mod: PBBFAC,,, | Performed by: SPECIALIST

## 2022-02-21 PROCEDURE — 3074F PR MOST RECENT SYSTOLIC BLOOD PRESSURE < 130 MM HG: ICD-10-PCS | Mod: CPTII,S$GLB,, | Performed by: SPECIALIST

## 2022-02-21 PROCEDURE — 3288F FALL RISK ASSESSMENT DOCD: CPT | Mod: CPTII,S$GLB,, | Performed by: SPECIALIST

## 2022-02-21 PROCEDURE — 31575 LARYNGOSCOPY: ICD-10-PCS | Mod: S$GLB,,, | Performed by: SPECIALIST

## 2022-02-21 PROCEDURE — 99999 PR PBB SHADOW E&M-EST. PATIENT-LVL III: ICD-10-PCS | Mod: PBBFAC,,, | Performed by: SPECIALIST

## 2022-02-21 PROCEDURE — 1160F RVW MEDS BY RX/DR IN RCRD: CPT | Mod: CPTII,S$GLB,, | Performed by: SPECIALIST

## 2022-02-21 RX ORDER — AZELASTINE 1 MG/ML
SPRAY, METERED NASAL
Qty: 30 ML | Refills: 11 | Status: SHIPPED | OUTPATIENT
Start: 2022-02-21 | End: 2024-02-20 | Stop reason: SDUPTHER

## 2022-02-21 NOTE — TELEPHONE ENCOUNTER
----- Message from BETTY Solo MD sent at 2/19/2022  5:16 PM CST -----  Please inform the patient that there COVID-19 test was negative.  They should proceed as scheduled.  Called and spoke with patient today letting her know about her covid-19 test is negative. Pt states thanks.

## 2022-02-21 NOTE — PROGRESS NOTES
Subjective:       Patient ID: Valarie Colindres is a 76 y.o. female.    Chief Complaint: Follow-up (With Scope)    Follow-up    The patient is returning for follow-up visit.  There are multiple issues to discuss:  1. Allergic rhinitis:  The patient continues to have headaches at least twice weekly common chronic nasal stuffiness and clear nasal discharge she did start using Flonase in addition to her Claritin; however, symptom level has improved but is not controlled..   2. Hearing loss/tinnitus/blockage of the ears:  There is no change in the patient's hearing since last visit.    3. Tightness in the throat/laryngopharyngeal reflux::  The patient's voice continues to improve.  She has come to speech therapy 1 session.  She feels that her voice is better and has improved to the degree that she is able to sing in the [employer] choir.   She is taking famotidine twice daily.    4. Ataxia/balance issues:  The patient has not yet undergone her VNG testing.        Review of Systems     Constitutional: Positive for fatigue.  Negative for appetite change and unexpected weight loss.      HENT: Positive for hearing loss, postnasal drip, ringing in the ears, runny nose, sinus pressure, stuffy nose and trouble swallowing.  Negative for ear discharge (Ear wax), ear infection, ear pain, facial swelling, mouth sores, nosebleeds, sinus infection, tonsil infection, dental problems and voice change.      Eyes:  Negative for change in eyesight, eye drainage, eye itching and photophobia.     Respiratory:  Negative for shortness of breath, sleep apnea, snoring and wheezing.      Cardiovascular:  Negative for foot swelling, irregular heartbeat and swollen veins.     Gastrointestinal:  Negative for acid reflux, constipation, diarrhea and heartburn.     Genitourinary: Negative for difficulty urinating, sexual problems and frequent urination.     Musc: Negative for aching joints, aching muscles and back pain.     Allergy: Positive for  seasonal allergies. Negative for food allergies.     Endocrine: Negative for cold intolerance and heat intolerance.      Neurological: Negative for dizziness, light-headedness, seizures and tremors.      Hematologic: Negative for bruises/bleeds easily.  Alopecia of the entire scalp    Psychiatric: Negative for decreased concentration, depression, nervous/anxious and sleep disturbance.                Objective:      Physical Exam  Vitals and nursing note reviewed.   Constitutional:       General: She is awake.      Appearance: Normal appearance. She is well-developed, well-groomed and normal weight.   HENT:      Head: Normocephalic.      Jaw: There is normal jaw occlusion.      Salivary Glands: Right salivary gland is not diffusely enlarged. Left salivary gland is not diffusely enlarged.      Right Ear: Ear canal and external ear normal. Decreased hearing noted. There is impacted cerumen. Tympanic membrane is retracted. Tympanic membrane has decreased mobility.      Left Ear: Ear canal and external ear normal. Decreased hearing noted. There is impacted cerumen. Tympanic membrane is retracted. Tympanic membrane has decreased mobility.      Nose: Septal deviation (Mildly to the left), mucosal edema (cyanotic, boggy inferior turbinates bilaterally) and rhinorrhea (clear mucus bilaterally) present. No nasal deformity. Rhinorrhea is clear.      Right Turbinates: Enlarged and pale.      Left Turbinates: Enlarged and pale.      Mouth/Throat:      Lips: No lesions.      Mouth: No oral lesions.      Dentition: No gum lesions.      Tongue: No lesions.      Palate: No mass and lesions.      Pharynx: Oropharynx is clear. Uvula midline.   Eyes:      General: Lids are normal.         Right eye: No discharge.         Left eye: No discharge.      Conjunctiva/sclera:      Right eye: Right conjunctiva is injected. No exudate.     Left eye: Left conjunctiva is injected. No exudate.     Pupils: Pupils are equal, round, and reactive to  light.   Neck:      Thyroid: No thyroid mass or thyromegaly.      Trachea: Trachea normal. No tracheal deviation.   Cardiovascular:      Rate and Rhythm: Normal rate and regular rhythm.      Pulses: Normal pulses.      Heart sounds: Normal heart sounds.   Pulmonary:      Effort: Pulmonary effort is normal.      Breath sounds: Normal breath sounds. No stridor. No decreased breath sounds, wheezing, rhonchi or rales.   Abdominal:      General: Bowel sounds are normal.      Palpations: Abdomen is soft.      Tenderness: There is no abdominal tenderness.   Musculoskeletal:         General: Normal range of motion.      Cervical back: Normal range of motion. No muscular tenderness.   Lymphadenopathy:      Head:      Right side of head: No submental, submandibular, preauricular, posterior auricular or occipital adenopathy.      Left side of head: No submental, submandibular, preauricular, posterior auricular or occipital adenopathy.      Cervical: No cervical adenopathy.   Skin:     General: Skin is warm and dry.      Findings: No petechiae or rash.      Nails: There is no clubbing.   Neurological:      Mental Status: She is alert and oriented to person, place, and time.      Cranial Nerves: No cranial nerve deficit.      Sensory: No sensory deficit.      Gait: Gait normal.      Comments: Neuro otologic-no nystagmus, cranial nerves intact, no focal or cerebellar signs, Romberg negative   Psychiatric:         Speech: Speech normal.         Behavior: Behavior normal. Behavior is cooperative.         Thought Content: Thought content normal.         Judgment: Judgment normal.         VNG-test not yet performed    Flexible video nasolaryngoscopy:  The nasal septum deviated and nasal changes of allergic rhinitis; laryngeal changes consistent with laryngopharyngeal reflux that is responding well to b.i.d. H2 agonist therapy    Assessment:       1. Bilateral vocal cord paresis    2. Dysphonia    3. Laryngopharyngeal reflux (LPR)     4. Allergic rhinitis, unspecified seasonality, unspecified trigger    5. Nasal septal deviation    6. Ataxia    7. Bilateral tinnitus    8. Sensorineural hearing loss (SNHL) of right ear with restricted hearing of left ear        Plan:       I  will place the patient on twice daily azelastine and fluticasone nasal spray.  She can  continue using her loratadine D if she wishes to do so  will recheck the patient in 8 weeks.  I am referring again her for a VNG further evaluation of the balance disorder.   I will recheck her in 4 weeks.  I am stressing the importance of her speech language therapy.

## 2022-02-21 NOTE — PROCEDURES
"Laryngoscopy    Date/Time: 2/21/2022 1:40 PM  Performed by: BETTY Solo MD  Authorized by: BETTY Solo MD     Time out: Immediately prior to procedure a "time out" was called to verify the correct patient, procedure, equipment, support staff and site/side marked as required.    Consent Done?:  Yes (Verbal)  Anesthesia:     Local anesthetic:  4% Xylocaine spray with Kingsley-Synephrine (Topical aerosol)    Patient tolerance:  Patient tolerated the procedure well with no immediate complications    Decongestion performed?: Yes    Laryngoscopy:     Areas examined:  Vocal cords, larynx, hypopharynx, oropharynx, nasopharynx and nasal cavities    Laryngoscope size:  4 mm (Flexible video nasolaryngoscopy)  Nose External:      No external nasal deformity  Nose Intranasal:      Mucosa no polyps     Mucosa ulcers not present     No mucosa lesions present (Clear mucus in the nasal passages bilaterally)     Septum gross deformity ( I septal deviation to the right and low septal deviation the left)     Enlarged turbinates ( inferior turbinates enlarged bilaterally mulberry mucosa on the posterior end of each inferior turbinates)  Nasopharynx:      No mucosa lesions     Adenoids not present     Posterior choanae patent     Eustachian tube patent  Larynx/hypopharynx:      No epiglottis lesions     No epiglottis edema     No AE folds lesions     No vocal cord polyps     Equal and normal bilateral ( vocal cords symmetrically, bowing of the left vocal cord on phonation)     No hypopharynx lesions     No piriform sinus pooling     No piriform sinus lesions     Post cricoid edema ( minimal, improved from last endoscopy of October 2021)     Post cricoid erythema ( minimal, improved from last endoscopy of 2021)     Flexible video nasolaryngoscopy-nasal septal deviation and nasal changes allergic rhinitis; laryngeal changes consistent with laryngopharyngeal reflux that is improving on H2 agonist therapy      "

## 2022-02-22 ENCOUNTER — PES CALL (OUTPATIENT)
Dept: ADMINISTRATIVE | Facility: CLINIC | Age: 77
End: 2022-02-22
Payer: MEDICARE

## 2022-03-16 ENCOUNTER — CLINICAL SUPPORT (OUTPATIENT)
Dept: AUDIOLOGY | Facility: CLINIC | Age: 77
End: 2022-03-16
Payer: MEDICARE

## 2022-03-16 DIAGNOSIS — H81.8X2 UNILATERAL VESTIBULAR WEAKNESS, LEFT: Primary | ICD-10-CM

## 2022-03-16 PROCEDURE — 92537 PR CALORIC VSTBLR TEST W/REC BITHERMAL: ICD-10-PCS | Mod: S$GLB,,, | Performed by: AUDIOLOGIST

## 2022-03-16 PROCEDURE — 92537 CALORIC VSTBLR TEST W/REC: CPT | Mod: S$GLB,,, | Performed by: AUDIOLOGIST

## 2022-03-16 PROCEDURE — 92540 BASIC VESTIBULAR EVALUATION: CPT | Mod: S$GLB,,, | Performed by: AUDIOLOGIST

## 2022-03-16 PROCEDURE — 92540 PR VESTIBULAR EVAL NYSTAG FOVL&PERPH STIM OSCIL TRACKING: ICD-10-PCS | Mod: S$GLB,,, | Performed by: AUDIOLOGIST

## 2022-03-16 NOTE — Clinical Note
Dr. Solo,   Please find your patient's VNG results attached. Please let me know if I can provide any additional information.   Thanks,   Flora Banegas, CCC-A

## 2022-03-16 NOTE — PROGRESS NOTES
VNG Evaluation    Referring physician:  Dr. Solo    76 y.o. female complains of imbalance and lightheadedness. Symptoms occur spontaneously and tend to last seconds. Symptoms have been recurring over the past year. Ms. Colindres denies experiencing any falls as a result of her symptoms.     Gaze nystagmus was absent.  Oculomotor function was normal.  Spontaneous nystagmus: 2 d/s right-beating spontaneous nystagmus without fixation.  The head-hanging left Hallpike revealed <clinically significant, non-fatiguing, non-torsional> nystagmus: 8 d/s up-beating in the head hanging position and up-beating nystagmus in the return to upright position.   The head-hanging right Hallpike revealed <clinically significant, non-fatiguing, non-torsional> nystagmus: 7 d/s up-beating in the head hanging position and up-beating nystagmus in the return to upright position.  Static positional testing revealed  <clinically insignificant, non-fatiguing, non-torsional> nystagmus: 3 d/s up-beating in the supine head center position,  <clinically significant, non-fatiguing, non-torsional> nystagmus: 9 d/s up-beating in the supine head right position, and  <clinically insignificant, non-fatiguing, non-torsional> nystagmus: 4 d/s up-beating in the supine head left position.  Unilateral weakness: 90% (left)  Directional preponderance: 14% (left-beating)  RW: 18 d/s  LW: 2 d/s  RC: 22 d/s   d/s  Fixation suppression was normal.    Impression: VNG findings suggest a left peripheral vestibular abnormality that is incompletely compensated.    Recommend: 1. A trial with [place] exercises. A copy was provided for Ms. Colindres at today's appointment. May consider formal vestibular/balance rehab if symptoms persist. 2. Follow-up with Dr. Solo to review the results of today's evaluation

## 2022-03-17 ENCOUNTER — TELEPHONE (OUTPATIENT)
Dept: OTOLARYNGOLOGY | Facility: CLINIC | Age: 77
End: 2022-03-17
Payer: MEDICARE

## 2022-03-17 ENCOUNTER — PATIENT MESSAGE (OUTPATIENT)
Dept: OTOLARYNGOLOGY | Facility: CLINIC | Age: 77
End: 2022-03-17
Payer: MEDICARE

## 2022-03-17 ENCOUNTER — PES CALL (OUTPATIENT)
Dept: ADMINISTRATIVE | Facility: CLINIC | Age: 77
End: 2022-03-17
Payer: MEDICARE

## 2022-03-17 NOTE — TELEPHONE ENCOUNTER
----- Message from BETTY Solo MD sent at 3/16/2022  7:06 PM CDT -----  Balance testing was abnormal.  The patient should follow-up as scheduled next week.  Discuss at that.  ----- Message -----  From: SAI Cabrera  Sent: 3/16/2022   1:58 PM CDT  To: MD Dr. Edil Valencia,     Please find your patient's VNG results attached. Please let me know if I can provide any additional information.     Thanks,     Flora Banegas, CCC-A    Message sent to patient today about her test results per Dr. Solo.

## 2022-03-20 NOTE — PROGRESS NOTES
"Subjective:       Patient ID: Vaalrie Colindres is a 76 y.o. female.    Chief Complaint: Follow-up    Follow-up    The patient is returning for follow-up visit.  There are multiple issues to discuss:  1. Allergic rhinitis:  She is having some nasal congestion and clear rhinorrhea and postnasal drip.  She is using Flonase and loratadine to control allergy symptoms.  2. Hearing loss/tinnitus/blockage of the ears:  There is no change in the patient's hearing since last visit.  She continues to feel like her ears are blocked with wax.  3. Tightness in the throat/laryngopharyngeal reflux::  She is using famotidine twice daily she finds that her tightness in the throat, globus sensation, throat clearing and phlegm have improved somewhat.    4. Ataxia/balance issues:  She is coming in for results of her VNG testing.  She has.  When she is walking where she will "take a side step", but does not fall.  The episodes are short lived.  She does not have vertigo.  She does not have these episodes frequently enough that she feels she would use any type of vestibular suppressant to help control them.      Review of Systems     Constitutional: Positive for fatigue.  Negative for appetite change and unexpected weight loss.      HENT: Positive for hearing loss, postnasal drip, ringing in the ears, runny nose, sinus pressure, stuffy nose and trouble swallowing.  Negative for ear discharge (Ear wax), ear infection, ear pain, facial swelling, mouth sores, nosebleeds, sinus infection, tonsil infection, dental problems and voice change.      Eyes:  Negative for change in eyesight, eye drainage, eye itching and photophobia.     Respiratory:  Negative for shortness of breath, sleep apnea, snoring and wheezing.      Cardiovascular:  Negative for foot swelling, irregular heartbeat and swollen veins.     Gastrointestinal:  Negative for acid reflux, constipation, diarrhea and heartburn.     Genitourinary: Negative for difficulty urinating, " sexual problems and frequent urination.     Musc: Negative for aching joints, aching muscles and back pain.     Allergy: Positive for seasonal allergies. Negative for food allergies.     Endocrine: Negative for cold intolerance and heat intolerance.      Neurological: Negative for dizziness, light-headedness, seizures and tremors.      Hematologic: Negative for bruises/bleeds easily.  Alopecia of the entire scalp    Psychiatric: Negative for decreased concentration, depression, nervous/anxious and sleep disturbance.                Objective:      Physical Exam  Vitals and nursing note reviewed.   Constitutional:       General: She is awake.      Appearance: Normal appearance. She is well-developed, well-groomed and normal weight.   HENT:      Head: Normocephalic.      Jaw: There is normal jaw occlusion.      Salivary Glands: Right salivary gland is not diffusely enlarged. Left salivary gland is not diffusely enlarged.      Right Ear: Ear canal and external ear normal. Decreased hearing noted. There is impacted cerumen. Tympanic membrane is retracted. Tympanic membrane has decreased mobility.      Left Ear: Ear canal and external ear normal. Decreased hearing noted. There is impacted cerumen. Tympanic membrane is retracted. Tympanic membrane has decreased mobility.      Nose: Septal deviation (Mildly to the left), mucosal edema (cyanotic, boggy inferior turbinates bilaterally) and rhinorrhea (clear mucus bilaterally) present. No nasal deformity. Rhinorrhea is clear.      Right Turbinates: Enlarged and pale.      Left Turbinates: Enlarged and pale.      Mouth/Throat:      Lips: No lesions.      Mouth: No oral lesions.      Dentition: No gum lesions.      Tongue: No lesions.      Palate: No mass and lesions.      Pharynx: Oropharynx is clear. Uvula midline.   Eyes:      General: Lids are normal.         Right eye: No discharge.         Left eye: No discharge.      Conjunctiva/sclera:      Right eye: Right conjunctiva  is injected. No exudate.     Left eye: Left conjunctiva is injected. No exudate.     Pupils: Pupils are equal, round, and reactive to light.   Neck:      Thyroid: No thyroid mass or thyromegaly.      Trachea: Trachea normal. No tracheal deviation.   Cardiovascular:      Rate and Rhythm: Normal rate and regular rhythm.      Pulses: Normal pulses.      Heart sounds: Normal heart sounds.   Pulmonary:      Effort: Pulmonary effort is normal.      Breath sounds: Normal breath sounds. No stridor. No decreased breath sounds, wheezing, rhonchi or rales.   Abdominal:      General: Bowel sounds are normal.      Palpations: Abdomen is soft.      Tenderness: There is no abdominal tenderness.   Musculoskeletal:         General: Normal range of motion.      Cervical back: Normal range of motion. No muscular tenderness.   Lymphadenopathy:      Head:      Right side of head: No submental, submandibular, preauricular, posterior auricular or occipital adenopathy.      Left side of head: No submental, submandibular, preauricular, posterior auricular or occipital adenopathy.      Cervical: No cervical adenopathy.   Skin:     General: Skin is warm and dry.      Findings: No petechiae or rash.      Nails: There is no clubbing.   Neurological:      Mental Status: She is alert and oriented to person, place, and time.      Cranial Nerves: No cranial nerve deficit.      Sensory: No sensory deficit.      Gait: Gait normal.      Comments: Neuro otologic-no nystagmus, cranial nerves intact, no focal or cerebellar signs, Romberg negative, tandem Romberg falls to the left, mildly wide-based gait   Psychiatric:         Speech: Speech normal.         Behavior: Behavior normal. Behavior is cooperative.         Thought Content: Thought content normal.         Judgment: Judgment normal.         VNG-bilaterally positive do Hallpike maneuver with non fatiguing nystagmus, 90% reduced response in the left ear on bithermal calorics    Assessment:       1.  Peripheral vertigo involving left ear    2. Laryngopharyngeal reflux (LPR)    3. Allergic rhinitis, unspecified seasonality, unspecified trigger    4. Nasal septal deviation    5. Bilateral tinnitus    6. Ataxia    7. Dysphagia, unspecified type    8. PND (post-nasal drip)    9. Alopecia (capitis) totalis    10. Screening for condition        Plan:       I  and placing the patient on a sodium restricted diet (1500 mg per day), cessation of caffeine and continued cessation of nicotine the.  Since her episodes are brief I will not give her a vestibular suppressant.  I am ordering those parts of a metabolic evaluation for inner ear disease that are not already ordered by her primary care physician I will recheck her in 2 months, or sooner on an as-needed basis.  She will need undergo flexible nasolaryngoscopy at that visit.

## 2022-03-21 ENCOUNTER — OFFICE VISIT (OUTPATIENT)
Dept: OTOLARYNGOLOGY | Facility: CLINIC | Age: 77
End: 2022-03-21
Payer: MEDICARE

## 2022-03-21 VITALS
DIASTOLIC BLOOD PRESSURE: 81 MMHG | BODY MASS INDEX: 24.65 KG/M2 | HEART RATE: 60 BPM | TEMPERATURE: 98 F | SYSTOLIC BLOOD PRESSURE: 171 MMHG | WEIGHT: 143.63 LBS

## 2022-03-21 DIAGNOSIS — Z13.9 SCREENING FOR CONDITION: ICD-10-CM

## 2022-03-21 DIAGNOSIS — H81.392 PERIPHERAL VERTIGO INVOLVING LEFT EAR: Primary | ICD-10-CM

## 2022-03-21 DIAGNOSIS — J34.2 NASAL SEPTAL DEVIATION: ICD-10-CM

## 2022-03-21 DIAGNOSIS — H93.13 BILATERAL TINNITUS: ICD-10-CM

## 2022-03-21 DIAGNOSIS — R09.82 PND (POST-NASAL DRIP): ICD-10-CM

## 2022-03-21 DIAGNOSIS — J30.9 ALLERGIC RHINITIS, UNSPECIFIED SEASONALITY, UNSPECIFIED TRIGGER: ICD-10-CM

## 2022-03-21 DIAGNOSIS — R27.0 ATAXIA: ICD-10-CM

## 2022-03-21 DIAGNOSIS — K21.9 LARYNGOPHARYNGEAL REFLUX (LPR): ICD-10-CM

## 2022-03-21 DIAGNOSIS — L63.0 ALOPECIA (CAPITIS) TOTALIS: ICD-10-CM

## 2022-03-21 DIAGNOSIS — R13.10 DYSPHAGIA, UNSPECIFIED TYPE: ICD-10-CM

## 2022-03-21 PROCEDURE — 3079F DIAST BP 80-89 MM HG: CPT | Mod: CPTII,S$GLB,, | Performed by: SPECIALIST

## 2022-03-21 PROCEDURE — 1160F PR REVIEW ALL MEDS BY PRESCRIBER/CLIN PHARMACIST DOCUMENTED: ICD-10-PCS | Mod: CPTII,S$GLB,, | Performed by: SPECIALIST

## 2022-03-21 PROCEDURE — 1160F RVW MEDS BY RX/DR IN RCRD: CPT | Mod: CPTII,S$GLB,, | Performed by: SPECIALIST

## 2022-03-21 PROCEDURE — 3077F SYST BP >= 140 MM HG: CPT | Mod: CPTII,S$GLB,, | Performed by: SPECIALIST

## 2022-03-21 PROCEDURE — 99999 PR PBB SHADOW E&M-EST. PATIENT-LVL III: CPT | Mod: PBBFAC,,, | Performed by: SPECIALIST

## 2022-03-21 PROCEDURE — 3079F PR MOST RECENT DIASTOLIC BLOOD PRESSURE 80-89 MM HG: ICD-10-PCS | Mod: CPTII,S$GLB,, | Performed by: SPECIALIST

## 2022-03-21 PROCEDURE — 1159F MED LIST DOCD IN RCRD: CPT | Mod: CPTII,S$GLB,, | Performed by: SPECIALIST

## 2022-03-21 PROCEDURE — 3288F PR FALLS RISK ASSESSMENT DOCUMENTED: ICD-10-PCS | Mod: CPTII,S$GLB,, | Performed by: SPECIALIST

## 2022-03-21 PROCEDURE — 3077F PR MOST RECENT SYSTOLIC BLOOD PRESSURE >= 140 MM HG: ICD-10-PCS | Mod: CPTII,S$GLB,, | Performed by: SPECIALIST

## 2022-03-21 PROCEDURE — 1126F AMNT PAIN NOTED NONE PRSNT: CPT | Mod: CPTII,S$GLB,, | Performed by: SPECIALIST

## 2022-03-21 PROCEDURE — 3288F FALL RISK ASSESSMENT DOCD: CPT | Mod: CPTII,S$GLB,, | Performed by: SPECIALIST

## 2022-03-21 PROCEDURE — 99214 OFFICE O/P EST MOD 30 MIN: CPT | Mod: S$GLB,,, | Performed by: SPECIALIST

## 2022-03-21 PROCEDURE — 1126F PR PAIN SEVERITY QUANTIFIED, NO PAIN PRESENT: ICD-10-PCS | Mod: CPTII,S$GLB,, | Performed by: SPECIALIST

## 2022-03-21 PROCEDURE — 1159F PR MEDICATION LIST DOCUMENTED IN MEDICAL RECORD: ICD-10-PCS | Mod: CPTII,S$GLB,, | Performed by: SPECIALIST

## 2022-03-21 PROCEDURE — 99214 PR OFFICE/OUTPT VISIT, EST, LEVL IV, 30-39 MIN: ICD-10-PCS | Mod: S$GLB,,, | Performed by: SPECIALIST

## 2022-03-21 PROCEDURE — 99999 PR PBB SHADOW E&M-EST. PATIENT-LVL III: ICD-10-PCS | Mod: PBBFAC,,, | Performed by: SPECIALIST

## 2022-03-21 PROCEDURE — 1101F PT FALLS ASSESS-DOCD LE1/YR: CPT | Mod: CPTII,S$GLB,, | Performed by: SPECIALIST

## 2022-03-21 PROCEDURE — 1101F PR PT FALLS ASSESS DOC 0-1 FALLS W/OUT INJ PAST YR: ICD-10-PCS | Mod: CPTII,S$GLB,, | Performed by: SPECIALIST

## 2022-03-22 ENCOUNTER — TELEPHONE (OUTPATIENT)
Dept: OTOLARYNGOLOGY | Facility: CLINIC | Age: 77
End: 2022-03-22
Payer: MEDICARE

## 2022-03-22 NOTE — TELEPHONE ENCOUNTER
Returned pt call. Informed pt per provider what will take place at her next visit.       ----- Message from Domi Hoff sent at 3/22/2022  9:10 AM CDT -----  Regarding: pt  Pt is returning Elinor call can you please call pt at [phone].    SAMANTHA

## 2022-04-18 ENCOUNTER — PATIENT MESSAGE (OUTPATIENT)
Dept: OBSTETRICS AND GYNECOLOGY | Facility: CLINIC | Age: 77
End: 2022-04-18
Payer: MEDICARE

## 2022-04-28 ENCOUNTER — PATIENT MESSAGE (OUTPATIENT)
Dept: PRIMARY CARE CLINIC | Facility: CLINIC | Age: 77
End: 2022-04-28
Payer: MEDICARE

## 2022-04-28 ENCOUNTER — LAB VISIT (OUTPATIENT)
Dept: LAB | Facility: HOSPITAL | Age: 77
End: 2022-04-28
Attending: FAMILY MEDICINE
Payer: MEDICARE

## 2022-04-28 DIAGNOSIS — Z13.89 SCREENING FOR MULTIPLE CONDITIONS: ICD-10-CM

## 2022-04-28 DIAGNOSIS — H81.399 PERIPHERAL VERTIGO, UNSPECIFIED LATERALITY: ICD-10-CM

## 2022-04-28 DIAGNOSIS — Z13.89 SCREENING FOR MULTIPLE CONDITIONS: Primary | ICD-10-CM

## 2022-04-28 LAB
CRP SERPL-MCNC: 1 MG/L (ref 0–8.2)
ERYTHROCYTE [SEDIMENTATION RATE] IN BLOOD BY WESTERGREN METHOD: 23 MM/HR (ref 0–36)

## 2022-04-28 PROCEDURE — 86592 SYPHILIS TEST NON-TREP QUAL: CPT | Performed by: SPECIALIST

## 2022-04-28 PROCEDURE — 86140 C-REACTIVE PROTEIN: CPT | Performed by: SPECIALIST

## 2022-04-28 PROCEDURE — 36415 COLL VENOUS BLD VENIPUNCTURE: CPT | Mod: PN | Performed by: SPECIALIST

## 2022-04-28 PROCEDURE — 86038 ANTINUCLEAR ANTIBODIES: CPT | Performed by: SPECIALIST

## 2022-04-28 PROCEDURE — 85652 RBC SED RATE AUTOMATED: CPT | Performed by: SPECIALIST

## 2022-04-29 LAB
ANA SER QL IF: NORMAL
RPR SER QL: NORMAL

## 2022-05-15 NOTE — PROGRESS NOTES
Subjective:       Patient ID: Valarie Colindres is a 77 y.o. female.    Chief Complaint: Follow-up (With scope)    Follow-up    The patient is returning for follow-up visit.  There are multiple issues to discuss:  1. Allergic rhinitis:  Nasal secretions are clear.  She is breathing well through her nose.  She does have some postnasal drip is satisfied with current symptom control level.  She is using fluticasone and loratadine to control allergy symptoms.  2. Hearing loss/tinnitus/blockage of tlhe ears:  There is no change in the patient's hearing since last visit.  She continues to feel like her ears are blocked with wax.  3. Laryngopharyngeal reflux::  Her throat symptoms have improved significantly.  She does have some globus sensation and throat clearing but all symptoms are markedly better.  She has been taking famotidine twice daily.    4. Left peripheral vestibular disorder:  At last visit I started her on a sodium restricted diet, which she has been following.  Her balance issues are markedly improved.  She does not feel that she is having any problems at this point.      Review of Systems     Constitutional: Positive for fatigue.  Negative for appetite change and unexpected weight loss.      HENT: Positive for hearing loss, postnasal drip, ringing in the ears, runny nose, sinus pressure, stuffy nose and trouble swallowing.  Negative for ear discharge (Ear wax), ear infection, ear pain, facial swelling, mouth sores, nosebleeds, sinus infection, tonsil infection, dental problems and voice change.      Eyes:  Negative for change in eyesight, eye drainage, eye itching and photophobia.     Respiratory:  Negative for shortness of breath, sleep apnea, snoring and wheezing.      Cardiovascular:  Negative for foot swelling, irregular heartbeat and swollen veins.     Gastrointestinal:  Negative for acid reflux, constipation, diarrhea and heartburn.     Genitourinary: Negative for difficulty urinating, sexual  problems and frequent urination.     Musc: Negative for aching joints, aching muscles and back pain.     Allergy: Positive for seasonal allergies. Negative for food allergies.     Endocrine: Negative for cold intolerance and heat intolerance.      Neurological: Negative for dizziness, light-headedness, seizures and tremors.      Hematologic: Negative for bruises/bleeds easily.  Alopecia of the entire scalp    Psychiatric: Negative for decreased concentration, depression, nervous/anxious and sleep disturbance.                Objective:      Physical Exam  Vitals and nursing note reviewed.   Constitutional:       General: She is awake.      Appearance: Normal appearance. She is well-developed, well-groomed and normal weight.   HENT:      Head: Normocephalic.      Jaw: There is normal jaw occlusion.      Salivary Glands: Right salivary gland is not diffusely enlarged. Left salivary gland is not diffusely enlarged.      Right Ear: Ear canal and external ear normal. Decreased hearing noted. There is impacted cerumen. Tympanic membrane is retracted. Tympanic membrane has decreased mobility.      Left Ear: Ear canal and external ear normal. Decreased hearing noted. There is impacted cerumen. Tympanic membrane is retracted. Tympanic membrane has decreased mobility.      Nose: Septal deviation (Mildly to the left), mucosal edema (cyanotic, boggy inferior turbinates bilaterally) and rhinorrhea (clear mucus bilaterally) present. No nasal deformity. Rhinorrhea is clear.      Right Turbinates: Enlarged and pale.      Left Turbinates: Enlarged and pale.      Mouth/Throat:      Lips: No lesions.      Mouth: No oral lesions.      Dentition: No gum lesions.      Tongue: No lesions.      Palate: No mass and lesions.      Pharynx: Oropharynx is clear. Uvula midline.   Eyes:      General: Lids are normal.         Right eye: No discharge.         Left eye: No discharge.      Conjunctiva/sclera:      Right eye: Right conjunctiva is  injected. No exudate.     Left eye: Left conjunctiva is injected. No exudate.     Pupils: Pupils are equal, round, and reactive to light.   Neck:      Thyroid: No thyroid mass or thyromegaly.      Trachea: Trachea normal. No tracheal deviation.   Cardiovascular:      Rate and Rhythm: Normal rate and regular rhythm.      Pulses: Normal pulses.      Heart sounds: Normal heart sounds.   Pulmonary:      Effort: Pulmonary effort is normal.      Breath sounds: Normal breath sounds. No stridor. No decreased breath sounds, wheezing, rhonchi or rales.   Abdominal:      General: Bowel sounds are normal.      Palpations: Abdomen is soft.      Tenderness: There is no abdominal tenderness.   Musculoskeletal:         General: Normal range of motion.      Cervical back: Normal range of motion. No muscular tenderness.   Lymphadenopathy:      Head:      Right side of head: No submental, submandibular, preauricular, posterior auricular or occipital adenopathy.      Left side of head: No submental, submandibular, preauricular, posterior auricular or occipital adenopathy.      Cervical: No cervical adenopathy.   Skin:     General: Skin is warm and dry.      Findings: No petechiae or rash.      Nails: There is no clubbing.   Neurological:      Mental Status: She is alert and oriented to person, place, and time.      Cranial Nerves: No cranial nerve deficit.      Sensory: No sensory deficit.      Gait: Gait normal.      Comments: Neuro otologic-no nystagmus, cranial nerves intact, no focal or cerebellar signs, Romberg negative, tandem Romberg falls to the left, mildly wide-based gait   Psychiatric:         Speech: Speech normal.         Behavior: Behavior normal. Behavior is cooperative.         Thought Content: Thought content normal.         Judgment: Judgment normal.             Assessment:       1. Dysphagia, unspecified type    2. Dysphonia    3. Laryngopharyngeal reflux (LPR)    4. Peripheral vertigo involving left ear    5. Ataxia     6. 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 Bilateral tinnitus    8. Nasal septal deviation    9. Non-seasonal allergic rhinitis, unspecified trigger        Plan:       I am giving the patient a prescription for low-dose meclizine to use on an as-needed basis.  She will continue with a low-sodium diet and her current allergy meds.  I will recheck her in 2 months.

## 2022-05-16 ENCOUNTER — OFFICE VISIT (OUTPATIENT)
Dept: OTOLARYNGOLOGY | Facility: CLINIC | Age: 77
End: 2022-05-16
Payer: MEDICARE

## 2022-05-16 VITALS
DIASTOLIC BLOOD PRESSURE: 58 MMHG | SYSTOLIC BLOOD PRESSURE: 105 MMHG | HEART RATE: 75 BPM | BODY MASS INDEX: 24.29 KG/M2 | WEIGHT: 141.56 LBS | TEMPERATURE: 98 F

## 2022-05-16 DIAGNOSIS — H93.13 BILATERAL TINNITUS: ICD-10-CM

## 2022-05-16 DIAGNOSIS — J30.89 NON-SEASONAL ALLERGIC RHINITIS, UNSPECIFIED TRIGGER: ICD-10-CM

## 2022-05-16 DIAGNOSIS — K21.9 LARYNGOPHARYNGEAL REFLUX (LPR): ICD-10-CM

## 2022-05-16 DIAGNOSIS — R49.0 DYSPHONIA: ICD-10-CM

## 2022-05-16 DIAGNOSIS — R13.10 DYSPHAGIA, UNSPECIFIED TYPE: Primary | ICD-10-CM

## 2022-05-16 DIAGNOSIS — H81.392 PERIPHERAL VERTIGO INVOLVING LEFT EAR: ICD-10-CM

## 2022-05-16 DIAGNOSIS — J34.2 NASAL SEPTAL DEVIATION: ICD-10-CM

## 2022-05-16 DIAGNOSIS — R27.0 ATAXIA: ICD-10-CM

## 2022-05-16 DIAGNOSIS — H90.A21 SENSORINEURAL HEARING LOSS (SNHL) OF RIGHT EAR WITH RESTRICTED HEARING OF LEFT EAR: ICD-10-CM

## 2022-05-16 PROCEDURE — 99999 PR PBB SHADOW E&M-EST. PATIENT-LVL III: ICD-10-PCS | Mod: PBBFAC,,, | Performed by: SPECIALIST

## 2022-05-16 PROCEDURE — 1126F AMNT PAIN NOTED NONE PRSNT: CPT | Mod: CPTII,S$GLB,, | Performed by: SPECIALIST

## 2022-05-16 PROCEDURE — 1159F MED LIST DOCD IN RCRD: CPT | Mod: CPTII,S$GLB,, | Performed by: SPECIALIST

## 2022-05-16 PROCEDURE — 3288F PR FALLS RISK ASSESSMENT DOCUMENTED: ICD-10-PCS | Mod: CPTII,S$GLB,, | Performed by: SPECIALIST

## 2022-05-16 PROCEDURE — 1101F PT FALLS ASSESS-DOCD LE1/YR: CPT | Mod: CPTII,S$GLB,, | Performed by: SPECIALIST

## 2022-05-16 PROCEDURE — 1160F PR REVIEW ALL MEDS BY PRESCRIBER/CLIN PHARMACIST DOCUMENTED: ICD-10-PCS | Mod: CPTII,S$GLB,, | Performed by: SPECIALIST

## 2022-05-16 PROCEDURE — 1160F RVW MEDS BY RX/DR IN RCRD: CPT | Mod: CPTII,S$GLB,, | Performed by: SPECIALIST

## 2022-05-16 PROCEDURE — 31575 LARYNGOSCOPY: ICD-10-PCS | Mod: S$GLB,,, | Performed by: SPECIALIST

## 2022-05-16 PROCEDURE — 3288F FALL RISK ASSESSMENT DOCD: CPT | Mod: CPTII,S$GLB,, | Performed by: SPECIALIST

## 2022-05-16 PROCEDURE — 3074F PR MOST RECENT SYSTOLIC BLOOD PRESSURE < 130 MM HG: ICD-10-PCS | Mod: CPTII,S$GLB,, | Performed by: SPECIALIST

## 2022-05-16 PROCEDURE — 99999 PR PBB SHADOW E&M-EST. PATIENT-LVL III: CPT | Mod: PBBFAC,,, | Performed by: SPECIALIST

## 2022-05-16 PROCEDURE — 31575 DIAGNOSTIC LARYNGOSCOPY: CPT | Mod: S$GLB,,, | Performed by: SPECIALIST

## 2022-05-16 PROCEDURE — 1101F PR PT FALLS ASSESS DOC 0-1 FALLS W/OUT INJ PAST YR: ICD-10-PCS | Mod: CPTII,S$GLB,, | Performed by: SPECIALIST

## 2022-05-16 PROCEDURE — 1159F PR MEDICATION LIST DOCUMENTED IN MEDICAL RECORD: ICD-10-PCS | Mod: CPTII,S$GLB,, | Performed by: SPECIALIST

## 2022-05-16 PROCEDURE — 99214 OFFICE O/P EST MOD 30 MIN: CPT | Mod: 25,S$GLB,, | Performed by: SPECIALIST

## 2022-05-16 PROCEDURE — 3078F PR MOST RECENT DIASTOLIC BLOOD PRESSURE < 80 MM HG: ICD-10-PCS | Mod: CPTII,S$GLB,, | Performed by: SPECIALIST

## 2022-05-16 PROCEDURE — 99214 PR OFFICE/OUTPT VISIT, EST, LEVL IV, 30-39 MIN: ICD-10-PCS | Mod: 25,S$GLB,, | Performed by: SPECIALIST

## 2022-05-16 PROCEDURE — 1126F PR PAIN SEVERITY QUANTIFIED, NO PAIN PRESENT: ICD-10-PCS | Mod: CPTII,S$GLB,, | Performed by: SPECIALIST

## 2022-05-16 PROCEDURE — 3074F SYST BP LT 130 MM HG: CPT | Mod: CPTII,S$GLB,, | Performed by: SPECIALIST

## 2022-05-16 PROCEDURE — 3078F DIAST BP <80 MM HG: CPT | Mod: CPTII,S$GLB,, | Performed by: SPECIALIST

## 2022-05-16 RX ORDER — MECLIZINE HCL 12.5 MG 12.5 MG/1
12.5 TABLET ORAL EVERY 6 HOURS
Qty: 30 TABLET | Refills: 5 | Status: SHIPPED | OUTPATIENT
Start: 2022-05-16 | End: 2022-07-25 | Stop reason: ALTCHOICE

## 2022-05-16 NOTE — PROCEDURES
"Laryngoscopy    Date/Time: 5/16/2022 1:00 PM  Performed by: BETTY Solo MD  Authorized by: BETTY Solo MD     Time out: Immediately prior to procedure a "time out" was called to verify the correct patient, procedure, equipment, support staff and site/side marked as required.    Consent Done?:  Yes (Verbal)  Anesthesia:     Local anesthetic:  4% Xylocaine spray with Kingsley-Synephrine (Topical aerosol)    Patient tolerance:  Patient tolerated the procedure well with no immediate complications    Decongestion performed?: Yes    Laryngoscopy:     Areas examined:  Vocal cords, larynx, hypopharynx, oropharynx, nasopharynx and nasal cavities    Laryngoscope size:  4 mm (Flexible video nasolaryngoscopy)  Nose External:      No external nasal deformity  Nose Intranasal:      Mucosa no polyps     Mucosa ulcers not present     No mucosa lesions present (Clear mucus fills both nasal passages)     Septum gross deformity ( septum deviated high to the right and low to the left)     Enlarged turbinates ( inferior turbinates enlarged bilaterally)  Nasopharynx:      No mucosa lesions     Adenoids not present     Posterior choanae patent     Eustachian tube patent  Larynx/hypopharynx:      No epiglottis lesions     No epiglottis edema     No AE folds lesions     No vocal cord polyps     Equal and normal bilateral ( vocal cords move symmetrically, no mucosal lesions noted)     No hypopharynx lesions     No piriform sinus pooling     No piriform sinus lesions     Post cricoid edema ( trace to minimum, improved from endoscopy of 02/20/2022)     Post cricoid erythema ( trace to minimum, improved from endoscopy of 02/21/2022)     Flexible video nasolaryngoscopy-nasal septal deviation and nasal changes allergic rhinitis; laryngeal changes consistent with laryngopharyngeal reflux that has improved significantly since last endoscopy in February 2022      "

## 2022-05-30 ENCOUNTER — OFFICE VISIT (OUTPATIENT)
Dept: OPHTHALMOLOGY | Facility: CLINIC | Age: 77
End: 2022-05-30
Payer: MEDICARE

## 2022-05-30 DIAGNOSIS — H21.551 ANGLE RECESSION OF RIGHT EYE: ICD-10-CM

## 2022-05-30 DIAGNOSIS — H25.13 NUCLEAR SCLEROTIC CATARACT OF BOTH EYES: Primary | ICD-10-CM

## 2022-05-30 PROCEDURE — 1126F AMNT PAIN NOTED NONE PRSNT: CPT | Mod: CPTII,S$GLB,, | Performed by: OPHTHALMOLOGY

## 2022-05-30 PROCEDURE — 1160F RVW MEDS BY RX/DR IN RCRD: CPT | Mod: CPTII,S$GLB,, | Performed by: OPHTHALMOLOGY

## 2022-05-30 PROCEDURE — 1159F PR MEDICATION LIST DOCUMENTED IN MEDICAL RECORD: ICD-10-PCS | Mod: CPTII,S$GLB,, | Performed by: OPHTHALMOLOGY

## 2022-05-30 PROCEDURE — 92004 PR EYE EXAM, NEW PATIENT,COMPREHESV: ICD-10-PCS | Mod: S$GLB,,, | Performed by: OPHTHALMOLOGY

## 2022-05-30 PROCEDURE — 3288F FALL RISK ASSESSMENT DOCD: CPT | Mod: CPTII,S$GLB,, | Performed by: OPHTHALMOLOGY

## 2022-05-30 PROCEDURE — 92004 COMPRE OPH EXAM NEW PT 1/>: CPT | Mod: S$GLB,,, | Performed by: OPHTHALMOLOGY

## 2022-05-30 PROCEDURE — 3288F PR FALLS RISK ASSESSMENT DOCUMENTED: ICD-10-PCS | Mod: CPTII,S$GLB,, | Performed by: OPHTHALMOLOGY

## 2022-05-30 PROCEDURE — 1126F PR PAIN SEVERITY QUANTIFIED, NO PAIN PRESENT: ICD-10-PCS | Mod: CPTII,S$GLB,, | Performed by: OPHTHALMOLOGY

## 2022-05-30 PROCEDURE — 1160F PR REVIEW ALL MEDS BY PRESCRIBER/CLIN PHARMACIST DOCUMENTED: ICD-10-PCS | Mod: CPTII,S$GLB,, | Performed by: OPHTHALMOLOGY

## 2022-05-30 PROCEDURE — 1101F PT FALLS ASSESS-DOCD LE1/YR: CPT | Mod: CPTII,S$GLB,, | Performed by: OPHTHALMOLOGY

## 2022-05-30 PROCEDURE — 1101F PR PT FALLS ASSESS DOC 0-1 FALLS W/OUT INJ PAST YR: ICD-10-PCS | Mod: CPTII,S$GLB,, | Performed by: OPHTHALMOLOGY

## 2022-05-30 PROCEDURE — 99999 PR PBB SHADOW E&M-EST. PATIENT-LVL III: CPT | Mod: PBBFAC,,, | Performed by: OPHTHALMOLOGY

## 2022-05-30 PROCEDURE — 99999 PR PBB SHADOW E&M-EST. PATIENT-LVL III: ICD-10-PCS | Mod: PBBFAC,,, | Performed by: OPHTHALMOLOGY

## 2022-05-30 PROCEDURE — 1159F MED LIST DOCD IN RCRD: CPT | Mod: CPTII,S$GLB,, | Performed by: OPHTHALMOLOGY

## 2022-05-31 NOTE — PROGRESS NOTES
Subjective:       Patient ID: Valarie Colindres is a 77 y.o. female      Chief Complaint   Patient presents with    Concerns About Ocular Health     History of Present Illness  HPI     Last eye exam was 8/19/21    Patient states OU is blurry after injury to the eyes with an exercise cord   to mainly the right eye several years ago.  Wants to check on ocular   health.  No f/f/pain OU.    Patient use OTC tears    Last edited by Torsten Hsu MD on 5/30/2022  9:52 PM. (History)        Imaging:    See report    Assessment/Plan:     1. Nuclear sclerotic cataract of both eyes  Continue yearly exams with Dr Cruz    2. Angle recession of right eye  Has h/o angle recession OD secondary to trauma.  IOP good and no sign of glaucoma.  Needs ongoing comprehensive care for periodic evaluation (at least yearly).    Retinal findings stable.  No pathology needing Rx and this time.    RD precautions.  RTC retina PRN only    Follow up if symptoms worsen or fail to improve.

## 2022-06-11 ENCOUNTER — LAB VISIT (OUTPATIENT)
Dept: LAB | Facility: HOSPITAL | Age: 77
End: 2022-06-11
Attending: FAMILY MEDICINE
Payer: MEDICARE

## 2022-06-11 DIAGNOSIS — E03.9 ACQUIRED HYPOTHYROIDISM: ICD-10-CM

## 2022-06-11 DIAGNOSIS — Z13.6 ENCOUNTER FOR LIPID SCREENING FOR CARDIOVASCULAR DISEASE: ICD-10-CM

## 2022-06-11 DIAGNOSIS — M85.88 OSTEOPENIA OF LUMBAR SPINE: ICD-10-CM

## 2022-06-11 DIAGNOSIS — I10 ESSENTIAL HYPERTENSION: ICD-10-CM

## 2022-06-11 DIAGNOSIS — M89.9 BONE DISORDER: ICD-10-CM

## 2022-06-11 DIAGNOSIS — R73.03 PREDIABETES: ICD-10-CM

## 2022-06-11 DIAGNOSIS — Z13.220 ENCOUNTER FOR LIPID SCREENING FOR CARDIOVASCULAR DISEASE: ICD-10-CM

## 2022-06-11 LAB
25(OH)D3+25(OH)D2 SERPL-MCNC: 60 NG/ML (ref 30–96)
ALBUMIN SERPL BCP-MCNC: 3.9 G/DL (ref 3.5–5.2)
ALP SERPL-CCNC: 51 U/L (ref 55–135)
ALT SERPL W/O P-5'-P-CCNC: 7 U/L (ref 10–44)
ANION GAP SERPL CALC-SCNC: 10 MMOL/L (ref 8–16)
AST SERPL-CCNC: 13 U/L (ref 10–40)
BASOPHILS # BLD AUTO: 0.02 K/UL (ref 0–0.2)
BASOPHILS NFR BLD: 0.4 % (ref 0–1.9)
BILIRUB SERPL-MCNC: 0.5 MG/DL (ref 0.1–1)
BUN SERPL-MCNC: 16 MG/DL (ref 8–23)
CALCIUM SERPL-MCNC: 9.8 MG/DL (ref 8.7–10.5)
CHLORIDE SERPL-SCNC: 103 MMOL/L (ref 95–110)
CHOLEST SERPL-MCNC: 173 MG/DL (ref 120–199)
CHOLEST/HDLC SERPL: 2.9 {RATIO} (ref 2–5)
CO2 SERPL-SCNC: 27 MMOL/L (ref 23–29)
CREAT SERPL-MCNC: 0.9 MG/DL (ref 0.5–1.4)
DIFFERENTIAL METHOD: ABNORMAL
EOSINOPHIL # BLD AUTO: 0.1 K/UL (ref 0–0.5)
EOSINOPHIL NFR BLD: 1.5 % (ref 0–8)
ERYTHROCYTE [DISTWIDTH] IN BLOOD BY AUTOMATED COUNT: 17.7 % (ref 11.5–14.5)
EST. GFR  (AFRICAN AMERICAN): >60 ML/MIN/1.73 M^2
EST. GFR  (NON AFRICAN AMERICAN): >60 ML/MIN/1.73 M^2
ESTIMATED AVG GLUCOSE: 123 MG/DL (ref 68–131)
GLUCOSE SERPL-MCNC: 83 MG/DL (ref 70–110)
HBA1C MFR BLD: 5.9 % (ref 4–5.6)
HCT VFR BLD AUTO: 33.8 % (ref 37–48.5)
HDLC SERPL-MCNC: 59 MG/DL (ref 40–75)
HDLC SERPL: 34.1 % (ref 20–50)
HGB BLD-MCNC: 10.3 G/DL (ref 12–16)
IMM GRANULOCYTES # BLD AUTO: 0.01 K/UL (ref 0–0.04)
IMM GRANULOCYTES NFR BLD AUTO: 0.2 % (ref 0–0.5)
LDLC SERPL CALC-MCNC: 103.2 MG/DL (ref 63–159)
LYMPHOCYTES # BLD AUTO: 2.9 K/UL (ref 1–4.8)
LYMPHOCYTES NFR BLD: 53.3 % (ref 18–48)
MCH RBC QN AUTO: 19.1 PG (ref 27–31)
MCHC RBC AUTO-ENTMCNC: 30.5 G/DL (ref 32–36)
MCV RBC AUTO: 63 FL (ref 82–98)
MONOCYTES # BLD AUTO: 0.4 K/UL (ref 0.3–1)
MONOCYTES NFR BLD: 7.5 % (ref 4–15)
NEUTROPHILS # BLD AUTO: 2.1 K/UL (ref 1.8–7.7)
NEUTROPHILS NFR BLD: 37.1 % (ref 38–73)
NONHDLC SERPL-MCNC: 114 MG/DL
NRBC BLD-RTO: 0 /100 WBC
PLATELET # BLD AUTO: 259 K/UL (ref 150–450)
PMV BLD AUTO: 10.6 FL (ref 9.2–12.9)
POTASSIUM SERPL-SCNC: 3.8 MMOL/L (ref 3.5–5.1)
PROT SERPL-MCNC: 6.9 G/DL (ref 6–8.4)
RBC # BLD AUTO: 5.39 M/UL (ref 4–5.4)
SODIUM SERPL-SCNC: 140 MMOL/L (ref 136–145)
TRIGL SERPL-MCNC: 54 MG/DL (ref 30–150)
TSH SERPL DL<=0.005 MIU/L-ACNC: 1.75 UIU/ML (ref 0.4–4)
WBC # BLD AUTO: 5.5 K/UL (ref 3.9–12.7)

## 2022-06-11 PROCEDURE — 80061 LIPID PANEL: CPT | Performed by: FAMILY MEDICINE

## 2022-06-11 PROCEDURE — 80053 COMPREHEN METABOLIC PANEL: CPT | Performed by: FAMILY MEDICINE

## 2022-06-11 PROCEDURE — 83036 HEMOGLOBIN GLYCOSYLATED A1C: CPT | Performed by: FAMILY MEDICINE

## 2022-06-11 PROCEDURE — 82306 VITAMIN D 25 HYDROXY: CPT | Performed by: FAMILY MEDICINE

## 2022-06-11 PROCEDURE — 84443 ASSAY THYROID STIM HORMONE: CPT | Performed by: FAMILY MEDICINE

## 2022-06-11 PROCEDURE — 85025 COMPLETE CBC W/AUTO DIFF WBC: CPT | Performed by: FAMILY MEDICINE

## 2022-06-11 PROCEDURE — 36415 COLL VENOUS BLD VENIPUNCTURE: CPT | Performed by: FAMILY MEDICINE

## 2022-06-15 ENCOUNTER — TELEPHONE (OUTPATIENT)
Dept: PRIMARY CARE CLINIC | Facility: CLINIC | Age: 77
End: 2022-06-15
Payer: MEDICARE

## 2022-06-15 NOTE — TELEPHONE ENCOUNTER
----- Message from Stacy Gallegos MD sent at 6/14/2022 10:41 AM CDT -----  Hello,    Prediabetes  Impaired fasting glucose is essentially early type 2 diabetes and needs to be treated as such with main emphasis on diet and exercise. Avoiding excessive sugars and starch in the diet are important    Normal thyroid function test    Vitamin-D is now normal.  Good!    Liver function within normal limits.    Normal kidney function    Normal cholesterol    You continue to have anemia due to thalassemia trait.

## 2022-06-17 ENCOUNTER — OFFICE VISIT (OUTPATIENT)
Dept: PRIMARY CARE CLINIC | Facility: CLINIC | Age: 77
End: 2022-06-17
Payer: MEDICARE

## 2022-06-17 VITALS
HEIGHT: 64 IN | WEIGHT: 142.63 LBS | BODY MASS INDEX: 24.35 KG/M2 | DIASTOLIC BLOOD PRESSURE: 62 MMHG | HEART RATE: 65 BPM | RESPIRATION RATE: 19 BRPM | SYSTOLIC BLOOD PRESSURE: 110 MMHG | OXYGEN SATURATION: 99 % | TEMPERATURE: 98 F

## 2022-06-17 DIAGNOSIS — N81.6 RECTOCELE: ICD-10-CM

## 2022-06-17 DIAGNOSIS — D56.3 THALASSEMIA TRAIT: ICD-10-CM

## 2022-06-17 DIAGNOSIS — E78.2 MIXED HYPERLIPIDEMIA: ICD-10-CM

## 2022-06-17 DIAGNOSIS — N95.2 VAGINAL ATROPHY: ICD-10-CM

## 2022-06-17 DIAGNOSIS — N81.11 CYSTOCELE, MIDLINE: ICD-10-CM

## 2022-06-17 DIAGNOSIS — H90.A32 MIXED CONDUCTIVE AND SENSORINEURAL HEARING LOSS OF LEFT EAR WITH RESTRICTED HEARING OF RIGHT EAR: ICD-10-CM

## 2022-06-17 DIAGNOSIS — N39.41 URGE INCONTINENCE: ICD-10-CM

## 2022-06-17 DIAGNOSIS — G95.9 DISEASE OF SPINAL CORD, UNSPECIFIED: ICD-10-CM

## 2022-06-17 DIAGNOSIS — M35.01 KERATITIS SICCA, BOTH EYES: ICD-10-CM

## 2022-06-17 DIAGNOSIS — K21.9 GASTROESOPHAGEAL REFLUX DISEASE WITHOUT ESOPHAGITIS: ICD-10-CM

## 2022-06-17 DIAGNOSIS — M85.88 OSTEOPENIA OF LUMBAR SPINE: ICD-10-CM

## 2022-06-17 DIAGNOSIS — M70.61 TROCHANTERIC BURSITIS OF RIGHT HIP: ICD-10-CM

## 2022-06-17 DIAGNOSIS — I10 ESSENTIAL HYPERTENSION: Primary | ICD-10-CM

## 2022-06-17 DIAGNOSIS — E03.9 ACQUIRED HYPOTHYROIDISM: ICD-10-CM

## 2022-06-17 DIAGNOSIS — D47.2 MGUS (MONOCLONAL GAMMOPATHY OF UNKNOWN SIGNIFICANCE): ICD-10-CM

## 2022-06-17 DIAGNOSIS — K58.2 IRRITABLE BOWEL SYNDROME WITH BOTH CONSTIPATION AND DIARRHEA: ICD-10-CM

## 2022-06-17 DIAGNOSIS — Z87.11 HISTORY OF GASTRIC ULCER: ICD-10-CM

## 2022-06-17 DIAGNOSIS — R73.03 PREDIABETES: ICD-10-CM

## 2022-06-17 DIAGNOSIS — Z90.710 S/P HYSTERECTOMY: ICD-10-CM

## 2022-06-17 DIAGNOSIS — H25.13 NUCLEAR SCLEROTIC CATARACT OF BOTH EYES: ICD-10-CM

## 2022-06-17 DIAGNOSIS — M20.12 ACQUIRED HALLUX VALGUS OF LEFT FOOT: ICD-10-CM

## 2022-06-17 PROBLEM — J43.2 CENTRILOBULAR EMPHYSEMA: Status: ACTIVE | Noted: 2022-06-17

## 2022-06-17 PROBLEM — J43.2 CENTRILOBULAR EMPHYSEMA: Status: RESOLVED | Noted: 2022-06-17 | Resolved: 2022-06-17

## 2022-06-17 PROCEDURE — 1126F AMNT PAIN NOTED NONE PRSNT: CPT | Mod: CPTII,S$GLB,, | Performed by: FAMILY MEDICINE

## 2022-06-17 PROCEDURE — 99214 PR OFFICE/OUTPT VISIT, EST, LEVL IV, 30-39 MIN: ICD-10-PCS | Mod: S$GLB,,, | Performed by: FAMILY MEDICINE

## 2022-06-17 PROCEDURE — 1159F PR MEDICATION LIST DOCUMENTED IN MEDICAL RECORD: ICD-10-PCS | Mod: CPTII,S$GLB,, | Performed by: FAMILY MEDICINE

## 2022-06-17 PROCEDURE — 3078F PR MOST RECENT DIASTOLIC BLOOD PRESSURE < 80 MM HG: ICD-10-PCS | Mod: CPTII,S$GLB,, | Performed by: FAMILY MEDICINE

## 2022-06-17 PROCEDURE — 99214 OFFICE O/P EST MOD 30 MIN: CPT | Mod: S$GLB,,, | Performed by: FAMILY MEDICINE

## 2022-06-17 PROCEDURE — 1101F PT FALLS ASSESS-DOCD LE1/YR: CPT | Mod: CPTII,S$GLB,, | Performed by: FAMILY MEDICINE

## 2022-06-17 PROCEDURE — 99499 UNLISTED E&M SERVICE: CPT | Mod: S$GLB,,, | Performed by: FAMILY MEDICINE

## 2022-06-17 PROCEDURE — 99999 PR PBB SHADOW E&M-EST. PATIENT-LVL V: ICD-10-PCS | Mod: PBBFAC,,, | Performed by: FAMILY MEDICINE

## 2022-06-17 PROCEDURE — 3288F PR FALLS RISK ASSESSMENT DOCUMENTED: ICD-10-PCS | Mod: CPTII,S$GLB,, | Performed by: FAMILY MEDICINE

## 2022-06-17 PROCEDURE — 99999 PR PBB SHADOW E&M-EST. PATIENT-LVL V: CPT | Mod: PBBFAC,,, | Performed by: FAMILY MEDICINE

## 2022-06-17 PROCEDURE — 3074F PR MOST RECENT SYSTOLIC BLOOD PRESSURE < 130 MM HG: ICD-10-PCS | Mod: CPTII,S$GLB,, | Performed by: FAMILY MEDICINE

## 2022-06-17 PROCEDURE — 99499 RISK ADDL DX/OHS AUDIT: ICD-10-PCS | Mod: S$GLB,,, | Performed by: FAMILY MEDICINE

## 2022-06-17 PROCEDURE — 1160F PR REVIEW ALL MEDS BY PRESCRIBER/CLIN PHARMACIST DOCUMENTED: ICD-10-PCS | Mod: CPTII,S$GLB,, | Performed by: FAMILY MEDICINE

## 2022-06-17 PROCEDURE — 1159F MED LIST DOCD IN RCRD: CPT | Mod: CPTII,S$GLB,, | Performed by: FAMILY MEDICINE

## 2022-06-17 PROCEDURE — 1160F RVW MEDS BY RX/DR IN RCRD: CPT | Mod: CPTII,S$GLB,, | Performed by: FAMILY MEDICINE

## 2022-06-17 PROCEDURE — 1101F PR PT FALLS ASSESS DOC 0-1 FALLS W/OUT INJ PAST YR: ICD-10-PCS | Mod: CPTII,S$GLB,, | Performed by: FAMILY MEDICINE

## 2022-06-17 PROCEDURE — 3074F SYST BP LT 130 MM HG: CPT | Mod: CPTII,S$GLB,, | Performed by: FAMILY MEDICINE

## 2022-06-17 PROCEDURE — 3288F FALL RISK ASSESSMENT DOCD: CPT | Mod: CPTII,S$GLB,, | Performed by: FAMILY MEDICINE

## 2022-06-17 PROCEDURE — 1126F PR PAIN SEVERITY QUANTIFIED, NO PAIN PRESENT: ICD-10-PCS | Mod: CPTII,S$GLB,, | Performed by: FAMILY MEDICINE

## 2022-06-17 PROCEDURE — 3078F DIAST BP <80 MM HG: CPT | Mod: CPTII,S$GLB,, | Performed by: FAMILY MEDICINE

## 2022-06-17 RX ORDER — VALSARTAN 320 MG/1
320 TABLET ORAL DAILY
Qty: 90 TABLET | Refills: 3 | Status: SHIPPED | OUTPATIENT
Start: 2022-06-17 | End: 2023-11-30 | Stop reason: SDUPTHER

## 2022-06-17 RX ORDER — AMOXICILLIN 500 MG/1
500 CAPSULE ORAL EVERY 6 HOURS
COMMUNITY
Start: 2022-06-10 | End: 2022-07-18 | Stop reason: ALTCHOICE

## 2022-06-17 RX ORDER — HYDROCHLOROTHIAZIDE 12.5 MG/1
12.5 TABLET ORAL DAILY
Qty: 90 TABLET | Refills: 3 | Status: SHIPPED | OUTPATIENT
Start: 2022-06-17 | End: 2023-07-05 | Stop reason: SDUPTHER

## 2022-06-17 RX ORDER — CIPROFLOXACIN HYDROCHLORIDE 3 MG/ML
SOLUTION/ DROPS OPHTHALMIC
COMMUNITY
Start: 2022-06-13 | End: 2022-11-18

## 2022-06-17 RX ORDER — LEVOTHYROXINE SODIUM 100 UG/1
100 TABLET ORAL DAILY
Qty: 90 TABLET | Refills: 3 | Status: SHIPPED | OUTPATIENT
Start: 2022-06-17 | End: 2023-07-26 | Stop reason: SDUPTHER

## 2022-06-17 RX ORDER — KETOROLAC TROMETHAMINE 5 MG/ML
SOLUTION OPHTHALMIC
COMMUNITY
Start: 2022-06-13 | End: 2022-11-18

## 2022-06-17 RX ORDER — ATORVASTATIN CALCIUM 10 MG/1
10 TABLET, FILM COATED ORAL NIGHTLY
Qty: 90 TABLET | Refills: 3 | Status: SHIPPED | OUTPATIENT
Start: 2022-06-17 | End: 2024-02-28 | Stop reason: SDUPTHER

## 2022-06-17 NOTE — PROGRESS NOTES
Patient ID: Valaire Colindres is a 77 y.o. female.    Chief Complaint: Follow up      She has a past medical history of Essential hypertension, prediabetes, hyperlipidemia, hypothyroidism, idiopathic progressive polyneuropathy; Alpha and beta thalassemia trait, MGUS (monoclonal gammopathy of unknown significance and Skeletal survey 8/5/2019 did not show multiple myeloma bone lesions), urge incontinence with cystocele and rectocele (follows with urogynecology); vaginal atrophy, foreign body of proximal phalanx of the left great toe, Hallux valgus of the left great toe with moderate degenerative changes of the bilateral great toe metatarsophalangeal joints; osteopenia, vitamin D insufficieny, trochanteric bursitis, GERD, spider veins, Hx of IBS, chronic constipation, gastric ulcer, Keratitis sicca of both eyes; dry eye syndrome of both eyes; right eye angle recession; hyperopia with presbypopia of both eyes; nuclear sclerotic cataract of both eyes; allergic rhinitis, postnasal drip, tinnitus of both ears, bilateral vocal cords paresis, vocal cord paresis determined by laryngoscopy, laryngopharyngeal reflux, nasal septal deviation, preslarynges, mixed conductive and sensorineural hearing loss of left ear with restricted hearing of right ear, 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    She reports no new or worsening concerns. She needs medication refilled. No medication adverse events.  She continues to follow with ENT and opthalmology.  She has upcoming cataract surgery but needs paperwork completed in close proximity to surgical date.  She will establish care with new PCP.      The following portions of the patient's history were reviewed and updated as appropriate: allergies, current medications, past family history, past medical history, past social history, past surgical history and  "problem list.      Review of Systems   Constitutional: Negative for activity change, appetite change, chills, diaphoresis, fatigue, fever and unexpected weight change.    HENT: Negative for nasal congestion, dental problem, facial swelling, nosebleeds, postnasal drip, rhinorrhea, sore throat, tinnitus and trouble swallowing.    Eyes: Negative for pain, discharge, itching and visual disturbance.   Respiratory: Negative for apnea, chest tightness, shortness of breath, wheezing and stridor.    Cardiovascular: Negative for chest pain, palpitations and leg swelling.   Gastrointestinal: Negative for abdominal distention, abdominal pain, constipation, diarrhea, nausea, rectal pain and vomiting.   Endocrine: Negative for cold intolerance, heat intolerance and polyuria.   Genitourinary: Negative for difficulty urinating, dysuria, frequency, hematuria and urgency.   Musculoskeletal: Positive for arthralgias, back pain and myalgias.   Integumentary:  Negative for color change and rash.   Neurological: Positive for numbness. Negative for tremors, seizures, syncope, facial asymmetry, weakness and headaches.   Hematological: Negative for adenopathy. Does not bruise/bleed easily.   Psychiatric/Behavioral: Negative for agitation, confusion, hallucinations, self-injury and suicidal ideas. The patient is not hyperactive.           Objective:       Vitals:    06/17/22 1104   BP: 110/62   BP Location: Right arm   Patient Position: Sitting   BP Method: Medium (Manual)   Pulse: 65   Resp: 19   Temp: 97.8 °F (36.6 °C)   SpO2: 99%   Weight: 64.7 kg (142 lb 10.2 oz)   Height: 5' 4" (1.626 m)     Physical Exam  Constitutional:       General: She is not in acute distress.     Appearance: She is well-developed. She is not diaphoretic.   HENT:      Head: Normocephalic and atraumatic.      Right Ear: External ear normal.      Left Ear: External ear normal.   Eyes:      General: No scleral icterus.        Right eye: No discharge.         Left " eye: No discharge.      Conjunctiva/sclera: Conjunctivae normal.   Neck:      Musculoskeletal: Neck supple.      Thyroid: No thyromegaly.   Cardiovascular:      Rate and Rhythm: Normal rate and regular rhythm.      Heart sounds: Normal heart sounds. No murmur. No friction rub. No gallop.    Pulmonary:      Effort: Pulmonary effort is normal. No respiratory distress.      Breath sounds: Normal breath sounds.   Chest:      Chest wall: No tenderness.   Abdominal:      General: Bowel sounds are normal.      Palpations: Abdomen is soft.      Tenderness: There is no rebound.   Musculoskeletal: Normal range of motion.   Lymphadenopathy:      Cervical: No cervical adenopathy.   Neurological:      General: No focal deficit present.      Mental Status: She is alert and oriented to person, place, and time.      Motor: No abnormal muscle tone.   Psychiatric:         Thought Content: Thought content normal.         Judgment: Judgment normal.         Assessment:       1. Essential hypertension    2. Prediabetes    3. Mixed hyperlipidemia    4. Acquired hypothyroidism    5. Gastroesophageal reflux disease without esophagitis    6. History of gastric ulcer    7. Irritable bowel syndrome with both constipation and diarrhea    8. Osteopenia of lumbar spine    9. Thalassemia trait    10. MGUS (monoclonal gammopathy of unknown significance)    11. Urge incontinence    12. Cystocele, midline    13. Rectocele    14. Vaginal atrophy    15. S/P hysterectomy    16. Acquired hallux valgus of left foot    17. Trochanteric bursitis of right hip    18. Nuclear sclerotic cataract of both eyes    19. Mixed conductive and sensorineural hearing loss of left ear with restricted hearing of right ear    20. Disease of spinal cord, unspecified    21. Keratitis sicca, both eyes        Plan:       1. Essential hypertension  -     valsartan (DIOVAN) 320 MG tablet; Take 1 tablet (320 mg total) by mouth once daily.  Dispense: 90 tablet; Refill: 3  -      hydroCHLOROthiazide (HYDRODIURIL) 12.5 MG Tab; Take 1 tablet (12.5 mg total) by mouth once daily.  Dispense: 90 tablet; Refill: 3  BP on target. Asymptomatic without symptoms of hypotension.    2. Prediabetes  Impaired fasting glucose is essentially early type 2 diabetes and needs to be treated as such with main emphasis on diet and exercise. Avoiding excessive sugars and starch in the diet are important.    3. Mixed hyperlipidemia  -     atorvastatin (LIPITOR) 10 MG tablet; Take 1 tablet (10 mg total) by mouth every evening.  Dispense: 90 tablet; Refill: 3    4. Acquired hypothyroidism  -     levothyroxine (SYNTHROID) 100 MCG tablet; Take 1 tablet (100 mcg total) by mouth once daily.  Dispense: 90 tablet; Refill: 3    5. Gastroesophageal reflux disease without esophagitis  On Famotidine    6. History of gastric ulcer  Avoid NSAIDs    7. Irritable bowel syndrome with both constipation and diarrhea  No adverse events. Due for screening colonoscopy in Dec 2022    8. Osteopenia of lumbar spine  Encourage adequate dietary calcium intake and supplemental vitamin D    9. Thalassemia trait  Follows with hematology/oncology  - thalassemia trait with deletion in both beta and alpha globulin  - anemia mild  - monitor    10. MGUS (monoclonal gammopathy of unknown significance)  Follows with hematology:  - IgG kappa monoclonal gammopathy. Low risk disease. No end organ damage.     11. Urge incontinence  12. Cystocele, midline  13. Rectocele  14. Vaginal atrophy  15. S/P hysterectomy  Follow with Urogynecology    16. Acquired hallux valgus of left foot  Foot precautions. Follow up with Podiatry as needed    17. Trochanteric bursitis of right hip  Manages conservatively    18. Nuclear sclerotic cataract of both eyes  19. Keratitis sicca, both eyes  Follow with ophthalmology    20. Mixed conductive and sensorineural hearing loss of left ear with restricted hearing of right ear  Follow with ENT    21. Disease of spinal cord,  unspecified  Hx of Osteoarthritis of spine with radiculopathy, cervical region; Neural foraminal stenosis of thoracic spine  On Gabapentin, robaxin  Follow with Physical Medicine and Rehabilitation  Completed healthy back program    Disclaimer: This note has been generated using voice-recognition software. There may be typographical errors that have been missed during proof-reading

## 2022-07-11 ENCOUNTER — PATIENT MESSAGE (OUTPATIENT)
Dept: OTOLARYNGOLOGY | Facility: CLINIC | Age: 77
End: 2022-07-11
Payer: MEDICARE

## 2022-07-15 ENCOUNTER — TELEPHONE (OUTPATIENT)
Dept: OTOLARYNGOLOGY | Facility: CLINIC | Age: 77
End: 2022-07-15
Payer: MEDICARE

## 2022-07-15 NOTE — TELEPHONE ENCOUNTER
----- Message from Dagoberto Israel MA sent at 7/14/2022  5:16 PM CDT -----  Regarding: FW: Missed call  Contact: [phone]    ----- Message -----  From: Lizy Abdi  Sent: 7/14/2022   3:55 PM CDT  To: [facility] Staff  Subject: Missed call                                      Calling in regards to missed call from office. Please call and discuss.       Attempted to call pt today about her next schedule appointment, per dr. Solo states he's not performing nasolaryngoscopy and he is not having to numb her throat for this visit.

## 2022-07-18 ENCOUNTER — OFFICE VISIT (OUTPATIENT)
Dept: OTOLARYNGOLOGY | Facility: CLINIC | Age: 77
End: 2022-07-18
Payer: MEDICARE

## 2022-07-18 VITALS
BODY MASS INDEX: 24.56 KG/M2 | SYSTOLIC BLOOD PRESSURE: 119 MMHG | TEMPERATURE: 98 F | DIASTOLIC BLOOD PRESSURE: 62 MMHG | HEART RATE: 70 BPM | WEIGHT: 143.06 LBS

## 2022-07-18 DIAGNOSIS — R49.0 DYSPHONIA: Primary | ICD-10-CM

## 2022-07-18 DIAGNOSIS — J34.2 NASAL SEPTAL DEVIATION: ICD-10-CM

## 2022-07-18 DIAGNOSIS — H61.22 IMPACTED CERUMEN OF LEFT EAR: ICD-10-CM

## 2022-07-18 DIAGNOSIS — J30.89 NON-SEASONAL ALLERGIC RHINITIS, UNSPECIFIED TRIGGER: ICD-10-CM

## 2022-07-18 DIAGNOSIS — H81.392 PERIPHERAL VERTIGO INVOLVING LEFT EAR: ICD-10-CM

## 2022-07-18 DIAGNOSIS — R09.82 PND (POST-NASAL DRIP): ICD-10-CM

## 2022-07-18 PROCEDURE — 99214 PR OFFICE/OUTPT VISIT, EST, LEVL IV, 30-39 MIN: ICD-10-PCS | Mod: 25,S$GLB,, | Performed by: SPECIALIST

## 2022-07-18 PROCEDURE — 3074F SYST BP LT 130 MM HG: CPT | Mod: CPTII,S$GLB,, | Performed by: SPECIALIST

## 2022-07-18 PROCEDURE — 3288F FALL RISK ASSESSMENT DOCD: CPT | Mod: CPTII,S$GLB,, | Performed by: SPECIALIST

## 2022-07-18 PROCEDURE — 1160F RVW MEDS BY RX/DR IN RCRD: CPT | Mod: CPTII,S$GLB,, | Performed by: SPECIALIST

## 2022-07-18 PROCEDURE — 1101F PT FALLS ASSESS-DOCD LE1/YR: CPT | Mod: CPTII,S$GLB,, | Performed by: SPECIALIST

## 2022-07-18 PROCEDURE — 1159F PR MEDICATION LIST DOCUMENTED IN MEDICAL RECORD: ICD-10-PCS | Mod: CPTII,S$GLB,, | Performed by: SPECIALIST

## 2022-07-18 PROCEDURE — 1101F PR PT FALLS ASSESS DOC 0-1 FALLS W/OUT INJ PAST YR: ICD-10-PCS | Mod: CPTII,S$GLB,, | Performed by: SPECIALIST

## 2022-07-18 PROCEDURE — 1160F PR REVIEW ALL MEDS BY PRESCRIBER/CLIN PHARMACIST DOCUMENTED: ICD-10-PCS | Mod: CPTII,S$GLB,, | Performed by: SPECIALIST

## 2022-07-18 PROCEDURE — 3078F DIAST BP <80 MM HG: CPT | Mod: CPTII,S$GLB,, | Performed by: SPECIALIST

## 2022-07-18 PROCEDURE — 99999 PR PBB SHADOW E&M-EST. PATIENT-LVL IV: CPT | Mod: PBBFAC,,, | Performed by: SPECIALIST

## 2022-07-18 PROCEDURE — 1126F PR PAIN SEVERITY QUANTIFIED, NO PAIN PRESENT: ICD-10-PCS | Mod: CPTII,S$GLB,, | Performed by: SPECIALIST

## 2022-07-18 PROCEDURE — 3074F PR MOST RECENT SYSTOLIC BLOOD PRESSURE < 130 MM HG: ICD-10-PCS | Mod: CPTII,S$GLB,, | Performed by: SPECIALIST

## 2022-07-18 PROCEDURE — 69210 PR REMOVAL IMPACTED CERUMEN REQUIRING INSTRUMENTATION, UNILATERAL: ICD-10-PCS | Mod: LT,S$GLB,, | Performed by: SPECIALIST

## 2022-07-18 PROCEDURE — 1159F MED LIST DOCD IN RCRD: CPT | Mod: CPTII,S$GLB,, | Performed by: SPECIALIST

## 2022-07-18 PROCEDURE — 69210 REMOVE IMPACTED EAR WAX UNI: CPT | Mod: LT,S$GLB,, | Performed by: SPECIALIST

## 2022-07-18 PROCEDURE — 3288F PR FALLS RISK ASSESSMENT DOCUMENTED: ICD-10-PCS | Mod: CPTII,S$GLB,, | Performed by: SPECIALIST

## 2022-07-18 PROCEDURE — 3078F PR MOST RECENT DIASTOLIC BLOOD PRESSURE < 80 MM HG: ICD-10-PCS | Mod: CPTII,S$GLB,, | Performed by: SPECIALIST

## 2022-07-18 PROCEDURE — 99999 PR PBB SHADOW E&M-EST. PATIENT-LVL IV: ICD-10-PCS | Mod: PBBFAC,,, | Performed by: SPECIALIST

## 2022-07-18 PROCEDURE — 1126F AMNT PAIN NOTED NONE PRSNT: CPT | Mod: CPTII,S$GLB,, | Performed by: SPECIALIST

## 2022-07-18 PROCEDURE — 99214 OFFICE O/P EST MOD 30 MIN: CPT | Mod: 25,S$GLB,, | Performed by: SPECIALIST

## 2022-07-18 NOTE — PROCEDURES
Ear Cerumen Removal    Date/Time: 7/18/2022 1:20 PM  Performed by: BETTY Solo MD  Authorized by: BETTY Solo MD     Consent Done?:  Yes (Verbal)    Local anesthetic:  None  Location details:  Left ear  Procedure type comment:  Wire loop  Cerumen  Removal Results:  Cerumen completely removed  Patient tolerance:  Patient tolerated the procedure well with no immediate complications

## 2022-07-18 NOTE — PROGRESS NOTES
Subjective:       Patient ID: Valarie Colindres is a 77 y.o. female.    Chief Complaint: Follow-up    Follow-up    The patient is returning for follow-up visit.  There are multiple issues to discuss:  1. Allergic rhinitis:  Nasal secretions are clear.  She is breathing well through her nose.  She is using fluticasone and loratadine to control allergy symptoms.  2. Hearing loss/tinnitus/blockage of tlhe ears:  There is no change in the patient's hearing since last visit.  She continues to feel like her ears are blocked with wax.  3. Laryngopharyngeal reflux::  The patient has noticed a significant improvement in the laryngopharyngeal reflux symptoms.  She has almost no globus sensation, throat clearing or phlegm as long she follows her diet.  She has been taking famotidine twice daily.    4. Left peripheral vestibular disorder:  The patient is observing a sodium restricted diet.  She has not required use of meclizine.  Her balance issues are markedly improved.        Review of Systems     Constitutional: Positive for fatigue.  Negative for appetite change and unexpected weight loss.      HENT: Positive for hearing loss, postnasal drip, ringing in the ears, runny nose, sinus pressure, stuffy nose and trouble swallowing.  Negative for ear discharge (Ear wax), ear infection, ear pain, facial swelling, mouth sores, nosebleeds, sinus infection, tonsil infection, dental problems and voice change.      Eyes:  Negative for change in eyesight, eye drainage, eye itching and photophobia.     Respiratory:  Negative for shortness of breath, sleep apnea, snoring and wheezing.      Cardiovascular:  Negative for foot swelling, irregular heartbeat and swollen veins.     Gastrointestinal:  Negative for acid reflux, constipation, diarrhea and heartburn.     Genitourinary: Negative for difficulty urinating, sexual problems and frequent urination.     Musc: Negative for aching joints, aching muscles and back pain.     Allergy: Positive  for seasonal allergies. Negative for food allergies.     Endocrine: Negative for cold intolerance and heat intolerance.      Neurological: Negative for dizziness, light-headedness, seizures and tremors.      Hematologic: Negative for bruises/bleeds easily.  Alopecia of the entire scalp    Psychiatric: Negative for decreased concentration, depression, nervous/anxious and sleep disturbance.                Objective:      Physical Exam  Vitals and nursing note reviewed.   Constitutional:       General: She is awake.      Appearance: Normal appearance. She is well-developed, well-groomed and normal weight.   HENT:      Head: Normocephalic.      Jaw: There is normal jaw occlusion.      Salivary Glands: Right salivary gland is not diffusely enlarged. Left salivary gland is not diffusely enlarged.      Right Ear: Ear canal and external ear normal. Decreased hearing noted. There is impacted cerumen. Tympanic membrane is retracted. Tympanic membrane has decreased mobility.      Left Ear: Ear canal and external ear normal. Decreased hearing noted. There is impacted cerumen. Tympanic membrane is retracted. Tympanic membrane has decreased mobility.      Nose: Septal deviation (Mildly to the left), mucosal edema (cyanotic, boggy inferior turbinates bilaterally) and rhinorrhea (clear mucus bilaterally) present. No nasal deformity. Rhinorrhea is clear.      Right Turbinates: Enlarged and pale.      Left Turbinates: Enlarged and pale.      Mouth/Throat:      Lips: No lesions.      Mouth: No oral lesions.      Dentition: No gum lesions.      Tongue: No lesions.      Palate: No mass and lesions.      Pharynx: Oropharynx is clear. Uvula midline.   Eyes:      General: Lids are normal.         Right eye: No discharge.         Left eye: No discharge.      Conjunctiva/sclera:      Right eye: Right conjunctiva is injected. No exudate.     Left eye: Left conjunctiva is injected. No exudate.     Pupils: Pupils are equal, round, and reactive  to light.   Neck:      Thyroid: No thyroid mass or thyromegaly.      Trachea: Trachea normal. No tracheal deviation.   Cardiovascular:      Rate and Rhythm: Normal rate and regular rhythm.      Pulses: Normal pulses.      Heart sounds: Normal heart sounds.   Pulmonary:      Effort: Pulmonary effort is normal.      Breath sounds: Normal breath sounds. No stridor. No decreased breath sounds, wheezing, rhonchi or rales.   Abdominal:      General: Bowel sounds are normal.      Palpations: Abdomen is soft.      Tenderness: There is no abdominal tenderness.   Musculoskeletal:         General: Normal range of motion.      Cervical back: Normal range of motion. No muscular tenderness.   Lymphadenopathy:      Head:      Right side of head: No submental, submandibular, preauricular, posterior auricular or occipital adenopathy.      Left side of head: No submental, submandibular, preauricular, posterior auricular or occipital adenopathy.      Cervical: No cervical adenopathy.   Skin:     General: Skin is warm and dry.      Findings: No petechiae or rash.      Nails: There is no clubbing.   Neurological:      Mental Status: She is alert and oriented to person, place, and time.      Cranial Nerves: No cranial nerve deficit.      Sensory: No sensory deficit.      Gait: Gait normal.      Comments: Neuro otologic-no nystagmus,  Romberg negative, tandem Romberg falls to the left, mildly wide-based gait   Psychiatric:         Speech: Speech normal.         Behavior: Behavior normal. Behavior is cooperative.         Thought Content: Thought content normal.         Judgment: Judgment normal.         Procedure-cerumen impactions removed from the left ear using a wire loop.  The patient tolerated procedure well was discharged post procedure    Assessment:       1. Dysphonia    2. Peripheral vertigo involving left ear    3. Non-seasonal allergic rhinitis, unspecified trigger    4. Nasal septal deviation    5. PND (post-nasal drip)    6.  Impacted cerumen of left ear        Plan:       I will have the patient continue with her current medical regimen.  I will recheck her in 4 months, or sooner on an as-needed basis.

## 2022-07-25 ENCOUNTER — OFFICE VISIT (OUTPATIENT)
Dept: PRIMARY CARE CLINIC | Facility: CLINIC | Age: 77
End: 2022-07-25
Payer: MEDICARE

## 2022-07-25 VITALS
OXYGEN SATURATION: 99 % | HEART RATE: 67 BPM | TEMPERATURE: 98 F | HEIGHT: 64 IN | RESPIRATION RATE: 18 BRPM | DIASTOLIC BLOOD PRESSURE: 70 MMHG | BODY MASS INDEX: 24.57 KG/M2 | WEIGHT: 143.94 LBS | SYSTOLIC BLOOD PRESSURE: 110 MMHG

## 2022-07-25 DIAGNOSIS — I10 PRIMARY HYPERTENSION: ICD-10-CM

## 2022-07-25 DIAGNOSIS — Z01.818 PREOPERATIVE CLEARANCE: Primary | ICD-10-CM

## 2022-07-25 DIAGNOSIS — D56.3 THALASSEMIA TRAIT: ICD-10-CM

## 2022-07-25 DIAGNOSIS — H26.9 CATARACT, UNSPECIFIED CATARACT TYPE, UNSPECIFIED LATERALITY: ICD-10-CM

## 2022-07-25 DIAGNOSIS — E03.9 ACQUIRED HYPOTHYROIDISM: ICD-10-CM

## 2022-07-25 DIAGNOSIS — Z12.31 SCREENING MAMMOGRAM FOR BREAST CANCER: ICD-10-CM

## 2022-07-25 PROBLEM — M19.042 PRIMARY OSTEOARTHRITIS OF BOTH HANDS: Status: ACTIVE | Noted: 2022-07-25

## 2022-07-25 PROBLEM — M17.0 PRIMARY OSTEOARTHRITIS OF BOTH KNEES: Status: ACTIVE | Noted: 2022-07-25

## 2022-07-25 PROBLEM — M19.041 PRIMARY OSTEOARTHRITIS OF BOTH HANDS: Status: ACTIVE | Noted: 2022-07-25

## 2022-07-25 PROCEDURE — 1101F PR PT FALLS ASSESS DOC 0-1 FALLS W/OUT INJ PAST YR: ICD-10-PCS | Mod: CPTII,S$GLB,, | Performed by: FAMILY MEDICINE

## 2022-07-25 PROCEDURE — 3288F PR FALLS RISK ASSESSMENT DOCUMENTED: ICD-10-PCS | Mod: CPTII,S$GLB,, | Performed by: FAMILY MEDICINE

## 2022-07-25 PROCEDURE — 1126F AMNT PAIN NOTED NONE PRSNT: CPT | Mod: CPTII,S$GLB,, | Performed by: FAMILY MEDICINE

## 2022-07-25 PROCEDURE — 99499 RISK ADDL DX/OHS AUDIT: ICD-10-PCS | Mod: S$GLB,,, | Performed by: FAMILY MEDICINE

## 2022-07-25 PROCEDURE — 1160F RVW MEDS BY RX/DR IN RCRD: CPT | Mod: CPTII,S$GLB,, | Performed by: FAMILY MEDICINE

## 2022-07-25 PROCEDURE — 99214 OFFICE O/P EST MOD 30 MIN: CPT | Mod: S$GLB,,, | Performed by: FAMILY MEDICINE

## 2022-07-25 PROCEDURE — 99214 PR OFFICE/OUTPT VISIT, EST, LEVL IV, 30-39 MIN: ICD-10-PCS | Mod: S$GLB,,, | Performed by: FAMILY MEDICINE

## 2022-07-25 PROCEDURE — 3074F PR MOST RECENT SYSTOLIC BLOOD PRESSURE < 130 MM HG: ICD-10-PCS | Mod: CPTII,S$GLB,, | Performed by: FAMILY MEDICINE

## 2022-07-25 PROCEDURE — 1159F MED LIST DOCD IN RCRD: CPT | Mod: CPTII,S$GLB,, | Performed by: FAMILY MEDICINE

## 2022-07-25 PROCEDURE — 1101F PT FALLS ASSESS-DOCD LE1/YR: CPT | Mod: CPTII,S$GLB,, | Performed by: FAMILY MEDICINE

## 2022-07-25 PROCEDURE — 1160F PR REVIEW ALL MEDS BY PRESCRIBER/CLIN PHARMACIST DOCUMENTED: ICD-10-PCS | Mod: CPTII,S$GLB,, | Performed by: FAMILY MEDICINE

## 2022-07-25 PROCEDURE — 99999 PR PBB SHADOW E&M-EST. PATIENT-LVL IV: CPT | Mod: PBBFAC,,, | Performed by: FAMILY MEDICINE

## 2022-07-25 PROCEDURE — 1126F PR PAIN SEVERITY QUANTIFIED, NO PAIN PRESENT: ICD-10-PCS | Mod: CPTII,S$GLB,, | Performed by: FAMILY MEDICINE

## 2022-07-25 PROCEDURE — 3288F FALL RISK ASSESSMENT DOCD: CPT | Mod: CPTII,S$GLB,, | Performed by: FAMILY MEDICINE

## 2022-07-25 PROCEDURE — 3078F PR MOST RECENT DIASTOLIC BLOOD PRESSURE < 80 MM HG: ICD-10-PCS | Mod: CPTII,S$GLB,, | Performed by: FAMILY MEDICINE

## 2022-07-25 PROCEDURE — 99999 PR PBB SHADOW E&M-EST. PATIENT-LVL IV: ICD-10-PCS | Mod: PBBFAC,,, | Performed by: FAMILY MEDICINE

## 2022-07-25 PROCEDURE — 1159F PR MEDICATION LIST DOCUMENTED IN MEDICAL RECORD: ICD-10-PCS | Mod: CPTII,S$GLB,, | Performed by: FAMILY MEDICINE

## 2022-07-25 PROCEDURE — 99499 UNLISTED E&M SERVICE: CPT | Mod: S$GLB,,, | Performed by: FAMILY MEDICINE

## 2022-07-25 PROCEDURE — 3078F DIAST BP <80 MM HG: CPT | Mod: CPTII,S$GLB,, | Performed by: FAMILY MEDICINE

## 2022-07-25 PROCEDURE — 3074F SYST BP LT 130 MM HG: CPT | Mod: CPTII,S$GLB,, | Performed by: FAMILY MEDICINE

## 2022-07-25 NOTE — PATIENT INSTRUCTIONS
Clearance completed  Dietary recommendations for prediabetes attached  Get shingles and tetanus vaccines at your convenience  Schedule mammogram

## 2022-07-25 NOTE — PROGRESS NOTES
[facility] Note    Chief Complaint      Chief Complaint   Patient presents with    [facility] [place] [facility]    Pre-op Exam       History of Present Illness      Valarie Colindres is a 77 y.o. female who presents today for:  Screening:    Colon-  Past age but has first degree relative. bella at [facility]   MMG- 7/21 neg with neg US, due now   Pap- hyst   BD- 12/21osteopenia on ca and D. Exercises - yoga and walking    Immunizations:   COVID- boost 6/22   Flu- 11/21   Shingles- will get once grandchildren go home   Pneumonia- completed 23/13 in 2021  Tetanus- due    Needs clearance for cataract surgery.  Can perform 4 METS  Non smoking  No blood thinners  BP controlled    Labs 6/22  H/H 10/33 stable has thalassemia. Does have fatigue. Has taken iron and transfusions without improvement in her numbers in past    A1C - 5.9         Patient Care Team:  Iris Lopez MD as PCP - General (Internal Medicine)  Sirena Rodriguez MA as Care Coordinator     Health Maintenance:  Immunization History   Administered Date(s) Administered    COVID-19, MRNA, LN-S, PF (Pfizer) (Gray Cap) 06/15/2022    COVID-19, MRNA, LN-S, PF (Pfizer) (Purple Cap) 01/21/2021, 02/11/2021, 10/11/2021    Influenza (FLUAD) - Quadrivalent - Adjuvanted - PF *Preferred* (65+) 09/10/2020, 11/05/2021    Influenza - High Dose - PF (65 years and older) 09/11/2017    Influenza - Quadrivalent - PF *Preferred* (6 months and older) 11/05/2016    Influenza Split 11/01/2014    Pneumococcal Conjugate - 13 Valent 11/16/2020    Pneumococcal Polysaccharide - 23 Valent 05/17/2021      Health Maintenance   Topic Date Due    TETANUS VACCINE  01/01/2014    Colonoscopy  12/29/2022    DEXA Scan  12/10/2024    Lipid Panel  06/11/2027    Hepatitis C Screening  Completed        Past Medical History:  Past Medical History:   Diagnosis Date    Angle recession of right eye     Blunt trauma of both eyes     hit across eyes with bungee exercise band     Cataract     Cystocele, midline     Dry eye syndrome     Ectropion due to laxity of eyelid, right     GERD (gastroesophageal reflux disease)     Hyperlipidemia     Hypertension     PVD (posterior vitreous detachment), right eye     Rectocele     Retinal drusen of both eyes     Thalassemia major     Urge incontinence     Vaginal atrophy        Past Surgical History:   has a past surgical history that includes Hysterectomy (1977); Ankle fracture surgery (Right, 1996); Abdominal adhesion surgery (1978); and Ectropion repair (Bilateral, 06/2018).    Family History:  family history includes Blindness in her maternal uncle; Breast cancer in her maternal aunt; Colon cancer in her father; Glaucoma in her maternal uncle; Heart disease in her mother; Prostate cancer in her brother, brother, and father; Stroke in her mother.     Social History:  Social History     Tobacco Use    Smoking status: Never Smoker    Smokeless tobacco: Never Used   Substance Use Topics    Alcohol use: Not Currently    Drug use: No       Review of Systems   Constitutional: Negative for fever.   Respiratory: Negative for shortness of breath.    Cardiovascular: Negative for chest pain.   Gastrointestinal: Negative for change in bowel habit and change in bowel habit.   Genitourinary: Negative for difficulty urinating.        Medications:    Current Outpatient Medications:     acetaminophen (TYLENOL) 500 MG tablet, Take 1-2 tablets (500-1,000 mg total) by mouth 3 (three) times daily as needed for Pain., Disp: , Rfl: 0    atorvastatin (LIPITOR) 10 MG tablet, Take 1 tablet (10 mg total) by mouth every evening., Disp: 90 tablet, Rfl: 3    azelastine (ASTELIN) 137 mcg (0.1 %) nasal spray, Two sprays in each nostril, sniff until absorbed, then follow with 1 spray of fluticasone.  Use both sprays twice daily., Disp: 30 mL, Rfl: 11    chlorhexidine (PERIDEX) 0.12 % solution, RINSE AND SWISH ONE CAPFUL BID, Disp: , Rfl: 1    ciprofloxacin HCl  "(CILOXAN) 0.3 % ophthalmic solution, , Disp: , Rfl:     diclofenac sodium (VOLTAREN) 1 % Gel, Apply 2 g topically 4 (four) times daily., Disp: 1 Tube, Rfl: 2    famotidine (PEPCID) 20 MG tablet, Take 1 tablet (20 mg total) by mouth 2 (two) times daily., Disp: 60 tablet, Rfl: 11    fluticasone propionate (FLONASE) 50 mcg/actuation nasal spray, 2 sprays (100 mcg total) by Each Nostril route once daily., Disp: 18.2 mL, Rfl: 11    hydroCHLOROthiazide (HYDRODIURIL) 12.5 MG Tab, Take 1 tablet (12.5 mg total) by mouth once daily., Disp: 90 tablet, Rfl: 3    ketorolac 0.5% (ACULAR) 0.5 % Drop, Place into both eyes., Disp: , Rfl:     levothyroxine (SYNTHROID) 100 MCG tablet, Take 1 tablet (100 mcg total) by mouth once daily., Disp: 90 tablet, Rfl: 3    loratadine (CLARITIN) 10 mg tablet, Take 1 tablet (10 mg total) by mouth once daily., Disp: 30 tablet, Rfl: 11    prednisoLONE acetate (PRED FORTE) 1 % DrpS, One drop both eyes: 4x/day x 5 days, 3x/day x 5 days, 2x/day x 5 days, 1x/day x 5 day, then stop., Disp: 1 Bottle, Rfl: 1    valsartan (DIOVAN) 320 MG tablet, Take 1 tablet (320 mg total) by mouth once daily., Disp: 90 tablet, Rfl: 3    diphth,pertus,acell,,tetanus (BOOSTRIX TDAP) 2.5-8-5 Lf-mcg-Lf/0.5mL Syrg injection, Inject into the muscle., Disp: 0.5 mL, Rfl: 0     Allergies:  Review of patient's allergies indicates:   Allergen Reactions    Sutures, catgut (chromic)        Physical Exam      Vital Signs  Temp: 98 °F (36.7 °C)  Pulse: 67  Resp: 18  SpO2: 99 %  BP: 110/70  BP Location: Left arm  Patient Position: Sitting  Pain Score: 0-No pain  Height and Weight  Height: 5' 4" (162.6 cm)  Weight: 65.3 kg (143 lb 15.4 oz)  BSA (Calculated - sq m): 1.72 sq meters  BMI (Calculated): 24.7  Weight in (lb) to have BMI = 25: 145.3      Patient Position: Sitting      Physical Exam  Vitals reviewed.   Constitutional:       General: She is not in acute distress.     Appearance: Normal appearance.   HENT:      Right " Ear: Tympanic membrane, ear canal and external ear normal.      Left Ear: Tympanic membrane, ear canal and external ear normal.      Mouth/Throat:      Mouth: Mucous membranes are moist.      Pharynx: Oropharynx is clear. No oropharyngeal exudate or posterior oropharyngeal erythema.   Eyes:      Extraocular Movements: Extraocular movements intact.      Conjunctiva/sclera: Conjunctivae normal.      Pupils: Pupils are equal, round, and reactive to light.   Cardiovascular:      Rate and Rhythm: Normal rate and regular rhythm.      Pulses: Normal pulses.      Heart sounds: No murmur heard.    No friction rub. No gallop.   Pulmonary:      Effort: Pulmonary effort is normal.      Breath sounds: No wheezing, rhonchi or rales.   Abdominal:      General: Abdomen is flat. Bowel sounds are normal. There is no distension.      Palpations: Abdomen is soft. There is no mass.      Tenderness: There is no abdominal tenderness.   Musculoskeletal:      Cervical back: Neck supple.      Right lower leg: No edema.      Left lower leg: No edema.   Lymphadenopathy:      Cervical: No cervical adenopathy.   Skin:     General: Skin is warm and dry.   Neurological:      General: No focal deficit present.      Mental Status: She is alert.   Psychiatric:         Mood and Affect: Mood normal.         Behavior: Behavior normal.          Laboratory:  CBC:  Recent Labs   Lab 05/10/21  0913 08/18/21  1025 06/11/22  1056   WBC 5.71 5.57 5.50   RBC 5.47 H 5.30 5.39   Hemoglobin 10.2 L 10.0 L 10.3 L   Hematocrit 33.6 L 33.1 L 33.8 L   Platelets 263 220 259   MCV 61 L 63 L 63 L   MCH 18.6 L 18.9 L 19.1 L   MCHC 30.4 L 30.2 L 30.5 L       CMP:  Recent Labs   Lab 05/10/21  0913 08/18/21  1025 06/11/22  1056   Glucose 92 80 83   Calcium 9.7 10.1 9.8   Albumin 3.7 4.0 3.9   Total Protein 6.9 7.0 6.9   Sodium 141 139 140   Potassium 4.1 3.8 3.8   CO2 32 H 27 27   Chloride 104 103 103   BUN 15 9 16   Creatinine 0.8 0.7 0.9   Alkaline Phosphatase 46 L 55 51 L    ALT 8 L 9 L 7 L   AST 15 15 13   Total Bilirubin 0.4 0.5 0.5           URINALYSIS:  Recent Labs   Lab 10/15/20  1021   Color, UA Yellow   Specific Gravity, UA 1.010   pH, UA 6.0   Protein, UA Negative   Bacteria Rare   Nitrite, UA Negative   Leukocytes, UA Negative        LIPIDS:  Recent Labs   Lab 05/10/21  0913 06/28/21  0920 06/11/22  1056   TSH 0.366 L 1.039 1.745   HDL 58  --  59   Cholesterol 218 H  --  173   Triglycerides 85  --  54   LDL Cholesterol 143.0  --  103.2   HDL/Cholesterol Ratio 26.6  --  34.1   Non-HDL Cholesterol 160  --  114   Total Cholesterol/HDL Ratio 3.8  --  2.9       TSH:  Recent Labs   Lab 05/10/21  0913 06/28/21  0920 06/11/22  1056   TSH 0.366 L 1.039 1.745       A1C:  Recent Labs   Lab 02/02/21  1534 06/07/21  1214 06/11/22  1056   Hemoglobin A1C 5.7 H 5.7 H 5.9 H       Urine Microalbumin/Cr:          Other:   Recent Labs   Lab 05/10/21  0913 06/07/21  1214 06/11/22  1056   Vit D, 25-Hydroxy 25 L  --  60   Vitamin B-12  --  453  --      Recent Labs   Lab 05/10/21  0913   Hepatitis C Ab Negative       Assessment/Plan     Valarie Colindres is a 77 y.o.female with:    Preoperative clearance  Patient cleared for MAC at low risk level  Cataract, unspecified cataract type, unspecified laterality  Surgery in August  Primary hypertension  - stable, continue current medication    Thalassemia trait  Anemic, stable  Acquired hypothyroidism  - stable, continue current medication    Screening mammogram for breast cancer  -     Mammo Digital Screening Bil[facility] w/ [facility]; Future; Expected date: 07/26/2022         Chronic conditions status updated as per HPI.  Other than changes above, cont current medications and maintain follow up with specialists.  Return to clinic in Follow up in about 6 months (around 1/25/2023).      Iris Lopez MD  [facility]

## 2022-07-29 ENCOUNTER — HOSPITAL ENCOUNTER (OUTPATIENT)
Dept: RADIOLOGY | Facility: OTHER | Age: 77
Discharge: HOME | End: 2022-07-29
Attending: FAMILY MEDICINE
Payer: MEDICARE

## 2022-07-29 DIAGNOSIS — Z12.31 SCREENING MAMMOGRAM FOR BREAST CANCER: ICD-10-CM

## 2022-07-29 PROCEDURE — 77067 SCR MAMMO BI INCL CAD: CPT | Mod: 26,,, | Performed by: RADIOLOGY

## 2022-07-29 PROCEDURE — 77063 MAMMO DIGITAL SCREENING BILAT WITH TOMO: ICD-10-PCS | Mod: 26,,, | Performed by: RADIOLOGY

## 2022-07-29 PROCEDURE — 77063 BREAST TOMOSYNTHESIS BI: CPT | Mod: TC

## 2022-07-29 PROCEDURE — 77067 MAMMO DIGITAL SCREENING BILAT WITH TOMO: ICD-10-PCS | Mod: 26,,, | Performed by: RADIOLOGY

## 2022-07-29 PROCEDURE — 77067 SCR MAMMO BI INCL CAD: CPT | Mod: TC

## 2022-07-29 PROCEDURE — 77063 BREAST TOMOSYNTHESIS BI: CPT | Mod: 26,,, | Performed by: RADIOLOGY

## 2022-08-01 ENCOUNTER — PATIENT MESSAGE (OUTPATIENT)
Dept: PRIMARY CARE CLINIC | Facility: CLINIC | Age: 77
End: 2022-08-01
Payer: MEDICARE

## 2022-08-01 NOTE — PROGRESS NOTES
Progress note        Subjective:  57-year-old male with cellulitis left foot/lower extremity.  Patient scheduled for OR tomorrow for removal of infected bone left foot.      Objective:  Integument: Ulceration plantar lateral left foot measuring 2.01.0.3 no sinus tract or tunneling undermining mod amount of serous drainage from the ulceration.  Ongoing edema and erythema surrounding the area decreased since last examination.  Vascular: +2/4 palpable pedal pulses bilateral.  Cap fill time less than 3 seconds.  Edema noted left midfoot  Neurological: Decree sensation to Newborn-Prabhu about 5.07 to level malleolus bilateral      Musculoskeletal: Cavovarus deformity left foot    Assessment: Osteomyelitis left fourth metatarsal base      Plan: Examined eval the patient advised patient procedure scheduled for tomorrow afternoon.  We will keep patient n.p.o. after midnight tonight hold all anticoagulants and check COVID status   Subjective:       Patient ID: Valarie Colindres is a 76 y.o. female.    Chief Complaint:  Vaginal Discharge (C/o cream colored discharge)        History of Present Illness  Valarie Colindres is a 76 y.o. female No obstetric history on file. who presents for new patient visit. Reports 2 episodes of vaginal discharge, creamy, no bleeding, no itching, no odor. Wanted to get checked. Previous hysterectomy. Also occasional right flank/midabdominal pain, no  or GI complaints. Happens intermittently. No h/o kidney stones. No blood in urine or stool.     No LMP recorded. Patient has had a hysterectomy.   Date of Last Pap: 11/24/2020    Review of Systems  Review of Systems   Constitutional: Negative for chills and fever.        Objective:   Physical Exam:   Constitutional: She appears well-developed and well-nourished.               Genitourinary:    Vagina, right adnexa and left adnexa normal.   The external female genitalia was normal.   Right adnexum displays no mass, no tenderness and no fullness. Left adnexum displays no mass, no tenderness and no fullness. No  no vaginal discharge or tenderness in the vagina. Cervix is absent.Uterus is absent.                      Assessment/ Plan:     1. Vaginal discharge  Vaginosis Screen by DNA Probe   2. Acute right-sided low back pain without sciatica     3. Back pain, unspecified back location, unspecified back pain laterality, unspecified chronicity  Urine culture     Normal exam  Continue with colonoscopies  If pain worsens see PCP    Follow up if symptoms worsen or fail to improve.    As of April 1, 2021, the Cures Act has been passed nationally. This new law requires that all doctors progress notes, lab results, pathology reports and radiology reports be released IMMEDIATELY to the patient in the patient portal. That means that the results are released to you at the EXACT same time they are released to me. Therefore, with all of the patients that I have I am not able to  reply to each patient exactly when the results come in. So there will be a delay from when you see the results to when I see them and have time to come up with a response to send you. Also I only see these results when I am on the computer at work. So if the results come in over the weekend or after 5 pm of a work day, I will not see them until the next business day. As you can tell, this is a challenge as a physician to give every patient the quick response they hope for and deserve. So please be patient! Thanks for understanding, Dr. Siegel

## 2022-08-04 ENCOUNTER — PATIENT MESSAGE (OUTPATIENT)
Dept: PRIMARY CARE CLINIC | Facility: CLINIC | Age: 77
End: 2022-08-04
Payer: MEDICARE

## 2022-08-04 DIAGNOSIS — R73.03 PREDIABETES: ICD-10-CM

## 2022-08-04 DIAGNOSIS — I10 PRIMARY HYPERTENSION: Primary | ICD-10-CM

## 2022-08-04 DIAGNOSIS — R73.03 PREDIABETES: Primary | ICD-10-CM

## 2022-08-04 DIAGNOSIS — I12.9 HYPERTENSIVE CHRONIC KIDNEY DISEASE WITH STAGE 1 THROUGH STAGE 4 CHRONIC KIDNEY DISEASE, OR UNSPECIFIED CHRONIC KIDNEY DISEASE: ICD-10-CM

## 2022-08-04 DIAGNOSIS — E78.2 MIXED HYPERLIPIDEMIA: ICD-10-CM

## 2022-08-04 DIAGNOSIS — I10 PRIMARY HYPERTENSION: ICD-10-CM

## 2022-08-09 ENCOUNTER — PATIENT OUTREACH (OUTPATIENT)
Dept: ADMINISTRATIVE | Facility: HOSPITAL | Age: 77
End: 2022-08-09
Payer: MEDICARE

## 2022-08-09 NOTE — PROGRESS NOTES
Patient due for the following    Shingles Vaccine (1 of 2)    TETANUS VACCINE       Received colonoscopy records.  Scanned to media.  updated    Immunizations: reviewed and updated  Care Everywhere: triggered  Care Teams: up to date  Outreach: none needed

## 2022-08-24 ENCOUNTER — OFFICE VISIT (OUTPATIENT)
Dept: UROGYNECOLOGY | Facility: CLINIC | Age: 77
End: 2022-08-24
Payer: MEDICARE

## 2022-08-24 VITALS
BODY MASS INDEX: 24.28 KG/M2 | HEIGHT: 64 IN | DIASTOLIC BLOOD PRESSURE: 70 MMHG | WEIGHT: 142.19 LBS | SYSTOLIC BLOOD PRESSURE: 110 MMHG

## 2022-08-24 DIAGNOSIS — Z87.19 HX OF CONSTIPATION: ICD-10-CM

## 2022-08-24 DIAGNOSIS — N81.11 MIDLINE CYSTOCELE: ICD-10-CM

## 2022-08-24 DIAGNOSIS — N81.6 RECTOCELE: ICD-10-CM

## 2022-08-24 DIAGNOSIS — N95.2 VAGINAL ATROPHY: ICD-10-CM

## 2022-08-24 DIAGNOSIS — N39.41 URGE INCONTINENCE: Primary | ICD-10-CM

## 2022-08-24 PROCEDURE — 99999 PR PBB SHADOW E&M-EST. PATIENT-LVL IV: ICD-10-PCS | Mod: PBBFAC,,, | Performed by: NURSE PRACTITIONER

## 2022-08-24 PROCEDURE — 1126F PR PAIN SEVERITY QUANTIFIED, NO PAIN PRESENT: ICD-10-PCS | Mod: CPTII,S$GLB,, | Performed by: NURSE PRACTITIONER

## 2022-08-24 PROCEDURE — 3074F PR MOST RECENT SYSTOLIC BLOOD PRESSURE < 130 MM HG: ICD-10-PCS | Mod: CPTII,S$GLB,, | Performed by: NURSE PRACTITIONER

## 2022-08-24 PROCEDURE — 3288F FALL RISK ASSESSMENT DOCD: CPT | Mod: CPTII,S$GLB,, | Performed by: NURSE PRACTITIONER

## 2022-08-24 PROCEDURE — 1159F MED LIST DOCD IN RCRD: CPT | Mod: CPTII,S$GLB,, | Performed by: NURSE PRACTITIONER

## 2022-08-24 PROCEDURE — 1160F RVW MEDS BY RX/DR IN RCRD: CPT | Mod: CPTII,S$GLB,, | Performed by: NURSE PRACTITIONER

## 2022-08-24 PROCEDURE — 1101F PT FALLS ASSESS-DOCD LE1/YR: CPT | Mod: CPTII,S$GLB,, | Performed by: NURSE PRACTITIONER

## 2022-08-24 PROCEDURE — 3078F PR MOST RECENT DIASTOLIC BLOOD PRESSURE < 80 MM HG: ICD-10-PCS | Mod: CPTII,S$GLB,, | Performed by: NURSE PRACTITIONER

## 2022-08-24 PROCEDURE — 1159F PR MEDICATION LIST DOCUMENTED IN MEDICAL RECORD: ICD-10-PCS | Mod: CPTII,S$GLB,, | Performed by: NURSE PRACTITIONER

## 2022-08-24 PROCEDURE — 1101F PR PT FALLS ASSESS DOC 0-1 FALLS W/OUT INJ PAST YR: ICD-10-PCS | Mod: CPTII,S$GLB,, | Performed by: NURSE PRACTITIONER

## 2022-08-24 PROCEDURE — 3078F DIAST BP <80 MM HG: CPT | Mod: CPTII,S$GLB,, | Performed by: NURSE PRACTITIONER

## 2022-08-24 PROCEDURE — 99999 PR PBB SHADOW E&M-EST. PATIENT-LVL IV: CPT | Mod: PBBFAC,,, | Performed by: NURSE PRACTITIONER

## 2022-08-24 PROCEDURE — 99213 PR OFFICE/OUTPT VISIT, EST, LEVL III, 20-29 MIN: ICD-10-PCS | Mod: S$GLB,,, | Performed by: NURSE PRACTITIONER

## 2022-08-24 PROCEDURE — 99213 OFFICE O/P EST LOW 20 MIN: CPT | Mod: S$GLB,,, | Performed by: NURSE PRACTITIONER

## 2022-08-24 PROCEDURE — 3288F PR FALLS RISK ASSESSMENT DOCUMENTED: ICD-10-PCS | Mod: CPTII,S$GLB,, | Performed by: NURSE PRACTITIONER

## 2022-08-24 PROCEDURE — 3074F SYST BP LT 130 MM HG: CPT | Mod: CPTII,S$GLB,, | Performed by: NURSE PRACTITIONER

## 2022-08-24 PROCEDURE — 1160F PR REVIEW ALL MEDS BY PRESCRIBER/CLIN PHARMACIST DOCUMENTED: ICD-10-PCS | Mod: CPTII,S$GLB,, | Performed by: NURSE PRACTITIONER

## 2022-08-24 PROCEDURE — 1126F AMNT PAIN NOTED NONE PRSNT: CPT | Mod: CPTII,S$GLB,, | Performed by: NURSE PRACTITIONER

## 2022-08-24 NOTE — PROGRESS NOTES
Urogyn follow up  08/24/2022  .  [facility] - UROGYNECOLOGY  [address] [id number]    Valarie Colindres  [id number]  1945      Valarie Colindres is a 77 y.o.  here for a urogyn follow up of urge incontinence    Last HPI from 10/22/2020     1)  UI:  (--) LINDA (+) UUI  (+) liner pads: with minimal wetness if out for long period of time.  Daytime frequency: Q 1 1/2  hours.  Nocturia: Yes: 2-4/night.  Does not limit fluids prior to bedtime (--) dysuria,  (+) hematuria ? intermittently,  (--) frequent UTIs.  (+) complete bladder emptying. Reports change in urine stream over the past year.     2)  POP:  Present. above introitus.  Symptoms:(+)  Vaginal pressure.  (--) vaginal bleeding. (--) vaginal discharge. (--) sexually active. (--)  Vaginal dryness.  (--) vaginal estrogen use.      3)  BM:  (--) constipation/straining.  (--) chronic diarrhea. (--) hematochezia.  (--) fecal incontinence.  (+) fecal smearing/urgency.  (+) complete evacuation.      08/05/2021     1) rare urge incontinence   --denies UI  --voiding every 2-3 hours  --nocturia 1/ night  --drinking 55-64 ounces     2. Midline cystocele stage 1/ rectocele stage 2  --asymptomatic     3. Intermittent constipation/ smearing  --taking colace daily  --eating high fiber diet  --has     4. Vaginal atrophy (dryness):.    --did not start replens        Changes since last visit:  1) rare urge incontinence   --denies UI at this time    2. Midline cystocele stage 1/ rectocele stage 2  --asymptomatic at this time     3. Intermittent constipation/ smearing  --denies with fiber supplement  --hydrating well       4. Vaginal atrophy (dryness):.    -- using  REPLENS intermittently            Past Medical History:   Diagnosis Date    Angle recession of right eye     Blunt trauma of both eyes     hit across eyes with bungee exercise band    Cataract     Cystocele, midline     Dry eye syndrome     Ectropion due to laxity of eyelid, right      GERD (gastroesophageal reflux disease)     Hyperlipidemia     Hypertension     PVD (posterior vitreous detachment), right eye     Rectocele     Retinal drusen of both eyes     Thalassemia major     Urge incontinence     Vaginal atrophy        Past Surgical History:   Procedure Laterality Date    ABDOMINAL ADHESION SURGERY  1978    ANKLE FRACTURE SURGERY Right 1996    ECTROPION REPAIR Bilateral 06/2018    Dr. Flaherty    HYSTERECTOMY  1977    possible cancerous lesion       Family History   Problem Relation Age of Onset    Stroke Mother     Heart disease Mother     Prostate cancer Father     Colon cancer Father     Prostate cancer Brother     Prostate cancer Brother     Glaucoma Maternal Uncle     Blindness Maternal Uncle     Breast cancer Maternal Aunt     Cataracts Neg Hx     Macular degeneration Neg Hx        Social History     Socioeconomic History    Marital status:    Occupational History     Comment: Retired in 2018 -    Tobacco Use    Smoking status: Never Smoker    Smokeless tobacco: Never Used   Substance and Sexual Activity    Alcohol use: Not Currently    Drug use: No    Sexual activity: Not Currently       Current Outpatient Medications   Medication Sig Dispense Refill    acetaminophen (TYLENOL) 500 MG tablet Take 1-2 tablets (500-1,000 mg total) by mouth 3 (three) times daily as needed for Pain.  0    atorvastatin (LIPITOR) 10 MG tablet Take 1 tablet (10 mg total) by mouth every evening. 90 tablet 3    azelastine (ASTELIN) 137 mcg (0.1 %) nasal spray Two sprays in each nostril, sniff until absorbed, then follow with 1 spray of fluticasone.  Use both sprays twice daily. 30 mL 11    chlorhexidine (PERIDEX) 0.12 % solution RINSE AND SWISH ONE CAPFUL BID  1    ciprofloxacin HCl (CILOXAN) 0.3 % ophthalmic solution       diclofenac sodium (VOLTAREN) 1 % Gel Apply 2 g topically 4 (four) times daily. 1 Tube 2    diphth,pertus,acell,,tetanus (BOOSTRIX  "TDAP) 2.5-8-5 Lf-mcg-Lf/0.5mL Syrg injection Inject into the muscle. 0.5 mL 0    famotidine (PEPCID) 20 MG tablet Take 1 tablet (20 mg total) by mouth 2 (two) times daily. 60 tablet 11    fluticasone propionate (FLONASE) 50 mcg/actuation nasal spray 2 sprays (100 mcg total) by Each Nostril route once daily. 18.2 mL 11    hydroCHLOROthiazide (HYDRODIURIL) 12.5 MG Tab Take 1 tablet (12.5 mg total) by mouth once daily. 90 tablet 3    ketorolac 0.5% (ACULAR) 0.5 % Drop Place into both eyes.      levothyroxine (SYNTHROID) 100 MCG tablet Take 1 tablet (100 mcg total) by mouth once daily. 90 tablet 3    loratadine (CLARITIN) 10 mg tablet Take 1 tablet (10 mg total) by mouth once daily. 30 tablet 11    prednisoLONE acetate (PRED FORTE) 1 % DrpS One drop both eyes: 4x/day x 5 days, 3x/day x 5 days, 2x/day x 5 days, 1x/day x 5 day, then stop. 1 Bottle 1    valsartan (DIOVAN) 320 MG tablet Take 1 tablet (320 mg total) by mouth once daily. 90 tablet 3     No current facility-administered medications for this visit.       Review of patient's allergies indicates:   Allergen Reactions    Sutures, catgut (chromic)        Well woman:  Pap test post hysterectomy--10/2020 normal HPV negative  History of abnormal paps: Yes - .  History of STIs:  No  Mammogram: Date of last: 07/222 normal  Colonoscopy 2017 polyp, diverticulosis.    DEXA:  Date of last: 09/2018.  Result:  There is a 6.2% risk of a major osteoporotic fracture and a 0.7% risk of hip fracture in the next 10 years (FRAX    ROS:  As per HPI.      Exam  /70 (BP Location: Left arm, Patient Position: Sitting, BP Method: Medium (Manual))   Ht 5' 4" (1.626 m)   Wt 64.5 kg (142 lb 3.2 oz)   BMI 24.41 kg/m²   General: alert and oriented, no acute distress  Respiratory: normal respiratory effort  Abd: soft, non-tender, non-distended    Pelvic  Ext. Genitalia: normal external genitalia. Normal bartholin's and skeens glands  Vagina: + atrophy. Normal vaginal mucosa " without lesions. No discharge noted.   Non-tender bladder base without palpable mass.  Cervix: absent  Uterus:  absent   Urethra: no masses or tenderness  Urethral meatus: no lesions, caruncle or prolapse.      Impression  1. Urge incontinence     2. Midline cystocele     3. Rectocele     4. Vaginal atrophy     5. Hx of constipation       We reviewed the above issues and discussed options for short-term versus long-term management of her problems.   Plan:      1) rare urge incontinence   --Empty bladder every 3 hours.  Empty well: wait a minute, lean forward on toilet.    --Avoid dietary irritants (see sheet).  Keep diary x 3-5 days to determine your irritants.  --KEGELS: do 10 in AM and 10 in PM, holding each x 10 seconds.  When you feel urge to go, STOP, KEGEL, and when urge has passed, then go to bathroom.  Consider PT in future.    --URGE: consider anticholinergic. Not really an issue at this time  --STRESS:  Pessary vs. Sling.      2. Midline cystocele stage 1/ rectocele stage 2  --likely cause of splayed stream  --will continue to monitor  --controlling constipation will help     3. Intermittent constipation/ smearing  --continue  fiber supplement  --colonoscopy due this year     4. Vaginal atrophy (dryness):.  Use REPLENS or REFRESH OTC: 1/2 applicator full in vagina twice a week.       5. RTC 1 year for annual     I spent a total of 20 minutes on the day of the visit.  This includes face to face time and non-face to face time preparing to see the patient (eg, review of tests), obtaining and/or reviewing separately obtained history, documenting clinical information in the electronic or other health record, independently interpreting results and communicating results to the patient/family/caregiver, or care coordinator.      Sarah Knapp, CONSUELO-BC [facility]  Division of Female Pelvic Medicine and Reconstructive Surgery  Department of Obstetrics & Gynecology

## 2022-08-24 NOTE — PATIENT INSTRUCTIONS
1) rare urge incontinence   --Empty bladder every 3 hours.  Empty well: wait a minute, lean forward on toilet.    --Avoid dietary irritants (see sheet).  Keep diary x 3-5 days to determine your irritants.  --KEGELS: do 10 in AM and 10 in PM, holding each x 10 seconds.  When you feel urge to go, STOP, KEGEL, and when urge has passed, then go to bathroom.  Consider PT in future.    --URGE: consider anticholinergic. Not really an issue at this time  --STRESS:  Pessary vs. Sling.      2. Midline cystocele stage 1/ rectocele stage 2  --likely cause of splayed stream  --will continue to monitor  --controlling constipation will help     3. Intermittent constipation/ smearing  --continue  fiber supplement  --colonoscopy due this year     4. Vaginal atrophy (dryness):.  Use REPLENS or REFRESH OTC: 1/2 applicator full in vagina twice a week.       5. RTC 1 year for annual

## 2022-09-12 ENCOUNTER — PATIENT MESSAGE (OUTPATIENT)
Dept: PRIMARY CARE CLINIC | Facility: CLINIC | Age: 77
End: 2022-09-12
Payer: MEDICARE

## 2022-09-12 DIAGNOSIS — Z12.11 SCREEN FOR COLON CANCER: Primary | ICD-10-CM

## 2022-09-29 ENCOUNTER — PATIENT MESSAGE (OUTPATIENT)
Dept: HEMATOLOGY/ONCOLOGY | Facility: CLINIC | Age: 77
End: 2022-09-29
Payer: MEDICARE

## 2022-10-06 ENCOUNTER — PATIENT MESSAGE (OUTPATIENT)
Dept: PRIMARY CARE CLINIC | Facility: CLINIC | Age: 77
End: 2022-10-06
Payer: MEDICARE

## 2022-10-14 ENCOUNTER — LAB VISIT (OUTPATIENT)
Dept: LAB | Facility: HOSPITAL | Age: 77
End: 2022-10-14
Attending: INTERNAL MEDICINE
Payer: MEDICARE

## 2022-10-14 DIAGNOSIS — D47.2 MGUS (MONOCLONAL GAMMOPATHY OF UNKNOWN SIGNIFICANCE): ICD-10-CM

## 2022-10-14 DIAGNOSIS — D53.9 NUTRITIONAL ANEMIA: ICD-10-CM

## 2022-10-14 LAB
ALBUMIN SERPL BCP-MCNC: 4.1 G/DL (ref 3.5–5.2)
ALP SERPL-CCNC: 52 U/L (ref 55–135)
ALT SERPL W/O P-5'-P-CCNC: 8 U/L (ref 10–44)
ANION GAP SERPL CALC-SCNC: 5 MMOL/L (ref 8–16)
AST SERPL-CCNC: 15 U/L (ref 10–40)
BASOPHILS # BLD AUTO: 0.03 K/UL (ref 0–0.2)
BASOPHILS NFR BLD: 0.5 % (ref 0–1.9)
BILIRUB SERPL-MCNC: 0.7 MG/DL (ref 0.1–1)
BUN SERPL-MCNC: 15 MG/DL (ref 8–23)
CALCIUM SERPL-MCNC: 9.7 MG/DL (ref 8.7–10.5)
CHLORIDE SERPL-SCNC: 103 MMOL/L (ref 95–110)
CO2 SERPL-SCNC: 31 MMOL/L (ref 23–29)
CREAT SERPL-MCNC: 0.8 MG/DL (ref 0.5–1.4)
DIFFERENTIAL METHOD: ABNORMAL
EOSINOPHIL # BLD AUTO: 0.1 K/UL (ref 0–0.5)
EOSINOPHIL NFR BLD: 1.1 % (ref 0–8)
ERYTHROCYTE [DISTWIDTH] IN BLOOD BY AUTOMATED COUNT: 18.1 % (ref 11.5–14.5)
EST. GFR  (NO RACE VARIABLE): >60 ML/MIN/1.73 M^2
FERRITIN SERPL-MCNC: 118 NG/ML (ref 20–300)
FOLATE SERPL-MCNC: 10.5 NG/ML (ref 4–24)
GLUCOSE SERPL-MCNC: 89 MG/DL (ref 70–110)
HCT VFR BLD AUTO: 36 % (ref 37–48.5)
HGB BLD-MCNC: 10.7 G/DL (ref 12–16)
IGA SERPL-MCNC: 139 MG/DL (ref 40–350)
IGG SERPL-MCNC: 1477 MG/DL (ref 650–1600)
IGM SERPL-MCNC: 105 MG/DL (ref 50–300)
IMM GRANULOCYTES # BLD AUTO: 0.01 K/UL (ref 0–0.04)
IMM GRANULOCYTES NFR BLD AUTO: 0.2 % (ref 0–0.5)
IRON SERPL-MCNC: 74 UG/DL (ref 30–160)
LYMPHOCYTES # BLD AUTO: 3.4 K/UL (ref 1–4.8)
LYMPHOCYTES NFR BLD: 59.8 % (ref 18–48)
MCH RBC QN AUTO: 18.8 PG (ref 27–31)
MCHC RBC AUTO-ENTMCNC: 29.7 G/DL (ref 32–36)
MCV RBC AUTO: 63 FL (ref 82–98)
MONOCYTES # BLD AUTO: 0.4 K/UL (ref 0.3–1)
MONOCYTES NFR BLD: 7.8 % (ref 4–15)
NEUTROPHILS # BLD AUTO: 1.7 K/UL (ref 1.8–7.7)
NEUTROPHILS NFR BLD: 30.6 % (ref 38–73)
NRBC BLD-RTO: 0 /100 WBC
PLATELET # BLD AUTO: 274 K/UL (ref 150–450)
PMV BLD AUTO: 10.9 FL (ref 9.2–12.9)
POTASSIUM SERPL-SCNC: 3.6 MMOL/L (ref 3.5–5.1)
PROT SERPL-MCNC: 7.4 G/DL (ref 6–8.4)
RBC # BLD AUTO: 5.69 M/UL (ref 4–5.4)
SATURATED IRON: 24 % (ref 20–50)
SODIUM SERPL-SCNC: 139 MMOL/L (ref 136–145)
TOTAL IRON BINDING CAPACITY: 308 UG/DL (ref 250–450)
TRANSFERRIN SERPL-MCNC: 208 MG/DL (ref 200–375)
VIT B12 SERPL-MCNC: 344 PG/ML (ref 210–950)
WBC # BLD AUTO: 5.67 K/UL (ref 3.9–12.7)

## 2022-10-14 PROCEDURE — 82728 ASSAY OF FERRITIN: CPT | Performed by: INTERNAL MEDICINE

## 2022-10-14 PROCEDURE — 84466 ASSAY OF TRANSFERRIN: CPT | Performed by: INTERNAL MEDICINE

## 2022-10-14 PROCEDURE — 84165 PATHOLOGIST INTERPRETATION SPE: ICD-10-PCS | Mod: 26,,, | Performed by: PATHOLOGY

## 2022-10-14 PROCEDURE — 85025 COMPLETE CBC W/AUTO DIFF WBC: CPT | Performed by: INTERNAL MEDICINE

## 2022-10-14 PROCEDURE — 82607 VITAMIN B-12: CPT | Performed by: INTERNAL MEDICINE

## 2022-10-14 PROCEDURE — 80053 COMPREHEN METABOLIC PANEL: CPT | Performed by: INTERNAL MEDICINE

## 2022-10-14 PROCEDURE — 82784 ASSAY IGA/IGD/IGG/IGM EACH: CPT | Mod: 59 | Performed by: INTERNAL MEDICINE

## 2022-10-14 PROCEDURE — 84165 PROTEIN E-PHORESIS SERUM: CPT | Performed by: INTERNAL MEDICINE

## 2022-10-14 PROCEDURE — 86334 PATHOLOGIST INTERPRETATION IFE: ICD-10-PCS | Mod: 26,,, | Performed by: PATHOLOGY

## 2022-10-14 PROCEDURE — 86334 IMMUNOFIX E-PHORESIS SERUM: CPT | Performed by: INTERNAL MEDICINE

## 2022-10-14 PROCEDURE — 86334 IMMUNOFIX E-PHORESIS SERUM: CPT | Mod: 26,,, | Performed by: PATHOLOGY

## 2022-10-14 PROCEDURE — 84165 PROTEIN E-PHORESIS SERUM: CPT | Mod: 26,,, | Performed by: PATHOLOGY

## 2022-10-14 PROCEDURE — 36415 COLL VENOUS BLD VENIPUNCTURE: CPT | Mod: PN | Performed by: INTERNAL MEDICINE

## 2022-10-14 PROCEDURE — 82746 ASSAY OF FOLIC ACID SERUM: CPT | Performed by: INTERNAL MEDICINE

## 2022-10-14 PROCEDURE — 83520 IMMUNOASSAY QUANT NOS NONAB: CPT | Mod: 59 | Performed by: INTERNAL MEDICINE

## 2022-10-17 LAB
ALBUMIN SERPL ELPH-MCNC: 4.13 G/DL (ref 3.35–5.55)
ALPHA1 GLOB SERPL ELPH-MCNC: 0.29 G/DL (ref 0.17–0.41)
ALPHA2 GLOB SERPL ELPH-MCNC: 0.69 G/DL (ref 0.43–0.99)
B-GLOBULIN SERPL ELPH-MCNC: 0.55 G/DL (ref 0.5–1.1)
GAMMA GLOB SERPL ELPH-MCNC: 1.34 G/DL (ref 0.67–1.58)
INTERPRETATION SERPL IFE-IMP: NORMAL
KAPPA LC SER QL IA: 1.77 MG/DL (ref 0.33–1.94)
KAPPA LC/LAMBDA SER IA: 1.9 (ref 0.26–1.65)
LAMBDA LC SER QL IA: 0.93 MG/DL (ref 0.57–2.63)
PROT SERPL-MCNC: 7 G/DL (ref 6–8.4)

## 2022-10-18 ENCOUNTER — OFFICE VISIT (OUTPATIENT)
Dept: PRIMARY CARE CLINIC | Facility: CLINIC | Age: 77
End: 2022-10-18
Payer: MEDICARE

## 2022-10-18 VITALS
OXYGEN SATURATION: 98 % | HEIGHT: 64 IN | WEIGHT: 141.13 LBS | SYSTOLIC BLOOD PRESSURE: 114 MMHG | BODY MASS INDEX: 24.1 KG/M2 | DIASTOLIC BLOOD PRESSURE: 72 MMHG | TEMPERATURE: 98 F | HEART RATE: 59 BPM

## 2022-10-18 DIAGNOSIS — D47.2 MGUS (MONOCLONAL GAMMOPATHY OF UNKNOWN SIGNIFICANCE): ICD-10-CM

## 2022-10-18 DIAGNOSIS — D56.3 THALASSEMIA TRAIT: ICD-10-CM

## 2022-10-18 DIAGNOSIS — I10 PRIMARY HYPERTENSION: ICD-10-CM

## 2022-10-18 DIAGNOSIS — R73.03 PREDIABETES: ICD-10-CM

## 2022-10-18 DIAGNOSIS — R19.4 BOWEL HABIT CHANGES: ICD-10-CM

## 2022-10-18 DIAGNOSIS — K21.9 LARYNGOPHARYNGEAL REFLUX (LPR): ICD-10-CM

## 2022-10-18 DIAGNOSIS — N81.11 CYSTOCELE, MIDLINE: ICD-10-CM

## 2022-10-18 DIAGNOSIS — R10.9 FLANK PAIN: Primary | ICD-10-CM

## 2022-10-18 DIAGNOSIS — E03.9 ACQUIRED HYPOTHYROIDISM: ICD-10-CM

## 2022-10-18 PROCEDURE — 1160F RVW MEDS BY RX/DR IN RCRD: CPT | Mod: CPTII,S$GLB,, | Performed by: STUDENT IN AN ORGANIZED HEALTH CARE EDUCATION/TRAINING PROGRAM

## 2022-10-18 PROCEDURE — 99999 PR PBB SHADOW E&M-EST. PATIENT-LVL V: ICD-10-PCS | Mod: PBBFAC,,, | Performed by: STUDENT IN AN ORGANIZED HEALTH CARE EDUCATION/TRAINING PROGRAM

## 2022-10-18 PROCEDURE — 1160F PR REVIEW ALL MEDS BY PRESCRIBER/CLIN PHARMACIST DOCUMENTED: ICD-10-PCS | Mod: CPTII,S$GLB,, | Performed by: STUDENT IN AN ORGANIZED HEALTH CARE EDUCATION/TRAINING PROGRAM

## 2022-10-18 PROCEDURE — 99214 OFFICE O/P EST MOD 30 MIN: CPT | Mod: S$GLB,,, | Performed by: STUDENT IN AN ORGANIZED HEALTH CARE EDUCATION/TRAINING PROGRAM

## 2022-10-18 PROCEDURE — 1125F PR PAIN SEVERITY QUANTIFIED, PAIN PRESENT: ICD-10-PCS | Mod: CPTII,S$GLB,, | Performed by: STUDENT IN AN ORGANIZED HEALTH CARE EDUCATION/TRAINING PROGRAM

## 2022-10-18 PROCEDURE — 3078F DIAST BP <80 MM HG: CPT | Mod: CPTII,S$GLB,, | Performed by: STUDENT IN AN ORGANIZED HEALTH CARE EDUCATION/TRAINING PROGRAM

## 2022-10-18 PROCEDURE — 1101F PT FALLS ASSESS-DOCD LE1/YR: CPT | Mod: CPTII,S$GLB,, | Performed by: STUDENT IN AN ORGANIZED HEALTH CARE EDUCATION/TRAINING PROGRAM

## 2022-10-18 PROCEDURE — 3288F PR FALLS RISK ASSESSMENT DOCUMENTED: ICD-10-PCS | Mod: CPTII,S$GLB,, | Performed by: STUDENT IN AN ORGANIZED HEALTH CARE EDUCATION/TRAINING PROGRAM

## 2022-10-18 PROCEDURE — 99999 PR PBB SHADOW E&M-EST. PATIENT-LVL V: CPT | Mod: PBBFAC,,, | Performed by: STUDENT IN AN ORGANIZED HEALTH CARE EDUCATION/TRAINING PROGRAM

## 2022-10-18 PROCEDURE — 3074F SYST BP LT 130 MM HG: CPT | Mod: CPTII,S$GLB,, | Performed by: STUDENT IN AN ORGANIZED HEALTH CARE EDUCATION/TRAINING PROGRAM

## 2022-10-18 PROCEDURE — 1159F MED LIST DOCD IN RCRD: CPT | Mod: CPTII,S$GLB,, | Performed by: STUDENT IN AN ORGANIZED HEALTH CARE EDUCATION/TRAINING PROGRAM

## 2022-10-18 PROCEDURE — 1159F PR MEDICATION LIST DOCUMENTED IN MEDICAL RECORD: ICD-10-PCS | Mod: CPTII,S$GLB,, | Performed by: STUDENT IN AN ORGANIZED HEALTH CARE EDUCATION/TRAINING PROGRAM

## 2022-10-18 PROCEDURE — 3078F PR MOST RECENT DIASTOLIC BLOOD PRESSURE < 80 MM HG: ICD-10-PCS | Mod: CPTII,S$GLB,, | Performed by: STUDENT IN AN ORGANIZED HEALTH CARE EDUCATION/TRAINING PROGRAM

## 2022-10-18 PROCEDURE — 1101F PR PT FALLS ASSESS DOC 0-1 FALLS W/OUT INJ PAST YR: ICD-10-PCS | Mod: CPTII,S$GLB,, | Performed by: STUDENT IN AN ORGANIZED HEALTH CARE EDUCATION/TRAINING PROGRAM

## 2022-10-18 PROCEDURE — 99214 PR OFFICE/OUTPT VISIT, EST, LEVL IV, 30-39 MIN: ICD-10-PCS | Mod: S$GLB,,, | Performed by: STUDENT IN AN ORGANIZED HEALTH CARE EDUCATION/TRAINING PROGRAM

## 2022-10-18 PROCEDURE — 1125F AMNT PAIN NOTED PAIN PRSNT: CPT | Mod: CPTII,S$GLB,, | Performed by: STUDENT IN AN ORGANIZED HEALTH CARE EDUCATION/TRAINING PROGRAM

## 2022-10-18 PROCEDURE — 3288F FALL RISK ASSESSMENT DOCD: CPT | Mod: CPTII,S$GLB,, | Performed by: STUDENT IN AN ORGANIZED HEALTH CARE EDUCATION/TRAINING PROGRAM

## 2022-10-18 PROCEDURE — 3074F PR MOST RECENT SYSTOLIC BLOOD PRESSURE < 130 MM HG: ICD-10-PCS | Mod: CPTII,S$GLB,, | Performed by: STUDENT IN AN ORGANIZED HEALTH CARE EDUCATION/TRAINING PROGRAM

## 2022-10-18 NOTE — PROGRESS NOTES
Valarie Colindres  1945        Subjective     Chief Complaint: Flank Pain    History of Present Illness:  Ms. Valarie Colindres is a 77 y.o. female who presents to clinic for right Flank Pain. Was seeing Dr. Iris Lopez.    States she has had this right sided flank/back pain for months. Always present but varies in intensity. Can be sharp but usually dull ache. Sometimes positional. No radiation.     Follows with Dr. Kirk for MGUS (IgG kappa monoclonal gammopathy). Also has alpha and beta thal trait per Heme/Onc notes. That was dx in her 20s. Last skeletal survey was done 2019. Planning on following up with her office in 3 days. Labs done 4 days ago (ordered by Heme/Onc). Renal function intact. Hb 10.7 which is around her baseline.    Due for her colonoscopy, Q5 yrs due to polyps. Done in 2017 outside network. Scheduled with Dr. Mabry near . Scheduled for next month. Dad had colon cancer. +BM changes. If she does not eat enough fiber does get constipated.     Urge incontinence-Has cyctocele/rectocele that she follows with Urogyn.    Follows with ENT for laryngopharyngeal reflux. On pepcid. Does     HTN- on Valsartan and HCTZ. On statin.     On Synthroid 100 mcg.     DEXA-osteopenia. Works with  at [employer].    Review of Systems   Constitutional:  Negative for chills, fever and malaise/fatigue.   HENT:  Negative for congestion and sore throat.    Respiratory:  Negative for cough and shortness of breath.    Cardiovascular:  Negative for chest pain and leg swelling.   Gastrointestinal:  Positive for constipation. Negative for abdominal pain, diarrhea and nausea.   Musculoskeletal:  Positive for back pain.   Skin:  Negative for itching and rash.   Neurological:  Negative for speech change, weakness and headaches.      PAST HISTORY:     Past Medical History:   Diagnosis Date    Angle recession of right eye     Blunt trauma of both eyes     hit across eyes with bungee exercise band     Cataract     Cystocele, midline     Dry eye syndrome     Ectropion due to laxity of eyelid, right     GERD (gastroesophageal reflux disease)     Hyperlipidemia     Hypertension     PVD (posterior vitreous detachment), right eye     Rectocele     Retinal drusen of both eyes     Thalassemia major     Urge incontinence     Vaginal atrophy        Past Surgical History:   Procedure Laterality Date    ABDOMINAL ADHESION SURGERY  1978    ANKLE FRACTURE SURGERY Right 1996    ECTROPION REPAIR Bilateral 06/2018    Dr. Flaherty    HYSTERECTOMY  1977    possible cancerous lesion       Family History   Problem Relation Age of Onset    Stroke Mother     Heart disease Mother     Prostate cancer Father     Colon cancer Father     Prostate cancer Brother     Prostate cancer Brother     Glaucoma Maternal Uncle     Blindness Maternal Uncle     Breast cancer Maternal Aunt     Cataracts Neg Hx     Macular degeneration Neg Hx        Social History     Socioeconomic History    Marital status:    Occupational History     Comment: Retired in 2018 -    Tobacco Use    Smoking status: Never    Smokeless tobacco: Never   Substance and Sexual Activity    Alcohol use: Not Currently    Drug use: No    Sexual activity: Not Currently       MEDICATIONS & ALLERGIES:     Current Outpatient Medications on File Prior to Visit   Medication Sig    acetaminophen (TYLENOL) 500 MG tablet Take 1-2 tablets (500-1,000 mg total) by mouth 3 (three) times daily as needed for Pain.    atorvastatin (LIPITOR) 10 MG tablet Take 1 tablet (10 mg total) by mouth every evening.    azelastine (ASTELIN) 137 mcg (0.1 %) nasal spray Two sprays in each nostril, sniff until absorbed, then follow with 1 spray of fluticasone.  Use both sprays twice daily.    chlorhexidine (PERIDEX) 0.12 % solution RINSE AND SWISH ONE CAPFUL BID    ciprofloxacin HCl (CILOXAN) 0.3 % ophthalmic solution     diclofenac sodium (VOLTAREN) 1 % Gel Apply 2 g topically 4 (four) times  "daily.    diphth,pertus,acell,,tetanus (BOOSTRIX TDAP) 2.5-8-5 Lf-mcg-Lf/0.5mL Syrg injection Inject into the muscle.    famotidine (PEPCID) 20 MG tablet Take 1 tablet (20 mg total) by mouth 2 (two) times daily.    fluticasone propionate (FLONASE) 50 mcg/actuation nasal spray 2 sprays (100 mcg total) by Each Nostril route once daily.    hydroCHLOROthiazide (HYDRODIURIL) 12.5 MG Tab Take 1 tablet (12.5 mg total) by mouth once daily.    ketorolac 0.5% (ACULAR) 0.5 % Drop Place into both eyes.    levothyroxine (SYNTHROID) 100 MCG tablet Take 1 tablet (100 mcg total) by mouth once daily.    loratadine (CLARITIN) 10 mg tablet Take 1 tablet (10 mg total) by mouth once daily.    prednisoLONE acetate (PRED FORTE) 1 % DrpS One drop both eyes: 4x/day x 5 days, 3x/day x 5 days, 2x/day x 5 days, 1x/day x 5 day, then stop.    valsartan (DIOVAN) 320 MG tablet Take 1 tablet (320 mg total) by mouth once daily.     No current facility-administered medications on file prior to visit.       Review of patient's allergies indicates:   Allergen Reactions    Sutures, catgut (chromic)        OBJECTIVE:     Vital Signs:  Vitals:    10/18/22 0806   BP: 114/72   BP Location: Right arm   Patient Position: Sitting   BP Method: Medium (Manual)   Pulse: (!) 59   Temp: 98 °F (36.7 °C)   TempSrc: Oral   SpO2: 98%   Weight: 64 kg (141 lb 1.5 oz)   Height: 5' 4" (1.626 m)       Body mass index is 24.22 kg/m².     Physical Exam:  Physical Exam  Vitals and nursing note reviewed.   Constitutional:       General: She is not in acute distress.     Appearance: Normal appearance. She is not ill-appearing, toxic-appearing or diaphoretic.   HENT:      Head: Normocephalic and atraumatic.   Eyes:      General: No scleral icterus.     Conjunctiva/sclera: Conjunctivae normal.   Cardiovascular:      Rate and Rhythm: Normal rate and regular rhythm.   Pulmonary:      Effort: Pulmonary effort is normal. No respiratory distress.      Breath sounds: No wheezing. "   Abdominal:      General: There is no distension.      Palpations: Abdomen is soft.      Tenderness: There is no abdominal tenderness. There is no right CVA tenderness, guarding or rebound.   Musculoskeletal:         General: No swelling. Normal range of motion.      Cervical back: Normal range of motion and neck supple. No rigidity.      Right lower leg: No edema.      Left lower leg: No edema.   Lymphadenopathy:      Cervical: No cervical adenopathy.   Skin:     General: Skin is warm and dry.      Findings: No lesion.   Neurological:      General: No focal deficit present.      Mental Status: She is alert and oriented to person, place, and time.      Motor: No weakness.      Gait: Gait normal.   Psychiatric:         Mood and Affect: Mood and affect normal.         Behavior: Behavior normal.          Laboratory  Lab Results   Component Value Date    WBC 5.67 10/14/2022    HGB 10.7 (L) 10/14/2022    HCT 36.0 (L) 10/14/2022    MCV 63 (L) 10/14/2022     10/14/2022     Lab Results   Component Value Date    GLU 89 10/14/2022     10/14/2022    K 3.6 10/14/2022     10/14/2022    CO2 31 (H) 10/14/2022    BUN 15 10/14/2022    CREATININE 0.8 10/14/2022    CALCIUM 9.7 10/14/2022    MG 2.3 10/15/2020     No results found for: INR, PROTIME  Lab Results   Component Value Date    HGBA1C 5.9 (H) 06/11/2022           Health Maintenance         Date Due Completion Date    COVID-19 Vaccine (5 - Booster for Pfizer series) 08/10/2022 6/15/2022    Shingles Vaccine (2 of 2) 10/06/2022 8/11/2022    Colonoscopy 12/29/2022 12/29/2017    DEXA Scan 12/10/2024 12/10/2021    Lipid Panel 06/11/2027 6/11/2022    TETANUS VACCINE 09/02/2032 9/2/2022    Override on 1/1/2004: Done                ASSESSMENT & PLAN:   Ms. Valarie Colindres is a 77 y.o. female who was seen today in clinic for right flank pain, est care.     1. Flank pain  -     US Abdomen Complete; Future; Expected date: 10/18/2022  No tenderness to palpation. Pain  not severe, constant full pain for months.    2. Bowel habit changes  -Father with colon cancer, constipation when not enough fiber in diet, states she has her colonoscopy scheduled next month with Dr. Choudhary near , will send results    3. MGUS (monoclonal gammopathy of unknown significance)  -Seeing Dr. Kirk in 3 days, labs without worsening anemia (stable around 10), Cr wnl, calcium wnl  -Can bring up flank pain with Heme/Onc as well  -Low threshold for CT scan if pain persists  If abdominal U/S unremarkable, will plan for CT A/P with contrast given hx of MGUS.    4. Cystocele, midline  -continued f/u with urogyn    5. Primary hypertension  -Stable, on Valsartan and HCTZ  -If wants to wean off (pt request), advised stopping hctz first since ARB may have renal protection and checking BP BID for 1-2 weeks and letting me know. Goal 120/80. If high, can re-start meds. Counseled on proper technique.    6. Laryngopharyngeal reflux (LPR)  -Following with ENT.    7. Thalassemia trait  -Stable, seeing Heme/Onc    8. Prediabetes  -     Ambulatory referral/consult to Nutrition Services; Future; Expected date: 10/25/2022  - Continue diet control    9. Acquired hypothyroidism  -Continue Synthroid 100 mcg QAM         RTC in     Sirena Renner MD  Internal Medicine

## 2022-10-19 LAB
PATHOLOGIST INTERPRETATION IFE: NORMAL
PATHOLOGIST INTERPRETATION SPE: NORMAL

## 2022-10-21 ENCOUNTER — OFFICE VISIT (OUTPATIENT)
Dept: HEMATOLOGY/ONCOLOGY | Facility: CLINIC | Age: 77
End: 2022-10-21
Payer: MEDICARE

## 2022-10-21 VITALS
WEIGHT: 142.44 LBS | SYSTOLIC BLOOD PRESSURE: 107 MMHG | HEIGHT: 64 IN | RESPIRATION RATE: 18 BRPM | BODY MASS INDEX: 24.32 KG/M2 | TEMPERATURE: 98 F | HEART RATE: 61 BPM | OXYGEN SATURATION: 99 % | DIASTOLIC BLOOD PRESSURE: 62 MMHG

## 2022-10-21 DIAGNOSIS — D56.3 THALASSEMIA TRAIT: ICD-10-CM

## 2022-10-21 DIAGNOSIS — D47.2 MGUS (MONOCLONAL GAMMOPATHY OF UNKNOWN SIGNIFICANCE): Primary | ICD-10-CM

## 2022-10-21 DIAGNOSIS — D53.9 NUTRITIONAL ANEMIA: ICD-10-CM

## 2022-10-21 PROCEDURE — 99214 OFFICE O/P EST MOD 30 MIN: CPT | Mod: S$GLB,,, | Performed by: INTERNAL MEDICINE

## 2022-10-21 PROCEDURE — 1126F PR PAIN SEVERITY QUANTIFIED, NO PAIN PRESENT: ICD-10-PCS | Mod: CPTII,S$GLB,, | Performed by: INTERNAL MEDICINE

## 2022-10-21 PROCEDURE — 1101F PT FALLS ASSESS-DOCD LE1/YR: CPT | Mod: CPTII,S$GLB,, | Performed by: INTERNAL MEDICINE

## 2022-10-21 PROCEDURE — 1160F PR REVIEW ALL MEDS BY PRESCRIBER/CLIN PHARMACIST DOCUMENTED: ICD-10-PCS | Mod: CPTII,S$GLB,, | Performed by: INTERNAL MEDICINE

## 2022-10-21 PROCEDURE — 1160F RVW MEDS BY RX/DR IN RCRD: CPT | Mod: CPTII,S$GLB,, | Performed by: INTERNAL MEDICINE

## 2022-10-21 PROCEDURE — 1126F AMNT PAIN NOTED NONE PRSNT: CPT | Mod: CPTII,S$GLB,, | Performed by: INTERNAL MEDICINE

## 2022-10-21 PROCEDURE — 3288F PR FALLS RISK ASSESSMENT DOCUMENTED: ICD-10-PCS | Mod: CPTII,S$GLB,, | Performed by: INTERNAL MEDICINE

## 2022-10-21 PROCEDURE — 3078F PR MOST RECENT DIASTOLIC BLOOD PRESSURE < 80 MM HG: ICD-10-PCS | Mod: CPTII,S$GLB,, | Performed by: INTERNAL MEDICINE

## 2022-10-21 PROCEDURE — 3078F DIAST BP <80 MM HG: CPT | Mod: CPTII,S$GLB,, | Performed by: INTERNAL MEDICINE

## 2022-10-21 PROCEDURE — 1101F PR PT FALLS ASSESS DOC 0-1 FALLS W/OUT INJ PAST YR: ICD-10-PCS | Mod: CPTII,S$GLB,, | Performed by: INTERNAL MEDICINE

## 2022-10-21 PROCEDURE — 3288F FALL RISK ASSESSMENT DOCD: CPT | Mod: CPTII,S$GLB,, | Performed by: INTERNAL MEDICINE

## 2022-10-21 PROCEDURE — 3074F SYST BP LT 130 MM HG: CPT | Mod: CPTII,S$GLB,, | Performed by: INTERNAL MEDICINE

## 2022-10-21 PROCEDURE — 99214 PR OFFICE/OUTPT VISIT, EST, LEVL IV, 30-39 MIN: ICD-10-PCS | Mod: S$GLB,,, | Performed by: INTERNAL MEDICINE

## 2022-10-21 PROCEDURE — 1159F PR MEDICATION LIST DOCUMENTED IN MEDICAL RECORD: ICD-10-PCS | Mod: CPTII,S$GLB,, | Performed by: INTERNAL MEDICINE

## 2022-10-21 PROCEDURE — 3074F PR MOST RECENT SYSTOLIC BLOOD PRESSURE < 130 MM HG: ICD-10-PCS | Mod: CPTII,S$GLB,, | Performed by: INTERNAL MEDICINE

## 2022-10-21 PROCEDURE — 99999 PR PBB SHADOW E&M-EST. PATIENT-LVL IV: CPT | Mod: PBBFAC,,, | Performed by: INTERNAL MEDICINE

## 2022-10-21 PROCEDURE — 99999 PR PBB SHADOW E&M-EST. PATIENT-LVL IV: ICD-10-PCS | Mod: PBBFAC,,, | Performed by: INTERNAL MEDICINE

## 2022-10-21 PROCEDURE — 1159F MED LIST DOCD IN RCRD: CPT | Mod: CPTII,S$GLB,, | Performed by: INTERNAL MEDICINE

## 2022-10-21 NOTE — PROGRESS NOTES
Patient ID: Valarie Colindres is a 77 y.o. female.     Chief Complaint: follow up for MGUS     Diagnosis:  1. Alpha and beta thalassemia trait  2. 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 Ms Colindres is a 75 yo woman who initially saw me on 7/16/19 for further evaluation of microcytic anemia. Her Hb was 11.4 on 9/24/18 with MCV of 61. CBC on 7/15/19 showed WBC 7.15, Hb 10.7, MCV 62, plt count 260. CMP on 5/30/19 was normal. TSH on 4/9/19 normal. Patient currently feels fine. 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 Was told that no treatment is needed. She has occasional fatigue but goes to the gym 3 times a week. She had chest pain after she ate Fritos which did not recur after she stopped eating Fritos. She just had a stress test done.   2. Labs on 7/16/19 showed creatinine 1.0, calcium 9.8, albumin 4.3, protein 7.5, ferritin 128, folate 7.2, vitamin B12 424, haptoglobin 66, iron 86, TIBC 340, iron saturation 25%, . SPEP showed a 0.77 g/dL M protein at IgG kappa. kappa light chain normal 1.11, lambda 0.97, K/L ratio 1.14. Hb electrophoresis showed 4.7% A2, 95.3% A, c/w beta thal trait. Alpha globulin-gene analysis showed one copy of gene deletion.     Interval History:   Ms Colindres returns for follow up. Feeling well. No complaints. Has switched over to a healthy diet.         ROS:   A ten-point system review is obtained and negative except for what was stated in the Interval History.      Physical Examination:   Vital signs reviewed.   General: well hydrated, well developed, in no acute distress  HEENT: normocephalic, PERRLA, EOMI, anicteric sclerae, oropharynx clear  Neck: supple, no JVD, thyromegaly, cervical or supraclavicular lymphadenopathy  Lungs: clear breath sounds bilaterally, no wheezing, rales, or rhonchi  Heart: RRR, no M/R/G  Abdomen: soft, no tenderness, non-distended, no hepatosplenomegaly, mass, or hernia. BS present  Extremities: no  "clubbing, cyanosis, or edema  Skin: no rash, ulcer, or open wounds  Neuro: alert and oriented x 4, no focal neuro deficit  Psych: pleasant and appropriate mood and affect     Objective:      Laboratory Data:   Labs on 7/16/19 showed creatinine 1.0, calcium 9.8, albumin 4.3, protein 7.5, ferritin 128, folate 7.2, vitamin B12 424, haptoglobin 66, iron 86, TIBC 340, iron saturation 25%, . SPEP showed a 0.77 g/dL M protein at IgG kappa. kappa light chain normal 1.11, lambda 0.97, K/L ratio 1.14. Hb electrophoresis showed 4.7% A2, 95.3% A, c/w beta thal trait. Alpha globulin-gene analysis showed one copy of gene deletion.     Labs from 2/3/2020 showed Hb 10.6, CMP normal. IgG normal 1350, SPEP showed IgG kappa 0.70 g/dL. Kappa light chain normal 1.2, lambda normal 0.83, K/L ratio normal 1.45.      Labs from 8/11/2020 showed Hb 10.5, CMP unremarkable, IgG normal 1343, SPEP showed IgG kappa 0.69 g/dL, kappa normal 1.07, lambda normal 1.01, K/L ratio normal 1.06    Labs from 2/26/21: Hb 10.6, CMP unremarkable, IgG normal, SPEP showed IgG kappa 0.76 g/dL, kappa normal 1.39, lambda normal 1.1, K/L ratio normal 1.26    Labs from 8/18/21: Hb 10.0, CMP unremarkable. IgG normal 1319. SPEP showed IgG kappa 0.66 g/dL, kappa 1.43, lambda 0.74, K/L 1.93    Labs from 10/14/22: Hb 10.7, CMP unremarkable. IgG normal 1477. SPEP showed "Two immediately adjacent paraprotein peaks are identified: 1. Near-gamma = 0.8 g/dL (previously, 0.66 g/dL). 2. Near-to-mid gamma = 0.26 g/dL (previously, 0.2 g/dL)". Kappa 1.77, lambda 0.93, K/L ratio 1.9     Imaging Data:  Skeletal survey 8/5/2019 did not show multiple myeloma bone lesions         Assessment and Plan:      1. MGUS (monoclonal gammopathy of unknown significance)    2. Thalassemia trait    3. Nutritional anemia      1.  - IgG kappa monoclonal gammopathy. Low risk disease. No end organ damage.   - Patient feeling well. No symptoms  - reviewed lab results with patient. Low M protein " stable. No evidence of end organ damage  - her protein spikes have been stable. Will monitor in 12 months      2.  - thalassemia trait with deletion in both beta and alpha globulin  - anemia mild  - monitor    3.  - ferritin, iron, B12, folate levels are normal  - monitor    Follow-up:      RTC in 12 months  Her questions were answered in the clinic. Encouraged to call should issues arise.          Route Chart for Scheduling    Med Onc Chart Routing      Follow up with physician 1 year. see me in one year with CBC, CMP, SPEP, immunofixation, immunoglobulins, serum free light chains, ferritin, iron, B12, folate level checked 1 week prior   Follow up with [facility]    Infusion scheduling note    Injection scheduling note    Labs CBC, CMP, ferritin, folate, free light chains, iron and TIBC, SPEP and vitamin B12   Lab interval:  immunoglobulin, immunofixation, free light chains   Imaging    Pharmacy appointment    Other referrals

## 2022-10-31 ENCOUNTER — HOSPITAL ENCOUNTER (OUTPATIENT)
Dept: RADIOLOGY | Facility: OTHER | Age: 77
Discharge: HOME | End: 2022-10-31
Attending: STUDENT IN AN ORGANIZED HEALTH CARE EDUCATION/TRAINING PROGRAM
Payer: MEDICARE

## 2022-10-31 DIAGNOSIS — R10.9 FLANK PAIN: ICD-10-CM

## 2022-10-31 PROCEDURE — 76700 US ABDOMEN COMPLETE: ICD-10-PCS | Mod: 26,,, | Performed by: INTERNAL MEDICINE

## 2022-10-31 PROCEDURE — 76700 US EXAM ABDOM COMPLETE: CPT | Mod: TC

## 2022-10-31 PROCEDURE — 76700 US EXAM ABDOM COMPLETE: CPT | Mod: 26,,, | Performed by: INTERNAL MEDICINE

## 2022-10-31 NOTE — PROGRESS NOTES
Hi,     Could you please contact the patient and let them know that their results?    They saw some small liver and kidney cysts on the ultrasound but nothing to explain the flank pain. Is she still having it? If so we can order a CT scan.     Thanks!  Dr. BRENNAN

## 2022-11-01 ENCOUNTER — TELEPHONE (OUTPATIENT)
Dept: PRIMARY CARE CLINIC | Facility: CLINIC | Age: 77
End: 2022-11-01
Payer: MEDICARE

## 2022-11-01 NOTE — TELEPHONE ENCOUNTER
----- Message from Sirena Renner MD sent at 10/31/2022  2:50 PM CDT -----  Hi,     Could you please contact the patient and let them know that their results?    They saw some small liver and kidney cysts on the ultrasound but nothing to explain the flank pain. Is she still having it? If so we can order a CT scan.     Thanks!  Dr. BRENNAN

## 2022-11-04 ENCOUNTER — PES CALL (OUTPATIENT)
Dept: ADMINISTRATIVE | Facility: CLINIC | Age: 77
End: 2022-11-04
Payer: MEDICARE

## 2022-11-07 ENCOUNTER — PATIENT MESSAGE (OUTPATIENT)
Dept: PRIMARY CARE CLINIC | Facility: CLINIC | Age: 77
End: 2022-11-07
Payer: MEDICARE

## 2022-11-18 ENCOUNTER — OFFICE VISIT (OUTPATIENT)
Dept: PRIMARY CARE CLINIC | Facility: CLINIC | Age: 77
End: 2022-11-18
Payer: MEDICARE

## 2022-11-18 VITALS
BODY MASS INDEX: 24.42 KG/M2 | SYSTOLIC BLOOD PRESSURE: 122 MMHG | RESPIRATION RATE: 16 BRPM | OXYGEN SATURATION: 97 % | DIASTOLIC BLOOD PRESSURE: 74 MMHG | WEIGHT: 143.06 LBS | HEART RATE: 77 BPM | HEIGHT: 64 IN

## 2022-11-18 DIAGNOSIS — G95.9 DISEASE OF SPINAL CORD, UNSPECIFIED: ICD-10-CM

## 2022-11-18 DIAGNOSIS — E03.9 ACQUIRED HYPOTHYROIDISM: ICD-10-CM

## 2022-11-18 DIAGNOSIS — E78.2 MIXED HYPERLIPIDEMIA: ICD-10-CM

## 2022-11-18 DIAGNOSIS — M54.17 LUMBOSACRAL RADICULOPATHY AT L5: ICD-10-CM

## 2022-11-18 DIAGNOSIS — M35.01 KERATITIS SICCA, BOTH EYES: Primary | ICD-10-CM

## 2022-11-18 DIAGNOSIS — G60.3 IDIOPATHIC PROGRESSIVE POLYNEUROPATHY: ICD-10-CM

## 2022-11-18 DIAGNOSIS — Z00.00 ENCOUNTER FOR PREVENTIVE HEALTH EXAMINATION: ICD-10-CM

## 2022-11-18 DIAGNOSIS — I10 PRIMARY HYPERTENSION: ICD-10-CM

## 2022-11-18 DIAGNOSIS — K21.9 GASTROESOPHAGEAL REFLUX DISEASE WITHOUT ESOPHAGITIS: ICD-10-CM

## 2022-11-18 PROBLEM — M19.90 ARTHRITIS: Status: ACTIVE | Noted: 2022-11-18

## 2022-11-18 PROBLEM — D64.9 ANEMIA: Status: ACTIVE | Noted: 2022-11-18

## 2022-11-18 PROBLEM — H26.9 CATARACT: Status: ACTIVE | Noted: 2022-11-18

## 2022-11-18 PROBLEM — R06.02 SHORTNESS OF BREATH: Status: ACTIVE | Noted: 2022-11-18

## 2022-11-18 PROBLEM — R31.29 HEMATURIA, MICROSCOPIC: Status: ACTIVE | Noted: 2018-05-09

## 2022-11-18 PROCEDURE — 3074F SYST BP LT 130 MM HG: CPT | Mod: CPTII,S$GLB,,

## 2022-11-18 PROCEDURE — 3078F PR MOST RECENT DIASTOLIC BLOOD PRESSURE < 80 MM HG: ICD-10-PCS | Mod: CPTII,S$GLB,,

## 2022-11-18 PROCEDURE — 99999 PR PBB SHADOW E&M-EST. PATIENT-LVL IV: CPT | Mod: PBBFAC,,,

## 2022-11-18 PROCEDURE — 1160F RVW MEDS BY RX/DR IN RCRD: CPT | Mod: CPTII,S$GLB,,

## 2022-11-18 PROCEDURE — 99999 PR PBB SHADOW E&M-EST. PATIENT-LVL IV: ICD-10-PCS | Mod: PBBFAC,,,

## 2022-11-18 PROCEDURE — G0439 PPPS, SUBSEQ VISIT: HCPCS | Mod: S$GLB,,,

## 2022-11-18 PROCEDURE — 3078F DIAST BP <80 MM HG: CPT | Mod: CPTII,S$GLB,,

## 2022-11-18 PROCEDURE — 1159F PR MEDICATION LIST DOCUMENTED IN MEDICAL RECORD: ICD-10-PCS | Mod: CPTII,S$GLB,,

## 2022-11-18 PROCEDURE — G0439 PR MEDICARE ANNUAL WELLNESS SUBSEQUENT VISIT: ICD-10-PCS | Mod: S$GLB,,,

## 2022-11-18 PROCEDURE — 1160F PR REVIEW ALL MEDS BY PRESCRIBER/CLIN PHARMACIST DOCUMENTED: ICD-10-PCS | Mod: CPTII,S$GLB,,

## 2022-11-18 PROCEDURE — 1159F MED LIST DOCD IN RCRD: CPT | Mod: CPTII,S$GLB,,

## 2022-11-18 PROCEDURE — 3074F PR MOST RECENT SYSTOLIC BLOOD PRESSURE < 130 MM HG: ICD-10-PCS | Mod: CPTII,S$GLB,,

## 2022-11-18 RX ORDER — PANTOPRAZOLE SODIUM 40 MG/1
40 TABLET, DELAYED RELEASE ORAL EVERY MORNING
COMMUNITY
Start: 2022-11-09

## 2022-11-18 RX ORDER — SODIUM PICOSULFATE, MAGNESIUM OXIDE, AND ANHYDROUS CITRIC ACID 10; 3.5; 12 MG/160ML; G/160ML; G/160ML
LIQUID ORAL
COMMUNITY
Start: 2022-11-08 | End: 2022-11-18

## 2022-11-18 NOTE — PROGRESS NOTES
"Valarie Colindres presented for a  Medicare AWV and comprehensive Health Risk Assessment today. She is a patient of Dr. Renner and is new to me. The following components were reviewed and updated:    Medical history  Family History  Social history  Allergies and Current Medications  Health Risk Assessment  Health Maintenance  Care Team     ** See Completed Assessments for Annual Wellness Visit within the encounter summary.**    The following assessments were completed:  Living Situation  CAGE  Depression Screening  Timed Get Up and Go  Whisper Test  Cognitive Function Screening  Nutrition Screening  ADL Screening  PAQ Screening  Review of opioid screen: pt does not have rx for opioid  Review of substance use disorder: pt does not use substance          Vitals:    11/18/22 1509   BP: 122/74   Pulse: 77   Resp: 16   SpO2: 97%   Weight: 64.9 kg (143 lb 1.3 oz)   Height: 5' 4" (1.626 m)     Body mass index is 24.56 kg/m².    Physical Exam  Vitals reviewed.   Constitutional:       Appearance: Normal appearance.   HENT:      Head: Normocephalic and atraumatic.   Cardiovascular:      Rate and Rhythm: Normal rate and regular rhythm.      Pulses: Normal pulses.           Radial pulses are 2+ on the right side and 2+ on the left side.        Dorsalis pedis pulses are 2+ on the right side and 2+ on the left side.        Posterior tibial pulses are 2+ on the right side and 2+ on the left side.      Heart sounds: Normal heart sounds.   Pulmonary:      Effort: Pulmonary effort is normal.      Breath sounds: Normal breath sounds.   Musculoskeletal:      Right lower leg: No edema.      Left lower leg: No edema.   Skin:     General: Skin is warm and dry.      Capillary Refill: Capillary refill takes less than 2 seconds.   Neurological:      General: No focal deficit present.      Mental Status: She is alert and oriented to person, place, and time.   Psychiatric:         Mood and Affect: Mood normal.         Behavior: Behavior normal. "        Diagnoses and health risks identified today and associated recommendations/orders:    1. Encounter for preventive health examination  -Assessment and evaluation performed as stated above    2. Disease of spinal cord, unspecified  -stable on current regimen.  Followed by neurology    3. Keratitis sicca, both eyes  -stable, followed by ophthalmology    4. Lumbosacral radiculopathy at L5  -stable with prn use of tylenol.  Followed by neurology    5. Idiopathic progressive polyneuropathy  -stable, followed by neurology    6. Primary hypertension  -stable on HCTZ, valsartan.  Followed by pcp    7. Mixed hyperlipidemia  -stable on atorvastatin.  Followed by pcp    8. Acquired hypothyroidism  -stable on levothyroxine.  Followed by pcp    9. Gastroesophageal reflux disease without esophagitis  -stable on pepcid, pantoprazole.  Followed by pcp      Provided [place] with a 5-10 year written screening schedule and personal prevention plan. Recommendations were developed using the USPSTF age appropriate recommendations. Education, counseling, and referrals were provided as needed. After Visit Summary printed and given to patient which includes a list of additional screenings\tests needed.    Follow up in about 1 year (around 11/18/2023), or if symptoms worsen or fail to improve.      Clotilde Chavarria, [place]    I offered to discuss advanced care planning, including how to pick a person who would make decisions for you if you were unable to make them for yourself, called a health care power of , and what kind of decisions you might make such as use of life sustaining treatments such as ventilators and tube feeding when faced with a life limiting illness recorded on a living will that they will need to know. (How you want to be cared for as you near the end of your natural life)     X Patient is interested in learning more about how to make advanced directives.  I provided them paperwork and offered to discuss this with  them.

## 2022-12-08 ENCOUNTER — NUTRITION (OUTPATIENT)
Dept: NUTRITION | Facility: CLINIC | Age: 77
End: 2022-12-08
Payer: MEDICARE

## 2022-12-08 VITALS — BODY MASS INDEX: 24.1 KG/M2 | WEIGHT: 141.13 LBS | HEIGHT: 64 IN

## 2022-12-08 DIAGNOSIS — R73.03 PREDIABETES: Primary | ICD-10-CM

## 2022-12-08 DIAGNOSIS — Z71.3 DIETARY COUNSELING: ICD-10-CM

## 2022-12-08 PROCEDURE — 97802 PR MED NUTR THER, 1ST, INDIV, EA 15 MIN: ICD-10-PCS | Mod: S$GLB,,, | Performed by: DIETITIAN, REGISTERED

## 2022-12-08 PROCEDURE — 99999 PR PBB SHADOW E&M-EST. PATIENT-LVL III: CPT | Mod: PBBFAC,,,

## 2022-12-08 PROCEDURE — 99999 PR PBB SHADOW E&M-EST. PATIENT-LVL III: ICD-10-PCS | Mod: PBBFAC,,,

## 2022-12-08 PROCEDURE — 97802 MEDICAL NUTRITION INDIV IN: CPT | Mod: S$GLB,,, | Performed by: DIETITIAN, REGISTERED

## 2022-12-08 NOTE — PROGRESS NOTES
"Referring Physician:Sirena Renner MD     Reason for visit:  Chief Complaint   Patient presents with    prediabetes    Nutrition Counseling      Initial Visit    :1945     Allergies Reviewed  Meds Reviewed    Anthropometrics  Weight:64 kg (141 lb 1.5 oz)  Height:5' 4" (1.626 m)  BMI:Body mass index is 24.22 kg/m².   IBW: 54.5 kg  +/-10%    Meds:  Outpatient Medications Prior to Visit   Medication Sig Dispense Refill    acetaminophen (TYLENOL) 500 MG tablet Take 1-2 tablets (500-1,000 mg total) by mouth 3 (three) times daily as needed for Pain.  0    atorvastatin (LIPITOR) 10 MG tablet Take 1 tablet (10 mg total) by mouth every evening. 90 tablet 3    azelastine (ASTELIN) 137 mcg (0.1 %) nasal spray Two sprays in each nostril, sniff until absorbed, then follow with 1 spray of fluticasone.  Use both sprays twice daily. 30 mL 11    chlorhexidine (PERIDEX) 0.12 % solution RINSE AND SWISH ONE CAPFUL BID  1    diclofenac sodium (VOLTAREN) 1 % Gel Apply 2 g topically 4 (four) times daily. 1 Tube 2    famotidine (PEPCID) 20 MG tablet TAKE 1 TABLET BY MOUTH TWICE A  tablet 3    fluticasone propionate (FLONASE) 50 mcg/actuation nasal spray 2 sprays (100 mcg total) by Each Nostril route once daily. 18.2 mL 11    hydroCHLOROthiazide (HYDRODIURIL) 12.5 MG Tab Take 1 tablet (12.5 mg total) by mouth once daily. 90 tablet 3    levothyroxine (SYNTHROID) 100 MCG tablet Take 1 tablet (100 mcg total) by mouth once daily. 90 tablet 3    loratadine (CLARITIN) 10 mg tablet Take 1 tablet (10 mg total) by mouth once daily. 30 tablet 11    pantoprazole (PROTONIX) 40 MG tablet Take 40 mg by mouth every morning.      valsartan (DIOVAN) 320 MG tablet Take 1 tablet (320 mg total) by mouth once daily. 90 tablet 3     No facility-administered medications prior to visit.       Food/Drug Interactions Noted:  n/a    Vitamins/Supplements/Herbs:  per pt, she takes herbs and Vit D    Labs: 22 HgbA1c = 5.9     Nutrition Prescription: " "1920 Kcals/day( 30 kcal/kg),  64 g protein( 1.0 g/kg)     Support System:   pt and her sister (who also has prediabetes) present for today's visit.  They live together, and share grocerys hopping and cooking responsibilities.  For today's visit, we discussed pt's diet regimen    Diet Hx: pt states she is interested in learning about how to balance her diet and to be consistent with diet regimen.  Is lactose-intolerant.  Daily goal is to drink 7-12 oz glasses of water/beverage daily.  Snacks:  pt states she will "binge snack"-buying something and eating it until over the course of a few days it's gone (Lactaid ice cream or honey roasted cashews)    Breakfast: between 8-10 am:  oatmeal with cinnamon & blueberries or waffle with peanut butter.  Whole Foods sausage links containing 7 gm protein.  Or grits with scrambled eggs and nondairy cheese.  Green tea with honey or occasionally everton juice.  Lunch:   between 2-3 pm:  lamb chop with green veggie or salad with nuts, raisins, beets, boiled egg chicken meat, [employer] Foods plant-based reddy.  Will drink water before she eats; occasionally drinks Coke Zero for the carbonation  Dinner: last night had honey-glazed lamb chop with bowtie pasta with some of the glaze on it and scalloped potatoes (prepared with oat milk and butter).  Tea.    Current activity level and/or physical limitations:  Goes to [facility] twice a week and works with a .  Does yoga    Motivation to make changes/anticipated barriers and/or expected adherence:  no barriers to adherence identified    Nutrition-Focus Physical Findings:  Pt wearing face covering per COVID19 protocol; pt appears well nourished      Assessment:  pt and her sister very attentive.  Pt asked numerous relevant questions about foods/beverages recommended and to avoid; carb counting; portion control; sample meal plan and menus; grocery shopping, eating out and cooking tips; reading food labels; healthy snacking; what does " glycemic index mean?;  What is Prediabetes?.  All of their questions were answered, and they verbalized understanding of information.    Nutrition Diagnosis: Food- and nutrition-related knowledge deficit RT lack of prior exposure to information AEB dx prediabetes      Recommendations:  Low fat, high fiber diet. Exercise as tolerated.  Handouts provided & reviewed:  Heart-Healthy Eating Mediterranean Style; Mediterranean Diet Shopping List; My Plate Planner; 1800 calorie Sample 5-day menus; Servings of Carbohydrates for Meal Planning; Eat Fit edgar info; Eat Fit Shopping List      Strategies Implemented:   portion control    Consultation Time:45 minutes.  Communicated with referring healthcare provider:  Consult note available in pt's Epic chart per MD discretion  Follow Up:  Pt provided with dietitian contact number and advised to call with questions or make future appointment if further intervention needed.

## 2023-01-09 ENCOUNTER — TELEPHONE (OUTPATIENT)
Dept: PODIATRY | Facility: CLINIC | Age: 78
End: 2023-01-09
Payer: MEDICARE

## 2023-01-09 NOTE — TELEPHONE ENCOUNTER
called pt on 1/9/22 spoke [facility] pt and pt michelle to schedule with sugical provider    [place]

## 2023-01-09 NOTE — TELEPHONE ENCOUNTER
----- Message from Shaila Cotton DPM sent at 1/9/2023  7:43 AM CST -----  Regarding: Appointment today  Please call and make sure new patient is not interested in any surgical intervention since her comment in the appointment note is bunions getting worse.  If she is please reschedule her with 1 of our surgical providers thank you

## 2023-01-20 NOTE — PROGRESS NOTES
Valarie Colindres  1945        Subjective     Chief Complaint: Flank Pain    History of Present Illness:  Ms. Valarie Colindres is a 77 y.o. female who presents to clinic for pre-op.    Scheduled for cataract surgery Feb 14th. Left eye, they did the right eye in Aug 2022. Went well, did report light-sensitivity 2 weeks after.     Has also had partial hysterectomy. Ovaries. Allergic to chrome sutures. No issues with anesthesia.     Able to walk up flight of stairs without issue. Works out with . No chest pain or SOB with activity.    Has had anemia since childhood, thalassemia trait. Follows with Dr. Kirk.       Follows with Dr. Kirk for MGUS (IgG kappa monoclonal gammopathy). Also has alpha and beta thal trait per Heme/Onc notes. That was dx in her 20s. Last skeletal survey was done 2019. Planning on following up with her office in 3 days. Labs done 4 days ago (ordered by Heme/Onc). Renal function intact. Hb 10.7 which is around her baseline.    Colonoscopy done 1/2022. -Q5 yrs due to polyps.  Dr. Mabry near . Dad had colon cancer.    Urge incontinence-Has cyctocele/rectocele that she follows with Urogyn.    Follows with ENT for laryngopharyngeal reflux. On pepcid.     HTN- on Valsartan and HCTZ. On statin.     On Synthroid 100 mcg.     DEXA-osteopenia. Works with  at [employer].    Review of Systems   Constitutional:  Negative for chills, diaphoresis, fever, malaise/fatigue and weight loss.   HENT:  Negative for congestion and sore throat.    Eyes:  Positive for photophobia.   Respiratory:  Negative for cough, shortness of breath and wheezing.    Cardiovascular:  Negative for chest pain, palpitations and leg swelling.   Gastrointestinal:  Negative for abdominal pain, nausea and vomiting.   Musculoskeletal:  Positive for back pain (Improving).   Skin:  Negative for itching and rash.   Neurological:  Negative for dizziness, tingling, tremors, sensory change, speech  change, focal weakness, weakness and headaches.      PAST HISTORY:     Past Medical History:   Diagnosis Date    Angle recession of right eye     Blunt trauma of both eyes     hit across eyes with bungee exercise band    Cataract     Cystocele, midline     Dry eye syndrome     Ectropion due to laxity of eyelid, right     GERD (gastroesophageal reflux disease)     Hyperlipidemia     Hypertension     PVD (posterior vitreous detachment), right eye     Rectocele     Retinal drusen of both eyes     Thalassemia major     Vaginal atrophy        Past Surgical History:   Procedure Laterality Date    ABDOMINAL ADHESION SURGERY  1978    ANKLE FRACTURE SURGERY Right 1996    COLONOSCOPY  11/09/2022    ECTROPION REPAIR Bilateral 06/2018    Dr. Flaherty    HYSTERECTOMY  1977    possible cancerous lesion       Family History   Problem Relation Age of Onset    Stroke Mother     Heart disease Mother     Prostate cancer Father     Colon cancer Father     Prostate cancer Brother     Prostate cancer Brother     Glaucoma Maternal Uncle     Blindness Maternal Uncle     Breast cancer Maternal Aunt     Cataracts Neg Hx     Macular degeneration Neg Hx        Social History     Socioeconomic History    Marital status:    Occupational History     Comment: Retired in 2018 -    Tobacco Use    Smoking status: Never    Smokeless tobacco: Never   Substance and Sexual Activity    Alcohol use: Not Currently    Drug use: No    Sexual activity: Not Currently     Social Determinants of Health     Financial Resource Strain: Low Risk     Difficulty of Paying Living Expenses: Not hard at all   Food Insecurity: No Food Insecurity    Worried About Running Out of Food in the Last Year: Never true    Ran Out of Food in the Last Year: Never true   Transportation Needs: No Transportation Needs    Lack of Transportation (Medical): No    Lack of Transportation (Non-Medical): No   Physical Activity: Insufficiently Active    Days of Exercise  per Week: 2 days    Minutes of Exercise per Session: 60 min   Stress: No Stress Concern Present    Feeling of Stress : Not at all   Social Connections: Moderately Isolated    Frequency of Communication with Friends and Family: Once a week    Frequency of Social Gatherings with Friends and Family: More than three times a week    Attends Yarsanism Services: More than 4 times per year    Active Member of Clubs or Organizations: No    Attends Club or Organization Meetings: Never    Marital Status:    Housing Stability: Low Risk     Unable to Pay for Housing in the Last Year: No    Number of Places Lived in the Last Year: 1    Unstable Housing in the Last Year: No       MEDICATIONS & ALLERGIES:     Current Outpatient Medications on File Prior to Visit   Medication Sig    acetaminophen (TYLENOL) 500 MG tablet Take 1-2 tablets (500-1,000 mg total) by mouth 3 (three) times daily as needed for Pain.    atorvastatin (LIPITOR) 10 MG tablet Take 1 tablet (10 mg total) by mouth every evening.    azelastine (ASTELIN) 137 mcg (0.1 %) nasal spray Two sprays in each nostril, sniff until absorbed, then follow with 1 spray of fluticasone.  Use both sprays twice daily.    chlorhexidine (PERIDEX) 0.12 % solution RINSE AND SWISH ONE CAPFUL BID    diclofenac sodium (VOLTAREN) 1 % Gel Apply 2 g topically 4 (four) times daily.    famotidine (PEPCID) 20 MG tablet TAKE 1 TABLET BY MOUTH TWICE A DAY    fluticasone propionate (FLONASE) 50 mcg/actuation nasal spray 2 sprays (100 mcg total) by Each Nostril route once daily.    hydroCHLOROthiazide (HYDRODIURIL) 12.5 MG Tab Take 1 tablet (12.5 mg total) by mouth once daily.    levothyroxine (SYNTHROID) 100 MCG tablet Take 1 tablet (100 mcg total) by mouth once daily.    loratadine (CLARITIN) 10 mg tablet Take 1 tablet (10 mg total) by mouth once daily.    pantoprazole (PROTONIX) 40 MG tablet Take 40 mg by mouth every morning.    valsartan (DIOVAN) 320 MG tablet Take 1 tablet (320 mg total)  "by mouth once daily.     No current facility-administered medications on file prior to visit.       Review of patient's allergies indicates:   Allergen Reactions    Sutures, catgut (chromic)        OBJECTIVE:     Vital Signs:  Vitals:    01/23/23 0909   BP: 108/64   BP Location: Right arm   Patient Position: Sitting   BP Method: Medium (Manual)   Pulse: 60   Temp: 97.9 °F (36.6 °C)   TempSrc: Oral   Weight: 64.2 kg (141 lb 8.6 oz)   Height: 5' 3.5" (1.613 m)         Body mass index is 24.68 kg/m².     Physical Exam:  Physical Exam  Vitals and nursing note reviewed.   Constitutional:       General: She is not in acute distress.     Appearance: Normal appearance. She is not ill-appearing, toxic-appearing or diaphoretic.   HENT:      Head: Normocephalic and atraumatic.   Eyes:      General: No scleral icterus.     Extraocular Movements: Extraocular movements intact.      Conjunctiva/sclera: Conjunctivae normal.   Cardiovascular:      Rate and Rhythm: Normal rate and regular rhythm.      Pulses: Normal pulses.      Heart sounds: No murmur heard.  Pulmonary:      Effort: Pulmonary effort is normal. No respiratory distress.      Breath sounds: Normal breath sounds. No wheezing.   Abdominal:      General: There is no distension.      Palpations: Abdomen is soft.      Tenderness: There is no abdominal tenderness. There is no guarding.   Musculoskeletal:         General: No swelling or tenderness. Normal range of motion.      Cervical back: Normal range of motion and neck supple. No rigidity or tenderness.      Right lower leg: No edema.      Left lower leg: No edema.   Lymphadenopathy:      Cervical: No cervical adenopathy.   Skin:     General: Skin is warm and dry.      Findings: No rash.   Neurological:      General: No focal deficit present.      Mental Status: She is alert and oriented to person, place, and time. Mental status is at baseline.      Sensory: No sensory deficit.      Motor: No weakness.   Psychiatric:    "      Mood and Affect: Mood and affect normal.         Behavior: Behavior normal.          Laboratory  Lab Results   Component Value Date    WBC 5.67 10/14/2022    HGB 10.7 (L) 10/14/2022    HCT 36.0 (L) 10/14/2022    MCV 63 (L) 10/14/2022     10/14/2022     Lab Results   Component Value Date    GLU 89 10/14/2022     10/14/2022    K 3.6 10/14/2022     10/14/2022    CO2 31 (H) 10/14/2022    BUN 15 10/14/2022    CREATININE 0.8 10/14/2022    CALCIUM 9.7 10/14/2022    MG 2.3 10/15/2020     No results found for: INR, PROTIME  Lab Results   Component Value Date    HGBA1C 5.9 (H) 06/11/2022             ASSESSMENT & PLAN:   Ms. Valarie Colindres is a 77 y.o. female who was seen today in clinic for pre-op.    1. Pre-op evaluation  2. Cataract, unspecified cataract type, unspecified laterality  -Doing well, able to climb flight of stairs shortness of breath or chest pain.  Has had cataract surgery already on 1 eye, planning on doing the other in a few weeks.  Denies history with anesthesia.  Is allergic to chromic sutures.  That has been noted on the forearm she asked us to fill out today.  We will check her kidney function given that she is on BP meds.      3. Keratitis sicca, both eyes  -Follows with Eye doctor    4. Primary hypertension  -     BASIC METABOLIC PANEL; Future; Expected date: 01/23/2023    5. Disease of spinal cord, unspecified  8. Lumbosacral radiculopathy at L5  -reports improvement since working with a .  Working on core strength.    6. MGUS (monoclonal gammopathy of unknown significance)  7. Thalassemia trait  -follows with Hematology/Oncology.  Stable.      9. Acquired hypothyroidism  -check TSH today.  Continue current dose of Synthroid.           Sirena Renner MD  Internal Medicine

## 2023-01-23 ENCOUNTER — OFFICE VISIT (OUTPATIENT)
Dept: PRIMARY CARE CLINIC | Facility: CLINIC | Age: 78
End: 2023-01-23
Payer: MEDICARE

## 2023-01-23 ENCOUNTER — TELEPHONE (OUTPATIENT)
Dept: PRIMARY CARE CLINIC | Facility: CLINIC | Age: 78
End: 2023-01-23

## 2023-01-23 VITALS
BODY MASS INDEX: 24.17 KG/M2 | TEMPERATURE: 98 F | DIASTOLIC BLOOD PRESSURE: 64 MMHG | HEART RATE: 60 BPM | HEIGHT: 64 IN | SYSTOLIC BLOOD PRESSURE: 108 MMHG | WEIGHT: 141.56 LBS

## 2023-01-23 DIAGNOSIS — D56.3 THALASSEMIA TRAIT: ICD-10-CM

## 2023-01-23 DIAGNOSIS — G95.9 DISEASE OF SPINAL CORD, UNSPECIFIED: ICD-10-CM

## 2023-01-23 DIAGNOSIS — I10 PRIMARY HYPERTENSION: ICD-10-CM

## 2023-01-23 DIAGNOSIS — H26.9 CATARACT, UNSPECIFIED CATARACT TYPE, UNSPECIFIED LATERALITY: ICD-10-CM

## 2023-01-23 DIAGNOSIS — D47.2 MGUS (MONOCLONAL GAMMOPATHY OF UNKNOWN SIGNIFICANCE): ICD-10-CM

## 2023-01-23 DIAGNOSIS — E03.9 ACQUIRED HYPOTHYROIDISM: ICD-10-CM

## 2023-01-23 DIAGNOSIS — Z01.818 PRE-OP EVALUATION: Primary | ICD-10-CM

## 2023-01-23 DIAGNOSIS — M54.17 LUMBOSACRAL RADICULOPATHY AT L5: ICD-10-CM

## 2023-01-23 DIAGNOSIS — M35.01 KERATITIS SICCA, BOTH EYES: ICD-10-CM

## 2023-01-23 PROCEDURE — 3074F PR MOST RECENT SYSTOLIC BLOOD PRESSURE < 130 MM HG: ICD-10-PCS | Mod: CPTII,S$GLB,, | Performed by: STUDENT IN AN ORGANIZED HEALTH CARE EDUCATION/TRAINING PROGRAM

## 2023-01-23 PROCEDURE — 1101F PT FALLS ASSESS-DOCD LE1/YR: CPT | Mod: CPTII,S$GLB,, | Performed by: STUDENT IN AN ORGANIZED HEALTH CARE EDUCATION/TRAINING PROGRAM

## 2023-01-23 PROCEDURE — 3078F DIAST BP <80 MM HG: CPT | Mod: CPTII,S$GLB,, | Performed by: STUDENT IN AN ORGANIZED HEALTH CARE EDUCATION/TRAINING PROGRAM

## 2023-01-23 PROCEDURE — 1101F PR PT FALLS ASSESS DOC 0-1 FALLS W/OUT INJ PAST YR: ICD-10-PCS | Mod: CPTII,S$GLB,, | Performed by: STUDENT IN AN ORGANIZED HEALTH CARE EDUCATION/TRAINING PROGRAM

## 2023-01-23 PROCEDURE — 3288F PR FALLS RISK ASSESSMENT DOCUMENTED: ICD-10-PCS | Mod: CPTII,S$GLB,, | Performed by: STUDENT IN AN ORGANIZED HEALTH CARE EDUCATION/TRAINING PROGRAM

## 2023-01-23 PROCEDURE — 3074F SYST BP LT 130 MM HG: CPT | Mod: CPTII,S$GLB,, | Performed by: STUDENT IN AN ORGANIZED HEALTH CARE EDUCATION/TRAINING PROGRAM

## 2023-01-23 PROCEDURE — 1126F AMNT PAIN NOTED NONE PRSNT: CPT | Mod: CPTII,S$GLB,, | Performed by: STUDENT IN AN ORGANIZED HEALTH CARE EDUCATION/TRAINING PROGRAM

## 2023-01-23 PROCEDURE — 3288F FALL RISK ASSESSMENT DOCD: CPT | Mod: CPTII,S$GLB,, | Performed by: STUDENT IN AN ORGANIZED HEALTH CARE EDUCATION/TRAINING PROGRAM

## 2023-01-23 PROCEDURE — 1160F RVW MEDS BY RX/DR IN RCRD: CPT | Mod: CPTII,S$GLB,, | Performed by: STUDENT IN AN ORGANIZED HEALTH CARE EDUCATION/TRAINING PROGRAM

## 2023-01-23 PROCEDURE — 99999 PR PBB SHADOW E&M-EST. PATIENT-LVL IV: CPT | Mod: PBBFAC,,, | Performed by: STUDENT IN AN ORGANIZED HEALTH CARE EDUCATION/TRAINING PROGRAM

## 2023-01-23 PROCEDURE — 1159F PR MEDICATION LIST DOCUMENTED IN MEDICAL RECORD: ICD-10-PCS | Mod: CPTII,S$GLB,, | Performed by: STUDENT IN AN ORGANIZED HEALTH CARE EDUCATION/TRAINING PROGRAM

## 2023-01-23 PROCEDURE — 99213 PR OFFICE/OUTPT VISIT, EST, LEVL III, 20-29 MIN: ICD-10-PCS | Mod: S$GLB,,, | Performed by: STUDENT IN AN ORGANIZED HEALTH CARE EDUCATION/TRAINING PROGRAM

## 2023-01-23 PROCEDURE — 3078F PR MOST RECENT DIASTOLIC BLOOD PRESSURE < 80 MM HG: ICD-10-PCS | Mod: CPTII,S$GLB,, | Performed by: STUDENT IN AN ORGANIZED HEALTH CARE EDUCATION/TRAINING PROGRAM

## 2023-01-23 PROCEDURE — 1160F PR REVIEW ALL MEDS BY PRESCRIBER/CLIN PHARMACIST DOCUMENTED: ICD-10-PCS | Mod: CPTII,S$GLB,, | Performed by: STUDENT IN AN ORGANIZED HEALTH CARE EDUCATION/TRAINING PROGRAM

## 2023-01-23 PROCEDURE — 1159F MED LIST DOCD IN RCRD: CPT | Mod: CPTII,S$GLB,, | Performed by: STUDENT IN AN ORGANIZED HEALTH CARE EDUCATION/TRAINING PROGRAM

## 2023-01-23 PROCEDURE — 1126F PR PAIN SEVERITY QUANTIFIED, NO PAIN PRESENT: ICD-10-PCS | Mod: CPTII,S$GLB,, | Performed by: STUDENT IN AN ORGANIZED HEALTH CARE EDUCATION/TRAINING PROGRAM

## 2023-01-23 PROCEDURE — 99213 OFFICE O/P EST LOW 20 MIN: CPT | Mod: S$GLB,,, | Performed by: STUDENT IN AN ORGANIZED HEALTH CARE EDUCATION/TRAINING PROGRAM

## 2023-01-23 PROCEDURE — 99999 PR PBB SHADOW E&M-EST. PATIENT-LVL IV: ICD-10-PCS | Mod: PBBFAC,,, | Performed by: STUDENT IN AN ORGANIZED HEALTH CARE EDUCATION/TRAINING PROGRAM

## 2023-01-23 NOTE — TELEPHONE ENCOUNTER
----- Message from Heidi Connolly sent at 1/23/2023 12:11 PM CST -----  Regarding: canceled lab  Good afternoon,    Patient did not come to lab. Orders have been canceled. Please reorder.

## 2023-01-25 ENCOUNTER — HOSPITAL ENCOUNTER (OUTPATIENT)
Dept: RADIOLOGY | Facility: HOSPITAL | Age: 78
Discharge: HOME | End: 2023-01-25
Attending: PODIATRIST
Payer: MEDICARE

## 2023-01-25 ENCOUNTER — OFFICE VISIT (OUTPATIENT)
Dept: PODIATRY | Facility: CLINIC | Age: 78
End: 2023-01-25
Payer: MEDICARE

## 2023-01-25 VITALS
HEART RATE: 66 BPM | DIASTOLIC BLOOD PRESSURE: 65 MMHG | BODY MASS INDEX: 24.05 KG/M2 | HEIGHT: 64 IN | SYSTOLIC BLOOD PRESSURE: 124 MMHG | WEIGHT: 140.88 LBS

## 2023-01-25 DIAGNOSIS — M20.12 VALGUS DEFORMITY OF BOTH GREAT TOES: ICD-10-CM

## 2023-01-25 DIAGNOSIS — M20.12 VALGUS DEFORMITY OF BOTH GREAT TOES: Primary | ICD-10-CM

## 2023-01-25 DIAGNOSIS — M20.11 VALGUS DEFORMITY OF BOTH GREAT TOES: Primary | ICD-10-CM

## 2023-01-25 DIAGNOSIS — M70.62 TROCHANTERIC BURSITIS OF LEFT HIP: ICD-10-CM

## 2023-01-25 DIAGNOSIS — M20.11 VALGUS DEFORMITY OF BOTH GREAT TOES: ICD-10-CM

## 2023-01-25 PROCEDURE — 1101F PT FALLS ASSESS-DOCD LE1/YR: CPT | Mod: CPTII,S$GLB,, | Performed by: PODIATRIST

## 2023-01-25 PROCEDURE — 99214 OFFICE O/P EST MOD 30 MIN: CPT | Mod: S$GLB,,, | Performed by: PODIATRIST

## 2023-01-25 PROCEDURE — 1125F PR PAIN SEVERITY QUANTIFIED, PAIN PRESENT: ICD-10-PCS | Mod: CPTII,S$GLB,, | Performed by: PODIATRIST

## 2023-01-25 PROCEDURE — 1160F PR REVIEW ALL MEDS BY PRESCRIBER/CLIN PHARMACIST DOCUMENTED: ICD-10-PCS | Mod: CPTII,S$GLB,, | Performed by: PODIATRIST

## 2023-01-25 PROCEDURE — 1160F RVW MEDS BY RX/DR IN RCRD: CPT | Mod: CPTII,S$GLB,, | Performed by: PODIATRIST

## 2023-01-25 PROCEDURE — 1159F PR MEDICATION LIST DOCUMENTED IN MEDICAL RECORD: ICD-10-PCS | Mod: CPTII,S$GLB,, | Performed by: PODIATRIST

## 2023-01-25 PROCEDURE — 99214 PR OFFICE/OUTPT VISIT, EST, LEVL IV, 30-39 MIN: ICD-10-PCS | Mod: S$GLB,,, | Performed by: PODIATRIST

## 2023-01-25 PROCEDURE — 73630 XR FOOT COMPLETE 3 VIEW BILATERAL: ICD-10-PCS | Mod: 26,50,, | Performed by: RADIOLOGY

## 2023-01-25 PROCEDURE — 3288F PR FALLS RISK ASSESSMENT DOCUMENTED: ICD-10-PCS | Mod: CPTII,S$GLB,, | Performed by: PODIATRIST

## 2023-01-25 PROCEDURE — 1159F MED LIST DOCD IN RCRD: CPT | Mod: CPTII,S$GLB,, | Performed by: PODIATRIST

## 2023-01-25 PROCEDURE — 99999 PR PBB SHADOW E&M-EST. PATIENT-LVL III: ICD-10-PCS | Mod: PBBFAC,,, | Performed by: PODIATRIST

## 2023-01-25 PROCEDURE — 99999 PR PBB SHADOW E&M-EST. PATIENT-LVL III: CPT | Mod: PBBFAC,,, | Performed by: PODIATRIST

## 2023-01-25 PROCEDURE — 3078F PR MOST RECENT DIASTOLIC BLOOD PRESSURE < 80 MM HG: ICD-10-PCS | Mod: CPTII,S$GLB,, | Performed by: PODIATRIST

## 2023-01-25 PROCEDURE — 73630 X-RAY EXAM OF FOOT: CPT | Mod: TC,50

## 2023-01-25 PROCEDURE — 1125F AMNT PAIN NOTED PAIN PRSNT: CPT | Mod: CPTII,S$GLB,, | Performed by: PODIATRIST

## 2023-01-25 PROCEDURE — 3288F FALL RISK ASSESSMENT DOCD: CPT | Mod: CPTII,S$GLB,, | Performed by: PODIATRIST

## 2023-01-25 PROCEDURE — 1101F PR PT FALLS ASSESS DOC 0-1 FALLS W/OUT INJ PAST YR: ICD-10-PCS | Mod: CPTII,S$GLB,, | Performed by: PODIATRIST

## 2023-01-25 PROCEDURE — 3078F DIAST BP <80 MM HG: CPT | Mod: CPTII,S$GLB,, | Performed by: PODIATRIST

## 2023-01-25 PROCEDURE — 3074F PR MOST RECENT SYSTOLIC BLOOD PRESSURE < 130 MM HG: ICD-10-PCS | Mod: CPTII,S$GLB,, | Performed by: PODIATRIST

## 2023-01-25 PROCEDURE — 3074F SYST BP LT 130 MM HG: CPT | Mod: CPTII,S$GLB,, | Performed by: PODIATRIST

## 2023-01-25 PROCEDURE — 73630 X-RAY EXAM OF FOOT: CPT | Mod: 26,50,, | Performed by: RADIOLOGY

## 2023-01-25 RX ORDER — DICLOFENAC SODIUM 10 MG/G
2 GEL TOPICAL 4 TIMES DAILY
Qty: 1 EACH | Refills: 2 | Status: SHIPPED | OUTPATIENT
Start: 2023-01-25

## 2023-01-25 NOTE — PROGRESS NOTES
Subjective:      Patient ID: Valarie Colindres is a 77 y.o. female.    Chief Complaint:   Etta Sheppard is a 77 y.o. female who presents to the podiatry clinic  with complaint of  bilateral foot pain. Onset of the symptoms was several years ago. Precipitating event: none known. Current symptoms include: ability to bear weight, but with some pain. Aggravating factors: any weight bearing. Symptoms have gradually worsened. Patient has had no prior foot problems. Evaluation to date: plain films: abnormal Bilateral bunion deformity with arthritic changes . Treatment to date:  changes in shoe gear, anti-inflammatory and digital padding . Patients rates pain 7/10 on pain scale.    Review of Systems   Constitutional: Negative for chills, decreased appetite, fever and malaise/fatigue.   HENT:  Negative for congestion, hearing loss, nosebleeds and tinnitus.    Eyes:  Negative for double vision, pain, photophobia and visual disturbance.   Cardiovascular:  Negative for chest pain, claudication, cyanosis and leg swelling.   Respiratory:  Negative for cough, hemoptysis, shortness of breath and wheezing.    Endocrine: Negative for cold intolerance and heat intolerance.   Hematologic/Lymphatic: Negative for adenopathy and bleeding problem.   Skin:  Negative for color change, dry skin, itching, nail changes and suspicious lesions.   Musculoskeletal:  Positive for arthritis, joint pain, myalgias and stiffness.   Gastrointestinal:  Negative for abdominal pain, jaundice, nausea and vomiting.   Genitourinary:  Negative for dysuria, frequency and hematuria.   Neurological:  Negative for difficulty with concentration, loss of balance, numbness, paresthesias and sensory change.   Psychiatric/Behavioral:  Negative for altered mental status, hallucinations and suicidal ideas. The patient is not nervous/anxious.    Allergic/Immunologic: Negative for environmental allergies and persistent infections.         Objective:      Physical  Exam  Vitals reviewed.   Constitutional:       Appearance: She is well-developed.   HENT:      Head: Normocephalic and atraumatic.   Cardiovascular:      Pulses:           Dorsalis pedis pulses are 2+ on the right side and 2+ on the left side.        Posterior tibial pulses are 2+ on the right side and 2+ on the left side.   Pulmonary:      Effort: Pulmonary effort is normal.   Musculoskeletal:         General: Normal range of motion.      Comments: Inspection and palpation of the muscles joints and bones of both lower extremities reveal that muscle strength for the anterior lateral and posterior muscle groups and intrinsic muscle groups of the foot are all 5 over 5 symmetrical.    Painful medial 1st MTPJ exostosis. Lateral deviation of hallux, non trackbound. No pain w/ ROM to 1st or 2nd MTPJs. No First ray hypermobility or sub second MT head callus. No lesser toe deformities or pain.      Skin:     General: Skin is warm and dry.      Capillary Refill: Capillary refill takes 2 to 3 seconds.      Comments: Skin turgor is normal bilaterally.  Skin texture is well hydrated to both lower extremities.  No lesions or rashes or wounds appreciated bilaterally.  Nail plates 1 through 5 bilaterally are within normal limits for length and thickness.  No nail clubbing or incurvation noted.   Neurological:      Mental Status: She is alert and oriented to person, place, and time.      Comments: Sharp dull light touch vibratory proprioceptive sensation are intact bilaterally.  Deep tendon reflexes to patellar and Achilles tendon are symmetrical 2 over 4 bilaterally.  No ankle clonus or Babinski reflexes noted bilaterally.  Coordination is normal to both feet and lower extremities.   Psychiatric:         Behavior: Behavior normal.           Assessment:       Encounter Diagnoses   Name Primary?    Valgus deformity of both great toes Yes    Trochanteric bursitis of left hip      Independent visualization of imaging was performed.   Results were reviewed in detail with patient.       Plan:       Valarie was seen today for bunions.    Diagnoses and all orders for this visit:    Valgus deformity of both great toes  -     X-Ray Foot Complete Bilateral; Future    Trochanteric bursitis of left hip  -     diclofenac sodium (VOLTAREN) 1 % Gel; Apply 2 g topically 4 (four) times daily.      I counseled the patient on her conditions, their implications and medical management.    The nature of the condition, options for management, as well as potential risks and complications were discussed in detail with patient. Patient was amenable to my recommendations and left my office fully informed and will follow up as instructed or sooner if necessary.       Conservative and surgical options discussed in detail regarding bunion deformity.  Appropriate shoe gear and orthotics discussed in detail.  Again discussed digital padding and conservative measures.  Radiographs ordered, follow-up to review to discuss possible surgical intervention if needed.

## 2023-02-01 ENCOUNTER — LAB VISIT (OUTPATIENT)
Dept: LAB | Facility: HOSPITAL | Age: 78
End: 2023-02-01
Attending: STUDENT IN AN ORGANIZED HEALTH CARE EDUCATION/TRAINING PROGRAM
Payer: MEDICARE

## 2023-02-01 DIAGNOSIS — E03.9 ACQUIRED HYPOTHYROIDISM: ICD-10-CM

## 2023-02-01 LAB — TSH SERPL DL<=0.005 MIU/L-ACNC: 1.1 UIU/ML (ref 0.4–4)

## 2023-02-01 PROCEDURE — 84443 ASSAY THYROID STIM HORMONE: CPT | Performed by: STUDENT IN AN ORGANIZED HEALTH CARE EDUCATION/TRAINING PROGRAM

## 2023-02-01 PROCEDURE — 36415 COLL VENOUS BLD VENIPUNCTURE: CPT | Mod: PN | Performed by: STUDENT IN AN ORGANIZED HEALTH CARE EDUCATION/TRAINING PROGRAM

## 2023-02-20 PROBLEM — Z00.00 ENCOUNTER FOR PREVENTIVE HEALTH EXAMINATION: Status: RESOLVED | Noted: 2022-11-18 | Resolved: 2023-02-20

## 2023-04-11 ENCOUNTER — PES CALL (OUTPATIENT)
Dept: ADMINISTRATIVE | Facility: CLINIC | Age: 78
End: 2023-04-11
Payer: MEDICARE

## 2023-04-24 ENCOUNTER — OFFICE VISIT (OUTPATIENT)
Dept: PRIMARY CARE CLINIC | Facility: CLINIC | Age: 78
End: 2023-04-24
Payer: MEDICARE

## 2023-04-24 ENCOUNTER — LAB VISIT (OUTPATIENT)
Dept: LAB | Facility: HOSPITAL | Age: 78
End: 2023-04-24
Attending: STUDENT IN AN ORGANIZED HEALTH CARE EDUCATION/TRAINING PROGRAM
Payer: MEDICARE

## 2023-04-24 VITALS
SYSTOLIC BLOOD PRESSURE: 118 MMHG | WEIGHT: 143.75 LBS | BODY MASS INDEX: 24.54 KG/M2 | HEIGHT: 64 IN | OXYGEN SATURATION: 96 % | DIASTOLIC BLOOD PRESSURE: 70 MMHG | HEART RATE: 60 BPM

## 2023-04-24 DIAGNOSIS — D47.2 MGUS (MONOCLONAL GAMMOPATHY OF UNKNOWN SIGNIFICANCE): ICD-10-CM

## 2023-04-24 DIAGNOSIS — G89.29 FLANK PAIN, CHRONIC: ICD-10-CM

## 2023-04-24 DIAGNOSIS — R10.9 FLANK PAIN, CHRONIC: Primary | ICD-10-CM

## 2023-04-24 DIAGNOSIS — D56.3 THALASSEMIA TRAIT: ICD-10-CM

## 2023-04-24 DIAGNOSIS — G89.29 FLANK PAIN, CHRONIC: Primary | ICD-10-CM

## 2023-04-24 DIAGNOSIS — R10.9 FLANK PAIN, CHRONIC: ICD-10-CM

## 2023-04-24 LAB
ALBUMIN SERPL BCP-MCNC: 4.1 G/DL (ref 3.5–5.2)
ALP SERPL-CCNC: 44 U/L (ref 55–135)
ALT SERPL W/O P-5'-P-CCNC: 10 U/L (ref 10–44)
ANION GAP SERPL CALC-SCNC: 8 MMOL/L (ref 8–16)
AST SERPL-CCNC: 16 U/L (ref 10–40)
BILIRUB SERPL-MCNC: 0.6 MG/DL (ref 0.1–1)
BUN SERPL-MCNC: 17 MG/DL (ref 8–23)
CALCIUM SERPL-MCNC: 9.9 MG/DL (ref 8.7–10.5)
CHLORIDE SERPL-SCNC: 102 MMOL/L (ref 95–110)
CO2 SERPL-SCNC: 30 MMOL/L (ref 23–29)
CREAT SERPL-MCNC: 0.9 MG/DL (ref 0.5–1.4)
EST. GFR  (NO RACE VARIABLE): >60 ML/MIN/1.73 M^2
GLUCOSE SERPL-MCNC: 86 MG/DL (ref 70–110)
POTASSIUM SERPL-SCNC: 3.8 MMOL/L (ref 3.5–5.1)
PROT SERPL-MCNC: 7.1 G/DL (ref 6–8.4)
SODIUM SERPL-SCNC: 140 MMOL/L (ref 136–145)

## 2023-04-24 PROCEDURE — 99214 PR OFFICE/OUTPT VISIT, EST, LEVL IV, 30-39 MIN: ICD-10-PCS | Mod: S$GLB,,, | Performed by: STUDENT IN AN ORGANIZED HEALTH CARE EDUCATION/TRAINING PROGRAM

## 2023-04-24 PROCEDURE — 1160F PR REVIEW ALL MEDS BY PRESCRIBER/CLIN PHARMACIST DOCUMENTED: ICD-10-PCS | Mod: CPTII,S$GLB,, | Performed by: STUDENT IN AN ORGANIZED HEALTH CARE EDUCATION/TRAINING PROGRAM

## 2023-04-24 PROCEDURE — 99999 PR PBB SHADOW E&M-EST. PATIENT-LVL IV: CPT | Mod: PBBFAC,,, | Performed by: STUDENT IN AN ORGANIZED HEALTH CARE EDUCATION/TRAINING PROGRAM

## 2023-04-24 PROCEDURE — 3078F PR MOST RECENT DIASTOLIC BLOOD PRESSURE < 80 MM HG: ICD-10-PCS | Mod: CPTII,S$GLB,, | Performed by: STUDENT IN AN ORGANIZED HEALTH CARE EDUCATION/TRAINING PROGRAM

## 2023-04-24 PROCEDURE — 3074F SYST BP LT 130 MM HG: CPT | Mod: CPTII,S$GLB,, | Performed by: STUDENT IN AN ORGANIZED HEALTH CARE EDUCATION/TRAINING PROGRAM

## 2023-04-24 PROCEDURE — 3288F FALL RISK ASSESSMENT DOCD: CPT | Mod: CPTII,S$GLB,, | Performed by: STUDENT IN AN ORGANIZED HEALTH CARE EDUCATION/TRAINING PROGRAM

## 2023-04-24 PROCEDURE — 99214 OFFICE O/P EST MOD 30 MIN: CPT | Mod: S$GLB,,, | Performed by: STUDENT IN AN ORGANIZED HEALTH CARE EDUCATION/TRAINING PROGRAM

## 2023-04-24 PROCEDURE — 1159F PR MEDICATION LIST DOCUMENTED IN MEDICAL RECORD: ICD-10-PCS | Mod: CPTII,S$GLB,, | Performed by: STUDENT IN AN ORGANIZED HEALTH CARE EDUCATION/TRAINING PROGRAM

## 2023-04-24 PROCEDURE — 1101F PR PT FALLS ASSESS DOC 0-1 FALLS W/OUT INJ PAST YR: ICD-10-PCS | Mod: CPTII,S$GLB,, | Performed by: STUDENT IN AN ORGANIZED HEALTH CARE EDUCATION/TRAINING PROGRAM

## 2023-04-24 PROCEDURE — 1125F AMNT PAIN NOTED PAIN PRSNT: CPT | Mod: CPTII,S$GLB,, | Performed by: STUDENT IN AN ORGANIZED HEALTH CARE EDUCATION/TRAINING PROGRAM

## 2023-04-24 PROCEDURE — 3288F PR FALLS RISK ASSESSMENT DOCUMENTED: ICD-10-PCS | Mod: CPTII,S$GLB,, | Performed by: STUDENT IN AN ORGANIZED HEALTH CARE EDUCATION/TRAINING PROGRAM

## 2023-04-24 PROCEDURE — 1160F RVW MEDS BY RX/DR IN RCRD: CPT | Mod: CPTII,S$GLB,, | Performed by: STUDENT IN AN ORGANIZED HEALTH CARE EDUCATION/TRAINING PROGRAM

## 2023-04-24 PROCEDURE — 3074F PR MOST RECENT SYSTOLIC BLOOD PRESSURE < 130 MM HG: ICD-10-PCS | Mod: CPTII,S$GLB,, | Performed by: STUDENT IN AN ORGANIZED HEALTH CARE EDUCATION/TRAINING PROGRAM

## 2023-04-24 PROCEDURE — 1159F MED LIST DOCD IN RCRD: CPT | Mod: CPTII,S$GLB,, | Performed by: STUDENT IN AN ORGANIZED HEALTH CARE EDUCATION/TRAINING PROGRAM

## 2023-04-24 PROCEDURE — 99999 PR PBB SHADOW E&M-EST. PATIENT-LVL IV: ICD-10-PCS | Mod: PBBFAC,,, | Performed by: STUDENT IN AN ORGANIZED HEALTH CARE EDUCATION/TRAINING PROGRAM

## 2023-04-24 PROCEDURE — 1125F PR PAIN SEVERITY QUANTIFIED, PAIN PRESENT: ICD-10-PCS | Mod: CPTII,S$GLB,, | Performed by: STUDENT IN AN ORGANIZED HEALTH CARE EDUCATION/TRAINING PROGRAM

## 2023-04-24 PROCEDURE — 80053 COMPREHEN METABOLIC PANEL: CPT | Performed by: STUDENT IN AN ORGANIZED HEALTH CARE EDUCATION/TRAINING PROGRAM

## 2023-04-24 PROCEDURE — 36415 COLL VENOUS BLD VENIPUNCTURE: CPT | Mod: PN | Performed by: STUDENT IN AN ORGANIZED HEALTH CARE EDUCATION/TRAINING PROGRAM

## 2023-04-24 PROCEDURE — 1101F PT FALLS ASSESS-DOCD LE1/YR: CPT | Mod: CPTII,S$GLB,, | Performed by: STUDENT IN AN ORGANIZED HEALTH CARE EDUCATION/TRAINING PROGRAM

## 2023-04-24 PROCEDURE — 3078F DIAST BP <80 MM HG: CPT | Mod: CPTII,S$GLB,, | Performed by: STUDENT IN AN ORGANIZED HEALTH CARE EDUCATION/TRAINING PROGRAM

## 2023-04-24 NOTE — PROGRESS NOTES
"  Valarie Colindres  1945        Subjective     Chief Complaint: Right flank pain    History of Present Illness:  Ms. Valarie Colindres is a 78 y.o. female who presents to clinic for flank pain, chronic.    Right flank and low back pain, going on for months. Feels sometimes muscle spasms. No pain with legs or issues with walking. Interferes with bending/housework. Reports deep pain but sore to touch. Not bruising. No rash. No adbominal pain. No nausea or vomiting. Energy levels at baseline.No pain or burning with urination. No fevers. No rash. No numbness or tingling.     Pain/discomfort very intermittent. Can go days without. No precipitating or alleviating factors. Does not need to take medication for it.    Denies bone pain.    Follows with Dr. Kirk in Heme/Onc-IgG kappa monoclonal gammopathy. Per note, "Low risk disease. No end organ damage." Monitoring with yearly labs. Last saw 10/2022. Also has hx of thalassemia trait.    US abdomen 10/2022:  Impression:     No finding to correlate with the history of flank pain     Hepatic and left renal cysts.      Review of Systems   Constitutional:  Negative for chills, fever and malaise/fatigue.   HENT:  Negative for congestion.    Respiratory:  Negative for cough.    Cardiovascular:  Negative for leg swelling.   Gastrointestinal:  Negative for abdominal pain, diarrhea, nausea and vomiting.   Genitourinary:  Positive for flank pain. Negative for dysuria, frequency, hematuria and urgency.   Musculoskeletal:  Positive for back pain and joint pain. Negative for myalgias and neck pain.   Skin:  Negative for rash.   Neurological:  Negative for dizziness, tingling, sensory change, speech change and focal weakness.      PAST HISTORY:     Past Medical History:   Diagnosis Date    Angle recession of right eye     Blunt trauma of both eyes     hit across eyes with bungee exercise band    Cataract     Cystocele, midline     Dry eye syndrome     Ectropion due to laxity of " eyelid, right     GERD (gastroesophageal reflux disease)     Hyperlipidemia     Hypertension     PVD (posterior vitreous detachment), right eye     Rectocele     Retinal drusen of both eyes     Thalassemia major     Vaginal atrophy        Past Surgical History:   Procedure Laterality Date    ABDOMINAL ADHESION SURGERY  1978    ANKLE FRACTURE SURGERY Right 1996    COLONOSCOPY  11/09/2022    ECTROPION REPAIR Bilateral 06/2018    Dr. Flaherty    HYSTERECTOMY  1977    possible cancerous lesion       Family History   Problem Relation Age of Onset    Stroke Mother     Heart disease Mother     Prostate cancer Father     Colon cancer Father     Prostate cancer Brother     Prostate cancer Brother     Glaucoma Maternal Uncle     Blindness Maternal Uncle     Breast cancer Maternal Aunt     Cataracts Neg Hx     Macular degeneration Neg Hx        Social History     Socioeconomic History    Marital status:    Occupational History     Comment: Retired in 2018 -    Tobacco Use    Smoking status: Never    Smokeless tobacco: Never   Substance and Sexual Activity    Alcohol use: Not Currently    Drug use: No    Sexual activity: Not Currently     Social Determinants of Health     Financial Resource Strain: Low Risk     Difficulty of Paying Living Expenses: Not hard at all   Food Insecurity: No Food Insecurity    Worried About Running Out of Food in the Last Year: Never true    Ran Out of Food in the Last Year: Never true   Transportation Needs: No Transportation Needs    Lack of Transportation (Medical): No    Lack of Transportation (Non-Medical): No   Physical Activity: Insufficiently Active    Days of Exercise per Week: 2 days    Minutes of Exercise per Session: 60 min   Stress: No Stress Concern Present    Feeling of Stress : Not at all   Social Connections: Moderately Isolated    Frequency of Communication with Friends and Family: Once a week    Frequency of Social Gatherings with Friends and Family: More than  three times a week    Attends Gnosticist Services: More than 4 times per year    Active Member of Clubs or Organizations: No    Attends Club or Organization Meetings: Never    Marital Status:    Housing Stability: Low Risk     Unable to Pay for Housing in the Last Year: No    Number of Places Lived in the Last Year: 1    Unstable Housing in the Last Year: No       MEDICATIONS & ALLERGIES:     Current Outpatient Medications on File Prior to Visit   Medication Sig    acetaminophen (TYLENOL) 500 MG tablet Take 1-2 tablets (500-1,000 mg total) by mouth 3 (three) times daily as needed for Pain.    atorvastatin (LIPITOR) 10 MG tablet Take 1 tablet (10 mg total) by mouth every evening.    azelastine (ASTELIN) 137 mcg (0.1 %) nasal spray Two sprays in each nostril, sniff until absorbed, then follow with 1 spray of fluticasone.  Use both sprays twice daily.    chlorhexidine (PERIDEX) 0.12 % solution RINSE AND SWISH ONE CAPFUL BID    diclofenac sodium (VOLTAREN) 1 % Gel Apply 2 g topically 4 (four) times daily.    famotidine (PEPCID) 20 MG tablet TAKE 1 TABLET BY MOUTH TWICE A DAY    fluticasone propionate (FLONASE) 50 mcg/actuation nasal spray 2 sprays (100 mcg total) by Each Nostril route once daily.    hydroCHLOROthiazide (HYDRODIURIL) 12.5 MG Tab Take 1 tablet (12.5 mg total) by mouth once daily.    levothyroxine (SYNTHROID) 100 MCG tablet Take 1 tablet (100 mcg total) by mouth once daily.    loratadine (CLARITIN) 10 mg tablet Take 1 tablet (10 mg total) by mouth once daily.    pantoprazole (PROTONIX) 40 MG tablet Take 40 mg by mouth every morning.    valsartan (DIOVAN) 320 MG tablet Take 1 tablet (320 mg total) by mouth once daily.     No current facility-administered medications on file prior to visit.       Review of patient's allergies indicates:   Allergen Reactions    Sutures, catgut (chromic)        OBJECTIVE:     Vital Signs:  Vitals:    04/24/23 0904   BP: 118/70   BP Location: Right arm   Patient Position:  "Sitting   BP Method: Medium (Manual)   Pulse: 60   SpO2: 96%   Weight: 65.2 kg (143 lb 11.8 oz)   Height: 5' 3.5" (1.613 m)       Body mass index is 25.06 kg/m².     Physical Exam:  Physical Exam  Vitals and nursing note reviewed.   Constitutional:       General: She is not in acute distress.     Appearance: Normal appearance. She is not ill-appearing, toxic-appearing or diaphoretic.   HENT:      Head: Normocephalic and atraumatic.   Eyes:      General: No scleral icterus.  Cardiovascular:      Pulses: Normal pulses.   Pulmonary:      Effort: Pulmonary effort is normal. No respiratory distress.   Abdominal:      General: There is no distension.      Palpations: Abdomen is soft.      Tenderness: There is no abdominal tenderness. There is no right CVA tenderness, left CVA tenderness, guarding or rebound.   Musculoskeletal:         General: No swelling, tenderness or signs of injury.      Cervical back: Normal range of motion.      Right lower leg: No edema.      Left lower leg: No edema.   Skin:     General: Skin is warm and dry.      Findings: No erythema or rash.   Neurological:      Mental Status: She is alert and oriented to person, place, and time.      Sensory: No sensory deficit.      Motor: No weakness.      Gait: Gait normal.   Psychiatric:         Mood and Affect: Mood and affect normal.         Behavior: Behavior normal.          Laboratory  Lab Results   Component Value Date    WBC 5.67 10/14/2022    HGB 10.7 (L) 10/14/2022    HCT 36.0 (L) 10/14/2022    MCV 63 (L) 10/14/2022     10/14/2022     Lab Results   Component Value Date    GLU 89 10/14/2022     10/14/2022    K 3.6 10/14/2022     10/14/2022    CO2 31 (H) 10/14/2022    BUN 15 10/14/2022    CREATININE 0.8 10/14/2022    CALCIUM 9.7 10/14/2022    MG 2.3 10/15/2020     No results found for: INR, PROTIME  Lab Results   Component Value Date    HGBA1C 5.9 (H) 06/11/2022           Health Maintenance         Date Due Completion Date    " Hemoglobin A1c (Prediabetes) 06/11/2023 6/11/2022    DEXA Scan 12/10/2024 12/10/2021    Lipid Panel 06/11/2027 6/11/2022    Colonoscopy 11/09/2027 11/9/2022    TETANUS VACCINE 09/02/2032 9/2/2022    Override on 1/1/2004: Done              ASSESSMENT & PLAN:   Ms. Valarie Colindres is a 78 y.o. female who was seen today in clinic for right flank pain, chronic. No spinal bony tenderness or CVA tenderness.  Denies systemic symptoms.  Ultrasound with small cysts.  Will check CT abdomen with history of MGUS.  Consider MRI spine if unrevealing.  Continue follow-up with Hematology/Oncology.  May be orthopedics if related to back/hip.      1. Flank pain, chronic  -     CT Abdomen With Without Contrast; Future; Expected date: 04/24/2023  -     Comprehensive Metabolic Panel; Future; Expected date: 04/24/2023  -     Urinalysis; Future; Expected date: 04/24/2023    2. MGUS (monoclonal gammopathy of unknown significance)  -     CT Abdomen With Without Contrast; Future; Expected date: 04/24/2023  -     Comprehensive Metabolic Panel; Future; Expected date: 04/24/2023  -     Urinalysis; Future; Expected date: 04/24/2023    3. Thalassemia trait           Sirena Renner MD  Internal Medicine

## 2023-04-28 ENCOUNTER — ANCILLARY ORDERS (OUTPATIENT)
Dept: PRIMARY CARE CLINIC | Facility: CLINIC | Age: 78
End: 2023-04-28
Payer: MEDICARE

## 2023-04-28 ENCOUNTER — HOSPITAL ENCOUNTER (OUTPATIENT)
Dept: RADIOLOGY | Facility: OTHER | Age: 78
Discharge: HOME | End: 2023-04-28
Attending: STUDENT IN AN ORGANIZED HEALTH CARE EDUCATION/TRAINING PROGRAM
Payer: MEDICARE

## 2023-04-28 DIAGNOSIS — R10.9 FLANK PAIN, CHRONIC: ICD-10-CM

## 2023-04-28 DIAGNOSIS — G89.29 FLANK PAIN, CHRONIC: ICD-10-CM

## 2023-04-28 DIAGNOSIS — D47.2 MGUS (MONOCLONAL GAMMOPATHY OF UNKNOWN SIGNIFICANCE): ICD-10-CM

## 2023-04-28 PROCEDURE — 74178 CT ABDOMEN PELVIS W WO CONTRAST: ICD-10-PCS | Mod: 26,,, | Performed by: STUDENT IN AN ORGANIZED HEALTH CARE EDUCATION/TRAINING PROGRAM

## 2023-04-28 PROCEDURE — 74178 CT ABD&PLV WO CNTR FLWD CNTR: CPT | Mod: TC

## 2023-04-28 PROCEDURE — 74178 CT ABD&PLV WO CNTR FLWD CNTR: CPT | Mod: 26,,, | Performed by: STUDENT IN AN ORGANIZED HEALTH CARE EDUCATION/TRAINING PROGRAM

## 2023-04-28 PROCEDURE — 25500020 PHARM REV CODE 255: Performed by: STUDENT IN AN ORGANIZED HEALTH CARE EDUCATION/TRAINING PROGRAM

## 2023-04-28 PROCEDURE — A9698 NON-RAD CONTRAST MATERIALNOC: HCPCS | Performed by: STUDENT IN AN ORGANIZED HEALTH CARE EDUCATION/TRAINING PROGRAM

## 2023-04-28 RX ADMIN — IOHEXOL 75 ML: 350 INJECTION, SOLUTION INTRAVENOUS at 08:04

## 2023-04-28 RX ADMIN — IOHEXOL 1000 ML: 9 SOLUTION ORAL at 08:04

## 2023-04-28 NOTE — PROGRESS NOTES
Hi,     Could you please contact the patient and let them know that their results?    Hard to say what could be causing her flank pain  They do see a lot of small kidney stones but nothing that is blocking any urine.  They also saw some liver and kidney cysts.  It also shows she is pretty constipated.  Has she been having bowel movements? constipation may be contributing to her flank pain.  If so please let me know we can order something to help.  For these kidney stone, even though they are not blocking anything we can have her see Urology to make sure there is no other workup that needs to be done.

## 2023-05-06 DIAGNOSIS — G89.29 FLANK PAIN, CHRONIC: Primary | ICD-10-CM

## 2023-05-06 DIAGNOSIS — R10.9 FLANK PAIN, CHRONIC: Primary | ICD-10-CM

## 2023-05-11 ENCOUNTER — OFFICE VISIT (OUTPATIENT)
Dept: PRIMARY CARE CLINIC | Facility: CLINIC | Age: 78
End: 2023-05-11
Payer: MEDICARE

## 2023-05-11 ENCOUNTER — LAB VISIT (OUTPATIENT)
Dept: LAB | Facility: HOSPITAL | Age: 78
End: 2023-05-11
Attending: STUDENT IN AN ORGANIZED HEALTH CARE EDUCATION/TRAINING PROGRAM
Payer: MEDICARE

## 2023-05-11 VITALS
HEART RATE: 65 BPM | SYSTOLIC BLOOD PRESSURE: 112 MMHG | DIASTOLIC BLOOD PRESSURE: 72 MMHG | WEIGHT: 145.94 LBS | BODY MASS INDEX: 24.92 KG/M2 | HEIGHT: 64 IN | OXYGEN SATURATION: 97 %

## 2023-05-11 DIAGNOSIS — K59.09 OTHER CONSTIPATION: ICD-10-CM

## 2023-05-11 DIAGNOSIS — Z01.818 PRE-OP EVALUATION: ICD-10-CM

## 2023-05-11 DIAGNOSIS — R10.9 FLANK PAIN: Primary | ICD-10-CM

## 2023-05-11 PROBLEM — R13.10 DYSPHAGIA: Status: RESOLVED | Noted: 2021-09-25 | Resolved: 2023-05-11

## 2023-05-11 PROBLEM — R06.02 SHORTNESS OF BREATH: Status: RESOLVED | Noted: 2022-11-18 | Resolved: 2023-05-11

## 2023-05-11 LAB
BASOPHILS # BLD AUTO: 0.02 K/UL (ref 0–0.2)
BASOPHILS NFR BLD: 0.3 % (ref 0–1.9)
BILIRUB UR QL STRIP: NEGATIVE
CLARITY UR REFRACT.AUTO: CLEAR
COLOR UR AUTO: COLORLESS
DIFFERENTIAL METHOD: ABNORMAL
EOSINOPHIL # BLD AUTO: 0.1 K/UL (ref 0–0.5)
EOSINOPHIL NFR BLD: 1.3 % (ref 0–8)
ERYTHROCYTE [DISTWIDTH] IN BLOOD BY AUTOMATED COUNT: 19 % (ref 11.5–14.5)
GLUCOSE UR QL STRIP: NEGATIVE
HCT VFR BLD AUTO: 34.1 % (ref 37–48.5)
HGB BLD-MCNC: 10.3 G/DL (ref 12–16)
HGB UR QL STRIP: ABNORMAL
IMM GRANULOCYTES # BLD AUTO: 0.02 K/UL (ref 0–0.04)
IMM GRANULOCYTES NFR BLD AUTO: 0.3 % (ref 0–0.5)
KETONES UR QL STRIP: NEGATIVE
LEUKOCYTE ESTERASE UR QL STRIP: ABNORMAL
LYMPHOCYTES # BLD AUTO: 3.3 K/UL (ref 1–4.8)
LYMPHOCYTES NFR BLD: 54.3 % (ref 18–48)
MCH RBC QN AUTO: 18.9 PG (ref 27–31)
MCHC RBC AUTO-ENTMCNC: 30.2 G/DL (ref 32–36)
MCV RBC AUTO: 63 FL (ref 82–98)
MICROSCOPIC COMMENT: NORMAL
MONOCYTES # BLD AUTO: 0.5 K/UL (ref 0.3–1)
MONOCYTES NFR BLD: 8.2 % (ref 4–15)
NEUTROPHILS # BLD AUTO: 2.2 K/UL (ref 1.8–7.7)
NEUTROPHILS NFR BLD: 35.6 % (ref 38–73)
NITRITE UR QL STRIP: NEGATIVE
NRBC BLD-RTO: 0 /100 WBC
PH UR STRIP: 7 [PH] (ref 5–8)
PLATELET # BLD AUTO: 245 K/UL (ref 150–450)
PMV BLD AUTO: 10.9 FL (ref 9.2–12.9)
PROT UR QL STRIP: NEGATIVE
RBC # BLD AUTO: 5.45 M/UL (ref 4–5.4)
RBC #/AREA URNS AUTO: 1 /HPF (ref 0–4)
SP GR UR STRIP: 1 (ref 1–1.03)
URN SPEC COLLECT METH UR: ABNORMAL
WBC # BLD AUTO: 6.1 K/UL (ref 3.9–12.7)
WBC #/AREA URNS AUTO: 1 /HPF (ref 0–5)

## 2023-05-11 PROCEDURE — 3078F PR MOST RECENT DIASTOLIC BLOOD PRESSURE < 80 MM HG: ICD-10-PCS | Mod: CPTII,S$GLB,, | Performed by: STUDENT IN AN ORGANIZED HEALTH CARE EDUCATION/TRAINING PROGRAM

## 2023-05-11 PROCEDURE — 85025 COMPLETE CBC W/AUTO DIFF WBC: CPT | Performed by: STUDENT IN AN ORGANIZED HEALTH CARE EDUCATION/TRAINING PROGRAM

## 2023-05-11 PROCEDURE — 99999 PR PBB SHADOW E&M-EST. PATIENT-LVL IV: ICD-10-PCS | Mod: PBBFAC,,, | Performed by: STUDENT IN AN ORGANIZED HEALTH CARE EDUCATION/TRAINING PROGRAM

## 2023-05-11 PROCEDURE — 36415 COLL VENOUS BLD VENIPUNCTURE: CPT | Mod: PN | Performed by: STUDENT IN AN ORGANIZED HEALTH CARE EDUCATION/TRAINING PROGRAM

## 2023-05-11 PROCEDURE — 3288F FALL RISK ASSESSMENT DOCD: CPT | Mod: CPTII,S$GLB,, | Performed by: STUDENT IN AN ORGANIZED HEALTH CARE EDUCATION/TRAINING PROGRAM

## 2023-05-11 PROCEDURE — 1125F PR PAIN SEVERITY QUANTIFIED, PAIN PRESENT: ICD-10-PCS | Mod: CPTII,S$GLB,, | Performed by: STUDENT IN AN ORGANIZED HEALTH CARE EDUCATION/TRAINING PROGRAM

## 2023-05-11 PROCEDURE — 3078F DIAST BP <80 MM HG: CPT | Mod: CPTII,S$GLB,, | Performed by: STUDENT IN AN ORGANIZED HEALTH CARE EDUCATION/TRAINING PROGRAM

## 2023-05-11 PROCEDURE — 1159F MED LIST DOCD IN RCRD: CPT | Mod: CPTII,S$GLB,, | Performed by: STUDENT IN AN ORGANIZED HEALTH CARE EDUCATION/TRAINING PROGRAM

## 2023-05-11 PROCEDURE — 1101F PT FALLS ASSESS-DOCD LE1/YR: CPT | Mod: CPTII,S$GLB,, | Performed by: STUDENT IN AN ORGANIZED HEALTH CARE EDUCATION/TRAINING PROGRAM

## 2023-05-11 PROCEDURE — 3074F SYST BP LT 130 MM HG: CPT | Mod: CPTII,S$GLB,, | Performed by: STUDENT IN AN ORGANIZED HEALTH CARE EDUCATION/TRAINING PROGRAM

## 2023-05-11 PROCEDURE — 99215 OFFICE O/P EST HI 40 MIN: CPT | Mod: S$GLB,,, | Performed by: STUDENT IN AN ORGANIZED HEALTH CARE EDUCATION/TRAINING PROGRAM

## 2023-05-11 PROCEDURE — 99215 PR OFFICE/OUTPT VISIT, EST, LEVL V, 40-54 MIN: ICD-10-PCS | Mod: S$GLB,,, | Performed by: STUDENT IN AN ORGANIZED HEALTH CARE EDUCATION/TRAINING PROGRAM

## 2023-05-11 PROCEDURE — 3074F PR MOST RECENT SYSTOLIC BLOOD PRESSURE < 130 MM HG: ICD-10-PCS | Mod: CPTII,S$GLB,, | Performed by: STUDENT IN AN ORGANIZED HEALTH CARE EDUCATION/TRAINING PROGRAM

## 2023-05-11 PROCEDURE — 1125F AMNT PAIN NOTED PAIN PRSNT: CPT | Mod: CPTII,S$GLB,, | Performed by: STUDENT IN AN ORGANIZED HEALTH CARE EDUCATION/TRAINING PROGRAM

## 2023-05-11 PROCEDURE — 3288F PR FALLS RISK ASSESSMENT DOCUMENTED: ICD-10-PCS | Mod: CPTII,S$GLB,, | Performed by: STUDENT IN AN ORGANIZED HEALTH CARE EDUCATION/TRAINING PROGRAM

## 2023-05-11 PROCEDURE — 1101F PR PT FALLS ASSESS DOC 0-1 FALLS W/OUT INJ PAST YR: ICD-10-PCS | Mod: CPTII,S$GLB,, | Performed by: STUDENT IN AN ORGANIZED HEALTH CARE EDUCATION/TRAINING PROGRAM

## 2023-05-11 PROCEDURE — 81001 URINALYSIS AUTO W/SCOPE: CPT | Performed by: STUDENT IN AN ORGANIZED HEALTH CARE EDUCATION/TRAINING PROGRAM

## 2023-05-11 PROCEDURE — 99999 PR PBB SHADOW E&M-EST. PATIENT-LVL IV: CPT | Mod: PBBFAC,,, | Performed by: STUDENT IN AN ORGANIZED HEALTH CARE EDUCATION/TRAINING PROGRAM

## 2023-05-11 PROCEDURE — 1159F PR MEDICATION LIST DOCUMENTED IN MEDICAL RECORD: ICD-10-PCS | Mod: CPTII,S$GLB,, | Performed by: STUDENT IN AN ORGANIZED HEALTH CARE EDUCATION/TRAINING PROGRAM

## 2023-05-11 RX ORDER — POLYETHYLENE GLYCOL 3350 17 G/17G
17 POWDER, FOR SOLUTION ORAL 2 TIMES DAILY PRN
Qty: 30 EACH | Refills: 3 | Status: SHIPPED | OUTPATIENT
Start: 2023-05-11 | End: 2023-11-14

## 2023-05-11 NOTE — PROGRESS NOTES
Valarie Colindres  1945        Subjective     Chief Complaint: F/u flank pain    History of Present Illness:  Ms. Valarie Colindres is a 78 y.o. female who presents to clinic for flank pain. Pre-op visit. Cataracts.    Had the right one, now the left. Planned for end this month.  Had hysterectomy (ovaries intact). No trouble with anesthesia.     No Chest pain, at rest or with exertion.   No SOB with normal exertion or at rest.   Can climb flight of stairs without issue.     No hx of MI or CVA.  Not a smoker.  No asthma.     Not on blood thinners.     Referral for urology placed per request. Non obstructing stone vs cyst.  Constipation noted on CT---tried OTC sennakot, softened but no large stool.   Has BM daily. Small, circular stools.  Does drink a lot of water.    Last colonoscopy 5 yrs ago, per pt normal (out of system). On a 5 yr follow-up.     CT scan: 4/24  Impression:     Punctate bilateral renal calculi.  No hydronephrosis.     Multiple hepatic hypodensities, largest compatible with cysts.     Multiple bilateral renal hypodensities, largest on the left compatible with cyst.     Moderate to large volume colonic stool burden suggestive of constipation.     Additional findings as above.       Review of Systems   Constitutional:  Negative for chills, fever and malaise/fatigue.   HENT:  Negative for sore throat.    Respiratory:  Negative for cough, sputum production, shortness of breath and wheezing.    Cardiovascular:  Negative for chest pain and leg swelling.   Gastrointestinal:  Positive for constipation. Negative for abdominal pain, diarrhea, nausea and vomiting.   Genitourinary:  Positive for flank pain (chronic).   Neurological:  Negative for dizziness, sensory change, speech change, focal weakness and weakness.      PAST HISTORY:     Past Medical History:   Diagnosis Date    Angle recession of right eye     Blunt trauma of both eyes     hit across eyes with bungee exercise band    Cataract      Cystocele, midline     Dry eye syndrome     Ectropion due to laxity of eyelid, right     GERD (gastroesophageal reflux disease)     Hyperlipidemia     Hypertension     PVD (posterior vitreous detachment), right eye     Rectocele     Retinal drusen of both eyes     Thalassemia major     Vaginal atrophy        Past Surgical History:   Procedure Laterality Date    ABDOMINAL ADHESION SURGERY  1978    ANKLE FRACTURE SURGERY Right 1996    COLONOSCOPY  11/09/2022    ECTROPION REPAIR Bilateral 06/2018    Dr. Flaherty    HYSTERECTOMY  1977    possible cancerous lesion       Family History   Problem Relation Age of Onset    Stroke Mother     Heart disease Mother     Prostate cancer Father     Colon cancer Father     Prostate cancer Brother     Prostate cancer Brother     Glaucoma Maternal Uncle     Blindness Maternal Uncle     Breast cancer Maternal Aunt     Cataracts Neg Hx     Macular degeneration Neg Hx        Social History     Socioeconomic History    Marital status:    Occupational History     Comment: Retired in 2018 -    Tobacco Use    Smoking status: Never    Smokeless tobacco: Never   Substance and Sexual Activity    Alcohol use: Not Currently    Drug use: No    Sexual activity: Not Currently     Social Determinants of Health     Financial Resource Strain: Low Risk     Difficulty of Paying Living Expenses: Not hard at all   Food Insecurity: No Food Insecurity    Worried About Running Out of Food in the Last Year: Never true    Ran Out of Food in the Last Year: Never true   Transportation Needs: No Transportation Needs    Lack of Transportation (Medical): No    Lack of Transportation (Non-Medical): No   Physical Activity: Insufficiently Active    Days of Exercise per Week: 2 days    Minutes of Exercise per Session: 60 min   Stress: No Stress Concern Present    Feeling of Stress : Not at all   Social Connections: Moderately Isolated    Frequency of Communication with Friends and Family: Once a  week    Frequency of Social Gatherings with Friends and Family: More than three times a week    Attends Yarsani Services: More than 4 times per year    Active Member of Clubs or Organizations: No    Attends Club or Organization Meetings: Never    Marital Status:    Housing Stability: Low Risk     Unable to Pay for Housing in the Last Year: No    Number of Places Lived in the Last Year: 1    Unstable Housing in the Last Year: No       MEDICATIONS & ALLERGIES:     Current Outpatient Medications on File Prior to Visit   Medication Sig    acetaminophen (TYLENOL) 500 MG tablet Take 1-2 tablets (500-1,000 mg total) by mouth 3 (three) times daily as needed for Pain.    atorvastatin (LIPITOR) 10 MG tablet Take 1 tablet (10 mg total) by mouth every evening.    azelastine (ASTELIN) 137 mcg (0.1 %) nasal spray Two sprays in each nostril, sniff until absorbed, then follow with 1 spray of fluticasone.  Use both sprays twice daily.    chlorhexidine (PERIDEX) 0.12 % solution RINSE AND SWISH ONE CAPFUL BID    diclofenac sodium (VOLTAREN) 1 % Gel Apply 2 g topically 4 (four) times daily.    famotidine (PEPCID) 20 MG tablet TAKE 1 TABLET BY MOUTH TWICE A DAY    fluticasone propionate (FLONASE) 50 mcg/actuation nasal spray 2 sprays (100 mcg total) by Each Nostril route once daily.    hydroCHLOROthiazide (HYDRODIURIL) 12.5 MG Tab Take 1 tablet (12.5 mg total) by mouth once daily.    levothyroxine (SYNTHROID) 100 MCG tablet Take 1 tablet (100 mcg total) by mouth once daily.    loratadine (CLARITIN) 10 mg tablet Take 1 tablet (10 mg total) by mouth once daily.    pantoprazole (PROTONIX) 40 MG tablet Take 40 mg by mouth every morning.    valsartan (DIOVAN) 320 MG tablet Take 1 tablet (320 mg total) by mouth once daily.     No current facility-administered medications on file prior to visit.       Review of patient's allergies indicates:   Allergen Reactions    Sutures, catgut (chromic)        OBJECTIVE:     Vital Signs:  Vitals:  "   05/11/23 1043   BP: 112/72   BP Location: Right arm   Patient Position: Sitting   BP Method: Medium (Manual)   Pulse: 65   SpO2: 97%   Weight: 66.2 kg (145 lb 15.1 oz)   Height: 5' 3.5" (1.613 m)       Body mass index is 25.45 kg/m².     Physical Exam:  Physical Exam  Vitals and nursing note reviewed.   Constitutional:       General: She is not in acute distress.     Appearance: Normal appearance. She is not ill-appearing, toxic-appearing or diaphoretic.   HENT:      Head: Normocephalic and atraumatic.      Right Ear: Tympanic membrane and external ear normal. There is impacted cerumen.      Left Ear: Tympanic membrane and external ear normal. There is impacted cerumen.      Mouth/Throat:      Mouth: Mucous membranes are dry.      Pharynx: No oropharyngeal exudate or posterior oropharyngeal erythema.   Eyes:      General: No scleral icterus.     Conjunctiva/sclera: Conjunctivae normal.   Cardiovascular:      Rate and Rhythm: Normal rate and regular rhythm.      Pulses: Normal pulses.      Heart sounds: No murmur heard.  Pulmonary:      Effort: Pulmonary effort is normal. No respiratory distress.      Breath sounds: Normal breath sounds. No wheezing.   Abdominal:      General: There is no distension.      Palpations: Abdomen is soft.      Tenderness: There is no abdominal tenderness. There is no right CVA tenderness, left CVA tenderness or guarding.   Musculoskeletal:      Cervical back: Normal range of motion.      Right lower leg: No edema.      Left lower leg: No edema.   Skin:     General: Skin is warm and dry.   Neurological:      Mental Status: She is alert and oriented to person, place, and time.   Psychiatric:         Mood and Affect: Mood and affect normal.         Behavior: Behavior normal.          Laboratory  Lab Results   Component Value Date    WBC 5.67 10/14/2022    HGB 10.7 (L) 10/14/2022    HCT 36.0 (L) 10/14/2022    MCV 63 (L) 10/14/2022     10/14/2022     Lab Results   Component Value " Date    GLU 86 04/24/2023     04/24/2023    K 3.8 04/24/2023     04/24/2023    CO2 30 (H) 04/24/2023    BUN 17 04/24/2023    CREATININE 0.9 04/24/2023    CALCIUM 9.9 04/24/2023    MG 2.3 10/15/2020     No results found for: INR, PROTIME  Lab Results   Component Value Date    HGBA1C 5.9 (H) 06/11/2022           Health Maintenance         Date Due Completion Date    Hemoglobin A1c (Prediabetes) 06/11/2023 6/11/2022    DEXA Scan 12/10/2024 12/10/2021    Lipid Panel 06/11/2027 6/11/2022    Colonoscopy 11/09/2027 11/9/2022    TETANUS VACCINE 09/02/2032 9/2/2022    Override on 1/1/2004: Done              ASSESSMENT & PLAN:   Ms. Valarie Colindres is a 78 y.o. female who was seen today in clinic for Pre-op/constipation. Consider GI and/or repeat colonoscopy if no improvement. May be contributing to flank pain. Urology referral in. Pre-op forms to be completed once labs results.       1. Flank pain  -     polyethylene glycol (GLYCOLAX) 17 gram PwPk; Take 17 g by mouth 2 (two) times daily as needed (constiptation).  Dispense: 30 each; Refill: 3  -     Urinalysis    2. Pre-op evaluation  -     CBC W/ AUTO DIFFERENTIAL; Future; Expected date: 05/11/2023    3. Other constipation  -     polyethylene glycol (GLYCOLAX) 17 gram PwPk; Take 17 g by mouth 2 (two) times daily as needed (constiptation).  Dispense: 30 each; Refill: 3           Sirena Renner MD  Internal Medicine         Time spent in the evaluation and management of this patient exceeded 50 min and greater than 50% of this time was in face-to-face time with the patient on day of the clinic visit.   This includes face-to-face time and non face-to-face time and includes the following:  --preparing to see the patient (eg, obtaining and/or reviewing old records such as, when applicable, primary care notes, specialist notes, hospital notes, review of laboratory tests, and/or radiographic and/or cardiology or other studies)  --performing a medically appropriate  review of systems and examination and/or evaluation  --reviewing and independently interpreting results (not separately reported; eg, lab results) and communicating results to the patient and/or family/caregiver  --placing orders and/or reviewing other physician's orders which can both include medications, laboratory studies, radiographic studies, procedures, referrals etcetera and   --counseling and educating the patient and/or family member/caregiver regarding the treatment plan  --documentation of the visit in the electronic health record  --communicating with other health care providers regarding referrals, studies, follow-up, etc

## 2023-05-12 ENCOUNTER — TELEPHONE (OUTPATIENT)
Dept: PRIMARY CARE CLINIC | Facility: CLINIC | Age: 78
End: 2023-05-12
Payer: MEDICARE

## 2023-05-12 DIAGNOSIS — G89.29 FLANK PAIN, CHRONIC: ICD-10-CM

## 2023-05-12 DIAGNOSIS — R31.9 HEMATURIA, UNSPECIFIED TYPE: Primary | ICD-10-CM

## 2023-05-12 DIAGNOSIS — R10.9 FLANK PAIN, CHRONIC: ICD-10-CM

## 2023-05-12 NOTE — TELEPHONE ENCOUNTER
----- Message from Sirena Renner MD sent at 5/12/2023  8:35 AM CDT -----  UA ordered for 2 weeks  
Schedule Pt for UA 5/26 At 12:00  
no

## 2023-05-17 ENCOUNTER — OFFICE VISIT (OUTPATIENT)
Dept: UROLOGY | Facility: CLINIC | Age: 78
End: 2023-05-17
Payer: MEDICARE

## 2023-05-17 ENCOUNTER — TELEPHONE (OUTPATIENT)
Dept: UROLOGY | Facility: CLINIC | Age: 78
End: 2023-05-17

## 2023-05-17 VITALS
BODY MASS INDEX: 24.75 KG/M2 | SYSTOLIC BLOOD PRESSURE: 101 MMHG | RESPIRATION RATE: 16 BRPM | HEART RATE: 72 BPM | HEIGHT: 64 IN | OXYGEN SATURATION: 98 % | DIASTOLIC BLOOD PRESSURE: 55 MMHG | WEIGHT: 145 LBS

## 2023-05-17 DIAGNOSIS — G89.29 FLANK PAIN, CHRONIC: ICD-10-CM

## 2023-05-17 DIAGNOSIS — R10.9 FLANK PAIN, CHRONIC: ICD-10-CM

## 2023-05-17 DIAGNOSIS — N20.0 NEPHROLITHIASIS: Primary | ICD-10-CM

## 2023-05-17 DIAGNOSIS — N28.1 RENAL CYST: ICD-10-CM

## 2023-05-17 PROCEDURE — 1160F RVW MEDS BY RX/DR IN RCRD: CPT | Mod: CPTII,S$GLB,, | Performed by: NURSE PRACTITIONER

## 2023-05-17 PROCEDURE — 3074F PR MOST RECENT SYSTOLIC BLOOD PRESSURE < 130 MM HG: ICD-10-PCS | Mod: CPTII,S$GLB,, | Performed by: NURSE PRACTITIONER

## 2023-05-17 PROCEDURE — 99203 OFFICE O/P NEW LOW 30 MIN: CPT | Mod: S$GLB,,, | Performed by: NURSE PRACTITIONER

## 2023-05-17 PROCEDURE — 3078F PR MOST RECENT DIASTOLIC BLOOD PRESSURE < 80 MM HG: ICD-10-PCS | Mod: CPTII,S$GLB,, | Performed by: NURSE PRACTITIONER

## 2023-05-17 PROCEDURE — 1159F PR MEDICATION LIST DOCUMENTED IN MEDICAL RECORD: ICD-10-PCS | Mod: CPTII,S$GLB,, | Performed by: NURSE PRACTITIONER

## 2023-05-17 PROCEDURE — 3078F DIAST BP <80 MM HG: CPT | Mod: CPTII,S$GLB,, | Performed by: NURSE PRACTITIONER

## 2023-05-17 PROCEDURE — 1101F PR PT FALLS ASSESS DOC 0-1 FALLS W/OUT INJ PAST YR: ICD-10-PCS | Mod: CPTII,S$GLB,, | Performed by: NURSE PRACTITIONER

## 2023-05-17 PROCEDURE — 1125F PR PAIN SEVERITY QUANTIFIED, PAIN PRESENT: ICD-10-PCS | Mod: CPTII,S$GLB,, | Performed by: NURSE PRACTITIONER

## 2023-05-17 PROCEDURE — 1160F PR REVIEW ALL MEDS BY PRESCRIBER/CLIN PHARMACIST DOCUMENTED: ICD-10-PCS | Mod: CPTII,S$GLB,, | Performed by: NURSE PRACTITIONER

## 2023-05-17 PROCEDURE — 1125F AMNT PAIN NOTED PAIN PRSNT: CPT | Mod: CPTII,S$GLB,, | Performed by: NURSE PRACTITIONER

## 2023-05-17 PROCEDURE — 3074F SYST BP LT 130 MM HG: CPT | Mod: CPTII,S$GLB,, | Performed by: NURSE PRACTITIONER

## 2023-05-17 PROCEDURE — 1101F PT FALLS ASSESS-DOCD LE1/YR: CPT | Mod: CPTII,S$GLB,, | Performed by: NURSE PRACTITIONER

## 2023-05-17 PROCEDURE — 3288F PR FALLS RISK ASSESSMENT DOCUMENTED: ICD-10-PCS | Mod: CPTII,S$GLB,, | Performed by: NURSE PRACTITIONER

## 2023-05-17 PROCEDURE — 1159F MED LIST DOCD IN RCRD: CPT | Mod: CPTII,S$GLB,, | Performed by: NURSE PRACTITIONER

## 2023-05-17 PROCEDURE — 99203 PR OFFICE/OUTPT VISIT, NEW, LEVL III, 30-44 MIN: ICD-10-PCS | Mod: S$GLB,,, | Performed by: NURSE PRACTITIONER

## 2023-05-17 PROCEDURE — 3288F FALL RISK ASSESSMENT DOCD: CPT | Mod: CPTII,S$GLB,, | Performed by: NURSE PRACTITIONER

## 2023-05-17 NOTE — PROGRESS NOTES
"Subjective:      Valarie Colindres is a 78 y.o. female who was referred by Dr. Renner for evaluation of her nephrolithiasis.    The patient recently completed a CT scan of her abdomen/pelvis that demonstrated bilateral non obstructing renal stones.     CT - "Bilateral kidneys are normal in size and location.  Normal symmetric uptake of contrast.  Multiple bilateral hypodensities, largest at the inferior pole of the left kidney demonstrates simple fluid attenuation compatible with cyst.  Additional subcentimeter lesions are too small to characterize.  Punctate hyperdense focus in the right kidney, likely represents nonobstructing calculus.  Numerous punctate hyperdense foci in the left kidney, largest measuring 6 mm (series 2, image 62), likely represent nonobstructing calculi.  No hydronephrosis. Difficult to visualize entire course of bilateral ureters, no definite ureteral calculi or hydroureter.  Bladder is significantly distended.  No focal wall thickening."    Denies a history of nephrolithiasis. Denies bothersome urinary symptoms and recurrent UTIs. Drinks lots of water! She has chronic R flank pain and has been unable to identify the source for this.     Component      Latest Ref Rng & Units 5/11/2023   RBC, UA      0 - 4 /hpf 1   WBC, UA      0 - 5 /hpf 1   Microscopic Comment       SEE COMMENT     The following portions of the patient's history were reviewed and updated as appropriate: allergies, current medications, past family history, past medical history, past social history, past surgical history and problem list.    Review of Systems  Constitutional: no fever or chills  ENT: no nasal congestion or sore throat  Respiratory: no cough or shortness of breath  Cardiovascular: no chest pain or palpitations  Gastrointestinal: no nausea or vomiting, tolerating diet  Genitourinary: as per HPI  Hematologic/Lymphatic: no easy bruising or lymphadenopathy  Musculoskeletal: no arthralgias or myalgias  Neurological: " "no seizures or tremors  Behavioral/Psych: no auditory or visual hallucinations     Objective:   Vital Signs:  Vitals:    05/17/23 1305   BP: (!) 101/55   Pulse: 72   Resp: 16     Physical Exam   General: alert and oriented, no acute distress  Head: normocephalic, atraumatic  Neck: supple, normal ROM  Respiratory: Symmetric expansion, non-labored breathing  Cardiovascular: regular rate and rhythm  Abdomen: soft, non tender, non distended  Pelvic: deferred  Skin: normal coloration and turgor, no rashes, no suspicious skin lesions noted  Neuro: alert and oriented x3, no gross deficits  Psych: normal judgment and insight, normal mood/affect, and non-anxious    Lab Review   Urinalysis demonstrates negative for all components  Lab Results   Component Value Date    WBC 6.10 05/11/2023    HGB 10.3 (L) 05/11/2023    HCT 34.1 (L) 05/11/2023    MCV 63 (L) 05/11/2023     05/11/2023     Lab Results   Component Value Date    CREATININE 0.9 04/24/2023    BUN 17 04/24/2023       Imaging   (all images personally reviewed; agree with report below)  CT abdomen/pelvis- "Bilateral kidneys are normal in size and location.  Normal symmetric uptake of contrast.  Multiple bilateral hypodensities, largest at the inferior pole of the left kidney demonstrates simple fluid attenuation compatible with cyst.  Additional subcentimeter lesions are too small to characterize.  Punctate hyperdense focus in the right kidney, likely represents nonobstructing calculus.  Numerous punctate hyperdense foci in the left kidney, largest measuring 6 mm (series 2, image 62), likely represent nonobstructing calculi.  No hydronephrosis. Difficult to visualize entire course of bilateral ureters, no definite ureteral calculi or hydroureter.Bladder is significantly distended.  No focal wall thickening."  Assessment:     1. Nephrolithiasis    2. Flank pain, chronic    3. Renal cyst      Plan:   Valarie was seen today for flank pain.    Diagnoses and all orders " for this visit:    Nephrolithiasis    Flank pain, chronic  -     Ambulatory referral/consult to Urology  -     X-Ray [facility]; Future  -     Ambulatory referral/consult to [facility]; Future    Renal cyst    Plan:  --Stable renal stones. No hydro or ureteral stones on CT  --Recommend general stone preventive measures, to include increased hydration, low Na diet, and increased citrus intake  --Discussed observation vs intervention for her renal stones- prefer observation  --Follow up annually with [facility]  --Referral to back and spine

## 2023-05-26 ENCOUNTER — LAB VISIT (OUTPATIENT)
Dept: LAB | Facility: HOSPITAL | Age: 78
End: 2023-05-26
Attending: STUDENT IN AN ORGANIZED HEALTH CARE EDUCATION/TRAINING PROGRAM
Payer: MEDICARE

## 2023-05-26 DIAGNOSIS — G89.29 FLANK PAIN, CHRONIC: ICD-10-CM

## 2023-05-26 DIAGNOSIS — R31.9 HEMATURIA, UNSPECIFIED TYPE: ICD-10-CM

## 2023-05-26 DIAGNOSIS — R10.9 FLANK PAIN, CHRONIC: ICD-10-CM

## 2023-05-26 LAB
BILIRUB UR QL STRIP: NEGATIVE
CLARITY UR REFRACT.AUTO: CLEAR
COLOR UR AUTO: COLORLESS
GLUCOSE UR QL STRIP: NEGATIVE
HGB UR QL STRIP: ABNORMAL
KETONES UR QL STRIP: NEGATIVE
LEUKOCYTE ESTERASE UR QL STRIP: NEGATIVE
MICROSCOPIC COMMENT: ABNORMAL
NITRITE UR QL STRIP: NEGATIVE
PH UR STRIP: 6 [PH] (ref 5–8)
PROT UR QL STRIP: NEGATIVE
RBC #/AREA URNS AUTO: 6 /HPF (ref 0–4)
SP GR UR STRIP: 1 (ref 1–1.03)
SQUAMOUS #/AREA URNS AUTO: 0 /HPF
URN SPEC COLLECT METH UR: ABNORMAL
WBC #/AREA URNS AUTO: 1 /HPF (ref 0–5)

## 2023-05-26 PROCEDURE — 81001 URINALYSIS AUTO W/SCOPE: CPT | Performed by: STUDENT IN AN ORGANIZED HEALTH CARE EDUCATION/TRAINING PROGRAM

## 2023-05-30 ENCOUNTER — PATIENT MESSAGE (OUTPATIENT)
Dept: PRIMARY CARE CLINIC | Facility: CLINIC | Age: 78
End: 2023-05-30
Payer: MEDICARE

## 2023-05-30 DIAGNOSIS — K59.09 OTHER CONSTIPATION: Primary | ICD-10-CM

## 2023-05-31 ENCOUNTER — PATIENT MESSAGE (OUTPATIENT)
Dept: OBSTETRICS AND GYNECOLOGY | Facility: CLINIC | Age: 78
End: 2023-05-31
Payer: MEDICARE

## 2023-05-31 RX ORDER — LACTULOSE 10 G/15ML
10 SOLUTION ORAL DAILY PRN
Qty: 45 ML | Refills: 0 | Status: SHIPPED | OUTPATIENT
Start: 2023-05-31 | End: 2023-07-19

## 2023-06-08 ENCOUNTER — OFFICE VISIT (OUTPATIENT)
Dept: SPINE | Facility: CLINIC | Age: 78
End: 2023-06-08
Payer: MEDICARE

## 2023-06-08 VITALS
BODY MASS INDEX: 24.41 KG/M2 | HEIGHT: 64 IN | SYSTOLIC BLOOD PRESSURE: 93 MMHG | WEIGHT: 143 LBS | DIASTOLIC BLOOD PRESSURE: 57 MMHG | HEART RATE: 69 BPM

## 2023-06-08 DIAGNOSIS — M51.36 DDD (DEGENERATIVE DISC DISEASE), LUMBAR: ICD-10-CM

## 2023-06-08 DIAGNOSIS — M47.816 SPONDYLOSIS OF LUMBAR REGION WITHOUT MYELOPATHY OR RADICULOPATHY: Primary | ICD-10-CM

## 2023-06-08 DIAGNOSIS — M54.9 DORSALGIA: ICD-10-CM

## 2023-06-08 PROCEDURE — 1101F PT FALLS ASSESS-DOCD LE1/YR: CPT | Mod: CPTII,S$GLB,, | Performed by: NURSE PRACTITIONER

## 2023-06-08 PROCEDURE — 3074F PR MOST RECENT SYSTOLIC BLOOD PRESSURE < 130 MM HG: ICD-10-PCS | Mod: CPTII,S$GLB,, | Performed by: NURSE PRACTITIONER

## 2023-06-08 PROCEDURE — 1160F RVW MEDS BY RX/DR IN RCRD: CPT | Mod: CPTII,S$GLB,, | Performed by: NURSE PRACTITIONER

## 2023-06-08 PROCEDURE — 1160F PR REVIEW ALL MEDS BY PRESCRIBER/CLIN PHARMACIST DOCUMENTED: ICD-10-PCS | Mod: CPTII,S$GLB,, | Performed by: NURSE PRACTITIONER

## 2023-06-08 PROCEDURE — 1125F AMNT PAIN NOTED PAIN PRSNT: CPT | Mod: CPTII,S$GLB,, | Performed by: NURSE PRACTITIONER

## 2023-06-08 PROCEDURE — 99214 OFFICE O/P EST MOD 30 MIN: CPT | Mod: S$GLB,,, | Performed by: NURSE PRACTITIONER

## 2023-06-08 PROCEDURE — 99214 PR OFFICE/OUTPT VISIT, EST, LEVL IV, 30-39 MIN: ICD-10-PCS | Mod: S$GLB,,, | Performed by: NURSE PRACTITIONER

## 2023-06-08 PROCEDURE — 3288F FALL RISK ASSESSMENT DOCD: CPT | Mod: CPTII,S$GLB,, | Performed by: NURSE PRACTITIONER

## 2023-06-08 PROCEDURE — 99999 PR PBB SHADOW E&M-EST. PATIENT-LVL IV: CPT | Mod: PBBFAC,,, | Performed by: NURSE PRACTITIONER

## 2023-06-08 PROCEDURE — 99999 PR PBB SHADOW E&M-EST. PATIENT-LVL IV: ICD-10-PCS | Mod: PBBFAC,,, | Performed by: NURSE PRACTITIONER

## 2023-06-08 PROCEDURE — 3074F SYST BP LT 130 MM HG: CPT | Mod: CPTII,S$GLB,, | Performed by: NURSE PRACTITIONER

## 2023-06-08 PROCEDURE — 1159F MED LIST DOCD IN RCRD: CPT | Mod: CPTII,S$GLB,, | Performed by: NURSE PRACTITIONER

## 2023-06-08 PROCEDURE — 3078F PR MOST RECENT DIASTOLIC BLOOD PRESSURE < 80 MM HG: ICD-10-PCS | Mod: CPTII,S$GLB,, | Performed by: NURSE PRACTITIONER

## 2023-06-08 PROCEDURE — 3288F PR FALLS RISK ASSESSMENT DOCUMENTED: ICD-10-PCS | Mod: CPTII,S$GLB,, | Performed by: NURSE PRACTITIONER

## 2023-06-08 PROCEDURE — 3078F DIAST BP <80 MM HG: CPT | Mod: CPTII,S$GLB,, | Performed by: NURSE PRACTITIONER

## 2023-06-08 PROCEDURE — 1125F PR PAIN SEVERITY QUANTIFIED, PAIN PRESENT: ICD-10-PCS | Mod: CPTII,S$GLB,, | Performed by: NURSE PRACTITIONER

## 2023-06-08 PROCEDURE — 1159F PR MEDICATION LIST DOCUMENTED IN MEDICAL RECORD: ICD-10-PCS | Mod: CPTII,S$GLB,, | Performed by: NURSE PRACTITIONER

## 2023-06-08 PROCEDURE — 1101F PR PT FALLS ASSESS DOC 0-1 FALLS W/OUT INJ PAST YR: ICD-10-PCS | Mod: CPTII,S$GLB,, | Performed by: NURSE PRACTITIONER

## 2023-06-08 NOTE — PROGRESS NOTES
Subjective:     Patient ID: Valarie Colindres is a 78 y.o. female.    Chief Complaint: Back Pain    HPI Ms. Colindres is a 78 year old female here for evaluation of low back pain.     Complaints of chronic low back pain for years, R>L  Denies leg pain, but does note numbness and tingling in her feet secondary to peripheral neuropathy  Did PT in 2022 with some improvement but did not maintain home exercise program and pain returned  Not taking any medications for pain, no injections in the past    Lumbar xray 2021    FINDINGS:  Lumbar vertebral body heights are maintained.  Disc space narrowing endplate changes L4-5.  Multilevel facet arthropathy.  AP alignment is anatomic.     Impression:     No acute osseous abnormality seen.  Degenerative change lower lumbar spine.    Past Medical History:   Diagnosis Date    Angle recession of right eye     Blunt trauma of both eyes     hit across eyes with bungee exercise band    Cataract     Cystocele, midline     Dry eye syndrome     Ectropion due to laxity of eyelid, right     GERD (gastroesophageal reflux disease)     Hyperlipidemia     Hypertension     PVD (posterior vitreous detachment), right eye     Rectocele     Retinal drusen of both eyes     Thalassemia major     Vaginal atrophy        Past Surgical History:   Procedure Laterality Date    ABDOMINAL ADHESION SURGERY  1978    ANKLE FRACTURE SURGERY Right 1996    COLONOSCOPY  11/09/2022    ECTROPION REPAIR Bilateral 06/2018    Dr. Flaherty    HYSTERECTOMY  1977    possible cancerous lesion       Family History   Problem Relation Age of Onset    Stroke Mother     Heart disease Mother     Prostate cancer Father     Colon cancer Father     Prostate cancer Brother     Prostate cancer Brother     Glaucoma Maternal Uncle     Blindness Maternal Uncle     Breast cancer Maternal Aunt     Cataracts Neg Hx     Macular degeneration Neg Hx        Social History     Socioeconomic History    Marital status:    Occupational History      Comment: Retired in 2018 -    Tobacco Use    Smoking status: Never    Smokeless tobacco: Never   Substance and Sexual Activity    Alcohol use: Not Currently    Drug use: No    Sexual activity: Not Currently     Social Determinants of Health     Financial Resource Strain: Low Risk     Difficulty of Paying Living Expenses: Not hard at all   Food Insecurity: No Food Insecurity    Worried About Running Out of Food in the Last Year: Never true    Ran Out of Food in the Last Year: Never true   Transportation Needs: No Transportation Needs    Lack of Transportation (Medical): No    Lack of Transportation (Non-Medical): No   Physical Activity: Insufficiently Active    Days of Exercise per Week: 2 days    Minutes of Exercise per Session: 60 min   Stress: No Stress Concern Present    Feeling of Stress : Not at all   Social Connections: Moderately Isolated    Frequency of Communication with Friends and Family: Once a week    Frequency of Social Gatherings with Friends and Family: More than three times a week    Attends Yazidi Services: More than 4 times per year    Active Member of Clubs or Organizations: No    Attends Club or Organization Meetings: Never    Marital Status:    Housing Stability: Low Risk     Unable to Pay for Housing in the Last Year: No    Number of Places Lived in the Last Year: 1    Unstable Housing in the Last Year: No       Current Outpatient Medications   Medication Sig Dispense Refill    acetaminophen (TYLENOL) 500 MG tablet Take 1-2 tablets (500-1,000 mg total) by mouth 3 (three) times daily as needed for Pain.  0    atorvastatin (LIPITOR) 10 MG tablet Take 1 tablet (10 mg total) by mouth every evening. 90 tablet 3    azelastine (ASTELIN) 137 mcg (0.1 %) nasal spray Two sprays in each nostril, sniff until absorbed, then follow with 1 spray of fluticasone.  Use both sprays twice daily. 30 mL 11    chlorhexidine (PERIDEX) 0.12 % solution RINSE AND SWISH ONE CAPFUL BID  1     diclofenac sodium (VOLTAREN) 1 % Gel Apply 2 g topically 4 (four) times daily. 1 each 2    famotidine (PEPCID) 20 MG tablet TAKE 1 TABLET BY MOUTH TWICE A  tablet 3    fluticasone propionate (FLONASE) 50 mcg/actuation nasal spray 2 sprays (100 mcg total) by Each Nostril route once daily. 18.2 mL 11    hydroCHLOROthiazide (HYDRODIURIL) 12.5 MG Tab Take 1 tablet (12.5 mg total) by mouth once daily. 90 tablet 3    lactulose (CHRONULAC) 20 gram/30 mL Soln Take 15 mLs (10 g total) by mouth daily as needed (Constipation). 45 mL 0    levothyroxine (SYNTHROID) 100 MCG tablet Take 1 tablet (100 mcg total) by mouth once daily. 90 tablet 3    loratadine (CLARITIN) 10 mg tablet Take 1 tablet (10 mg total) by mouth once daily. 30 tablet 11    pantoprazole (PROTONIX) 40 MG tablet Take 40 mg by mouth every morning.      polyethylene glycol (GLYCOLAX) 17 gram PwPk Take 17 g by mouth 2 (two) times daily as needed (constiptation). 30 each 3    valsartan (DIOVAN) 320 MG tablet Take 1 tablet (320 mg total) by mouth once daily. 90 tablet 3     No current facility-administered medications for this visit.       Review of patient's allergies indicates:   Allergen Reactions    Sutures, catgut (chromic)         Review of Systems   Constitutional: Negative for fever.   Cardiovascular:  Negative for chest pain.   Respiratory:  Negative for shortness of breath.    Musculoskeletal:  Positive for back pain. Negative for falls.   Gastrointestinal:  Negative for abdominal pain, bowel incontinence, nausea and vomiting.   Genitourinary:  Negative for bladder incontinence.   Neurological:  Positive for numbness and paresthesias.      Objective:     General: [place] is well-developed, well-nourished, appears stated age, in no acute distress, alert and oriented to time, place and person.     General    Vitals reviewed.  Constitutional: She is oriented to person, place, and time. She appears well-developed and well-nourished.   HENT:   Head:  Atraumatic.   Nose: Nose normal.   Eyes: Conjunctivae are normal.   Cardiovascular:  Normal rate.            Pulmonary/Chest: Effort normal.   Neurological: She is alert and oriented to person, place, and time.   Psychiatric: She has a normal mood and affect. Her behavior is normal. Judgment and thought content normal.     General Musculoskeletal Exam   Gait: normal     Back (L-Spine & T-Spine) / Neck (C-Spine) Exam     Tenderness Posterior midline palpation reveals tenderness of the Lower L-Spine. Right paramedian tenderness of the Lower L-Spine.     Back (L-Spine & T-Spine) Range of Motion   Extension:  10   Flexion:  90   Lateral bend right:  10   Lateral bend left:  10     Spinal Sensation   Right Side Sensation  L-Spine Level: normal  S-Spine Level: normal  Left Side Sensation  L-Spine Level: normal  S-Spine Level: normal    Other   She has no scoliosis .  Spinal Kyphosis:  Absent    Comments:  Pain with facet loading on the right      Muscle Strength   Right Upper Extremity   Biceps: 5/5   Deltoid:  5/5  Triceps:  5/5  Left Upper Extremity  Biceps: 5/5   Deltoid:  5/5  Triceps:  5/5  Right Lower Extremity   Hip Flexion: 5/5   Quadriceps:  5/5   Ankle Dorsiflexion:  5/5   Anterior tibial:  5/5   EHL:  5/5  Left Lower Extremity   Hip Flexion: 5/5   Quadriceps:  5/5   Ankle Dorsiflexion:  5/5   Anterior tibial:  5/5   EHL:  5/5    Reflexes     Left Side  Biceps:  2+  Brachioradialis:  2+  Achilles:  2+  Ankle Clonus:  absent    Right Side   Biceps:  2+  Brachioradialis:  2+  Achilles:  2+  Ankle Clonus:  absent    Vascular Exam     Right Pulses        Carotid:                  2+    Left Pulses        Carotid:                  2+        Assessment:     1. Spondylosis of lumbar region without myelopathy or radiculopathy    2. Dorsalgia    3. DDD (degenerative disc disease), lumbar         Plan:        Prior imaging and records reviewed today  We discussed back pain and the nature of back pain.  We discussed that  it is not one thing that causes the pain but an accumulation of multiple things that we do.    Referral to [facility] for evaluation and treatment of low back pain  We discussed posture sitting and the importance of trying to sit better.    We discussed the benefits of therapy and exercise and continuing to move.   Consider lumbar MBB/RFA if no improvement with [facility]  Return for follow-up in 3 months    More than 50% of the total time  of 45 minutes was spent face to face in counseling on diagnosis and treatment options. I also counseled patient  on common and most usual side effect of prescribed medications.  I reviewed Primary care , and other specialty's notes to better coordinate patient's care. All questions were answered, and patient voiced understanding.      Follow-up: Follow up in about 3 months (around 9/8/2023).  Prior records reviewed today If there are any questions prior to this, the patient was instructed to contact the office.

## 2023-06-15 ENCOUNTER — PES CALL (OUTPATIENT)
Dept: ADMINISTRATIVE | Facility: CLINIC | Age: 78
End: 2023-06-15
Payer: MEDICARE

## 2023-06-26 ENCOUNTER — OFFICE VISIT (OUTPATIENT)
Dept: UROLOGY | Facility: CLINIC | Age: 78
End: 2023-06-26
Payer: MEDICARE

## 2023-06-26 VITALS
HEIGHT: 63 IN | SYSTOLIC BLOOD PRESSURE: 169 MMHG | WEIGHT: 146.81 LBS | HEART RATE: 67 BPM | DIASTOLIC BLOOD PRESSURE: 77 MMHG | BODY MASS INDEX: 26.01 KG/M2

## 2023-06-26 DIAGNOSIS — N20.0 NEPHROLITHIASIS: Primary | ICD-10-CM

## 2023-06-26 PROCEDURE — 3077F SYST BP >= 140 MM HG: CPT | Mod: CPTII,S$GLB,, | Performed by: NURSE PRACTITIONER

## 2023-06-26 PROCEDURE — 3078F PR MOST RECENT DIASTOLIC BLOOD PRESSURE < 80 MM HG: ICD-10-PCS | Mod: CPTII,S$GLB,, | Performed by: NURSE PRACTITIONER

## 2023-06-26 PROCEDURE — 1125F PR PAIN SEVERITY QUANTIFIED, PAIN PRESENT: ICD-10-PCS | Mod: CPTII,S$GLB,, | Performed by: NURSE PRACTITIONER

## 2023-06-26 PROCEDURE — 1159F PR MEDICATION LIST DOCUMENTED IN MEDICAL RECORD: ICD-10-PCS | Mod: CPTII,S$GLB,, | Performed by: NURSE PRACTITIONER

## 2023-06-26 PROCEDURE — 1125F AMNT PAIN NOTED PAIN PRSNT: CPT | Mod: CPTII,S$GLB,, | Performed by: NURSE PRACTITIONER

## 2023-06-26 PROCEDURE — 1101F PT FALLS ASSESS-DOCD LE1/YR: CPT | Mod: CPTII,S$GLB,, | Performed by: NURSE PRACTITIONER

## 2023-06-26 PROCEDURE — 1101F PR PT FALLS ASSESS DOC 0-1 FALLS W/OUT INJ PAST YR: ICD-10-PCS | Mod: CPTII,S$GLB,, | Performed by: NURSE PRACTITIONER

## 2023-06-26 PROCEDURE — 3078F DIAST BP <80 MM HG: CPT | Mod: CPTII,S$GLB,, | Performed by: NURSE PRACTITIONER

## 2023-06-26 PROCEDURE — 1160F RVW MEDS BY RX/DR IN RCRD: CPT | Mod: CPTII,S$GLB,, | Performed by: NURSE PRACTITIONER

## 2023-06-26 PROCEDURE — 3288F PR FALLS RISK ASSESSMENT DOCUMENTED: ICD-10-PCS | Mod: CPTII,S$GLB,, | Performed by: NURSE PRACTITIONER

## 2023-06-26 PROCEDURE — 99213 PR OFFICE/OUTPT VISIT, EST, LEVL III, 20-29 MIN: ICD-10-PCS | Mod: S$GLB,,, | Performed by: NURSE PRACTITIONER

## 2023-06-26 PROCEDURE — 99213 OFFICE O/P EST LOW 20 MIN: CPT | Mod: S$GLB,,, | Performed by: NURSE PRACTITIONER

## 2023-06-26 PROCEDURE — 1160F PR REVIEW ALL MEDS BY PRESCRIBER/CLIN PHARMACIST DOCUMENTED: ICD-10-PCS | Mod: CPTII,S$GLB,, | Performed by: NURSE PRACTITIONER

## 2023-06-26 PROCEDURE — 1159F MED LIST DOCD IN RCRD: CPT | Mod: CPTII,S$GLB,, | Performed by: NURSE PRACTITIONER

## 2023-06-26 PROCEDURE — 3288F FALL RISK ASSESSMENT DOCD: CPT | Mod: CPTII,S$GLB,, | Performed by: NURSE PRACTITIONER

## 2023-06-26 PROCEDURE — 3077F PR MOST RECENT SYSTOLIC BLOOD PRESSURE >= 140 MM HG: ICD-10-PCS | Mod: CPTII,S$GLB,, | Performed by: NURSE PRACTITIONER

## 2023-06-26 NOTE — PROGRESS NOTES
"Subjective:      Valarie Colindres is a 78 y.o. female who returns today regarding her nephrolithiasis.     The patient completed a CT scan of her abdomen/pelvis in April that demonstrated bilateral non obstructing renal stones.      CT - "Bilateral kidneys are normal in size and location.  Normal symmetric uptake of contrast.  Multiple bilateral hypodensities, largest at the inferior pole of the left kidney demonstrates simple fluid attenuation compatible with cyst.  Additional subcentimeter lesions are too small to characterize.  Punctate hyperdense focus in the right kidney, likely represents nonobstructing calculus.  Numerous punctate hyperdense foci in the left kidney, largest measuring 6 mm (series 2, image 62), likely represent nonobstructing calculi.  No hydronephrosis. Difficult to visualize entire course of bilateral ureters, no definite ureteral calculi or hydroureter.  Bladder is significantly distended.  No focal wall thickening."     Denies a history of nephrolithiasis. Denies bothersome urinary symptoms and recurrent UTIs. Drinks lots of water! She has chronic R flank pain and has been unable to identify the source for this. Scheduled with back and spine next week.      The following portions of the patient's history were reviewed and updated as appropriate: allergies, current medications, past family history, past medical history, past social history, past surgical history and problem list.    Review of Systems  Constitutional: no fever or chills  ENT: no nasal congestion or sore throat  Respiratory: no cough or shortness of breath  Cardiovascular: no chest pain or palpitations  Gastrointestinal: no nausea or vomiting, tolerating diet  Genitourinary: as per HPI  Hematologic/Lymphatic: no easy bruising or lymphadenopathy  Musculoskeletal: no arthralgias or myalgias  Neurological: no seizures or tremors  Behavioral/Psych: no auditory or visual hallucinations     Objective:   Vital Signs:BP (!) 169/77 " "(BP Location: Right arm, Patient Position: Sitting, BP Method: Medium (Automatic))   Pulse 67   Ht 5' 3" (1.6 m)   Wt 66.6 kg (146 lb 13.2 oz)   BMI 26.01 kg/m²     Physical Exam   General: alert and oriented, no acute distress  Head: normocephalic, atraumatic  Neck: supple, normal ROM  Respiratory: Symmetric expansion, non-labored breathing  Cardiovascular: regular rate and rhythm  Abdomen: soft, non tender, non distended  Pelvic: deferred  Skin: normal coloration and turgor, no rashes, no suspicious skin lesions noted  Neuro: alert and oriented x3, no gross deficits  Psych: normal judgment and insight, normal mood/affect, and non-anxious    Lab Review   Urinalysis demonstrates negative for all components  Lab Results   Component Value Date    WBC 6.10 05/11/2023    HGB 10.3 (L) 05/11/2023    HCT 34.1 (L) 05/11/2023    MCV 63 (L) 05/11/2023     05/11/2023     Lab Results   Component Value Date    CREATININE 0.9 04/24/2023    BUN 17 04/24/2023       Imaging   None    Assessment:     1. Nephrolithiasis      Plan:   Valarie was seen today for flank pain and follow-up.    Diagnoses and all orders for this visit:    Nephrolithiasis      Plan:  --Again discussed prior imaging- no ureteral stones/hydro noted   --Recommend general stone preventive measures, to include increased hydration, low Na diet, and increased citrus intake  --There does not appear to be a  etiology for her pain. Discussed repeat imaging- pt declines for now  --Follow up in 1 year with [facility] for stone surveillance   "

## 2023-07-04 ENCOUNTER — PATIENT MESSAGE (OUTPATIENT)
Dept: PRIMARY CARE CLINIC | Facility: CLINIC | Age: 78
End: 2023-07-04
Payer: MEDICARE

## 2023-07-04 DIAGNOSIS — I10 ESSENTIAL HYPERTENSION: ICD-10-CM

## 2023-07-05 RX ORDER — HYDROCHLOROTHIAZIDE 12.5 MG/1
12.5 TABLET ORAL DAILY
Qty: 90 TABLET | Refills: 3 | Status: SHIPPED | OUTPATIENT
Start: 2023-07-05

## 2023-07-06 ENCOUNTER — PATIENT MESSAGE (OUTPATIENT)
Dept: PRIMARY CARE CLINIC | Facility: CLINIC | Age: 78
End: 2023-07-06
Payer: MEDICARE

## 2023-07-18 DIAGNOSIS — K59.09 OTHER CONSTIPATION: ICD-10-CM

## 2023-07-19 RX ORDER — LACTULOSE 10 G/15ML
SOLUTION ORAL; RECTAL
Qty: 45 ML | Refills: 3 | Status: SHIPPED | OUTPATIENT
Start: 2023-07-19 | End: 2023-09-06 | Stop reason: ALTCHOICE

## 2023-07-23 ENCOUNTER — PATIENT MESSAGE (OUTPATIENT)
Dept: PRIMARY CARE CLINIC | Facility: CLINIC | Age: 78
End: 2023-07-23
Payer: MEDICARE

## 2023-07-23 DIAGNOSIS — Z12.31 SCREENING MAMMOGRAM FOR BREAST CANCER: Primary | ICD-10-CM

## 2023-07-25 ENCOUNTER — OFFICE VISIT (OUTPATIENT)
Dept: UROGYNECOLOGY | Facility: CLINIC | Age: 78
End: 2023-07-25
Payer: MEDICARE

## 2023-07-25 VITALS
WEIGHT: 144.19 LBS | DIASTOLIC BLOOD PRESSURE: 64 MMHG | BODY MASS INDEX: 25.55 KG/M2 | SYSTOLIC BLOOD PRESSURE: 120 MMHG | HEIGHT: 63 IN

## 2023-07-25 DIAGNOSIS — Z12.39 ENCOUNTER FOR BREAST CANCER SCREENING OTHER THAN MAMMOGRAM: ICD-10-CM

## 2023-07-25 DIAGNOSIS — N81.11 MIDLINE CYSTOCELE: ICD-10-CM

## 2023-07-25 DIAGNOSIS — N95.2 VAGINAL ATROPHY: Primary | ICD-10-CM

## 2023-07-25 DIAGNOSIS — N81.6 RECTOCELE: ICD-10-CM

## 2023-07-25 DIAGNOSIS — Z87.19 HX OF CONSTIPATION: ICD-10-CM

## 2023-07-25 PROCEDURE — 1160F PR REVIEW ALL MEDS BY PRESCRIBER/CLIN PHARMACIST DOCUMENTED: ICD-10-PCS | Mod: CPTII,S$GLB,, | Performed by: NURSE PRACTITIONER

## 2023-07-25 PROCEDURE — 1126F AMNT PAIN NOTED NONE PRSNT: CPT | Mod: CPTII,S$GLB,, | Performed by: NURSE PRACTITIONER

## 2023-07-25 PROCEDURE — 99999 PR PBB SHADOW E&M-EST. PATIENT-LVL V: CPT | Mod: PBBFAC,,, | Performed by: NURSE PRACTITIONER

## 2023-07-25 PROCEDURE — 99999 PR PBB SHADOW E&M-EST. PATIENT-LVL V: ICD-10-PCS | Mod: PBBFAC,,, | Performed by: NURSE PRACTITIONER

## 2023-07-25 PROCEDURE — 1126F PR PAIN SEVERITY QUANTIFIED, NO PAIN PRESENT: ICD-10-PCS | Mod: CPTII,S$GLB,, | Performed by: NURSE PRACTITIONER

## 2023-07-25 PROCEDURE — 1159F PR MEDICATION LIST DOCUMENTED IN MEDICAL RECORD: ICD-10-PCS | Mod: CPTII,S$GLB,, | Performed by: NURSE PRACTITIONER

## 2023-07-25 PROCEDURE — 3074F SYST BP LT 130 MM HG: CPT | Mod: CPTII,S$GLB,, | Performed by: NURSE PRACTITIONER

## 2023-07-25 PROCEDURE — 3074F PR MOST RECENT SYSTOLIC BLOOD PRESSURE < 130 MM HG: ICD-10-PCS | Mod: CPTII,S$GLB,, | Performed by: NURSE PRACTITIONER

## 2023-07-25 PROCEDURE — 1159F MED LIST DOCD IN RCRD: CPT | Mod: CPTII,S$GLB,, | Performed by: NURSE PRACTITIONER

## 2023-07-25 PROCEDURE — 3078F DIAST BP <80 MM HG: CPT | Mod: CPTII,S$GLB,, | Performed by: NURSE PRACTITIONER

## 2023-07-25 PROCEDURE — 3078F PR MOST RECENT DIASTOLIC BLOOD PRESSURE < 80 MM HG: ICD-10-PCS | Mod: CPTII,S$GLB,, | Performed by: NURSE PRACTITIONER

## 2023-07-25 PROCEDURE — 99213 PR OFFICE/OUTPT VISIT, EST, LEVL III, 20-29 MIN: ICD-10-PCS | Mod: S$GLB,,, | Performed by: NURSE PRACTITIONER

## 2023-07-25 PROCEDURE — 1160F RVW MEDS BY RX/DR IN RCRD: CPT | Mod: CPTII,S$GLB,, | Performed by: NURSE PRACTITIONER

## 2023-07-25 PROCEDURE — 99213 OFFICE O/P EST LOW 20 MIN: CPT | Mod: S$GLB,,, | Performed by: NURSE PRACTITIONER

## 2023-07-25 RX ORDER — AMLODIPINE BESYLATE 5 MG/1
TABLET ORAL
COMMUNITY

## 2023-07-25 RX ORDER — TRIAZOLAM 0.25 MG/1
TABLET ORAL
COMMUNITY
End: 2023-09-06

## 2023-07-25 RX ORDER — CLOBETASOL PROPIONATE 0.46 MG/ML
SOLUTION TOPICAL
COMMUNITY

## 2023-07-25 RX ORDER — DIAZEPAM 5 MG/1
TABLET ORAL
COMMUNITY

## 2023-07-25 RX ORDER — THYROID 120 MG/1
TABLET ORAL
COMMUNITY
End: 2023-09-06

## 2023-07-25 RX ORDER — ASPIRIN 81 MG/1
1 TABLET ORAL DAILY
COMMUNITY

## 2023-07-25 RX ORDER — DEXAMETHASONE 0.75 MG/1
TABLET ORAL
COMMUNITY
End: 2023-09-06

## 2023-07-25 RX ORDER — CHLORZOXAZONE 500 MG/1
TABLET ORAL
COMMUNITY

## 2023-07-25 RX ORDER — ALENDRONATE SODIUM 70 MG/1
TABLET ORAL
COMMUNITY

## 2023-07-25 RX ORDER — FINASTERIDE 5 MG/1
1 TABLET, FILM COATED ORAL DAILY
COMMUNITY
End: 2023-09-06

## 2023-07-25 NOTE — PROGRESS NOTES
Urogyn follow up  07/25/2023  .  [facility] - UROGYNECOLOGY  [address] [address]    Valarie Colindres  [id number]  1945      Valarie Colindres is a 78 y.o.  here for a urogyn follow up of urge incontinence    Last HPI from 10/22/2020     1)  UI:  (--) LINDA (+) UUI  (+) liner pads: with minimal wetness if out for long period of time.  Daytime frequency: Q 1 1/2  hours.  Nocturia: Yes: 2-4/night.  Does not limit fluids prior to bedtime (--) dysuria,  (+) hematuria ? intermittently,  (--) frequent UTIs.  (+) complete bladder emptying. Reports change in urine stream over the past year.     2)  POP:  Present. above introitus.  Symptoms:(+)  Vaginal pressure.  (--) vaginal bleeding. (--) vaginal discharge. (--) sexually active. (--)  Vaginal dryness.  (--) vaginal estrogen use.      3)  BM:  (--) constipation/straining.  (--) chronic diarrhea. (--) hematochezia.  (--) fecal incontinence.  (+) fecal smearing/urgency.  (+) complete evacuation.      08/05/2021     1) rare urge incontinence   --denies UI  --voiding every 2-3 hours  --nocturia 1/ night  --drinking 55-64 ounces     2. Midline cystocele stage 1/ rectocele stage 2  --asymptomatic     3. Intermittent constipation/ smearing  --taking colace daily  --eating high fiber diet  --has     4. Vaginal atrophy (dryness):.    --did not start replens        08/24/2022  1) rare urge incontinence   --denies UI at this time    2. Midline cystocele stage 1/ rectocele stage 2  --asymptomatic at this time     3. Intermittent constipation/ smearing  --denies with fiber supplement  --hydrating well       4. Vaginal atrophy (dryness):.    -- using  REPLENS intermittently    Changes since last visit:  1) rare urge incontinence   --denies UI   --voiding every 3-4 hours during the day  --nocturia 2/ night-- but does not limit fluids      2. Midline cystocele stage 1/ rectocele stage 2  --likely cause of splayed stream  --will continue to  monitor  --controlling constipation will help     3. Intermittent constipation/ smearing  --no longer using fiber supplement  --recently prescribed lactulose by pcp  --has not scheduled colonoscopy     4. Vaginal atrophy (dryness):.    --Use REPLENS or REFRESH OTC: 1/2 applicator full in vagina twice a week.    --had vaginal discharge once in the past month-- self treated with monistat                 Past Medical History:   Diagnosis Date    Angle recession of right eye     Blunt trauma of both eyes     hit across eyes with bungee exercise band    Cataract     Cystocele, midline     Dry eye syndrome     Ectropion due to laxity of eyelid, right     GERD (gastroesophageal reflux disease)     Hyperlipidemia     Hypertension     PVD (posterior vitreous detachment), right eye     Rectocele     Retinal drusen of both eyes     Thalassemia major     Vaginal atrophy        Past Surgical History:   Procedure Laterality Date    ABDOMINAL ADHESION SURGERY  1978    ANKLE FRACTURE SURGERY Right 1996    COLONOSCOPY  11/09/2022    ECTROPION REPAIR Bilateral 06/2018    Dr. Flaherty    HYSTERECTOMY  1977    possible cancerous lesion       Family History   Problem Relation Age of Onset    Stroke Mother     Heart disease Mother     Prostate cancer Father     Colon cancer Father     Prostate cancer Brother     Prostate cancer Brother     Glaucoma Maternal Uncle     Blindness Maternal Uncle     Breast cancer Maternal Aunt     Cataracts Neg Hx     Macular degeneration Neg Hx        Social History     Socioeconomic History    Marital status:    Occupational History     Comment: Retired in 2018 -    Tobacco Use    Smoking status: Never    Smokeless tobacco: Never   Substance and Sexual Activity    Alcohol use: Not Currently    Drug use: No    Sexual activity: Not Currently     Social Determinants of Health     Financial Resource Strain: Low Risk     Difficulty of Paying Living Expenses: Not hard at all   Food  Insecurity: No Food Insecurity    Worried About Running Out of Food in the Last Year: Never true    Ran Out of Food in the Last Year: Never true   Transportation Needs: No Transportation Needs    Lack of Transportation (Medical): No    Lack of Transportation (Non-Medical): No   Physical Activity: Insufficiently Active    Days of Exercise per Week: 2 days    Minutes of Exercise per Session: 60 min   Stress: No Stress Concern Present    Feeling of Stress : Not at all   Social Connections: Moderately Isolated    Frequency of Communication with Friends and Family: Once a week    Frequency of Social Gatherings with Friends and Family: More than three times a week    Attends Je[employer] witness Services: More than 4 times per year    Active Member of Clubs or Organizations: No    Attends Club or Organization Meetings: Never    Marital Status:    Housing Stability: Low Risk     Unable to Pay for Housing in the Last Year: No    Number of Places Lived in the Last Year: 1    Unstable Housing in the Last Year: No       Current Outpatient Medications   Medication Sig Dispense Refill    acetaminophen (TYLENOL) 500 MG tablet Take 1-2 tablets (500-1,000 mg total) by mouth 3 (three) times daily as needed for Pain.  0    alendronate (FOSAMAX) 70 MG tablet TK 1 T PO EVERY WEEK      amLODIPine (NORVASC) 5 MG tablet       aspirin (ECOTRIN) 81 MG EC tablet Take 1 tablet by mouth once daily.      azelastine (ASTELIN) 137 mcg (0.1 %) nasal spray Two sprays in each nostril, sniff until absorbed, then follow with 1 spray of fluticasone.  Use both sprays twice daily. 30 mL 11    chlorhexidine (PERIDEX) 0.12 % solution RINSE AND SWISH ONE CAPFUL BID  1    chlorzoxazone (PARAFON FORTE) 500 mg Tab TAKE 1 TABLET(S) 3 TIMES A DAY BY ORAL ROUTE AS NEEDED.      clobetasoL (TEMOVATE) 0.05 % external solution       dexAMETHasone (DECADRON) 0.75 MG Tab       diazePAM (VALIUM) 5 MG tablet       diclofenac sodium (VOLTAREN) 1 % Gel Apply 2 g topically 4  "(four) times daily. 1 each 2    famotidine (PEPCID) 20 MG tablet TAKE 1 TABLET BY MOUTH TWICE A  tablet 3    finasteride (PROSCAR) 5 mg tablet Take 1 tablet by mouth once daily.      fluticasone propionate (FLONASE) 50 mcg/actuation nasal spray 2 sprays (100 mcg total) by Each Nostril route once daily. 18.2 mL 11    hydroCHLOROthiazide (HYDRODIURIL) 12.5 MG Tab Take 1 tablet (12.5 mg total) by mouth once daily. 90 tablet 3    lactulose (CHRONULAC) 10 gram/15 mL solution TAKE 15 MLS (10 G TOTAL) BY MOUTH DAILY AS NEEDED (CONSTIPATION). 45 mL 3    levothyroxine (SYNTHROID) 100 MCG tablet Take 1 tablet (100 mcg total) by mouth once daily. 90 tablet 3    pantoprazole (PROTONIX) 40 MG tablet Take 40 mg by mouth every morning.      polyethylene glycol (GLYCOLAX) 17 gram PwPk Take 17 g by mouth 2 (two) times daily as needed (constiptation). 30 each 3    thyroid, pork, (ARMOUR THYROID) 120 mg Tab       triazolam (HALCION) 0.25 MG Tab       valsartan (DIOVAN) 320 MG tablet Take 1 tablet (320 mg total) by mouth once daily. 90 tablet 3    atorvastatin (LIPITOR) 10 MG tablet Take 1 tablet (10 mg total) by mouth every evening. 90 tablet 3    loratadine (CLARITIN) 10 mg tablet Take 1 tablet (10 mg total) by mouth once daily. 30 tablet 11     No current facility-administered medications for this visit.       Review of patient's allergies indicates:   Allergen Reactions    Sutures, catgut (chromic)        Well woman:  Pap test post hysterectomy--10/2020 normal HPV negative  History of abnormal paps: Yes - .  History of STIs:  No  Mammogram: Date of last: 07/222 normal  Colonoscopy 2017 polyp, diverticulosis.    DEXA:  Date of last: 09/2018.  Result:  There is a 6.2% risk of a major osteoporotic fracture and a 0.7% risk of hip fracture in the next 10 years (FRAX    ROS:  As per HPI.      Exam  /64 (BP Location: Left arm, Patient Position: Sitting, BP Method: Medium (Manual))   Ht 5' 3" (1.6 m)   Wt 65.4 kg (144 lb 2.9 " oz)   BMI 25.54 kg/m²   General: alert and oriented, no acute distress  Respiratory: normal respiratory effort  Abd: soft, non-tender, non-distended    Pelvic  Ext. Genitalia: normal external genitalia. Normal bartholin's and skeens glands  Vagina: + atrophy. Normal vaginal mucosa without lesions. No discharge noted.   Non-tender bladder base without palpable mass.  Stage 1 cystocele, stage 2 rectocele  Cervix: absent  Uterus:  absent   Urethra: no masses or tenderness  Urethral meatus: no lesions, caruncle or prolapse.          Impression  1. Vaginal atrophy        2. Midline cystocele        3. Rectocele        4. Hx of constipation        5. Encounter for breast cancer screening other than mammogram            We reviewed the above issues and discussed options for short-term versus long-term management of her problems.   Plan:      1) rare urge incontinence   --Empty bladder every 3 hours.  Empty well: wait a minute, lean forward on toilet.    --Avoid dietary irritants (see sheet).  Keep diary x 3-5 days to determine your irritants.  --KEGELS: do 10 in AM and 10 in PM, holding each x 10 seconds.  When you feel urge to go, STOP, KEGEL, and when urge has passed, then go to bathroom.  Consider PT in future.    --URGE: consider anticholinergic. Not really an issue at this time  --STRESS:  Pessary vs. Sling.      2. Midline cystocele stage 1/ rectocele stage 2  --likely cause of splayed stream  --will continue to monitor  --controlling constipation will help     3. Intermittent constipation/ smearing  --restart fiber supplement--Start daily fiber.  Take 1 tsp of fiber powder (psyllium or other sugar-free powder).  Mix in 8 oz of water.  Take x 3-5 days.  Then, increase fiber by 1 tsp every 3-5 days until stool is easy to pass.  Stop and continue at that dose.   Do not exceed 6 tsps/day.  May also use over the counter stool softener 1-2 x/day.  AVOID laxatives.  --continue lactulose as needed  --colonoscopy due this  year     4. Vaginal atrophy (dryness):.  Use REPLENS or REFRESH OTC: 1/2 applicator full in vagina twice a week.       5. RTC 1 year for follow up     I spent a total of 20 minutes on the day of the visit.  This includes face to face time and non-face to face time preparing to see the patient (eg, review of tests), obtaining and/or reviewing separately obtained history, documenting clinical information in the electronic or other health record, independently interpreting results and communicating results to the patient/family/caregiver, or care coordinator.      Sarah Knapp, VIDAL-BC [facility]  Division of Female Pelvic Medicine and Reconstructive Surgery  Department of Obstetrics & Gynecology

## 2023-07-26 ENCOUNTER — PATIENT MESSAGE (OUTPATIENT)
Dept: PRIMARY CARE CLINIC | Facility: CLINIC | Age: 78
End: 2023-07-26
Payer: MEDICARE

## 2023-07-26 DIAGNOSIS — E03.9 ACQUIRED HYPOTHYROIDISM: ICD-10-CM

## 2023-07-27 RX ORDER — LEVOTHYROXINE SODIUM 100 UG/1
100 TABLET ORAL DAILY
Qty: 90 TABLET | Refills: 3 | Status: SHIPPED | OUTPATIENT
Start: 2023-07-27

## 2023-08-01 ENCOUNTER — HOSPITAL ENCOUNTER (OUTPATIENT)
Dept: RADIOLOGY | Facility: OTHER | Age: 78
Discharge: HOME | End: 2023-08-01
Attending: STUDENT IN AN ORGANIZED HEALTH CARE EDUCATION/TRAINING PROGRAM
Payer: MEDICARE

## 2023-08-01 DIAGNOSIS — Z12.31 SCREENING MAMMOGRAM FOR BREAST CANCER: ICD-10-CM

## 2023-08-01 PROCEDURE — 77067 SCR MAMMO BI INCL CAD: CPT | Mod: TC

## 2023-08-01 PROCEDURE — 77063 BREAST TOMOSYNTHESIS BI: CPT | Mod: 26,,, | Performed by: RADIOLOGY

## 2023-08-01 PROCEDURE — 77067 MAMMO DIGITAL SCREENING BILAT WITH TOMO: ICD-10-PCS | Mod: 26,,, | Performed by: RADIOLOGY

## 2023-08-01 PROCEDURE — 77063 MAMMO DIGITAL SCREENING BILAT WITH TOMO: ICD-10-PCS | Mod: 26,,, | Performed by: RADIOLOGY

## 2023-08-01 PROCEDURE — 77067 SCR MAMMO BI INCL CAD: CPT | Mod: 26,,, | Performed by: RADIOLOGY

## 2023-08-11 ENCOUNTER — PATIENT MESSAGE (OUTPATIENT)
Dept: PRIMARY CARE CLINIC | Facility: CLINIC | Age: 78
End: 2023-08-11
Payer: MEDICARE

## 2023-08-11 DIAGNOSIS — E78.2 MIXED HYPERLIPIDEMIA: Primary | ICD-10-CM

## 2023-08-15 ENCOUNTER — LAB VISIT (OUTPATIENT)
Dept: LAB | Facility: HOSPITAL | Age: 78
End: 2023-08-15
Attending: STUDENT IN AN ORGANIZED HEALTH CARE EDUCATION/TRAINING PROGRAM
Payer: MEDICARE

## 2023-08-15 DIAGNOSIS — E78.2 MIXED HYPERLIPIDEMIA: ICD-10-CM

## 2023-08-15 LAB
CHOLEST SERPL-MCNC: 183 MG/DL (ref 120–199)
CHOLEST/HDLC SERPL: 3.1 {RATIO} (ref 2–5)
HDLC SERPL-MCNC: 59 MG/DL (ref 40–75)
HDLC SERPL: 32.2 % (ref 20–50)
LDLC SERPL CALC-MCNC: 111.6 MG/DL (ref 63–159)
NONHDLC SERPL-MCNC: 124 MG/DL
TRIGL SERPL-MCNC: 62 MG/DL (ref 30–150)

## 2023-08-15 PROCEDURE — 80061 LIPID PANEL: CPT | Performed by: STUDENT IN AN ORGANIZED HEALTH CARE EDUCATION/TRAINING PROGRAM

## 2023-08-15 PROCEDURE — 36415 COLL VENOUS BLD VENIPUNCTURE: CPT | Mod: PN | Performed by: STUDENT IN AN ORGANIZED HEALTH CARE EDUCATION/TRAINING PROGRAM

## 2023-08-16 ENCOUNTER — CLINICAL SUPPORT (OUTPATIENT)
Dept: REHABILITATION | Facility: OTHER | Age: 78
End: 2023-08-16
Payer: MEDICARE

## 2023-08-16 DIAGNOSIS — R29.898 DECREASED STRENGTH OF UPPER EXTREMITY: ICD-10-CM

## 2023-08-16 DIAGNOSIS — M51.36 DDD (DEGENERATIVE DISC DISEASE), LUMBAR: ICD-10-CM

## 2023-08-16 DIAGNOSIS — R68.89 DECREASED ACTIVITY TOLERANCE: ICD-10-CM

## 2023-08-16 DIAGNOSIS — M54.9 DORSALGIA: ICD-10-CM

## 2023-08-16 DIAGNOSIS — M47.816 SPONDYLOSIS OF LUMBAR REGION WITHOUT MYELOPATHY OR RADICULOPATHY: ICD-10-CM

## 2023-08-16 PROCEDURE — 97162 PT EVAL MOD COMPLEX 30 MIN: CPT

## 2023-08-16 PROCEDURE — 97530 THERAPEUTIC ACTIVITIES: CPT

## 2023-08-16 PROCEDURE — 97110 THERAPEUTIC EXERCISES: CPT

## 2023-08-16 PROCEDURE — 97112 NEUROMUSCULAR REEDUCATION: CPT

## 2023-08-16 NOTE — PLAN OF CARE
[facility]  Physical Therapy Cervical Evaluation      Name: Valarie Colindres  Clinic Number: [id number]    Therapy Diagnosis:   Encounter Diagnoses   Name Primary?    Dorsalgia     Spondylosis of lumbar region without myelopathy or radiculopathy     DDD (degenerative disc disease), lumbar     Decreased strength of upper extremity     Decreased activity tolerance        Physician: Lindsay Reynolds, NP    Physician Orders: PT Eval and Treat  Medical Diagnosis from Referral:   1. Dorsalgia    2. Spondylosis of lumbar region without myelopathy or radiculopathy    3. DDD (degenerative disc disease), lumbar      Evaluation Date: 8/16/2023  Authorization Period Expiration: 06/07/2024   Plan of Care Expiration: 11/16/2023  Reassessment Due: 9/16/2023  Visit # / Visits authorized: 1/1    Time In: 8:20  Time Out: 9:40  Total Billable Time: 80 minutes  INSURANCE and OUTCOMES: Fee for Service with FOTO Outcomes 1/3    Precautions: standard Hypertension, Hyperlipidemia    Pattern of pain determined: 1 REP    Subjective     Date of onset: 7-8 months, no DIANA  History of current condition: Valarie reports the pain moves, at the times she was here before the pain was in the low back. Currently she has a  and goes to yoga (kinematic yoga/breathing) and the deep breathing really helps. Her pain at moved to the upper back particular over the R shoulder blade she is still able to function but reprots she is able to function though the pain. When bathing the can get the L arm to the R shoulder blade but not the other way without pain into the R upper arm. When sitting she will have a shoulder annoyance , lifting the arm over head she can feel it. Initially it was in the shoulder blade but in the past 7-8 months it had been going into the am.  She has used the salonpas it numbs it a little but it doesn't eliminate the pain. She does not take pain medication. Reports the  discomfort as sharp and stabbing. Reports no difficulty with house work and household chore but does have disocmfort doing them and takes a longer time. With the  she doing overhead tricep extension she has a limitation.     Per MD:  ELAINA Ms. Colindres is a 78 year old female here for evaluation of low back pain.      Complaints of chronic low back pain for years, R>L  Denies leg pain, but does note numbness and tingling in her feet secondary to peripheral neuropathy  Did PT in 2022 with some improvement but did not maintain home exercise program and pain returned  Not taking any medications for pain, no injections in the past     Medical History:   Past Medical History:   Diagnosis Date    Angle recession of right eye     Blunt trauma of both eyes     hit across eyes with bungee exercise band    Cataract     Cystocele, midline     Dry eye syndrome     Ectropion due to laxity of eyelid, right     GERD (gastroesophageal reflux disease)     Hyperlipidemia     Hypertension     PVD (posterior vitreous detachment), right eye     Rectocele     Retinal drusen of both eyes     Thalassemia major     Vaginal atrophy        Surgical History:   Valarie Colindres  has a past surgical history that includes Hysterectomy (1977); Ankle fracture surgery (Right, 1996); Abdominal adhesion surgery (1978); Ectropion repair (Bilateral, 06/2018); and Colonoscopy (11/09/2022).    Medications:   Valarie has a current medication list which includes the following prescription(s): acetaminophen, alendronate, amlodipine, aspirin, atorvastatin, azelastine, chlorhexidine, chlorzoxazone, clobetasol, dexamethasone, diazepam, diclofenac sodium, famotidine, finasteride, fluticasone propionate, hydrochlorothiazide, lactulose, levothyroxine, loratadine, pantoprazole, polyethylene glycol, thyroid (pork), triazolam, and valsartan.    Allergies:   Review of patient's allergies indicates:   Allergen Reactions    Sutures, catgut (chromic)          Imaging: X-ray 2021  EXAMINATION:  XR LUMBAR SPINE AP AND LAT WITH FLEX/EXT     CLINICAL HISTORY:  Lumbago with sciatica, left side     TECHNIQUE:  AP and lateral views as well as lateral flexion and extension images are performed through the lumbar spine.     COMPARISON:  None     FINDINGS:  Lumbar vertebral body heights are maintained.  Disc space narrowing endplate changes L4-5.  Multilevel facet arthropathy.  AP alignment is anatomic.     Impression:     No acute osseous abnormality seen.  Degenerative change lower lumbar spine.    Prior Therapy: Yes, [facility] 2020  Prior Treatment: Massage once a month for 3 months, and it has been helping  Social History:  lives with sister as her caregiver  Occupation: retired in 2018  Leisure: Yoga, activities with people program (painting, calligraphy, piano lessons, ceramics, bible study, chess)  Prior Level of Function: I with ADL  Current Level of Function: Some difficulty and increased time Bathing, dressing, cleaning  Gym Membership: Yes, has     Pain:  Current 6/10, worst 9/10, best 4/10   Location: right Shoulder blade into the arm above the   Description: sharp stabbing  Aggravating Factors: arm behid the back or over head  Easing Factors: Rest, positioning  Disturbed Sleep: no, every now and then she will wake up with it, but that is rare    Pattern of pain questions:  1.  Where is your pain the worst? Arm/shoulder blade  2.  Is your pain constant or intermittent? intermittent  3.  Does bending forward make your typical pain worse? no  4.  Since the start of your back pain, has there been a change in your bowel or bladder? no  5.  What can't you do now that you use to be able to do? Difficulty with dressing, bathing, cleaning, overhead activities.    Pts goals: to decrease pain and to be more flexible and agile    Red Flag Screening:   Cough/Sneeze Strain: (--)  Bladder/Bowel: (--)  Falls: (--)  Night pain: (--)  Unexplained weight loss: (--)  General  health: Good    Objective      Postural examination/scapula alignment: No postural abnormalities noted  Joint integrity: Firm end feeling  Skin integrity:WNL   Edema: None  Sitting: good  Standing: good  Correction of posture: better with lumbar roll    GAIT:  Assistive Device used: none  Level of Assistance: independent  Patient displays the following gait deviations: no gait deviations observed.     Range of Motion - MOVEMENT LOSS    ROM Loss   Flexion within functional limits   Extension moderate loss P!   Side bending Right moderate loss   Side bending Left moderate loss P! On R   Rotation Right minimal loss P! On R   Rotation Left minimal loss P! On R   Protraction within functional limits   Retraction  minimal loss       Function IR  R IR Bottom  R ER at the back of the head   Decreased r shoulder mobility in all planes compared to the R    Upper Extremity Strength  (R) UE  (L) UE    Shoulder flexion: 3+/5 Shoulder flexion: 4+/5   Shoulder Abduction: 3/5 Shoulder abduction: 4+/5   Elbow flexion: 4/5 Elbow flexion: 4+/5   Elbow extension: 4+/5 Elbow extension: 4+/5   ER 4  4   IR 3  4     NEUROLOGICAL SCREEN:    Sensory Deficits: intact to light touch      REPEATED TEST MOVEMENTS:   Repeated Retraction in Sitting  better   Repeated Retraction Extension in Sitting better     Baseline Isometric Testing on Med X equipment: Testing administered by PT    Date of testin2023  ROM 36-81 deg   Max Peak Torque 139    Min Peak Torque 108    Flex/Ext Ratio 1.2:1   % below normative data 42       Intake/Outcome for FOTO Cervical Surveys    Therapist reviewed FOTO scores for [place] on 2023  FOTO documents entered into [employer] - see Media section.    Intake Score: 26%   Visit 5 Score:   Visit 10 Score:   Discharge Score:   Goal Score: 15%     Treatment     Treatment Time In: 9:00  Treatment Time Out: 9:40  Total Treatment time separate from Evaluation: 40 minutes    [place] received therapeutic exercises to  develop/improve posture, lumbar ROM, strength, and muscular endurance for 15 minutes including the following exercises:     Cervical retraction in sitting 10x  Cervical retraction in sitting 10x with manual overpressure  Cervical retraction with extension 10x  Cervical retraction with extension 10x with thoracic extension and towel support at neck    [place] received neuromuscular education to engage spinal musculature correctly for motor control and engagement of musculature for 15 minutes including the MedX exercise component and practice and standard testing. MedX dynamic exercise and baseline isometric test performed with instructions to guide the patient safely through the testing procedure. Patient instructed to perform isometric test correctly and safely while building to an optimal force with a pain-free effort. Patient also instructed that she should feel support/pressure from MedX restraints but no pain/discomfort. Patient demonstrated appropriate understanding of information.     [facility] 8/16/2023   Visit Number 1   VAS Pain Rating 6   Treadmill Time (in min.) -   Time -   Retraction in Sitting 20   Retraction with Extension 20   Lumbar Stretches - Slouch Overcorrection -   Extension in Lying -   Flexion in Lying -   Manual Therapy -   Cervical Extension Seat Pad 0   Seat Adjustment 440   Top Dead Center 66   Counterweight 1   Cervical Flexion 81   Cervical Extension 36   Cervical Peak Torque 139   Cervical Weight 66   Lumbar Extension Seat Pad -   Repetitions -   Rating of Perceived Exertion -   Ice - Z Lie (in min.) 10          Therapeutic Education/Activity provided for 10 minutes:   - Patient was given an [facility] Visit 1 handout which discusses the following:  - what to expect in therapy  - an overview of the program, including health coaching and wellness  - importance of spinal hygiene, proper posture, lifting mechanics, sleep quality, and nutrition/hydration   - Delores  roll trialed, recommended, and purchase information was provided.  - Patient received a handout regarding anticipated muscular soreness following the isometric test and strategies for management were reviewed with patient including stretching, using ice and scheduled rest.   - Patient received verbal education on the following:   - Healthy Back program   - purpose of the isometric test  - safe progression of lumbar strengthening, wellness approach, and systemic strengthening.   - safe usage of MedX machine and testing protocols.    Valarie received cold pack for 10 minutes to low back in Z-lie.    Written Home Exercises Provided: yes.    HEP AS FOLLOWS:  Cervical retraction in sitting   Cervical retraction in sitting with overpressure  Cervical retraction with extension     Exercises were reviewed and Valarie was able to demonstrate them prior to the end of the session.  Valarie demonstrated good  understanding of the education provided.     See EMR under Patient Instructions for exercises provided 8/16/2023.    Assessment     Valarie is a 78 y.o. female referred to [facility] with a medical diagnosis of Dorsalgia, Spondylosis of lumbar region without myelopathy or radiculopathy, (degenerative disc disease), lumbar. Pt presents with complaints of shoulder blade pain that has started radiating to the upper arm and causing difficulty with bathing her back, preforming household chores, lifting and carrying objects, and lifting overhead. She has fair mobility in the neck with some discomfort with looking up, side bending bilaterally, and rotation. She has decreased strength and mobility in all planes in the R shoulder. Completed retraction and retraction extension exercises with an increase in shoulder abd and ER strength as well as improvement in function IR and ER. Plan to continue thoracic extension at home. She was able to test on the cervical medx and is 42 % below the averages.    Pain Pattern: 1 REP        Pt prognosis is Excellent.     Pt will benefit from skilled outpatient Physical Therapy to address the deficits stated above and in the chart below, to provide pt/family education, and to maximize pt's level of independence. Based on the above history and physical examination an active physical therapy program is recommended.      Plan of care discussed with patient: Yes  Pt's spiritual, cultural and educational needs considered and patient is agreeable to the plan of care and goals as stated below:     Anticipated Barriers for therapy: None    PT Evaluation Completed? Yes    Medical necessity is demonstrated by the following problem list:    History  Co-morbidities and personal factors that may impact the plan of care [] LOW: no personal factors / co-morbidities  [x] MODERATE: 1-2 personal factors / co-morbidities  [] HIGH: 3+ personal factors / co-morbidities    Moderate / High Support Documentation:   Co-morbidities affecting plan of care: Hypertension, Hyperlipidemia    Personal Factors:   lifestyle     Examination  Body Structures and Functions, activity limitations and participation restrictions that may impact the plan of care [] LOW: addressing 1-2 elements  [x] MODERATE: 3+ elements  [] HIGH: 4+ elements (please support below)    Moderate / High Support Documentation: Difficulty with bathing, dressing, lifting, carrying     Clinical Presentation [x] LOW: stable  [] MODERATE: Evolving  [] HIGH: Unstable     Decision Making/ Complexity Score: moderate         GOALS: Pt is in agreement with the following goals.    Short term goals:  6 weeks or 10 visits   - Pt will demonstrate increased lumbar ROM by at least 6 degrees from the initial ROM value with improvements noted in functional ROM and ability to perform ADLs. Appropriate and Ongoing  - Pt will demonstrate increased MedX average isometric strength value by 15% from initial test resulting in improved ability to perform bending, lifting, and carrying  activities safely, confidently. Appropriate and Ongoing  - Pt will report a reduction in worst pain score by 1-2 points for improved tolerance for putting arm behind back for bathing. Appropriate and Ongoing  - Pt able to perform HEP correctly with minimal cueing or supervision from therapist to encourage independent management of symptoms. Appropriate and Ongoing    Long term goals: 10 weeks or 20 visits   - Pt will demonstrate increased lumbar ROM by at least 12 degrees from initial ROM value, resulting in improved ability to perform functional forward bending while standing and sitting. Appropriate and Ongoing  - Pt will demonstrate increased MedX average isometric strength value by 30% from initial test resulting in improved ability to perform bending, lifting, and carrying activities safely and confidently. Appropriate and Ongoing  - Pt to demonstrate ability to independently control and reduce their pain through posture positioning and mechanical movements throughout a typical day. Appropriate and Ongoing  - Pt will demonstrate reduced pain and improved functional outcomes as reported on the Neck Disability Index by reaching a score of 15 or less in order to demonstrate subjective improvement in pt's condition.   Appropriate and Ongoing  - Pt will demonstrate independence with the HEP at discharge. Appropriate and Ongoing  - Pt able to carry water in the house without increase pain (patient goal) Appropriate and Ongoing  - Pt complete household chores without increased pain (patient goal) Appropriate and Ongoing    Plan     Outpatient physical therapy 2x week for 10 weeks or 20 visits to include the following:   - Patient education  - Therapeutic exercise  - Manual therapy  - Performance testing   - Neuromuscular Re-education  - Therapeutic activity   - Modalities    Pt may be seen by PTA as part of the rehabilitation team.     Therapist: Thad Beckham, PT  8/16/2023

## 2023-08-18 ENCOUNTER — PATIENT MESSAGE (OUTPATIENT)
Dept: PRIMARY CARE CLINIC | Facility: CLINIC | Age: 78
End: 2023-08-18
Payer: MEDICARE

## 2023-08-18 DIAGNOSIS — R31.9 HEMATURIA, UNSPECIFIED TYPE: Primary | ICD-10-CM

## 2023-08-22 ENCOUNTER — PATIENT OUTREACH (OUTPATIENT)
Dept: ADMINISTRATIVE | Facility: HOSPITAL | Age: 78
End: 2023-08-22
Payer: MEDICARE

## 2023-08-22 NOTE — PROGRESS NOTES
Patient due for the following    COVID-19 Vaccine (6 - Pfizer series)    Hemoglobin A1c (Prediabetes)      E-faxed Dr. Tyler Mabry for c-scope records and recommendations as requested.     Immunizations: reviewed and updated  Care Everywhere: triggered  Care Teams: up to date  Outreach: none needed

## 2023-08-22 NOTE — LETTER
AUTHORIZATION FOR RELEASE OF   CONFIDENTIAL INFORMATION    Dear Dr. Mabry ,    We are seeing Valarie Colindres, date of birth 1945, in the clinic at [facility]. Sirena Renner MD is the patient's PCP. Valarie Colindres has an outstanding lab/procedure at the time we reviewed her chart. In order to help keep her health information updated, she has authorized us to request the following medical record(s):        (  )  MAMMOGRAM                                      ( X )  COLONOSCOPY      (  )  PAP SMEAR                                          (  )  OUTSIDE LAB RESULTS     (  )  DEXA SCAN                                          (  )  EYE EXAM            (  )  FOOT EXAM                                          (  )  ENTIRE RECORD     (  )  OUTSIDE IMMUNIZATIONS                 (  )  _______________         Please fax records to [facility], Amjed, Saira, MD, [phone]      Attn: Dafne          Patient Name: Valarie Colindres  : 1945  Patient Phone #: [phone]

## 2023-09-06 ENCOUNTER — TELEPHONE (OUTPATIENT)
Dept: ENDOSCOPY | Facility: HOSPITAL | Age: 78
End: 2023-09-06
Payer: MEDICARE

## 2023-09-06 ENCOUNTER — OFFICE VISIT (OUTPATIENT)
Dept: GASTROENTEROLOGY | Facility: CLINIC | Age: 78
End: 2023-09-06
Payer: MEDICARE

## 2023-09-06 ENCOUNTER — LAB VISIT (OUTPATIENT)
Dept: LAB | Facility: HOSPITAL | Age: 78
End: 2023-09-06
Attending: INTERNAL MEDICINE
Payer: MEDICARE

## 2023-09-06 VITALS
HEART RATE: 67 BPM | DIASTOLIC BLOOD PRESSURE: 74 MMHG | WEIGHT: 143.5 LBS | HEIGHT: 64 IN | SYSTOLIC BLOOD PRESSURE: 118 MMHG | BODY MASS INDEX: 24.5 KG/M2

## 2023-09-06 VITALS — BODY MASS INDEX: 24.41 KG/M2 | WEIGHT: 143 LBS | HEIGHT: 64 IN

## 2023-09-06 DIAGNOSIS — Z86.010 HISTORY OF COLON POLYPS: ICD-10-CM

## 2023-09-06 DIAGNOSIS — R19.4 CHANGE IN BOWEL HABITS: Primary | ICD-10-CM

## 2023-09-06 DIAGNOSIS — D64.9 LOW HEMOGLOBIN: ICD-10-CM

## 2023-09-06 DIAGNOSIS — K59.04 CHRONIC IDIOPATHIC CONSTIPATION: ICD-10-CM

## 2023-09-06 DIAGNOSIS — K21.9 GASTROESOPHAGEAL REFLUX DISEASE, UNSPECIFIED WHETHER ESOPHAGITIS PRESENT: ICD-10-CM

## 2023-09-06 DIAGNOSIS — K59.09 OTHER CONSTIPATION: ICD-10-CM

## 2023-09-06 DIAGNOSIS — Z80.0 FAMILY HISTORY OF COLON CANCER: ICD-10-CM

## 2023-09-06 DIAGNOSIS — D56.3 BETA THALASSEMIA TRAIT: ICD-10-CM

## 2023-09-06 DIAGNOSIS — K59.00 CONSTIPATION, UNSPECIFIED CONSTIPATION TYPE: ICD-10-CM

## 2023-09-06 DIAGNOSIS — D56.3 ALPHA THALASSEMIA TRAIT: Primary | ICD-10-CM

## 2023-09-06 DIAGNOSIS — K21.9 GASTROESOPHAGEAL REFLUX DISEASE WITHOUT ESOPHAGITIS: ICD-10-CM

## 2023-09-06 DIAGNOSIS — Z12.11 ENCOUNTER FOR SCREENING COLONOSCOPY: Primary | ICD-10-CM

## 2023-09-06 LAB
ALBUMIN SERPL BCP-MCNC: 3.9 G/DL (ref 3.5–5.2)
ALP SERPL-CCNC: 57 U/L (ref 55–135)
ALT SERPL W/O P-5'-P-CCNC: 9 U/L (ref 10–44)
ANION GAP SERPL CALC-SCNC: 7 MMOL/L (ref 8–16)
AST SERPL-CCNC: 17 U/L (ref 10–40)
BASOPHILS # BLD AUTO: 0.02 K/UL (ref 0–0.2)
BASOPHILS NFR BLD: 0.4 % (ref 0–1.9)
BILIRUB SERPL-MCNC: 0.3 MG/DL (ref 0.1–1)
BUN SERPL-MCNC: 9 MG/DL (ref 8–23)
CALCIUM SERPL-MCNC: 9.3 MG/DL (ref 8.7–10.5)
CHLORIDE SERPL-SCNC: 105 MMOL/L (ref 95–110)
CO2 SERPL-SCNC: 32 MMOL/L (ref 23–29)
CREAT SERPL-MCNC: 0.8 MG/DL (ref 0.5–1.4)
DIFFERENTIAL METHOD: ABNORMAL
EOSINOPHIL # BLD AUTO: 0 K/UL (ref 0–0.5)
EOSINOPHIL NFR BLD: 0.5 % (ref 0–8)
ERYTHROCYTE [DISTWIDTH] IN BLOOD BY AUTOMATED COUNT: 17.8 % (ref 11.5–14.5)
EST. GFR  (NO RACE VARIABLE): >60 ML/MIN/1.73 M^2
FERRITIN SERPL-MCNC: 60 NG/ML (ref 20–300)
GLUCOSE SERPL-MCNC: 70 MG/DL (ref 70–110)
HCT VFR BLD AUTO: 35.8 % (ref 37–48.5)
HGB BLD-MCNC: 10.7 G/DL (ref 12–16)
IGA SERPL-MCNC: 114 MG/DL (ref 40–350)
IMM GRANULOCYTES # BLD AUTO: 0.01 K/UL (ref 0–0.04)
IMM GRANULOCYTES NFR BLD AUTO: 0.2 % (ref 0–0.5)
IRON SERPL-MCNC: 61 UG/DL (ref 30–160)
LIPASE SERPL-CCNC: 15 U/L (ref 4–60)
LYMPHOCYTES # BLD AUTO: 2.8 K/UL (ref 1–4.8)
LYMPHOCYTES NFR BLD: 50.4 % (ref 18–48)
MCH RBC QN AUTO: 18.6 PG (ref 27–31)
MCHC RBC AUTO-ENTMCNC: 29.9 G/DL (ref 32–36)
MCV RBC AUTO: 62 FL (ref 82–98)
MONOCYTES # BLD AUTO: 0.6 K/UL (ref 0.3–1)
MONOCYTES NFR BLD: 10.3 % (ref 4–15)
NEUTROPHILS # BLD AUTO: 2.1 K/UL (ref 1.8–7.7)
NEUTROPHILS NFR BLD: 38.2 % (ref 38–73)
NRBC BLD-RTO: 0 /100 WBC
PLATELET # BLD AUTO: 251 K/UL (ref 150–450)
PMV BLD AUTO: 10.2 FL (ref 9.2–12.9)
POTASSIUM SERPL-SCNC: 4.1 MMOL/L (ref 3.5–5.1)
PROT SERPL-MCNC: 7.3 G/DL (ref 6–8.4)
RBC # BLD AUTO: 5.76 M/UL (ref 4–5.4)
SATURATED IRON: 21 % (ref 20–50)
SODIUM SERPL-SCNC: 144 MMOL/L (ref 136–145)
T4 FREE SERPL-MCNC: 0.82 NG/DL (ref 0.71–1.51)
TOTAL IRON BINDING CAPACITY: 286 UG/DL (ref 250–450)
TRANSFERRIN SERPL-MCNC: 193 MG/DL (ref 200–375)
TSH SERPL DL<=0.005 MIU/L-ACNC: 5.87 UIU/ML (ref 0.4–4)
WBC # BLD AUTO: 5.52 K/UL (ref 3.9–12.7)

## 2023-09-06 PROCEDURE — 99204 OFFICE O/P NEW MOD 45 MIN: CPT | Mod: S$GLB,,, | Performed by: INTERNAL MEDICINE

## 2023-09-06 PROCEDURE — 82728 ASSAY OF FERRITIN: CPT | Performed by: INTERNAL MEDICINE

## 2023-09-06 PROCEDURE — 85025 COMPLETE CBC W/AUTO DIFF WBC: CPT | Performed by: INTERNAL MEDICINE

## 2023-09-06 PROCEDURE — 86364 TISS TRNSGLTMNASE EA IG CLAS: CPT | Performed by: INTERNAL MEDICINE

## 2023-09-06 PROCEDURE — 84439 ASSAY OF FREE THYROXINE: CPT | Performed by: INTERNAL MEDICINE

## 2023-09-06 PROCEDURE — 1160F RVW MEDS BY RX/DR IN RCRD: CPT | Mod: CPTII,S$GLB,, | Performed by: INTERNAL MEDICINE

## 2023-09-06 PROCEDURE — 80053 COMPREHEN METABOLIC PANEL: CPT | Performed by: INTERNAL MEDICINE

## 2023-09-06 PROCEDURE — 1159F MED LIST DOCD IN RCRD: CPT | Mod: CPTII,S$GLB,, | Performed by: INTERNAL MEDICINE

## 2023-09-06 PROCEDURE — 99999 PR PBB SHADOW E&M-EST. PATIENT-LVL V: ICD-10-PCS | Mod: PBBFAC,,, | Performed by: INTERNAL MEDICINE

## 2023-09-06 PROCEDURE — 1160F PR REVIEW ALL MEDS BY PRESCRIBER/CLIN PHARMACIST DOCUMENTED: ICD-10-PCS | Mod: CPTII,S$GLB,, | Performed by: INTERNAL MEDICINE

## 2023-09-06 PROCEDURE — 1125F PR PAIN SEVERITY QUANTIFIED, PAIN PRESENT: ICD-10-PCS | Mod: CPTII,S$GLB,, | Performed by: INTERNAL MEDICINE

## 2023-09-06 PROCEDURE — 3288F PR FALLS RISK ASSESSMENT DOCUMENTED: ICD-10-PCS | Mod: CPTII,S$GLB,, | Performed by: INTERNAL MEDICINE

## 2023-09-06 PROCEDURE — 99999 PR PBB SHADOW E&M-EST. PATIENT-LVL V: CPT | Mod: PBBFAC,,, | Performed by: INTERNAL MEDICINE

## 2023-09-06 PROCEDURE — 99204 PR OFFICE/OUTPT VISIT, NEW, LEVL IV, 45-59 MIN: ICD-10-PCS | Mod: S$GLB,,, | Performed by: INTERNAL MEDICINE

## 2023-09-06 PROCEDURE — 84443 ASSAY THYROID STIM HORMONE: CPT | Performed by: INTERNAL MEDICINE

## 2023-09-06 PROCEDURE — 3288F FALL RISK ASSESSMENT DOCD: CPT | Mod: CPTII,S$GLB,, | Performed by: INTERNAL MEDICINE

## 2023-09-06 PROCEDURE — 3074F SYST BP LT 130 MM HG: CPT | Mod: CPTII,S$GLB,, | Performed by: INTERNAL MEDICINE

## 2023-09-06 PROCEDURE — 1125F AMNT PAIN NOTED PAIN PRSNT: CPT | Mod: CPTII,S$GLB,, | Performed by: INTERNAL MEDICINE

## 2023-09-06 PROCEDURE — 1101F PT FALLS ASSESS-DOCD LE1/YR: CPT | Mod: CPTII,S$GLB,, | Performed by: INTERNAL MEDICINE

## 2023-09-06 PROCEDURE — 3078F DIAST BP <80 MM HG: CPT | Mod: CPTII,S$GLB,, | Performed by: INTERNAL MEDICINE

## 2023-09-06 PROCEDURE — 3074F PR MOST RECENT SYSTOLIC BLOOD PRESSURE < 130 MM HG: ICD-10-PCS | Mod: CPTII,S$GLB,, | Performed by: INTERNAL MEDICINE

## 2023-09-06 PROCEDURE — 83540 ASSAY OF IRON: CPT | Performed by: INTERNAL MEDICINE

## 2023-09-06 PROCEDURE — 3078F PR MOST RECENT DIASTOLIC BLOOD PRESSURE < 80 MM HG: ICD-10-PCS | Mod: CPTII,S$GLB,, | Performed by: INTERNAL MEDICINE

## 2023-09-06 PROCEDURE — 36415 COLL VENOUS BLD VENIPUNCTURE: CPT | Performed by: INTERNAL MEDICINE

## 2023-09-06 PROCEDURE — 84466 ASSAY OF TRANSFERRIN: CPT | Performed by: INTERNAL MEDICINE

## 2023-09-06 PROCEDURE — 1101F PR PT FALLS ASSESS DOC 0-1 FALLS W/OUT INJ PAST YR: ICD-10-PCS | Mod: CPTII,S$GLB,, | Performed by: INTERNAL MEDICINE

## 2023-09-06 PROCEDURE — 1159F PR MEDICATION LIST DOCUMENTED IN MEDICAL RECORD: ICD-10-PCS | Mod: CPTII,S$GLB,, | Performed by: INTERNAL MEDICINE

## 2023-09-06 PROCEDURE — 83690 ASSAY OF LIPASE: CPT | Performed by: INTERNAL MEDICINE

## 2023-09-06 PROCEDURE — 82784 ASSAY IGA/IGD/IGG/IGM EACH: CPT | Performed by: INTERNAL MEDICINE

## 2023-09-06 NOTE — Clinical Note
"Procedure: EGD/Colonoscopy  Diagnosis:  Change in bowel habits constipation GERD  Procedure Timin-6 weeks  *If within 4 weeks selected, please charisma as high priority*  *If greater than 12 weeks, please select "5-12 weeks" and delay sending until 2 months prior to requested date*  Provider: Myself  Location: [address]  Additional Scheduling Information:  Needs constipation bowel prep protocol  Prep Specifications:Extended/Constipation prep  Is the patient taking a GLP-1 Agonist:no  Have you attached a patient to this message: yes "

## 2023-09-06 NOTE — PROGRESS NOTES
"GENERAL GI PATIENT INTAKE:    COVID symptoms in the last 7 days (runny nose, sore throat, congestion, cough, fever): No  PCP: Sirena Renner  If not PCP-  number given to establish [phone]: N/A    ALLERGIES REVIEWED:  Yes    CHIEF COMPLAINT:    Chief Complaint   Patient presents with    Constipation       VITAL SIGNS:  /74   Pulse 67   Ht 5' 4" (1.626 m)   Wt 65.1 kg (143 lb 8.3 oz)   BMI 24.64 kg/m²      Change in medical, surgical, family or social history: No      REVIEWED MEDICATION LIST RECONCILED INCLUDING ABOVE MEDS:  Yes     "

## 2023-09-06 NOTE — PATIENT INSTRUCTIONS
"Procedure: EGD/Colonoscopy    Diagnosis:  Change in bowel habits constipation GERD    Procedure Timin-6 weeks    *If within 4 weeks selected, please charisma as high priority*    *If greater than 12 weeks, please select "5-12 weeks" and delay sending until 2 months prior to requested date*    Provider: Myself    Location: [address]    Additional Scheduling Information:  Needs constipation bowel prep protocol    Prep Specifications:Extended/Constipation prep    Is the patient taking a GLP-1 Agonist:no    Have you attached a patient to this message: yes   "

## 2023-09-06 NOTE — Clinical Note
GI MA team please schedule patient 2nd floor lab work today along with clean-catch mid stream urine  CT scan across the street at the imaging center Prescription for Linzess sent to her pharmacy Referral to endoscopy schedulers for EGD colonoscopy constipation bowel prep protocol for evaluation of GERD change in bowel habits constipation  Return to clinic appointment with me 8 weeks for follow-up okay for video visit

## 2023-09-06 NOTE — TELEPHONE ENCOUNTER
"----- Message from Xavier Lawrence MD sent at 2023 10:15 AM CDT -----  Procedure: EGD/Colonoscopy    Diagnosis:  Change in bowel habits constipation GERD    Procedure Timin-6 weeks    #If within 4 weeks selected, please charisma as high priority#    #If greater than 12 weeks, please select "5-12 weeks" and delay sending until 2 months prior to requested date#    Provider: Myself    Location: [address]    Additional Scheduling Information:  Needs constipation bowel prep protocol    Prep Specifications:Extended/Constipation prep    Is the patient taking a GLP-1 Agonist:no    Have you attached a patient to this message: yes   "

## 2023-09-06 NOTE — PROGRESS NOTES
[facility] Consultation Note    Reason for Consult:  The primary encounter diagnosis was Alpha thalassemia trait. Diagnoses of Other constipation, Beta thalassemia trait, History of colon polyps, Gastroesophageal reflux disease without esophagitis, Low hemoglobin, Family history of colon cancer, and Chronic idiopathic constipation were also pertinent to this visit.    PCP:   Sirena Renner   [id number] / [place] [id number]    Referring MD:  Sirena Renner Md  [id number] [id number]    Initial History of Present Illness (HPI):  This is a 78 y.o. female here for evaluation of new onset change in bowel habits worsening constipation over the last several months patient says she has a first-degree relative a sister with colon cancer at the age of 80 she has personally had colon polyps herself her last colonoscopy was not done your in our system she thinks it was probably a year ago the last 1 we see was in 2017 had a small polyp pathology unknown patient has tried MiraLax with no help with the constipation Colace and Dulcolax with no help lactulose with no help has not ever tried Amitiza Trulance or Linzess she would like to try she has used enemas sometimes she is also having some right flank pain as well no dysuria urgency frequency no weight loss she has longstanding GERD symptoms no recent EGD in our system no dysphagia no odynophagia no blood in her stool no nausea vomiting she gets some right lower quadrant pain and some right upper quadrant pain and some right flank pain no history of any abdominal trauma no family history of esophageal stomach cancer nobody with pancreatitis pancreatic cancer liver cancer nobody with ovarian uterine kidney or bladder cancer she is had a hysterectomy she says still has her ovaries in place.    Abdominal pain - as above  Reflux - as above  Dysphagia - no   Bowel habits - as above  GI bleeding - none  NSAID usage -  aspirin    Interval HPI 09/06/2023:  The patient's last visit with me was on Visit date not found.      ROS:  Constitutional: No fevers, chills, No weight loss  ENT:  Asthma heartburn no dysphagia no odynophagia no hoarseness  CV: No chest pain, no palpitation  Pulm: No cough, No shortness of breath, no wheezing  Ophtho: No vision changes  GI: see HPI  Derm: No rash, no itching  Heme: No lymphadenopathy, No easy bruising  MSK: No significant arthritis  : No dysuria, No hematuria  Endo: No hot or cold intolerance  Neuro: No syncope, No seizure, no strokes  Psych: No uncontrolled anxiety, No uncontrolled depression    Medical History:  has a past medical history of Angle recession of right eye, Blunt trauma of both eyes, Cataract, Cystocele, midline, Dry eye syndrome, Ectropion due to laxity of eyelid, right, GERD (gastroesophageal reflux disease), Hyperlipidemia, Hypertension, PVD (posterior vitreous detachment), right eye, Rectocele, Retinal drusen of both eyes, Thalassemia major, and Vaginal atrophy.    Surgical History:  has a past surgical history that includes Hysterectomy (1977); Ankle fracture surgery (Right, 1996); Abdominal adhesion surgery (1978); Ectropion repair (Bilateral, 06/2018); and Colonoscopy (11/09/2022).    Family History: family history includes Blindness in her maternal uncle; Breast cancer in her maternal aunt; Colon cancer in her father; Glaucoma in her maternal uncle; Heart disease in her mother; Prostate cancer in her brother, brother, and father; Stroke in her mother..     Social History:  reports that she has never smoked. She has never used smokeless tobacco. She reports that she does not currently use alcohol. She reports that she does not use drugs.    Review of patient's allergies indicates:   Allergen Reactions    Sutures, catgut (chromic)        Medication List with Changes/Refills   New Medications    LINACLOTIDE (LINZESS) 145 MCG CAP CAPSULE    Take 1 capsule (145 mcg total) by  mouth before breakfast.   Current Medications    ACETAMINOPHEN (TYLENOL) 500 MG TABLET    Take 1-2 tablets (500-1,000 mg total) by mouth 3 (three) times daily as needed for Pain.    ALENDRONATE (FOSAMAX) 70 MG TABLET    TK 1 T PO EVERY WEEK    AMLODIPINE (NORVASC) 5 MG TABLET        ASPIRIN (ECOTRIN) 81 MG EC TABLET    Take 1 tablet by mouth once daily.    ATORVASTATIN (LIPITOR) 10 MG TABLET    Take 1 tablet (10 mg total) by mouth every evening.    AZELASTINE (ASTELIN) 137 MCG (0.1 %) NASAL SPRAY    Two sprays in each nostril, sniff until absorbed, then follow with 1 spray of fluticasone.  Use both sprays twice daily.    CHLORHEXIDINE (PERIDEX) 0.12 % SOLUTION    RINSE AND SWISH ONE CAPFUL BID    CHLORZOXAZONE (PARAFON FORTE) 500 MG TAB    TAKE 1 TABLET(S) 3 TIMES A DAY BY ORAL ROUTE AS NEEDED.    CLOBETASOL (TEMOVATE) 0.05 % EXTERNAL SOLUTION        DIAZEPAM (VALIUM) 5 MG TABLET        DICLOFENAC SODIUM (VOLTAREN) 1 % GEL    Apply 2 g topically 4 (four) times daily.    FAMOTIDINE (PEPCID) 20 MG TABLET    TAKE 1 TABLET BY MOUTH TWICE A DAY    FLUTICASONE PROPIONATE (FLONASE) 50 MCG/ACTUATION NASAL SPRAY    2 sprays (100 mcg total) by Each Nostril route once daily.    HYDROCHLOROTHIAZIDE (HYDRODIURIL) 12.5 MG TAB    Take 1 tablet (12.5 mg total) by mouth once daily.    LEVOTHYROXINE (SYNTHROID) 100 MCG TABLET    Take 1 tablet (100 mcg total) by mouth once daily.    LORATADINE (CLARITIN) 10 MG TABLET    Take 1 tablet (10 mg total) by mouth once daily.    PANTOPRAZOLE (PROTONIX) 40 MG TABLET    Take 40 mg by mouth every morning.    POLYETHYLENE GLYCOL (GLYCOLAX) 17 GRAM PWPK    Take 17 g by mouth 2 (two) times daily as needed (constiptation).    VALSARTAN (DIOVAN) 320 MG TABLET    Take 1 tablet (320 mg total) by mouth once daily.   Discontinued Medications    DEXAMETHASONE (DECADRON) 0.75 MG TAB        FINASTERIDE (PROSCAR) 5 MG TABLET    Take 1 tablet by mouth once daily.    LACTULOSE (CHRONULAC) 10 GRAM/15 ML  "SOLUTION    TAKE 15 MLS (10 G TOTAL) BY MOUTH DAILY AS NEEDED (CONSTIPATION).    THYROID, PORK, (ARMOUR THYROID) 120 MG TAB        TRIAZOLAM (HALCION) 0.25 MG TAB             Objective Findings:    Vital Signs:  /74   Pulse 67   Ht 5' 4" (1.626 m)   Wt 65.1 kg (143 lb 8.3 oz)   BMI 24.64 kg/m²   Body mass index is 24.64 kg/m².    Physical Exam:  General Appearance: Well appearing in no acute distress  Eyes:    No scleral icterus  ENT:  No lesions or masses   Lungs: CTA bilaterally, no wheezes, no rhonchi, no rales  Heart:  S1, S2 normal, no murmurs heard  Abdomen:  Non distended, soft, no guarding, no rebound, no tenderness, no appreciated ascites, no bruits, no hepatosplenomegaly,  Mild right CVA tenderness, no appreciated hernias, no Etienne sign no McBurney point tenderness  Musculoskeletal:  No major joint deformities  Skin: No petechiae or rash on exposed skin areas  Neurologic:  Alert and oriented x4  Psychiatric:  Normal speech mentation and affect    Labs:  Lab Results   Component Value Date    WBC 6.10 05/11/2023    HGB 10.3 (L) 05/11/2023    HCT 34.1 (L) 05/11/2023     05/11/2023    CHOL 183 08/15/2023    TRIG 62 08/15/2023    HDL 59 08/15/2023    ALT 10 04/24/2023    AST 16 04/24/2023     04/24/2023    K 3.8 04/24/2023     04/24/2023    CREATININE 0.9 04/24/2023    BUN 17 04/24/2023    CO2 30 (H) 04/24/2023    TSH 1.101 02/01/2023    HGBA1C 5.9 (H) 06/11/2022               Medical Decision Making:  Lab work reviewed  Prior colonoscopy report reviewed  Lab work talk given  CT scan talk given endoscopy talk given  Constipation protocol talk given  Linzess talk given      Assessment:  1. Alpha thalassemia trait    2. Other constipation    3. Beta thalassemia trait    4. History of colon polyps    5. Gastroesophageal reflux disease without esophagitis    6. Low hemoglobin    7. Family history of colon cancer    8. Chronic idiopathic constipation         Recommendations:  1. Lab " work with clean-catch mid stream urine  2. CT scan abdomen pelvis for further evaluation of abdominal pain and change in bowel habits  3. Referral to endoscopy schedulers for longstanding GERD evaluation and colonoscopy with constipation bowel prep protocol for evaluation of change in bowel habits abdominal pain family history of colon cancer in a first-degree relative and personal history of colon polyp  4. Will start Linzess 145 mcg once daily as needed for constipation patient knows not to take any other laxatives with this medicine if it proves to be too strong or too powerful for her she should stop it and let me know.  5. Return GI clinic 6-8 weeks for follow-up okay for telemedicine video visit sooner if any concerns or symptoms worsen    Follow up in about 8 weeks (around 11/1/2023).      Order summary:  Orders Placed This Encounter    Urine culture    CT Enterography Abd_Pelvis With Contrast    TISSUE TRANSGLUTAMINASE (TTG), IGA    IgA    Ferritin    Iron and TIBC    Comprehensive Metabolic Panel    Lipase    TSH    CBC Auto Differential    Urinalysis    linaCLOtide (LINZESS) 145 mcg Cap capsule         Thank you so much for allowing me to participate in the care of Valarie Colindres    Xavier Lawrence MD    DISCLAIMER: This note was prepared with Cswitch voice recognition transcription software. Garbled syntax, mangled or inadvertent pronouns, and other bizarre constructions may be attributed to that software system. While efforts were made to correct any mistakes made by this voice recognition program, some errors and/or omissions may remain in the note that were missed when the note was originally created.

## 2023-09-06 NOTE — TELEPHONE ENCOUNTER
Spoke to patient to schedule procedure(s) Colonoscopy/EGD       Physician to perform procedure(s) Dr. COLE Lawrence  Date of Procedure (s) 120/5/2023  Arrival Time 8:00 am   Time of Procedure(s) 9:00 am    Location of Procedure(s) [address]  Type of Rx Prep sent to patient: PEG/extended  Instructions provided to patient via [facility]    Patient was informed on the following information and verbalized understanding. Screening questionnaire reviewed with patient and complete. If procedure requires anesthesia, a responsible adult needs to be present to accompany the patient home, patient cannot drive after receiving anesthesia. Appointment details are tentative, especially check-in time. Patient will receive a prep-op call 4 days prior to confirm check-in time for procedure. If applicable the patient should contact their pharmacy to verify Rx for procedure prep is ready for pick-up. Patient was advised to call the scheduling department at [phone] if pharmacy states no Rx is available. Patient was advised to call the endoscopy scheduling department if any questions or concerns arise.      [facility] Scheduling Department

## 2023-09-11 ENCOUNTER — OFFICE VISIT (OUTPATIENT)
Dept: SPINE | Facility: CLINIC | Age: 78
End: 2023-09-11
Payer: MEDICARE

## 2023-09-11 VITALS
WEIGHT: 143 LBS | BODY MASS INDEX: 24.41 KG/M2 | RESPIRATION RATE: 12 BRPM | HEIGHT: 64 IN | HEART RATE: 62 BPM | SYSTOLIC BLOOD PRESSURE: 139 MMHG | DIASTOLIC BLOOD PRESSURE: 79 MMHG

## 2023-09-11 DIAGNOSIS — M51.36 DDD (DEGENERATIVE DISC DISEASE), LUMBAR: ICD-10-CM

## 2023-09-11 DIAGNOSIS — M54.9 DORSALGIA: Primary | ICD-10-CM

## 2023-09-11 DIAGNOSIS — M47.816 SPONDYLOSIS OF LUMBAR REGION WITHOUT MYELOPATHY OR RADICULOPATHY: ICD-10-CM

## 2023-09-11 PROCEDURE — 99214 PR OFFICE/OUTPT VISIT, EST, LEVL IV, 30-39 MIN: ICD-10-PCS | Mod: S$GLB,,, | Performed by: NURSE PRACTITIONER

## 2023-09-11 PROCEDURE — 3075F PR MOST RECENT SYSTOLIC BLOOD PRESS GE 130-139MM HG: ICD-10-PCS | Mod: CPTII,S$GLB,, | Performed by: NURSE PRACTITIONER

## 2023-09-11 PROCEDURE — 99999 PR PBB SHADOW E&M-EST. PATIENT-LVL IV: CPT | Mod: PBBFAC,,, | Performed by: NURSE PRACTITIONER

## 2023-09-11 PROCEDURE — 3078F DIAST BP <80 MM HG: CPT | Mod: CPTII,S$GLB,, | Performed by: NURSE PRACTITIONER

## 2023-09-11 PROCEDURE — 99999 PR PBB SHADOW E&M-EST. PATIENT-LVL IV: ICD-10-PCS | Mod: PBBFAC,,, | Performed by: NURSE PRACTITIONER

## 2023-09-11 PROCEDURE — 1101F PR PT FALLS ASSESS DOC 0-1 FALLS W/OUT INJ PAST YR: ICD-10-PCS | Mod: CPTII,S$GLB,, | Performed by: NURSE PRACTITIONER

## 2023-09-11 PROCEDURE — 99214 OFFICE O/P EST MOD 30 MIN: CPT | Mod: S$GLB,,, | Performed by: NURSE PRACTITIONER

## 2023-09-11 PROCEDURE — 1160F PR REVIEW ALL MEDS BY PRESCRIBER/CLIN PHARMACIST DOCUMENTED: ICD-10-PCS | Mod: CPTII,S$GLB,, | Performed by: NURSE PRACTITIONER

## 2023-09-11 PROCEDURE — 1159F PR MEDICATION LIST DOCUMENTED IN MEDICAL RECORD: ICD-10-PCS | Mod: CPTII,S$GLB,, | Performed by: NURSE PRACTITIONER

## 2023-09-11 PROCEDURE — 1125F AMNT PAIN NOTED PAIN PRSNT: CPT | Mod: CPTII,S$GLB,, | Performed by: NURSE PRACTITIONER

## 2023-09-11 PROCEDURE — 3075F SYST BP GE 130 - 139MM HG: CPT | Mod: CPTII,S$GLB,, | Performed by: NURSE PRACTITIONER

## 2023-09-11 PROCEDURE — 1101F PT FALLS ASSESS-DOCD LE1/YR: CPT | Mod: CPTII,S$GLB,, | Performed by: NURSE PRACTITIONER

## 2023-09-11 PROCEDURE — 3288F PR FALLS RISK ASSESSMENT DOCUMENTED: ICD-10-PCS | Mod: CPTII,S$GLB,, | Performed by: NURSE PRACTITIONER

## 2023-09-11 PROCEDURE — 1125F PR PAIN SEVERITY QUANTIFIED, PAIN PRESENT: ICD-10-PCS | Mod: CPTII,S$GLB,, | Performed by: NURSE PRACTITIONER

## 2023-09-11 PROCEDURE — 1159F MED LIST DOCD IN RCRD: CPT | Mod: CPTII,S$GLB,, | Performed by: NURSE PRACTITIONER

## 2023-09-11 PROCEDURE — 3078F PR MOST RECENT DIASTOLIC BLOOD PRESSURE < 80 MM HG: ICD-10-PCS | Mod: CPTII,S$GLB,, | Performed by: NURSE PRACTITIONER

## 2023-09-11 PROCEDURE — 1160F RVW MEDS BY RX/DR IN RCRD: CPT | Mod: CPTII,S$GLB,, | Performed by: NURSE PRACTITIONER

## 2023-09-11 PROCEDURE — 3288F FALL RISK ASSESSMENT DOCD: CPT | Mod: CPTII,S$GLB,, | Performed by: NURSE PRACTITIONER

## 2023-09-11 NOTE — PROGRESS NOTES
Subjective:     Patient ID: Valarie Colindres is a 78 y.o. female.    Chief Complaint: Low-back Pain    Interval History 9/11/2023:  Ms. Colindres presents today for follow-up of low back pain.  She did attend the initial evaluation for healthy back in his scheduled to start on Friday.  Her back pain is unchanged time and she is looking forward to starting therapy.    HPI Ms. Colindres is a 78 year old female here for evaluation of low back pain.     Complaints of chronic low back pain for years, R>L  Denies leg pain, but does note numbness and tingling in her feet secondary to peripheral neuropathy  Did PT in 2022 with some improvement but did not maintain home exercise program and pain returned  Not taking any medications for pain, no injections in the past    Lumbar xray 2021    FINDINGS:  Lumbar vertebral body heights are maintained.  Disc space narrowing endplate changes L4-5.  Multilevel facet arthropathy.  AP alignment is anatomic.     Impression:     No acute osseous abnormality seen.  Degenerative change lower lumbar spine.    Past Medical History:   Diagnosis Date    Angle recession of right eye     Blunt trauma of both eyes     hit across eyes with bungee exercise band    Cataract     Cystocele, midline     Dry eye syndrome     Ectropion due to laxity of eyelid, right     GERD (gastroesophageal reflux disease)     Hyperlipidemia     Hypertension     PVD (posterior vitreous detachment), right eye     Rectocele     Retinal drusen of both eyes     Thalassemia major     Vaginal atrophy        Past Surgical History:   Procedure Laterality Date    ABDOMINAL ADHESION SURGERY  1978    ANKLE FRACTURE SURGERY Right 1996    COLONOSCOPY  11/09/2022    ECTROPION REPAIR Bilateral 06/2018    Dr. Flaherty    HYSTERECTOMY  1977    possible cancerous lesion       Family History   Problem Relation Age of Onset    Stroke Mother     Heart disease Mother     Prostate cancer Father     Colon cancer Father     Prostate cancer  Brother     Prostate cancer Brother     Glaucoma Maternal Uncle     Blindness Maternal Uncle     Breast cancer Maternal Aunt     Cataracts Neg Hx     Macular degeneration Neg Hx        Social History     Socioeconomic History    Marital status:    Occupational History     Comment: Retired in 2018 -    Tobacco Use    Smoking status: Never    Smokeless tobacco: Never   Substance and Sexual Activity    Alcohol use: Not Currently    Drug use: No    Sexual activity: Not Currently     Social Determinants of Health     Financial Resource Strain: Low Risk  (11/18/2022)    Overall Financial Resource Strain (CARDIA)     Difficulty of Paying Living Expenses: Not hard at all   Food Insecurity: No Food Insecurity (11/18/2022)    Hunger Vital Sign     Worried About Running Out of Food in the Last Year: Never true     Ran Out of Food in the Last Year: Never true   Transportation Needs: No Transportation Needs (11/18/2022)    PRAPARE - Transportation     Lack of Transportation (Medical): No     Lack of Transportation (Non-Medical): No   Physical Activity: Insufficiently Active (11/18/2022)    Exercise Vital Sign     Days of Exercise per Week: 2 days     Minutes of Exercise per Session: 60 min   Stress: No Stress Concern Present (11/18/2022)    [employer] [place] [employer] - Occupational Stress Questionnaire     Feeling of Stress : Not at all   Social Connections: Moderately Isolated (11/18/2022)    Social Connection and Isolation Panel [NHANES]     Frequency of Communication with Friends and Family: Once a week     Frequency of Social Gatherings with Friends and Family: More than three times a week     Attends [employer]ology Services: More than 4 times per year     Active Member of Clubs or Organizations: No     Attends Club or Organization Meetings: Never     Marital Status:    Housing Stability: Low Risk  (11/18/2022)    Housing Stability Vital Sign     Unable to Pay for Housing in the Last  Year: No     Number of Places Lived in the Last Year: 1     Unstable Housing in the Last Year: No       Current Outpatient Medications   Medication Sig Dispense Refill    acetaminophen (TYLENOL) 500 MG tablet Take 1-2 tablets (500-1,000 mg total) by mouth 3 (three) times daily as needed for Pain.  0    alendronate (FOSAMAX) 70 MG tablet TK 1 T PO EVERY WEEK      amLODIPine (NORVASC) 5 MG tablet       aspirin (ECOTRIN) 81 MG EC tablet Take 1 tablet by mouth once daily.      atorvastatin (LIPITOR) 10 MG tablet Take 1 tablet (10 mg total) by mouth every evening. 90 tablet 3    azelastine (ASTELIN) 137 mcg (0.1 %) nasal spray Two sprays in each nostril, sniff until absorbed, then follow with 1 spray of fluticasone.  Use both sprays twice daily. 30 mL 11    chlorhexidine (PERIDEX) 0.12 % solution RINSE AND SWISH ONE CAPFUL BID  1    chlorzoxazone (PARAFON FORTE) 500 mg Tab TAKE 1 TABLET(S) 3 TIMES A DAY BY ORAL ROUTE AS NEEDED.      clobetasoL (TEMOVATE) 0.05 % external solution       diazePAM (VALIUM) 5 MG tablet       diclofenac sodium (VOLTAREN) 1 % Gel Apply 2 g topically 4 (four) times daily. 1 each 2    famotidine (PEPCID) 20 MG tablet TAKE 1 TABLET BY MOUTH TWICE A  tablet 3    fluticasone propionate (FLONASE) 50 mcg/actuation nasal spray 2 sprays (100 mcg total) by Each Nostril route once daily. 18.2 mL 11    hydroCHLOROthiazide (HYDRODIURIL) 12.5 MG Tab Take 1 tablet (12.5 mg total) by mouth once daily. 90 tablet 3    levothyroxine (SYNTHROID) 100 MCG tablet Take 1 tablet (100 mcg total) by mouth once daily. 90 tablet 3    linaCLOtide (LINZESS) 145 mcg Cap capsule Take 1 capsule (145 mcg total) by mouth before breakfast. 30 capsule 0    loratadine (CLARITIN) 10 mg tablet Take 1 tablet (10 mg total) by mouth once daily. 30 tablet 11    pantoprazole (PROTONIX) 40 MG tablet Take 40 mg by mouth every morning.      polyethylene glycol (GLYCOLAX) 17 gram PwPk Take 17 g by mouth 2 (two) times daily as needed  (constiptation). 30 each 3    valsartan (DIOVAN) 320 MG tablet Take 1 tablet (320 mg total) by mouth once daily. 90 tablet 3     No current facility-administered medications for this visit.       Review of patient's allergies indicates:   Allergen Reactions    Sutures, catgut (chromic)         Review of Systems   Constitutional: Negative for fever.   Cardiovascular:  Negative for chest pain.   Respiratory:  Negative for shortness of breath.    Musculoskeletal:  Positive for back pain. Negative for falls.   Gastrointestinal:  Negative for abdominal pain, bowel incontinence, nausea and vomiting.   Genitourinary:  Negative for bladder incontinence.   Neurological:  Positive for numbness and paresthesias.        Objective:     General: [place] is well-developed, well-nourished, appears stated age, in no acute distress, alert and oriented to time, place and person.     General    Vitals reviewed.  Constitutional: She is oriented to person, place, and time. She appears well-developed and well-nourished.   HENT:   Head: Atraumatic.   Nose: Nose normal.   Eyes: Conjunctivae are normal.   Cardiovascular:  Normal rate.            Pulmonary/Chest: Effort normal.   Neurological: She is alert and oriented to person, place, and time.   Psychiatric: She has a normal mood and affect. Her behavior is normal. Judgment and thought content normal.     General Musculoskeletal Exam   Gait: normal     Back (L-Spine & T-Spine) / Neck (C-Spine) Exam     Tenderness Posterior midline palpation reveals tenderness of the Lower L-Spine. Right paramedian tenderness of the Lower L-Spine.     Back (L-Spine & T-Spine) Range of Motion   Extension:  10   Flexion:  90   Lateral bend right:  10   Lateral bend left:  10     Spinal Sensation   Right Side Sensation  L-Spine Level: normal  S-Spine Level: normal  Left Side Sensation  L-Spine Level: normal  S-Spine Level: normal    Other   She has no scoliosis .  Spinal Kyphosis:  Absent    Comments:  Pain  with facet loading on the right      Muscle Strength   Right Upper Extremity   Biceps: 5/5   Deltoid:  5/5  Triceps:  5/5  Left Upper Extremity  Biceps: 5/5   Deltoid:  5/5  Triceps:  5/5  Right Lower Extremity   Hip Flexion: 5/5   Quadriceps:  5/5   Ankle Dorsiflexion:  5/5   Anterior tibial:  5/5   EHL:  5/5  Left Lower Extremity   Hip Flexion: 5/5   Quadriceps:  5/5   Ankle Dorsiflexion:  5/5   Anterior tibial:  5/5   EHL:  5/5    Reflexes     Left Side  Biceps:  2+  Brachioradialis:  2+  Achilles:  2+  Ankle Clonus:  absent    Right Side   Biceps:  2+  Brachioradialis:  2+  Achilles:  2+  Ankle Clonus:  absent    Vascular Exam     Right Pulses        Carotid:                  2+    Left Pulses        Carotid:                  2+          Assessment:     1. Dorsalgia    2. Spondylosis of lumbar region without myelopathy or radiculopathy    3. DDD (degenerative disc disease), lumbar           Plan:        Prior imaging and records reviewed today  Continue healthy back  We discussed posture sitting and the importance of trying to sit better.    We discussed the benefits of therapy and exercise and continuing to move.   Consider lumbar MBB/RFA if no improvement with Healthy Back  Return for follow-up in 3 months      Follow-up: Follow up in about 3 months (around 12/11/2023).  Prior records reviewed today If there are any questions prior to this, the patient was instructed to contact the office.

## 2023-09-12 ENCOUNTER — LAB VISIT (OUTPATIENT)
Dept: LAB | Facility: HOSPITAL | Age: 78
End: 2023-09-12
Attending: INTERNAL MEDICINE
Payer: MEDICARE

## 2023-09-12 DIAGNOSIS — Z86.010 HISTORY OF COLON POLYPS: ICD-10-CM

## 2023-09-12 DIAGNOSIS — D64.9 LOW HEMOGLOBIN: ICD-10-CM

## 2023-09-12 DIAGNOSIS — K21.9 GASTROESOPHAGEAL REFLUX DISEASE WITHOUT ESOPHAGITIS: ICD-10-CM

## 2023-09-12 DIAGNOSIS — Z80.0 FAMILY HISTORY OF COLON CANCER: ICD-10-CM

## 2023-09-12 DIAGNOSIS — K59.09 OTHER CONSTIPATION: ICD-10-CM

## 2023-09-12 LAB
BILIRUB UR QL STRIP: NEGATIVE
CLARITY UR REFRACT.AUTO: CLEAR
COLOR UR AUTO: COLORLESS
GLUCOSE UR QL STRIP: NEGATIVE
HGB UR QL STRIP: ABNORMAL
KETONES UR QL STRIP: NEGATIVE
LEUKOCYTE ESTERASE UR QL STRIP: NEGATIVE
MICROSCOPIC COMMENT: NORMAL
NITRITE UR QL STRIP: NEGATIVE
PH UR STRIP: 7 [PH] (ref 5–8)
PROT UR QL STRIP: NEGATIVE
RBC #/AREA URNS AUTO: 1 /HPF (ref 0–4)
SP GR UR STRIP: 1 (ref 1–1.03)
TTG IGA SER-ACNC: <0.2 U/ML
URN SPEC COLLECT METH UR: ABNORMAL
WBC #/AREA URNS AUTO: 0 /HPF (ref 0–5)

## 2023-09-12 PROCEDURE — 87086 URINE CULTURE/COLONY COUNT: CPT | Performed by: INTERNAL MEDICINE

## 2023-09-12 PROCEDURE — 81001 URINALYSIS AUTO W/SCOPE: CPT | Performed by: INTERNAL MEDICINE

## 2023-09-13 ENCOUNTER — HOSPITAL ENCOUNTER (OUTPATIENT)
Dept: RADIOLOGY | Facility: HOSPITAL | Age: 78
Discharge: HOME | End: 2023-09-13
Attending: INTERNAL MEDICINE
Payer: MEDICARE

## 2023-09-13 DIAGNOSIS — Z12.11 ENCOUNTER FOR SCREENING COLONOSCOPY: Primary | ICD-10-CM

## 2023-09-13 DIAGNOSIS — K21.9 GASTROESOPHAGEAL REFLUX DISEASE WITHOUT ESOPHAGITIS: ICD-10-CM

## 2023-09-13 DIAGNOSIS — K59.09 OTHER CONSTIPATION: ICD-10-CM

## 2023-09-13 DIAGNOSIS — Z80.0 FAMILY HISTORY OF COLON CANCER: ICD-10-CM

## 2023-09-13 DIAGNOSIS — Z12.11 SPECIAL SCREENING FOR MALIGNANT NEOPLASMS, COLON: Primary | ICD-10-CM

## 2023-09-13 DIAGNOSIS — D64.9 LOW HEMOGLOBIN: ICD-10-CM

## 2023-09-13 DIAGNOSIS — Z86.010 HISTORY OF COLON POLYPS: ICD-10-CM

## 2023-09-13 LAB — BACTERIA UR CULT: NO GROWTH

## 2023-09-13 PROCEDURE — 74177 CT ABD & PELVIS W/CONTRAST: CPT | Mod: TC

## 2023-09-13 PROCEDURE — 25500020 PHARM REV CODE 255: Performed by: INTERNAL MEDICINE

## 2023-09-13 PROCEDURE — 74177 CT ABD & PELVIS W/CONTRAST: CPT | Mod: 26,,, | Performed by: RADIOLOGY

## 2023-09-13 PROCEDURE — A9698 NON-RAD CONTRAST MATERIALNOC: HCPCS | Performed by: INTERNAL MEDICINE

## 2023-09-13 PROCEDURE — 74177 CT ENTEROGRAPHY ABD_PELVIS WITH CONTRAST: ICD-10-PCS | Mod: 26,,, | Performed by: RADIOLOGY

## 2023-09-13 RX ADMIN — BARIUM SULFATE 1350 ML: 1 SUSPENSION ORAL at 07:09

## 2023-09-13 RX ADMIN — IOHEXOL 100 ML: 350 INJECTION, SOLUTION INTRAVENOUS at 07:09

## 2023-09-15 ENCOUNTER — CLINICAL SUPPORT (OUTPATIENT)
Dept: REHABILITATION | Facility: OTHER | Age: 78
End: 2023-09-15
Payer: MEDICARE

## 2023-09-15 DIAGNOSIS — R29.898 DECREASED STRENGTH OF UPPER EXTREMITY: Primary | ICD-10-CM

## 2023-09-15 DIAGNOSIS — R68.89 DECREASED ACTIVITY TOLERANCE: ICD-10-CM

## 2023-09-15 PROCEDURE — 97110 THERAPEUTIC EXERCISES: CPT

## 2023-09-15 PROCEDURE — 97112 NEUROMUSCULAR REEDUCATION: CPT

## 2023-09-15 NOTE — PROGRESS NOTES
[facility]  Physical Therapy Treatment Note     Name: Valarie Colindres  Clinic Number: [id number]    Therapy Diagnosis:   Encounter Diagnoses   Name Primary?    Decreased strength of upper extremity Yes    Decreased activity tolerance      Physician: Lindsay Reynolds NP    Visit Date: 9/15/2023    Physician Orders: PT Eval and Treat  Medical Diagnosis from Referral:   1. Dorsalgia    2. Spondylosis of lumbar region without myelopathy or radiculopathy    3. DDD (degenerative disc disease), lumbar       Evaluation Date: 2023  Authorization Period Expiration: 2024   Plan of Care Expiration: 2023  Reassessment Due: 2023  Visit # / Visits authorized:     PTA Visit #: 0/5     Time In: 1:30 pm  Time Out: 2:30 pm  Total Billable Time: 55 minutes  INSURANCE and OUTCOMES: Fee for Service with FOTO Outcomes 1/3    Precautions: standard Hypertension, Hyperlipidemia    Pattern of pain determined: 1 REP    Subjective     Valarie Colindres reports no change in neck shoulder and arm pain, she has been doing her exercises every day.      Patient reports tolerating previous visit well  Patient reports their pain to be 6/10 on a 0-10 scale with 0 being no pain and 10 being the worst pain imaginable.  Pain Location: R neck shoulder and arm     Occupation: retired in 2018  Leisure: Yoga, activities with people program (painting, calligraphy, piano lessons, ceramics, bible study, chess)    Pt goals: to decrease pain and to be more flexible and agile    Objective      Baseline Isometric Testing on Med X equipment: Testing administered by PT     Date of testin2023  ROM 36-81 deg   Max Peak Torque 139    Min Peak Torque 108    Flex/Ext Ratio 1.2:1   % below normative data 42        Outcomes:  Intake Score: 26%  Visit 6 Score:   Visit 10 Score:   Discharge Score:  Goal Score: 15%     Treatment     Valarie received the treatments listed below:      Moabdulkadir  "received neuromuscular education for 15 minutes via participation on the [employer] Machine. Therapist assisted patient in isolating and engaging spinal stabilization musculature in order to improve functional ability and postural control. Patient performed exercise with therapist guidance in order to accurately use pacer function, avoid valsalva, and optimally exert effort within a safe and effective range via the Darling Exertion Rating Scale. Patient instructed to perform at a midrange of exertion and to complete 15-20 repetitions within appropriate split time, with proper technique, and while maintaining safety.         9/15/2023     1:54 PM   [facility] Therapy - Short   Visit Number 2   VAS Pain Rating 6   Time 5   Retraction in Sitting 20   Retraction with Extension 20   Scapular Retraction 20   Cervical Weight 111 lbs   Repetitions 15   Rating of Perceived Exertion 4         [place] participated in neuromuscular re-education activities to improve balance, coordination, proprioception, motor control and/or posture for 15 minutes. The following activities were included:    Cervical retraction in sitting 10x5"  Cervical retraction in sitting 10x5" with manual overpressure  Cervical retraction with extension 2x10  towel support at neck     Cues for flow and controlled movements and pacing with rest breaks between reps/sets      [place] participated in therapeutic exercises to develop strength, endurance, ROM, flexibility, and posture for 25 minutes including:    Recumbent bike for endurance x5'    Scap retracts 20x3"  Thoracic ext over FR 15x3"      Peripheral muscle strengthening which included one set of 15-20 repetitions at a slow and controlled 10-13 second per rep pace focused on strengthening supporting musculature in order to improve body mechanics and functional mobility. Patient and therapist focused on proper form during treatment to ensure optimal strengthening of each targeted muscle group.  Machines " utilized include torso rotation, leg extension, leg curl, chest press, upright row. Tricep extension, bicep curl, leg press, and hip abduction added visit 3    [place] participated in dynamic functional therapeutic activities to improve functional performance and simulate household and community activities for  minutes. The following activities were included:      [place] received manual therapy techniques for   minutes. The following activities were included:      Pt given cold pack for 5 minutes to cervical an thoracic spine in [place].    Patient Education and Home Exercises     Home exercises include:  Chin tucks, cervical extensions  Cardio program (V5): -  Lifting education (V11): -  Posture/Lumbar roll: acquired  Fridge Magnet Discharge handout (date given): -  Equipment at home/gym membership: yes, has     Education provided:   - PT role and POC  - HEP    Written Home Exercises Provided: Patient instructed to cont prior HEP.  Exercises were reviewed and Valarie was able to demonstrate them prior to the end of the session.  [place] demonstrated good  understanding of the education provided.     See EMR under Patient Instructions for exercises provided prior visit.    Assessment     Pt presents to second healthy back visit reporting unchanged neck/shoulder/arm symptoms, was able to demo HEP with Min VC for form. Pt was able to start neuro reeducation training, strengthening, and endurance training on the cervical MedX at 80% of max peak torque according to the initial visit isometric test. Pt was able to complete 15 reps, with 4/10 RPE. Pt was also able to complete half of the peripheral strengthening exercises without increased discomfort and will complete the complete circuit next visit as tolerated.    Patient is making good progress towards established goals.  Pt will continue to benefit from skilled outpatient physical therapy to address the deficits stated in the impairment chart, provide pt/family  education and to maximize pt's level of independence in the home and community environment.     Anticipated Barriers for therapy: none  Pt's spiritual, cultural and educational needs considered and pt agreeable to plan of care and goals as stated below:     GOALS: Pt is in agreement with the following goals.     Short term goals:  6 weeks or 10 visits   - Pt will demonstrate increased lumbar ROM by at least 6 degrees from the initial ROM value with improvements noted in functional ROM and ability to perform ADLs. Appropriate and Ongoing  - Pt will demonstrate increased MedX average isometric strength value by 15% from initial test resulting in improved ability to perform bending, lifting, and carrying activities safely, confidently. Appropriate and Ongoing  - Pt will report a reduction in worst pain score by 1-2 points for improved tolerance for putting arm behind back for bathing. Appropriate and Ongoing  - Pt able to perform HEP correctly with minimal cueing or supervision from therapist to encourage independent management of symptoms. Appropriate and Ongoing     Long term goals: 10 weeks or 20 visits   - Pt will demonstrate increased lumbar ROM by at least 12 degrees from initial ROM value, resulting in improved ability to perform functional forward bending while standing and sitting. Appropriate and Ongoing  - Pt will demonstrate increased MedX average isometric strength value by 30% from initial test resulting in improved ability to perform bending, lifting, and carrying activities safely and confidently. Appropriate and Ongoing  - Pt to demonstrate ability to independently control and reduce their pain through posture positioning and mechanical movements throughout a typical day. Appropriate and Ongoing  - Pt will demonstrate reduced pain and improved functional outcomes as reported on the Neck Disability Index by reaching a score of 15 or less in order to demonstrate subjective improvement in pt's condition.    Appropriate and Ongoing  - Pt will demonstrate independence with the HEP at discharge. Appropriate and Ongoing  - Pt able to carry water in the house without increase pain (patient goal) Appropriate and Ongoing  - Pt complete household chores without increased pain (patient goal) Appropriate and Ongoing    Plan     Continue with established Plan of Care towards established PT goals.     Therapist: Franco Jackson, PT  9/15/2023

## 2023-09-17 ENCOUNTER — PATIENT MESSAGE (OUTPATIENT)
Dept: ENDOSCOPY | Facility: HOSPITAL | Age: 78
End: 2023-09-17
Payer: MEDICARE

## 2023-09-17 DIAGNOSIS — K76.9 LIVER LESION: Primary | ICD-10-CM

## 2023-09-19 ENCOUNTER — CLINICAL SUPPORT (OUTPATIENT)
Dept: REHABILITATION | Facility: OTHER | Age: 78
End: 2023-09-19
Payer: MEDICARE

## 2023-09-19 DIAGNOSIS — R68.89 DECREASED ACTIVITY TOLERANCE: ICD-10-CM

## 2023-09-19 DIAGNOSIS — R29.898 DECREASED STRENGTH OF UPPER EXTREMITY: Primary | ICD-10-CM

## 2023-09-19 PROCEDURE — 97112 NEUROMUSCULAR REEDUCATION: CPT

## 2023-09-19 PROCEDURE — 97110 THERAPEUTIC EXERCISES: CPT

## 2023-09-19 NOTE — PROGRESS NOTES
[facility] OUTPATIENT THERAPY AND WELLNESS - HEALTHY BACK  Physical Therapy Treatment Note     Name: Valarie Colindres  Clinic Number: [id number]    Therapy Diagnosis:   Encounter Diagnoses   Name Primary?    Decreased strength of upper extremity Yes    Decreased activity tolerance        Physician: Lindsay Reynolds NP    Visit Date: 2023    Physician Orders: PT Eval and Treat  Medical Diagnosis from Referral:   1. Dorsalgia    2. Spondylosis of lumbar region without myelopathy or radiculopathy    3. DDD (degenerative disc disease), lumbar       Evaluation Date: 2023  Authorization Period Expiration: 2024   Plan of Care Expiration: 2023  Reassessment Due: 10/19/2023  Visit # / Visits authorized: 3/20    PTA Visit #: 0/5     Time In: 3:35 pm  Time Out: 4:40 pm  Total Billable Time: 30 minutes  Total Treatment Time: 65 minutes  INSURANCE and OUTCOMES: Fee for Service with FOTO Outcomes 1/3    Precautions: standard Hypertension, Hyperlipidemia    Pattern of pain determined: 1 REP    Subjective     Valarie Colindres reports pain was less this morning when she woke up, and it is less today than it was last visit.     Patient reports tolerating previous visit well  Patient reports their pain to be 4/10 on a 0-10 scale with 0 being no pain and 10 being the worst pain imaginable.  Pain Location: R neck shoulder and arm     Occupation: retired in 2018  Leisure: Yoga, activities with people program (painting, calligraphy, piano lessons, ceramics, bible study, chess)    Pt goals: to decrease pain and to be more flexible and agile    Objective      Baseline Isometric Testing on Med X equipment: Testing administered by PT     Date of testin2023  ROM 36-81 deg   Max Peak Torque 139    Min Peak Torque 108    Flex/Ext Ratio 1.2:1   % below normative data 42        Outcomes:  Intake Score: 26%  Visit 6 Score:   Visit 10 Score:   Discharge Score:  Goal Score: 15%     Treatment     Valarie received the  "treatments listed below:      [place] received neuromuscular education for 15 minutes via participation on the [employer] Machine. Therapist assisted patient in isolating and engaging spinal stabilization musculature in order to improve functional ability and postural control. Patient performed exercise with therapist guidance in order to accurately use pacer function, avoid valsalva, and optimally exert effort within a safe and effective range via the Darling Exertion Rating Scale. Patient instructed to perform at a midrange of exertion and to complete 15-20 repetitions within appropriate split time, with proper technique, and while maintaining safety.         9/15/2023     1:54 PM   [facility] Therapy - Short   Visit Number 2   VAS Pain Rating 6   Time 5   Retraction in Sitting 20   Retraction with Extension 20   Scapular Retraction 20   Cervical Weight 111 lbs   Repetitions 15   Rating of Perceived Exertion 4         [place] participated in neuromuscular re-education activities to improve balance, coordination, proprioception, motor control and/or posture for 10 minutes. The following activities were included:    Cervical retraction in sitting 10x5"  Cervical retraction in sitting 10x5" with manual overpressure  Cervical retraction with extension 2x10  towel support at neck     Cues for flow and controlled movements and pacing with rest breaks between reps/sets      [place] participated in therapeutic exercises to develop strength, endurance, ROM, flexibility, and posture for 30 minutes including:    Recumbent bike for endurance x5'    Scap retracts 10x3" --> no money RTB 15x3"  Thoracic ext over FR 15x3"      Peripheral muscle strengthening which included one set of 15-20 repetitions at a slow and controlled 10-13 second per rep pace focused on strengthening supporting musculature in order to improve body mechanics and functional mobility. Patient and therapist focused on proper form during treatment to ensure " optimal strengthening of each targeted muscle group.  Machines utilized include torso rotation, leg extension, leg curl, chest press, upright row. Tricep extension, bicep curl, leg press, and hip abduction added visit 3    [place] participated in dynamic functional therapeutic activities to improve functional performance and simulate household and community activities for  minutes. The following activities were included:      [place] received manual therapy techniques for   minutes. The following activities were included:      Pt given cold pack for 5 minutes to cervical an thoracic spine in [place].    Patient Education and Home Exercises     Home exercises include:  Chin tucks, cervical extensions  Cardio program (V5): -  Lifting education (V11): -  Posture/Lumbar roll: acquired  Fridge Magnet Discharge handout (date given): -  Equipment at home/gym membership: yes, has     Education provided:   - PT role and POC  - HEP    Written Home Exercises Provided: Patient instructed to cont prior HEP.  Exercises were reviewed and [place] was able to demonstrate them prior to the end of the session.  [place] demonstrated good  understanding of the education provided.     See EMR under Patient Instructions for exercises provided prior visit.    Assessment     [place] presents today with reports of decreased pain levels compared to last visit. Again reviewed HEP due to pt reporting non-compliance over the weekend, discussed importance of daily completion for max therapeutic benefit. Medx resistance maintained at 111 in/lbs and pt was able to complete 18 reps with 4/10 RPE. Continue to progress per HB protocol.    Patient is making good progress towards established goals.  Pt will continue to benefit from skilled outpatient physical therapy to address the deficits stated in the impairment chart, provide pt/family education and to maximize pt's level of independence in the home and community environment.     Anticipated  Barriers for therapy: none  Pt's spiritual, cultural and educational needs considered and pt agreeable to plan of care and goals as stated below:     GOALS: Pt is in agreement with the following goals.     Short term goals:  6 weeks or 10 visits   - Pt will demonstrate increased lumbar ROM by at least 6 degrees from the initial ROM value with improvements noted in functional ROM and ability to perform ADLs. Appropriate and Ongoing  - Pt will demonstrate increased MedX average isometric strength value by 15% from initial test resulting in improved ability to perform bending, lifting, and carrying activities safely, confidently. Appropriate and Ongoing  - Pt will report a reduction in worst pain score by 1-2 points for improved tolerance for putting arm behind back for bathing. Appropriate and Ongoing  - Pt able to perform HEP correctly with minimal cueing or supervision from therapist to encourage independent management of symptoms. Appropriate and Ongoing     Long term goals: 10 weeks or 20 visits   - Pt will demonstrate increased lumbar ROM by at least 12 degrees from initial ROM value, resulting in improved ability to perform functional forward bending while standing and sitting. Appropriate and Ongoing  - Pt will demonstrate increased MedX average isometric strength value by 30% from initial test resulting in improved ability to perform bending, lifting, and carrying activities safely and confidently. Appropriate and Ongoing  - Pt to demonstrate ability to independently control and reduce their pain through posture positioning and mechanical movements throughout a typical day. Appropriate and Ongoing  - Pt will demonstrate reduced pain and improved functional outcomes as reported on the Neck Disability Index by reaching a score of 15 or less in order to demonstrate subjective improvement in pt's condition.   Appropriate and Ongoing  - Pt will demonstrate independence with the HEP at discharge. Appropriate and  Ongoing  - Pt able to carry water in the house without increase pain (patient goal) Appropriate and Ongoing  - Pt complete household chores without increased pain (patient goal) Appropriate and Ongoing    Plan     Continue with established Plan of Care towards established PT goals.     Therapist: Franco Jackson, PT  9/19/2023

## 2023-09-21 NOTE — PROGRESS NOTES
[facility]  Physical Therapy Treatment Note     Name: Valarie Colindres  Clinic Number: [id number]    Therapy Diagnosis:   Encounter Diagnoses   Name Primary?    Decreased strength of upper extremity Yes    Decreased activity tolerance          Physician: Lindsay Reynolds NP    Visit Date: 2023    Physician Orders: PT Eval and Treat  Medical Diagnosis from Referral:   1. Dorsalgia    2. Spondylosis of lumbar region without myelopathy or radiculopathy    3. DDD (degenerative disc disease), lumbar       Evaluation Date: 2023  Authorization Period Expiration: 2024   Plan of Care Expiration: 2023  Reassessment Due: 10/19/2023  Visit # / Visits authorized:     PTA Visit #: 0/5     Time In: 9:30 am  Time Out: 10:30 am  Total Billable Time: 30 minutes  Total Treatment Time: 60 minutes  INSURANCE and OUTCOMES: Fee for Service with FOTO Outcomes 1/3    Precautions: standard Hypertension, Hyperlipidemia    Pattern of pain determined: 1 REP  Subjective     Valarie Colindres reports she is having same amount of pain levels, stretching while doing other activities. Muscle soreness after last visit.     Patient reports tolerating previous visit well  Patient reports their pain to be 4/10 on a 0-10 scale with 0 being no pain and 10 being the worst pain imaginable.  Pain Location: R neck shoulder and arm     Occupation: retired in 2018  Leisure: Yoga, activities with people program (painting, calligraphy, piano lessons, ceramics, bible study, chess)    Pt goals: to decrease pain and to be more flexible and agile  Objective      Baseline Isometric Testing on Med X equipment: Testing administered by PT     Date of testin2023  ROM 36-81 deg   Max Peak Torque 139    Min Peak Torque 108    Flex/Ext Ratio 1.2:1   % below normative data 42        Outcomes:  Intake Score: 26%  Visit 6 Score:   Visit 10 Score:   Discharge Score:  Goal Score: 15%     Treatment  "    Valarie received the treatments listed below:      [place] received neuromuscular education for 10 minutes via participation on the [employer] Machine. Therapist assisted patient in isolating and engaging spinal stabilization musculature in order to improve functional ability and postural control. Patient performed exercise with therapist guidance in order to accurately use pacer function, avoid valsalva, and optimally exert effort within a safe and effective range via the Darling Exertion Rating Scale. Patient instructed to perform at a midrange of exertion and to complete 15-20 repetitions within appropriate split time, with proper technique, and while maintaining safety.         9/22/2023    10:39 AM   [facility] - [facility]   Visit Number 4   VAS Pain Rating 4   Time 5   Retraction in Sitting 20   Retraction with Extension 20   Scapular Retraction 20           [place] participated in neuromuscular re-education activities to improve balance, coordination, proprioception, motor control and/or posture for 10 minutes. The following activities were included:    Cervical retraction in sitting 10x5"  Cervical retraction in sitting 10x5" with manual overpressure  Cervical retraction with extension 2x10  towel support at neck     Cues for flow and controlled movements and pacing with rest breaks between reps/sets      [place] participated in therapeutic exercises to develop strength, endurance, ROM, flexibility, and posture for 30 minutes including:    Recumbent bike for endurance x5'  Scap retracts 10x3" --> no money RTB 15x3"  Thoracic ext over FR 15x3"      Peripheral muscle strengthening which included one set of 15-20 repetitions at a slow and controlled 10-13 second per rep pace focused on strengthening supporting musculature in order to improve body mechanics and functional mobility. Patient and therapist focused on proper form during treatment to ensure optimal strengthening of each targeted muscle group. "  Machines utilized include torso rotation, leg extension, leg curl, chest press, upright row. Tricep extension, bicep curl, leg press, and hip abduction added visit 3    Valarie participated in dynamic functional therapeutic activities to improve functional performance and simulate household and community activities for  minutes. The following activities were included:      Valarie received manual therapy techniques for   minutes. The following activities were included:      Pt given cold pack for 5 minutes to cervical an thoracic spine in Z-lie.    Patient Education and Home Exercises     Home exercises include:  Chin tucks, cervical extensions  Cardio program (V5): -  Lifting education (V11): -  Posture/Lumbar roll: acquired  Fridge Magnet Discharge handout (date given): -  Equipment at home/gym membership: yes, has     Education provided:   - PT role and POC  - HEP    Written Home Exercises Provided: Patient instructed to cont prior HEP.  Exercises were reviewed and Valarie was able to demonstrate them prior to the end of the session.  Valarie demonstrated good  understanding of the education provided.     See EMR under Patient Instructions for exercises provided prior visit.    Assessment   Valarie presents today with moderate pain levels. Reports she has been incorporating stretches in during the day, but doesn't perform all at once. Discussed the importance of more frequency for optimal therapeutic gains. Discussed sleeping positions to address morning pain and ensure neutral spinal position and recommended performing HEP before bed.Patient able to perform 18 reps on l/s medx machine with an RPE of 4/10.  Continue to progress per HB protocol.    Patient is making good progress towards established goals.  Pt will continue to benefit from skilled outpatient physical therapy to address the deficits stated in the impairment chart, provide pt/family education and to maximize pt's level of independence in the  home and community environment.     Anticipated Barriers for therapy: none  Pt's spiritual, cultural and educational needs considered and pt agreeable to plan of care and goals as stated below:     GOALS: Pt is in agreement with the following goals.     Short term goals:  6 weeks or 10 visits   - Pt will demonstrate increased lumbar ROM by at least 6 degrees from the initial ROM value with improvements noted in functional ROM and ability to perform ADLs. Appropriate and Ongoing  - Pt will demonstrate increased MedX average isometric strength value by 15% from initial test resulting in improved ability to perform bending, lifting, and carrying activities safely, confidently. Appropriate and Ongoing  - Pt will report a reduction in worst pain score by 1-2 points for improved tolerance for putting arm behind back for bathing. Appropriate and Ongoing  - Pt able to perform HEP correctly with minimal cueing or supervision from therapist to encourage independent management of symptoms. Appropriate and Ongoing     Long term goals: 10 weeks or 20 visits   - Pt will demonstrate increased lumbar ROM by at least 12 degrees from initial ROM value, resulting in improved ability to perform functional forward bending while standing and sitting. Appropriate and Ongoing  - Pt will demonstrate increased MedX average isometric strength value by 30% from initial test resulting in improved ability to perform bending, lifting, and carrying activities safely and confidently. Appropriate and Ongoing  - Pt to demonstrate ability to independently control and reduce their pain through posture positioning and mechanical movements throughout a typical day. Appropriate and Ongoing  - Pt will demonstrate reduced pain and improved functional outcomes as reported on the Neck Disability Index by reaching a score of 15 or less in order to demonstrate subjective improvement in pt's condition.   Appropriate and Ongoing  - Pt will demonstrate  independence with the HEP at discharge. Appropriate and Ongoing  - Pt able to carry water in the house without increase pain (patient goal) Appropriate and Ongoing  - Pt complete household chores without increased pain (patient goal) Appropriate and Ongoing    Plan     Continue with established Plan of Care towards established PT goals.     Therapist: Jodie Garcia, PTA  9/22/2023

## 2023-09-22 ENCOUNTER — CLINICAL SUPPORT (OUTPATIENT)
Dept: REHABILITATION | Facility: OTHER | Age: 78
End: 2023-09-22
Payer: MEDICARE

## 2023-09-22 DIAGNOSIS — R68.89 DECREASED ACTIVITY TOLERANCE: ICD-10-CM

## 2023-09-22 DIAGNOSIS — R29.898 DECREASED STRENGTH OF UPPER EXTREMITY: Primary | ICD-10-CM

## 2023-09-22 PROCEDURE — 97112 NEUROMUSCULAR REEDUCATION: CPT | Mod: CQ

## 2023-09-27 ENCOUNTER — TELEPHONE (OUTPATIENT)
Dept: ENDOSCOPY | Facility: HOSPITAL | Age: 78
End: 2023-09-27
Payer: MEDICARE

## 2023-10-02 ENCOUNTER — LAB VISIT (OUTPATIENT)
Dept: LAB | Facility: HOSPITAL | Age: 78
End: 2023-10-02
Attending: INTERNAL MEDICINE
Payer: MEDICARE

## 2023-10-02 ENCOUNTER — OFFICE VISIT (OUTPATIENT)
Dept: HEMATOLOGY/ONCOLOGY | Facility: CLINIC | Age: 78
End: 2023-10-02
Payer: MEDICARE

## 2023-10-02 VITALS
WEIGHT: 142.5 LBS | RESPIRATION RATE: 18 BRPM | BODY MASS INDEX: 24.33 KG/M2 | HEIGHT: 64 IN | TEMPERATURE: 98 F | DIASTOLIC BLOOD PRESSURE: 67 MMHG | OXYGEN SATURATION: 96 % | SYSTOLIC BLOOD PRESSURE: 115 MMHG | HEART RATE: 69 BPM

## 2023-10-02 DIAGNOSIS — D47.2 MGUS (MONOCLONAL GAMMOPATHY OF UNKNOWN SIGNIFICANCE): Primary | ICD-10-CM

## 2023-10-02 DIAGNOSIS — D53.9 NUTRITIONAL ANEMIA: ICD-10-CM

## 2023-10-02 DIAGNOSIS — D47.2 MGUS (MONOCLONAL GAMMOPATHY OF UNKNOWN SIGNIFICANCE): ICD-10-CM

## 2023-10-02 LAB
ALBUMIN SERPL BCP-MCNC: 4 G/DL (ref 3.5–5.2)
ALP SERPL-CCNC: 45 U/L (ref 55–135)
ALT SERPL W/O P-5'-P-CCNC: 10 U/L (ref 10–44)
ANION GAP SERPL CALC-SCNC: 7 MMOL/L (ref 8–16)
AST SERPL-CCNC: 16 U/L (ref 10–40)
BASOPHILS # BLD AUTO: 0.03 K/UL (ref 0–0.2)
BASOPHILS NFR BLD: 0.5 % (ref 0–1.9)
BILIRUB SERPL-MCNC: 0.4 MG/DL (ref 0.1–1)
BUN SERPL-MCNC: 14 MG/DL (ref 8–23)
CALCIUM SERPL-MCNC: 9.3 MG/DL (ref 8.7–10.5)
CHLORIDE SERPL-SCNC: 102 MMOL/L (ref 95–110)
CO2 SERPL-SCNC: 30 MMOL/L (ref 23–29)
CREAT SERPL-MCNC: 0.9 MG/DL (ref 0.5–1.4)
DIFFERENTIAL METHOD: ABNORMAL
EOSINOPHIL # BLD AUTO: 0.1 K/UL (ref 0–0.5)
EOSINOPHIL NFR BLD: 1 % (ref 0–8)
ERYTHROCYTE [DISTWIDTH] IN BLOOD BY AUTOMATED COUNT: 17.2 % (ref 11.5–14.5)
EST. GFR  (NO RACE VARIABLE): >60 ML/MIN/1.73 M^2
FOLATE SERPL-MCNC: 8.8 NG/ML (ref 4–24)
GLUCOSE SERPL-MCNC: 90 MG/DL (ref 70–110)
HCT VFR BLD AUTO: 35.4 % (ref 37–48.5)
HGB BLD-MCNC: 10.6 G/DL (ref 12–16)
IGA SERPL-MCNC: 127 MG/DL (ref 40–350)
IGG SERPL-MCNC: 1430 MG/DL (ref 650–1600)
IGM SERPL-MCNC: 109 MG/DL (ref 50–300)
IMM GRANULOCYTES # BLD AUTO: 0.01 K/UL (ref 0–0.04)
IMM GRANULOCYTES NFR BLD AUTO: 0.2 % (ref 0–0.5)
LYMPHOCYTES # BLD AUTO: 2.9 K/UL (ref 1–4.8)
LYMPHOCYTES NFR BLD: 46.4 % (ref 18–48)
MCH RBC QN AUTO: 18.6 PG (ref 27–31)
MCHC RBC AUTO-ENTMCNC: 29.9 G/DL (ref 32–36)
MCV RBC AUTO: 62 FL (ref 82–98)
MONOCYTES # BLD AUTO: 0.4 K/UL (ref 0.3–1)
MONOCYTES NFR BLD: 6.8 % (ref 4–15)
NEUTROPHILS # BLD AUTO: 2.9 K/UL (ref 1.8–7.7)
NEUTROPHILS NFR BLD: 45.1 % (ref 38–73)
NRBC BLD-RTO: 0 /100 WBC
PLATELET # BLD AUTO: 274 K/UL (ref 150–450)
PMV BLD AUTO: 9.8 FL (ref 9.2–12.9)
POTASSIUM SERPL-SCNC: 3.5 MMOL/L (ref 3.5–5.1)
PROT SERPL-MCNC: 7.2 G/DL (ref 6–8.4)
RBC # BLD AUTO: 5.7 M/UL (ref 4–5.4)
SODIUM SERPL-SCNC: 139 MMOL/L (ref 136–145)
VIT B12 SERPL-MCNC: 510 PG/ML (ref 210–950)
WBC # BLD AUTO: 6.31 K/UL (ref 3.9–12.7)

## 2023-10-02 PROCEDURE — 86334 IMMUNOFIX E-PHORESIS SERUM: CPT | Performed by: INTERNAL MEDICINE

## 2023-10-02 PROCEDURE — 3074F SYST BP LT 130 MM HG: CPT | Mod: CPTII,S$GLB,, | Performed by: INTERNAL MEDICINE

## 2023-10-02 PROCEDURE — 1160F PR REVIEW ALL MEDS BY PRESCRIBER/CLIN PHARMACIST DOCUMENTED: ICD-10-PCS | Mod: CPTII,S$GLB,, | Performed by: INTERNAL MEDICINE

## 2023-10-02 PROCEDURE — 3078F PR MOST RECENT DIASTOLIC BLOOD PRESSURE < 80 MM HG: ICD-10-PCS | Mod: CPTII,S$GLB,, | Performed by: INTERNAL MEDICINE

## 2023-10-02 PROCEDURE — 83521 IG LIGHT CHAINS FREE EACH: CPT | Mod: 59 | Performed by: INTERNAL MEDICINE

## 2023-10-02 PROCEDURE — 1159F MED LIST DOCD IN RCRD: CPT | Mod: CPTII,S$GLB,, | Performed by: INTERNAL MEDICINE

## 2023-10-02 PROCEDURE — 80053 COMPREHEN METABOLIC PANEL: CPT | Performed by: INTERNAL MEDICINE

## 2023-10-02 PROCEDURE — 82746 ASSAY OF FOLIC ACID SERUM: CPT | Performed by: INTERNAL MEDICINE

## 2023-10-02 PROCEDURE — 99999 PR PBB SHADOW E&M-EST. PATIENT-LVL IV: CPT | Mod: PBBFAC,,, | Performed by: INTERNAL MEDICINE

## 2023-10-02 PROCEDURE — 82607 VITAMIN B-12: CPT | Performed by: INTERNAL MEDICINE

## 2023-10-02 PROCEDURE — 85025 COMPLETE CBC W/AUTO DIFF WBC: CPT | Performed by: INTERNAL MEDICINE

## 2023-10-02 PROCEDURE — 1125F PR PAIN SEVERITY QUANTIFIED, PAIN PRESENT: ICD-10-PCS | Mod: CPTII,S$GLB,, | Performed by: INTERNAL MEDICINE

## 2023-10-02 PROCEDURE — 82784 ASSAY IGA/IGD/IGG/IGM EACH: CPT | Performed by: INTERNAL MEDICINE

## 2023-10-02 PROCEDURE — 99214 OFFICE O/P EST MOD 30 MIN: CPT | Mod: S$GLB,,, | Performed by: INTERNAL MEDICINE

## 2023-10-02 PROCEDURE — 99214 PR OFFICE/OUTPT VISIT, EST, LEVL IV, 30-39 MIN: ICD-10-PCS | Mod: S$GLB,,, | Performed by: INTERNAL MEDICINE

## 2023-10-02 PROCEDURE — 36415 COLL VENOUS BLD VENIPUNCTURE: CPT | Performed by: INTERNAL MEDICINE

## 2023-10-02 PROCEDURE — 1160F RVW MEDS BY RX/DR IN RCRD: CPT | Mod: CPTII,S$GLB,, | Performed by: INTERNAL MEDICINE

## 2023-10-02 PROCEDURE — 86334 PATHOLOGIST INTERPRETATION IFE: ICD-10-PCS | Mod: 26,,, | Performed by: PATHOLOGY

## 2023-10-02 PROCEDURE — 1125F AMNT PAIN NOTED PAIN PRSNT: CPT | Mod: CPTII,S$GLB,, | Performed by: INTERNAL MEDICINE

## 2023-10-02 PROCEDURE — 3288F PR FALLS RISK ASSESSMENT DOCUMENTED: ICD-10-PCS | Mod: CPTII,S$GLB,, | Performed by: INTERNAL MEDICINE

## 2023-10-02 PROCEDURE — 3078F DIAST BP <80 MM HG: CPT | Mod: CPTII,S$GLB,, | Performed by: INTERNAL MEDICINE

## 2023-10-02 PROCEDURE — 1101F PT FALLS ASSESS-DOCD LE1/YR: CPT | Mod: CPTII,S$GLB,, | Performed by: INTERNAL MEDICINE

## 2023-10-02 PROCEDURE — 3074F PR MOST RECENT SYSTOLIC BLOOD PRESSURE < 130 MM HG: ICD-10-PCS | Mod: CPTII,S$GLB,, | Performed by: INTERNAL MEDICINE

## 2023-10-02 PROCEDURE — 84165 PROTEIN E-PHORESIS SERUM: CPT | Mod: 26,,, | Performed by: PATHOLOGY

## 2023-10-02 PROCEDURE — 3288F FALL RISK ASSESSMENT DOCD: CPT | Mod: CPTII,S$GLB,, | Performed by: INTERNAL MEDICINE

## 2023-10-02 PROCEDURE — 84165 PATHOLOGIST INTERPRETATION SPE: ICD-10-PCS | Mod: 26,,, | Performed by: PATHOLOGY

## 2023-10-02 PROCEDURE — 1101F PR PT FALLS ASSESS DOC 0-1 FALLS W/OUT INJ PAST YR: ICD-10-PCS | Mod: CPTII,S$GLB,, | Performed by: INTERNAL MEDICINE

## 2023-10-02 PROCEDURE — 99999 PR PBB SHADOW E&M-EST. PATIENT-LVL IV: ICD-10-PCS | Mod: PBBFAC,,, | Performed by: INTERNAL MEDICINE

## 2023-10-02 PROCEDURE — 84165 PROTEIN E-PHORESIS SERUM: CPT | Performed by: INTERNAL MEDICINE

## 2023-10-02 PROCEDURE — 1159F PR MEDICATION LIST DOCUMENTED IN MEDICAL RECORD: ICD-10-PCS | Mod: CPTII,S$GLB,, | Performed by: INTERNAL MEDICINE

## 2023-10-02 PROCEDURE — 86334 IMMUNOFIX E-PHORESIS SERUM: CPT | Mod: 26,,, | Performed by: PATHOLOGY

## 2023-10-02 NOTE — PROGRESS NOTES
Patient ID: Valarie Colindres is a 78 y.o. female.     Chief Complaint: follow up for MGUS     Diagnosis:  1. Alpha and beta thalassemia trait  2. 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 Ms Colindres is a 75 yo woman who initially saw me on 7/16/19 for further evaluation of microcytic anemia. Her Hb was 11.4 on 9/24/18 with MCV of 61. CBC on 7/15/19 showed WBC 7.15, Hb 10.7, MCV 62, plt count 260. CMP on 5/30/19 was normal. TSH on 4/9/19 normal. Patient currently feels fine. 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 Was told that no treatment is needed. She has occasional fatigue but goes to the gym 3 times a week. She had chest pain after she ate Fritos which did not recur after she stopped eating Fritos. She just had a stress test done.   2. Labs on 7/16/19 showed creatinine 1.0, calcium 9.8, albumin 4.3, protein 7.5, ferritin 128, folate 7.2, vitamin B12 424, haptoglobin 66, iron 86, TIBC 340, iron saturation 25%, . SPEP showed a 0.77 g/dL M protein at IgG kappa. kappa light chain normal 1.11, lambda 0.97, K/L ratio 1.14. Hb electrophoresis showed 4.7% A2, 95.3% A, c/w beta thal trait. Alpha globulin-gene analysis showed one copy of gene deletion.     Interval History:   Ms Colindres returns for follow up. Feeling well. Getting physical therapy for right shoulder pain which helps a lot. Seeing Dr Lawrence for constipation. Ferritin level is normal. Scheduled for colonoscopy in Dec.         ROS:   A ten-point system review is obtained and negative except for what was stated in the Interval History.      Physical Examination:   Vital signs reviewed.   General: well hydrated, well developed, in no acute distress  HEENT: normocephalic, PERRLA, EOMI, anicteric sclerae  Neck: supple, no JVD, thyromegaly, cervical or supraclavicular lymphadenopathy  Lungs: clear breath sounds bilaterally, no wheezing, rales, or rhonchi  Heart: RRR, no M/R/G  Abdomen: soft, no  "tenderness, non-distended, no hepatosplenomegaly, mass, or hernia. BS present  Extremities: no clubbing, cyanosis, or edema  Skin: no rash, ulcer, or open wounds  Neuro: alert and oriented x 4, no focal neuro deficit  Psych: pleasant and appropriate mood and affect     Objective:      Laboratory Data:   Labs on 7/16/19 showed creatinine 1.0, calcium 9.8, albumin 4.3, protein 7.5, ferritin 128, folate 7.2, vitamin B12 424, haptoglobin 66, iron 86, TIBC 340, iron saturation 25%, . SPEP showed a 0.77 g/dL M protein at IgG kappa. kappa light chain normal 1.11, lambda 0.97, K/L ratio 1.14. Hb electrophoresis showed 4.7% A2, 95.3% A, c/w beta thal trait. Alpha globulin-gene analysis showed one copy of gene deletion.     Labs from 2/3/2020 showed Hb 10.6, CMP normal. IgG normal 1350, SPEP showed IgG kappa 0.70 g/dL. Kappa light chain normal 1.2, lambda normal 0.83, K/L ratio normal 1.45.      Labs from 8/11/2020 showed Hb 10.5, CMP unremarkable, IgG normal 1343, SPEP showed IgG kappa 0.69 g/dL, kappa normal 1.07, lambda normal 1.01, K/L ratio normal 1.06    Labs from 2/26/21: Hb 10.6, CMP unremarkable, IgG normal, SPEP showed IgG kappa 0.76 g/dL, kappa normal 1.39, lambda normal 1.1, K/L ratio normal 1.26    Labs from 8/18/21: Hb 10.0, CMP unremarkable. IgG normal 1319. SPEP showed IgG kappa 0.66 g/dL, kappa 1.43, lambda 0.74, K/L 1.93    Labs from 10/14/22: Hb 10.7, CMP unremarkable. IgG normal 1477. SPEP showed "Two immediately adjacent paraprotein peaks are identified: 1. Near-gamma = 0.8 g/dL (previously, 0.66 g/dL). 2. Near-to-mid gamma = 0.26 g/dL (previously, 0.2 g/dL)". Kappa 1.77, lambda 0.93, K/L ratio 1.9    Assessment and Plan:      1. MGUS (monoclonal gammopathy of unknown significance)    2. Nutritional anemia        1.  - IgG kappa monoclonal gammopathy. Low risk disease. No end organ damage.   - Patient feeling well. No symptoms  - get MM labs today. Will call patient to review results    2.  - " thalassemia trait with deletion in both beta and alpha globulin  - ferritin level normal  - check B12 and folate levels today    Follow-up:      RTC in 12 months  Her questions were answered in the clinic. Encouraged to call should issues arise.          Route Chart for Scheduling    Med Onc Chart Routing      Follow up with physician 1 year. see me in one year with CBC, CMP, SPEP, Immunofixation, immunoglobulins, free light chains, vit B12, folate, ferritin, iron/TIBC checked 1 week prior to return   Follow up with JETT    Infusion scheduling note    Injection scheduling note    Labs CBC, CMP, ferritin, free light chains, folate, iron and TIBC, vitamin B12 and SPEP   Scheduling:  Preferred lab:  Lab interval:     Imaging    Pharmacy appointment    Other referrals

## 2023-10-03 LAB
ALBUMIN SERPL ELPH-MCNC: 4.15 G/DL (ref 3.35–5.55)
ALPHA1 GLOB SERPL ELPH-MCNC: 0.24 G/DL (ref 0.17–0.41)
ALPHA2 GLOB SERPL ELPH-MCNC: 0.59 G/DL (ref 0.43–0.99)
B-GLOBULIN SERPL ELPH-MCNC: 0.57 G/DL (ref 0.5–1.1)
GAMMA GLOB SERPL ELPH-MCNC: 1.34 G/DL (ref 0.67–1.58)
INTERPRETATION SERPL IFE-IMP: NORMAL
KAPPA LC SER QL IA: 1.71 MG/DL (ref 0.33–1.94)
KAPPA LC/LAMBDA SER IA: 2.76 (ref 0.26–1.65)
LAMBDA LC SER QL IA: 0.62 MG/DL (ref 0.57–2.63)
PROT SERPL-MCNC: 6.9 G/DL (ref 6–8.4)

## 2023-10-04 LAB
PATHOLOGIST INTERPRETATION IFE: NORMAL
PATHOLOGIST INTERPRETATION SPE: NORMAL

## 2023-10-05 NOTE — PROGRESS NOTES
[facility]  Physical Therapy Treatment Note     Name: Valarie Colindres  Clinic Number: [id number]    Therapy Diagnosis:   No diagnosis found.    Physician: Lindsay Reynolds NP    Visit Date: 10/6/2023    Physician Orders: PT Eval and Treat  Medical Diagnosis from Referral:   1. Dorsalgia    2. Spondylosis of lumbar region without myelopathy or radiculopathy    3. DDD (degenerative disc disease), lumbar       Evaluation Date: 2023  Authorization Period Expiration: 2024   Plan of Care Expiration: 2023  Reassessment Due: 10/19/2023  Visit # / Visits authorized:      PTA Visit #: 0/5     Time In: 8:04 am  Time Out: 9:04 am  Total Billable Time: 53 minutes  Total Treatment Time: 55 minutes  INSURANCE and OUTCOMES: Fee for Service with FOTO Outcomes 1/3    Precautions: standard Hypertension, Hyperlipidemia    Pattern of pain determined: 1 REP  Subjective     Valarie Colindres reports that she was sore      Patient reports tolerating previous visit well  Patient reports their pain to be 5 /10 on a 0-10 scale with 0 being no pain and 10 being the worst pain imaginable.  Pain Location: R neck shoulder and arm     Occupation: retired in 2018  Leisure: Yoga, activities with people program (painting, calligraphy, piano lessons, ceramics, bible study, chess)    Pt goals: to decrease pain and to be more flexible and agile  Objective      Baseline Isometric Testing on Med X equipment: Testing administered by PT     Date of testin2023  ROM 36-81 deg   Max Peak Torque 139    Min Peak Torque 108    Flex/Ext Ratio 1.2:1   % below normative data 42        Outcomes:  Intake Score: 26%  Visit 6 Score:   Visit 10 Score:   Discharge Score:  Goal Score: 15%     Treatment     Valarie received the treatments listed below:      [place] received neuromuscular education for 20 minutes via participation on the [employer] Machine. Therapist assisted patient in  "isolating and engaging spinal stabilization musculature in order to improve functional ability and postural control. Patient performed exercise with therapist guidance in order to accurately use pacer function, avoid valsalva, and optimally exert effort within a safe and effective range via the Darling Exertion Rating Scale. Patient instructed to perform at a midrange of exertion and to complete 15-20 repetitions within appropriate split time, with proper technique, and while maintaining safety.         10/6/2023     8:29 AM   [facility]   Visit Number 5   VAS Pain Rating 5   Time 5   Retraction in Sitting 20   Retraction with Extension 10   Cervical Weight 112 lbs   Repetitions 20   Rating of Perceived Exertion 4   Ice - Z Lie (in min.) 0        [place] participated in neuromuscular re-education activities to improve balance, coordination, proprioception, motor control and/or posture for 10 minutes. The following activities were included:    Cervical retraction in sitting 10x5"  Cervical retraction in sitting 10x5" with manual overpressure  Cervical retraction with extension 2x10  towel support at neck     Cues for flow and controlled movements and pacing with rest breaks between reps/sets    [place] participated in therapeutic exercises to develop strength, endurance, ROM, flexibility, and posture for 23 minutes including:    Scap retracts 10x3" --> no money GTB 15x3"  Thoracic ext over FR 15x3"  +Open books x 10     NP: Recumbent bike for endurance x5'    Peripheral muscle strengthening which included one set of 15-20 repetitions at a slow and controlled 10-13 second per rep pace focused on strengthening supporting musculature in order to improve body mechanics and functional mobility. Patient and therapist focused on proper form during treatment to ensure optimal strengthening of each targeted muscle group.  Machines utilized include torso rotation, leg extension, leg curl, chest press, upright row. Tricep " extension, bicep curl, leg press, and hip abduction added visit 3    [place] participated in dynamic functional therapeutic activities to improve functional performance and simulate household and community activities for  minutes. The following activities were included:      [place] received manual therapy techniques for   minutes. The following activities were included:      Pt given cold pack for 0 minutes to cervical an thoracic spine in Z-lie.    Patient Education and Home Exercises     Home exercises include:  Chin tucks, cervical extensions  Cardio program (V5): - 10/6/23   Lifting education (V11): -  Posture/Lumbar roll: acquired  Fridge Magnet Discharge handout (date given): -  Equipment at home/gym membership: yes, has     Education provided:   - PT role and POC  - HEP    Written Home Exercises Provided: Patient instructed to cont prior HEP.  Exercises were reviewed and Valarie was able to demonstrate them prior to the end of the session.  Valarie demonstrated good  understanding of the education provided.     See EMR under Patient Instructions for exercises provided prior visit.    Assessment     Patient presents with report of low level neck pain, and states that her pain is primarily upon waking up in the morning. Recommended that patient incorporate gentle cervical ROM and supine chin tucks into bed mobility routine prior to getting out of bed. Introduced thoracic rotation ROM exercise today which patient tolerated well. MedX weight maintained to previous resistance of 112 ft. Lbs. and patient able to perform 20 reps with an RPE of 4/10. Patient completed entire peripheral strengthening circuit without complaint. Continue to progress per HB protocol.    Patient is making good progress towards established goals.  Pt will continue to benefit from skilled outpatient physical therapy to address the deficits stated in the impairment chart, provide pt/family education and to maximize pt's level of  independence in the home and community environment.     Anticipated Barriers for therapy: none  Pt's spiritual, cultural and educational needs considered and pt agreeable to plan of care and goals as stated below:     GOALS: Pt is in agreement with the following goals.     Short term goals:  6 weeks or 10 visits   - Pt will demonstrate increased lumbar ROM by at least 6 degrees from the initial ROM value with improvements noted in functional ROM and ability to perform ADLs. Appropriate and Ongoing  - Pt will demonstrate increased MedX average isometric strength value by 15% from initial test resulting in improved ability to perform bending, lifting, and carrying activities safely, confidently. Appropriate and Ongoing  - Pt will report a reduction in worst pain score by 1-2 points for improved tolerance for putting arm behind back for bathing. Appropriate and Ongoing  - Pt able to perform HEP correctly with minimal cueing or supervision from therapist to encourage independent management of symptoms. Appropriate and Ongoing     Long term goals: 10 weeks or 20 visits   - Pt will demonstrate increased lumbar ROM by at least 12 degrees from initial ROM value, resulting in improved ability to perform functional forward bending while standing and sitting. Appropriate and Ongoing  - Pt will demonstrate increased MedX average isometric strength value by 30% from initial test resulting in improved ability to perform bending, lifting, and carrying activities safely and confidently. Appropriate and Ongoing  - Pt to demonstrate ability to independently control and reduce their pain through posture positioning and mechanical movements throughout a typical day. Appropriate and Ongoing  - Pt will demonstrate reduced pain and improved functional outcomes as reported on the Neck Disability Index by reaching a score of 15 or less in order to demonstrate subjective improvement in pt's condition.   Appropriate and Ongoing  - Pt will  demonstrate independence with the HEP at discharge. Appropriate and Ongoing  - Pt able to carry water in the house without increase pain (patient goal) Appropriate and Ongoing  - Pt complete household chores without increased pain (patient goal) Appropriate and Ongoing    Plan     Continue with established Plan of Care towards established PT goals.     Therapist: Leydi Mcmahon, PT  10/5/2023

## 2023-10-06 ENCOUNTER — CLINICAL SUPPORT (OUTPATIENT)
Dept: REHABILITATION | Facility: OTHER | Age: 78
End: 2023-10-06
Payer: MEDICARE

## 2023-10-06 DIAGNOSIS — R68.89 DECREASED ACTIVITY TOLERANCE: ICD-10-CM

## 2023-10-06 DIAGNOSIS — R29.898 DECREASED STRENGTH OF UPPER EXTREMITY: Primary | ICD-10-CM

## 2023-10-06 PROCEDURE — 97112 NEUROMUSCULAR REEDUCATION: CPT

## 2023-10-06 PROCEDURE — 97110 THERAPEUTIC EXERCISES: CPT

## 2023-10-08 ENCOUNTER — PATIENT MESSAGE (OUTPATIENT)
Dept: ENDOSCOPY | Facility: HOSPITAL | Age: 78
End: 2023-10-08
Payer: MEDICARE

## 2023-10-09 NOTE — PROGRESS NOTES
Valarie looks like your labs are stable except for your TSH is slightly elevated you may want to follow this up with your primary care doctor

## 2023-10-10 NOTE — PROGRESS NOTES
[facility]  Physical Therapy Treatment Note     Name: Valarie Colindres  Clinic Number: [id number]    Therapy Diagnosis:   Encounter Diagnoses   Name Primary?    Decreased strength of upper extremity Yes    Decreased activity tolerance        Physician: Lindsay Reynolds NP    Visit Date: 10/11/2023    Physician Orders: PT Eval and Treat  Medical Diagnosis from Referral:   1. Dorsalgia    2. Spondylosis of lumbar region without myelopathy or radiculopathy    3. DDD (degenerative disc disease), lumbar       Evaluation Date: 2023  Authorization Period Expiration: 2024   Plan of Care Expiration: 2023  Reassessment Due: 10/19/2023  Visit # / Visits authorized:      PTA Visit #: 0/5     Time In: 3:30 pm  Time Out: 4:30 pm  Total Billable Time: 55 minutes  Total Treatment Time: 55 minutes  INSURANCE and OUTCOMES: Fee for Service with FOTO Outcomes 1/3    Precautions: standard Hypertension, Hyperlipidemia    Pattern of pain determined: 1 REP  Subjective     Valarie Colindres reports that she did an exercise class that included a great deal of shoulder/neck stretches and exercises with some soreness after, but felt better when she woke up this morning.    Patient reports tolerating previous visit well  Patient reports their pain to be  4/10 on a 0-10 scale with 0 being no pain and 10 being the worst pain imaginable.  Pain Location: R neck shoulder and arm     Occupation: retired in 2018  Leisure: Yoga, activities with people program (painting, calligraphy, piano lessons, ceramics, bible study, chess)    Pt goals: to decrease pain and to be more flexible and agile  Objective    Baseline Isometric Testing on Med X equipment: Testing administered by PT     Date of testin2023  ROM 36-81 deg   Max Peak Torque 139    Min Peak Torque 108    Flex/Ext Ratio 1.2:1   % below normative data 42        Outcomes:  Intake Score: 26%  Visit 6 Score:   Visit 10  "Score:   Discharge Score:  Goal Score: 15%   Treatment     [place] received the treatments listed below:        [place] participated in neuromuscular re-education activities to improve balance, coordination, proprioception, motor control and/or posture for 0 minutes. The following activities were included:    Cervical retraction in sitting 10x5"  Cervical retraction in sitting 10x5" with manual overpressure  Cervical retraction with extension 2x10  towel support at neck     Cues for flow and controlled movements and pacing with rest breaks between reps/sets    [place] participated in therapeutic exercises to develop strength, endurance, ROM, flexibility, and posture for 55 minutes including:    +Half FR:Pec/positional release 1 min, shoulder horizontal abd w/YTB x15, SA punches w/1# dowel x20  Scap retracts 10x3" --> no money GTB 15x3"  Thoracic ext over FR 15x3"  Open books x10         10/11/2023     3:55 PM   [facility]   Visit Number 6   VAS Pain Rating 4   Time 5   Cervical Weight 120 lbs   Repetitions 15   Rating of Perceived Exertion 5          Peripheral muscle strengthening which included one set of 15-20 repetitions at a slow and controlled 10-13 second per rep pace focused on strengthening supporting musculature in order to improve body mechanics and functional mobility. Patient and therapist focused on proper form during treatment to ensure optimal strengthening of each targeted muscle group.  Machines utilized include torso rotation, leg extension, leg curl, chest press, upright row. Tricep extension, bicep curl, leg press, and hip abduction added visit 3    [place] participated in dynamic functional therapeutic activities to improve functional performance and simulate household and community activities for  minutes. The following activities were included:      [place] received manual therapy techniques for   minutes. The following activities were included:      Pt given cold pack for 0 " minutes to cervical an thoracic spine in Z-lie.    Patient Education and Home Exercises     Home exercises include:  Chin tucks, cervical extensions  Cardio program (V5): - 10/6/23   Lifting education (V11): -  Posture/Lumbar roll: acquired  Fridge Magnet Discharge handout (date given): -  Equipment at home/gym membership: yes, has     Education provided:   - PT role and POC  - HEP    Written Home Exercises Provided: Patient instructed to cont prior HEP.  Exercises were reviewed and Valarie was able to demonstrate them prior to the end of the session.  [place] demonstrated good  understanding of the education provided.     See EMR under Patient Instructions for exercises provided prior visit.    Assessment   Patient tolerated progressed periscapular and postural exercises without adverse effects and appropriate challenge. Updated HEP to include added exercises with YTB. Patient able to perform 15 reps on l/s medx machine with an RPE of 5/10. Continue to progress per HB protocol.     Patient is making good progress towards established goals  Pt will continue to benefit from skilled outpatient physical therapy to address the deficits stated in the impairment chart, provide pt/family education and to maximize pt's level of independence in the home and community environment.     Anticipated Barriers for therapy: none  Pt's spiritual, cultural and educational needs considered and pt agreeable to plan of care and goals as stated below:     GOALS: Pt is in agreement with the following goals.     Short term goals:  6 weeks or 10 visits   - Pt will demonstrate increased lumbar ROM by at least 6 degrees from the initial ROM value with improvements noted in functional ROM and ability to perform ADLs. Appropriate and Ongoing  - Pt will demonstrate increased MedX average isometric strength value by 15% from initial test resulting in improved ability to perform bending, lifting, and carrying activities safely, confidently.  Appropriate and Ongoing  - Pt will report a reduction in worst pain score by 1-2 points for improved tolerance for putting arm behind back for bathing. Appropriate and Ongoing  - Pt able to perform HEP correctly with minimal cueing or supervision from therapist to encourage independent management of symptoms. Appropriate and Ongoing     Long term goals: 10 weeks or 20 visits   - Pt will demonstrate increased lumbar ROM by at least 12 degrees from initial ROM value, resulting in improved ability to perform functional forward bending while standing and sitting. Appropriate and Ongoing  - Pt will demonstrate increased MedX average isometric strength value by 30% from initial test resulting in improved ability to perform bending, lifting, and carrying activities safely and confidently. Appropriate and Ongoing  - Pt to demonstrate ability to independently control and reduce their pain through posture positioning and mechanical movements throughout a typical day. Appropriate and Ongoing  - Pt will demonstrate reduced pain and improved functional outcomes as reported on the Neck Disability Index by reaching a score of 15 or less in order to demonstrate subjective improvement in pt's condition.   Appropriate and Ongoing  - Pt will demonstrate independence with the HEP at discharge. Appropriate and Ongoing  - Pt able to carry water in the house without increase pain (patient goal) Appropriate and Ongoing  - Pt complete household chores without increased pain (patient goal) Appropriate and Ongoing    Plan     Continue with established Plan of Care towards established PT goals.     Therapist: Jodie Garcia, PTA  10/11/2023

## 2023-10-11 ENCOUNTER — TELEPHONE (OUTPATIENT)
Dept: HEMATOLOGY/ONCOLOGY | Facility: CLINIC | Age: 78
End: 2023-10-11
Payer: MEDICARE

## 2023-10-11 ENCOUNTER — CLINICAL SUPPORT (OUTPATIENT)
Dept: REHABILITATION | Facility: OTHER | Age: 78
End: 2023-10-11
Payer: MEDICARE

## 2023-10-11 DIAGNOSIS — R29.898 DECREASED STRENGTH OF UPPER EXTREMITY: Primary | ICD-10-CM

## 2023-10-11 DIAGNOSIS — K59.04 CHRONIC IDIOPATHIC CONSTIPATION: ICD-10-CM

## 2023-10-11 DIAGNOSIS — R68.89 DECREASED ACTIVITY TOLERANCE: ICD-10-CM

## 2023-10-11 PROCEDURE — 97110 THERAPEUTIC EXERCISES: CPT | Mod: CQ

## 2023-10-11 RX ORDER — LINACLOTIDE 145 UG/1
145 CAPSULE, GELATIN COATED ORAL
Qty: 30 CAPSULE | Refills: 0 | Status: SHIPPED | OUTPATIENT
Start: 2023-10-11 | End: 2023-11-14

## 2023-10-11 NOTE — TELEPHONE ENCOUNTER
Called patient. NA. Left VM. Blood work is good. Protein spikes small and stable. See me in one year to monitor.

## 2023-10-12 NOTE — PROGRESS NOTES
[facility]  Physical Therapy Treatment Note     Name: Valarie Colindres  Clinic Number: [id number]    Therapy Diagnosis:   No diagnosis found.      Physician: Lindsay Reynolds NP    Visit Date: 10/13/2023    Physician Orders: PT Eval and Treat  Medical Diagnosis from Referral:   1. Dorsalgia    2. Spondylosis of lumbar region without myelopathy or radiculopathy    3. DDD (degenerative disc disease), lumbar       Evaluation Date: 2023  Authorization Period Expiration: 2024   Plan of Care Expiration: 2023  Reassessment Due: 10/19/2023  Visit # / Visits authorized:      PTA Visit #: 0/5     Time In: 9:06 AM  Time Out: 10:18 AM  Total Billable Time: 53 minutes  Total Treatment Time: 64 minutes  INSURANCE and OUTCOMES: Fee for Service with FOTO Outcomes 1/3    Precautions: standard Hypertension, Hyperlipidemia    Pattern of pain determined: 1 REP  Subjective     Valarie Colindres reports that she met with her  yesterday and did a lot of upper body work, so her neck and shoulder pain was higher than normal last night. Right now it's radiating into right anterior shoulder.      Patient reports tolerating previous visit well  Patient reports their pain to be  3 /10 on a 0-10 scale with 0 being no pain and 10 being the worst pain imaginable.  Pain Location: R neck shoulder and arm     Occupation: retired in 2018  Leisure: Yoga, activities with people program (painting, calligraphy, piano lessons, ceramics, bible study, chess)    Pt goals: to decrease pain and to be more flexible and agile  Objective    Baseline Isometric Testing on Med X equipment: Testing administered by PT     Date of testin2023  ROM 36-81 deg   Max Peak Torque 139    Min Peak Torque 108    Flex/Ext Ratio 1.2:1   % below normative data 42        Outcomes:  Intake Score: 26%  Visit 6 Score:   Visit 10 Score:   Discharge Score:  Goal Score: 15%   Treatment     Valarie  "received the treatments listed below:        [place] participated in neuromuscular re-education activities to improve balance, coordination, proprioception, motor control and/or posture for 13 minutes. The following activities were included:    Cervical retraction in sitting 10x5"  Cervical retraction in sitting 10x5" with manual overpressure  Cervical retraction with extension 2x10 towel support at neck     Cues for flow and controlled movements and pacing with rest breaks between reps/sets    [place] participated in therapeutic exercises to develop strength, endurance, ROM, flexibility, and posture for 40 minutes including:    Half FR:Pec/positional release 1 min, shoulder horizontal abd w/YTB x15, SA punches w/5 lbs. Dumb bells  No money GTB 15x3"  Open books x10   + Upper trap stretch 30" x 1    NP: Scap retracts 10x3"  Thoracic ext over FR 15x3"        10/13/2023     9:14 AM   [facility]   Visit Number 7   VAS Pain Rating 3   Time 5   Retraction in Sitting 20   Retraction with Extension 20   Sidebending 1   Cervical Weight 120 lbs   Repetitions 18   Rating of Perceived Exertion 4   Ice - Z Lie (in min.) 5     Peripheral muscle strengthening which included one set of 15-20 repetitions at a slow and controlled 10-13 second per rep pace focused on strengthening supporting musculature in order to improve body mechanics and functional mobility. Patient and therapist focused on proper form during treatment to ensure optimal strengthening of each targeted muscle group.  Machines utilized include torso rotation, leg extension, leg curl, chest press, upright row. Tricep extension, bicep curl, leg press, and hip abduction added visit 3    [place] participated in dynamic functional therapeutic activities to improve functional performance and simulate household and community activities for  minutes. The following activities were included:    [place] received manual therapy techniques for 0  minutes. The following " activities were included:    Pt given cold pack for 5 minutes to cervical an thoracic spine in Z-lie.    Patient Education and Home Exercises     Home exercises include:  Chin tucks, cervical extensions  + Upper trap stretch  + Open books    Cardio program (V5): - 10/6/23   Lifting education (V11): -  Posture/Lumbar roll: acquired  Fridge Magnet Discharge handout (date given): -  Equipment at home/gym membership: yes, has     Education provided:   - PT role and POC  - HEP    Written Home Exercises Provided: Patient instructed to cont prior HEP.  Exercises were reviewed and Valarie was able to demonstrate them prior to the end of the session.  [place] demonstrated good  understanding of the education provided.     See EMR under Patient Instructions for exercises provided prior visit.    Assessment   Patient presents with increased hypertonicity through right upper trapezius after performing upper body exercises with  yesterday. Incorporated upper trapezius stretching and open books into HEP. Patient tolerated increase in serratus strengthening intensity. Maintained cervical MedX to 120 ft. Lbs. And patient was able to complete 18 reps with an RPE of 3/10 indicating appropriate progression. Patient able to complete entire peripheral strength circuit without complaint. Continue to progress per HB protocol.     Patient is making good progress towards established goals  Pt will continue to benefit from skilled outpatient physical therapy to address the deficits stated in the impairment chart, provide pt/family education and to maximize pt's level of independence in the home and community environment.     Anticipated Barriers for therapy: none  Pt's spiritual, cultural and educational needs considered and pt agreeable to plan of care and goals as stated below:     GOALS: Pt is in agreement with the following goals.     Short term goals:  6 weeks or 10 visits   - Pt will demonstrate increased lumbar  ROM by at least 6 degrees from the initial ROM value with improvements noted in functional ROM and ability to perform ADLs. Appropriate and Ongoing  - Pt will demonstrate increased MedX average isometric strength value by 15% from initial test resulting in improved ability to perform bending, lifting, and carrying activities safely, confidently. Appropriate and Ongoing  - Pt will report a reduction in worst pain score by 1-2 points for improved tolerance for putting arm behind back for bathing. Appropriate and Ongoing  - Pt able to perform HEP correctly with minimal cueing or supervision from therapist to encourage independent management of symptoms. Appropriate and Ongoing     Long term goals: 10 weeks or 20 visits   - Pt will demonstrate increased lumbar ROM by at least 12 degrees from initial ROM value, resulting in improved ability to perform functional forward bending while standing and sitting. Appropriate and Ongoing  - Pt will demonstrate increased MedX average isometric strength value by 30% from initial test resulting in improved ability to perform bending, lifting, and carrying activities safely and confidently. Appropriate and Ongoing  - Pt to demonstrate ability to independently control and reduce their pain through posture positioning and mechanical movements throughout a typical day. Appropriate and Ongoing  - Pt will demonstrate reduced pain and improved functional outcomes as reported on the Neck Disability Index by reaching a score of 15 or less in order to demonstrate subjective improvement in pt's condition.   Appropriate and Ongoing  - Pt will demonstrate independence with the HEP at discharge. Appropriate and Ongoing  - Pt able to carry water in the house without increase pain (patient goal) Appropriate and Ongoing  - Pt complete household chores without increased pain (patient goal) Appropriate and Ongoing    Plan     Continue with established Plan of Care towards established PT goals.      Therapist: Leydi Mcmahon, PT  10/12/2023

## 2023-10-13 ENCOUNTER — CLINICAL SUPPORT (OUTPATIENT)
Dept: REHABILITATION | Facility: OTHER | Age: 78
End: 2023-10-13
Payer: MEDICARE

## 2023-10-13 DIAGNOSIS — M47.812 CERVICAL SPONDYLOSIS: ICD-10-CM

## 2023-10-13 DIAGNOSIS — M50.30 DDD (DEGENERATIVE DISC DISEASE), CERVICAL: Primary | ICD-10-CM

## 2023-10-13 DIAGNOSIS — M43.10 SPONDYLOLISTHESIS, UNSPECIFIED SPINAL REGION: ICD-10-CM

## 2023-10-13 PROCEDURE — 97110 THERAPEUTIC EXERCISES: CPT

## 2023-10-13 PROCEDURE — 97112 NEUROMUSCULAR REEDUCATION: CPT

## 2023-10-17 ENCOUNTER — CLINICAL SUPPORT (OUTPATIENT)
Dept: REHABILITATION | Facility: OTHER | Age: 78
End: 2023-10-17
Payer: MEDICARE

## 2023-10-17 DIAGNOSIS — R68.89 DECREASED ACTIVITY TOLERANCE: ICD-10-CM

## 2023-10-17 DIAGNOSIS — R29.898 DECREASED STRENGTH OF UPPER EXTREMITY: Primary | ICD-10-CM

## 2023-10-17 PROCEDURE — 97110 THERAPEUTIC EXERCISES: CPT | Mod: CQ

## 2023-10-17 PROCEDURE — 97112 NEUROMUSCULAR REEDUCATION: CPT | Mod: CQ

## 2023-10-17 NOTE — PROGRESS NOTES
"[facility]  Physical Therapy Treatment Note     Name: Valarie Colindres  Clinic Number: [id number]    Therapy Diagnosis:   Encounter Diagnoses   Name Primary?    Decreased strength of upper extremity Yes    Decreased activity tolerance          Physician: Lindsay Reynolds, NP    Visit Date: 10/17/2023    Physician Orders: PT Eval and Treat  Medical Diagnosis from Referral:   1. Dorsalgia    2. Spondylosis of lumbar region without myelopathy or radiculopathy    3. DDD (degenerative disc disease), lumbar       Evaluation Date: 2023  Authorization Period Expiration: 2024   Plan of Care Expiration: 2023  Reassessment Due: 10/19/2023  Visit # / Visits authorized:      PTA Visit #:      Time In: 8:36 AM  Time Out: 9:46 AM  Total Billable Time: 60 minutes  Total Treatment Time: 65 minutes  INSURANCE and OUTCOMES: Fee for Service with FOTO Outcomes 1/3    Precautions: standard Hypertension, Hyperlipidemia    Pattern of pain determined: 1 REP  Subjective     Valarie Colindres reports still having some discomfort in R side neck and shld which she describes as a "twinge" and no longer a stabbing pain. She also demonstrates increased R arm mobility, ER behind her back stating she could not do that prior to OHB.    Patient reports tolerating previous visit well  Patient reports their pain to be  3 /10 on a 0-10 scale with 0 being no pain and 10 being the worst pain imaginable.  Pain Location: R neck shoulder and arm     Occupation: retired in 2018  Leisure: Yoga, activities with people program (painting, calligraphy, piano lessons, ceramics, bible study, chess)    Pt goals: to decrease pain and to be more flexible and agile  Objective    Baseline Isometric Testing on Med X equipment: Testing administered by PT     Date of testin2023  ROM 36-81 deg   Max Peak Torque 139    Min Peak Torque 108    Flex/Ext Ratio 1.2:1   % below normative data 42    " "    Outcomes:  Intake Score: 26%  Visit 6 Score:   Visit 10 Score:   Discharge Score:  Goal Score: 15%   Treatment     [place] received the treatments listed below:        [place] participated in neuromuscular re-education activities to improve balance, coordination, proprioception, motor control and/or posture for 15 minutes. The following activities were included:    Cervical retraction in sitting 10x5"  Cervical retraction in sitting 10x5" with manual overpressure  Cervical retraction with extension 2x10 towel support at neck     Cues for flow and controlled movements and pacing with rest breaks between reps/sets    [place] participated in therapeutic exercises to develop strength, endurance, ROM, flexibility, and posture for 45 minutes including:    Half FR:Pec/positional release 1 min, shoulder horizontal abd w/YTB x15, SA punches w/5 lbs. Dumb bells  No money GTB 15x3"  Open books x10    Upper trap stretch 30" x 2  +Wall slides c/ pillow case x10    NP: Scap retracts 10x3"  Thoracic ext over FR 15x3"      10/17/2023     9:02 AM   [facility]   Visit Number 8   VAS Pain Rating 2   Time 5   Retraction in Sitting 20   Retraction with Extension 20   Cervical Weight 120 lbs   Repetitions 20   Rating of Perceived Exertion 4   Ice - Z Lie (in min.) 5         Peripheral muscle strengthening which included one set of 15-20 repetitions at a slow and controlled 10-13 second per rep pace focused on strengthening supporting musculature in order to improve body mechanics and functional mobility. Patient and therapist focused on proper form during treatment to ensure optimal strengthening of each targeted muscle group.  Machines utilized include torso rotation, leg extension, leg curl, chest press, upright row. Tricep extension, bicep curl, leg press, and hip abduction added visit 3    [place] participated in dynamic functional therapeutic activities to improve functional performance and simulate household and " community activities for  minutes. The following activities were included:    [place] received manual therapy techniques for 0  minutes. The following activities were included:    Pt given cold pack for 5 minutes to cervical an thoracic spine in Z-lie.    Patient Education and Home Exercises     Home exercises include:  Chin tucks, cervical extensions  + Upper trap stretch  + Open books    Cardio program (V5): - 10/6/23   Lifting education (V11): -  Posture/Lumbar roll: acquired  Fridge Magnet Discharge handout (date given): -  Equipment at home/gym membership: yes, has     Education provided:   - PT role and POC  - HEP    Written Home Exercises Provided: Patient instructed to cont prior HEP.  Exercises were reviewed and [place] was able to demonstrate them prior to the end of the session.  [place] demonstrated good  understanding of the education provided.     See EMR under Patient Instructions for exercises provided prior visit.    Assessment     [place] tolerated tx well. Pt cont neuro muscular re-education of cervical region with cervical stretch and stability exercises. Adding wall slides to cont strengthening challenge of scapular region.  Pt able to perform without difficulty. Maintained cervical MedX to 120 ft. Lbs. And patient was able to complete 20 reps with an RPE of 4/10 indicating appropriate progression. Patient able to complete entire peripheral strength circuit without complaint. Continue to progress per HB protocol.     Patient is making good progress towards established goals  Pt will continue to benefit from skilled outpatient physical therapy to address the deficits stated in the impairment chart, provide pt/family education and to maximize pt's level of independence in the home and community environment.     Anticipated Barriers for therapy: none  Pt's spiritual, cultural and educational needs considered and pt agreeable to plan of care and goals as stated below:     GOALS: Pt is in  agreement with the following goals.     Short term goals:  6 weeks or 10 visits   - Pt will demonstrate increased lumbar ROM by at least 6 degrees from the initial ROM value with improvements noted in functional ROM and ability to perform ADLs. Appropriate and Ongoing  - Pt will demonstrate increased MedX average isometric strength value by 15% from initial test resulting in improved ability to perform bending, lifting, and carrying activities safely, confidently. Appropriate and Ongoing  - Pt will report a reduction in worst pain score by 1-2 points for improved tolerance for putting arm behind back for bathing. Appropriate and Ongoing  - Pt able to perform HEP correctly with minimal cueing or supervision from therapist to encourage independent management of symptoms. Appropriate and Ongoing     Long term goals: 10 weeks or 20 visits   - Pt will demonstrate increased lumbar ROM by at least 12 degrees from initial ROM value, resulting in improved ability to perform functional forward bending while standing and sitting. Appropriate and Ongoing  - Pt will demonstrate increased MedX average isometric strength value by 30% from initial test resulting in improved ability to perform bending, lifting, and carrying activities safely and confidently. Appropriate and Ongoing  - Pt to demonstrate ability to independently control and reduce their pain through posture positioning and mechanical movements throughout a typical day. Appropriate and Ongoing  - Pt will demonstrate reduced pain and improved functional outcomes as reported on the Neck Disability Index by reaching a score of 15 or less in order to demonstrate subjective improvement in pt's condition.   Appropriate and Ongoing  - Pt will demonstrate independence with the HEP at discharge. Appropriate and Ongoing  - Pt able to carry water in the house without increase pain (patient goal) Appropriate and Ongoing  - Pt complete household chores without increased pain  (patient goal) Appropriate and Ongoing    Plan     Continue with established Plan of Care towards established PT goals.     Therapist: Ilene Gonzales, PTA  10/17/2023

## 2023-10-19 ENCOUNTER — CLINICAL SUPPORT (OUTPATIENT)
Dept: REHABILITATION | Facility: OTHER | Age: 78
End: 2023-10-19
Payer: MEDICARE

## 2023-10-19 DIAGNOSIS — R68.89 DECREASED ACTIVITY TOLERANCE: ICD-10-CM

## 2023-10-19 DIAGNOSIS — R29.898 DECREASED STRENGTH OF UPPER EXTREMITY: Primary | ICD-10-CM

## 2023-10-19 PROCEDURE — 97110 THERAPEUTIC EXERCISES: CPT

## 2023-10-19 PROCEDURE — 97112 NEUROMUSCULAR REEDUCATION: CPT

## 2023-10-19 NOTE — PROGRESS NOTES
[facility]  Physical Therapy Treatment Note     Name: Valarie Colindres  Clinic Number: [id number]    Therapy Diagnosis:   Encounter Diagnoses   Name Primary?    Decreased strength of upper extremity Yes    Decreased activity tolerance          Physician: Lindsay Reynolds NP    Visit Date: 10/19/2023    Physician Orders: PT Eval and Treat  Medical Diagnosis from Referral:   1. Dorsalgia    2. Spondylosis of lumbar region without myelopathy or radiculopathy    3. DDD (degenerative disc disease), lumbar       Evaluation Date: 2023  Authorization Period Expiration: 2024   Plan of Care Expiration: 2023  Reassessment Due: 10/19/2023  Visit # / Visits authorized:      PTA Visit #: 0/5     Time In: 11:36 AM  Time Out: 12:40 AM  Total Billable Time: 60 minutes  Total Treatment Time: 65 minutes  INSURANCE and OUTCOMES: Fee for Service with FOTO Outcomes 1/3    Precautions: standard Hypertension, Hyperlipidemia    Pattern of pain determined: 1 REP  Subjective     Valarie Colindres reports she always has some sort of discomfort, today she is at about 4/10    Patient reports tolerating previous visit well  Patient reports their pain to be  4 /10 on a 0-10 scale with 0 being no pain and 10 being the worst pain imaginable.  Pain Location: R neck shoulder and arm     Occupation: retired in 2018  Leisure: Yoga, activities with people program (painting, calligraphy, piano lessons, ceramics, bible study, chess)    Pt goals: to decrease pain and to be more flexible and agile  Objective    Baseline Isometric Testing on Med X equipment: Testing administered by PT     Date of testin2023  ROM 36-81 deg   Max Peak Torque 139    Min Peak Torque 108    Flex/Ext Ratio 1.2:1   % below normative data 42        Outcomes:  Intake Score: 26%  Visit 6 Score:   Visit 10 Score:   Discharge Score:  Goal Score: 15%   Treatment     Valarie received the treatments listed below:   "      [place] participated in neuromuscular re-education activities to improve balance, coordination, proprioception, motor control and/or posture for 15 minutes. The following activities were included:    Cervical retraction in sitting 10x5"  Cervical retraction in sitting 10x5" with manual overpressure  Cervical retraction with extension 2x10 towel support at neck     Cues for flow and controlled movements and pacing with rest breaks between reps/sets    [place] participated in therapeutic exercises to develop strength, endurance, ROM, flexibility, and posture for 45 minutes including:    Half FR:Pec/positional release 1 min, shoulder horizontal abd w/RTB x15, SA punches w/5 lbs. Dumb bells  No money GTB 15x3"  Open books x10   Upper trap stretch 30" x 2  +Wall slides c/ pillow case x10    NP: Scap retracts 10x3"  Thoracic ext over FR 15x3"        10/19/2023    12:14 PM   [facility]   Visit Number 9   VAS Pain Rating 4   Time 5   Retraction in Sitting 20   Retraction with Extension 20   Cervical Flexion 108   Cervical Extension 33   Cervical Weight 129 lbs   Repetitions 15   Rating of Perceived Exertion 3.5   Ice - Z Lie (in min.) 5         Peripheral muscle strengthening which included one set of 15-20 repetitions at a slow and controlled 10-13 second per rep pace focused on strengthening supporting musculature in order to improve body mechanics and functional mobility. Patient and therapist focused on proper form during treatment to ensure optimal strengthening of each targeted muscle group.  Machines utilized include torso rotation, leg extension, leg curl, chest press, upright row. Tricep extension, bicep curl, leg press, and hip abduction added visit 3    [place] participated in dynamic functional therapeutic activities to improve functional performance and simulate household and community activities for  minutes. The following activities were included:    [place] received manual therapy techniques " for 0  minutes. The following activities were included:    Pt given cold pack for 5 minutes to cervical an thoracic spine in Z-lie.    Patient Education and Home Exercises     Home exercises include:  Chin tucks, cervical extensions  + Upper trap stretch  + Open books    Cardio program (V5): - 10/6/23   Lifting education (V11): -  Posture/Lumbar roll: acquired  Fridge Magnet Discharge handout (date given): -  Equipment at home/gym membership: yes, has     Education provided:   - PT role and POC  - HEP    Written Home Exercises Provided: Patient instructed to cont prior HEP.  Exercises were reviewed and Valarie was able to demonstrate them prior to the end of the session.  Valarie demonstrated good  understanding of the education provided.     See EMR under Patient Instructions for exercises provided prior visit.    Assessment     Valarie presents to therapy with some discomfort in the neck, she does appreciate the exercises and feels like they help. Resumed stretches and progress with cervical extension without towel, she had some discomfort but at rest she was back to baseline. Resumed strengthening exercises with moderate cues and progressed theraband for horizontal abd. Increased ROM in flexion and increased resistance on the cervical medx, she was able to complete 15 repetitions at 3/10 RPE, plan to complete midpoint testing next visit.    Patient is making good progress towards established goals  Pt will continue to benefit from skilled outpatient physical therapy to address the deficits stated in the impairment chart, provide pt/family education and to maximize pt's level of independence in the home and community environment.     Anticipated Barriers for therapy: none  Pt's spiritual, cultural and educational needs considered and pt agreeable to plan of care and goals as stated below:     GOALS: Pt is in agreement with the following goals.     Short term goals:  6 weeks or 10 visits   - Pt will  demonstrate increased lumbar ROM by at least 6 degrees from the initial ROM value with improvements noted in functional ROM and ability to perform ADLs. Met 10/19/2023  - Pt will demonstrate increased MedX average isometric strength value by 15% from initial test resulting in improved ability to perform bending, lifting, and carrying activities safely, confidently. Appropriate and Ongoing  - Pt will report a reduction in worst pain score by 1-2 points for improved tolerance for putting arm behind back for bathing. Appropriate and Ongoing  - Pt able to perform HEP correctly with minimal cueing or supervision from therapist to encourage independent management of symptoms. Appropriate and Ongoing     Long term goals: 10 weeks or 20 visits   - Pt will demonstrate increased lumbar ROM by at least 12 degrees from initial ROM value, resulting in improved ability to perform functional forward bending while standing and sitting. Met 10/19/2023  - Pt will demonstrate increased MedX average isometric strength value by 30% from initial test resulting in improved ability to perform bending, lifting, and carrying activities safely and confidently. Appropriate and Ongoing  - Pt to demonstrate ability to independently control and reduce their pain through posture positioning and mechanical movements throughout a typical day. Appropriate and Ongoing  - Pt will demonstrate reduced pain and improved functional outcomes as reported on the Neck Disability Index by reaching a score of 15 or less in order to demonstrate subjective improvement in pt's condition.   Appropriate and Ongoing  - Pt will demonstrate independence with the HEP at discharge. Appropriate and Ongoing  - Pt able to carry water in the house without increase pain (patient goal) Appropriate and Ongoing  - Pt complete household chores without increased pain (patient goal) Appropriate and Ongoing    Plan     Continue with established Plan of Care towards established PT  goals.     Therapist: Thad Beckham, PT  10/19/2023

## 2023-10-23 ENCOUNTER — CLINICAL SUPPORT (OUTPATIENT)
Dept: REHABILITATION | Facility: OTHER | Age: 78
End: 2023-10-23
Payer: MEDICARE

## 2023-10-23 DIAGNOSIS — R68.89 DECREASED ACTIVITY TOLERANCE: ICD-10-CM

## 2023-10-23 DIAGNOSIS — R29.898 DECREASED STRENGTH OF UPPER EXTREMITY: Primary | ICD-10-CM

## 2023-10-23 PROCEDURE — 97140 MANUAL THERAPY 1/> REGIONS: CPT

## 2023-10-23 PROCEDURE — 97112 NEUROMUSCULAR REEDUCATION: CPT

## 2023-10-23 NOTE — PROGRESS NOTES
[facility]  Physical Therapy Treatment Note     Name: Valarie Colindres  Clinic Number: [id number]    Therapy Diagnosis:   Encounter Diagnoses   Name Primary?    Decreased strength of upper extremity Yes    Decreased activity tolerance        Physician: Lindsay Reynolds NP    Visit Date: 10/23/2023    Physician Orders: PT Eval and Treat  Medical Diagnosis from Referral:   1. Dorsalgia    2. Spondylosis of lumbar region without myelopathy or radiculopathy    3. DDD (degenerative disc disease), lumbar       Evaluation Date: 2023  Authorization Period Expiration: 2024   Plan of Care Expiration: 2023  Reassessment Due: 2023  Visit # / Visits authorized: 10/20     PTA Visit #: 0/5     Time In: 2:30 PM  Time Out: 3:35 PM  Total Billable Time: 30 minutes  Total Treatment Time: 65 minutes  INSURANCE and OUTCOMES: Fee for Service with FOTO Outcomes 1/3    Precautions: standard Hypertension, Hyperlipidemia    Pattern of pain determined: 1 REP  Subjective     Valarie Colindres reports she is feeling better overall. There is pain is moving more into the R side of the neck and less in the shoulder and arm.    Patient reports tolerating previous visit well  Patient reports their pain to be  4 /10 on a 0-10 scale with 0 being no pain and 10 being the worst pain imaginable.  Pain Location: R neck      Occupation: retired in 2018  Leisure: Yoga, activities with people program (painting, calligraphy, piano lessons, ceramics, bible study, chess)    Pt goals: to decrease pain and to be more flexible and agile  Objective    Baseline Isometric Testing on Med X equipment: Testing administered by PT     Date of testin2023  ROM 36-81 deg   Max Peak Torque 139    Min Peak Torque 108    Flex/Ext Ratio 1.2:1   % below normative data 42      Midpoint Isometric Testing on Med X equipment: Testing administered by PT     Date of testing: 10/240403  ROM  deg   Max  "Peak Torque 217   Min Peak Torque 161   Flex/Ext Ratio 1.34:1   % below normative data 5%   % gain from initial 57%       Outcomes:  Intake Score: 26%  Visit 6 Score: 28%  Visit 10 Score: 12%  Discharge Score:  Goal Score: 15%   Treatment     S[place] received the treatments listed below:        [place] participated in neuromuscular re-education activities to improve balance, coordination, proprioception, motor control and/or posture for 10 minutes. The following activities were included:    Cervical retraction in sitting 10x5"  Cervical retraction in sitting 10x5" with manual overpressure  Cervical retraction with extension 2x10 towel support at neck     Cues for flow and controlled movements and pacing with rest breaks between reps/sets    [place] participated in therapeutic exercises to develop strength, endurance, ROM, flexibility, and posture for 35 minutes including:    Half FR:Pec/positional release 1 min, shoulder horizontal abd w/RTB x15, SA punches w/5 lbs. Dumb bells  No money GTB 15x3"  Open books x10   Upper trap stretch 30" x 2  Wall slides c/ elbows 10x5"    NP: Scap retracts 10x3"  Thoracic ext over FR 15x3"        10/23/2023     2:52 PM   [facility]   Visit Number 10   VAS Pain Rating 4   Time 5   Retraction in Sitting 10   Retraction with Extension 20   Scapular Retraction 15   Seat Adjustment 467   Cervical Flexion 108   Cervical Extension 33   Cervical Peak Torque 217 ft. lbs.   Cervical Weight 90 lbs   Repetitions 5             Peripheral muscle strengthening which included one set of 15-20 repetitions at a slow and controlled 10-13 second per rep pace focused on strengthening supporting musculature in order to improve body mechanics and functional mobility. Patient and therapist focused on proper form during treatment to ensure optimal strengthening of each targeted muscle group.  Machines utilized include torso rotation, leg extension, leg curl, chest press, upright row. Tricep " extension, bicep curl, leg press, and hip abduction added visit 3    [place] participated in dynamic functional therapeutic activities to improve functional performance and simulate household and community activities for  minutes. The following activities were included:    [place] received manual therapy techniques for 10  minutes. The following activities were included:    Suboccipital release  Manual traction  Cervical CPA's  STM bilateral cervical paraspinals     Pt given cold pack for 5 minutes to cervical an thoracic spine in Z-lie.    Patient Education and Home Exercises     Home exercises include:  Chin tucks, cervical extensions  + Upper trap stretch  + Open books    Cardio program (V5): - 10/6/23   Lifting education (V11): -  Posture/Lumbar roll: acquired  Fridge Magnet Discharge handout (date given): -  Equipment at home/gym membership: yes, has     Education provided:   - PT role and POC  - HEP    Written Home Exercises Provided: Patient instructed to cont prior HEP.  Exercises were reviewed and Valarie was able to demonstrate them prior to the end of the session.  [place] demonstrated good  understanding of the education provided.     See EMR under Patient Instructions for exercises provided prior visit.    Assessment     Patient has attended 10 visits at [facility] which included MD evaluation, PT evaluation with isometric testing, and physical therapy treatment including HEP instruction, education, aerobic activity, dynamic strengthening on MedX equipment for the spine, and whole body strengthening on MedX equipment with increasing resistance. Patient demonstrates increased ability to reduce symptoms, improve posture, improve ROM, and improve strength, as stated below:    -Improved posture, when using lumbar roll  -Improved cervical ROM, initially on MedX test 36-81 deg and currently  deg.  -Improved strength at each test point on lumbar MedX isometric test with 57 average  improvement noted with reduced pain noted by patient.  -Initial outcome tool score 26 and current outcome tool score 12 indicating reduced pain and improved function.          Patient is making good progress towards established goals  Pt will continue to benefit from skilled outpatient physical therapy to address the deficits stated in the impairment chart, provide pt/family education and to maximize pt's level of independence in the home and community environment.     Anticipated Barriers for therapy: none  Pt's spiritual, cultural and educational needs considered and pt agreeable to plan of care and goals as stated below:     GOALS: Pt is in agreement with the following goals.     Short term goals:  6 weeks or 10 visits   - Pt will demonstrate increased lumbar ROM by at least 6 degrees from the initial ROM value with improvements noted in functional ROM and ability to perform ADLs. Met 10/19/2023  - Pt will demonstrate increased MedX average isometric strength value by 15% from initial test resulting in improved ability to perform bending, lifting, and carrying activities safely, confidently. MET  - Pt will report a reduction in worst pain score by 1-2 points for improved tolerance for putting arm behind back for bathing. MET  - Pt able to perform HEP correctly with minimal cueing or supervision from therapist to encourage independent management of symptoms. MET     Long term goals: 10 weeks or 20 visits   - Pt will demonstrate increased lumbar ROM by at least 12 degrees from initial ROM value, resulting in improved ability to perform functional forward bending while standing and sitting. Met 10/19/2023  - Pt will demonstrate increased MedX average isometric strength value by 30% from initial test resulting in improved ability to perform bending, lifting, and carrying activities safely and confidently. Appropriate and Ongoing  - Pt to demonstrate ability to independently control and reduce their pain through  posture positioning and mechanical movements throughout a typical day. Appropriate and Ongoing  - Pt will demonstrate reduced pain and improved functional outcomes as reported on the Neck Disability Index by reaching a score of 15 or less in order to demonstrate subjective improvement in pt's condition.   Appropriate and Ongoing  - Pt will demonstrate independence with the HEP at discharge. Appropriate and Ongoing  - Pt able to carry water in the house without increase pain (patient goal) Appropriate and Ongoing  - Pt complete household chores without increased pain (patient goal) Appropriate and Ongoing    Plan     Continue with established Plan of Care towards established PT goals.     Therapist: Franco Jackson, PT  10/23/2023

## 2023-10-24 NOTE — PROGRESS NOTES
[facility] Note    Chief Complaint      Chief Complaint   Patient presents with    Medicare AWV         History of Present Illness      Valarie Colindres is a 78 y.o. female who presents today for   Chief Complaint   Patient presents with    Medicare AW           Patient presents to clinic for her annual wellness visit required by Medicare.  She reports feeling well.  There are no complaints at this time.       Review of Systems   All 12 systems otherwise negative.     Family History:  family history includes Blindness in her maternal uncle; Breast cancer in her maternal aunt; Colon cancer in her father and sister; Crohn's disease in her daughter; Diabetes in her brother; Glaucoma in her maternal uncle; Heart disease in her mother; Hypertension in her maternal grandmother and sister; No Known Problems in her brother, brother, maternal grandfather, paternal grandfather, paternal grandmother, and sister; Prostate cancer in her brother and father; Stroke in her mother.   Family history was reviewed with patient.     Medications:  Outpatient Encounter Medications as of 11/14/2023   Medication Sig Dispense Refill    acetaminophen (TYLENOL) 500 MG tablet Take 1-2 tablets (500-1,000 mg total) by mouth 3 (three) times daily as needed for Pain.  0    alendronate (FOSAMAX) 70 MG tablet TK 1 T PO EVERY WEEK      amLODIPine (NORVASC) 5 MG tablet       aspirin (ECOTRIN) 81 MG EC tablet Take 1 tablet by mouth once daily.      atorvastatin (LIPITOR) 10 MG tablet Take 1 tablet (10 mg total) by mouth every evening. 90 tablet 3    azelastine (ASTELIN) 137 mcg (0.1 %) nasal spray Two sprays in each nostril, sniff until absorbed, then follow with 1 spray of fluticasone.  Use both sprays twice daily. 30 mL 11    chlorhexidine (PERIDEX) 0.12 % solution RINSE AND SWISH ONE CAPFUL BID  1    chlorzoxazone (PARAFON FORTE) 500 mg Tab TAKE 1 TABLET(S) 3 TIMES A DAY BY ORAL ROUTE AS NEEDED.      diclofenac sodium  (VOLTAREN) 1 % Gel Apply 2 g topically 4 (four) times daily. 1 each 2    famotidine (PEPCID) 20 MG tablet TAKE 1 TABLET BY MOUTH TWICE A  tablet 3    hydroCHLOROthiazide (HYDRODIURIL) 12.5 MG Tab Take 1 tablet (12.5 mg total) by mouth once daily. 90 tablet 3    levothyroxine (SYNTHROID) 100 MCG tablet Take 1 tablet (100 mcg total) by mouth once daily. 90 tablet 3    loratadine (CLARITIN) 10 mg tablet Take 1 tablet (10 mg total) by mouth once daily. 30 tablet 11    pantoprazole (PROTONIX) 40 MG tablet Take 40 mg by mouth every morning.      valsartan (DIOVAN) 320 MG tablet Take 1 tablet (320 mg total) by mouth once daily. 90 tablet 3    clobetasoL (TEMOVATE) 0.05 % external solution       diazePAM (VALIUM) 5 MG tablet       [DISCONTINUED] fluticasone propionate (FLONASE) 50 mcg/actuation nasal spray 2 sprays (100 mcg total) by Each Nostril route once daily. (Patient not taking: Reported on 11/14/2023) 18.2 mL 11    [DISCONTINUED] LINZESS 145 mcg Cap capsule TAKE 1 CAPSULE BY MOUTH BEFORE BREAKFAST (Patient not taking: Reported on 11/14/2023) 30 capsule 0    [DISCONTINUED] polyethylene glycol (GLYCOLAX) 17 gram PwPk Take 17 g by mouth 2 (two) times daily as needed (constiptation). (Patient not taking: Reported on 11/14/2023) 30 each 3     No facility-administered encounter medications on file as of 11/14/2023.     Review for Opioid Screening: Pt does not have Rx for Opioids    Review for Substance Use Disorders: Patient does not have a controlled substance abuse disorder.        Allergies:  Review of patient's allergies indicates:   Allergen Reactions    Sutures, catgut (chromic)        Health Maintenance:  Health Maintenance   Topic Date Due    DEXA Scan  12/10/2024    Colonoscopy  11/09/2027    Lipid Panel  08/15/2028    TETANUS VACCINE  09/02/2032    Hepatitis C Screening  Completed    Shingles Vaccine  Completed     Health Maintenance Topics with due status: Not Due       Topic Last  "Completion Date    DEXA Scan 12/10/2021    TETANUS VACCINE 09/02/2022    Colonoscopy 11/09/2022    Lipid Panel 08/15/2023    Hemoglobin A1c (Prediabetes) 10/31/2023       Physical Exam      Vital Signs  Temp: 97.8 °F (36.6 °C)  Temp Source: Oral  Pulse: 72  Resp: 16  SpO2: 98 %  BP: 120/78  BP Location: Right arm  Patient Position: Sitting  Pain Score: 0-No pain  Height and Weight  Height: 5' 4" (162.6 cm)  Weight: 65.7 kg (144 lb 13.5 oz)  BSA (Calculated - sq m): 1.72 sq meters  BMI (Calculated): 24.8  Weight in (lb) to have BMI = 25: 145.3]    Physical Exam  Constitutional:       Appearance: Normal appearance. She is normal weight.   HENT:      Head: Normocephalic and atraumatic.      Nose: Nose normal.      Mouth/Throat:      Mouth: Mucous membranes are moist.      Pharynx: Oropharynx is clear.   Eyes:      Extraocular Movements: Extraocular movements intact.      Conjunctiva/sclera: Conjunctivae normal.      Pupils: Pupils are equal, round, and reactive to light.   Cardiovascular:      Rate and Rhythm: Normal rate and regular rhythm.      Pulses: Normal pulses.      Heart sounds: Normal heart sounds.   Pulmonary:      Effort: Pulmonary effort is normal.      Breath sounds: Normal breath sounds.   Musculoskeletal:         General: Normal range of motion.      Cervical back: Normal range of motion and neck supple.   Skin:     General: Skin is warm and dry.      Capillary Refill: Capillary refill takes less than 2 seconds.   Neurological:      General: No focal deficit present.      Mental Status: She is alert and oriented to person, place, and time. Mental status is at baseline.   Psychiatric:         Mood and Affect: Mood normal.         Behavior: Behavior normal.         Thought Content: Thought content normal.         Judgment: Judgment normal.          Assessment/Plan     Valarie Colindres is a 78 y.o.female with:    Encounter for preventive health examination      As above, continue current medications and " maintain follow up with specialists.  Return to clinic as needed.    Greater than 50% of visit was spent face to face with patient.  All questions were answered to patient's satisfaction.           Betsey Magallanes, NP-C  [facility]    I offered to discuss advanced care planning, including how to pick a person who would make decisions for you if you were unable to make them for yourself, called a health care power of , and what kind of decisions you might make such as use of life sustaining treatments such as ventilators and tube feeding when faced with a life limiting illness recorded on a living will that they will need to know. (How you want to be cared for as you near the end of your natural life)     X Patient is interested in learning more about how to make advanced directives.  I provided them paperwork and offered to discuss this with them.  I offered to discuss advanced care planning, including how to pick a person who would make decisions for you if you were unable to make them for yourself, called a health care power of , and what kind of decisions you might make such as use of life sustaining treatments such as ventilators and tube feeding when faced with a life limiting illness recorded on a living will that they will need to know. (How you want to be cared for as you near the end of your natural life)     X Patient is interested in learning more about how to make advanced directives.  I provided them paperwork and offered to discuss this with them.

## 2023-10-27 ENCOUNTER — CLINICAL SUPPORT (OUTPATIENT)
Dept: REHABILITATION | Facility: OTHER | Age: 78
End: 2023-10-27
Payer: MEDICARE

## 2023-10-27 DIAGNOSIS — R68.89 DECREASED ACTIVITY TOLERANCE: ICD-10-CM

## 2023-10-27 DIAGNOSIS — R29.898 DECREASED STRENGTH OF UPPER EXTREMITY: Primary | ICD-10-CM

## 2023-10-27 PROCEDURE — 97110 THERAPEUTIC EXERCISES: CPT | Mod: CQ

## 2023-10-27 NOTE — PROGRESS NOTES
[facility]  Physical Therapy Treatment Note     Name: Valarie Colindres  Clinic Number: [id number]    Therapy Diagnosis:   Encounter Diagnoses   Name Primary?    Decreased strength of upper extremity Yes    Decreased activity tolerance          Physician: Lindsay Reynolds NP    Visit Date: 10/27/2023    Physician Orders: PT Eval and Treat  Medical Diagnosis from Referral:   1. Dorsalgia    2. Spondylosis of lumbar region without myelopathy or radiculopathy    3. DDD (degenerative disc disease), lumbar       Evaluation Date: 2023  Authorization Period Expiration: 2024   Plan of Care Expiration: 2023  Reassessment Due: 2023  Visit # / Visits authorized:  (Lifting training NV)    PTA Visit #: 0/5     Time In: 9:05AM  Time Out: 10:00AM  Total Billable Time: 30 minutes  Total Treatment Time: 60 minutes  INSURANCE and OUTCOMES: Fee for Service with FOTO Outcomes 1/3    Precautions: standard Hypertension, Hyperlipidemia    Pattern of pain determined: 1 REP  Subjective     Valarie Colindres reports the pain has shifted to R shoulder and less in the neck now.    Patient reports tolerating previous visit well  Patient reports their pain to be  4 /10 on a 0-10 scale with 0 being no pain and 10 being the worst pain imaginable.  Pain Location: R neck      Occupation: retired in 2018  Leisure: Yoga, activities with people program (painting, calligraphy, piano lessons, ceramics, bible study, chess)    Pt goals: to decrease pain and to be more flexible and agile  Objective    Baseline Isometric Testing on Med X equipment: Testing administered by PT     Date of testin2023  ROM 36-81 deg   Max Peak Torque 139    Min Peak Torque 108    Flex/Ext Ratio 1.2:1   % below normative data 42      Midpoint Isometric Testing on Med X equipment: Testing administered by PT     Date of testing: 10/661295  ROM  deg   Max Peak Torque 217   Min Peak Torque 161  "  Flex/Ext Ratio 1.34:1   % below normative data 5%   % gain from initial 57%       Outcomes:  Intake Score: 26%  Visit 6 Score: 28%  Visit 10 Score: 12%  Discharge Score:  Goal Score: 15%   Treatment     Sedonia received the treatments listed below:      Sedonia participated in neuromuscular re-education activities to improve balance, coordination, proprioception, motor control and/or posture for 0 minutes. The following activities were included:    Cervical retraction in sitting 10x5"  Cervical retraction in sitting 10x5" with manual overpressure  Cervical retraction with extension 2x10 towel support at neck     Cues for flow and controlled movements and pacing with rest breaks between reps/sets    Sedonia participated in therapeutic exercises to develop strength, endurance, ROM, flexibility, and posture for 35 minutes including:    Half FR:Pec/positional release 1 min, shoulder horizontal abd w/RTB x15, SA punches w/5 lbs dumb bells  No money GTB 15x3" w/half FR at wall w/sustained chin tuck x10  Open books x10   Upper trap stretch 30" x2  Wall slides c/ elbows 10x5"        10/27/2023     9:12 AM   [facility]   Visit Number 11   VAS Pain Rating 4   Cervical Weight 129 lbs   Repetitions 20   Rating of Perceived Exertion 4        NP: Scap retracts 10x3"  Thoracic ext over FR 15x3"              Peripheral muscle strengthening which included one set of 15-20 repetitions at a slow and controlled 10-13 second per rep pace focused on strengthening supporting musculature in order to improve body mechanics and functional mobility. Patient and therapist focused on proper form during treatment to ensure optimal strengthening of each targeted muscle group.  Machines utilized include torso rotation, leg extension, leg curl, chest press, upright row. Tricep extension, bicep curl, leg press, and hip abduction added visit 3    Sedonia participated in dynamic functional therapeutic activities to improve " functional performance and simulate household and community activities for  minutes. The following activities were included:    Sedonia received manual therapy techniques for 0  minutes. The following activities were included:    Suboccipital release  Manual traction  Cervical CPA's  STM bilateral cervical paraspinals     Pt given cold pack for 5 minutes to cervical an thoracic spine in [place].    Patient Education and Home Exercises     Home exercises include:  Chin tucks, cervical extensions  + Upper trap stretch  + Open books    Cardio program (V5): - 10/6/23   Lifting education (V11): -  Posture/Lumbar roll: acquired  Fridge Magnet Discharge handout (date given): -  Equipment at home/gym membership: yes, has     Education provided:   - PT role and POC  - HEP    Written Home Exercises Provided: Patient instructed to cont prior HEP.  Exercises were reviewed and Valarie was able to demonstrate them prior to the end of the session.  Sedonia demonstrated good  understanding of the education provided.     See EMR under Patient Instructions for exercises provided prior visit.    Assessment   Due to patient late arrival and time constraints, unable to perform MASON and complete entirety of HEP. Good tolerance with progression of periscapular and postural stabilization exercises, provided GTB to progress HEP. Will resume progressing strengthening and stabilization ex nv. Patient able to perform 20 reps on c/s medx machine with an RPE of 4/10. Will continue to progress per HB protocol.         Patient is making good progress towards established goals  Pt will continue to benefit from skilled outpatient physical therapy to address the deficits stated in the impairment chart, provide pt/family education and to maximize pt's level of independence in the home and community environment.     Anticipated Barriers for therapy: none  Pt's spiritual, cultural and educational needs considered and pt agreeable to plan of care and  goals as stated below:     GOALS: Pt is in agreement with the following goals.     Short term goals:  6 weeks or 10 visits   - Pt will demonstrate increased lumbar ROM by at least 6 degrees from the initial ROM value with improvements noted in functional ROM and ability to perform ADLs. Met 10/19/2023  - Pt will demonstrate increased MedX average isometric strength value by 15% from initial test resulting in improved ability to perform bending, lifting, and carrying activities safely, confidently. MET  - Pt will report a reduction in worst pain score by 1-2 points for improved tolerance for putting arm behind back for bathing. MET  - Pt able to perform HEP correctly with minimal cueing or supervision from therapist to encourage independent management of symptoms. MET     Long term goals: 10 weeks or 20 visits   - Pt will demonstrate increased lumbar ROM by at least 12 degrees from initial ROM value, resulting in improved ability to perform functional forward bending while standing and sitting. Met 10/19/2023  - Pt will demonstrate increased MedX average isometric strength value by 30% from initial test resulting in improved ability to perform bending, lifting, and carrying activities safely and confidently. Appropriate and Ongoing  - Pt to demonstrate ability to independently control and reduce their pain through posture positioning and mechanical movements throughout a typical day. Appropriate and Ongoing  - Pt will demonstrate reduced pain and improved functional outcomes as reported on the Neck Disability Index by reaching a score of 15 or less in order to demonstrate subjective improvement in pt's condition.   Appropriate and Ongoing  - Pt will demonstrate independence with the HEP at discharge. Appropriate and Ongoing  - Pt able to carry water in the house without increase pain (patient goal) Appropriate and Ongoing  - Pt complete household chores without increased pain (patient goal) Appropriate and  Ongoing    Plan     Continue with established Plan of Care towards established PT goals.     Therapist: Jodie Garcia, PTA  10/27/2023

## 2023-10-31 ENCOUNTER — LAB VISIT (OUTPATIENT)
Dept: LAB | Facility: HOSPITAL | Age: 78
End: 2023-10-31
Attending: STUDENT IN AN ORGANIZED HEALTH CARE EDUCATION/TRAINING PROGRAM
Payer: MEDICARE

## 2023-10-31 ENCOUNTER — OFFICE VISIT (OUTPATIENT)
Dept: PRIMARY CARE CLINIC | Facility: CLINIC | Age: 78
End: 2023-10-31
Payer: MEDICARE

## 2023-10-31 VITALS
BODY MASS INDEX: 24.65 KG/M2 | WEIGHT: 144.38 LBS | SYSTOLIC BLOOD PRESSURE: 118 MMHG | HEART RATE: 60 BPM | DIASTOLIC BLOOD PRESSURE: 80 MMHG | OXYGEN SATURATION: 97 % | HEIGHT: 64 IN

## 2023-10-31 DIAGNOSIS — R73.03 PREDIABETES: ICD-10-CM

## 2023-10-31 DIAGNOSIS — K86.2 PANCREATIC CYST: ICD-10-CM

## 2023-10-31 DIAGNOSIS — K76.89 LIVER CYST: ICD-10-CM

## 2023-10-31 DIAGNOSIS — I10 PRIMARY HYPERTENSION: ICD-10-CM

## 2023-10-31 DIAGNOSIS — D56.3 THALASSEMIA TRAIT: ICD-10-CM

## 2023-10-31 DIAGNOSIS — E03.9 ACQUIRED HYPOTHYROIDISM: ICD-10-CM

## 2023-10-31 DIAGNOSIS — N81.11 CYSTOCELE, MIDLINE: ICD-10-CM

## 2023-10-31 DIAGNOSIS — E03.9 ACQUIRED HYPOTHYROIDISM: Primary | ICD-10-CM

## 2023-10-31 DIAGNOSIS — M85.88 OSTEOPENIA OF LUMBAR SPINE: ICD-10-CM

## 2023-10-31 DIAGNOSIS — D47.2 MGUS (MONOCLONAL GAMMOPATHY OF UNKNOWN SIGNIFICANCE): ICD-10-CM

## 2023-10-31 PROBLEM — R27.0 ATAXIA: Status: RESOLVED | Noted: 2021-11-05 | Resolved: 2023-10-31

## 2023-10-31 PROBLEM — R09.82 PND (POST-NASAL DRIP): Status: RESOLVED | Noted: 2021-11-17 | Resolved: 2023-10-31

## 2023-10-31 LAB
ESTIMATED AVG GLUCOSE: 137 MG/DL (ref 68–131)
HBA1C MFR BLD: 6.4 % (ref 4–5.6)
TSH SERPL DL<=0.005 MIU/L-ACNC: 1.14 UIU/ML (ref 0.4–4)

## 2023-10-31 PROCEDURE — 3079F PR MOST RECENT DIASTOLIC BLOOD PRESSURE 80-89 MM HG: ICD-10-PCS | Mod: CPTII,S$GLB,, | Performed by: STUDENT IN AN ORGANIZED HEALTH CARE EDUCATION/TRAINING PROGRAM

## 2023-10-31 PROCEDURE — 83036 HEMOGLOBIN GLYCOSYLATED A1C: CPT | Performed by: STUDENT IN AN ORGANIZED HEALTH CARE EDUCATION/TRAINING PROGRAM

## 2023-10-31 PROCEDURE — 3079F DIAST BP 80-89 MM HG: CPT | Mod: CPTII,S$GLB,, | Performed by: STUDENT IN AN ORGANIZED HEALTH CARE EDUCATION/TRAINING PROGRAM

## 2023-10-31 PROCEDURE — 84443 ASSAY THYROID STIM HORMONE: CPT | Performed by: STUDENT IN AN ORGANIZED HEALTH CARE EDUCATION/TRAINING PROGRAM

## 2023-10-31 PROCEDURE — 1101F PT FALLS ASSESS-DOCD LE1/YR: CPT | Mod: CPTII,S$GLB,, | Performed by: STUDENT IN AN ORGANIZED HEALTH CARE EDUCATION/TRAINING PROGRAM

## 2023-10-31 PROCEDURE — 3288F PR FALLS RISK ASSESSMENT DOCUMENTED: ICD-10-PCS | Mod: CPTII,S$GLB,, | Performed by: STUDENT IN AN ORGANIZED HEALTH CARE EDUCATION/TRAINING PROGRAM

## 2023-10-31 PROCEDURE — 1125F PR PAIN SEVERITY QUANTIFIED, PAIN PRESENT: ICD-10-PCS | Mod: CPTII,S$GLB,, | Performed by: STUDENT IN AN ORGANIZED HEALTH CARE EDUCATION/TRAINING PROGRAM

## 2023-10-31 PROCEDURE — 36415 COLL VENOUS BLD VENIPUNCTURE: CPT | Mod: PN | Performed by: STUDENT IN AN ORGANIZED HEALTH CARE EDUCATION/TRAINING PROGRAM

## 2023-10-31 PROCEDURE — 99999 PR PBB SHADOW E&M-EST. PATIENT-LVL V: CPT | Mod: PBBFAC,,, | Performed by: STUDENT IN AN ORGANIZED HEALTH CARE EDUCATION/TRAINING PROGRAM

## 2023-10-31 PROCEDURE — 1160F PR REVIEW ALL MEDS BY PRESCRIBER/CLIN PHARMACIST DOCUMENTED: ICD-10-PCS | Mod: CPTII,S$GLB,, | Performed by: STUDENT IN AN ORGANIZED HEALTH CARE EDUCATION/TRAINING PROGRAM

## 2023-10-31 PROCEDURE — 1160F RVW MEDS BY RX/DR IN RCRD: CPT | Mod: CPTII,S$GLB,, | Performed by: STUDENT IN AN ORGANIZED HEALTH CARE EDUCATION/TRAINING PROGRAM

## 2023-10-31 PROCEDURE — 99999 PR PBB SHADOW E&M-EST. PATIENT-LVL V: ICD-10-PCS | Mod: PBBFAC,,, | Performed by: STUDENT IN AN ORGANIZED HEALTH CARE EDUCATION/TRAINING PROGRAM

## 2023-10-31 PROCEDURE — 1159F PR MEDICATION LIST DOCUMENTED IN MEDICAL RECORD: ICD-10-PCS | Mod: CPTII,S$GLB,, | Performed by: STUDENT IN AN ORGANIZED HEALTH CARE EDUCATION/TRAINING PROGRAM

## 2023-10-31 PROCEDURE — 1101F PR PT FALLS ASSESS DOC 0-1 FALLS W/OUT INJ PAST YR: ICD-10-PCS | Mod: CPTII,S$GLB,, | Performed by: STUDENT IN AN ORGANIZED HEALTH CARE EDUCATION/TRAINING PROGRAM

## 2023-10-31 PROCEDURE — 3074F SYST BP LT 130 MM HG: CPT | Mod: CPTII,S$GLB,, | Performed by: STUDENT IN AN ORGANIZED HEALTH CARE EDUCATION/TRAINING PROGRAM

## 2023-10-31 PROCEDURE — 3288F FALL RISK ASSESSMENT DOCD: CPT | Mod: CPTII,S$GLB,, | Performed by: STUDENT IN AN ORGANIZED HEALTH CARE EDUCATION/TRAINING PROGRAM

## 2023-10-31 PROCEDURE — 99214 OFFICE O/P EST MOD 30 MIN: CPT | Mod: S$GLB,,, | Performed by: STUDENT IN AN ORGANIZED HEALTH CARE EDUCATION/TRAINING PROGRAM

## 2023-10-31 PROCEDURE — 3074F PR MOST RECENT SYSTOLIC BLOOD PRESSURE < 130 MM HG: ICD-10-PCS | Mod: CPTII,S$GLB,, | Performed by: STUDENT IN AN ORGANIZED HEALTH CARE EDUCATION/TRAINING PROGRAM

## 2023-10-31 PROCEDURE — 1125F AMNT PAIN NOTED PAIN PRSNT: CPT | Mod: CPTII,S$GLB,, | Performed by: STUDENT IN AN ORGANIZED HEALTH CARE EDUCATION/TRAINING PROGRAM

## 2023-10-31 PROCEDURE — 99214 PR OFFICE/OUTPT VISIT, EST, LEVL IV, 30-39 MIN: ICD-10-PCS | Mod: S$GLB,,, | Performed by: STUDENT IN AN ORGANIZED HEALTH CARE EDUCATION/TRAINING PROGRAM

## 2023-10-31 PROCEDURE — 1159F MED LIST DOCD IN RCRD: CPT | Mod: CPTII,S$GLB,, | Performed by: STUDENT IN AN ORGANIZED HEALTH CARE EDUCATION/TRAINING PROGRAM

## 2023-10-31 NOTE — PROGRESS NOTES
Valarie Colindres  1945        Subjective     Chief Complaint: F/u    History of Present Illness:  Ms. Valarie Colindres is a 78 y.o. female who presents to clinic for f/u.     MGUS/thalassemia trait- recently saw Dr. Kirk. Plan for repeat labs yearly.     Hypothyroidism- last TSH slightly high at 5.68 with GI. On Synthroid 100 mcg.   Lab Results   Component Value Date    TSH 5.868 (H) 09/06/2023        Seeing GI, plan for endoscopy (upper and lower) in December. Has pancreatic cyst and liver cyst. [facility] appt scheduled as well. Plan for repeat imaging ordered per specialists.    Prediabetes-  Lab Results   Component Value Date    HGBA1C 5.9 (H) 06/11/2022      Seeing urogyn.   Had UA with 1+ blood. Following with Urology.    Review of Systems   Constitutional:  Negative for chills and fever.   Respiratory:  Negative for cough.    Cardiovascular:  Negative for leg swelling.   Gastrointestinal:  Negative for nausea and vomiting.   Neurological:  Negative for sensory change, speech change and focal weakness.   Psychiatric/Behavioral:  The patient is not nervous/anxious.         PAST HISTORY:     Past Medical History:   Diagnosis Date    Angle recession of right eye     Blunt trauma of both eyes     hit across eyes with bungee exercise band    Cataract     Cystocele, midline     Dry eye syndrome     Ectropion due to laxity of eyelid, right     GERD (gastroesophageal reflux disease)     Hyperlipidemia     Hypertension     PVD (posterior vitreous detachment), right eye     Rectocele     Retinal drusen of both eyes     Thalassemia major     Vaginal atrophy        Past Surgical History:   Procedure Laterality Date    ABDOMINAL ADHESION SURGERY  1978    ANKLE FRACTURE SURGERY Right 1996    COLONOSCOPY  11/09/2022    ECTROPION REPAIR Bilateral 06/2018    Dr. Flaherty    HYSTERECTOMY  1977    possible cancerous lesion       Family History   Problem Relation Age of Onset    Stroke Mother     Heart disease Mother     Prostate  cancer Father     Colon cancer Father     Prostate cancer Brother     Prostate cancer Brother     Glaucoma Maternal Uncle     Blindness Maternal Uncle     Breast cancer Maternal Aunt     Cataracts Neg Hx     Macular degeneration Neg Hx        Social History     Socioeconomic History    Marital status:    Occupational History     Comment: Retired in 2018 -    Tobacco Use    Smoking status: Never    Smokeless tobacco: Never   Substance and Sexual Activity    Alcohol use: Not Currently    Drug use: No    Sexual activity: Not Currently     Social Determinants of Health     Financial Resource Strain: Low Risk  (11/18/2022)    Overall Financial Resource Strain (CARDIA)     Difficulty of Paying Living Expenses: Not hard at all   Food Insecurity: No Food Insecurity (11/18/2022)    Hunger Vital Sign     Worried About Running Out of Food in the Last Year: Never true     Ran Out of Food in the Last Year: Never true   Transportation Needs: No Transportation Needs (11/18/2022)    PRAPARE - Transportation     Lack of Transportation (Medical): No     Lack of Transportation (Non-Medical): No   Physical Activity: Insufficiently Active (11/18/2022)    Exercise Vital Sign     Days of Exercise per Week: 2 days     Minutes of Exercise per Session: 60 min   Stress: No Stress Concern Present (11/18/2022)    [place] [employer] - Occupational Stress Questionnaire     Feeling of Stress : Not at all   Social Connections: Moderately Isolated (11/18/2022)    Social Connection and Isolation Panel [NHANES]     Frequency of Communication with Friends and Family: Once a week     Frequency of Social Gatherings with Friends and Family: More than three times a week     Attends Sabianism Services: More than 4 times per year     Active Member of Clubs or Organizations: No     Attends Club or Organization Meetings: Never     Marital Status:    Housing Stability: Low Risk  (11/18/2022)    Housing  Stability Vital Sign     Unable to Pay for Housing in the Last Year: No     Number of Places Lived in the Last Year: 1     Unstable Housing in the Last Year: No       MEDICATIONS & ALLERGIES:     Current Outpatient Medications on File Prior to Visit   Medication Sig    acetaminophen (TYLENOL) 500 MG tablet Take 1-2 tablets (500-1,000 mg total) by mouth 3 (three) times daily as needed for Pain.    alendronate (FOSAMAX) 70 MG tablet TK 1 T PO EVERY WEEK    amLODIPine (NORVASC) 5 MG tablet     aspirin (ECOTRIN) 81 MG EC tablet Take 1 tablet by mouth once daily.    atorvastatin (LIPITOR) 10 MG tablet Take 1 tablet (10 mg total) by mouth every evening.    azelastine (ASTELIN) 137 mcg (0.1 %) nasal spray Two sprays in each nostril, sniff until absorbed, then follow with 1 spray of fluticasone.  Use both sprays twice daily.    chlorhexidine (PERIDEX) 0.12 % solution RINSE AND SWISH ONE CAPFUL BID    chlorzoxazone (PARAFON FORTE) 500 mg Tab TAKE 1 TABLET(S) 3 TIMES A DAY BY ORAL ROUTE AS NEEDED.    clobetasoL (TEMOVATE) 0.05 % external solution     diazePAM (VALIUM) 5 MG tablet     diclofenac sodium (VOLTAREN) 1 % Gel Apply 2 g topically 4 (four) times daily.    famotidine (PEPCID) 20 MG tablet TAKE 1 TABLET BY MOUTH TWICE A DAY    fluticasone propionate (FLONASE) 50 mcg/actuation nasal spray 2 sprays (100 mcg total) by Each Nostril route once daily.    hydroCHLOROthiazide (HYDRODIURIL) 12.5 MG Tab Take 1 tablet (12.5 mg total) by mouth once daily.    levothyroxine (SYNTHROID) 100 MCG tablet Take 1 tablet (100 mcg total) by mouth once daily.    LINZESS 145 mcg Cap capsule TAKE 1 CAPSULE BY MOUTH BEFORE BREAKFAST    loratadine (CLARITIN) 10 mg tablet Take 1 tablet (10 mg total) by mouth once daily.    pantoprazole (PROTONIX) 40 MG tablet Take 40 mg by mouth every morning.    polyethylene glycol (GLYCOLAX) 17 gram PwPk Take 17 g by mouth 2 (two) times daily as needed (constiptation).    valsartan (DIOVAN) 320 MG tablet Take 1  "tablet (320 mg total) by mouth once daily.     No current facility-administered medications on file prior to visit.       Review of patient's allergies indicates:   Allergen Reactions    Sutures, catgut (chromic)        OBJECTIVE:     Vital Signs:  Vitals:    10/31/23 0810   BP: 118/80   BP Location: Right arm   Patient Position: Sitting   BP Method: Medium (Manual)   Pulse: 60   SpO2: 97%   Weight: 65.5 kg (144 lb 6.4 oz)   Height: 5' 4" (1.626 m)       Body mass index is 24.79 kg/m².     Physical Exam:  Physical Exam  Vitals and nursing note reviewed.   Constitutional:       General: She is not in acute distress.     Appearance: Normal appearance. She is not ill-appearing, toxic-appearing or diaphoretic.   HENT:      Head: Normocephalic and atraumatic.   Eyes:      General: No scleral icterus.     Conjunctiva/sclera: Conjunctivae normal.   Pulmonary:      Effort: Pulmonary effort is normal. No respiratory distress.   Neurological:      Mental Status: She is alert and oriented to person, place, and time. Mental status is at baseline.   Psychiatric:         Mood and Affect: Mood normal.         Behavior: Behavior normal.            Laboratory  Lab Results   Component Value Date    WBC 6.31 10/02/2023    HGB 10.6 (L) 10/02/2023    HCT 35.4 (L) 10/02/2023    MCV 62 (L) 10/02/2023     10/02/2023     Lab Results   Component Value Date    GLU 90 10/02/2023     10/02/2023    K 3.5 10/02/2023     10/02/2023    CO2 30 (H) 10/02/2023    BUN 14 10/02/2023    CREATININE 0.9 10/02/2023    CALCIUM 9.3 10/02/2023    MG 2.3 10/15/2020     No results found for: "INR", "PROTIME"  Lab Results   Component Value Date    HGBA1C 5.9 (H) 06/11/2022           Health Maintenance         Date Due Completion Date    RSV Vaccine (Age 60+) (1 - 1-dose 60+ series) Never done ---    Hemoglobin A1c (Prediabetes) 06/11/2023 6/11/2022    COVID-19 Vaccine (8 - 2023-24 season) 09/01/2023 12/1/2022    DEXA Scan 12/10/2024 12/10/2021 "    Colonoscopy 11/09/2027 11/9/2022    Lipid Panel 08/15/2028 8/15/2023    TETANUS VACCINE 09/02/2032 9/2/2022    Override on 1/1/2004: Done            ASSESSMENT & PLAN:   Ms. Valarie Colindres is a 78 y.o. female who was seen today in clinic for f/u.       1. Acquired hypothyroidism  -     TSH; Future; Expected date: 10/31/2023    2. MGUS (monoclonal gammopathy of unknown significance)    3. Thalassemia trait    4. Primary hypertension    5. Osteopenia of lumbar spine    6. Prediabetes  -     HEMOGLOBIN A1C; Future; Expected date: 10/31/2023    7. Pancreatic cyst    8. Liver cyst    9. Cystocele, midline             Sirena Renner MD  Internal Medicine         Portions of this note may have been generated using voice recognition software.  Please excuse any spelling/grammatical errors. Occasional wrong-word or sound-a-like substitutions may have also occurred due to the inherent limitations of voice recognition software. Please read the chart carefully and recognize, using context, where substitutions have occurred.

## 2023-11-01 ENCOUNTER — CLINICAL SUPPORT (OUTPATIENT)
Dept: REHABILITATION | Facility: OTHER | Age: 78
End: 2023-11-01
Payer: MEDICARE

## 2023-11-01 DIAGNOSIS — R68.89 DECREASED ACTIVITY TOLERANCE: ICD-10-CM

## 2023-11-01 DIAGNOSIS — R29.898 DECREASED STRENGTH OF UPPER EXTREMITY: Primary | ICD-10-CM

## 2023-11-01 PROCEDURE — 97140 MANUAL THERAPY 1/> REGIONS: CPT | Mod: CQ

## 2023-11-01 PROCEDURE — 97110 THERAPEUTIC EXERCISES: CPT | Mod: CQ

## 2023-11-01 NOTE — PROGRESS NOTES
[facility]  Physical Therapy Treatment Note     Name: Valarie Colindres  Clinic Number: [id number]    Therapy Diagnosis:   Encounter Diagnoses   Name Primary?    Decreased strength of upper extremity Yes    Decreased activity tolerance            Physician: Lindsay Reynolds NP    Visit Date: 2023    Physician Orders: PT Eval and Treat  Medical Diagnosis from Referral:   1. Dorsalgia    2. Spondylosis of lumbar region without myelopathy or radiculopathy    3. DDD (degenerative disc disease), lumbar       Evaluation Date: 2023  Authorization Period Expiration: 2024   Plan of Care Expiration: 2023  Reassessment Due: 2023  Visit # / Visits authorized:      PTA Visit #: 0/5     Time In: 2:00PM  Time Out: 3:00M  Total Billable Time: 30 minutes  Total Treatment Time: 60 minutes  INSURANCE and OUTCOMES: Fee for Service with FOTO Outcomes 1/3    Precautions: standard Hypertension, Hyperlipidemia    Pattern of pain determined: 1 REP  Subjective     Valarie Colindres Pain continues to be more in R shoulder than neck where it used to be. Consistent with exercises throughout the day rather than performing a session all at once to accommodate for busy lifestyle.    Patient reports tolerating previous visit well  Patient reports their pain to be  4 /10 on a 0-10 scale with 0 being no pain and 10 being the worst pain imaginable.  Pain Location: R neck      Occupation: retired in 2018  Leisure: Yoga, activities with people program (painting, calligraphy, piano lessons, ceramics, bible study, chess)    Pt goals: to decrease pain and to be more flexible and agile  Objective    Baseline Isometric Testing on Med X equipment: Testing administered by PT     Date of testin2023  ROM 36-81 deg   Max Peak Torque 139    Min Peak Torque 108    Flex/Ext Ratio 1.2:1   % below normative data 42      Midpoint Isometric Testing on Med X equipment: Testing  "administered by PT     Date of testing: 10/464464  ROM  deg   Max Peak Torque 217   Min Peak Torque 161   Flex/Ext Ratio 1.34:1   % below normative data 5%   % gain from initial 57%       Outcomes:  Intake Score: 26%  Visit 6 Score: 28%  Visit 10 Score: 12%  Discharge Score:  Goal Score: 15%   Treatment     [place] received the treatments listed below:      [place] participated in neuromuscular re-education activities to improve balance, coordination, proprioception, motor control and/or posture for 0 minutes. The following activities were included:    Cervical retraction in sitting 10x5"  Cervical retraction in sitting 10x5" with manual overpressure  Cervical retraction with extension 2x10 towel support at neck     Cues for flow and controlled movements and pacing with rest breaks between reps/sets    [place] participated in therapeutic exercises to develop strength, endurance, ROM, flexibility, and posture for 35 minutes including:    Half FR:Pec/positional release 1 min, shoulder horizontal abd w/RTB x15, SA punches w/5 lbs dumb bells  No money GTB 15x3" w/half FR at wall w/sustained chin tuck x10  Open books x10   Upper trap stretch 30" x2  Wall slides c/ elbows 10x5"        11/1/2023     2:30 PM   [facility] - [facility]   Visit Number 12   VAS Pain Rating 4   Cervical Weight 138 lbs   Repetitions 20   Rating of Perceived Exertion 3             NP: Scap retracts 10x3"  Thoracic ext over FR 15x3"    Peripheral muscle strengthening which included one set of 15-20 repetitions at a slow and controlled 10-13 second per rep pace focused on strengthening supporting musculature in order to improve body mechanics and functional mobility. Patient and therapist focused on proper form during treatment to ensure optimal strengthening of each targeted muscle group.  Machines utilized include torso rotation, leg extension, leg curl, chest press, upright row. Tricep extension, bicep curl, leg press, and hip abduction " added visit 3    [place] participated in dynamic functional therapeutic activities to improve functional performance and simulate household and community activities for  minutes. The following activities were included:    [place] received manual therapy techniques for 10  minutes. The following activities were included:    Suboccipital release  Manual traction  Cervical CPA's  STM R LS/UT, cervical paraspinals     Pt given cold pack for 5 minutes to cervical an thoracic spine in seated    Patient Education and Home Exercises     Home exercises include:  Chin tucks, cervical extensions  + Upper trap stretch  + Open books    Cardio program (V5): - 10/6/23   Lifting education (V11): -11/1/23  Posture/Lumbar roll: acquired  [employer] Discharge handout (date given): -  Equipment at home/gym membership: yes, has     Education provided:   - PT role and POC  - HEP    Written Home Exercises Provided: Patient instructed to cont prior HEP.  Exercises were reviewed and Valarie was able to demonstrate them prior to the end of the session.  [place] demonstrated good  understanding of the education provided.     See EMR under Patient Instructions for exercises provided prior visit.    Assessment   Emphasis on manual work this visit to address lingering pain that hasn't subsided with stretches/exercises, monitor for effectiveness NV. Educated on use of tennis ball to perform self mfr to address muscular restriction in cervical paraspinals. Due to time constraints unable to perform both manual work and stretches/periscapular stabilization, will resume NV. Discussed proper lifting technique with cues to increase hip hinge and avoid twisting, but able to demonstrate properly with instruction. Patient able to perform 20 reps on c/s medx machine with an RPE of 3/10. Will continue to progress per HB protocol.       Patient is making good progress towards established goals  Pt will continue to benefit from skilled outpatient  physical therapy to address the deficits stated in the impairment chart, provide pt/family education and to maximize pt's level of independence in the home and community environment.     Anticipated Barriers for therapy: none  Pt's spiritual, cultural and educational needs considered and pt agreeable to plan of care and goals as stated below:     GOALS: Pt is in agreement with the following goals.     Short term goals:  6 weeks or 10 visits   - Pt will demonstrate increased lumbar ROM by at least 6 degrees from the initial ROM value with improvements noted in functional ROM and ability to perform ADLs. Met 10/19/2023  - Pt will demonstrate increased MedX average isometric strength value by 15% from initial test resulting in improved ability to perform bending, lifting, and carrying activities safely, confidently. MET  - Pt will report a reduction in worst pain score by 1-2 points for improved tolerance for putting arm behind back for bathing. MET  - Pt able to perform HEP correctly with minimal cueing or supervision from therapist to encourage independent management of symptoms. MET     Long term goals: 10 weeks or 20 visits   - Pt will demonstrate increased lumbar ROM by at least 12 degrees from initial ROM value, resulting in improved ability to perform functional forward bending while standing and sitting. Met 10/19/2023  - Pt will demonstrate increased MedX average isometric strength value by 30% from initial test resulting in improved ability to perform bending, lifting, and carrying activities safely and confidently. Appropriate and Ongoing  - Pt to demonstrate ability to independently control and reduce their pain through posture positioning and mechanical movements throughout a typical day. Appropriate and Ongoing  - Pt will demonstrate reduced pain and improved functional outcomes as reported on the Neck Disability Index by reaching a score of 15 or less in order to demonstrate subjective improvement in  pt's condition.   Appropriate and Ongoing  - Pt will demonstrate independence with the HEP at discharge. Appropriate and Ongoing  - Pt able to carry water in the house without increase pain (patient goal) Appropriate and Ongoing  - Pt complete household chores without increased pain (patient goal) Appropriate and Ongoing    Plan     Continue with established Plan of Care towards established PT goals.     Therapist: Jodie Garcia, PTA  11/1/2023

## 2023-11-03 ENCOUNTER — CLINICAL SUPPORT (OUTPATIENT)
Dept: REHABILITATION | Facility: OTHER | Age: 78
End: 2023-11-03
Payer: MEDICARE

## 2023-11-03 DIAGNOSIS — R68.89 DECREASED ACTIVITY TOLERANCE: ICD-10-CM

## 2023-11-03 DIAGNOSIS — R29.898 DECREASED STRENGTH OF UPPER EXTREMITY: Primary | ICD-10-CM

## 2023-11-03 PROCEDURE — 97112 NEUROMUSCULAR REEDUCATION: CPT | Mod: CQ

## 2023-11-03 PROCEDURE — 97110 THERAPEUTIC EXERCISES: CPT | Mod: CQ

## 2023-11-03 NOTE — PROGRESS NOTES
[facility]  Physical Therapy Treatment Note     Name: Valarie Colindres  Clinic Number: [id number]    Therapy Diagnosis:   Encounter Diagnoses   Name Primary?    Decreased strength of upper extremity Yes    Decreased activity tolerance          Physician: Lindsay Reynolds NP    Visit Date: 11/3/2023    Physician Orders: PT Eval and Treat  Medical Diagnosis from Referral:   1. Dorsalgia    2. Spondylosis of lumbar region without myelopathy or radiculopathy    3. DDD (degenerative disc disease), lumbar       Evaluation Date: 2023  Authorization Period Expiration: 2024   Plan of Care Expiration: 2023  Reassessment Due: 2023  Visit # / Visits authorized:      PTA Visit #: 3     Time In: 913 AM  Time Out: 115 AM  Total Billable Time: 40 minutes  Total Treatment Time: 58 minutes  INSURANCE and OUTCOMES: Fee for Service with FOTO Outcomes 1/3    Precautions: standard Hypertension, Hyperlipidemia    Pattern of pain determined: 1 REP  Subjective     Valarie Colindres She has pain now in lateral shoulder. It feels like it's moving down from her neck to her shoulder. She doesn't feel like she has time to block out a designated time slot for exercises but she does them throughout the day.     Patient reports tolerating previous visit well  Patient reports their pain to be  5 /10 on a 0-10 scale with 0 being no pain and 10 being the worst pain imaginable.  Pain Location: R neck      Occupation: retired in 2018  Leisure: Yoga, activities with people program (painting, calligraphy, piano lessons, ceramics, bible study, chess)    Pt goals: to decrease pain and to be more flexible and agile  Objective    Baseline Isometric Testing on Med X equipment: Testing administered by PT     Date of testin2023  ROM 36-81 deg   Max Peak Torque 139    Min Peak Torque 108    Flex/Ext Ratio 1.2:1   % below normative data 42      Midpoint Isometric Testing on Med  "X equipment: Testing administered by PT     Date of testing: 10/281873  ROM  deg   Max Peak Torque 217   Min Peak Torque 161   Flex/Ext Ratio 1.34:1   % below normative data 5%   % gain from initial 57%       Outcomes:  Intake Score: 26%  Visit 6 Score: 28%  Visit 10 Score: 12%  Discharge Score:  Goal Score: 15%   Treatment     [place] received the treatments listed below:      [place] participated in neuromuscular re-education activities to improve balance, coordination, proprioception, motor control and/or posture for 10 minutes. The following activities were included:    Cervical retraction in sitting 10x5"  Cervical retraction in sitting 10x5" with manual overpressure  Cervical retraction with extension 2x10 towel support at neck     Cues for flow and controlled movements and pacing with rest breaks between reps/sets    [place] participated in therapeutic exercises to develop strength, endurance, ROM, flexibility, and posture for 48 minutes including:    Half FR:Pec/positional release 1 min, shoulder horizontal abd w/RTB x15, SA punches w/5 lbs dumb bells x 15          11/3/2023     9:17 AM   [facility]   Visit Number 13   VAS Pain Rating 5   Retraction in Sitting 20   Retraction with Extension 20   Scapular Retraction 15   Cervical Weight 145 lbs   Repetitions 15   Rating of Perceived Exertion 3   Ice - Z Lie (in min.) 5                 Not performed 11/3/2023 : 2/2 to pt arriving late  Scap retracts 10x3"  Thoracic ext over FR 15x3"  Upper trap stretch 30" x2  Wall slides c/ elbows 10x5"  No money GTB 15x3" w/half FR at wall w/sustained chin tuck x10  Open books x10     Peripheral muscle strengthening which included one set of 15-20 repetitions at a slow and controlled 10-13 second per rep pace focused on strengthening supporting musculature in order to improve body mechanics and functional mobility. Patient and therapist focused on proper form during treatment to ensure optimal strengthening of " each targeted muscle group.  Machines utilized include torso rotation, leg extension, leg curl, chest press, upright row. Tricep extension, bicep curl, leg press, and hip abduction added visit 3    [place] participated in dynamic functional therapeutic activities to improve functional performance and simulate household and community activities for  minutes. The following activities were included:    [place] received manual therapy techniques for 00  minutes. The following activities were included:    Suboccipital release  Manual traction  Cervical CPA's  STM R LS/UT, cervical paraspinals     Pt given cold pack for 5 minutes to cervical an thoracic spine in seated    Patient Education and Home Exercises     Home exercises include:  Chin tucks, cervical extensions  + Upper trap stretch  + Open books    Cardio program (V5): - 10/6/23   Lifting education (V11): -11/1/23  Posture/Lumbar roll: acquired  [employer] Discharge handout (date given): -  Equipment at home/gym membership: yes, has     Education provided:   - PT role and POC  - HEP    Written Home Exercises Provided: Patient instructed to cont prior HEP.  Exercises were reviewed and Valarie was able to demonstrate them prior to the end of the session.  Valarie demonstrated good  understanding of the education provided.     See EMR under Patient Instructions for exercises provided prior visit.    Assessment   Emphasis on manual work this visit to address lingering pain that hasn't subsided with stretches/exercises, isolated to R lateral shoulder. Session limited to pts late arrival. MedX was performed at 145ft lbs with 15 reps performed at an exertion rating of 3/10. No issues with Matrix peripherals.         Patient is making good progress towards established goals  Pt will continue to benefit from skilled outpatient physical therapy to address the deficits stated in the impairment chart, provide pt/family education and to maximize pt's level of  independence in the home and community environment.     Anticipated Barriers for therapy: none  Pt's spiritual, cultural and educational needs considered and pt agreeable to plan of care and goals as stated below:     GOALS: Pt is in agreement with the following goals.     Short term goals:  6 weeks or 10 visits   - Pt will demonstrate increased lumbar ROM by at least 6 degrees from the initial ROM value with improvements noted in functional ROM and ability to perform ADLs. Met 10/19/2023  - Pt will demonstrate increased MedX average isometric strength value by 15% from initial test resulting in improved ability to perform bending, lifting, and carrying activities safely, confidently. MET  - Pt will report a reduction in worst pain score by 1-2 points for improved tolerance for putting arm behind back for bathing. MET  - Pt able to perform HEP correctly with minimal cueing or supervision from therapist to encourage independent management of symptoms. MET     Long term goals: 10 weeks or 20 visits   - Pt will demonstrate increased lumbar ROM by at least 12 degrees from initial ROM value, resulting in improved ability to perform functional forward bending while standing and sitting. Met 10/19/2023  - Pt will demonstrate increased MedX average isometric strength value by 30% from initial test resulting in improved ability to perform bending, lifting, and carrying activities safely and confidently. Appropriate and Ongoing  - Pt to demonstrate ability to independently control and reduce their pain through posture positioning and mechanical movements throughout a typical day. Appropriate and Ongoing  - Pt will demonstrate reduced pain and improved functional outcomes as reported on the Neck Disability Index by reaching a score of 15 or less in order to demonstrate subjective improvement in pt's condition.   Appropriate and Ongoing  - Pt will demonstrate independence with the HEP at discharge. Appropriate and Ongoing  - Pt  able to carry water in the house without increase pain (patient goal) Appropriate and Ongoing  - Pt complete household chores without increased pain (patient goal) Appropriate and Ongoing    Plan     Continue with established Plan of Care towards established PT goals.     Therapist: Shiva Martinez, PTA  11/3/2023

## 2023-11-06 ENCOUNTER — CLINICAL SUPPORT (OUTPATIENT)
Dept: REHABILITATION | Facility: OTHER | Age: 78
End: 2023-11-06
Payer: MEDICARE

## 2023-11-06 DIAGNOSIS — R29.898 DECREASED STRENGTH OF UPPER EXTREMITY: Primary | ICD-10-CM

## 2023-11-06 DIAGNOSIS — R68.89 DECREASED ACTIVITY TOLERANCE: ICD-10-CM

## 2023-11-06 PROCEDURE — 97110 THERAPEUTIC EXERCISES: CPT

## 2023-11-06 PROCEDURE — 97112 NEUROMUSCULAR REEDUCATION: CPT

## 2023-11-06 NOTE — PROGRESS NOTES
[facility]  Physical Therapy Treatment Note     Name: Valarie Colindres  Clinic Number: [id number]    Therapy Diagnosis:   No diagnosis found.    Physician: Lindsay Reynolds NP    Visit Date: 2023    Physician Orders: PT Eval and Treat  Medical Diagnosis from Referral:   1. Dorsalgia    2. Spondylosis of lumbar region without myelopathy or radiculopathy    3. DDD (degenerative disc disease), lumbar       Evaluation Date: 2023  Authorization Period Expiration: 2024   Plan of Care Expiration: 2023  Reassessment Due: 2023  Visit # / Visits authorized:      PTA Visit #: 3/5     Time In: 1:00 PM  Time Out: 2:02 PM  Total Billable Time: 30 minutes  Total Treatment Time: 53 minutes  INSURANCE and OUTCOMES: Fee for Service with FOTO Outcomes 1/3    Precautions: standard Hypertension, Hyperlipidemia    Pattern of pain determined: 1 REP  Subjective     Valarie Colindres reports that her right upper trapezius and right shoulder are a little painful. She thinks that the recent computer work she did may have contributed. She says that she hasn't done her exercises much since last visit due to life getting in the way.     Patient reports tolerating previous visit well  Patient reports their pain to be  4 /10 on a 0-10 scale with 0 being no pain and 10 being the worst pain imaginable.  Pain Location: R neck      Occupation: retired in 2018  Leisure: Yoga, activities with people program (painting, calligraphy, piano lessons, ceramics, bible study, chess)    Pt goals: to decrease pain and to be more flexible and agile  Objective    Baseline Isometric Testing on Med X equipment: Testing administered by PT     Date of testin2023  ROM 36-81 deg   Max Peak Torque 139    Min Peak Torque 108    Flex/Ext Ratio 1.2:1   % below normative data 42      Midpoint Isometric Testing on Med X equipment: Testing administered by PT     Date of testing:  "[id number]  ROM  deg   Max Peak Torque 217   Min Peak Torque 161   Flex/Ext Ratio 1.34:1   % below normative data 5%   % gain from initial 57%       Outcomes:  Intake Score: 26%  Visit 6 Score: 28%  Visit 10 Score: 12%  Discharge Score:  Goal Score: 15%   Treatment     S[place] received the treatments listed below:      [place] participated in neuromuscular re-education activities to improve balance, coordination, proprioception, motor control and/or posture for 12 minutes. The following activities were included:    Cervical retraction in sitting 10x5"  Cervical retraction in sitting 10x5" with manual overpressure  Cervical retraction with extension 2x10 towel support at neck     Cues for flow and controlled movements and pacing with rest breaks between reps/sets    [place] participated in therapeutic exercises to develop strength, endurance, ROM, flexibility, and posture for 43 minutes including:    Half FR:Pec/positional release 1 min  Shoulder horizontal abd w/BTB x15  SA punches w/5 lbs dumb bells x 15  + Levator scapulae stretch x 1 x 30"  Upper trap stretch 30" x2        11/6/2023     1:04 PM   [facility]   Visit Number 14   VAS Pain Rating 4   Time 5   Retraction in Sitting 20   Retraction with Extension 20   Sidebending 1   Rotation 1   Cervical Weight 144 lbs   Repetitions 18   Rating of Perceived Exertion 3   Ice - Z Lie (in min.) 5         Not performed 11/6/2023 : 2/2 to pt arriving late  Scap retracts 10x3"  Thoracic ext over FR 15x3"  Wall slides c/ elbows 10x5"  No money GTB 15x3" w/half FR at wall w/sustained chin tuck x10  Open books x10     Peripheral muscle strengthening which included one set of 15-20 repetitions at a slow and controlled 10-13 second per rep pace focused on strengthening supporting musculature in order to improve body mechanics and functional mobility. Patient and therapist focused on proper form during treatment to ensure optimal strengthening of each targeted " muscle group.  Machines utilized include torso rotation, leg extension, leg curl, chest press, upright row. Tricep extension, bicep curl, leg press, and hip abduction added visit 3    [place] participated in dynamic functional therapeutic activities to improve functional performance and simulate household and community activities for  minutes. The following activities were included:    [place] received manual therapy techniques for 00 minutes. The following activities were included:    NP: Suboccipital release  Manual traction  Cervical CPA's  STM R LS/UT, cervical paraspinals     Pt given cold pack for 5 minutes to cervical an thoracic spine in seated    Patient Education and Home Exercises     Home exercises include:  Chin tucks, cervical extensions  Upper trap stretch  Open books    Cardio program (V5): - 10/6/23   Lifting education (V11): -11/1/23  Posture/Lumbar roll: acquired  Fridge Magnet Discharge handout (date given): -  Equipment at home/gym membership: yes, has     Education provided:   - PT role and POC  - HEP    Written Home Exercises Provided: Patient instructed to cont prior HEP.  Exercises were reviewed and Valarie was able to demonstrate them prior to the end of the session.  Valarie demonstrated good  understanding of the education provided.     See EMR under Patient Instructions for exercises provided prior visit.    Assessment     Patient reporting poor HEP adherence due to recent life circumstances. While pain level reported is low, patient's neural tension remains moderately irritable with symptoms into right shoulder. Introduced levator scapulae muscle lengthening. Patient continued with cervical MedX training at previous weight of 144 ft. Lbs. And patient was able to complete 18 reps with an RPE of 3/10. Patient completed entire peripheral strength circuit without complaint. Continue to progress patient through [facility] protocol per patient's tolerance.     Patient is making good  progress towards established goals  Pt will continue to benefit from skilled outpatient physical therapy to address the deficits stated in the impairment chart, provide pt/family education and to maximize pt's level of independence in the home and community environment.     Anticipated Barriers for therapy: none  Pt's spiritual, cultural and educational needs considered and pt agreeable to plan of care and goals as stated below:     GOALS: Pt is in agreement with the following goals.     Short term goals:  6 weeks or 10 visits   - Pt will demonstrate increased lumbar ROM by at least 6 degrees from the initial ROM value with improvements noted in functional ROM and ability to perform ADLs. Met 10/19/2023  - Pt will demonstrate increased MedX average isometric strength value by 15% from initial test resulting in improved ability to perform bending, lifting, and carrying activities safely, confidently. MET  - Pt will report a reduction in worst pain score by 1-2 points for improved tolerance for putting arm behind back for bathing. MET  - Pt able to perform HEP correctly with minimal cueing or supervision from therapist to encourage independent management of symptoms. MET     Long term goals: 10 weeks or 20 visits   - Pt will demonstrate increased lumbar ROM by at least 12 degrees from initial ROM value, resulting in improved ability to perform functional forward bending while standing and sitting. Met 10/19/2023  - Pt will demonstrate increased MedX average isometric strength value by 30% from initial test resulting in improved ability to perform bending, lifting, and carrying activities safely and confidently. Appropriate and Ongoing  - Pt to demonstrate ability to independently control and reduce their pain through posture positioning and mechanical movements throughout a typical day. Appropriate and Ongoing  - Pt will demonstrate reduced pain and improved functional outcomes as reported on the Neck Disability Index  by reaching a score of 15 or less in order to demonstrate subjective improvement in pt's condition.   Appropriate and Ongoing  - Pt will demonstrate independence with the HEP at discharge. Appropriate and Ongoing  - Pt able to carry water in the house without increase pain (patient goal) Appropriate and Ongoing  - Pt complete household chores without increased pain (patient goal) Appropriate and Ongoing    Plan     Continue with established Plan of Care towards established PT goals.     Therapist: Leydi Mcmahon, PT  11/6/2023

## 2023-11-08 ENCOUNTER — CLINICAL SUPPORT (OUTPATIENT)
Dept: REHABILITATION | Facility: OTHER | Age: 78
End: 2023-11-08
Payer: MEDICARE

## 2023-11-08 DIAGNOSIS — R29.898 DECREASED STRENGTH OF UPPER EXTREMITY: Primary | ICD-10-CM

## 2023-11-08 DIAGNOSIS — R68.89 DECREASED ACTIVITY TOLERANCE: ICD-10-CM

## 2023-11-08 PROCEDURE — 97110 THERAPEUTIC EXERCISES: CPT | Mod: CQ

## 2023-11-08 PROCEDURE — 97140 MANUAL THERAPY 1/> REGIONS: CPT | Mod: CQ

## 2023-11-08 NOTE — PROGRESS NOTES
[facility]  Physical Therapy Treatment Note     Name: aVlarie Colindres  Clinic Number: [id number]    Therapy Diagnosis:   Encounter Diagnoses   Name Primary?    Decreased strength of upper extremity Yes    Decreased activity tolerance        Physician: Lindsay Reynolds NP    Visit Date: 2023    Physician Orders: PT Eval and Treat  Medical Diagnosis from Referral:   1. Dorsalgia    2. Spondylosis of lumbar region without myelopathy or radiculopathy    3. DDD (degenerative disc disease), lumbar       Evaluation Date: 2023  Authorization Period Expiration: 2024   Plan of Care Expiration: 2023  Reassessment Due: 2023  Visit # / Visits authorized: 15/20     PTA Visit #: 3/5     Time In: 8:30AM  Time Out: 9::30 AM  Total Billable Time: 55 minutes  Total Treatment Time: 55 minutes  INSURANCE and OUTCOMES: Fee for Service with FOTO Outcomes 1/3    Precautions: standard Hypertension, Hyperlipidemia    Pattern of pain determined: 1 REP  Subjective   Valarie Colindres reports that she has a new nagging/aggravating pain in the back of her right shoulder blade, possibly from lifting a few gallon water bottles yesterday.    Patient reports tolerating previous visit well  Patient reports their pain to be  5/10 on a 0-10 scale with 0 being no pain and 10 being the worst pain imaginable.  Pain Location: R neck, R shlder blade     Occupation: retired in 2018  Leisure: Yoga, activities with people program (painting, calligraphy, piano lessons, ceramics, bible study, chess)    Pt goals: to decrease pain and to be more flexible and agile  Objective    Baseline Isometric Testing on Med X equipment: Testing administered by PT     Date of testin2023  ROM 36-81 deg   Max Peak Torque 139    Min Peak Torque 108    Flex/Ext Ratio 1.2:1   % below normative data 42      Midpoint Isometric Testing on Med X equipment: Testing administered by PT     Date of  "testing: 10/110723  ROM  deg   Max Peak Torque 217   Min Peak Torque 161   Flex/Ext Ratio 1.34:1   % below normative data 5%   % gain from initial 57%       Outcomes:  Intake Score: 26%  Visit 6 Score: 28%  Visit 10 Score: 12%  Discharge Score:  Goal Score: 15%   Treatment     [place] received the treatments listed below:      [place] participated in neuromuscular re-education activities to improve balance, coordination, proprioception, motor control and/or posture for 0 minutes. The following activities were included:    Cervical retraction in sitting + YTB 10x3"  Cervical retraction in sitting 10x5" with manual overpressure  Cervical retraction with extension 2x10 towel support at neck     Cues for flow and controlled movements and pacing with rest breaks between reps/sets    [place] participated in therapeutic exercises to develop strength, endurance, ROM, flexibility, and posture for 45 minutes including:    Half FR:Pec/positional release 1 min  Shoulder horizontal abd w/BTB x10,3"  SA punches w/5 lbs dumb bells x15  Levator scapulae stretch x 2 x 30"  Upper trap stretch 30" x2        11/8/2023     8:58 AM   [facility]   Visit Number 15   VAS Pain Rating 5   Time 5   Cervical Weight 144 lbs   Repetitions 20   Rating of Perceived Exertion 3              Not performed 11/8/2023 : 2/2 to pt arriving late  Scap retracts 10x3"  Thoracic ext over FR 15x3"  Wall slides c/ elbows 10x5"  No money GTB 15x3" w/half FR at wall w/sustained chin tuck x10  Open books x10     Peripheral muscle strengthening which included one set of 15-20 repetitions at a slow and controlled 10-13 second per rep pace focused on strengthening supporting musculature in order to improve body mechanics and functional mobility. Patient and therapist focused on proper form during treatment to ensure optimal strengthening of each targeted muscle group.  Machines utilized include torso rotation, leg extension, leg curl, chest " press, upright row. Tricep extension, bicep curl, leg press, and hip abduction added visit 3    [place] participated in dynamic functional therapeutic activities to improve functional performance and simulate household and community activities for  minutes. The following activities were included:    [place] received manual therapy techniques for 10 minutes. The following activities were included:    STM to R teres minor/infraspinatus   Suboccipital release  Manual traction  Cervical CPA's  STM R LS/UT, cervical paraspinals     Pt given cold pack for 5 minutes to cervical an thoracic spine in seated    Patient Education and Home Exercises     Home exercises include:  +Chin tuck with added resistance of TB added 11/8/23)  Chin tucks, cervical extensions  Upper trap stretch  Open books    Cardio program (V5): - 10/6/23   Lifting education (V11): -11/1/23  Posture/Lumbar roll: acquired  Fridge Magnet Discharge handout (date given): -  Equipment at home/gym membership: yes, has     Education provided:   - PT role and POC  - HEP    Written Home Exercises Provided: Patient instructed to cont prior HEP.  Exercises were reviewed and Valarie was able to demonstrate them prior to the end of the session.  Moonia demonstrated good  understanding of the education provided.     See EMR under Patient Instructions for exercises provided prior visit.    Assessment   Patient returns to PT with higher than normal pain levels, manual work helped with pain modification. Good tolerance with added resistance to chin tucks, added to HEP to progress strengthening. Patient required min vc/tc with increased resistance for proper form and to avoid substitutions, but able to self correct. Patient able to perform 20 reps on c/s medx machine with an RPE of 3/10. Continue to progress patient through [facility] protocol per patient's tolerance.     Patient is making good progress towards established goals  Pt will continue to benefit from  skilled outpatient physical therapy to address the deficits stated in the impairment chart, provide pt/family education and to maximize pt's level of independence in the home and community environment.     Anticipated Barriers for therapy: none  Pt's spiritual, cultural and educational needs considered and pt agreeable to plan of care and goals as stated below:     GOALS: Pt is in agreement with the following goals.     Short term goals:  6 weeks or 10 visits   - Pt will demonstrate increased lumbar ROM by at least 6 degrees from the initial ROM value with improvements noted in functional ROM and ability to perform ADLs. Met 10/19/2023  - Pt will demonstrate increased MedX average isometric strength value by 15% from initial test resulting in improved ability to perform bending, lifting, and carrying activities safely, confidently. MET  - Pt will report a reduction in worst pain score by 1-2 points for improved tolerance for putting arm behind back for bathing. MET  - Pt able to perform HEP correctly with minimal cueing or supervision from therapist to encourage independent management of symptoms. MET     Long term goals: 10 weeks or 20 visits   - Pt will demonstrate increased lumbar ROM by at least 12 degrees from initial ROM value, resulting in improved ability to perform functional forward bending while standing and sitting. Met 10/19/2023  - Pt will demonstrate increased MedX average isometric strength value by 30% from initial test resulting in improved ability to perform bending, lifting, and carrying activities safely and confidently. Appropriate and Ongoing  - Pt to demonstrate ability to independently control and reduce their pain through posture positioning and mechanical movements throughout a typical day. Appropriate and Ongoing  - Pt will demonstrate reduced pain and improved functional outcomes as reported on the Neck Disability Index by reaching a score of 15 or less in order to demonstrate subjective  improvement in pt's condition.   Appropriate and Ongoing  - Pt will demonstrate independence with the HEP at discharge. Appropriate and Ongoing  - Pt able to carry water in the house without increase pain (patient goal) Appropriate and Ongoing  - Pt complete household chores without increased pain (patient goal) Appropriate and Ongoing    Plan     Continue with established Plan of Care towards established PT goals.     Therapist: Jodie Garcia, PTA  11/8/2023

## 2023-11-13 ENCOUNTER — CLINICAL SUPPORT (OUTPATIENT)
Dept: REHABILITATION | Facility: OTHER | Age: 78
End: 2023-11-13
Payer: MEDICARE

## 2023-11-13 DIAGNOSIS — R29.898 DECREASED STRENGTH OF UPPER EXTREMITY: Primary | ICD-10-CM

## 2023-11-13 DIAGNOSIS — R68.89 DECREASED ACTIVITY TOLERANCE: ICD-10-CM

## 2023-11-13 PROCEDURE — 97110 THERAPEUTIC EXERCISES: CPT

## 2023-11-13 NOTE — PROGRESS NOTES
[facility]  Physical Therapy Treatment Note     Name: Valarie Colindres  Clinic Number: [id number]    Therapy Diagnosis:   Encounter Diagnoses   Name Primary?    Decreased strength of upper extremity Yes    Decreased activity tolerance        Physician: Lindsay Reynolds NP    Visit Date: 2023    Physician Orders: PT Eval and Treat  Medical Diagnosis from Referral:   1. Dorsalgia    2. Spondylosis of lumbar region without myelopathy or radiculopathy    3. DDD (degenerative disc disease), lumbar       Evaluation Date: 2023  Authorization Period Expiration: 2024   Plan of Care Expiration: 2023  Reassessment Due: 2023  Visit # / Visits authorized:      PTA Visit #: 3     Time In: 1300  Time Out: 1400  Total Billable Time: 55 minutes  Total Treatment Time: 55 minutes  INSURANCE and OUTCOMES: Fee for Service with FOTO Outcomes 1/3    Precautions: standard Hypertension, Hyperlipidemia    Pattern of pain determined: 1 REP  Subjective   Valarie Colindres reports no new pain today. Some soreness still in her upper traps.      Patient reports tolerating previous visit well  Patient reports their pain to be  5/10 on a 0-10 scale with 0 being no pain and 10 being the worst pain imaginable.  Pain Location: R neck,     Occupation: retired in 2018  Leisure: Yoga, activities with people program (painting, calligraphy, piano lessons, ceramics, bible study, chess)    Pt goals: to decrease pain and to be more flexible and agile  Objective    Baseline Isometric Testing on Med X equipment: Testing administered by PT     Date of testin2023  ROM 36-81 deg   Max Peak Torque 139    Min Peak Torque 108    Flex/Ext Ratio 1.2:1   % below normative data 42      Midpoint Isometric Testing on Med X equipment: Testing administered by PT     Date of testing: 10/781462  ROM  deg   Max Peak Torque 217   Min Peak Torque 161   Flex/Ext Ratio 1.34:1   %  "below normative data 5%   % gain from initial 57%       Outcomes:  Intake Score: 26%  Visit 6 Score: 28%  Visit 10 Score: 12%  Discharge Score:  Goal Score: 15%   Treatment     Sedabdulkadir received the treatments listed below:      [place] participated in neuromuscular re-education activities to improve balance, coordination, proprioception, motor control and/or posture for 10 minutes. The following activities were included:    Cervical retraction in sitting + YTB 10x3"  Cervical retraction in sitting 10x5" with manual overpressure  Cervical retraction with extension 2x10 towel support at neck     Cues for flow and controlled movements and pacing with rest breaks between reps/sets    [place] participated in therapeutic exercises to develop strength, endurance, ROM, flexibility, and posture for 45 minutes including:    Half FR:Pec/positional release 1 min  Shoulder horizontal abd w/BTB x10,3"  SA punches w/5 lbs dumb bells x15  Levator scapulae stretch x 2 x 30"  Upper trap stretch 30" x2        11/13/2023     2:06 PM   [facility]   Visit Number 16   VAS Pain Rating 4   Time 5   Cervical Weight 159 lbs   Repetitions 15   Rating of Perceived Exertion 4   Ice - Z Lie (in min.) 5        Not performed 11/13/2023   Scap retracts 10x3"  Thoracic ext over FR 15x3"  Wall slides c/ elbows 10x5"  No money GTB 15x3" w/half FR at wall w/sustained chin tuck x10  Open books x10     Peripheral muscle strengthening which included one set of 15-20 repetitions at a slow and controlled 10-13 second per rep pace focused on strengthening supporting musculature in order to improve body mechanics and functional mobility. Patient and therapist focused on proper form during treatment to ensure optimal strengthening of each targeted muscle group.  Machines utilized include torso rotation, leg extension, leg curl, chest press, upright row. Tricep extension, bicep curl, leg press, and hip abduction added visit 3    Sedabdulkadir participated in " dynamic functional therapeutic activities to improve functional performance and simulate household and community activities for  minutes. The following activities were included:    [place] received manual therapy techniques for 0 minutes. The following activities were included:    STM to R teres minor/infraspinatus   Suboccipital release  Manual traction  Cervical CPA's  STM R LS/UT, cervical paraspinals     Pt given cold pack for 5 minutes to cervical an thoracic spine in seated    Patient Education and Home Exercises     Home exercises include:  +Chin tuck with added resistance of TB added 11/8/23)  Chin tucks, cervical extensions  Upper trap stretch  Open books    Cardio program (V5): - 10/6/23   Lifting education (V11): -11/1/23  Posture/Lumbar roll: acquired  [employer] Discharge handout (date given): -  Equipment at home/gym membership: yes, has     Education provided:   - PT role and POC  - HEP    Written Home Exercises Provided: Patient instructed to cont prior HEP.  Exercises were reviewed and Valarie was able to demonstrate them prior to the end of the session.  Moonia demonstrated good  understanding of the education provided.     See EMR under Patient Instructions for exercises provided prior visit.    Assessment   Valarie tolerated treatment well today. Reports no new significant issues today. Continued prior cervicothoracic exercises to build continuity. Patient able to perform 15 reps at increased resistance on MedX machine with appropriate exertion. Tolerated well. Continues to tolerate peripheral exercises well. Will continue to progress as able.      Patient is making good progress towards established goals  Pt will continue to benefit from skilled outpatient physical therapy to address the deficits stated in the impairment chart, provide pt/family education and to maximize pt's level of independence in the home and community environment.     Anticipated Barriers for therapy: none  Pt's  spiritual, cultural and educational needs considered and pt agreeable to plan of care and goals as stated below:     GOALS: Pt is in agreement with the following goals.     Short term goals:  6 weeks or 10 visits   - Pt will demonstrate increased lumbar ROM by at least 6 degrees from the initial ROM value with improvements noted in functional ROM and ability to perform ADLs. Met 10/19/2023  - Pt will demonstrate increased MedX average isometric strength value by 15% from initial test resulting in improved ability to perform bending, lifting, and carrying activities safely, confidently. MET  - Pt will report a reduction in worst pain score by 1-2 points for improved tolerance for putting arm behind back for bathing. MET  - Pt able to perform HEP correctly with minimal cueing or supervision from therapist to encourage independent management of symptoms. MET     Long term goals: 10 weeks or 20 visits   - Pt will demonstrate increased lumbar ROM by at least 12 degrees from initial ROM value, resulting in improved ability to perform functional forward bending while standing and sitting. Met 10/19/2023  - Pt will demonstrate increased MedX average isometric strength value by 30% from initial test resulting in improved ability to perform bending, lifting, and carrying activities safely and confidently. Appropriate and Ongoing  - Pt to demonstrate ability to independently control and reduce their pain through posture positioning and mechanical movements throughout a typical day. Appropriate and Ongoing  - Pt will demonstrate reduced pain and improved functional outcomes as reported on the Neck Disability Index by reaching a score of 15 or less in order to demonstrate subjective improvement in pt's condition.   Appropriate and Ongoing  - Pt will demonstrate independence with the HEP at discharge. Appropriate and Ongoing  - Pt able to carry water in the house without increase pain (patient goal) Appropriate and Ongoing  - Pt  complete household chores without increased pain (patient goal) Appropriate and Ongoing    Plan     Continue with established Plan of Care towards established PT goals.     Therapist: Marc Figueroa, PT  11/13/2023

## 2023-11-14 ENCOUNTER — OFFICE VISIT (OUTPATIENT)
Dept: PRIMARY CARE CLINIC | Facility: CLINIC | Age: 78
End: 2023-11-14
Payer: MEDICARE

## 2023-11-14 VITALS
HEART RATE: 72 BPM | BODY MASS INDEX: 24.72 KG/M2 | DIASTOLIC BLOOD PRESSURE: 78 MMHG | SYSTOLIC BLOOD PRESSURE: 120 MMHG | OXYGEN SATURATION: 98 % | WEIGHT: 144.81 LBS | HEIGHT: 64 IN | TEMPERATURE: 98 F | RESPIRATION RATE: 16 BRPM

## 2023-11-14 DIAGNOSIS — Z00.00 ENCOUNTER FOR PREVENTIVE HEALTH EXAMINATION: Primary | ICD-10-CM

## 2023-11-14 PROCEDURE — 3074F SYST BP LT 130 MM HG: CPT | Mod: CPTII,S$GLB,, | Performed by: NURSE PRACTITIONER

## 2023-11-14 PROCEDURE — 1170F PR FUNCTIONAL STATUS ASSESSED: ICD-10-PCS | Mod: CPTII,S$GLB,, | Performed by: NURSE PRACTITIONER

## 2023-11-14 PROCEDURE — 99999 PR PBB SHADOW E&M-EST. PATIENT-LVL V: ICD-10-PCS | Mod: PBBFAC,,, | Performed by: NURSE PRACTITIONER

## 2023-11-14 PROCEDURE — 1170F FXNL STATUS ASSESSED: CPT | Mod: CPTII,S$GLB,, | Performed by: NURSE PRACTITIONER

## 2023-11-14 PROCEDURE — 3074F PR MOST RECENT SYSTOLIC BLOOD PRESSURE < 130 MM HG: ICD-10-PCS | Mod: CPTII,S$GLB,, | Performed by: NURSE PRACTITIONER

## 2023-11-14 PROCEDURE — G0439 PR MEDICARE ANNUAL WELLNESS SUBSEQUENT VISIT: ICD-10-PCS | Mod: S$GLB,,, | Performed by: NURSE PRACTITIONER

## 2023-11-14 PROCEDURE — 1159F PR MEDICATION LIST DOCUMENTED IN MEDICAL RECORD: ICD-10-PCS | Mod: CPTII,S$GLB,, | Performed by: NURSE PRACTITIONER

## 2023-11-14 PROCEDURE — G0439 PPPS, SUBSEQ VISIT: HCPCS | Mod: S$GLB,,, | Performed by: NURSE PRACTITIONER

## 2023-11-14 PROCEDURE — 3078F PR MOST RECENT DIASTOLIC BLOOD PRESSURE < 80 MM HG: ICD-10-PCS | Mod: CPTII,S$GLB,, | Performed by: NURSE PRACTITIONER

## 2023-11-14 PROCEDURE — 1159F MED LIST DOCD IN RCRD: CPT | Mod: CPTII,S$GLB,, | Performed by: NURSE PRACTITIONER

## 2023-11-14 PROCEDURE — 1160F PR REVIEW ALL MEDS BY PRESCRIBER/CLIN PHARMACIST DOCUMENTED: ICD-10-PCS | Mod: CPTII,S$GLB,, | Performed by: NURSE PRACTITIONER

## 2023-11-14 PROCEDURE — 99999 PR PBB SHADOW E&M-EST. PATIENT-LVL V: CPT | Mod: PBBFAC,,, | Performed by: NURSE PRACTITIONER

## 2023-11-14 PROCEDURE — 1126F PR PAIN SEVERITY QUANTIFIED, NO PAIN PRESENT: ICD-10-PCS | Mod: CPTII,S$GLB,, | Performed by: NURSE PRACTITIONER

## 2023-11-14 PROCEDURE — 3078F DIAST BP <80 MM HG: CPT | Mod: CPTII,S$GLB,, | Performed by: NURSE PRACTITIONER

## 2023-11-14 PROCEDURE — 1126F AMNT PAIN NOTED NONE PRSNT: CPT | Mod: CPTII,S$GLB,, | Performed by: NURSE PRACTITIONER

## 2023-11-14 PROCEDURE — 1160F RVW MEDS BY RX/DR IN RCRD: CPT | Mod: CPTII,S$GLB,, | Performed by: NURSE PRACTITIONER

## 2023-11-14 NOTE — PATIENT INSTRUCTIONS
Counseling and Referral of Other Preventative  (Italic type indicates deductible and co-insurance are waived)    Patient Name: Valarie Colindres  Today's Date: 11/14/2023    Health Maintenance       Date Due Completion Date    COVID-19 Vaccine (6 - 2023-24 season) 09/01/2023 12/1/2022    RSV Vaccine (Age 60+) (1 - 1-dose 60+ series) 11/14/2023 (Originally 4/18/2004) ---    Hemoglobin A1c (Prediabetes) 10/31/2024 10/31/2023    DEXA Scan 12/10/2024 12/10/2021    Colonoscopy 11/09/2027 11/9/2022    Lipid Panel 08/15/2028 8/15/2023    TETANUS VACCINE 09/02/2032 9/2/2022    Override on 1/1/2004: Done        No orders of the defined types were placed in this encounter.    The following information is provided to all patients.  This information is to help you find resources for any of the problems found today that may be affecting your health:                Living healthy guide: www.[facility].[place].gov      Understanding Diabetes: www.diabetes.org      Eating healthy: www.cdc.gov/healthyweight      CDC home safety checklist: www.cdc.gov/steadi/patient.html      Agency on Aging: www.goea.[place].gov      Alcoholics anonymous (AA): www.aa.org      Physical Activity: www.neela.nih.gov/il5srmy      Tobacco use: www.quitwithusla.org     Counseling and Referral of Other Preventative  (Italic type indicates deductible and co-insurance are waived)    Patient Name: Valarie Colindres  Today's Date: 11/14/2023    Health Maintenance       Date Due Completion Date    COVID-19 Vaccine (6 - 2023-24 season) 09/01/2023 12/1/2022    RSV Vaccine (Age 60+) (1 - 1-dose 60+ series) 11/14/2023 (Originally 4/18/2004) ---    Hemoglobin A1c (Prediabetes) 10/31/2024 10/31/2023    DEXA Scan 12/10/2024 12/10/2021    Colonoscopy 11/09/2027 11/9/2022    Lipid Panel 08/15/2028 8/15/2023    TETANUS VACCINE 09/02/2032 9/2/2022    Override on 1/1/2004: Done        No orders of the defined types were placed in this encounter.    The following information is  provided to all patients.  This information is to help you find resources for any of the problems found today that may be affecting your health:                Living healthy guide: www.[facility].[place].[facility]      Understanding Diabetes: www.diabetes.org      Eating healthy: www.cdc.gov/healthyweight      CDC home safety checklist: www.cdc.gov/steadi/patient.html      Agency on Aging: www.goea.[place].[facility]      Alcoholics anonymous (AA): www.aa.org      Physical Activity: www.neela.nih.gov/cy7uogu      Tobacco use: www.quitwithusla.org

## 2023-11-17 RX ORDER — FAMOTIDINE 20 MG/1
TABLET, FILM COATED ORAL
Qty: 180 TABLET | Refills: 3 | Status: SHIPPED | OUTPATIENT
Start: 2023-11-17 | End: 2024-02-20 | Stop reason: SDUPTHER

## 2023-11-17 NOTE — TELEPHONE ENCOUNTER
I called and spoke with patient about this medication and she does not need a refill. The patient would have to see a provider before getting this medicine refill. She said she will call schedule and appointment before her medicine get low.

## 2023-11-20 ENCOUNTER — CLINICAL SUPPORT (OUTPATIENT)
Dept: REHABILITATION | Facility: OTHER | Age: 78
End: 2023-11-20
Payer: MEDICARE

## 2023-11-20 DIAGNOSIS — R68.89 DECREASED ACTIVITY TOLERANCE: ICD-10-CM

## 2023-11-20 DIAGNOSIS — R29.898 DECREASED STRENGTH OF UPPER EXTREMITY: Primary | ICD-10-CM

## 2023-11-20 PROCEDURE — 97110 THERAPEUTIC EXERCISES: CPT

## 2023-11-20 NOTE — PROGRESS NOTES
[facility]  Physical Therapy Treatment Note     Name: Valarie Colindrse  Clinic Number: [id number]    Therapy Diagnosis:   Encounter Diagnoses   Name Primary?    Decreased strength of upper extremity Yes    Decreased activity tolerance        Physician: Lindsay Reynolds NP    Visit Date: 2023    Physician Orders: PT Eval and Treat  Medical Diagnosis from Referral:   1. Dorsalgia    2. Spondylosis of lumbar region without myelopathy or radiculopathy    3. DDD (degenerative disc disease), lumbar       Evaluation Date: 2023  Authorization Period Expiration: 2024   Plan of Care Expiration: 2023  Reassessment Due: 2023  Visit # / Visits authorized:      PTA Visit #: 3     Time In: 1230  Time Out: 1330  Total Billable Time: 55 minutes  Total Treatment Time: 55 minutes  INSURANCE and OUTCOMES: Fee for Service with FOTO Outcomes 1/3    Precautions: standard Hypertension, Hyperlipidemia    Pattern of pain determined: 1 REP  Subjective   Valarie Colindres reports slight stiffness, but nothing significant. Her low back is fairly achy today which is causing her discomfort.     Patient reports tolerating previous visit well  Patient reports their pain to be  5/10 on a 0-10 scale with 0 being no pain and 10 being the worst pain imaginable.  Pain Location: R neck,     Occupation: retired in 2018  Leisure: Yoga, activities with people program (painting, calligraphy, piano lessons, ceramics, bible study, chess)    Pt goals: to decrease pain and to be more flexible and agile  Objective    Baseline Isometric Testing on Med X equipment: Testing administered by PT     Date of testin2023  ROM 36-81 deg   Max Peak Torque 139    Min Peak Torque 108    Flex/Ext Ratio 1.2:1   % below normative data 42      Midpoint Isometric Testing on Med X equipment: Testing administered by PT     Date of testing: 10/766704  ROM  deg   Max Peak Torque 217  "  Min Peak Torque 161   Flex/Ext Ratio 1.34:1   % below normative data 5%   % gain from initial 57%       Outcomes:  Intake Score: 26%  Visit 6 Score: 28%  Visit 10 Score: 12%  Discharge Score:  Goal Score: 15%   Treatment     Sedonia received the treatments listed below:      [place] participated in neuromuscular re-education activities to improve balance, coordination, proprioception, motor control and/or posture for 10 minutes. The following activities were included:    Cervical retraction in sitting + YTB 10x3"  Cervical retraction in sitting 10x5" with manual overpressure  Cervical retraction with extension 2x10 towel support at neck   +PPT 15x3"     Cues for flow and controlled movements and pacing with rest breaks between reps/sets    [place] participated in therapeutic exercises to develop strength, endurance, ROM, flexibility, and posture for 45 minutes including:    Half FR:Pec/positional release 1 min  Shoulder horizontal abd w/BTB x10,3"  SA punches w/5 lbs dumb bells x15  Levator scapulae stretch x 2 x 30"  Upper trap stretch 30" x2  +LTR x10 ea         11/20/2023     2:17 PM   [facility]   Visit Number 17   VAS Pain Rating 3   Time 5   Cervical Weight 159 lbs   Repetitions 18   Rating of Perceived Exertion 3   Ice - Z Lie (in min.) 5        Not performed 11/20/2023   Scap retracts 10x3"  Thoracic ext over FR 15x3"  Wall slides c/ elbows 10x5"  No money GTB 15x3" w/half FR at wall w/sustained chin tuck x10  Open books x10     Peripheral muscle strengthening which included one set of 15-20 repetitions at a slow and controlled 10-13 second per rep pace focused on strengthening supporting musculature in order to improve body mechanics and functional mobility. Patient and therapist focused on proper form during treatment to ensure optimal strengthening of each targeted muscle group.  Machines utilized include torso rotation, leg extension, leg curl, chest press, upright row. Tricep extension, bicep " curl, leg press, and hip abduction added visit 3    [place] participated in dynamic functional therapeutic activities to improve functional performance and simulate household and community activities for  minutes. The following activities were included:    [place] received manual therapy techniques for 0 minutes. The following activities were included:    STM to R teres minor/infraspinatus   Suboccipital release  Manual traction  Cervical CPA's  STM R LS/UT, cervical paraspinals     Pt given cold pack for 5 minutes to cervical an thoracic spine in seated    Patient Education and Home Exercises     Home exercises include:  +Chin tuck with added resistance of TB added 11/8/23)  Chin tucks, cervical extensions  Upper trap stretch  Open books    Cardio program (V5): - 10/6/23   Lifting education (V11): -11/1/23  Posture/Lumbar roll: acquired  [employer] Discharge handout (date given): -  Equipment at home/gym membership: yes, has     Education provided:   - PT role and POC  - HEP    Written Home Exercises Provided: Patient instructed to cont prior HEP.  Exercises were reviewed and Valarie was able to demonstrate them prior to the end of the session.  Valarie demonstrated good  understanding of the education provided.     See EMR under Patient Instructions for exercises provided prior visit.    Assessment   Valarie tolerated treatment well today. Reported some increased low back soreness today. Continued past cervicothoracic exercises and progressed treatment by adding lumbar exercises to decrease discomfort. Patient able to perform 18 reps at prior resistance on MedX machine with appropriate exertion. Tolerated all activity well. Continues to tolerate peripheral exercises well. Will continue to progress as able.      Patient is making good progress towards established goals  Pt will continue to benefit from skilled outpatient physical therapy to address the deficits stated in the impairment chart, provide  pt/family education and to maximize pt's level of independence in the home and community environment.     Anticipated Barriers for therapy: none  Pt's spiritual, cultural and educational needs considered and pt agreeable to plan of care and goals as stated below:     GOALS: Pt is in agreement with the following goals.     Short term goals:  6 weeks or 10 visits   - Pt will demonstrate increased lumbar ROM by at least 6 degrees from the initial ROM value with improvements noted in functional ROM and ability to perform ADLs. Met 10/19/2023  - Pt will demonstrate increased MedX average isometric strength value by 15% from initial test resulting in improved ability to perform bending, lifting, and carrying activities safely, confidently. MET  - Pt will report a reduction in worst pain score by 1-2 points for improved tolerance for putting arm behind back for bathing. MET  - Pt able to perform HEP correctly with minimal cueing or supervision from therapist to encourage independent management of symptoms. MET     Long term goals: 10 weeks or 20 visits   - Pt will demonstrate increased lumbar ROM by at least 12 degrees from initial ROM value, resulting in improved ability to perform functional forward bending while standing and sitting. Met 10/19/2023  - Pt will demonstrate increased MedX average isometric strength value by 30% from initial test resulting in improved ability to perform bending, lifting, and carrying activities safely and confidently. Appropriate and Ongoing  - Pt to demonstrate ability to independently control and reduce their pain through posture positioning and mechanical movements throughout a typical day. Appropriate and Ongoing  - Pt will demonstrate reduced pain and improved functional outcomes as reported on the Neck Disability Index by reaching a score of 15 or less in order to demonstrate subjective improvement in pt's condition.   Appropriate and Ongoing  - Pt will demonstrate independence with  the HEP at discharge. Appropriate and Ongoing  - Pt able to carry water in the house without increase pain (patient goal) Appropriate and Ongoing  - Pt complete household chores without increased pain (patient goal) Appropriate and Ongoing    Plan     Continue with established Plan of Care towards established PT goals.     Therapist: Marc Figueroa, PT  11/20/2023

## 2023-11-22 ENCOUNTER — CLINICAL SUPPORT (OUTPATIENT)
Dept: REHABILITATION | Facility: OTHER | Age: 78
End: 2023-11-22
Payer: MEDICARE

## 2023-11-22 DIAGNOSIS — R29.898 DECREASED STRENGTH OF UPPER EXTREMITY: Primary | ICD-10-CM

## 2023-11-22 DIAGNOSIS — R68.89 DECREASED ACTIVITY TOLERANCE: ICD-10-CM

## 2023-11-22 PROCEDURE — 97110 THERAPEUTIC EXERCISES: CPT | Mod: CQ

## 2023-11-22 NOTE — PROGRESS NOTES
[facility]  Physical Therapy Treatment Note     Name: Valarie Colindres  Clinic Number: [id number]    Therapy Diagnosis:   Encounter Diagnoses   Name Primary?    Decreased strength of upper extremity Yes    Decreased activity tolerance        Physician: Lindsay Reynolds NP    Visit Date: 2023    Physician Orders: PT Eval and Treat  Medical Diagnosis from Referral:   1. Dorsalgia    2. Spondylosis of lumbar region without myelopathy or radiculopathy    3. DDD (degenerative disc disease), lumbar       Evaluation Date: 2023  Authorization Period Expiration: 2024   Plan of Care Expiration: 2023  Reassessment Due: 2023  Visit # / Visits authorized:      PTA Visit #: 4/5     Time In: 1105  Time Out: 1210  Total Billable Time: 30 minutes  Total Treatment Time: 55 minutes  INSURANCE and OUTCOMES: Fee for Service with FOTO Outcomes 1/3    Precautions: standard Hypertension, Hyperlipidemia    Pattern of pain determined: 1 REP  Subjective   Valarie Colindres reports her neck is doing better. She is beginning to have low back pain. The exercises she did last visit helped     Patient reports tolerating previous visit well  Patient reports their pain to be  5/10 on a 0-10 scale with 0 being no pain and 10 being the worst pain imaginable.  Pain Location: low back      Occupation: retired in 2018  Leisure: Yoga, activities with people program (painting, calligraphy, piano lessons, ceramics, bible study, chess)    Pt goals: to decrease pain and to be more flexible and agile  Objective    Baseline Isometric Testing on Med X equipment: Testing administered by PT     Date of testin2023  ROM 36-81 deg   Max Peak Torque 139    Min Peak Torque 108    Flex/Ext Ratio 1.2:1   % below normative data 42      Midpoint Isometric Testing on Med X equipment: Testing administered by PT     Date of testing: 10/337311  ROM  deg   Max Peak Torque 217   Min  "Peak Torque 161   Flex/Ext Ratio 1.34:1   % below normative data 5%   % gain from initial 57%       Outcomes:  Intake Score: 26%  Visit 6 Score: 28%  Visit 10 Score: 12%  Discharge Score:  Goal Score: 15%   Treatment     Sedonia received the treatments listed below:      S[place] participated in neuromuscular re-education activities to improve balance, coordination, proprioception, motor control and/or posture for 10 minutes. The following activities were included:    Cervical retraction in sitting + YTB 10x3"  Cervical retraction in sitting 10x5" with manual overpressure  Cervical retraction with extension 2x10 towel support at neck   PPT 15x3"   +PPT + bridge x10  Cues for flow and controlled movements and pacing with rest breaks between reps/sets    S[place] participated in therapeutic exercises to develop strength, endurance, ROM, flexibility, and posture for 45 minutes including:    Half FR:Pec/positional release 1 min  Shoulder horizontal abd w/BTB x10,3"  SA punches w/5 lbs dumb bells x15  Levator scapulae stretch x 2 x 30"  Upper trap stretch 30" x2  LTR x10 ea   +JACKIE x1.5 minutes  +EIP x10           11/22/2023    12:53 PM   [facility]   Visit Number 18   VAS Pain Rating 3   Time 5   Cervical Weight 159 lbs   Repetitions 20   Rating of Perceived Exertion 3       Not performed 11/22/2023   Scap retracts 10x3"  Thoracic ext over FR 15x3"  Wall slides c/ elbows 10x5"  No money GTB 15x3" w/half FR at wall w/sustained chin tuck x10  Open books x10     Peripheral muscle strengthening which included one set of 15-20 repetitions at a slow and controlled 10-13 second per rep pace focused on strengthening supporting musculature in order to improve body mechanics and functional mobility. Patient and therapist focused on proper form during treatment to ensure optimal strengthening of each targeted muscle group.  Machines utilized include torso rotation, leg extension, leg curl, chest press, upright row. Tricep " extension, bicep curl, leg press, and hip abduction added visit 3    S[place] participated in dynamic functional therapeutic activities to improve functional performance and simulate household and community activities for  minutes. The following activities were included:    S[place] received manual therapy techniques for 0 minutes. The following activities were included:    STM to R teres minor/infraspinatus   Suboccipital release  Manual traction  Cervical CPA's  STM R LS/UT, cervical paraspinals     Pt given cold pack for 5 minutes to cervical an thoracic spine in seated    Patient Education and Home Exercises     Home exercises include:  +Chin tuck with added resistance of TB added 11/8/23)  Chin tucks, cervical extensions  Upper trap stretch  Open books    Cardio program (V5): - 10/6/23   Lifting education (V11): -11/1/23  Posture/Lumbar roll: acquired  Fridge Magnet Discharge handout (date given): -  Equipment at home/gym membership: yes, has     Education provided:   - PT role and POC  - HEP    Written Home Exercises Provided: Patient instructed to cont prior HEP.  Exercises were reviewed and Valarie was able to demonstrate them prior to the end of the session.  Moonia demonstrated good  understanding of the education provided.     See EMR under Patient Instructions for exercises provided prior visit.    Assessment   Valarie tolerated treatment well today. Pt continues to c/o low back pain. Addressed with exercises and education added to HEP. Continued cervical spine stretching and strengthening and progressed treatment by increased repetition. Patient able to perform w0 reps at 159 resistance on MedX machine with appropriate exertion. Continues to tolerate peripheral exercises well. Will continue to progress as able.      Patient is making good progress towards established goals  Pt will continue to benefit from skilled outpatient physical therapy to address the deficits stated in the impairment chart,  provide pt/family education and to maximize pt's level of independence in the home and community environment.     Anticipated Barriers for therapy: none  Pt's spiritual, cultural and educational needs considered and pt agreeable to plan of care and goals as stated below:     GOALS: Pt is in agreement with the following goals.     Short term goals:  6 weeks or 10 visits   - Pt will demonstrate increased lumbar ROM by at least 6 degrees from the initial ROM value with improvements noted in functional ROM and ability to perform ADLs. Met 10/19/2023  - Pt will demonstrate increased MedX average isometric strength value by 15% from initial test resulting in improved ability to perform bending, lifting, and carrying activities safely, confidently. MET  - Pt will report a reduction in worst pain score by 1-2 points for improved tolerance for putting arm behind back for bathing. MET  - Pt able to perform HEP correctly with minimal cueing or supervision from therapist to encourage independent management of symptoms. MET     Long term goals: 10 weeks or 20 visits   - Pt will demonstrate increased lumbar ROM by at least 12 degrees from initial ROM value, resulting in improved ability to perform functional forward bending while standing and sitting. Met 10/19/2023  - Pt will demonstrate increased MedX average isometric strength value by 30% from initial test resulting in improved ability to perform bending, lifting, and carrying activities safely and confidently. Appropriate and Ongoing  - Pt to demonstrate ability to independently control and reduce their pain through posture positioning and mechanical movements throughout a typical day. Appropriate and Ongoing  - Pt will demonstrate reduced pain and improved functional outcomes as reported on the Neck Disability Index by reaching a score of 15 or less in order to demonstrate subjective improvement in pt's condition.   Appropriate and Ongoing  - Pt will demonstrate  independence with the HEP at discharge. Appropriate and Ongoing  - Pt able to carry water in the house without increase pain (patient goal) Appropriate and Ongoing  - Pt complete household chores without increased pain (patient goal) Appropriate and Ongoing    Plan     Continue with established Plan of Care towards established PT goals.     Therapist: Marco Lieberman, PTA  11/22/2023

## 2023-11-27 ENCOUNTER — CLINICAL SUPPORT (OUTPATIENT)
Dept: REHABILITATION | Facility: OTHER | Age: 78
End: 2023-11-27
Payer: MEDICARE

## 2023-11-27 DIAGNOSIS — R29.898 DECREASED STRENGTH OF UPPER EXTREMITY: Primary | ICD-10-CM

## 2023-11-27 DIAGNOSIS — R68.89 DECREASED ACTIVITY TOLERANCE: ICD-10-CM

## 2023-11-27 PROCEDURE — 97110 THERAPEUTIC EXERCISES: CPT

## 2023-11-27 PROCEDURE — 97112 NEUROMUSCULAR REEDUCATION: CPT

## 2023-11-27 NOTE — PROGRESS NOTES
[facility]  Physical Therapy Treatment Note     Name: Valarie Colindres  Clinic Number: [id number]    Therapy Diagnosis:   No diagnosis found.      Physician: Lindsay Reynolds NP    Visit Date: 2023    Physician Orders: PT Eval and Treat  Medical Diagnosis from Referral:   1. Dorsalgia    2. Spondylosis of lumbar region without myelopathy or radiculopathy    3. DDD (degenerative disc disease), lumbar       Evaluation Date: 2023  Authorization Period Expiration: 2024   Plan of Care Expiration: 2023  Reassessment Due: 2023  Visit # / Visits authorized:      PTA Visit #: 4/5     Time In: 1:37 PM  Time Out: 2:39 PM  Total Billable Time: 53 minutes  Total Treatment Time: 60 minutes  INSURANCE and OUTCOMES: Fee for Service with FOTO Outcomes 1/3    Precautions: standard Hypertension, Hyperlipidemia    Pattern of pain determined: 1 REP  Subjective   Valarie Colindres reports that she still continues to see small, gradual improvement in neck pain, especially right shoulder ROM. She says that she's signed up for 3 fitness classes at [employer].     Patient reports tolerating previous visit well  Patient reports their pain to be  4/10 on a 0-10 scale with 0 being no pain and 10 being the worst pain imaginable.  Pain Location: low back      Occupation: retired in 2018  Leisure: Yoga, activities with people program (painting, calligraphy, piano lessons, ceramics, bible study, chess)    Pt goals: to decrease pain and to be more flexible and agile  Objective    Baseline Isometric Testing on Med X equipment: Testing administered by PT     Date of testin2023  ROM 36-81 deg   Max Peak Torque 139    Min Peak Torque 108    Flex/Ext Ratio 1.2:1   % below normative data 42      Midpoint Isometric Testing on Med X equipment: Testing administered by PT     Date of testing: 10/631605  ROM  deg   Max Peak Torque 217   Min Peak Torque 161   Flex/Ext  "Ratio 1.34:1   % below normative data 5%   % gain from initial 57%       Outcomes:  Intake Score: 26%  Visit 6 Score: 28%  Visit 10 Score: 12%  Discharge Score:  Goal Score: 15%   Treatment     S[place] received the treatments listed below:      [place] participated in neuromuscular re-education activities to improve balance, coordination, proprioception, motor control and/or posture for 10 minutes. The following activities were included:    Cervical retraction in sitting + GTB 10x3"  Cervical retraction in sitting 2 x 10 x5" with manual overpressure  Cervical retraction with extension x 10 towel support at neck     [place] participated in therapeutic exercises to develop strength, endurance, ROM, flexibility, and posture for 43 minutes including:    Half FR:Pec/positional release 1 min  Shoulder horizontal abd w/BTB x10,3"  SA punches w/7 lbs dumb bells x15  Levator scapulae stretch x 2 x 30"  Upper trap stretch 30" x2  LTR x10 ea   JACKIE x1.5 minutes  EIP x10   +I, Y, T's on swiss ball x 10    Not performed 11/27/2023   Scap retracts 10x3"  Thoracic ext over FR 15x3"  Wall slides c/ elbows 10x5"  No money GTB 15x3" w/half FR at wall w/sustained chin tuck x10  Open books x10         11/27/2023     1:42 PM   [facility]   Visit Number 19   VAS Pain Rating 4   Incline 5   Retraction in Sitting 20   Retraction with Extension 10   Cervical Weight 166 lbs   Repetitions 15   Rating of Perceived Exertion 4   Ice - Z Lie (in min.) 0       Peripheral muscle strengthening which included one set of 15-20 repetitions at a slow and controlled 10-13 second per rep pace focused on strengthening supporting musculature in order to improve body mechanics and functional mobility. Patient and therapist focused on proper form during treatment to ensure optimal strengthening of each targeted muscle group.  Machines utilized include torso rotation, leg extension, leg curl, chest press, upright row. Tricep extension, bicep curl, leg " press, and hip abduction added visit 3    Sedabdulkadir participated in dynamic functional therapeutic activities to improve functional performance and simulate household and community activities for  minutes. The following activities were included:    Moonia received manual therapy techniques for 0 minutes. The following activities were included:    STM to R teres minor/infraspinatus   Suboccipital release  Manual traction  Cervical CPA's  STM R LS/UT, cervical paraspinals     Pt given cold pack for 0 minutes to cervical an thoracic spine in seated    Patient Education and Home Exercises     Home exercises include:  Chin tuck with added resistance of TB added 11/8/23)  Chin tucks, cervical extensions  Upper trap stretch  Open books  + I, Y, T's    Cardio program (V5): - 10/6/23   Lifting education (V11): -11/1/23  Posture/Lumbar roll: acquired  [employer] Discharge handout (date given): -  Equipment at home/gym membership: yes, has     Education provided:   - PT role and POC  - HEP    Written Home Exercises Provided: Patient instructed to cont prior HEP.  Exercises were reviewed and Valarie was able to demonstrate them prior to the end of the session.  Valarie demonstrated good  understanding of the education provided.     See EMR under Patient Instructions for exercises provided prior visit.    Assessment   Valarie reports continued improvement in neck pain with most notable improvement in right upper extremity ROM. Increased weight of serratus strengthening which patient tolerated well, and introduced gravity dependent subha-scapular strengthening into HEP. Patient completed cervical MedX at new weight of 165 ft. Lbs. And patient was able to complete 15 reps with an RPE of 4/10. Patient was informed about Wellness program from . She was able to complete full peripheral strength circuit without complaint. Patient appropriate for discharge at next visit.      Patient is making good progress  towards established goals  Pt will continue to benefit from skilled outpatient physical therapy to address the deficits stated in the impairment chart, provide pt/family education and to maximize pt's level of independence in the home and community environment.     Anticipated Barriers for therapy: none  Pt's spiritual, cultural and educational needs considered and pt agreeable to plan of care and goals as stated below:     GOALS: Pt is in agreement with the following goals.     Short term goals:  6 weeks or 10 visits   - Pt will demonstrate increased lumbar ROM by at least 6 degrees from the initial ROM value with improvements noted in functional ROM and ability to perform ADLs. Met 10/19/2023  - Pt will demonstrate increased MedX average isometric strength value by 15% from initial test resulting in improved ability to perform bending, lifting, and carrying activities safely, confidently. MET  - Pt will report a reduction in worst pain score by 1-2 points for improved tolerance for putting arm behind back for bathing. MET  - Pt able to perform HEP correctly with minimal cueing or supervision from therapist to encourage independent management of symptoms. MET     Long term goals: 10 weeks or 20 visits   - Pt will demonstrate increased lumbar ROM by at least 12 degrees from initial ROM value, resulting in improved ability to perform functional forward bending while standing and sitting. Met 10/19/2023  - Pt will demonstrate increased MedX average isometric strength value by 30% from initial test resulting in improved ability to perform bending, lifting, and carrying activities safely and confidently. Appropriate and Ongoing  - Pt to demonstrate ability to independently control and reduce their pain through posture positioning and mechanical movements throughout a typical day. Appropriate and Ongoing  - Pt will demonstrate reduced pain and improved functional outcomes as reported on the Neck Disability Index by  reaching a score of 15 or less in order to demonstrate subjective improvement in pt's condition.   Appropriate and Ongoing  - Pt will demonstrate independence with the HEP at discharge. Appropriate and Ongoing  - Pt able to carry water in the house without increase pain (patient goal) Appropriate and Ongoing  - Pt complete household chores without increased pain (patient goal) Appropriate and Ongoing    Plan     Continue with established Plan of Care towards established PT goals.     Therapist: Leydi Mcmahon, PT  11/27/2023

## 2023-11-30 ENCOUNTER — CLINICAL SUPPORT (OUTPATIENT)
Dept: REHABILITATION | Facility: OTHER | Age: 78
End: 2023-11-30
Payer: MEDICARE

## 2023-11-30 ENCOUNTER — OFFICE VISIT (OUTPATIENT)
Dept: GASTROENTEROLOGY | Facility: CLINIC | Age: 78
End: 2023-11-30
Payer: MEDICARE

## 2023-11-30 VITALS
WEIGHT: 143.31 LBS | HEIGHT: 64 IN | HEART RATE: 66 BPM | BODY MASS INDEX: 24.46 KG/M2 | SYSTOLIC BLOOD PRESSURE: 156 MMHG | DIASTOLIC BLOOD PRESSURE: 88 MMHG

## 2023-11-30 DIAGNOSIS — R29.898 DECREASED STRENGTH OF UPPER EXTREMITY: Primary | ICD-10-CM

## 2023-11-30 DIAGNOSIS — R68.89 DECREASED ACTIVITY TOLERANCE: ICD-10-CM

## 2023-11-30 DIAGNOSIS — I10 ESSENTIAL HYPERTENSION: Primary | ICD-10-CM

## 2023-11-30 DIAGNOSIS — K86.2 PANCREATIC CYST: Primary | ICD-10-CM

## 2023-11-30 DIAGNOSIS — R93.5 ABNORMAL CT OF THE ABDOMEN: ICD-10-CM

## 2023-11-30 PROCEDURE — 3079F PR MOST RECENT DIASTOLIC BLOOD PRESSURE 80-89 MM HG: ICD-10-PCS | Mod: CPTII,S$GLB,, | Performed by: INTERNAL MEDICINE

## 2023-11-30 PROCEDURE — 99214 PR OFFICE/OUTPT VISIT, EST, LEVL IV, 30-39 MIN: ICD-10-PCS | Mod: S$GLB,,, | Performed by: INTERNAL MEDICINE

## 2023-11-30 PROCEDURE — 1160F PR REVIEW ALL MEDS BY PRESCRIBER/CLIN PHARMACIST DOCUMENTED: ICD-10-PCS | Mod: CPTII,S$GLB,, | Performed by: INTERNAL MEDICINE

## 2023-11-30 PROCEDURE — 1125F PR PAIN SEVERITY QUANTIFIED, PAIN PRESENT: ICD-10-PCS | Mod: CPTII,S$GLB,, | Performed by: INTERNAL MEDICINE

## 2023-11-30 PROCEDURE — 99999 PR PBB SHADOW E&M-EST. PATIENT-LVL III: CPT | Mod: PBBFAC,,, | Performed by: INTERNAL MEDICINE

## 2023-11-30 PROCEDURE — 3077F SYST BP >= 140 MM HG: CPT | Mod: CPTII,S$GLB,, | Performed by: INTERNAL MEDICINE

## 2023-11-30 PROCEDURE — 1125F AMNT PAIN NOTED PAIN PRSNT: CPT | Mod: CPTII,S$GLB,, | Performed by: INTERNAL MEDICINE

## 2023-11-30 PROCEDURE — 97110 THERAPEUTIC EXERCISES: CPT | Mod: CQ

## 2023-11-30 PROCEDURE — 3079F DIAST BP 80-89 MM HG: CPT | Mod: CPTII,S$GLB,, | Performed by: INTERNAL MEDICINE

## 2023-11-30 PROCEDURE — 99214 OFFICE O/P EST MOD 30 MIN: CPT | Mod: S$GLB,,, | Performed by: INTERNAL MEDICINE

## 2023-11-30 PROCEDURE — 1159F PR MEDICATION LIST DOCUMENTED IN MEDICAL RECORD: ICD-10-PCS | Mod: CPTII,S$GLB,, | Performed by: INTERNAL MEDICINE

## 2023-11-30 PROCEDURE — 1159F MED LIST DOCD IN RCRD: CPT | Mod: CPTII,S$GLB,, | Performed by: INTERNAL MEDICINE

## 2023-11-30 PROCEDURE — 1160F RVW MEDS BY RX/DR IN RCRD: CPT | Mod: CPTII,S$GLB,, | Performed by: INTERNAL MEDICINE

## 2023-11-30 PROCEDURE — 99999 PR PBB SHADOW E&M-EST. PATIENT-LVL III: ICD-10-PCS | Mod: PBBFAC,,, | Performed by: INTERNAL MEDICINE

## 2023-11-30 PROCEDURE — 3077F PR MOST RECENT SYSTOLIC BLOOD PRESSURE >= 140 MM HG: ICD-10-PCS | Mod: CPTII,S$GLB,, | Performed by: INTERNAL MEDICINE

## 2023-11-30 RX ORDER — VALSARTAN 320 MG/1
320 TABLET ORAL DAILY
Qty: 90 TABLET | Refills: 3 | Status: SHIPPED | OUTPATIENT
Start: 2023-11-30

## 2023-11-30 NOTE — PROGRESS NOTES
[facility]  Physical Therapy Treatment Note     Name: Valarie Colindres  Clinic Number: [id number]    Therapy Diagnosis:   Encounter Diagnoses   Name Primary?    Decreased strength of upper extremity Yes    Decreased activity tolerance          Physician: Lindsay Reynolds NP    Visit Date: 2023    Physician Orders: PT Eval and Treat  Medical Diagnosis from Referral:   1. Dorsalgia    2. Spondylosis of lumbar region without myelopathy or radiculopathy    3. DDD (degenerative disc disease), lumbar       Evaluation Date: 2023  Authorization Period Expiration: 2024   Plan of Care Expiration: 2023  Reassessment Due: 2023  Visit # / Visits authorized:      PTA Visit #:      Time In: 2:00 PM  Time Out: 3:00 PM  Total Billable Time: 53 minutes  Total Treatment Time: 60 minutes  INSURANCE and OUTCOMES: Fee for Service with FOTO Outcomes 1/3    Precautions: standard Hypertension, Hyperlipidemia    Pattern of pain determined: 1 REP  Subjective   Valarie Colindres reports feeling great today.      Patient reports tolerating previous visit well  Patient reports their pain to be  4/10 on a 0-10 scale with 0 being no pain and 10 being the worst pain imaginable.  Pain Location: low back      Occupation: retired in 2018  Leisure: Yoga, activities with people program (painting, calligraphy, piano lessons, ceramics, bible study, chess)    Pt goals: to decrease pain and to be more flexible and agile  Objective    Baseline Isometric Testing on Med X equipment: Testing administered by PT     Date of testin2023  ROM 36-81 deg   Max Peak Torque 139    Min Peak Torque 108    Flex/Ext Ratio 1.2:1   % below normative data 42      Midpoint Isometric Testing on Med X equipment: Testing administered by PT     Date of testing: 10/502164  ROM  deg   Max Peak Torque 217   Min Peak Torque 161   Flex/Ext Ratio 1.34:1   % below normative data 5%   % gain  "from initial 57%       Outcomes:  Intake Score: 26%  Visit 6 Score: 28%  Visit 10 Score: 12%  Discharge Score:  Goal Score: 15%   Treatment     Sedonia received the treatments listed below:      S[place] participated in neuromuscular re-education activities to improve balance, coordination, proprioception, motor control and/or posture for 10 minutes. The following activities were included:    Cervical retraction in sitting + GTB 10x3"  Cervical retraction in sitting 2 x 10 x5" with manual overpressure  Cervical retraction with extension x 10 towel support at neck     [place] participated in therapeutic exercises to develop strength, endurance, ROM, flexibility, and posture for 43 minutes including:    Half FR:Pec/positional release 1 min  Shoulder horizontal abd w/BTB x10,3"  SA punches w/7 lbs dumb bells x15  Levator scapulae stretch x 2 x 30"  Upper trap stretch 30" x2  LTR x10 ea   JACKIE x1.5 minutes NP  EIP x10   +I, Y, T's on swiss ball x 10    Not performed 11/30/2023     Scap retracts 10x3"  Thoracic ext over FR 15x3"  Wall slides c/ elbows 10x5"  No money GTB 15x3" w/half FR at wall w/sustained chin tuck x10  Open books x10         11/30/2023     2:42 PM   [facility]   Visit Number 20   VAS Pain Rating 3   Cervical Flexion 114   Cervical Extension 27   Cervical Peak Torque 251 ft. lbs.   Cervical Weight 166 lbs   Repetitions 18   Rating of Perceived Exertion 3   Ice - Z Lie (in min.) 5         Peripheral muscle strengthening which included one set of 15-20 repetitions at a slow and controlled 10-13 second per rep pace focused on strengthening supporting musculature in order to improve body mechanics and functional mobility. Patient and therapist focused on proper form during treatment to ensure optimal strengthening of each targeted muscle group.  Machines utilized include torso rotation, leg extension, leg curl, chest press, upright row. Tricep extension, bicep curl, leg press, and hip abduction added " visit 3    Valarie participated in dynamic functional therapeutic activities to improve functional performance and simulate household and community activities for  minutes. The following activities were included:    S[place] received manual therapy techniques for 0 minutes. The following activities were included:    STM to R teres minor/infraspinatus   Suboccipital release  Manual traction  Cervical CPA's  STM R LS/UT, cervical paraspinals     Pt given cold pack for 0 minutes to cervical an thoracic spine in seated    Patient Education and Home Exercises     Home exercises include:  Chin tuck with added resistance of TB added 11/8/23)  Chin tucks, cervical extensions  Upper trap stretch  Open books  + I, Y, T's    Cardio program (V5): - 10/6/23   Lifting education (V11): -11/1/23  Posture/Lumbar roll: acquired  Fridge Magnet Discharge handout (date given): -  Equipment at home/gym membership: yes, has     Education provided:   - PT role and POC  - HEP    Written Home Exercises Provided: Patient instructed to cont prior HEP.  Exercises were reviewed and Valarie was able to demonstrate them prior to the end of the session.  Valarie demonstrated good  understanding of the education provided.     See EMR under Patient Instructions for exercises provided prior visit.    Assessment   Valarie [3:48 PM] Thad Beckham  Patient has attended 20 visits of the [facility] program focusing aerobic training, isometric testing with dynamic strengthening on MedX machine for spine, whole body strengthening on peripheral strengthening equipment, HEP, and patient education. Patient has completed the [facility] Program and is ready to be transitioned into wellness program. Educated on the importance of wellness program and attending weekly in order to maintain strength. Stressed the importance of continuing exercise and maintaining proper body mechanics and ergonomics in order to fully participate in activities of daily living,  work, and leisure activities. At this time, patient demonstrates improvement in ability to reduce symptoms, improved posture, improved spinal ROM and improved strength. Discharge handout with HEP given and reviewed all information given. Patient able to demonstrate and verbalize understanding. Patient does not plan to attend wellness and is appropriate for transition.     -Improved posture and is using lumbar roll.    -Improved cervical ROM, initially on MedX test 36-81 and currently .    -Improved strength at each test point on lumbar med ex IM test with 79% average improvement with reduced pain noted by patient.    -Initial outcome tool score 26 and current outcome tool score 12 indicating reduced pain and improved function.Patient is making good progress towards established goals  Pt will continue to benefit from skilled outpatient physical therapy to address the deficits stated in the impairment chart, provide pt/family education and to maximize pt's level of independence in the home and community environment.     Anticipated Barriers for therapy: none  Pt's spiritual, cultural and educational needs considered and pt agreeable to plan of care and goals as stated below:     GOALS: Pt is in agreement with the following goals.     Short term goals:  6 weeks or 10 visits   - Pt will demonstrate increased lumbar ROM by at least 6 degrees from the initial ROM value with improvements noted in functional ROM and ability to perform ADLs. Met 10/19/2023  - Pt will demonstrate increased MedX average isometric strength value by 15% from initial test resulting in improved ability to perform bending, lifting, and carrying activities safely, confidently. MET  - Pt will report a reduction in worst pain score by 1-2 points for improved tolerance for putting arm behind back for bathing. MET  - Pt able to perform HEP correctly with minimal cueing or supervision from therapist to encourage independent management of symptoms.  MET     Long term goals: 10 weeks or 20 visits   - Pt will demonstrate increased lumbar ROM by at least 12 degrees from initial ROM value, resulting in improved ability to perform functional forward bending while standing and sitting. Met 10/19/2023  - Pt will demonstrate increased MedX average isometric strength value by 30% from initial test resulting in improved ability to perform bending, lifting, and carrying activities safely and confidently. Met 11/30/23  - Pt to demonstrate ability to independently control and reduce their pain through posture positioning and mechanical movements throughout a typical day. Met 11/30/23  - Pt will demonstrate reduced pain and improved functional outcomes as reported on the Neck Disability Index by reaching a score of 15 or less in order to demonstrate subjective improvement in pt's condition.   Met 11/30/23  - Pt will demonstrate independence with the HEP at discharge. Met 11/30/23  - Pt able to carry water in the house without increase pain (patient goal) Met 11/30/23  - Pt complete household chores without increased pain (patient goal) Met 11/30/23    Plan     Continue with established Plan of Care towards established PT goals.     Therapist: Edil Zamarripa, PTA  11/30/2023

## 2023-11-30 NOTE — PROGRESS NOTES
"GENERAL GI PATIENT INTAKE:    COVID symptoms in the last 7 days (runny nose, sore throat, congestion, cough, fever): No  PCP: Sirena Renner  If not PCP-  number given to establish [phone]: N/A    ALLERGIES REVIEWED:  Yes    CHIEF COMPLAINT:    Chief Complaint   Patient presents with    Advice Only       VITAL SIGNS:  BP (!) 156/88   Pulse 66   Ht 5' 4" (1.626 m)   Wt 65 kg (143 lb 4.8 oz)   BMI 24.60 kg/m²      Change in medical, surgical, family or social history: No      REVIEWED MEDICATION LIST RECONCILED INCLUDING ABOVE MEDS:  Yes     "

## 2023-11-30 NOTE — PROGRESS NOTES
Advanced Endoscopy / Pancreaticobiliary Service    Reason for visit (Chief Complaint): Pancreatic cyst    Referring provider/PCP: Xavier Lawrence MD  [address] [place] [address]    History of Present Illness: Patient presents for above.  She is followed by 1 of my partners Dr. Lawrence in our GI clinic.  He had ordered a CT enterography that was performed in September for evaluation of abdominal pain.  On the CT, a 4 mm hypodense area was seen in the head of the pancreas.  The patient denies history of pancreatitis.  She is no family history of pancreatic cancer.  She drinks alcohol only rarely.  She is never been a smoker.       Physical Exam:  General: Well-developed, well-appearing, no acute distress      Laboratory:       Imaging:  Reviewed CT images from September and April of this year.  On my review, the same hypodense area can be seen also on the prior CT from April 2023.  Suspect these represent a stable and small sized pancreatic cyst.  The main pancreatic duct is not dilated.    Assessment/Plan:  Pancreatic cyst- as of now, the nature of the pancreatic cyst is indeterminate.  We discussed various etiologies of pancreatic cysts including benign as well as precancerous types.  Right now I would recommend continued imaging surveillance.  Per international guidelines that our group follows, the next surveillance imaging can be performed at about 2 years from her last imaging.  We will tentatively plan for an MRI of the pancreas to be done in September of 2025.  I will arrange for her to be seen in our pancreatic cyst Clinic in the summer of 2025 to assess for any interval symptom changes as well as candidacy for continued surveillance imaging.        Blane Gonzalez MD, BETTY   - Department of Gastroenterology  [facility] [place] [facility]

## 2023-12-05 ENCOUNTER — ANESTHESIA (OUTPATIENT)
Dept: ENDOSCOPY | Facility: HOSPITAL | Age: 78
End: 2023-12-05
Payer: MEDICARE

## 2023-12-05 ENCOUNTER — ANESTHESIA EVENT (OUTPATIENT)
Dept: ENDOSCOPY | Facility: HOSPITAL | Age: 78
End: 2023-12-05
Payer: MEDICARE

## 2023-12-05 ENCOUNTER — HOSPITAL ENCOUNTER (OUTPATIENT)
Facility: HOSPITAL | Age: 78
Discharge: HOME | End: 2023-12-05
Attending: INTERNAL MEDICINE | Admitting: INTERNAL MEDICINE
Payer: MEDICARE

## 2023-12-05 VITALS
WEIGHT: 145 LBS | DIASTOLIC BLOOD PRESSURE: 77 MMHG | HEIGHT: 64 IN | BODY MASS INDEX: 24.75 KG/M2 | SYSTOLIC BLOOD PRESSURE: 164 MMHG | RESPIRATION RATE: 18 BRPM | HEART RATE: 75 BPM | TEMPERATURE: 97 F | OXYGEN SATURATION: 97 %

## 2023-12-05 DIAGNOSIS — K21.9 GERD (GASTROESOPHAGEAL REFLUX DISEASE): ICD-10-CM

## 2023-12-05 PROCEDURE — 43239 PR EGD, FLEX, W/BIOPSY, SGL/MULTI: ICD-10-PCS | Mod: 51,,, | Performed by: INTERNAL MEDICINE

## 2023-12-05 PROCEDURE — E9220 PRA ENDO ANESTHESIA: ICD-10-PCS | Mod: ,,, | Performed by: NURSE ANESTHETIST, CERTIFIED REGISTERED

## 2023-12-05 PROCEDURE — 25000003 PHARM REV CODE 250: Performed by: NURSE ANESTHETIST, CERTIFIED REGISTERED

## 2023-12-05 PROCEDURE — 43239 EGD BIOPSY SINGLE/MULTIPLE: CPT | Mod: 51,,, | Performed by: INTERNAL MEDICINE

## 2023-12-05 PROCEDURE — 88305 TISSUE EXAM BY PATHOLOGIST: ICD-10-PCS | Mod: 26,,, | Performed by: PATHOLOGY

## 2023-12-05 PROCEDURE — 88342 CHG IMMUNOCYTOCHEMISTRY: ICD-10-PCS | Mod: 26,,, | Performed by: PATHOLOGY

## 2023-12-05 PROCEDURE — 45380 COLONOSCOPY AND BIOPSY: CPT | Performed by: INTERNAL MEDICINE

## 2023-12-05 PROCEDURE — 88305 TISSUE EXAM BY PATHOLOGIST: CPT | Mod: 59 | Performed by: PATHOLOGY

## 2023-12-05 PROCEDURE — 43239 EGD BIOPSY SINGLE/MULTIPLE: CPT | Performed by: INTERNAL MEDICINE

## 2023-12-05 PROCEDURE — E9220 PRA ENDO ANESTHESIA: HCPCS | Mod: ,,, | Performed by: NURSE ANESTHETIST, CERTIFIED REGISTERED

## 2023-12-05 PROCEDURE — 45380 PR COLONOSCOPY,BIOPSY: ICD-10-PCS | Mod: ,,, | Performed by: INTERNAL MEDICINE

## 2023-12-05 PROCEDURE — 37000009 HC ANESTHESIA EA ADD 15 MINS: Performed by: INTERNAL MEDICINE

## 2023-12-05 PROCEDURE — 88342 IMHCHEM/IMCYTCHM 1ST ANTB: CPT | Performed by: PATHOLOGY

## 2023-12-05 PROCEDURE — 45380 COLONOSCOPY AND BIOPSY: CPT | Mod: ,,, | Performed by: INTERNAL MEDICINE

## 2023-12-05 PROCEDURE — 88342 IMHCHEM/IMCYTCHM 1ST ANTB: CPT | Mod: 26,,, | Performed by: PATHOLOGY

## 2023-12-05 PROCEDURE — 88305 TISSUE EXAM BY PATHOLOGIST: CPT | Mod: 26,,, | Performed by: PATHOLOGY

## 2023-12-05 PROCEDURE — 27201012 HC FORCEPS, HOT/COLD, DISP: Performed by: INTERNAL MEDICINE

## 2023-12-05 PROCEDURE — 63600175 PHARM REV CODE 636 W HCPCS: Performed by: NURSE ANESTHETIST, CERTIFIED REGISTERED

## 2023-12-05 PROCEDURE — 37000008 HC ANESTHESIA 1ST 15 MINUTES: Performed by: INTERNAL MEDICINE

## 2023-12-05 PROCEDURE — 25000003 PHARM REV CODE 250: Performed by: INTERNAL MEDICINE

## 2023-12-05 RX ORDER — SODIUM CHLORIDE 9 MG/ML
INJECTION, SOLUTION INTRAVENOUS CONTINUOUS
Status: DISCONTINUED | OUTPATIENT
Start: 2023-12-05 | End: 2023-12-05 | Stop reason: HOSPADM

## 2023-12-05 RX ORDER — PROPOFOL 10 MG/ML
VIAL (ML) INTRAVENOUS
Status: DISCONTINUED | OUTPATIENT
Start: 2023-12-05 | End: 2023-12-05

## 2023-12-05 RX ORDER — LIDOCAINE HYDROCHLORIDE 20 MG/ML
INJECTION INTRAVENOUS
Status: DISCONTINUED | OUTPATIENT
Start: 2023-12-05 | End: 2023-12-05

## 2023-12-05 RX ORDER — PROPOFOL 10 MG/ML
INJECTION, EMULSION INTRAVENOUS CONTINUOUS PRN
Status: DISCONTINUED | OUTPATIENT
Start: 2023-12-05 | End: 2023-12-05

## 2023-12-05 RX ADMIN — LIDOCAINE HYDROCHLORIDE 50 MG: 20 INJECTION INTRAVENOUS at 09:12

## 2023-12-05 RX ADMIN — PROPOFOL 30 MG: 10 INJECTION, EMULSION INTRAVENOUS at 09:12

## 2023-12-05 RX ADMIN — PROPOFOL 175 MCG/KG/MIN: 10 INJECTION, EMULSION INTRAVENOUS at 09:12

## 2023-12-05 RX ADMIN — SODIUM CHLORIDE: 0.9 INJECTION, SOLUTION INTRAVENOUS at 08:12

## 2023-12-05 NOTE — PROVATION PATIENT INSTRUCTIONS
Discharge Summary/Instructions after an Endoscopic Procedure  Patient Name: Vlaarie Prakash  Patient MRN: [id number]  Patient YOB: 1945 Tuesday, December 5, 2023  Xavier Lawrence MD  Dear patient,  As a result of recent federal legislation (The Federal Cures Act), you may   receive lab or pathology results from your procedure in your [facility]   account before your physician is able to contact you. Your physician or   their representative will relay the results to you with their   recommendations at their soonest availability.  Thank you,  RESTRICTIONS:  During your procedure today, you received medications for sedation.  These   medications may affect your judgment, balance and coordination.  Therefore,   for 24 hours, you have the following restrictions:   - DO NOT drive a car, operate machinery, make legal/financial decisions,   sign important papers or drink alcohol.    ACTIVITY:  Today: no heavy lifting, straining or running due to procedural   sedation/anesthesia.  The following day: return to full activity including work.  DIET:  Eat and drink normally unless instructed otherwise.     TREATMENT FOR COMMON SIDE EFFECTS:  - Mild abdominal pain, nausea, belching, bloating or excessive gas:  rest,   eat lightly and use a heating pad.  - Sore Throat: treat with throat lozenges and/or gargle with warm salt   water.  - Because air was used during the procedure, expelling large amounts of air   from your rectum or belching is normal.  - If a bowel prep was taken, you may not have a bowel movement for 1-3 days.    This is normal.  SYMPTOMS TO WATCH FOR AND REPORT TO YOUR PHYSICIAN:  1. Abdominal pain or bloating, other than gas cramps.  2. Chest pain.  3. Back pain.  4. Signs of infection such as: chills or fever occurring within 24 hours   after the procedure.  5. Rectal bleeding, which would show as bright red, maroon, or black stools.   (A tablespoon of blood from the rectum is not serious, especially  if   hemorrhoids are present.)  6. Vomiting.  7. Weakness or dizziness.  GO DIRECTLY TO THE NEAREST EMERGENCY ROOM IF YOU HAVE ANY OF THE FOLLOWING:      Difficulty breathing              Chills and/or fever over 101 F   Persistent vomiting and/or vomiting blood   Severe abdominal pain   Severe chest pain   Black, tarry stools   Bleeding- more than one tablespoon   Any other symptom or condition that you feel may need urgent attention  Your doctor recommends these additional instructions:  If any biopsies were taken, your doctors clinic will contact you in 1 to 2   weeks with any results.  - Discharge patient to home.   - Follow an antireflux regimen indefinitely.   - Await pathology results.   - Telephone endoscopist for pathology results in 3 weeks.   - Repeat upper endoscopy in 1 year for surveillance and for surveillance   based on pathology results.   - Return to GI clinic at the next available appointment.   - The findings and recommendations were discussed with the patient.  For questions, problems or results please call your physician - Xavier Lawrence MD at Work:  [phone].  [facility], EMERGENCY ROOM PHONE NUMBER: [phone]  IF A COMPLICATION OR EMERGENCY SITUATION ARISES AND YOU ARE UNABLE TO REACH   YOUR PHYSICIAN - GO DIRECTLY TO THE EMERGENCY ROOM.  Xavier Lawrence MD  12/5/2023 9:57:16 AM  This report has been verified and signed electronically.  Dear patient,  As a result of recent federal legislation (The Federal Cures Act), you may   receive lab or pathology results from your procedure in your [facility]   account before your physician is able to contact you. Your physician or   their representative will relay the results to you with their   recommendations at their soonest availability.  Thank you,  PROVATION

## 2023-12-05 NOTE — PROVATION PATIENT INSTRUCTIONS
Discharge Summary/Instructions after an Endoscopic Procedure  Patient Name: Valarie Prakash  Patient MRN: [id number]  Patient YOB: 1945 Tuesday, December 5, 2023  Xavier Lawrence MD  Dear patient,  As a result of recent federal legislation (The Federal Cures Act), you may   receive lab or pathology results from your procedure in your [facility]   account before your physician is able to contact you. Your physician or   their representative will relay the results to you with their   recommendations at their soonest availability.  Thank you,  RESTRICTIONS:  During your procedure today, you received medications for sedation.  These   medications may affect your judgment, balance and coordination.  Therefore,   for 24 hours, you have the following restrictions:   - DO NOT drive a car, operate machinery, make legal/financial decisions,   sign important papers or drink alcohol.    ACTIVITY:  Today: no heavy lifting, straining or running due to procedural   sedation/anesthesia.  The following day: return to full activity including work.  DIET:  Eat and drink normally unless instructed otherwise.     TREATMENT FOR COMMON SIDE EFFECTS:  - Mild abdominal pain, nausea, belching, bloating or excessive gas:  rest,   eat lightly and use a heating pad.  - Sore Throat: treat with throat lozenges and/or gargle with warm salt   water.  - Because air was used during the procedure, expelling large amounts of air   from your rectum or belching is normal.  - If a bowel prep was taken, you may not have a bowel movement for 1-3 days.    This is normal.  SYMPTOMS TO WATCH FOR AND REPORT TO YOUR PHYSICIAN:  1. Abdominal pain or bloating, other than gas cramps.  2. Chest pain.  3. Back pain.  4. Signs of infection such as: chills or fever occurring within 24 hours   after the procedure.  5. Rectal bleeding, which would show as bright red, maroon, or black stools.   (A tablespoon of blood from the rectum is not serious, especially  if   hemorrhoids are present.)  6. Vomiting.  7. Weakness or dizziness.  GO DIRECTLY TO THE NEAREST EMERGENCY ROOM IF YOU HAVE ANY OF THE FOLLOWING:      Difficulty breathing              Chills and/or fever over 101 F   Persistent vomiting and/or vomiting blood   Severe abdominal pain   Severe chest pain   Black, tarry stools   Bleeding- more than one tablespoon   Any other symptom or condition that you feel may need urgent attention  Your doctor recommends these additional instructions:  If any biopsies were taken, your doctors clinic will contact you in 1 to 2   weeks with any results.  - Discharge patient to home.   - Await pathology results.   - Telephone endoscopist for pathology results in 3 weeks.   - Repeat colonoscopy in 5 years for surveillance.   - Return to GI clinic.   - The findings and recommendations were discussed with the patient.  For questions, problems or results please call your physician - Xavier Lawrence MD at Work:  [phone].  [facility], EMERGENCY ROOM PHONE NUMBER: [phone]  IF A COMPLICATION OR EMERGENCY SITUATION ARISES AND YOU ARE UNABLE TO REACH   YOUR PHYSICIAN - GO DIRECTLY TO THE EMERGENCY ROOM.  Xavier Lawrence MD  12/5/2023 9:56:30 AM  This report has been verified and signed electronically.  Dear patient,  As a result of recent federal legislation (The Federal Cures Act), you may   receive lab or pathology results from your procedure in your [facility]   account before your physician is able to contact you. Your physician or   their representative will relay the results to you with their   recommendations at their soonest availability.  Thank you,  PROVATION

## 2023-12-05 NOTE — ANESTHESIA PREPROCEDURE EVALUATION
12/05/2023  Valarie Colindres is a 78 y.o., female.    Past Medical History:   Diagnosis Date    Angle recession of right eye     Blunt trauma of both eyes     hit across eyes with bungee exercise band    Cataract     Cystocele, midline     Dry eye syndrome     Ectropion due to laxity of eyelid, right     GERD (gastroesophageal reflux disease)     Hyperlipidemia     Hypertension     PVD (posterior vitreous detachment), right eye     Rectocele     Retinal drusen of both eyes     Thalassemia major     Vaginal atrophy           Pre-op Assessment    I have reviewed the Patient Summary Reports.     I have reviewed the Nursing Notes. I have reviewed the NPO Status.      Review of Systems  Anesthesia Hx:             Denies Family Hx of Anesthesia complications.    Denies Personal Hx of Anesthesia complications.                    Cardiovascular:     Hypertension                                        Pulmonary:  Pulmonary Normal                       Neurological:    Denies CVA.    Denies Seizures.                                    Physical Exam  General: Well nourished, Cooperative, Alert and Oriented    Airway:  Mallampati: II   Mouth Opening: Normal  TM Distance: Normal  Tongue: Normal  Neck ROM: Normal ROM    Dental:  Intact    Chest/Lungs:  Normal Respiratory Rate    Heart:  Rate: Normal        Anesthesia Plan  Type of Anesthesia, risks & benefits discussed:    Anesthesia Type: Gen Natural Airway  Intra-op Monitoring Plan: Standard ASA Monitors  Induction:  IV  Informed Consent: Informed consent signed with the Patient and all parties understand the risks and agree with anesthesia plan.  All questions answered.   ASA Score: 2  Day of Surgery Review of History & Physical: H&P Update referred to the surgeon/provider.    Ready For Surgery From Anesthesia Perspective.     .

## 2023-12-05 NOTE — ANESTHESIA POSTPROCEDURE EVALUATION
Anesthesia Post Evaluation    Patient: Valarie Colindres    Procedure(s) Performed: Procedure(s) (LRB):  EGD (ESOPHAGOGASTRODUODENOSCOPY) (N/A)  COLONOSCOPY (N/A)    Final Anesthesia Type: general      Patient location during evaluation: GI PACU  Patient participation: Yes- Able to Participate  Level of consciousness: awake and alert  Post-procedure vital signs: reviewed and stable  Pain management: adequate  Airway patency: patent    PONV status at discharge: No PONV  Anesthetic complications: no      Cardiovascular status: blood pressure returned to baseline and stable  Respiratory status: unassisted, spontaneous ventilation and room air  Hydration status: euvolemic  Follow-up not needed.              Vitals Value Taken Time   /77 12/05/23 1025   Temp 36.3 °C (97.3 °F) 12/05/23 0953   Pulse 75 12/05/23 1025   Resp 18 12/05/23 1025   SpO2 97 % 12/05/23 1025         Event Time   Out of Recovery 10:27:04         Pain/Chauncey Score: Chauncey Score: 10 (12/5/2023 10:26 AM)

## 2023-12-05 NOTE — TRANSFER OF CARE
Anesthesia Transfer of Care Note    Patient: Valarie Colindres    Procedure(s) Performed: Procedure(s) (LRB):  EGD (ESOPHAGOGASTRODUODENOSCOPY) (N/A)  COLONOSCOPY (N/A)    Patient location: GI    Anesthesia Type: general    Transport from OR: Transported from OR on room air with adequate spontaneous ventilation    Post pain: adequate analgesia    Post assessment: no apparent anesthetic complications    Post vital signs: stable    Level of consciousness: responds to stimulation and sedated    Nausea/Vomiting: no nausea/vomiting    Complications: none    Transfer of care protocol was followed      Last vitals: Visit Vitals  /68   Pulse 65   Temp 36   Resp 19   Ht    Wt    SpO2 100%   Breastfeeding    BMI

## 2023-12-05 NOTE — H&P
[facility]  History & Physical    Subjective:      Chief Complaint/Reason for Admission:     EGD and colonoscopy for GERD and change in bowel habits constipation.    Valarie Colindres is a 78 y.o. female.    Past Medical History:   Diagnosis Date    Angle recession of right eye     Blunt trauma of both eyes     hit across eyes with bungee exercise band    Cataract     Cystocele, midline     Dry eye syndrome     Ectropion due to laxity of eyelid, right     GERD (gastroesophageal reflux disease)     Hyperlipidemia     Hypertension     PVD (posterior vitreous detachment), right eye     Rectocele     Retinal drusen of both eyes     Thalassemia major     Vaginal atrophy      Past Surgical History:   Procedure Laterality Date    ABDOMINAL ADHESION SURGERY  1978    ANKLE FRACTURE SURGERY Right 1996    COLONOSCOPY  11/09/2022    ECTROPION REPAIR Bilateral 06/2018    Dr. Flaherty    HYSTERECTOMY  1977    possible cancerous lesion       Social History     Tobacco Use    Smoking status: Never    Smokeless tobacco: Never   Substance Use Topics    Alcohol use: Not Currently     Comment: none    Drug use: No       PTA Medications   Medication Sig    acetaminophen (TYLENOL) 500 MG tablet Take 1-2 tablets (500-1,000 mg total) by mouth 3 (three) times daily as needed for Pain.    alendronate (FOSAMAX) 70 MG tablet TK 1 T PO EVERY WEEK    amLODIPine (NORVASC) 5 MG tablet     aspirin (ECOTRIN) 81 MG EC tablet Take 1 tablet by mouth once daily.    azelastine (ASTELIN) 137 mcg (0.1 %) nasal spray Two sprays in each nostril, sniff until absorbed, then follow with 1 spray of fluticasone.  Use both sprays twice daily.    chlorhexidine (PERIDEX) 0.12 % solution RINSE AND SWISH ONE CAPFUL BID    chlorzoxazone (PARAFON FORTE) 500 mg Tab TAKE 1 TABLET(S) 3 TIMES A DAY BY ORAL ROUTE AS NEEDED.    clobetasoL (TEMOVATE) 0.05 % external solution     diazePAM (VALIUM) 5 MG tablet     diclofenac sodium (VOLTAREN) 1 % Gel Apply  2 g topically 4 (four) times daily.    famotidine (PEPCID) 20 MG tablet TAKE 1 TABLET BY MOUTH TWICE A DAY    hydroCHLOROthiazide (HYDRODIURIL) 12.5 MG Tab Take 1 tablet (12.5 mg total) by mouth once daily.    levothyroxine (SYNTHROID) 100 MCG tablet Take 1 tablet (100 mcg total) by mouth once daily.    pantoprazole (PROTONIX) 40 MG tablet Take 40 mg by mouth every morning.    valsartan (DIOVAN) 320 MG tablet Take 1 tablet (320 mg total) by mouth once daily.    atorvastatin (LIPITOR) 10 MG tablet Take 1 tablet (10 mg total) by mouth every evening.    loratadine (CLARITIN) 10 mg tablet Take 1 tablet (10 mg total) by mouth once daily.     Review of patient's allergies indicates:   Allergen Reactions    Sutures, catgut (chromic)         Review of Systems   Constitutional:  Negative for fever.       Objective:      Vital Signs (Most Recent)  Temp: 98.1 °F (36.7 °C) (12/05/23 0838)  Pulse: 72 (12/05/23 0838)  Resp: 15 (12/05/23 0838)  BP: (!) 160/77 (12/05/23 0838)  SpO2: 99 % (12/05/23 0838)    Vital Signs Range (Last 24H):  Temp:  [98.1 °F (36.7 °C)]   Pulse:  [72]   Resp:  [15]   BP: (160)/(77)   SpO2:  [99 %]     Physical Exam  Cardiovascular:      Rate and Rhythm: Normal rate.   Pulmonary:      Effort: Pulmonary effort is normal.   Neurological:      Mental Status: She is alert and oriented to person, place, and time.         Assessment:      EGD and colonoscopy for GERD and change in bowel habits constipation.      Plan:    EGD and colonoscopy for GERD and change in bowel habits constipation.

## 2023-12-07 ENCOUNTER — TELEPHONE (OUTPATIENT)
Dept: PRIMARY CARE CLINIC | Facility: CLINIC | Age: 78
End: 2023-12-07
Payer: MEDICARE

## 2023-12-07 NOTE — TELEPHONE ENCOUNTER
----- Message from Johnathan Juan sent at 12/7/2023  2:51 PM CST -----  Contact: [phone]  Patient is returning a phone call.  Who left a message for the patient: CARLOS   Does patient know what this is regarding:  PT SAID SHE WAS CONTACTED  FOR SISTERS RESULTS  FREDIS RIVERA   Would you like a call back, or a response through your [facility] portal?:   CALL BACK   Comments:

## 2023-12-08 LAB
FINAL PATHOLOGIC DIAGNOSIS: NORMAL
GROSS: NORMAL
Lab: NORMAL

## 2023-12-11 ENCOUNTER — PATIENT MESSAGE (OUTPATIENT)
Dept: GASTROENTEROLOGY | Facility: CLINIC | Age: 78
End: 2023-12-11
Payer: MEDICARE

## 2023-12-11 NOTE — PROGRESS NOTES
[place] your EGD colonoscopy pathology was benign no celiac sprue no H pylori.    Your colon polyps were adenomas those are precancerous but benign types of colon polyps.    Recommend you next EGD for follow-up 1 year    Recommend you next surveillance colonoscopy 5 years.    1. Duodenum, biopsy:  - Duodenal mucosa with lymphoid aggregate and reactive changes.      2. Stomach, biopsy:  - Antral and oxyntic mucosa with minimal reactive changes.    - Immunostain for H.pylori is negative, see comment.    3. Stomach, cardia just below Z line, biopsy:  - Oxyntocardiac mucosa with reactive hyperplastic changes.    - Negative for intestinal metaplasia and dysplasia.    4. Colon, cecum, less than 1 mm polyp, biopsy:  - Tubular adenoma.    5. Colon, ascending, 1 mm polyp, biopsy:  - Tubular adenoma.    COMMENT: Appropriate positive controls are examined.   Comment: Interp By Vanessa Gomes MD, Signed on 12/08/2023

## 2023-12-12 ENCOUNTER — OFFICE VISIT (OUTPATIENT)
Dept: SPINE | Facility: CLINIC | Age: 78
End: 2023-12-12
Payer: MEDICARE

## 2023-12-12 ENCOUNTER — TELEPHONE (OUTPATIENT)
Dept: ORTHOPEDICS | Facility: CLINIC | Age: 78
End: 2023-12-12
Payer: MEDICARE

## 2023-12-12 VITALS
OXYGEN SATURATION: 100 % | HEIGHT: 64 IN | RESPIRATION RATE: 18 BRPM | WEIGHT: 145.06 LBS | BODY MASS INDEX: 24.77 KG/M2 | DIASTOLIC BLOOD PRESSURE: 68 MMHG | HEART RATE: 61 BPM | SYSTOLIC BLOOD PRESSURE: 130 MMHG

## 2023-12-12 DIAGNOSIS — G89.29 CHRONIC RIGHT SHOULDER PAIN: ICD-10-CM

## 2023-12-12 DIAGNOSIS — M25.511 CHRONIC RIGHT SHOULDER PAIN: ICD-10-CM

## 2023-12-12 DIAGNOSIS — M51.36 DDD (DEGENERATIVE DISC DISEASE), LUMBAR: ICD-10-CM

## 2023-12-12 DIAGNOSIS — M54.9 DORSALGIA: Primary | ICD-10-CM

## 2023-12-12 DIAGNOSIS — M47.816 SPONDYLOSIS OF LUMBAR REGION WITHOUT MYELOPATHY OR RADICULOPATHY: ICD-10-CM

## 2023-12-12 PROCEDURE — 3075F PR MOST RECENT SYSTOLIC BLOOD PRESS GE 130-139MM HG: ICD-10-PCS | Mod: CPTII,S$GLB,, | Performed by: NURSE PRACTITIONER

## 2023-12-12 PROCEDURE — 1125F PR PAIN SEVERITY QUANTIFIED, PAIN PRESENT: ICD-10-PCS | Mod: CPTII,S$GLB,, | Performed by: NURSE PRACTITIONER

## 2023-12-12 PROCEDURE — 99999 PR PBB SHADOW E&M-EST. PATIENT-LVL IV: CPT | Mod: PBBFAC,,, | Performed by: NURSE PRACTITIONER

## 2023-12-12 PROCEDURE — 3075F SYST BP GE 130 - 139MM HG: CPT | Mod: CPTII,S$GLB,, | Performed by: NURSE PRACTITIONER

## 2023-12-12 PROCEDURE — 1125F AMNT PAIN NOTED PAIN PRSNT: CPT | Mod: CPTII,S$GLB,, | Performed by: NURSE PRACTITIONER

## 2023-12-12 PROCEDURE — 3288F FALL RISK ASSESSMENT DOCD: CPT | Mod: CPTII,S$GLB,, | Performed by: NURSE PRACTITIONER

## 2023-12-12 PROCEDURE — 3078F PR MOST RECENT DIASTOLIC BLOOD PRESSURE < 80 MM HG: ICD-10-PCS | Mod: CPTII,S$GLB,, | Performed by: NURSE PRACTITIONER

## 2023-12-12 PROCEDURE — 1101F PT FALLS ASSESS-DOCD LE1/YR: CPT | Mod: CPTII,S$GLB,, | Performed by: NURSE PRACTITIONER

## 2023-12-12 PROCEDURE — 99999 PR PBB SHADOW E&M-EST. PATIENT-LVL IV: ICD-10-PCS | Mod: PBBFAC,,, | Performed by: NURSE PRACTITIONER

## 2023-12-12 PROCEDURE — 1160F RVW MEDS BY RX/DR IN RCRD: CPT | Mod: CPTII,S$GLB,, | Performed by: NURSE PRACTITIONER

## 2023-12-12 PROCEDURE — 99214 OFFICE O/P EST MOD 30 MIN: CPT | Mod: S$GLB,,, | Performed by: NURSE PRACTITIONER

## 2023-12-12 PROCEDURE — 1159F MED LIST DOCD IN RCRD: CPT | Mod: CPTII,S$GLB,, | Performed by: NURSE PRACTITIONER

## 2023-12-12 PROCEDURE — 3288F PR FALLS RISK ASSESSMENT DOCUMENTED: ICD-10-PCS | Mod: CPTII,S$GLB,, | Performed by: NURSE PRACTITIONER

## 2023-12-12 PROCEDURE — 1101F PR PT FALLS ASSESS DOC 0-1 FALLS W/OUT INJ PAST YR: ICD-10-PCS | Mod: CPTII,S$GLB,, | Performed by: NURSE PRACTITIONER

## 2023-12-12 PROCEDURE — 1159F PR MEDICATION LIST DOCUMENTED IN MEDICAL RECORD: ICD-10-PCS | Mod: CPTII,S$GLB,, | Performed by: NURSE PRACTITIONER

## 2023-12-12 PROCEDURE — 1160F PR REVIEW ALL MEDS BY PRESCRIBER/CLIN PHARMACIST DOCUMENTED: ICD-10-PCS | Mod: CPTII,S$GLB,, | Performed by: NURSE PRACTITIONER

## 2023-12-12 PROCEDURE — 99214 PR OFFICE/OUTPT VISIT, EST, LEVL IV, 30-39 MIN: ICD-10-PCS | Mod: S$GLB,,, | Performed by: NURSE PRACTITIONER

## 2023-12-12 PROCEDURE — 3078F DIAST BP <80 MM HG: CPT | Mod: CPTII,S$GLB,, | Performed by: NURSE PRACTITIONER

## 2023-12-12 NOTE — PROGRESS NOTES
Subjective:     Patient ID: Valarie Colindres is a 78 y.o. female.    Chief Complaint: No chief complaint on file.    Interval history 12/12/2023  Ms. Colindres presents today for follow-up of low back pain.  She completed physical therapy with good improvement in her back pain.  Today she is complaining of shoulder pain and would like to see an orthopedic    Interval History 9/11/2023:  Ms. Colindres presents today for follow-up of low back pain.  She did attend the initial evaluation for healthy back in his scheduled to start on Friday.  Her back pain is unchanged time and she is looking forward to starting therapy.    HPI Ms. Colindres is a 78 year old female here for evaluation of low back pain.     Complaints of chronic low back pain for years, R>L  Denies leg pain, but does note numbness and tingling in her feet secondary to peripheral neuropathy  Did PT in 2022 with some improvement but did not maintain home exercise program and pain returned  Not taking any medications for pain, no injections in the past    Lumbar xray 2021    FINDINGS:  Lumbar vertebral body heights are maintained.  Disc space narrowing endplate changes L4-5.  Multilevel facet arthropathy.  AP alignment is anatomic.     Impression:     No acute osseous abnormality seen.  Degenerative change lower lumbar spine.    Past Medical History:   Diagnosis Date    Angle recession of right eye     Blunt trauma of both eyes     hit across eyes with bungee exercise band    Cataract     Cystocele, midline     Dry eye syndrome     Ectropion due to laxity of eyelid, right     GERD (gastroesophageal reflux disease)     Hyperlipidemia     Hypertension     PVD (posterior vitreous detachment), right eye     Rectocele     Retinal drusen of both eyes     Thalassemia major     Vaginal atrophy        Past Surgical History:   Procedure Laterality Date    ABDOMINAL ADHESION SURGERY  1978    ANKLE FRACTURE SURGERY Right 1996    COLONOSCOPY  11/09/2022    COLONOSCOPY  N/A 12/5/2023    Procedure: COLONOSCOPY;  Surgeon: Xavier Lawrence MD;  Location: [facility] ENDO (4TH FLR);  Service: Endoscopy;  Laterality: N/A;    ECTROPION REPAIR Bilateral 06/2018    Dr. Flaherty    ESOPHAGOGASTRODUODENOSCOPY N/A 12/5/2023    Procedure: EGD (ESOPHAGOGASTRODUODENOSCOPY);  Surgeon: Xavier Lawrence MD;  Location: [facility] ENDO (4TH FLR);  Service: Endoscopy;  Laterality: N/A;  9/6- referred by Dr. Lawrence/Additional Scheduling Information:  Needs constipation bowel prep protocol / prep instructions handed to patient and to portal. AS  11/28-precall complete-MS    HYSTERECTOMY  1977    possible cancerous lesion       Family History   Problem Relation Age of Onset    Stroke Mother     Heart disease Mother     Prostate cancer Father     Colon cancer Sister     Hypertension Sister     No Known Problems Sister     No Known Problems Brother     Prostate cancer Brother     Diabetes Brother     No Known Problems Brother     Crohn's disease Daughter     Breast cancer Maternal Aunt     Glaucoma Maternal Uncle     Blindness Maternal Uncle     Hypertension Maternal Grandmother     No Known Problems Maternal Grandfather     No Known Problems Paternal Grandmother     No Known Problems Paternal Grandfather     Cataracts Neg Hx     Macular degeneration Neg Hx        Social History     Socioeconomic History    Marital status:    Occupational History     Comment: Retired in 2018 -    Tobacco Use    Smoking status: Never    Smokeless tobacco: Never   Substance and Sexual Activity    Alcohol use: Not Currently     Comment: none    Drug use: No    Sexual activity: Not Currently     Social Determinants of Health     Financial Resource Strain: Low Risk  (11/14/2023)    Overall Financial Resource Strain (CARDIA)     Difficulty of Paying Living Expenses: Not hard at all   Food Insecurity: No Food Insecurity (11/14/2023)    Hunger Vital Sign     Worried About Running Out of Food in the Last Year: Never  true     Ran Out of Food in the Last Year: Never true   Transportation Needs: No Transportation Needs (11/14/2023)    PRAPARE - Transportation     Lack of Transportation (Medical): No     Lack of Transportation (Non-Medical): No   Physical Activity: Insufficiently Active (11/14/2023)    Exercise Vital Sign     Days of Exercise per Week: 2 days     Minutes of Exercise per Session: 60 min   Stress: No Stress Concern Present (11/14/2023)    [place] [employer] - Occupational Stress Questionnaire     Feeling of Stress : Not at all   Social Connections: Moderately Isolated (11/14/2023)    Social Connection and Isolation Panel [NHANES]     Frequency of Communication with Friends and Family: Once a week     Frequency of Social Gatherings with Friends and Family: More than three times a week     Attends Worship Services: More than 4 times per year     Active Member of Clubs or Organizations: No     Attends Club or Organization Meetings: Never     Marital Status:    Housing Stability: Low Risk  (11/14/2023)    Housing Stability Vital Sign     Unable to Pay for Housing in the Last Year: No     Number of Places Lived in the Last Year: 1     Unstable Housing in the Last Year: No       Current Outpatient Medications   Medication Sig Dispense Refill    acetaminophen (TYLENOL) 500 MG tablet Take 1-2 tablets (500-1,000 mg total) by mouth 3 (three) times daily as needed for Pain.  0    alendronate (FOSAMAX) 70 MG tablet TK 1 T PO EVERY WEEK      amLODIPine (NORVASC) 5 MG tablet       aspirin (ECOTRIN) 81 MG EC tablet Take 1 tablet by mouth once daily.      atorvastatin (LIPITOR) 10 MG tablet Take 1 tablet (10 mg total) by mouth every evening. 90 tablet 3    azelastine (ASTELIN) 137 mcg (0.1 %) nasal spray Two sprays in each nostril, sniff until absorbed, then follow with 1 spray of fluticasone.  Use both sprays twice daily. 30 mL 11    chlorhexidine (PERIDEX) 0.12 % solution RINSE AND SWISH ONE CAPFUL  BID  1    chlorzoxazone (PARAFON FORTE) 500 mg Tab TAKE 1 TABLET(S) 3 TIMES A DAY BY ORAL ROUTE AS NEEDED.      clobetasoL (TEMOVATE) 0.05 % external solution       diazePAM (VALIUM) 5 MG tablet       diclofenac sodium (VOLTAREN) 1 % Gel Apply 2 g topically 4 (four) times daily. 1 each 2    famotidine (PEPCID) 20 MG tablet TAKE 1 TABLET BY MOUTH TWICE A  tablet 3    hydroCHLOROthiazide (HYDRODIURIL) 12.5 MG Tab Take 1 tablet (12.5 mg total) by mouth once daily. 90 tablet 3    levothyroxine (SYNTHROID) 100 MCG tablet Take 1 tablet (100 mcg total) by mouth once daily. 90 tablet 3    loratadine (CLARITIN) 10 mg tablet Take 1 tablet (10 mg total) by mouth once daily. 30 tablet 11    pantoprazole (PROTONIX) 40 MG tablet Take 40 mg by mouth every morning.      valsartan (DIOVAN) 320 MG tablet Take 1 tablet (320 mg total) by mouth once daily. 90 tablet 3     No current facility-administered medications for this visit.       Review of patient's allergies indicates:   Allergen Reactions    Sutures, catgut (chromic)         Review of Systems   Constitutional: Negative for fever.   Cardiovascular:  Negative for chest pain.   Respiratory:  Negative for shortness of breath.    Musculoskeletal:  Positive for back pain. Negative for falls.   Gastrointestinal:  Negative for abdominal pain, bowel incontinence, nausea and vomiting.   Genitourinary:  Negative for bladder incontinence.   Neurological:  Positive for numbness and paresthesias.        Objective:     General: [place] is well-developed, well-nourished, appears stated age, in no acute distress, alert and oriented to time, place and person.     General    Vitals reviewed.  Constitutional: She is oriented to person, place, and time. She appears well-developed and well-nourished.   HENT:   Head: Atraumatic.   Nose: Nose normal.   Eyes: Conjunctivae are normal.   Cardiovascular:  Normal rate.            Pulmonary/Chest: Effort normal.   Neurological: She is alert and  oriented to person, place, and time.   Psychiatric: She has a normal mood and affect. Her behavior is normal. Judgment and thought content normal.     General Musculoskeletal Exam   Gait: normal     Back (L-Spine & T-Spine) / Neck (C-Spine) Exam     Tenderness Posterior midline palpation reveals tenderness of the Lower L-Spine. Right paramedian tenderness of the Lower L-Spine.     Back (L-Spine & T-Spine) Range of Motion   Extension:  10   Flexion:  90   Lateral bend right:  10   Lateral bend left:  10     Spinal Sensation   Right Side Sensation  L-Spine Level: normal  S-Spine Level: normal  Left Side Sensation  L-Spine Level: normal  S-Spine Level: normal    Other   She has no scoliosis .  Spinal Kyphosis:  Absent    Comments:  Pain with facet loading on the right      Muscle Strength   Right Upper Extremity   Biceps: 5/5   Deltoid:  5/5  Triceps:  5/5  Left Upper Extremity  Biceps: 5/5   Deltoid:  5/5  Triceps:  5/5  Right Lower Extremity   Hip Flexion: 5/5   Quadriceps:  5/5   Ankle Dorsiflexion:  5/5   Anterior tibial:  5/5   EHL:  5/5  Left Lower Extremity   Hip Flexion: 5/5   Quadriceps:  5/5   Ankle Dorsiflexion:  5/5   Anterior tibial:  5/5   EHL:  5/5    Reflexes     Left Side  Biceps:  2+  Brachioradialis:  2+  Achilles:  2+  Ankle Clonus:  absent    Right Side   Biceps:  2+  Brachioradialis:  2+  Achilles:  2+  Ankle Clonus:  absent    Vascular Exam     Right Pulses        Carotid:                  2+    Left Pulses        Carotid:                  2+          Assessment:     1. Dorsalgia    2. Spondylosis of lumbar region without myelopathy or radiculopathy    3. DDD (degenerative disc disease), lumbar    4. Chronic right shoulder pain             Plan:        Prior imaging and records reviewed today  Back pain improved with completion of Healthy Back.  She continues with regular home exercise  Complaints of shoulder pain, will refer to orthopedics for evaluation  We discussed posture sitting and the  importance of trying to sit better.    We discussed the benefits of therapy and exercise and continuing to move.   Return for follow-up as needed      Follow-up: No follow-ups on file.  Prior records reviewed today If there are any questions prior to this, the patient was instructed to contact the office.

## 2023-12-13 ENCOUNTER — OFFICE VISIT (OUTPATIENT)
Dept: URGENT CARE | Facility: CLINIC | Age: 78
End: 2023-12-13
Payer: MEDICARE

## 2023-12-13 VITALS
TEMPERATURE: 98 F | WEIGHT: 145.06 LBS | DIASTOLIC BLOOD PRESSURE: 73 MMHG | SYSTOLIC BLOOD PRESSURE: 122 MMHG | BODY MASS INDEX: 24.77 KG/M2 | HEART RATE: 68 BPM | OXYGEN SATURATION: 98 % | HEIGHT: 64 IN | RESPIRATION RATE: 16 BRPM

## 2023-12-13 DIAGNOSIS — Z20.822 EXPOSURE TO COVID-19 VIRUS: Primary | ICD-10-CM

## 2023-12-13 LAB
CTP QC/QA: YES
SARS-COV-2 AG RESP QL IA.RAPID: NEGATIVE

## 2023-12-13 PROCEDURE — 99213 PR OFFICE/OUTPT VISIT, EST, LEVL III, 20-29 MIN: ICD-10-PCS | Mod: S$GLB,,, | Performed by: NURSE PRACTITIONER

## 2023-12-13 PROCEDURE — 99213 OFFICE O/P EST LOW 20 MIN: CPT | Mod: S$GLB,,, | Performed by: NURSE PRACTITIONER

## 2023-12-13 PROCEDURE — 87811 SARS-COV-2 COVID19 W/OPTIC: CPT | Mod: QW,S$GLB,, | Performed by: NURSE PRACTITIONER

## 2023-12-13 PROCEDURE — 87811 SARS CORONAVIRUS 2 ANTIGEN POCT, MANUAL READ: ICD-10-PCS | Mod: QW,S$GLB,, | Performed by: NURSE PRACTITIONER

## 2023-12-13 NOTE — PROGRESS NOTES
"Subjective:      Patient ID: Valarie Colindres is a 78 y.o. female.    Vitals:  height is 5' 4" (1.626 m) and weight is 65.8 kg (145 lb 1 oz). Her oral temperature is 98.3 °F (36.8 °C). Her blood pressure is 122/73 and her pulse is 68. Her respiration is 16 and oxygen saturation is 98%.     Chief Complaint: Cough    Patient reports with symptoms starting on Sunday and reports with a positive COVID 19 exposure on Friday last week.    Cough  This is a new problem. The current episode started in the past 7 days. The problem has been gradually worsening. The problem occurs every few minutes. The cough is Non-productive. Associated symptoms include chills, nasal congestion and a sore throat. Pertinent negatives include no shortness of breath. The symptoms are aggravated by lying down. She has tried nothing for the symptoms. Her past medical history is significant for environmental allergies. There is no history of asthma, bronchiectasis, bronchitis, COPD, emphysema or pneumonia.       Constitution: Positive for chills.   HENT:  Positive for sore throat.    Neck: neck negative.   Respiratory:  Positive for cough. Negative for shortness of breath.    Allergic/Immunologic: Positive for environmental allergies.      Objective:     Physical Exam   HENT:   Head: Normocephalic.   Ears:   Right Ear: Tympanic membrane, external ear and ear canal normal.   Left Ear: Tympanic membrane, external ear and ear canal normal.   Nose: Nose normal.   Mouth/Throat: Mucous membranes are moist. Oropharynx is clear.   Cardiovascular: Normal rate and regular rhythm.   Pulmonary/Chest: Effort normal and breath sounds normal.   Abdominal: Normal appearance.   Neurological: She is alert.   Skin: Skin is warm and dry.       Assessment:     1. Cough, unspecified type        Plan:       Cough, unspecified type  -     SARS Coronavirus 2 Antigen, POCT Manual Read      Results for orders placed or performed in visit on 12/13/23   SARS Coronavirus 2 " Antigen, POCT Manual Read   Result Value Ref Range    SARS Coronavirus 2 Antigen Negative Negative     Acceptable Yes

## 2023-12-15 ENCOUNTER — HOSPITAL ENCOUNTER (OUTPATIENT)
Dept: RADIOLOGY | Facility: HOSPITAL | Age: 78
Discharge: HOME | End: 2023-12-15
Attending: INTERNAL MEDICINE
Payer: MEDICARE

## 2023-12-15 DIAGNOSIS — K76.9 LIVER LESION: ICD-10-CM

## 2023-12-15 PROCEDURE — 76705 US ABDOMEN LIMITED: ICD-10-PCS | Mod: 26,,, | Performed by: STUDENT IN AN ORGANIZED HEALTH CARE EDUCATION/TRAINING PROGRAM

## 2023-12-15 PROCEDURE — 76705 ECHO EXAM OF ABDOMEN: CPT | Mod: 26,,, | Performed by: STUDENT IN AN ORGANIZED HEALTH CARE EDUCATION/TRAINING PROGRAM

## 2023-12-15 PROCEDURE — 76705 ECHO EXAM OF ABDOMEN: CPT | Mod: TC

## 2023-12-16 ENCOUNTER — PATIENT MESSAGE (OUTPATIENT)
Dept: GASTROENTEROLOGY | Facility: CLINIC | Age: 78
End: 2023-12-16
Payer: MEDICARE

## 2023-12-16 DIAGNOSIS — K76.89 LIVER CYST: Primary | ICD-10-CM

## 2023-12-16 NOTE — PROGRESS NOTES
Pamela please schedule [place] for follow-up liver ultrasound in 6 months which would be June of 2024 for follow-up liver cyst orders placed    Follow-up liver ultrasound in 6 months June of 2024 for complex liver cyst    Impression:    Multiple simple to minimally complex hepatic cysts as detailed above.  Overall, lesions are similar from comparison ultrasound 10/31/2022.      Electronically signed by: Holger Singh  Date:                                            12/15/2023

## 2023-12-17 ENCOUNTER — PATIENT MESSAGE (OUTPATIENT)
Dept: PRIMARY CARE CLINIC | Facility: CLINIC | Age: 78
End: 2023-12-17
Payer: MEDICARE

## 2023-12-20 ENCOUNTER — OFFICE VISIT (OUTPATIENT)
Dept: URGENT CARE | Facility: CLINIC | Age: 78
End: 2023-12-20
Payer: MEDICARE

## 2023-12-20 VITALS
HEART RATE: 67 BPM | OXYGEN SATURATION: 98 % | SYSTOLIC BLOOD PRESSURE: 147 MMHG | RESPIRATION RATE: 18 BRPM | WEIGHT: 145 LBS | TEMPERATURE: 98 F | DIASTOLIC BLOOD PRESSURE: 71 MMHG | BODY MASS INDEX: 24.75 KG/M2 | HEIGHT: 64 IN

## 2023-12-20 DIAGNOSIS — U07.1 COVID-19 VIRUS DETECTED: ICD-10-CM

## 2023-12-20 DIAGNOSIS — J06.9 VIRAL URI WITH COUGH: Primary | ICD-10-CM

## 2023-12-20 DIAGNOSIS — U07.1 COVID: ICD-10-CM

## 2023-12-20 LAB
CTP QC/QA: YES
SARS-COV-2 AG RESP QL IA.RAPID: POSITIVE

## 2023-12-20 PROCEDURE — 87811 SARS CORONAVIRUS 2 ANTIGEN POCT, MANUAL READ: ICD-10-PCS | Mod: QW,S$GLB,, | Performed by: FAMILY MEDICINE

## 2023-12-20 PROCEDURE — 99213 PR OFFICE/OUTPT VISIT, EST, LEVL III, 20-29 MIN: ICD-10-PCS | Mod: S$GLB,,, | Performed by: FAMILY MEDICINE

## 2023-12-20 PROCEDURE — 99213 OFFICE O/P EST LOW 20 MIN: CPT | Mod: S$GLB,,, | Performed by: FAMILY MEDICINE

## 2023-12-20 PROCEDURE — 87811 SARS-COV-2 COVID19 W/OPTIC: CPT | Mod: QW,S$GLB,, | Performed by: FAMILY MEDICINE

## 2023-12-20 RX ORDER — PROMETHAZINE HYDROCHLORIDE AND DEXTROMETHORPHAN HYDROBROMIDE 6.25; 15 MG/5ML; MG/5ML
5 SYRUP ORAL NIGHTLY PRN
Qty: 118 ML | Refills: 0 | Status: SHIPPED | OUTPATIENT
Start: 2023-12-20 | End: 2023-12-28

## 2023-12-20 RX ORDER — BENZONATATE 200 MG/1
200 CAPSULE ORAL 3 TIMES DAILY PRN
Qty: 30 CAPSULE | Refills: 0 | Status: SHIPPED | OUTPATIENT
Start: 2023-12-20 | End: 2023-12-30

## 2023-12-20 RX ORDER — MOLNUPIRAVIR 200 MG/1
800 CAPSULE ORAL EVERY 12 HOURS
Qty: 40 CAPSULE | Refills: 0 | Status: SHIPPED | OUTPATIENT
Start: 2023-12-20 | End: 2023-12-25

## 2023-12-20 NOTE — PATIENT INSTRUCTIONS
Below are suggestions for symptomatic relief of your upper respiratory symptoms:              -Salt water gargles to soothe throat pain.              -Chloroseptic spray and Cepacol lozenges also help to numb throat pain.              -Warm herbal teas with honey/lemon/nelly can help soothe sore throat and hoarseness              -Nasal saline spray reduces inflammation and dryness.              -Warm face compresses to help with facial sinus pain/pressure.              -Humidifiers and steam can help with nasal dryness and congestion              -Vicks vapor rub at night for chest congestion.              -Flonase OTC or Nasacort OTC for nasal congestion and post-nasal drip. Ok to use twice daily for the first week, then reduce to once daily after symptoms have begun to improve.              -Afrin is a nasal spray that can give immediate relief of nasal congestion but you cannot use this medication for more than 3 days              -Simple foods like chicken noodle soup.              - Mucinex for congestion or Mucinex DM for cough during the day time. Delsym helps with coughing at night. Mucinex-D if you have sinus pressure/sinus pain or chest congestion. (caution if history of high blood pressure or palpitations). You must increase your water intake when using expectorants (Mucinex).             -Zyrtec/Claritin/Allegra/Xyzal should help with allergies.  -If you DO NOT have Hypertension or any history of palpitations, it is ok to take over the counter Sudafed or Mucinex D or Allegra-D or Claritin-D or Zyrtec-D.  -If you do take one of the above, it is ok to combine that with plain over the counter Mucinex or Allegra or Claritin or Zyrtec. If, for example, you are taking Zyrtec -D, you can combine that with Mucinex, but not Mucinex-D.  If you are taking Mucinex-D, you can combine that with plain Allegra or Claritin or Zyrtec.   -If you DO have Hypertension or palpitations, it is safe to take Coricidin HBP for  relief of sinus symptoms.     Your test was POSITIVE for COVID-19 (coronavirus).       Please isolate yourself at home.  You may leave home and/or return to work once the following conditions are met:    If you were not hospitalized and are not moderately to severely immunocompromised:   More than 5 days since symptoms first appeared AND  More than 24 hours fever free without medications AND  Symptoms are improving  Continue to wear a mask around others for 5 additional days.    If you were hospitalized OR are moderately to severely immunocompromised:  More than 20 days since symptoms first appeared  More than 24 hours fever free without medications  Symptoms have improved    If you had no symptoms but tested positive:  More than 5 days since the date of the first positive test (20 days if moderately to severely immunocompromised). If you develop symptoms, then use the guidelines above.  Continue to wear a mask around others for 5 additional days.      Contact Tracing    As one of the next steps, you will receive a call or text from the [place] Department of Health ([facility]) COVID-19 Tracing Team. See the contact information below so you know not to ignore the health departments call. It is important that you contact them back immediately so they can help.      Contact Tracer Number:  [phone]  Caller ID for most carriers: [facility]     What is contact tracing?  Contact tracing is a process that helps identify everyone who has been in close contact with an infected person. Contact tracers let those people know they may have been exposed and guide them on next steps. Confidentiality is important for everyone; no one will be told who may have exposed them to the virus.  Your involvement is important. The more we know about where and how this virus is spreading, the better chance we have at stopping it from spreading further.  What does exposure mean?  Exposure means you have been within 6 feet for more than  15 minutes with a person who has or had COVID-19.  What kind of questions do the contact tracers ask?  A contact tracer will confirm your basic contact information including name, address, phone number, and next of kin, as well as asking about any symptoms you may have had. Theyll also ask you how you think you may have gotten sick, such as places where you may have been exposed to the virus, and people you were with. Those names will never be shared with anyone outside of that call, and will only be used to help trace and stop the spread of the virus.   I have privacy concerns. How will the state use my information?  Your privacy about your health is important. All calls are completed using call centers that use the appropriate health privacy protection measures (HIPAA compliance), meaning that your patient information is safe. No one will ever ask you any questions related to immigration status. Your health comes first.   Do I have to participate?  You do not have to participate, but we strongly encourage you to. Contact tracing can help us catch and control new outbreaks as theyre developing to keep your friends and family safe.   What if I dont hear from anyone?  If you dont receive a call within 24 hours, you can call the number above right away to inquire about your status. That line is open from 8:00 am - 8:00 p.m., 7 days a week.  Contact tracing saves lives! Together, we have the power to beat this virus and keep our loved ones and neighbors safe.    For more information see CDC link below.      https://www.cdc.gov/coronavirus/2019-ncov/hcp/guidance-prevent-spread.html#precautions        Sources:  CDC, [place] [facility]           Seek immediate care in the emergency room in the event of severe abdominal pain, chest pain, respiratory distress, fever unresponsive to antipyretic, dehydration, loss of consciousness, seizure.

## 2023-12-20 NOTE — PROGRESS NOTES
"Subjective:      Patient ID: Valarie Colindres is a 78 y.o. female.    Vitals:  height is 5' 4" (1.626 m) and weight is 65.8 kg (145 lb). Her oral temperature is 98.4 °F (36.9 °C). Her blood pressure is 147/71 (abnormal) and her pulse is 67. Her respiration is 18 and oxygen saturation is 98%.     Chief Complaint: Cough    Patient presents with cough. Symptoms include fatigue. Symptoms began 2 days ago.    Cough  The current episode started in the past 7 days. The problem has been gradually worsening. The problem occurs constantly. The cough is Productive of sputum. Pertinent negatives include no chest pain, chills, ear congestion, ear pain, fever, headaches, heartburn, hemoptysis, myalgias, nasal congestion, postnasal drip, rash, rhinorrhea, sore throat or shortness of breath. Nothing aggravates the symptoms. Treatments tried: Robutussin. The treatment provided mild relief. There is no history of asthma, bronchiectasis, bronchitis, COPD, emphysema, environmental allergies or pneumonia.       Constitution: Negative for chills and fever.   HENT:  Negative for ear pain, postnasal drip and sore throat.    Cardiovascular:  Negative for chest pain.   Respiratory:  Positive for cough. Negative for bloody sputum and shortness of breath.    Gastrointestinal:  Negative for heartburn.   Musculoskeletal:  Negative for muscle ache.   Skin:  Negative for rash.   Allergic/Immunologic: Negative for environmental allergies.   Neurological:  Negative for headaches.      Objective:     Vitals:    12/20/23 1620   BP: (!) 147/71   BP Location: Right arm   Patient Position: Sitting   BP Method: Medium (Automatic)   Pulse: 67   Resp: 18   Temp: 98.4 °F (36.9 °C)   TempSrc: Oral   SpO2: 98%   Weight: 65.8 kg (145 lb)   Height: 5' 4" (1.626 m)      Physical Exam   Constitutional: She is oriented to person, place, and time.  Non-toxic appearance. She does not appear ill. No distress.   HENT:   Head: Atraumatic.   Ears:   Right Ear: Tympanic " membrane normal.   Left Ear: Tympanic membrane normal.   Nose: Congestion present.   Mouth/Throat: Posterior oropharyngeal erythema present. No oropharyngeal exudate.   Eyes: Conjunctivae are normal.   Cardiovascular: Normal rate, regular rhythm, normal heart sounds and normal pulses.   Pulmonary/Chest: Effort normal and breath sounds normal.   Abdominal: Bowel sounds are normal. Soft. There is no rebound.   Lymphadenopathy:     She has no cervical adenopathy.   Neurological: no focal deficit. She is alert and oriented to person, place, and time.   Skin: Skin is warm and not diaphoretic. Capillary refill takes less than 2 seconds.   Psychiatric: Judgment and thought content normal.     Results for orders placed or performed in visit on 12/20/23   SARS Coronavirus 2 Antigen, POCT Manual Read   Result Value Ref Range    SARS Coronavirus 2 Antigen Positive (A) Negative     Acceptable Yes       Assessment:     1. Viral URI with cough    2. COVID        Plan:       Viral URI with cough  -     SARS Coronavirus 2 Antigen, POCT Manual Read  -     promethazine-dextromethorphan (PROMETHAZINE-DM) 6.25-15 mg/5 mL Syrp; Take 5 mLs by mouth nightly as needed (cough).  Dispense: 118 mL; Refill: 0  -     benzonatate (TESSALON) 200 MG capsule; Take 1 capsule (200 mg total) by mouth 3 (three) times daily as needed for Cough.  Dispense: 30 capsule; Refill: 0    2. COVID  -     molnupiravir (LAGEVRIO, EUA,) 200 mg capsule (EUA); Take 4 capsules (800 mg total) by mouth every 12 (twelve) hours. for 5 days  Dispense: 40 capsule; Refill: 0      Patient Instructions   Below are suggestions for symptomatic relief of your upper respiratory symptoms:              -Salt water gargles to soothe throat pain.              -Chloroseptic spray and Cepacol lozenges also help to numb throat pain.              -Warm herbal teas with honey/lemon/ginger can help soothe sore throat and hoarseness              -Nasal saline spray reduces  inflammation and dryness.              -Warm face compresses to help with facial sinus pain/pressure.              -Humidifiers and steam can help with nasal dryness and congestion              -Vicks vapor rub at night for chest congestion.              -Flonase OTC or Nasacort OTC for nasal congestion and post-nasal drip. Ok to use twice daily for the first week, then reduce to once daily after symptoms have begun to improve.              -Afrin is a nasal spray that can give immediate relief of nasal congestion but you cannot use this medication for more than 3 days              -Simple foods like chicken noodle soup.              - Mucinex for congestion or Mucinex DM for cough during the day time. Delsym helps with coughing at night. Mucinex-D if you have sinus pressure/sinus pain or chest congestion. (caution if history of high blood pressure or palpitations). You must increase your water intake when using expectorants (Mucinex).             -Zyrtec/Claritin/Allegra/Xyzal should help with allergies.  -If you DO NOT have Hypertension or any history of palpitations, it is ok to take over the counter Sudafed or Mucinex D or Allegra-D or Claritin-D or Zyrtec-D.  -If you do take one of the above, it is ok to combine that with plain over the counter Mucinex or Allegra or Claritin or Zyrtec. If, for example, you are taking Zyrtec -D, you can combine that with Mucinex, but not Mucinex-D.  If you are taking Mucinex-D, you can combine that with plain Allegra or Claritin or Zyrtec.   -If you DO have Hypertension or palpitations, it is safe to take Coricidin HBP for relief of sinus symptoms.     Your test was POSITIVE for COVID-19 (coronavirus).       Please isolate yourself at home.  You may leave home and/or return to work once the following conditions are met:    If you were not hospitalized and are not moderately to severely immunocompromised:   More than 5 days since symptoms first appeared AND  More than 24 hours  fever free without medications AND  Symptoms are improving  Continue to wear a mask around others for 5 additional days.    If you were hospitalized OR are moderately to severely immunocompromised:  More than 20 days since symptoms first appeared  More than 24 hours fever free without medications  Symptoms have improved    If you had no symptoms but tested positive:  More than 5 days since the date of the first positive test (20 days if moderately to severely immunocompromised). If you develop symptoms, then use the guidelines above.  Continue to wear a mask around others for 5 additional days.      Contact Tracing    As one of the next steps, you will receive a call or text from the [place] Department of Health ([facility] COVID-19 Tracing Team. See the contact information below so you know not to ignore the health departments call. It is important that you contact them back immediately so they can help.      Contact Tracer Number:  [phone]  Caller ID for most carriers: [facility]     What is contact tracing?  Contact tracing is a process that helps identify everyone who has been in close contact with an infected person. Contact tracers let those people know they may have been exposed and guide them on next steps. Confidentiality is important for everyone; no one will be told who may have exposed them to the virus.  Your involvement is important. The more we know about where and how this virus is spreading, the better chance we have at stopping it from spreading further.  What does exposure mean?  Exposure means you have been within 6 feet for more than 15 minutes with a person who has or had COVID-19.  What kind of questions do the contact tracers ask?  A contact tracer will confirm your basic contact information including name, address, phone number, and next of kin, as well as asking about any symptoms you may have had. Theyll also ask you how you think you may have gotten sick, such as places where you  may have been exposed to the virus, and people you were with. Those names will never be shared with anyone outside of that call, and will only be used to help trace and stop the spread of the virus.   I have privacy concerns. How will the state use my information?  Your privacy about your health is important. All calls are completed using call centers that use the appropriate health privacy protection measures (HIPAA compliance), meaning that your patient information is safe. No one will ever ask you any questions related to immigration status. Your health comes first.   Do I have to participate?  You do not have to participate, but we strongly encourage you to. Contact tracing can help us catch and control new outbreaks as theyre developing to keep your friends and family safe.   What if I dont hear from anyone?  If you dont receive a call within 24 hours, you can call the number above right away to inquire about your status. That line is open from 8:00 am - 8:00 p.m., 7 days a week.  Contact tracing saves lives! Together, we have the power to beat this virus and keep our loved ones and neighbors safe.    For more information see CDC link below.      https://www.cdc.gov/coronavirus/2019-ncov/hcp/guidance-prevent-spread.html#precautions        Sources:  [facility]           Seek immediate care in the emergency room in the event of severe abdominal pain, chest pain, respiratory distress, fever unresponsive to antipyretic, dehydration, loss of consciousness, seizure.

## 2023-12-27 DIAGNOSIS — M25.511 CHRONIC RIGHT SHOULDER PAIN: Primary | ICD-10-CM

## 2023-12-27 DIAGNOSIS — G89.29 CHRONIC RIGHT SHOULDER PAIN: Primary | ICD-10-CM

## 2023-12-28 ENCOUNTER — OFFICE VISIT (OUTPATIENT)
Dept: PRIMARY CARE CLINIC | Facility: CLINIC | Age: 78
End: 2023-12-28
Payer: MEDICARE

## 2023-12-28 ENCOUNTER — HOSPITAL ENCOUNTER (OUTPATIENT)
Dept: RADIOLOGY | Facility: HOSPITAL | Age: 78
Discharge: HOME | End: 2023-12-28
Attending: STUDENT IN AN ORGANIZED HEALTH CARE EDUCATION/TRAINING PROGRAM
Payer: MEDICARE

## 2023-12-28 ENCOUNTER — PATIENT MESSAGE (OUTPATIENT)
Dept: PRIMARY CARE CLINIC | Facility: CLINIC | Age: 78
End: 2023-12-28

## 2023-12-28 VITALS
TEMPERATURE: 99 F | OXYGEN SATURATION: 98 % | WEIGHT: 142.88 LBS | BODY MASS INDEX: 24.39 KG/M2 | SYSTOLIC BLOOD PRESSURE: 144 MMHG | HEART RATE: 78 BPM | RESPIRATION RATE: 16 BRPM | HEIGHT: 64 IN | DIASTOLIC BLOOD PRESSURE: 78 MMHG

## 2023-12-28 DIAGNOSIS — U07.1 COVID-19: ICD-10-CM

## 2023-12-28 DIAGNOSIS — D47.2 MGUS (MONOCLONAL GAMMOPATHY OF UNKNOWN SIGNIFICANCE): ICD-10-CM

## 2023-12-28 DIAGNOSIS — J06.9 VIRAL URI WITH COUGH: ICD-10-CM

## 2023-12-28 DIAGNOSIS — I10 PRIMARY HYPERTENSION: ICD-10-CM

## 2023-12-28 DIAGNOSIS — U07.1 COVID-19: Primary | ICD-10-CM

## 2023-12-28 PROCEDURE — 1159F PR MEDICATION LIST DOCUMENTED IN MEDICAL RECORD: ICD-10-PCS | Mod: CPTII,S$GLB,, | Performed by: STUDENT IN AN ORGANIZED HEALTH CARE EDUCATION/TRAINING PROGRAM

## 2023-12-28 PROCEDURE — 3077F SYST BP >= 140 MM HG: CPT | Mod: CPTII,S$GLB,, | Performed by: STUDENT IN AN ORGANIZED HEALTH CARE EDUCATION/TRAINING PROGRAM

## 2023-12-28 PROCEDURE — 71046 X-RAY EXAM CHEST 2 VIEWS: CPT | Mod: 26,,, | Performed by: RADIOLOGY

## 2023-12-28 PROCEDURE — 1126F AMNT PAIN NOTED NONE PRSNT: CPT | Mod: CPTII,S$GLB,, | Performed by: STUDENT IN AN ORGANIZED HEALTH CARE EDUCATION/TRAINING PROGRAM

## 2023-12-28 PROCEDURE — 1160F PR REVIEW ALL MEDS BY PRESCRIBER/CLIN PHARMACIST DOCUMENTED: ICD-10-PCS | Mod: CPTII,S$GLB,, | Performed by: STUDENT IN AN ORGANIZED HEALTH CARE EDUCATION/TRAINING PROGRAM

## 2023-12-28 PROCEDURE — 1101F PR PT FALLS ASSESS DOC 0-1 FALLS W/OUT INJ PAST YR: ICD-10-PCS | Mod: CPTII,S$GLB,, | Performed by: STUDENT IN AN ORGANIZED HEALTH CARE EDUCATION/TRAINING PROGRAM

## 2023-12-28 PROCEDURE — 3078F DIAST BP <80 MM HG: CPT | Mod: CPTII,S$GLB,, | Performed by: STUDENT IN AN ORGANIZED HEALTH CARE EDUCATION/TRAINING PROGRAM

## 2023-12-28 PROCEDURE — 3078F PR MOST RECENT DIASTOLIC BLOOD PRESSURE < 80 MM HG: ICD-10-PCS | Mod: CPTII,S$GLB,, | Performed by: STUDENT IN AN ORGANIZED HEALTH CARE EDUCATION/TRAINING PROGRAM

## 2023-12-28 PROCEDURE — 99214 OFFICE O/P EST MOD 30 MIN: CPT | Mod: S$GLB,,, | Performed by: STUDENT IN AN ORGANIZED HEALTH CARE EDUCATION/TRAINING PROGRAM

## 2023-12-28 PROCEDURE — 3288F PR FALLS RISK ASSESSMENT DOCUMENTED: ICD-10-PCS | Mod: CPTII,S$GLB,, | Performed by: STUDENT IN AN ORGANIZED HEALTH CARE EDUCATION/TRAINING PROGRAM

## 2023-12-28 PROCEDURE — 3077F PR MOST RECENT SYSTOLIC BLOOD PRESSURE >= 140 MM HG: ICD-10-PCS | Mod: CPTII,S$GLB,, | Performed by: STUDENT IN AN ORGANIZED HEALTH CARE EDUCATION/TRAINING PROGRAM

## 2023-12-28 PROCEDURE — 1159F MED LIST DOCD IN RCRD: CPT | Mod: CPTII,S$GLB,, | Performed by: STUDENT IN AN ORGANIZED HEALTH CARE EDUCATION/TRAINING PROGRAM

## 2023-12-28 PROCEDURE — 99999 PR PBB SHADOW E&M-EST. PATIENT-LVL V: CPT | Mod: PBBFAC,,, | Performed by: STUDENT IN AN ORGANIZED HEALTH CARE EDUCATION/TRAINING PROGRAM

## 2023-12-28 PROCEDURE — 71046 XR CHEST PA AND LATERAL: ICD-10-PCS | Mod: 26,,, | Performed by: RADIOLOGY

## 2023-12-28 PROCEDURE — 71046 X-RAY EXAM CHEST 2 VIEWS: CPT | Mod: TC,PN

## 2023-12-28 PROCEDURE — 3288F FALL RISK ASSESSMENT DOCD: CPT | Mod: CPTII,S$GLB,, | Performed by: STUDENT IN AN ORGANIZED HEALTH CARE EDUCATION/TRAINING PROGRAM

## 2023-12-28 PROCEDURE — 1126F PR PAIN SEVERITY QUANTIFIED, NO PAIN PRESENT: ICD-10-PCS | Mod: CPTII,S$GLB,, | Performed by: STUDENT IN AN ORGANIZED HEALTH CARE EDUCATION/TRAINING PROGRAM

## 2023-12-28 PROCEDURE — 1160F RVW MEDS BY RX/DR IN RCRD: CPT | Mod: CPTII,S$GLB,, | Performed by: STUDENT IN AN ORGANIZED HEALTH CARE EDUCATION/TRAINING PROGRAM

## 2023-12-28 PROCEDURE — 1101F PT FALLS ASSESS-DOCD LE1/YR: CPT | Mod: CPTII,S$GLB,, | Performed by: STUDENT IN AN ORGANIZED HEALTH CARE EDUCATION/TRAINING PROGRAM

## 2023-12-28 PROCEDURE — 99214 PR OFFICE/OUTPT VISIT, EST, LEVL IV, 30-39 MIN: ICD-10-PCS | Mod: S$GLB,,, | Performed by: STUDENT IN AN ORGANIZED HEALTH CARE EDUCATION/TRAINING PROGRAM

## 2023-12-28 PROCEDURE — 99999 PR PBB SHADOW E&M-EST. PATIENT-LVL V: ICD-10-PCS | Mod: PBBFAC,,, | Performed by: STUDENT IN AN ORGANIZED HEALTH CARE EDUCATION/TRAINING PROGRAM

## 2023-12-28 RX ORDER — CODEINE PHOSPHATE AND GUAIFENESIN 10; 100 MG/5ML; MG/5ML
5 SOLUTION ORAL EVERY 8 HOURS PRN
Qty: 100 ML | Refills: 0 | Status: SHIPPED | OUTPATIENT
Start: 2023-12-28 | End: 2024-01-05

## 2023-12-28 RX ORDER — ALBUTEROL SULFATE 90 UG/1
1-2 AEROSOL, METERED RESPIRATORY (INHALATION) EVERY 6 HOURS PRN
Qty: 18 G | Refills: 0 | Status: SHIPPED | OUTPATIENT
Start: 2023-12-28 | End: 2024-01-05

## 2023-12-28 NOTE — PATIENT INSTRUCTIONS
"Patient Instructions     =========     An UPPER RESPIRATORY ILLNESS is usually caused by a virus. The goal to help you feel better is drying up the drainage & stopping the cough so the virus can run it's course in about 7-10 days. If your drainage becomes more thick and worse after 7-10 days of trying the below advised over the counter medications, please make an appointment for further evaluation. Also please let a healthcare provider know if you have fever or if your symptoms do not improve/worsen after 7-10 days.     If you have Afib, kidney disease, high blood pressure, or ulcers please look for specific recommendations that pertain to you below.    Please be aware many OTC cold medicines have combinations of tylenol (acetaminophen), advil (ibuprofen), sudafed (phenylephrine), mucinex (guaifenesin) in them so do not double up. Read label carefully before mixing OTC medications.     =========     If you have POST NASAL DRIP , "RUNNY NOSE" or SORE THROAT FROM CLEARING YOUR THROAT :    Take an ANTIHISTAMINE  (Claritin or Zyrtec or Allegra ) AT NIGHT     NASAL SPRAY - Nasacort (nasal steroid) or Flonase (nasal steroid)  Twice daily to decrease swelling & post nasal drip ------ very safe , works where the congestion is , & does not interfere with other medication or go through your blood stream. Please use twice a day, may take up to 1 week to really work. Can continue using daily.      If you STILL HAVE DRAINAGE or EAR POPPING/ PRESSURE/ DECREASED HEARING  You can add a DECONGESTANT like SUDAFED (only if it doesn't cause palpitations for you; do NOT take this is you have high blood pressure or atrial fibrillation). For people with high blood pressure, please look for labels that say, "safe for high blood pressure." For example:         If you have THICK MUCOUS DRAINAGE from the NOSE or COUGHING UP THICK PHLEGM:  You can add a MUCOLYTIC like MUCINEX (Guaifenesin). Drink lots of water to help this medicine work. " "Mucinex DM includes a decongestant (usually phenylephrine which is sudafed) so please be aware. Try looking for plain Mucinex if cannot take decongestants.          If your COUGH PERSISTS:  Acetylcysteine nasal spray reduces cough  You can add a COUGH SUPPRESSANT like or ROBITUSSIN or DELSYM (dextromethorphan) twice a day for short period of time.   Albuterol inhaler Q4hrs can also help     If you have PAIN, SORE THROAT, FEVER OR CHILLS:  You can ALTERNATE 250- 500 mg of  TYLENOL with  200 mg of IBUPROFEN every 6-hrs hours - for the next 1-3 days.    Do not take NSAIDS-non-steroidal anti-inflammatories (Advil, ibuprofen, aleve, naproxen, meloxicam, celebrex, diclofenac etc) if you have a history of stomach or intestinal ulcers, are on blood thinners, or have kidney disease/decreased kidney function. Long term NSAIDs can cause GI upset, intestinal bleeding elevated blood pressure, and cause severe kidney damage. Limit Tylenol to 2g (2000 mg) per day to avoid liver injury. Some examples of NSAIDS are below:           For SORE THROAT, try: Gargling with warm salt mixed with water 2-3 times per day. Do not swallow.    Zinc supplements (only for a few days at a time) can help with recovering quickly -- zinc and vitamin C can be found in formulations at the pharmacy such as "Airborne" and "Emergen-C"     Drink lots of WATER and stay hydrated during this time.     Please let your healthcare provider know if your symptoms do not improve or worsen. The above recommendations are general guidelines and may not apply to everyone. Please read in detail to ensure safe for your specific medical conditions.If you have any question as to whether these recommendations are safe for you, please reach out to the clinic or provider.   "

## 2023-12-28 NOTE — PROGRESS NOTES
Valarie Colindres  1945        Subjective     Chief Complaint: Covid-19    History of Present Illness:  Ms. Valarie Colindres is a 78 y.o. female who presents to clinic for Covid-19. Positive 8 days ago. Daughter also had Covid who was visiting.    Still with cough. Finished antiviral given at  (Molnupiravir). Now seems to be having a rebound with cough. No fever.   +mucus. Energy seems to be improving but cough worse. Some sore throat with cough, not painful with swallowing. Appetite has improved.     Also using robitussin, did not realize the promethazine/dextromethorphan was similar.   +tessalon pearls. +using steam, Vcks vapor rup.    Trying to isolate from sister, so far sister is doing ok.     No chest pain or SOB.    Review of Systems   Constitutional:  Positive for malaise/fatigue. Negative for chills and fever.   HENT:  Positive for sore throat.    Respiratory:  Positive for cough. Negative for sputum production and shortness of breath.    Cardiovascular:  Negative for chest pain and leg swelling.   Gastrointestinal:  Negative for nausea and vomiting.   Neurological:  Positive for headaches. Negative for sensory change and speech change.   Psychiatric/Behavioral:  The patient is not nervous/anxious.         PAST HISTORY:     Past Medical History:   Diagnosis Date    Angle recession of right eye     Blunt trauma of both eyes     hit across eyes with bungee exercise band    Cataract     Cystocele, midline     Dry eye syndrome     Ectropion due to laxity of eyelid, right     GERD (gastroesophageal reflux disease)     Hyperlipidemia     Hypertension     PVD (posterior vitreous detachment), right eye     Rectocele     Retinal drusen of both eyes     Thalassemia major     Vaginal atrophy        Past Surgical History:   Procedure Laterality Date    ABDOMINAL ADHESION SURGERY  1978    ANKLE FRACTURE SURGERY Right 1996    COLONOSCOPY  11/09/2022    COLONOSCOPY N/A 12/5/2023    Procedure: COLONOSCOPY;   Surgeon: Xavier Lawrence MD;  Location: [facility] ENDO (4TH FLR);  Service: Endoscopy;  Laterality: N/A;    ECTROPION REPAIR Bilateral 06/2018    Dr. Flaherty    ESOPHAGOGASTRODUODENOSCOPY N/A 12/5/2023    Procedure: EGD (ESOPHAGOGASTRODUODENOSCOPY);  Surgeon: Xavier Lawrence MD;  Location: [facility] ENDO (4TH FLR);  Service: Endoscopy;  Laterality: N/A;  9/6- referred by Dr. Lawrence/Additional Scheduling Information:  Needs constipation bowel prep protocol / prep instructions handed to patient and to portal. AS  11/28-precall complete-MS    HYSTERECTOMY  1977    possible cancerous lesion       Family History   Problem Relation Age of Onset    Stroke Mother     Heart disease Mother     Prostate cancer Father     Colon cancer Sister     Hypertension Sister     No Known Problems Sister     No Known Problems Brother     Prostate cancer Brother     Diabetes Brother     No Known Problems Brother     Crohn's disease Daughter     Breast cancer Maternal Aunt     Glaucoma Maternal Uncle     Blindness Maternal Uncle     Hypertension Maternal Grandmother     No Known Problems Maternal Grandfather     No Known Problems Paternal Grandmother     No Known Problems Paternal Grandfather     Cataracts Neg Hx     Macular degeneration Neg Hx        Social History     Socioeconomic History    Marital status:    Occupational History     Comment: Retired in 2018 -    Tobacco Use    Smoking status: Never     Passive exposure: Never    Smokeless tobacco: Never   Substance and Sexual Activity    Alcohol use: Not Currently     Comment: none    Drug use: No    Sexual activity: Not Currently     Social Determinants of Health     Financial Resource Strain: Low Risk  (11/14/2023)    Overall Financial Resource Strain (CARDIA)     Difficulty of Paying Living Expenses: Not hard at all   Food Insecurity: No Food Insecurity (11/14/2023)    Hunger Vital Sign     Worried About Running Out of Food in the Last Year: Never true     Ran  Out of Food in the Last Year: Never true   Transportation Needs: No Transportation Needs (11/14/2023)    PRAPARE - Transportation     Lack of Transportation (Medical): No     Lack of Transportation (Non-Medical): No   Physical Activity: Insufficiently Active (11/14/2023)    Exercise Vital Sign     Days of Exercise per Week: 2 days     Minutes of Exercise per Session: 60 min   Stress: No Stress Concern Present (11/14/2023)    [employer] [place] - Occupational Stress Questionnaire     Feeling of Stress : Not at all   Social Connections: Moderately Isolated (11/14/2023)    Social Connection and Isolation Panel [NHANES]     Frequency of Communication with Friends and Family: Once a week     Frequency of Social Gatherings with Friends and Family: More than three times a week     Attends Congregation Services: More than 4 times per year     Active Member of Clubs or Organizations: No     Attends Club or Organization Meetings: Never     Marital Status:    Housing Stability: Low Risk  (11/14/2023)    Housing Stability Vital Sign     Unable to Pay for Housing in the Last Year: No     Number of Places Lived in the Last Year: 1     Unstable Housing in the Last Year: No       MEDICATIONS & ALLERGIES:     Current Outpatient Medications on File Prior to Visit   Medication Sig    acetaminophen (TYLENOL) 500 MG tablet Take 1-2 tablets (500-1,000 mg total) by mouth 3 (three) times daily as needed for Pain.    alendronate (FOSAMAX) 70 MG tablet TK 1 T PO EVERY WEEK    amLODIPine (NORVASC) 5 MG tablet     aspirin (ECOTRIN) 81 MG EC tablet Take 1 tablet by mouth once daily.    azelastine (ASTELIN) 137 mcg (0.1 %) nasal spray Two sprays in each nostril, sniff until absorbed, then follow with 1 spray of fluticasone.  Use both sprays twice daily.    benzonatate (TESSALON) 200 MG capsule Take 1 capsule (200 mg total) by mouth 3 (three) times daily as needed for Cough.    chlorhexidine (PERIDEX) 0.12 % solution  "RINSE AND SWISH ONE CAPFUL BID    chlorzoxazone (PARAFON FORTE) 500 mg Tab TAKE 1 TABLET(S) 3 TIMES A DAY BY ORAL ROUTE AS NEEDED.    clobetasoL (TEMOVATE) 0.05 % external solution     diazePAM (VALIUM) 5 MG tablet     diclofenac sodium (VOLTAREN) 1 % Gel Apply 2 g topically 4 (four) times daily.    famotidine (PEPCID) 20 MG tablet TAKE 1 TABLET BY MOUTH TWICE A DAY    hydroCHLOROthiazide (HYDRODIURIL) 12.5 MG Tab Take 1 tablet (12.5 mg total) by mouth once daily.    levothyroxine (SYNTHROID) 100 MCG tablet Take 1 tablet (100 mcg total) by mouth once daily.    pantoprazole (PROTONIX) 40 MG tablet Take 40 mg by mouth every morning.    valsartan (DIOVAN) 320 MG tablet Take 1 tablet (320 mg total) by mouth once daily.    [DISCONTINUED] promethazine-dextromethorphan (PROMETHAZINE-DM) 6.25-15 mg/5 mL Syrp Take 5 mLs by mouth nightly as needed (cough).    atorvastatin (LIPITOR) 10 MG tablet Take 1 tablet (10 mg total) by mouth every evening.    loratadine (CLARITIN) 10 mg tablet Take 1 tablet (10 mg total) by mouth once daily.     No current facility-administered medications on file prior to visit.       Review of patient's allergies indicates:   Allergen Reactions    Sutures, catgut (chromic)        OBJECTIVE:     Vital Signs:  Vitals:    12/28/23 1311   BP: (!) 144/78   BP Location: Left arm   Patient Position: Sitting   Pulse: 78   Resp: 16   Temp: 98.9 °F (37.2 °C)   TempSrc: Oral   SpO2: 98%   Weight: 64.8 kg (142 lb 13.7 oz)   Height: 5' 4" (1.626 m)       Body mass index is 24.52 kg/m².     Physical Exam:  Physical Exam  Vitals and nursing note reviewed.   Constitutional:       General: She is not in acute distress.     Appearance: Normal appearance. She is not ill-appearing, toxic-appearing or diaphoretic.   HENT:      Head: Normocephalic and atraumatic.      Right Ear: Tympanic membrane, ear canal and external ear normal.      Left Ear: Tympanic membrane, ear canal and external ear normal.   Eyes:      General: " "No scleral icterus.        Right eye: No discharge.         Left eye: No discharge.      Conjunctiva/sclera: Conjunctivae normal.   Cardiovascular:      Rate and Rhythm: Normal rate and regular rhythm.      Pulses: Normal pulses.      Heart sounds: Normal heart sounds.   Pulmonary:      Effort: Pulmonary effort is normal. No respiratory distress.      Breath sounds: Normal breath sounds. No stridor. No wheezing or rales.   Musculoskeletal:         General: Normal range of motion.      Right lower leg: No edema.      Left lower leg: No edema.   Skin:     General: Skin is warm and dry.   Neurological:      Mental Status: She is alert and oriented to person, place, and time. Mental status is at baseline.      Gait: Gait normal.   Psychiatric:         Mood and Affect: Mood normal.         Behavior: Behavior normal.            Laboratory  Lab Results   Component Value Date    WBC 6.31 10/02/2023    HGB 10.6 (L) 10/02/2023    HCT 35.4 (L) 10/02/2023    MCV 62 (L) 10/02/2023     10/02/2023     Lab Results   Component Value Date    GLU 90 10/02/2023     10/02/2023    K 3.5 10/02/2023     10/02/2023    CO2 30 (H) 10/02/2023    BUN 14 10/02/2023    CREATININE 0.9 10/02/2023    CALCIUM 9.3 10/02/2023    MG 2.3 10/15/2020     No results found for: "INR", "PROTIME"  Lab Results   Component Value Date    HGBA1C 6.4 (H) 10/31/2023             ASSESSMENT & PLAN:   Ms. Valarie Colindres is a 78 y.o. female who was seen today in clinic for cough related to Covid-19. Discussed cough may linger past first week. Potentially may be experiencing rebound symptoms 2/2 antiviral. Denies fever. No chest pain. No SOB. CTAB on exam. Well-appearing. Will give stronger cough syrup to use in place of the one she has now + albuterol inhaler. May cause sedation. Let me know if cough does not improve over next few days or symptoms worsen/fever develops.       1. COVID-19  -     guaiFENesin-codeine 100-10 mg/5 ml (TUSSI-ORGANIDIN " NR)  mg/5 mL syrup; Take 5 mLs by mouth every 8 (eight) hours as needed for Cough.  Dispense: 100 mL; Refill: 0  -     albuterol (VENTOLIN HFA) 90 mcg/actuation inhaler; Inhale 1-2 puffs into the lungs every 6 (six) hours as needed for Wheezing or Shortness of Breath (Cough). Rescue  Dispense: 18 g; Refill: 0  -     X-Ray Chest PA And Lateral; Future; Expected date: 12/28/2023    2. Viral URI with cough  -     albuterol (VENTOLIN HFA) 90 mcg/actuation inhaler; Inhale 1-2 puffs into the lungs every 6 (six) hours as needed for Wheezing or Shortness of Breath (Cough). Rescue  Dispense: 18 g; Refill: 0  -     X-Ray Chest PA And Lateral; Future; Expected date: 12/28/2023    3. Primary hypertension    4. MGUS (monoclonal gammopathy of unknown significance)           Sirena Renner MD  Internal Medicine         Portions of this note may have been generated using voice recognition software.  Please excuse any spelling/grammatical errors. Occasional wrong-word or sound-a-like substitutions may have also occurred due to the inherent limitations of voice recognition software. Please read the chart carefully and recognize, using context, where substitutions have occurred.

## 2024-01-03 ENCOUNTER — OFFICE VISIT (OUTPATIENT)
Dept: URGENT CARE | Facility: CLINIC | Age: 79
End: 2024-01-03
Payer: MEDICARE

## 2024-01-03 VITALS
WEIGHT: 142 LBS | RESPIRATION RATE: 20 BRPM | SYSTOLIC BLOOD PRESSURE: 153 MMHG | DIASTOLIC BLOOD PRESSURE: 88 MMHG | BODY MASS INDEX: 24.24 KG/M2 | TEMPERATURE: 98 F | OXYGEN SATURATION: 97 % | HEART RATE: 80 BPM | HEIGHT: 64 IN

## 2024-01-03 DIAGNOSIS — R05.9 COUGH, UNSPECIFIED TYPE: Primary | ICD-10-CM

## 2024-01-03 PROCEDURE — 99213 OFFICE O/P EST LOW 20 MIN: CPT | Mod: S$GLB,,, | Performed by: NURSE PRACTITIONER

## 2024-01-03 NOTE — PROGRESS NOTES
"Subjective:      Patient ID: Valarie Colindres is a 78 y.o. female.    Vitals:  height is 5' 4" (1.626 m) and weight is 64.4 kg (142 lb). Her oral temperature is 97.7 °F (36.5 °C). Her blood pressure is 153/88 (abnormal) and her pulse is 80. Her respiration is 20 and oxygen saturation is 97%.     Chief Complaint: Cough    Pt was dx with covid on her last visit, she is now presenting with a bad cough, sore throat and fatigue    Cough  This is a new problem. The current episode started in the past 7 days. The problem occurs constantly. The cough is Non-productive. Pertinent negatives include no chest pain, chills, ear congestion, ear pain, fever, headaches, heartburn, hemoptysis, myalgias, nasal congestion, postnasal drip, rash, rhinorrhea, sore throat, shortness of breath, sweats, weight loss or wheezing. She has tried prescription cough suppressant and OTC cough suppressant for the symptoms. The treatment provided no relief. There is no history of asthma, bronchiectasis, bronchitis, COPD, emphysema, environmental allergies or pneumonia.       Constitution: Negative for chills and fever.   HENT:  Negative for ear pain, postnasal drip and sore throat.    Cardiovascular:  Negative for chest pain.   Respiratory:  Positive for cough. Negative for bloody sputum, shortness of breath and wheezing.    Gastrointestinal:  Negative for heartburn.   Musculoskeletal:  Negative for muscle ache.   Skin:  Negative for rash.   Allergic/Immunologic: Negative for environmental allergies.   Neurological:  Negative for headaches.      Objective:     Physical Exam   HENT:   Head: Normocephalic.   Mouth/Throat: Mucous membranes are moist. Oropharynx is clear.   Cardiovascular: Normal rate and regular rhythm.   Pulmonary/Chest: Effort normal and breath sounds normal.   Abdominal: Normal appearance.   Neurological: She is alert.       Assessment:     1. Cough, unspecified type        Plan:   Ms. Colindres was dx with COVID 2 wks ago. Since " "she has a dry cough. Recently went to her PCP was given cough elixir and Ventolin inh, which she says "helps some." Concern she may still me contagious. Denies judith other symptoms.    Cough, unspecified type                    "

## 2024-01-04 DIAGNOSIS — J06.9 VIRAL URI WITH COUGH: ICD-10-CM

## 2024-01-04 DIAGNOSIS — U07.1 COVID-19: ICD-10-CM

## 2024-01-04 NOTE — TELEPHONE ENCOUNTER
No care due was identified.  [facility] Embedded Care Due Messages. Reference number: [id number].   1/04/2024 12:43:20 PM CST

## 2024-01-05 RX ORDER — ALBUTEROL SULFATE 90 UG/1
1-2 AEROSOL, METERED RESPIRATORY (INHALATION) EVERY 6 HOURS PRN
Qty: 25.5 G | Refills: 0 | Status: SHIPPED | OUTPATIENT
Start: 2024-01-05

## 2024-01-05 RX ORDER — CODEINE PHOSPHATE AND GUAIFENESIN 10; 100 MG/5ML; MG/5ML
SOLUTION ORAL
Qty: 100 ML | Refills: 0 | Status: SHIPPED | OUTPATIENT
Start: 2024-01-05

## 2024-01-05 NOTE — TELEPHONE ENCOUNTER
Refill Routing Note   Medication(s) are not appropriate for processing by [facility] for the following reason(s):        New or recently adjusted medication  Outside of protocol    [facility] action(s):  Route  Defer               Appointments  past 12m or future 3m with PCP    Date Provider   Last Visit   12/28/2023 Sirena Renner MD   Next Visit   Visit date not found Sirena Renner MD   ED visits in past 90 days: 0        Note composed:11:38 PM 01/04/2024

## 2024-01-18 ENCOUNTER — TELEPHONE (OUTPATIENT)
Dept: ORTHOPEDICS | Facility: CLINIC | Age: 79
End: 2024-01-18
Payer: MEDICARE

## 2024-01-23 ENCOUNTER — OFFICE VISIT (OUTPATIENT)
Dept: ORTHOPEDICS | Facility: CLINIC | Age: 79
End: 2024-01-23
Payer: MEDICARE

## 2024-01-23 ENCOUNTER — HOSPITAL ENCOUNTER (OUTPATIENT)
Dept: RADIOLOGY | Facility: OTHER | Age: 79
Discharge: HOME | End: 2024-01-23
Attending: ORTHOPAEDIC SURGERY
Payer: MEDICARE

## 2024-01-23 DIAGNOSIS — G89.29 CHRONIC RIGHT SHOULDER PAIN: ICD-10-CM

## 2024-01-23 DIAGNOSIS — M25.511 CHRONIC RIGHT SHOULDER PAIN: ICD-10-CM

## 2024-01-23 PROCEDURE — 99214 OFFICE O/P EST MOD 30 MIN: CPT | Mod: S$GLB,,, | Performed by: ORTHOPAEDIC SURGERY

## 2024-01-23 PROCEDURE — 73030 X-RAY EXAM OF SHOULDER: CPT | Mod: 26,RT,, | Performed by: RADIOLOGY

## 2024-01-23 PROCEDURE — 73030 X-RAY EXAM OF SHOULDER: CPT | Mod: TC,FY,RT

## 2024-01-23 PROCEDURE — 99999 PR PBB SHADOW E&M-EST. PATIENT-LVL III: CPT | Mod: PBBFAC,,, | Performed by: ORTHOPAEDIC SURGERY

## 2024-01-23 NOTE — PROGRESS NOTES
Went to [facility]- helped, pt has seen back and spine  Points to trigger points on back and states pain goes from arm down on right  RHD  No N/T  Just pain  Pain is sporadic- not just at night    Could wake up in AM with pain  Stabbing pain    Pain that just sits there- tried salonpas, NSAIS with mild relief, no injection    Xrays normal      FROM shoulder  +TTP over biceps    Plan: PT  Pt does not want injection         Head atraumatic, normal cephalic shape.

## 2024-02-09 ENCOUNTER — CLINICAL SUPPORT (OUTPATIENT)
Dept: REHABILITATION | Facility: OTHER | Age: 79
End: 2024-02-09
Attending: ORTHOPAEDIC SURGERY
Payer: MEDICARE

## 2024-02-09 DIAGNOSIS — G89.29 CHRONIC RIGHT SHOULDER PAIN: ICD-10-CM

## 2024-02-09 DIAGNOSIS — M25.511 CHRONIC RIGHT SHOULDER PAIN: ICD-10-CM

## 2024-02-09 DIAGNOSIS — M25.611 DECREASED RIGHT SHOULDER RANGE OF MOTION: Primary | ICD-10-CM

## 2024-02-09 PROCEDURE — 97165 OT EVAL LOW COMPLEX 30 MIN: CPT | Mod: PN

## 2024-02-09 PROCEDURE — 97110 THERAPEUTIC EXERCISES: CPT | Mod: PN

## 2024-02-09 NOTE — PATIENT INSTRUCTIONS
[facility], OCCUPATIONAL THERAPY  HOME EXERCISE PROGRAM     Complete the following exercises for 10 repetitions, 2x/day:         Maintaining erect posture, draw shoulders back while bringing elbows back and inward.  - Squeeze the shoulder blades together!        Shoulder Flexion  Using wall for resistance, press forward into wall using light pressure.   Hold 5 seconds. Repeat 10 times. Do 2 sessions per day.         Shoulder Extension  Using wall for resistance, press back of arm into the wall/rolled up towel using   light pressure.   Hold 5 seconds. Repeat 10 times. Do 2 sessions per day.         Shoulder Abduction  Using wall for resistance, press arm into wall/rolled up towel using   light pressure, as if trying to lift elbow away from body  Hold 5 seconds. Repeat 10 times. Do 2 sessions per day.         Shoulder Internal Rotation  Using door frame for resistance, press palm of hand into wall/rolled up towel   using light pressure. Keep elbow in at side.   Hold 5 seconds. Repeat 10 times. Do 2 sessions per day.         Shoulder External Rotation  Using wall to provide resistance, and keeping arm at side,   press back of hand into wall using light pressure.   Keep elbow at side - as if trying to rotate arm out  Hold 5 seconds. Repeat 10 times. Do 2 sessions per day.    Therapist: Rossy Moran, JOHN/L     Copyright © [facility]. All rights reserved.

## 2024-02-09 NOTE — PLAN OF CARE
[facility] Occupational Therapy  Initial Evaluation     Date: 2/9/2024  Patient: Valarie Colindres  Chart Number: [id number]    Therapy Diagnosis:   1. Decreased right shoulder range of motion        2. Chronic right shoulder pain  Ambulatory referral/consult to Physical/Occupational Therapy        Medical Diagnosis: M25.511,G89.29 (ICD-10-CM) - Chronic right shoulder sabas    Referring Physician: Karin Sandra, *  Physician Orders: Eval and treat  Date of Return to MD: 3/5/24    Date of Injury: about a year ago  Date of Surgery: NA    Evaluation Date: 2/9/2024  Authorization Period: 2/9/24 - 2/28/24  Plan of Care Expiration: 5/3/24  Visit #/ Visits Authorized: 1 of 1  FOTO Completion: NEXT VISIT  FOTO #2:  FOTO #3:    Time In: 8:00 am - pt arriving 15 min late  Time Out: 8:37 am  Total Appointment Time (timed & untimed codes): 37 min    Precautions: Standard    Subjective     History of Current Condition: Valarie Colindres is a 78 y.o. year old Right hand dominant female who has been experiencing Right shoulder pain for the past year, near constant pain but worse with behind the back motion. She reports recent course of PT for neck pain of same side. Valarie Colindres is referred to Occupational Therapy for evaluation and treatment. Patient presents today alone.    Falls: none    Involved Side: Right  Dominant Side: Right    Date of Onset: about a year ago  Imaging: x-ray  FINDINGS:  Three views right shoulder.     The right humeral head maintains appropriate relationship with the glenoid.  The acromioclavicular joint is intact noting degenerative changes.  No acute displaced right rib fracture.  The right lung zones are clear.     Impression:  1. No acute displaced fracture or dislocation of the right shoulder.     Previous Therapy: none    Pain:  Functional Pain Scale Rating 0-10:   Current: 6/10  At Best: 6/10  At Worst: 8/10    Location: Right shoulder  Description: stabbing, radiating  "about lateral shoulder  Aggravating Factors: sleeping, reaching behind behind  Easing Factors: no relief with heat/ice, "refuses steroid injection"    Functional Limitations/Social History:  Prior Level of Function: Independent with all ADLs/IADLs    Current Level of Function:   ADLs: difficulty reaching back, pain is there but remains independent  IADLs: remains independent, but increased pain  Leisure: gym/has a , [employer] program (program for >50y.o) that provides classes (art, music, exercise) she attend [employer]  Driving: Yes    Occupation:  Retired    Patient's Goals for Therapy: to return to PLOF    Past Medical History/Physical Systems Review:   Valarie Colindres  has a past medical history of Angle recession of right eye, Blunt trauma of both eyes, Cataract, Cystocele, midline, Dry eye syndrome, Ectropion due to laxity of eyelid, right, GERD (gastroesophageal reflux disease), Hyperlipidemia, Hypertension, PVD (posterior vitreous detachment), right eye, Rectocele, Retinal drusen of both eyes, Thalassemia major, and Vaginal atrophy.    Valarie Colindres  has a past surgical history that includes Hysterectomy (1977); Ankle fracture surgery (Right, 1996); Abdominal adhesion surgery (1978); Ectropion repair (Bilateral, 06/2018); Colonoscopy (11/09/2022); Esophagogastroduodenoscopy (N/A, 12/5/2023); and Colonoscopy (N/A, 12/5/2023).    Valarie has a current medication list which includes the following prescription(s): acetaminophen, albuterol, alendronate, amlodipine, aspirin, atorvastatin, azelastine, chlorhexidine, chlorzoxazone, clobetasol, diazepam, diclofenac sodium, famotidine, guaifenesin-codeine 100-10 mg/5 ml, hydrochlorothiazide, levothyroxine, loratadine, pantoprazole, and valsartan.    Review of patient's allergies indicates:   Allergen Reactions    Sutures, catgut (chromic)           Objective     Mental status: alert, oriented x3    Observation/Appearance:   Right scapular depressed, " "downwardly rotated  Pain at end range flexion, and hiking noted    Sensation: Patient reports baseline of neuropathy but reports mostly in her feet      SHOULDER RANGE OF MOTION:   Measured in degrees of active motion with goniometer   Right  2/9/2024 Left  2/9/2024   Shoulder Flexion 130* 150   Shoulder Abduction 120 130   Shoulder ER at side 70* 80   Shoulder IR T7* T2    *with pain    Manual Muscle Test:  ER: 5/5, pain but improved with correcting scapula (retract) and GH posterior glide  IR: 5/5, with pain      Intake Outcome Measure for FOTO Initial Evaluation Survey    Therapist reviewed FOTO scores for Valarie Colindres on 2/9/2024.   FOTO documents entered into [employer] - see Media section.    Intake Score: NEXT VISIT         Treatment     Treatment Time In: 8:27 am  Treatment Time Out: 8:37 am  Total Treatment time separate from Evaluation time: 10 min      Valarie performed therapeutic exercises for 10 minutes including:  - Instructed in use of pillow for support during sleeping position to facilitate center alignment of humeral head  - Cross body stretch in ER (3 x 20 sec)  - [employer] shoulder flex/ext, ER/IR, abduction (10 reps, 5 sec hold) - instructed to "set" scap/shoulder first      Patient Education and Home Exercises     Patient/Family Education Provided:   - Role of OT, goals for OT, scheduling/cancellations - pt verbalized understanding. Discussed insurance limitations with patient.    Written Home Exercises Provided: yes.  Exercises were reviewed and Valarie was able to demonstrate them prior to the end of the session.  Valarie demonstrated good  understanding of the education provided. See EMR under Patient Instructions for exercises provided during therapy sessions.     Pt was advised to perform these exercises free of pain, and to stop performing them if pain occurs.      Assessment     Valarie Colindres is a 78 y.o. female referred to outpatient occupational therapy and presents with a " medical diagnosis of Chronic right shoulder pain.    Following medical record review it is determined that pt will benefit from occupational therapy services in order to maximize pain free and/or functional use of right shoulder. The following goals were discussed with the patient and patient is in agreement with them as to be addressed in the treatment plan. The patient's rehab potential is Good.     Anticipated barriers to occupational therapy: time since onset of symptoms    Plan of care discussed with patient: Yes  Patient's spiritual, cultural and educational needs considered and patient is agreeable to the plan of care and goals as stated below:     Medical Necessity is demonstrated by the following  Occupational Profile/History  Co-morbidities and personal factors that may impact the plan of care [x] LOW: Brief chart review  [] MODERATE: Expanded chart review   [] HIGH: Extensive chart review    Moderate / High Support Documentation:        Examination  Performance deficits relating to physical, cognitive or psychosocial skills that result in activity limitations and/or participation restrictions  [x] LOW: addressing 1-3 Performance deficits  [] MODERATE: 3-5 Performance deficits  [] HIGH: 5+ Performance deficits (please support below)    Moderate / High Support Documentation:    Physical:  Joint Mobility  Muscle Endurance  Postural Control  Pain    Cognitive:  No Deficits    Psychosocial:    Habits  Routines     Treatment Options [] LOW: Limited options  [x] MODERATE: Several options  [] HIGH: Multiple options      Decision Making/ Complexity Score: low       The following goals were discussed with the patient and patient is in agreement with them as to be addressed in the treatment plan.     Long Term Goals (to be met by discharge):  Date Goal Met:     1.) Valarie Colindres will demonstrate significantly improved functional performance from re-assessment as measured by a FOTO Intake score of more than 60.     Goal Status:   In progress    2.) Valarie Colindres will return to near to prior level of function for ADLs and household management reporting independence or modified independence.    Goal Status:   In progress    3. Valarie Colindres will report pain 2 out of 10 at worst to increase functional use of affected hand for work and leisure tasks.    Goal Status:   In progress     Short Term Goals (to be met by 3/8/24):  Date Goal Met:     Valarie Colindres will be independent with home exercise program with written instructions.    Goal Status:   In progress    Valarie Colindres will demonstrate ability to touch T4 with IR for Right shoulder to improve functional performance in ADLs/work/leisure tasks.    Goal Status:   In progress    Valarie Colindres will demonstrate no pain with MMT of all shoulder planes to improve functional use of affected arm for ADLs/work/leisure tasks.    Goal Status:   In progress    Valarie Colindres will demonstrate the ability to complete ADL/IADL tasks with 4/10 pain.    Goal Status:   In progress    Valarie Colindres will be able to resume significantly greater occupational roles independently or modified as demonstrated by a FOTO Intake score of more than 50.    Goal Status:   In progress       Plan     Pt to be treated by Occupational Therapy 2 times per week for 12 weeks during the certification period from 2/9/2024 to 5/3/24 to achieve the established goals.     Treatment to include: Manual therapy/joint mobilizations, Modalities for pain management, US 3 mhz, Therapeutic exercises/activities., Iontophoresis with 2.0 cc Dexamethasone, Strengthening, Electrical Modalities, Joint Protection, and Energy Conservation, as well as any other treatments deemed necessary based on the patient's needs or progress.       Rossy Moran, OTR/L

## 2024-02-19 ENCOUNTER — CLINICAL SUPPORT (OUTPATIENT)
Dept: REHABILITATION | Facility: OTHER | Age: 79
End: 2024-02-19
Payer: MEDICARE

## 2024-02-19 DIAGNOSIS — M25.511 CHRONIC RIGHT SHOULDER PAIN: Primary | ICD-10-CM

## 2024-02-19 DIAGNOSIS — M25.611 DECREASED RIGHT SHOULDER RANGE OF MOTION: ICD-10-CM

## 2024-02-19 DIAGNOSIS — G89.29 CHRONIC RIGHT SHOULDER PAIN: Primary | ICD-10-CM

## 2024-02-19 PROCEDURE — 97140 MANUAL THERAPY 1/> REGIONS: CPT | Mod: PN

## 2024-02-19 PROCEDURE — 97110 THERAPEUTIC EXERCISES: CPT | Mod: PN

## 2024-02-19 NOTE — PROGRESS NOTES
Subjective:       Patient ID: Valarie Colindres is a 78 y.o. female.    Chief Complaint: Other (Hoarseness ) and Follow-up      The patient is returning for follow-up visit.  I have not seen her in 1-1/2 years.  There are multiple issues to discuss:  1. Allergic rhinitis:  Nasal secretions are clear.  She does have intermittent nasal congestion, rhinorrhea and postnasal drip She is using loratadine and fluticasone nasal spray.  Substance she supplements with azelastine nasal spray when needed.  2. Asthma:  The patient uses an albuterol HFA inhaler on as-needed basis.  She uses her rescue inhaler once a month or less.  3. Hearing loss/tinnitus/ear pressure:  She does not feel she is having significant change in her hearing.  Her last hearing test was in October 2021.  4. Laryngal pharyngeal reflux:  She is trying be good with her diet.  She does have intermittent throat clearing and globus sensation.  Overall, the laryngal pharyngeal reflux issues have decreased significantly.  The patient is taking famotidine twice daily.  5. Left peripheral vestibular disorder.  The patient has not had any balance issue since her last visit.  She does have meclizine to use if needed.            Review of Systems     Constitutional: Positive for fatigue.  Negative for appetite change, chills, fever and unexpected weight loss.      HENT: Positive for hearing loss, postnasal drip, ringing in the ears, runny nose, sinus pressure, sore throat, stuffy nose, trouble swallowing and voice change.  Negative for ear discharge, ear infection, ear pain, facial swelling, mouth sores, nosebleeds, sinus infection, tonsil infection and dental problems.      Eyes:  Negative for change in eyesight, eye drainage, eye itching and photophobia. Dry eyes    Respiratory:  Positive for cough. Negative for shortness of breath, sleep apnea, snoring and wheezing.      Cardiovascular:  Negative for chest pain, foot swelling, irregular heartbeat and swollen  veins.     Gastrointestinal:  Positive for abdominal pain, constipation and diarrhea. Negative for acid reflux, heartburn and vomiting.     Genitourinary: Positive for difficulty urinating. Negative for sexual problems and frequent urination.     Musc: Positive for aching joints, back pain and neck pain. Negative for aching muscles.     Skin: Negative for rash. Alopecia    Allergy: Positive for seasonal allergies. Negative for food allergies.     Endocrine: Negative for cold intolerance and heat intolerance.  Osteoporosis    Neurological: Positive for headaches. Negative for dizziness, light-headedness, seizures and tremors.      Hematologic: Negative for bruises/bleeds easily and swollen glands.  MGUS    Psychiatric: Negative for decreased concentration, depression, nervous/anxious and sleep disturbance.                Objective:      Physical Exam  Vitals and nursing note reviewed.   Constitutional:       General: She is awake.      Appearance: Normal appearance. She is well-developed, well-groomed and normal weight.   HENT:      Head: Normocephalic.      Jaw: There is normal jaw occlusion.      Salivary Glands: Right salivary gland is not diffusely enlarged or tender. Left salivary gland is not diffusely enlarged or tender.      Right Ear: Hearing, ear canal and external ear normal. There is impacted cerumen. Tympanic membrane is retracted. Tympanic membrane has decreased mobility.      Left Ear: Hearing, ear canal and external ear normal. There is impacted cerumen. Tympanic membrane is retracted. Tympanic membrane has decreased mobility.      Nose: Septal deviation (Mildly to the left), mucosal edema (cyanotic, boggy inferior turbinates bilaterally) and rhinorrhea (clear mucus bilaterally) present. No nasal deformity. Rhinorrhea is clear.      Right Turbinates: Enlarged and pale.      Left Turbinates: Enlarged and pale.      Mouth/Throat:      Lips: No lesions.      Mouth: No oral lesions.      Dentition: No gum  lesions.      Tongue: No lesions.      Palate: No mass and lesions.      Pharynx: Oropharynx is clear. Uvula midline.   Eyes:      General: Lids are normal. Vision grossly intact.         Right eye: No discharge.         Left eye: No discharge.      Extraocular Movements: Extraocular movements intact.      Conjunctiva/sclera: Conjunctivae normal.      Pupils: Pupils are equal, round, and reactive to light.   Neck:      Thyroid: No thyroid mass or thyromegaly.      Trachea: Trachea normal. No tracheal deviation.   Cardiovascular:      Rate and Rhythm: Normal rate and regular rhythm.      Pulses: Normal pulses.      Heart sounds: Normal heart sounds.   Pulmonary:      Effort: Pulmonary effort is normal.      Breath sounds: Normal breath sounds. No stridor. No decreased breath sounds, wheezing, rhonchi or rales.   Abdominal:      General: Bowel sounds are normal.      Palpations: Abdomen is soft.      Tenderness: There is no abdominal tenderness.   Musculoskeletal:      Cervical back: Neck supple. No muscular tenderness. Decreased range of motion.   Lymphadenopathy:      Head:      Right side of head: No submental, submandibular, preauricular, posterior auricular or occipital adenopathy.      Left side of head: No submental, submandibular, preauricular, posterior auricular or occipital adenopathy.      Cervical: No cervical adenopathy.   Skin:     General: Skin is warm and dry.      Findings: No petechiae or rash.      Nails: There is no clubbing.   Neurological:      Mental Status: She is alert and oriented to person, place, and time.      Cranial Nerves: No cranial nerve deficit.      Sensory: No sensory deficit.      Gait: Gait normal.   Psychiatric:         Speech: Speech normal.         Behavior: Behavior normal. Behavior is cooperative.         Thought Content: Thought content normal.         Judgment: Judgment normal.         Procedure-cerumen impactions removed bilaterally using wire loop.  The patient  tolerated procedure well was discharged post procedure    Flexible video nasolaryngoscopy-nasal septal deviation and nasal changes of allergic rhinitis; laryngeal changes consistent with mild laryngal pharyngeal reflux that is controlled with H2 agonist therapy    Laryngeal pictures:                  Nasopharyngeal pictures:                  Right nasal passage:          Left nasal passage pictures:                Assessment:       1. Laryngopharyngeal reflux (LPR)    2. Non-seasonal allergic rhinitis, unspecified trigger    3. Peripheral vertigo involving left ear    4. Dysphonia    5. Nasal septal deviation    6. Impacted cerumen of left ear    7. Sensorineural hearing loss (SNHL) of right ear with restricted hearing of left ear    8. Bilateral tinnitus    9. COVID-19    10. Viral URI with cough    11. Mild intermittent asthma without complication        Plan:       I will have the patient continue with her current drug regimen.  I am scheduling her for a comprehensive auditory evaluation.  I will telephone results to her unless an in-person evaluation and explanation is required.  I am recommending that she use Gaviscon when she has laryngeal symptoms or heartburn.  She will continue taking her famotidine twice daily.  I will recheck her in 6 months, or sooner on an as-needed basis              DISCLAIMER: This note was prepared with [employer] voice recognition transcription software. Garbled syntax, mangled pronouns, and other bizarre constructions may be attributed to that software system. While efforts were made to correct any mistakes made by this voice recognition program, some errors and/or omissions may remain in the note that were missed when the note was originally created.

## 2024-02-19 NOTE — PROGRESS NOTES
"  Occupational Therapy Daily Treatment Note     Date: 2/19/2024  Name: Valarie Colindres  Clinic Number: [id number]    Therapy Diagnosis:   1. Chronic right shoulder pain        2. Decreased right shoulder range of motion          Medical Diagnosis: M25.511,G89.29 (ICD-10-CM) - Chronic right shoulder pain     Referring Physician: Karin Sandra, *  Physician Orders: Eval and treat  Date of Return to MD: 3/5/24     Date of Injury: about a year ago  Date of Surgery: NA     Evaluation Date: 2/9/2024  Authorization Period: 2/9/24 - 2/28/24  Plan of Care Expiration: 5/3/24  Visit #/ Visits Authorized: 2 of 20  FOTO Completion: NEXT VISIT  FOTO #2:  FOTO #3:     Time In: 2:03 pm  Time Out: 2:50 pm  Total Appointment Time (timed & untimed codes): 47 min     Precautions: Standard      Subjective     History of Current Condition: Valarie Colindres is a 78 y.o. year old Right hand dominant female who has been experiencing Right shoulder pain for the past year, near constant pain but worse with behind the back motion. She reports recent course of PT for neck pain of same side. Valarie Colindres is referred to Occupational Therapy for evaluation and treatment. Patient presents today alone.    Pt reports: "I didn't get a chance to due much of the exercises"  Valarie Colindres was compliant with home exercise program given last session.   Response to previous treatment:tolerated well  Functional change: none -first follow up    Pain: 6/10  Location: Right shoulder    Objective     Observation/Appearance:   Right scapular depressed, downwardly rotated  Pain at end range flexion, and hiking noted     Sensation: Patient reports baseline of neuropathy but reports mostly in her feet        SHOULDER RANGE OF MOTION:   Measured in degrees of active motion with goniometer    Right  2/9/2024 Left  2/9/2024   Shoulder Flexion 130* 150   Shoulder Abduction 120 130   Shoulder ER at side 70* 80   Shoulder IR T7* T2    *with pain   "   Manual Muscle Test:  ER: 5/5, pain but improved with correcting scapula (retract) and GH posterior glide  IR: 5/5, with pain        Intake Outcome Measure for FOTO Initial Evaluation Survey     Therapist reviewed FOTO scores for Valraie Colindres on 2/9/2024.   FOTO documents entered into [facility] - see Media section.     Intake Score: NEXT VISIT           Treatment       Valarie received the following manual therapy techniques for 12 minutes:   - posterior glides GH joint  - PROM ER/IR at 90*      Valarie performed therapeutic exercises for 35 minutes including:  - Supine red theraband ER/IR at 90* (30 reps)  - Red therabands ext, ER/IR (30 ea)  - Touchdowns and wiggles (5 sets, 20 wiggles)  - ER into overhead postural training (30 reps)  - Updated HEP and provided handout    HEP:  - Red theraband rows/retract, ER/IR  - Touchdowns/wiggles  - Postural training (ER into overhead)    Home Exercises and Education Provided     Education provided:   - Updated HEP  - Progress towards goals     Written Home Exercises Provided: yes.  Exercises were reviewed and Valarie was able to demonstrate them prior to the end of the session.  Valarie demonstrated good  understanding of the HEP provided.   .   See EMR under Patient Instructions for exercises provided 2/19/2024.        Assessment     Valarie tolerated all treatment tasks and progression well. Will progress as tolerated.     Valarie is progressing well towards her goals and there are no updates to goals at this time. Pt prognosis is Good. Pt will continue to benefit from skilled outpatient occupational therapy to address the deficits listed in the problem list on initial evaluation provide pt/family education and to maximize pt's level of independence in the home and community environment.     Pt's spiritual, cultural and educational needs considered and pt agreeable to plan of care and goals.  The following goals were discussed with the patient and patient is in agreement  with them as to be addressed in the treatment plan.      Long Term Goals (to be met by discharge):  Date Goal Met:       1.) Valarie Colindres will demonstrate significantly improved functional performance from re-assessment as measured by a FOTO Intake score of more than 60.     Goal Status:   In progress     2.) Valarie Colindres will return to near to prior level of function for ADLs and household management reporting independence or modified independence.     Goal Status:   In progress     3. Valarie Colindres will report pain 2 out of 10 at worst to increase functional use of affected hand for work and leisure tasks.     Goal Status:   In progress      Short Term Goals (to be met by 3/8/24):  Date Goal Met:       Valarie Colindres will be independent with home exercise program with written instructions.     Goal Status:   In progress     Valarie Colindres will demonstrate ability to touch T4 with IR for Right shoulder to improve functional performance in ADLs/work/leisure tasks.     Goal Status:   In progress     Valarie Colindres will demonstrate no pain with MMT of all shoulder planes to improve functional use of affected arm for ADLs/work/leisure tasks.     Goal Status:   In progress     Valarie Colindres will demonstrate the ability to complete ADL/IADL tasks with 4/10 pain.     Goal Status:   In progress     Valarie Colindres will be able to resume significantly greater occupational roles independently or modified as demonstrated by a FOTO Intake score of more than 50.     Goal Status:   In progress        Plan   Continue skilled occupational therapy with individualized plan of care focusing on increasing functional independence and participation in meaningful daily activities.    Updates/Grading for next session: progress as tolerated      Rossy Moran, OT

## 2024-02-20 ENCOUNTER — OFFICE VISIT (OUTPATIENT)
Dept: OTOLARYNGOLOGY | Facility: CLINIC | Age: 79
End: 2024-02-20
Payer: MEDICARE

## 2024-02-20 VITALS
SYSTOLIC BLOOD PRESSURE: 129 MMHG | OXYGEN SATURATION: 96 % | WEIGHT: 145.19 LBS | HEART RATE: 77 BPM | BODY MASS INDEX: 24.92 KG/M2 | DIASTOLIC BLOOD PRESSURE: 67 MMHG

## 2024-02-20 DIAGNOSIS — H93.13 BILATERAL TINNITUS: ICD-10-CM

## 2024-02-20 DIAGNOSIS — J06.9 VIRAL URI WITH COUGH: ICD-10-CM

## 2024-02-20 DIAGNOSIS — H81.392 PERIPHERAL VERTIGO INVOLVING LEFT EAR: ICD-10-CM

## 2024-02-20 DIAGNOSIS — J30.89 NON-SEASONAL ALLERGIC RHINITIS, UNSPECIFIED TRIGGER: ICD-10-CM

## 2024-02-20 DIAGNOSIS — H90.A21 SENSORINEURAL HEARING LOSS (SNHL) OF RIGHT EAR WITH RESTRICTED HEARING OF LEFT EAR: ICD-10-CM

## 2024-02-20 DIAGNOSIS — K21.9 LARYNGOPHARYNGEAL REFLUX (LPR): Primary | ICD-10-CM

## 2024-02-20 DIAGNOSIS — U07.1 COVID-19: ICD-10-CM

## 2024-02-20 DIAGNOSIS — J45.20 MILD INTERMITTENT ASTHMA WITHOUT COMPLICATION: ICD-10-CM

## 2024-02-20 DIAGNOSIS — R49.0 DYSPHONIA: ICD-10-CM

## 2024-02-20 DIAGNOSIS — H61.22 IMPACTED CERUMEN OF LEFT EAR: ICD-10-CM

## 2024-02-20 DIAGNOSIS — J34.2 NASAL SEPTAL DEVIATION: ICD-10-CM

## 2024-02-20 PROCEDURE — 69210 REMOVE IMPACTED EAR WAX UNI: CPT | Mod: 51,S$GLB,, | Performed by: SPECIALIST

## 2024-02-20 PROCEDURE — 31575 DIAGNOSTIC LARYNGOSCOPY: CPT | Mod: S$GLB,,, | Performed by: SPECIALIST

## 2024-02-20 PROCEDURE — 99214 OFFICE O/P EST MOD 30 MIN: CPT | Mod: 25,S$GLB,, | Performed by: SPECIALIST

## 2024-02-20 PROCEDURE — 99999 PR PBB SHADOW E&M-EST. PATIENT-LVL V: CPT | Mod: PBBFAC,,, | Performed by: SPECIALIST

## 2024-02-20 RX ORDER — FAMOTIDINE 20 MG/1
20 TABLET, FILM COATED ORAL 2 TIMES DAILY
Qty: 180 TABLET | Refills: 3 | Status: SHIPPED | OUTPATIENT
Start: 2024-02-20 | End: 2025-02-14

## 2024-02-20 RX ORDER — AZELASTINE 1 MG/ML
SPRAY, METERED NASAL
Qty: 30 ML | Refills: 11 | Status: SHIPPED | OUTPATIENT
Start: 2024-02-20

## 2024-02-21 NOTE — PROCEDURES
"Ear Cerumen Removal    Date/Time: 2/20/2024 3:20 PM    Performed by: BETTY Solo MD  Authorized by: BETTY Solo MD    Time out: Immediately prior to procedure a "time out" was called to verify the correct patient, procedure, equipment, support staff and site/side marked as required.    Consent Done?:  Yes (Written)    Local anesthetic:  None  Location details:  Both ears  Procedure type comment:  Wire loop  Cerumen  Removal Results:  Cerumen completely removed  Patient tolerance:  Patient tolerated the procedure well with no immediate complications  Laryngoscopy    Date/Time: 2/20/2024 3:20 PM    Performed by: BETTY Solo MD  Authorized by: BETTY Solo MD    Time out: Immediately prior to procedure a "time out" was called to verify the correct patient, procedure, equipment, support staff and site/side marked as required.    Consent Done?:  Yes (Verbal)  Anesthesia:     Local anesthetic:  4% Xylocaine spray with Kingsley-Synephrine    Patient tolerance:  Patient tolerated the procedure well with no immediate complications    Decongestion performed?: Yes    Laryngoscopy:     Areas examined:  Larynx, hypopharynx, oropharynx, nasopharynx, nasal cavities and vocal cords    Laryngoscope size:  4 mm (Flexible video nasolaryngoscopy)  Nose External:      No external nasal deformity  Nose Intranasal:      Mucosa no polyps     Mucosa ulcers not present     No mucosa lesions present (Clear mucus in the nasal passages bilaterally)     Septum gross deformity (septum deviated to the right high and on the floor the nose to the left)     Enlarged turbinates (inferior turbinates mildly enlarged bilaterally)  Nasopharynx:      Mucosa lesions (edema of the tip of the uvula with thin prolonged mucosal strip laying against posterior pharyngeal wall)     Adenoids not present     Posterior choanae patent     Eustachian tube patent  Larynx/hypopharynx:      No epiglottis lesions     No epiglottis edema     No AE folds " lesions     No vocal cord polyps     Equal and normal bilateral     No hypopharynx lesions     No piriform sinus pooling     No piriform sinus lesions     Post cricoid edema (minimal to mild)     Post cricoid erythema (Minimal to mild)     Flexible video nasolaryngoscopy-nasal septal deviation and nasal changes of allergic rhinitis; laryngeal changes consistent with laryngal pharyngeal reflux that is mild in degree and controlled with H2 agonist.

## 2024-02-28 ENCOUNTER — PATIENT MESSAGE (OUTPATIENT)
Dept: PRIMARY CARE CLINIC | Facility: CLINIC | Age: 79
End: 2024-02-28
Payer: MEDICARE

## 2024-02-28 DIAGNOSIS — E78.2 MIXED HYPERLIPIDEMIA: ICD-10-CM

## 2024-02-28 RX ORDER — ATORVASTATIN CALCIUM 10 MG/1
10 TABLET, FILM COATED ORAL NIGHTLY
Qty: 90 TABLET | Refills: 3 | Status: SHIPPED | OUTPATIENT
Start: 2024-02-28 | End: 2025-02-27

## 2024-02-28 NOTE — TELEPHONE ENCOUNTER
No care due was identified.  [facility] Embedded Care Due Messages. Reference number: [id number].   2/28/2024 2:32:17 PM CST

## 2024-03-05 ENCOUNTER — OFFICE VISIT (OUTPATIENT)
Dept: ORTHOPEDICS | Facility: CLINIC | Age: 79
End: 2024-03-05
Payer: MEDICARE

## 2024-03-05 VITALS — HEIGHT: 64 IN | WEIGHT: 145.31 LBS | BODY MASS INDEX: 24.81 KG/M2

## 2024-03-05 DIAGNOSIS — M25.511 ACUTE PAIN OF RIGHT SHOULDER: Primary | ICD-10-CM

## 2024-03-05 PROCEDURE — 99213 OFFICE O/P EST LOW 20 MIN: CPT | Mod: 25,S$GLB,, | Performed by: ORTHOPAEDIC SURGERY

## 2024-03-05 PROCEDURE — 20610 DRAIN/INJ JOINT/BURSA W/O US: CPT | Mod: RT,S$GLB,, | Performed by: ORTHOPAEDIC SURGERY

## 2024-03-05 PROCEDURE — 99999 PR PBB SHADOW E&M-EST. PATIENT-LVL III: CPT | Mod: PBBFAC,,, | Performed by: ORTHOPAEDIC SURGERY

## 2024-03-05 RX ADMIN — TRIAMCINOLONE ACETONIDE 80 MG: 40 INJECTION, SUSPENSION INTRA-ARTICULAR; INTRAMUSCULAR at 02:03

## 2024-03-05 NOTE — PROCEDURES
Large Joint Aspiration/Injection: R subacromial bursa    Date/Time: 3/5/2024 2:30 PM    Performed by: Karin Sandra MD  Authorized by: Karin Sandra MD    Consent Done?:  Yes (Verbal)  Indications:  Pain  Timeout: prior to procedure the correct patient, procedure, and site was verified    Prep: patient was prepped and draped in usual sterile fashion      Local anesthesia used?: Yes    Anesthesia:  Local infiltration  Local anesthetic:  Lidocaine 1% without epinephrine    Details:  Needle Size:  22 G  Approach:  Posterior  Location:  Shoulder  Site:  R subacromial bursa  Medications:  80 mg triamcinolone acetonide 40 mg/mL

## 2024-03-05 NOTE — PROGRESS NOTES
Pt has right shoulder pain failed 2 visits of PT  Plan for injection today, cont PT, if fails plan for MRI  + impingement, weak on suprasinatus

## 2024-03-05 NOTE — PROGRESS NOTES
Subjective:      Patient ID: Valarie Colindres is a 78 y.o. female.    Chief Complaint: Pain of the Right Shoulder      HPI  Valarie Colindres is a right hand dominant 78 y.o. female presenting today for right shoulder pain. She has attended 2 PT visits since her last visit due to a scheduling issue. She states she does exercise classes 4 times a week. She noticed she is unable to lift her arm up as high as the left arm. She did not want an injection last visit due to her thinking it would mask the pain.     Review of patient's allergies indicates:   Allergen Reactions    Sutures, catgut (chromic)          Current Outpatient Medications   Medication Sig Dispense Refill    acetaminophen (TYLENOL) 500 MG tablet Take 1-2 tablets (500-1,000 mg total) by mouth 3 (three) times daily as needed for Pain.  0    albuterol (PROVENTIL/VENTOLIN HFA) 90 mcg/actuation inhaler Inhale 1-2 puffs into the lungs every 6 (six) hours as needed for Wheezing or Shortness of Breath. (Cough) Rescue 25.5 g 0    alendronate (FOSAMAX) 70 MG tablet TK 1 T PO EVERY WEEK      amLODIPine (NORVASC) 5 MG tablet       aspirin (ECOTRIN) 81 MG EC tablet Take 1 tablet by mouth once daily.      atorvastatin (LIPITOR) 10 MG tablet Take 1 tablet (10 mg total) by mouth every evening. 90 tablet 3    azelastine (ASTELIN) 137 mcg (0.1 %) nasal spray Two sprays in each nostril, sniff until absorbed, then follow with 1 spray of fluticasone.  Use both sprays twice daily. 30 mL 11    chlorhexidine (PERIDEX) 0.12 % solution RINSE AND SWISH ONE CAPFUL BID  1    chlorzoxazone (PARAFON FORTE) 500 mg Tab TAKE 1 TABLET(S) 3 TIMES A DAY BY ORAL ROUTE AS NEEDED.      clobetasoL (TEMOVATE) 0.05 % external solution       diazePAM (VALIUM) 5 MG tablet       diclofenac sodium (VOLTAREN) 1 % Gel Apply 2 g topically 4 (four) times daily. 1 each 2    famotidine (PEPCID) 20 MG tablet Take 1 tablet (20 mg total) by mouth 2 (two) times daily. 180 tablet 3    guaiFENesin-codeine  100-10 mg/5 ml (TUSSI-ORGANIDIN NR)  mg/5 mL syrup TAKE 5 ML BY MOUTH EVERY 8 HOURS AS NEEDED FOR COUGH 100 mL 0    hydroCHLOROthiazide (HYDRODIURIL) 12.5 MG Tab Take 1 tablet (12.5 mg total) by mouth once daily. 90 tablet 3    levothyroxine (SYNTHROID) 100 MCG tablet Take 1 tablet (100 mcg total) by mouth once daily. 90 tablet 3    pantoprazole (PROTONIX) 40 MG tablet Take 40 mg by mouth every morning.      valsartan (DIOVAN) 320 MG tablet Take 1 tablet (320 mg total) by mouth once daily. 90 tablet 3    loratadine (CLARITIN) 10 mg tablet Take 1 tablet (10 mg total) by mouth once daily. 30 tablet 11     No current facility-administered medications for this visit.       Past Medical History:   Diagnosis Date    Angle recession of right eye     Blunt trauma of both eyes     hit across eyes with bungee exercise band    Cataract     Cystocele, midline     Dry eye syndrome     Ectropion due to laxity of eyelid, right     GERD (gastroesophageal reflux disease)     Hyperlipidemia     Hypertension     PVD (posterior vitreous detachment), right eye     Rectocele     Retinal drusen of both eyes     Thalassemia major     Vaginal atrophy        Past Surgical History:   Procedure Laterality Date    ABDOMINAL ADHESION SURGERY  1978    ANKLE FRACTURE SURGERY Right 1996    COLONOSCOPY  11/09/2022    COLONOSCOPY N/A 12/5/2023    Procedure: COLONOSCOPY;  Surgeon: Xavier Lawernce MD;  Location: [address]);  Service: Endoscopy;  Laterality: N/A;    ECTROPION REPAIR Bilateral 06/2018    Dr. Flaherty    ESOPHAGOGASTRODUODENOSCOPY N/A 12/5/2023    Procedure: EGD (ESOPHAGOGASTRODUODENOSCOPY);  Surgeon: Xavier Lawrence MD;  Location: [address]);  Service: Endoscopy;  Laterality: N/A;  9/6- referred by Dr. Lawrence/Additional Scheduling Information:  Needs constipation bowel prep protocol / prep instructions handed to patient and to portal. AS  11/28-precall complete-MS    HYSTERECTOMY  1977    possible cancerous  "lesion       Review of Systems:  Constitutional: Negative for chills and fever.   Respiratory: Negative for cough and shortness of breath.    Gastrointestinal: Negative for nausea and vomiting.   Skin: Negative for rash.   Neurological: Negative for dizziness and headaches.   Psychiatric/Behavioral: Negative for depression.   MSK as in HPI       OBJECTIVE:     PHYSICAL EXAM:  Ht 5' 4" (1.626 m)   Wt 65.9 kg (145 lb 4.5 oz)   BMI 24.94 kg/m²     GEN:  NAD, well-developed, well-groomed.  NEURO: Awake, alert, and oriented. Normal attention and concentration.    PSYCH: Normal mood and affect. Behavior is normal.  HEENT: No cervical lymphadenopathy noted.  CARDIOVASCULAR: Radial pulses 2+ bilaterally. No LE edema noted.  PULMONARY: Breath sounds normal. No respiratory distress.  SKIN: Intact, no rashes.      MSK:     RUE:  Good active ROM of the wrist and fingers. AIN/PIN/Radial/Median/Ulnar Nerves assessed in isolation without deficit. Radial & Ulnar arteries palpated 2+. Capillary Refill <3s.  Neg drop arm  Positive impingement test  Limited ROM in flexion 160 degrees  Internal rotation limited ROM   LUE:  Good active ROM of the wrist and fingers. AIN/PIN/Radial/Median/Ulnar Nerves assessed in isolation without deficit. Radial & Ulnar arteries palpated 2+. Capillary Refill <3s.      RADIOGRAPHS:  The right humeral head maintains appropriate relationship with the glenoid. The acromioclavicular joint is intact noting degenerative changes. No acute displaced right rib fracture. The right lung zones are clear.   Comments: I have personally reviewed the imaging and I agree with the above radiologist's report.    ASSESSMENT/PLAN:       ICD-10-CM ICD-9-CM   1. Acute pain of right shoulder  M25.511 719.41       Orders Placed This Encounter    Large Joint Aspiration/Injection: R subacromial bursa     Orders Placed This Encounter   Procedures    Large Joint Aspiration/Injection: R subacromial bursa     78 yr old female with " right shoulder bursitis      Plan:     CSI right shoulder for bursitis   F/u 7 weeks    The patient indicates understanding of these issues and agrees to the plan.    Jodie Flynn ATC, [facility]   [facility]  [place]

## 2024-03-11 RX ORDER — TRIAMCINOLONE ACETONIDE 40 MG/ML
80 INJECTION, SUSPENSION INTRA-ARTICULAR; INTRAMUSCULAR
Status: DISCONTINUED | OUTPATIENT
Start: 2024-03-05 | End: 2024-03-11 | Stop reason: HOSPADM

## 2024-03-28 ENCOUNTER — HOSPITAL ENCOUNTER (OUTPATIENT)
Dept: RADIOLOGY | Facility: HOSPITAL | Age: 79
Discharge: HOME | End: 2024-03-28
Attending: NURSE PRACTITIONER
Payer: MEDICARE

## 2024-03-28 DIAGNOSIS — R10.9 FLANK PAIN, CHRONIC: ICD-10-CM

## 2024-03-28 DIAGNOSIS — G89.29 FLANK PAIN, CHRONIC: ICD-10-CM

## 2024-03-28 PROCEDURE — 74018 RADEX ABDOMEN 1 VIEW: CPT | Mod: 26,,, | Performed by: RADIOLOGY

## 2024-03-28 PROCEDURE — 74018 RADEX ABDOMEN 1 VIEW: CPT | Mod: TC,PN

## 2024-04-01 ENCOUNTER — DOCUMENTATION ONLY (OUTPATIENT)
Dept: REHABILITATION | Facility: OTHER | Age: 79
End: 2024-04-01
Payer: MEDICARE

## 2024-04-01 NOTE — PROGRESS NOTES
No Show Note/Documentation    Patient: Valarie Colindres  Date of session: 4/1/2024  Chart Number: [id number]     Valarie Colindres did not attend her scheduled therapy appointment today. She did not call to cancel nor reschedule. This is the 1st appointment that she has no showed (two cancellations prior however). Next appointment is scheduled for 4/8/24 and will follow up with patient at that time. No charges have been posted today.     Rossy Moran, OTR/L

## 2024-04-08 ENCOUNTER — CLINICAL SUPPORT (OUTPATIENT)
Dept: REHABILITATION | Facility: OTHER | Age: 79
End: 2024-04-08
Payer: MEDICARE

## 2024-04-08 DIAGNOSIS — G89.29 CHRONIC RIGHT SHOULDER PAIN: Primary | ICD-10-CM

## 2024-04-08 DIAGNOSIS — M25.511 CHRONIC RIGHT SHOULDER PAIN: Primary | ICD-10-CM

## 2024-04-08 DIAGNOSIS — M25.611 DECREASED RIGHT SHOULDER RANGE OF MOTION: ICD-10-CM

## 2024-04-08 PROCEDURE — 97110 THERAPEUTIC EXERCISES: CPT | Mod: PN

## 2024-04-08 PROCEDURE — 97112 NEUROMUSCULAR REEDUCATION: CPT | Mod: PN

## 2024-04-08 NOTE — PROGRESS NOTES
Occupational Therapy Daily Treatment Note     Date: 4/8/2024  Name: Valarie Colindres  Clinic Number: [id number]    Therapy Diagnosis:   1. Chronic right shoulder pain        2. Decreased right shoulder range of motion          Medical Diagnosis: M25.511,G89.29 (ICD-10-CM) - Chronic right shoulder pain     Referring Physician: Karin Sandra, *  Physician Orders: Eval and treat  Date of Return to MD: 3/5/24     Date of Injury: about a year ago  Date of Surgery: NA     Evaluation Date: 2/9/2024  Authorization Period: 2/9/24 - 2/28/24  Plan of Care Expiration: 5/3/24  Visit #/ Visits Authorized: 3 of 20  FOTO Completion: NEXT VISIT  FOTO #2:  FOTO #3:     Time In: 2:05 pm  Time Out: 2:50 pm  Total Appointment Time (timed & untimed codes): 45 min     Precautions: Standard      Subjective     History of Current Condition: Valarie Colindres is a 78 y.o. year old Right hand dominant female who has been experiencing Right shoulder pain for the past year, near constant pain but worse with behind the back motion. She reports recent course of PT for neck pain of same side. Valarie Colindres is referred to Occupational Therapy for evaluation and treatment. Patient presents today alone.    Pt reports: she only found mild short term relief from the steroid injection, pain mostly with reaching behind back  Valarie Colindres was compliant with home exercise program given last session.   Response to previous treatment:tolerated well  Functional change: improvement in ROM    Pain: 5/10  Location: Right shoulder    Objective     Observation/Appearance:   Right scapular depressed, downwardly rotated  Pain at end range flexion, and hiking noted     Sensation: Patient reports baseline of neuropathy but reports mostly in her feet        SHOULDER RANGE OF MOTION:   Measured in degrees of active motion with goniometer    Right  2/9/2024 Right  4/8/24 Left  2/9/2024   Shoulder Flexion 130* 150 150   Shoulder Abduction 120  130    Shoulder ER at side 70* 80 80   Shoulder IR T7* T2 T2    *with pain     Shoulder Manual Muscle Test:  ER: 5/5  IR: 5/5, with mild pain    Flexion: 5  Extension: 5       Intake Outcome Measure for FOTO Initial Evaluation Survey     Therapist reviewed FOTO scores for Valarie Colindres on 2/9/2024.   FOTO documents entered into [employer] - see Media section.     Intake Score: NEXT VISIT           Treatment     Valarie performed therapeutic exercises for 10 minutes including:  - Reassessment of objective measurements      Valarie participated in neuromuscular re-education activities to improve: Posture for 35 minutes. The following activities were included:  - Supine red theraband ER/IR at 90* (30 reps)  - Supine shoulder flexion with theraband loop (30 reps)  - Touchdowns and shrug (30 reps)  - Robots with foam roll (20 reps) - try SA slides next    - Red therabands extension pull down (30 ea) - NT  - ER into overhead postural training (30 reps) - NT      Home Exercises and Education Provided     Education provided:   - Progress towards goals     Written Home Exercises Provided: Patient instructed to cont prior HEP.  Exercises were reviewed and Valarie was able to demonstrate them prior to the end of the session.  Valarie demonstrated good  understanding of the HEP provided.     HEP:  - Red theraband rows/retract, ER/IR  - Touchdowns/wiggles  - Postural training (ER into overhead)    See EMR under Patient Instructions for exercises provided 2/19/2024.        Assessment     Valarie tolerated all treatment tasks fairly well, noting increase in pain about lateral shoulder and radiating down arm with robot exercises. She demonstrates overall improvement in ROM since initial eval. Pain mostly with reaching behind back and extremes overhead with weight. Will progress as tolerated.     Valarie is progressing well towards her goals and there are no updates to goals at this time. Pt prognosis is Good. Pt will continue to benefit  from skilled outpatient occupational therapy to address the deficits listed in the problem list on initial evaluation provide pt/family education and to maximize pt's level of independence in the home and community environment.     Pt's spiritual, cultural and educational needs considered and pt agreeable to plan of care and goals.  The following goals were discussed with the patient and patient is in agreement with them as to be addressed in the treatment plan.      Long Term Goals (to be met by discharge):  Date Goal Met:       1.) Valarie Colindres will demonstrate significantly improved functional performance from re-assessment as measured by a FOTO Intake score of more than 60.     Goal Status:   In progress     2.) Valarie Colindres will return to near to prior level of function for ADLs and household management reporting independence or modified independence.     Goal Status:   In progress     3. Valarie Colindres will report pain 2 out of 10 at worst to increase functional use of affected hand for work and leisure tasks.     Goal Status:   In progress      Short Term Goals (to be met by 3/8/24):  Date Goal Met:       Valarie Colindres will be independent with home exercise program with written instructions.     Goal Status:   In progress     Valarie Colindres will demonstrate ability to touch T4 with IR for Right shoulder to improve functional performance in ADLs/work/leisure tasks.    4/8/24 Goal Status: Met     Valarie Colindres will demonstrate no pain with MMT of all shoulder planes to improve functional use of affected arm for ADLs/work/leisure tasks.     Goal Status:   In progress     Valarie Colindres will demonstrate the ability to complete ADL/IADL tasks with 4/10 pain.     Goal Status:   In progress     Valarie Colindres will be able to resume significantly greater occupational roles independently or modified as demonstrated by a FOTO Intake score of more than 50.     Goal Status:   In  progress        Plan   Continue skilled occupational therapy with individualized plan of care focusing on increasing functional independence and participation in meaningful daily activities.    Updates/Grading for next session: progress as tolerated      Rossy Moran OT

## 2024-04-15 ENCOUNTER — CLINICAL SUPPORT (OUTPATIENT)
Dept: REHABILITATION | Facility: OTHER | Age: 79
End: 2024-04-15
Payer: MEDICARE

## 2024-04-15 ENCOUNTER — PATIENT MESSAGE (OUTPATIENT)
Dept: ORTHOPEDICS | Facility: CLINIC | Age: 79
End: 2024-04-15
Payer: MEDICARE

## 2024-04-15 DIAGNOSIS — M25.511 CHRONIC RIGHT SHOULDER PAIN: Primary | ICD-10-CM

## 2024-04-15 DIAGNOSIS — G89.29 CHRONIC RIGHT SHOULDER PAIN: Primary | ICD-10-CM

## 2024-04-15 DIAGNOSIS — M25.611 DECREASED RIGHT SHOULDER RANGE OF MOTION: ICD-10-CM

## 2024-04-15 PROCEDURE — 97110 THERAPEUTIC EXERCISES: CPT | Mod: PN

## 2024-04-15 PROCEDURE — 97112 NEUROMUSCULAR REEDUCATION: CPT | Mod: PN

## 2024-04-15 NOTE — PROGRESS NOTES
Occupational Therapy Daily Treatment Note     Date: 4/15/2024  Name: Valarie Colindres  Clinic Number: [id number]    Therapy Diagnosis:   1. Chronic right shoulder pain        2. Decreased right shoulder range of motion          Medical Diagnosis: M25.511,G89.29 (ICD-10-CM) - Chronic right shoulder pain     Referring Physician: Karin Sandra, *  Physician Orders: Eval and treat  Date of Return to MD: 3/5/24     Date of Injury: about a year ago  Date of Surgery: NA     Evaluation Date: 2/9/2024  Authorization Period: 2/9/24 - 2/28/24  Plan of Care Expiration: 5/3/24  Visit #/ Visits Authorized: 3 of 20  FOTO Completion: NEXT VISIT  FOTO #2:  FOTO #3:     Time In: 2:05 pm  Time Out: 2:43 pm  Total Appointment Time (timed & untimed codes): 38 min     Precautions: Standard      Subjective     History of Current Condition: Valarie Colindres is a 78 y.o. year old Right hand dominant female who has been experiencing Right shoulder pain for the past year, near constant pain but worse with behind the back motion. She reports recent course of PT for neck pain of same side. Valarie Colindres is referred to Occupational Therapy for evaluation and treatment. Patient presents today alone.    Pt reports: increased soreness for a few days after last session  Valarie Colindres was compliant with home exercise program given last session.   Response to previous treatment:tolerated well  Functional change: improvement in ROM    Pain: 5/10  Location: Right shoulder    Objective     Observation/Appearance:   Right scapular depressed, downwardly rotated  Pain at end range flexion, and hiking noted     Sensation: Patient reports baseline of neuropathy but reports mostly in her feet        SHOULDER RANGE OF MOTION:   Measured in degrees of active motion with goniometer    Right  2/9/2024 Right  4/8/24 Left  2/9/2024   Shoulder Flexion 130* 150 150   Shoulder Abduction 120  130   Shoulder ER at side 70* 80 80   Shoulder IR T7* T2  T2    *with pain     Shoulder Manual Muscle Test:  ER: 5/5  IR: 5/5, with mild pain    Flexion: 5  Extension: 5      Treatment     [place] performed therapeutic exercises for 15 minutes including:  - UBE level 1.0 for dynamic scap and shoulder mobility (3 min each direction)  - Red therabands extension (30 ea)   - Red theraband lat pull down (30 reps)  - SA wall slides (30 reps)    [place] participated in neuromuscular re-education activities to improve: Posture for 23 minutes. The following activities were included:  - Supine bilateral shoulder flexion with theraband loop (30 reps)  - Supine PNF (30 reps)  - Supine red theraband ER/IR at 90* (30 reps)  - Touchdowns and shrug (30 reps)      Home Exercises and Education Provided     Education provided:   - Progress towards goals     Written Home Exercises Provided: Patient instructed to cont prior HEP.  Exercises were reviewed and Valarie was able to demonstrate them prior to the end of the session.  Moonia demonstrated good  understanding of the HEP provided.     HEP:  - Red theraband rows/retract, ER/IR  - Touchdowns/wiggles  - Postural training (ER into overhead)    See EMR under Patient Instructions for exercises provided 2/19/2024.        Assessment     Valarie tolerated all treatment tasks well with mild reports of fatigue by end of session. Will progress as tolerated.     Valarie is progressing well towards her goals and there are no updates to goals at this time. Pt prognosis is Good. Pt will continue to benefit from skilled outpatient occupational therapy to address the deficits listed in the problem list on initial evaluation provide pt/family education and to maximize pt's level of independence in the home and community environment.     Pt's spiritual, cultural and educational needs considered and pt agreeable to plan of care and goals.  The following goals were discussed with the patient and patient is in agreement with them as to be addressed in the  treatment plan.      Long Term Goals (to be met by discharge):  Date Goal Met:       1.) Valarie Colindres will demonstrate significantly improved functional performance from re-assessment as measured by a FOTO Intake score of more than 60.     Goal Status:   In progress     2.) Valarie Colindres will return to near to prior level of function for ADLs and household management reporting independence or modified independence.     Goal Status:   In progress     3. Valarie Colindres will report pain 2 out of 10 at worst to increase functional use of affected hand for work and leisure tasks.     Goal Status:   In progress      Short Term Goals (to be met by 3/8/24):  Date Goal Met:       Valarie Colindres will be independent with home exercise program with written instructions.     Goal Status:   In progress     Valarie Colindres will demonstrate ability to touch T4 with IR for Right shoulder to improve functional performance in ADLs/work/leisure tasks.    4/8/24 Goal Status: Met     Valarie Colindres will demonstrate no pain with MMT of all shoulder planes to improve functional use of affected arm for ADLs/work/leisure tasks.     Goal Status:   In progress     Valarie Colindres will demonstrate the ability to complete ADL/IADL tasks with 4/10 pain.     Goal Status:   In progress     Valarie Colindres will be able to resume significantly greater occupational roles independently or modified as demonstrated by a FOTO Intake score of more than 50.     Goal Status:   In progress        Plan   Continue skilled occupational therapy with individualized plan of care focusing on increasing functional independence and participation in meaningful daily activities.    Updates/Grading for next session: progress as tolerated      Rossy Moran, OT

## 2024-04-22 ENCOUNTER — CLINICAL SUPPORT (OUTPATIENT)
Dept: REHABILITATION | Facility: OTHER | Age: 79
End: 2024-04-22
Payer: MEDICARE

## 2024-04-22 DIAGNOSIS — G89.29 CHRONIC RIGHT SHOULDER PAIN: Primary | ICD-10-CM

## 2024-04-22 DIAGNOSIS — M25.511 CHRONIC RIGHT SHOULDER PAIN: Primary | ICD-10-CM

## 2024-04-22 DIAGNOSIS — M25.611 DECREASED RIGHT SHOULDER RANGE OF MOTION: ICD-10-CM

## 2024-04-22 PROCEDURE — 97112 NEUROMUSCULAR REEDUCATION: CPT | Mod: PN

## 2024-04-22 PROCEDURE — 97110 THERAPEUTIC EXERCISES: CPT | Mod: PN

## 2024-04-22 NOTE — PROGRESS NOTES
Occupational Therapy Daily Treatment Note     Date: 4/22/2024  Name: Valarie Colindres  Clinic Number: [id number]    Therapy Diagnosis:   1. Chronic right shoulder pain        2. Decreased right shoulder range of motion          Medical Diagnosis: M25.511,G89.29 (ICD-10-CM) - Chronic right shoulder pain     Referring Physician: Karin Sandra, *  Physician Orders: Eval and treat  Date of Return to MD: 3/5/24     Date of Injury: about a year ago  Date of Surgery: NA     Evaluation Date: 2/9/2024  Authorization Period: 2/9/24 - 2/28/24  Plan of Care Expiration: 5/3/24  Visit #/ Visits Authorized: 5 of 20  FOTO Completion:   FOTO #2:  FOTO #3:     Time In: 2:05 pm  Time Out: 2:45 pm  Total Appointment Time (timed & untimed codes): 40 min     Precautions: Standard      Subjective     History of Current Condition: Valarie Colindres is a 78 y.o. year old Right hand dominant female who has been experiencing Right shoulder pain for the past year, near constant pain but worse with behind the back motion. She reports recent course of PT for neck pain of same side. Valarie Colindres is referred to Occupational Therapy for evaluation and treatment. Patient presents today alone.    Pt reports: increased soreness for a few days after last session  Valarie Colindres was compliant with home exercise program given last session.   Response to previous treatment:tolerated well  Functional change: improvement in ROM    Pain: 4/10  Location: Right shoulder    Objective     Observation/Appearance:   Right scapular depressed, downwardly rotated  Pain at end range flexion, and hiking noted     Sensation: Patient reports baseline of neuropathy but reports mostly in her feet        SHOULDER RANGE OF MOTION:   Measured in degrees of active motion with goniometer    Right  2/9/2024 Right  4/8/24 Left  2/9/2024   Shoulder Flexion 130* 150 150   Shoulder Abduction 120  130   Shoulder ER at side 70* 80 80   Shoulder IR T7* T2 T2     *with pain     Shoulder Manual Muscle Test:  ER: 5/5  IR: 5/5, with mild pain    Flexion: 5  Extension: 5      Treatment     Valarie performed therapeutic exercises for 15 minutes including:  - UBE level 2.0 for dynamic scap and shoulder mobility (3 min each direction)  - Red therabands extension (30 ea)   - Red theraband lat pull down (30 reps)  - SA wall slides (30 reps)      [place] participated in neuromuscular re-education activities to improve: Posture for 30 minutes. The following activities were included:  - Supine bilateral shoulder flexion with theraband loop (30 reps)  - Supine horizontal abduction (3 x 10 reps)  - Supine red theraband ER/IR at 90* (30 reps)  - Side lying abduction, 1# (30 reps)  - Ys wall slide and lift off (30 reps)      Home Exercises and Education Provided     Education provided:   - Progress towards goals     Written Home Exercises Provided: Patient instructed to cont prior HEP.  Exercises were reviewed and Valarie was able to demonstrate them prior to the end of the session.  Valarie demonstrated good  understanding of the HEP provided.     HEP:  - Red theraband rows/retract, ER/IR  - Touchdowns/wiggles  - Postural training (ER into overhead)    See EMR under Patient Instructions for exercises provided 2/19/2024.        Assessment     Valarie tolerated all treatment tasks well with mild reports of fatigue by end of session. She reports overall improvement in shoulder pain, although dull ache remains about lateral shoulder. No complaints of pain during session today. Will progress as tolerated.     Valarie is progressing well towards her goals and there are no updates to goals at this time. Pt prognosis is Good. Pt will continue to benefit from skilled outpatient occupational therapy to address the deficits listed in the problem list on initial evaluation provide pt/family education and to maximize pt's level of independence in the home and community environment.     Pt's  spiritual, cultural and educational needs considered and pt agreeable to plan of care and goals.  The following goals were discussed with the patient and patient is in agreement with them as to be addressed in the treatment plan.      Long Term Goals (to be met by discharge):  Date Goal Met:       1.) Valarie Colindres will demonstrate significantly improved functional performance from re-assessment as measured by a FOTO Intake score of more than 60.     Goal Status:   In progress     2.) Valarie Colindres will return to near to prior level of function for ADLs and household management reporting independence or modified independence.     Goal Status:   In progress     3. Valarie Colindres will report pain 2 out of 10 at worst to increase functional use of affected hand for work and leisure tasks.     Goal Status:   In progress      Short Term Goals (to be met by 3/8/24):  Date Goal Met:       Valarie Colindres will be independent with home exercise program with written instructions.     Goal Status:   In progress     Valarie Colindres will demonstrate ability to touch T4 with IR for Right shoulder to improve functional performance in ADLs/work/leisure tasks.    4/8/24 Goal Status: Met     Valarie Colindres will demonstrate no pain with MMT of all shoulder planes to improve functional use of affected arm for ADLs/work/leisure tasks.     Goal Status:   In progress     Valarie Colindres will demonstrate the ability to complete ADL/IADL tasks with 4/10 pain.     Goal Status:   In progress     Valarie Colindres will be able to resume significantly greater occupational roles independently or modified as demonstrated by a FOTO Intake score of more than 50.     Goal Status:   In progress        Plan   Continue skilled occupational therapy with individualized plan of care focusing on increasing functional independence and participation in meaningful daily activities.    Updates/Grading for next session: progress as  tolerated      Rossy Moran, OT

## 2024-04-23 ENCOUNTER — DOCUMENTATION ONLY (OUTPATIENT)
Dept: ORTHOPEDICS | Facility: CLINIC | Age: 79
End: 2024-04-23
Payer: MEDICARE

## 2024-04-23 ENCOUNTER — OFFICE VISIT (OUTPATIENT)
Dept: ORTHOPEDICS | Facility: CLINIC | Age: 79
End: 2024-04-23
Payer: MEDICARE

## 2024-04-23 VITALS — WEIGHT: 145.31 LBS | BODY MASS INDEX: 24.81 KG/M2 | HEIGHT: 64 IN

## 2024-04-23 DIAGNOSIS — M67.911 TENDINOPATHY OF RIGHT ROTATOR CUFF: Primary | ICD-10-CM

## 2024-04-23 PROCEDURE — 99999 PR PBB SHADOW E&M-EST. PATIENT-LVL III: CPT | Mod: PBBFAC,,, | Performed by: ORTHOPAEDIC SURGERY

## 2024-04-23 PROCEDURE — 1101F PT FALLS ASSESS-DOCD LE1/YR: CPT | Mod: CPTII,S$GLB,, | Performed by: ORTHOPAEDIC SURGERY

## 2024-04-23 PROCEDURE — 3288F FALL RISK ASSESSMENT DOCD: CPT | Mod: CPTII,S$GLB,, | Performed by: ORTHOPAEDIC SURGERY

## 2024-04-23 PROCEDURE — 1125F AMNT PAIN NOTED PAIN PRSNT: CPT | Mod: CPTII,S$GLB,, | Performed by: ORTHOPAEDIC SURGERY

## 2024-04-23 PROCEDURE — 1159F MED LIST DOCD IN RCRD: CPT | Mod: CPTII,S$GLB,, | Performed by: ORTHOPAEDIC SURGERY

## 2024-04-23 PROCEDURE — 99214 OFFICE O/P EST MOD 30 MIN: CPT | Mod: S$GLB,,, | Performed by: ORTHOPAEDIC SURGERY

## 2024-04-23 NOTE — PROGRESS NOTES
Subjective:      Patient ID: Valarie Colindres is a 79 y.o. female.    Chief Complaint: Pain of the Right Shoulder      HPI  Valarie Colindres is a right hand dominant 79 y.o. female presenting today for right shoulder pain. She has attended 2 PT visits since her last visit due to a scheduling issue. She states she does exercise classes 4 times a week. She noticed she is unable to lift her arm up as high as the left arm. She did not want an injection last visit due to her thinking it would mask the pain.     Int hx 4/23/24:  Patient returns today for follow up of her right shoulder subacromial bursitis.  Last visit she underwent right subacromial CSI and course of physical therapy.  Endorses that the CSI gave her 100% relief, but only lasted 1 week.  Still endorses her baseline pain, but did get significant improvement in her range of motion and strength of physical therapy and working with a .  No history of MRI.  Of note, she endorsed 2 episodes of a strange sensation involving feeling a somewhat foggy start over her posterior neck and transition to the superior side of her head during that week of complete pain relief.  During this episodes they denied any pain, lightheadedness, changes in vision.  Once her pain returned, had no further episodes of this.  Denies any history of complications from steroids in the past has had a steroid injection in her right hip that completely resolved her pain.    Review of patient's allergies indicates:   Allergen Reactions    Sutures, catgut (chromic)          Current Outpatient Medications   Medication Sig Dispense Refill    acetaminophen (TYLENOL) 500 MG tablet Take 1-2 tablets (500-1,000 mg total) by mouth 3 (three) times daily as needed for Pain.  0    albuterol (PROVENTIL/VENTOLIN HFA) 90 mcg/actuation inhaler Inhale 1-2 puffs into the lungs every 6 (six) hours as needed for Wheezing or Shortness of Breath. (Cough) Rescue 25.5 g 0    alendronate (FOSAMAX)  70 MG tablet TK 1 T PO EVERY WEEK      amLODIPine (NORVASC) 5 MG tablet       aspirin (ECOTRIN) 81 MG EC tablet Take 1 tablet by mouth once daily.      atorvastatin (LIPITOR) 10 MG tablet Take 1 tablet (10 mg total) by mouth every evening. 90 tablet 3    azelastine (ASTELIN) 137 mcg (0.1 %) nasal spray Two sprays in each nostril, sniff until absorbed, then follow with 1 spray of fluticasone.  Use both sprays twice daily. 30 mL 11    chlorhexidine (PERIDEX) 0.12 % solution RINSE AND SWISH ONE CAPFUL BID  1    chlorzoxazone (PARAFON FORTE) 500 mg Tab TAKE 1 TABLET(S) 3 TIMES A DAY BY ORAL ROUTE AS NEEDED.      clobetasoL (TEMOVATE) 0.05 % external solution       diazePAM (VALIUM) 5 MG tablet       diclofenac sodium (VOLTAREN) 1 % Gel Apply 2 g topically 4 (four) times daily. 1 each 2    famotidine (PEPCID) 20 MG tablet Take 1 tablet (20 mg total) by mouth 2 (two) times daily. 180 tablet 3    guaiFENesin-codeine 100-10 mg/5 ml (TUSSI-ORGANIDIN NR)  mg/5 mL syrup TAKE 5 ML BY MOUTH EVERY 8 HOURS AS NEEDED FOR COUGH 100 mL 0    hydroCHLOROthiazide (HYDRODIURIL) 12.5 MG Tab Take 1 tablet (12.5 mg total) by mouth once daily. 90 tablet 3    levothyroxine (SYNTHROID) 100 MCG tablet Take 1 tablet (100 mcg total) by mouth once daily. 90 tablet 3    pantoprazole (PROTONIX) 40 MG tablet Take 40 mg by mouth every morning.      valsartan (DIOVAN) 320 MG tablet Take 1 tablet (320 mg total) by mouth once daily. 90 tablet 3    loratadine (CLARITIN) 10 mg tablet Take 1 tablet (10 mg total) by mouth once daily. 30 tablet 11     No current facility-administered medications for this visit.       Past Medical History:   Diagnosis Date    Angle recession of right eye     Blunt trauma of both eyes     hit across eyes with bungee exercise band    Cataract     Cystocele, midline     Dry eye syndrome     Ectropion due to laxity of eyelid, right     GERD (gastroesophageal reflux disease)     Hyperlipidemia     Hypertension     PVD  "(posterior vitreous detachment), right eye     Rectocele     Retinal drusen of both eyes     Thalassemia major     Vaginal atrophy        Past Surgical History:   Procedure Laterality Date    ABDOMINAL ADHESION SURGERY  1978    ANKLE FRACTURE SURGERY Right 1996    COLONOSCOPY  11/09/2022    COLONOSCOPY N/A 12/5/2023    Procedure: COLONOSCOPY;  Surgeon: Xavier Lawrence MD;  Location: [facility] (4TH FLR);  Service: Endoscopy;  Laterality: N/A;    ECTROPION REPAIR Bilateral 06/2018    Dr. Flaherty    ESOPHAGOGASTRODUODENOSCOPY N/A 12/5/2023    Procedure: EGD (ESOPHAGOGASTRODUODENOSCOPY);  Surgeon: Xavier Lawrence MD;  [facility]  Service: Endoscopy;  Laterality: N/A;  9/6- referred by Dr. Lawrence/Additional Scheduling Information:  Needs constipation bowel prep protocol / prep instructions handed to patient and to portal. AS  11/28-precall complete-MS    HYSTERECTOMY  1977    possible cancerous lesion       Review of Systems:  Constitutional: Negative for chills and fever.   Respiratory: Negative for cough and shortness of breath.    Gastrointestinal: Negative for nausea and vomiting.   Skin: Negative for rash.   Neurological: Negative for dizziness and headaches.   Psychiatric/Behavioral: Negative for depression.   MSK as in HPI       OBJECTIVE:     PHYSICAL EXAM:  Ht 5' 4" (1.626 m)   Wt 65.9 kg (145 lb 4.5 oz)   BMI 24.94 kg/m²     GEN:  NAD, well-developed, well-groomed.  NEURO: Awake, alert, and oriented. Normal attention and concentration.    PSYCH: Normal mood and affect. Behavior is normal.  HEENT: No cervical lymphadenopathy noted.  CARDIOVASCULAR: Radial pulses 2+ bilaterally. No LE edema noted.  PULMONARY: Breath sounds normal. No respiratory distress.  SKIN: Intact, no rashes.      MSK:     RUE:  Good active ROM of the wrist and fingers. AIN/PIN/Radial/Median/Ulnar Nerves assessed in isolation without deficit. Radial & Ulnar arteries palpated 2+. Capillary Refill <3s.  Neg drop " arm  Neg impingement tests  Full ROM and strength   No crepitus    LUE:  Good active ROM of the wrist and fingers. AIN/PIN/Radial/Median/Ulnar Nerves assessed in isolation without deficit. Radial & Ulnar arteries palpated 2+. Capillary Refill <3s.      RADIOGRAPHS:  The right humeral head maintains appropriate relationship with the glenoid. The acromioclavicular joint is intact noting degenerative changes. No acute displaced right rib fracture. The right lung zones are clear.   Comments: I have personally reviewed the imaging and I agree with the above radiologist's report.    ASSESSMENT/PLAN:       78 yr old female with right shoulder bursitis      Plan:     Had an extensive discussion with the patient regarding options.  Educated patient that given she has failed a course of conservative management that I would recommend either repeating CSI, but going interarticular giver she only got 1 week relief with her subacromial injection vs obtaining MRI. She would like to undergo MRI and endorsed difficulty with closed MRI even with valium onboard in the past. Will attempt to obtain open MRI. F/u after MRI to discuss results.

## 2024-04-23 NOTE — PROGRESS NOTES
Pt has pain 5 at worst since she had injection and PT  Pt felt the injection did not last- lasted for a week  Does not have night time pain, but will have pain at rest  Has henri PT and a     Plan for MRI of shoulder  Discussed that an MRi is utilized by us for surgical treatement planning.  Pt understands and agrees with plan

## 2024-04-26 ENCOUNTER — TELEPHONE (OUTPATIENT)
Dept: ORTHOPEDICS | Facility: CLINIC | Age: 79
End: 2024-04-26
Payer: MEDICARE

## 2024-04-26 NOTE — TELEPHONE ENCOUNTER
Spoke c pt. Offered and confirmed appt location & time c Dr. Hathaway 04/30/24 to review results of R shoulder MRI. Pt reminded to bring disc to the appt. Outside imaging report scanned into Media. Pt expressed understanding & was thankful.

## 2024-04-30 ENCOUNTER — OFFICE VISIT (OUTPATIENT)
Dept: ORTHOPEDICS | Facility: CLINIC | Age: 79
End: 2024-04-30
Payer: MEDICARE

## 2024-04-30 VITALS — WEIGHT: 145.31 LBS | BODY MASS INDEX: 24.81 KG/M2 | HEIGHT: 64 IN

## 2024-04-30 DIAGNOSIS — M67.911 TENDINOPATHY OF RIGHT ROTATOR CUFF: Primary | ICD-10-CM

## 2024-04-30 PROCEDURE — 99214 OFFICE O/P EST MOD 30 MIN: CPT | Mod: S$GLB,,, | Performed by: ORTHOPAEDIC SURGERY

## 2024-04-30 PROCEDURE — 1125F AMNT PAIN NOTED PAIN PRSNT: CPT | Mod: CPTII,S$GLB,, | Performed by: ORTHOPAEDIC SURGERY

## 2024-04-30 PROCEDURE — 99999 PR PBB SHADOW E&M-EST. PATIENT-LVL III: CPT | Mod: PBBFAC,,, | Performed by: ORTHOPAEDIC SURGERY

## 2024-04-30 PROCEDURE — 1159F MED LIST DOCD IN RCRD: CPT | Mod: CPTII,S$GLB,, | Performed by: ORTHOPAEDIC SURGERY

## 2024-04-30 PROCEDURE — 3288F FALL RISK ASSESSMENT DOCD: CPT | Mod: CPTII,S$GLB,, | Performed by: ORTHOPAEDIC SURGERY

## 2024-04-30 PROCEDURE — 1101F PT FALLS ASSESS-DOCD LE1/YR: CPT | Mod: CPTII,S$GLB,, | Performed by: ORTHOPAEDIC SURGERY

## 2024-04-30 NOTE — PROGRESS NOTES
Pt does not want surgery at this time or another injection.  Pt states if it gets worse than now she will come back, currently pain is a 2/3 and she can live with this  Pt has 2 visits of PT    Cast another injection versus surgery patient is not interested in either she feels like she can make do she will call us if she needs us

## 2024-04-30 NOTE — PROGRESS NOTES
Subjective:      Patient ID: Valarei Colindres is a 79 y.o. female.    Chief Complaint: Pain of the Right Shoulder      HPI  Valarie Colindres is a right hand dominant 79 y.o. female presenting today for right shoulder pain. She has attended 2 PT visits since her last visit due to a scheduling issue. She states she does exercise classes 4 times a week. She noticed she is unable to lift her arm up as high as the left arm. She did not want an injection last visit due to her thinking it would mask the pain.     Int hx 4/23/24:  Patient returns today for follow up of her right shoulder subacromial bursitis.  Last visit she underwent right subacromial CSI and course of physical therapy.  Endorses that the CSI gave her 100% relief, but only lasted 1 week.  Still endorses her baseline pain, but did get significant improvement in her range of motion and strength of physical therapy and working with a .  No history of MRI.  Of note, she endorsed 2 episodes of a strange sensation involving feeling a somewhat foggy start over her posterior neck and transition to the superior side of her head during that week of complete pain relief.  During this episodes they denied any pain, lightheadedness, changes in vision.  Once her pain returned, had no further episodes of this.  Denies any history of complications from steroids in the past has had a steroid injection in her right hip that completely resolved her pain.    4/30/24 Patient is here today to go over her MRI results, she states she has 2 PT visits left and it has helped with her ROM but not her pain. She states it hurts when she reaches over head. She had a CSI but it only lasted a week.     Review of patient's allergies indicates:   Allergen Reactions    Sutures, catgut (chromic)          Current Outpatient Medications   Medication Sig Dispense Refill    acetaminophen (TYLENOL) 500 MG tablet Take 1-2 tablets (500-1,000 mg total) by mouth 3 (three) times  daily as needed for Pain.  0    albuterol (PROVENTIL/VENTOLIN HFA) 90 mcg/actuation inhaler Inhale 1-2 puffs into the lungs every 6 (six) hours as needed for Wheezing or Shortness of Breath. (Cough) Rescue 25.5 g 0    alendronate (FOSAMAX) 70 MG tablet TK 1 T PO EVERY WEEK      amLODIPine (NORVASC) 5 MG tablet       aspirin (ECOTRIN) 81 MG EC tablet Take 1 tablet by mouth once daily.      atorvastatin (LIPITOR) 10 MG tablet Take 1 tablet (10 mg total) by mouth every evening. 90 tablet 3    azelastine (ASTELIN) 137 mcg (0.1 %) nasal spray Two sprays in each nostril, sniff until absorbed, then follow with 1 spray of fluticasone.  Use both sprays twice daily. 30 mL 11    chlorhexidine (PERIDEX) 0.12 % solution RINSE AND SWISH ONE CAPFUL BID  1    chlorzoxazone (PARAFON FORTE) 500 mg Tab TAKE 1 TABLET(S) 3 TIMES A DAY BY ORAL ROUTE AS NEEDED.      clobetasoL (TEMOVATE) 0.05 % external solution       diazePAM (VALIUM) 5 MG tablet       diclofenac sodium (VOLTAREN) 1 % Gel Apply 2 g topically 4 (four) times daily. 1 each 2    famotidine (PEPCID) 20 MG tablet Take 1 tablet (20 mg total) by mouth 2 (two) times daily. 180 tablet 3    guaiFENesin-codeine 100-10 mg/5 ml (TUSSI-ORGANIDIN NR)  mg/5 mL syrup TAKE 5 ML BY MOUTH EVERY 8 HOURS AS NEEDED FOR COUGH 100 mL 0    hydroCHLOROthiazide (HYDRODIURIL) 12.5 MG Tab Take 1 tablet (12.5 mg total) by mouth once daily. 90 tablet 3    levothyroxine (SYNTHROID) 100 MCG tablet Take 1 tablet (100 mcg total) by mouth once daily. 90 tablet 3    loratadine (CLARITIN) 10 mg tablet Take 1 tablet (10 mg total) by mouth once daily. 30 tablet 11    pantoprazole (PROTONIX) 40 MG tablet Take 40 mg by mouth every morning.      valsartan (DIOVAN) 320 MG tablet Take 1 tablet (320 mg total) by mouth once daily. 90 tablet 3     No current facility-administered medications for this visit.       Past Medical History:   Diagnosis Date    Angle recession of right eye     Blunt trauma of both eyes   "   hit across eyes with bungee exercise band    Cataract     Cystocele, midline     Dry eye syndrome     Ectropion due to laxity of eyelid, right     GERD (gastroesophageal reflux disease)     Hyperlipidemia     Hypertension     PVD (posterior vitreous detachment), right eye     Rectocele     Retinal drusen of both eyes     Thalassemia major     Vaginal atrophy        Past Surgical History:   Procedure Laterality Date    ABDOMINAL ADHESION SURGERY  1978    ANKLE FRACTURE SURGERY Right 1996    COLONOSCOPY  11/09/2022    COLONOSCOPY N/A 12/5/2023    Procedure: COLONOSCOPY;  Surgeon: Xavier Lawrence MD;  Location: [facility] (4TH FLR);  Service: Endoscopy;  Laterality: N/A;    ECTROPION REPAIR Bilateral 06/2018    Dr. Flaherty    ESOPHAGOGASTRODUODENOSCOPY N/A 12/5/2023    Procedure: EGD (ESOPHAGOGASTRODUODENOSCOPY);  Surgeon: Xavier Lawrence MD;  [facility]  Service: Endoscopy;  Laterality: N/A;  9/6- referred by Dr. Lawrence/Additional Scheduling Information:  Needs constipation bowel prep protocol / prep instructions handed to patient and to portal. AS  11/28-precall complete-MS    HYSTERECTOMY  1977    possible cancerous lesion       Review of Systems:  Constitutional: Negative for chills and fever.   Respiratory: Negative for cough and shortness of breath.    Gastrointestinal: Negative for nausea and vomiting.   Skin: Negative for rash.   Neurological: Negative for dizziness and headaches.   Psychiatric/Behavioral: Negative for depression.   MSK as in HPI       OBJECTIVE:     PHYSICAL EXAM:  Ht 5' 4" (1.626 m)   Wt 65.9 kg (145 lb 4.5 oz)   BMI 24.94 kg/m²     GEN:  NAD, well-developed, well-groomed.  NEURO: Awake, alert, and oriented. Normal attention and concentration.    PSYCH: Normal mood and affect. Behavior is normal.  HEENT: No cervical lymphadenopathy noted.  CARDIOVASCULAR: Radial pulses 2+ bilaterally. No LE edema noted.  PULMONARY: Breath sounds normal. No respiratory " distress.  SKIN: Intact, no rashes.      MSK:     RUE:  Good active ROM of the wrist and fingers. AIN/PIN/Radial/Median/Ulnar Nerves assessed in isolation without deficit. Radial & Ulnar arteries palpated 2+. Capillary Refill <3s.  Neg drop arm  Neg impingement tests  Full ROM and strength   No crepitus    LUE:  Good active ROM of the wrist and fingers. AIN/PIN/Radial/Median/Ulnar Nerves assessed in isolation without deficit. Radial & Ulnar arteries palpated 2+. Capillary Refill <3s.      RADIOGRAPHS:  The right humeral head maintains appropriate relationship with the glenoid. The acromioclavicular joint is intact noting degenerative changes. No acute displaced right rib fracture. The right lung zones are clear.   Comments: I have personally reviewed the imaging and I agree with the above radiologist's report.  MRI mild supraspinatus and infraspinatus tendinosis  Mild subacromial/subdeltoid bursitis  Mild acromioclavicular degenerative arthrosis   ASSESSMENT/PLAN:       78 yr old female with right shoulder bursitis      Plan:   Complete PT   RTC PRN

## 2024-05-03 ENCOUNTER — CLINICAL SUPPORT (OUTPATIENT)
Dept: REHABILITATION | Facility: OTHER | Age: 79
End: 2024-05-03
Payer: MEDICARE

## 2024-05-03 DIAGNOSIS — M25.511 CHRONIC RIGHT SHOULDER PAIN: Primary | ICD-10-CM

## 2024-05-03 DIAGNOSIS — M25.611 DECREASED RIGHT SHOULDER RANGE OF MOTION: ICD-10-CM

## 2024-05-03 DIAGNOSIS — G89.29 CHRONIC RIGHT SHOULDER PAIN: Primary | ICD-10-CM

## 2024-05-03 PROCEDURE — 97110 THERAPEUTIC EXERCISES: CPT | Mod: PN

## 2024-05-03 PROCEDURE — 97112 NEUROMUSCULAR REEDUCATION: CPT | Mod: PN

## 2024-05-03 NOTE — PROGRESS NOTES
Occupational Therapy Daily Treatment Note     Date: 5/3/2024  Name: Valarie Colindres  Clinic Number: [id number]    Therapy Diagnosis:   1. Chronic right shoulder pain        2. Decreased right shoulder range of motion          Medical Diagnosis: M25.511,G89.29 (ICD-10-CM) - Chronic right shoulder pain     Referring Physician: Karin Sandra, *  Physician Orders: Eval and treat  Date of Return to MD: 3/5/24     Date of Injury: about a year ago  Date of Surgery: NA     Evaluation Date: 2/9/2024  Authorization Period: 2/9/24 - 2/28/24  Plan of Care Expiration: 5/3/24  Visit #/ Visits Authorized: 6 of 20  FOTO Completion:   FOTO #2:  FOTO #3:     Time In: 10:03 am  Time Out: 10:35 am - pt requesting to leave early due to MD appt  Total Appointment Time (timed & untimed codes): 32 min     Precautions: Standard      Subjective     History of Current Condition: Valarie Colindres is a 78 y.o. year old Right hand dominant female who has been experiencing Right shoulder pain for the past year, near constant pain but worse with behind the back motion. She reports recent course of PT for neck pain of same side. Valarie Colindres is referred to Occupational Therapy for evaluation and treatment. Patient presents today alone.    Pt reports: she had MRI revealing bursitis and arthritis, she continues to have dull pain and intensifies when reaching overhead  Valarie Colindres was compliant with home exercise program given last session.   Response to previous treatment:tolerated well  Functional change: improvement in ROM    Pain: 4/10  Location: Right shoulder    Objective     Observation/Appearance:   Right scapular depressed, downwardly rotated  Pain at end range flexion, and hiking noted     Sensation: Patient reports baseline of neuropathy but reports mostly in her feet        SHOULDER RANGE OF MOTION:   Measured in degrees of active motion with goniometer    Right  2/9/2024 Right  4/8/24 Left  2/9/2024   Shoulder  Flexion 130* 150 150   Shoulder Abduction 120  130   Shoulder ER at side 70* 80 80   Shoulder IR T7* T2 T2    *with pain     Shoulder Manual Muscle Test:  ER: 5/5  IR: 5/5, with mild pain    Flexion: 5  Extension: 5      Treatment       Valarie performed therapeutic exercises for 9 minutes including:  - UBE level 3.0 for dynamic scap and shoulder mobility (3 min each direction)  - Supine bilateral shoulder flexion with 3# dowel (30 reps)  - Red therabands extension (30 ea) - NT  - Red theraband lat pull down (30 reps) - NT  - Red theraband ER/IR walk out - NT  - SA wall slides (30 reps)  - NT      Valarie participated in neuromuscular re-education activities to improve: Posture for 23 minutes. The following activities were included:  - Side lying abduction and ER, 1# (30 reps ea)  - Prone rows (2#), Ts and Is (3 x 10 ea)  - Ys wall slide and lift off (30 reps)      Home Exercises and Education Provided     Education provided:   - Progress towards goals     Written Home Exercises Provided: Patient instructed to cont prior HEP.  Exercises were reviewed and Valarie was able to demonstrate them prior to the end of the session.  Valarie demonstrated good  understanding of the HEP provided.     HEP:  - Red theraband rows/retract, ER/IR  - Touchdowns/wiggles  - Postural training (ER into overhead)    See EMR under Patient Instructions for exercises provided 2/19/2024.        Assessment     Valarie tolerated all treatment tasks well with no complaints of pain. She reports overall improvement in shoulder pain, although dull ache intensifies with reaching overhead. She has one more scheduled appt with plan for reassessment and discharge to [facility] at that time.    Valarie is progressing well towards her goals and there are no updates to goals at this time. Pt prognosis is Good. Pt will continue to benefit from skilled outpatient occupational therapy to address the deficits listed in the problem list on initial evaluation provide  pt/family education and to maximize pt's level of independence in the home and community environment.     Pt's spiritual, cultural and educational needs considered and pt agreeable to plan of care and goals.  The following goals were discussed with the patient and patient is in agreement with them as to be addressed in the treatment plan.      Long Term Goals (to be met by discharge):  Date Goal Met:       1.) Valarie Colindres will demonstrate significantly improved functional performance from re-assessment as measured by a FOTO Intake score of more than 60.     Goal Status:   In progress     2.) Valarie Colindres will return to near to prior level of function for ADLs and household management reporting independence or modified independence.     Goal Status:   In progress     3. Valarie Colindres will report pain 2 out of 10 at worst to increase functional use of affected hand for work and leisure tasks.     Goal Status:   In progress      Short Term Goals (to be met by 3/8/24):  Date Goal Met:       Valarie Colindres will be independent with home exercise program with written instructions.    5/3/24 Goal Status: Met     Valarie Colindres will demonstrate ability to touch T4 with IR for Right shoulder to improve functional performance in ADLs/work/leisure tasks.    4/8/24 Goal Status: Met     Valarie Colindres will demonstrate no pain with MMT of all shoulder planes to improve functional use of affected arm for ADLs/work/leisure tasks.     Goal Status:   In progress     Valarie Colindres will demonstrate the ability to complete ADL/IADL tasks with 4/10 pain.    5/3/24 Goal Status:  Met     Valarie Colindres will be able to resume significantly greater occupational roles independently or modified as demonstrated by a FOTO Intake score of more than 50.     Goal Status:   In progress        Plan   Continue skilled occupational therapy with individualized plan of care focusing on increasing functional  independence and participation in meaningful daily activities.    Updates/Grading for next session: discharge next session      Rossy Moran OT

## 2024-05-05 ENCOUNTER — PATIENT MESSAGE (OUTPATIENT)
Dept: HEMATOLOGY/ONCOLOGY | Facility: CLINIC | Age: 79
End: 2024-05-05
Payer: MEDICARE

## 2024-05-06 ENCOUNTER — CLINICAL SUPPORT (OUTPATIENT)
Dept: REHABILITATION | Facility: OTHER | Age: 79
End: 2024-05-06
Payer: MEDICARE

## 2024-05-06 ENCOUNTER — TELEPHONE (OUTPATIENT)
Dept: PRIMARY CARE CLINIC | Facility: CLINIC | Age: 79
End: 2024-05-06
Payer: MEDICARE

## 2024-05-06 ENCOUNTER — TELEPHONE (OUTPATIENT)
Dept: HEMATOLOGY/ONCOLOGY | Facility: CLINIC | Age: 79
End: 2024-05-06
Payer: MEDICARE

## 2024-05-06 VITALS — DIASTOLIC BLOOD PRESSURE: 67 MMHG | SYSTOLIC BLOOD PRESSURE: 129 MMHG

## 2024-05-06 DIAGNOSIS — D53.9 NUTRITIONAL ANEMIA: ICD-10-CM

## 2024-05-06 DIAGNOSIS — G89.29 CHRONIC RIGHT SHOULDER PAIN: Primary | ICD-10-CM

## 2024-05-06 DIAGNOSIS — D47.2 MGUS (MONOCLONAL GAMMOPATHY OF UNKNOWN SIGNIFICANCE): Primary | ICD-10-CM

## 2024-05-06 DIAGNOSIS — M25.511 CHRONIC RIGHT SHOULDER PAIN: Primary | ICD-10-CM

## 2024-05-06 DIAGNOSIS — M25.611 DECREASED RIGHT SHOULDER RANGE OF MOTION: ICD-10-CM

## 2024-05-06 PROCEDURE — 97110 THERAPEUTIC EXERCISES: CPT | Mod: PN

## 2024-05-06 PROCEDURE — 97112 NEUROMUSCULAR REEDUCATION: CPT | Mod: PN

## 2024-05-06 NOTE — PROGRESS NOTES
Occupational Therapy Daily Treatment Note     Date: 5/6/2024  Name: aVlarie Colindres  Clinic Number: [id number]    Therapy Diagnosis:   1. Chronic right shoulder pain        2. Decreased right shoulder range of motion          Medical Diagnosis: M25.511,G89.29 (ICD-10-CM) - Chronic right shoulder pain     Referring Physician: Karin Sandra, *  Physician Orders: Eval and treat  Date of Return to MD: 3/5/24     Date of Injury: about a year ago  Date of Surgery: NA     Evaluation Date: 2/9/2024  Authorization Period: 2/9/24 - 2/28/24  Plan of Care Expiration: 5/3/24  Visit #/ Visits Authorized: 7 of 20     Time In: 2:05 pm  Time Out: 2:45 pm  Total Appointment Time (timed & untimed codes): 40 min     Precautions: Standard      Subjective     History of Current Condition: Valarie Colindres is a 78 y.o. year old Right hand dominant female who has been experiencing Right shoulder pain for the past year, near constant pain but worse with behind the back motion. She reports recent course of PT for neck pain of same side. Valarie Colindres is referred to Occupational Therapy for evaluation and treatment. Patient presents today alone.    Pt reports: she has been performing HEP and notices overall improvement  Valarie Colindres was compliant with home exercise program given last session.   Response to previous treatment:tolerated well  Functional change: improvement in ROM    Pain: 3-4/10 with functional tasks  Location: Right shoulder    Objective     Observation/Appearance:   Right scapular depressed, downwardly rotated  Pain at end range flexion, and hiking noted     Sensation: Patient reports baseline of neuropathy but reports mostly in her feet        SHOULDER RANGE OF MOTION:   Measured in degrees of active motion with goniometer    Right  2/9/2024 Right  4/8/24 Right  5/6/24 Left  2/9/2024   Shoulder Flexion 130* 150 150 150   Shoulder Abduction 120   130   Shoulder ER at side 70* 80 80 80   Shoulder IR T7*  T2 T2 T2    *with pain     Shoulder Manual Muscle Test: updated 5/6/24  ER: 5/5  IR: 5/5    Flexion: 5  Extension: 5      Treatment       Valarie performed therapeutic exercises for 16 minutes including:  - Reassessment objective measurements  - UBE level 3.0 for dynamic scap and shoulder mobility (3 min each direction)  - Supine bilateral shoulder flexion with 5# dowel (30 reps)  - Red theraband IR (30 reps)  - Updated HEP and provided written instructions  - Red therabands extension (30 ea) - NT  - Red theraband lat pull down (30 reps) - NT  - Red theraband ER/IR walk out - NT  - SA wall slides (30 reps)  - NT      Valarie participated in neuromuscular re-education activities to improve: Posture for 24 minutes. The following activities were included:  - Side lying abduction and ER, 2# (30 reps ea)  - Prone rows (2#), Ts and Is (3 x 10 ea)  - Ys wall slide and lift off (30 reps)      Home Exercises and Education Provided     Education provided:   - Progress towards goals     Written Home Exercises Provided: Patient instructed to cont prior HEP.  Exercises were reviewed and Valarie was able to demonstrate them prior to the end of the session.  Valarie demonstrated good  understanding of the HEP provided.     HEP:  - Side lying ER  - Prone rows, Ts, Is  - Ys wall slide  - Red theraband IR    See EMR under Patient Instructions for exercises provided 2/19/2024.        Assessment     Valarie tolerated all treatment tasks well with no complaints of pain. She has met all STG and is progressing well towards LTG. She continues to feel upto 4/10 pain with functional tasks such as reaching for an object but overall she feels it is much improved. She demonstrates independence with HEP and attends regular exercise classes as well. She is discharged from OT and will progress independently with HEP. She knows to call with any questions or concerns.      Pt's spiritual, cultural and educational needs considered and pt agreeable to  plan of care and goals.  The following goals were discussed with the patient and patient is in agreement with them as to be addressed in the treatment plan.      Long Term Goals (to be met by discharge):  Date Goal Met:       1.) Valarie Colindres will demonstrate significantly improved functional performance from re-assessment as measured by a FOTO Intake score of more than 60.     Goal Status:  NT     2.) Valarie Colindres will return to near to prior level of function for ADLs and household management reporting independence or modified independence.    5/7/24 Goal Status:  Met     3. Valarie Colindres will report pain 2 out of 10 at worst to increase functional use of affected hand for work and leisure tasks.     Goal Status:   In progress      Short Term Goals (to be met by 3/8/24):  Date Goal Met:       Valarie Colindres will be independent with home exercise program with written instructions.    5/3/24 Goal Status: Met     Valarie Colindres will demonstrate ability to touch T4 with IR for Right shoulder to improve functional performance in ADLs/work/leisure tasks.    4/8/24 Goal Status: Met     Valarie Colindres will demonstrate no pain with MMT of all shoulder planes to improve functional use of affected arm for ADLs/work/leisure tasks.    5/7/24 Goal Status:  Met     Valarie Colindres will demonstrate the ability to complete ADL/IADL tasks with 4/10 pain.    5/3/24 Goal Status:  Met     Valarie Colindres will be able to resume significantly greater occupational roles independently or modified as demonstrated by a FOTO Intake score of more than 50.     Goal Status:  NT        Plan     Discharge to [facility]    Rossy Moran OT

## 2024-05-06 NOTE — PATIENT INSTRUCTIONS
[facility], OCCUPATIONAL THERAPY  HOME EXERCISE PROGRAM        **as if bringing elbow along the rib cage to your back pocket                          Lower Trap Lift Off  -  front of a tall chair or table.  - Place pinky side of the hand on the table, in a Y position  - Depress the shoulder blades down and squeeze together.  - Then, lift off only an inch above the table, hold 3 seconds and relax       Therapist: Rossy Moran OTR/NICHELLE

## 2024-05-22 ENCOUNTER — LAB VISIT (OUTPATIENT)
Dept: LAB | Facility: HOSPITAL | Age: 79
End: 2024-05-22
Attending: INTERNAL MEDICINE
Payer: MEDICARE

## 2024-05-22 ENCOUNTER — OFFICE VISIT (OUTPATIENT)
Dept: PRIMARY CARE CLINIC | Facility: CLINIC | Age: 79
End: 2024-05-22
Payer: MEDICARE

## 2024-05-22 VITALS
DIASTOLIC BLOOD PRESSURE: 70 MMHG | OXYGEN SATURATION: 96 % | HEIGHT: 64 IN | HEART RATE: 66 BPM | SYSTOLIC BLOOD PRESSURE: 122 MMHG | WEIGHT: 142.44 LBS | BODY MASS INDEX: 24.32 KG/M2

## 2024-05-22 DIAGNOSIS — R73.03 PREDIABETES: ICD-10-CM

## 2024-05-22 DIAGNOSIS — K86.2 PANCREATIC CYST: ICD-10-CM

## 2024-05-22 DIAGNOSIS — F40.243 FEAR OF FLYING: ICD-10-CM

## 2024-05-22 DIAGNOSIS — G95.9 DISEASE OF SPINAL CORD, UNSPECIFIED: ICD-10-CM

## 2024-05-22 DIAGNOSIS — D47.2 MGUS (MONOCLONAL GAMMOPATHY OF UNKNOWN SIGNIFICANCE): ICD-10-CM

## 2024-05-22 DIAGNOSIS — M35.01 KERATITIS SICCA, BOTH EYES: ICD-10-CM

## 2024-05-22 DIAGNOSIS — E78.2 MIXED HYPERLIPIDEMIA: ICD-10-CM

## 2024-05-22 DIAGNOSIS — M48.04 NEURAL FORAMINAL STENOSIS OF THORACIC SPINE: ICD-10-CM

## 2024-05-22 DIAGNOSIS — K76.89 LIVER CYST: ICD-10-CM

## 2024-05-22 DIAGNOSIS — D53.9 NUTRITIONAL ANEMIA: ICD-10-CM

## 2024-05-22 DIAGNOSIS — I10 PRIMARY HYPERTENSION: Primary | ICD-10-CM

## 2024-05-22 DIAGNOSIS — E03.9 ACQUIRED HYPOTHYROIDISM: ICD-10-CM

## 2024-05-22 DIAGNOSIS — I10 PRIMARY HYPERTENSION: ICD-10-CM

## 2024-05-22 DIAGNOSIS — L72.0 CYST OF SKIN AND SUBCUTANEOUS TISSUE: ICD-10-CM

## 2024-05-22 LAB
ALBUMIN SERPL BCP-MCNC: 3.9 G/DL (ref 3.5–5.2)
ALP SERPL-CCNC: 44 U/L (ref 55–135)
ALT SERPL W/O P-5'-P-CCNC: 10 U/L (ref 10–44)
ANION GAP SERPL CALC-SCNC: 5 MMOL/L (ref 8–16)
AST SERPL-CCNC: 17 U/L (ref 10–40)
BASOPHILS # BLD AUTO: 0.03 K/UL (ref 0–0.2)
BASOPHILS NFR BLD: 0.5 % (ref 0–1.9)
BILIRUB SERPL-MCNC: 0.5 MG/DL (ref 0.1–1)
BUN SERPL-MCNC: 14 MG/DL (ref 8–23)
CALCIUM SERPL-MCNC: 9.7 MG/DL (ref 8.7–10.5)
CHLORIDE SERPL-SCNC: 102 MMOL/L (ref 95–110)
CHOLEST SERPL-MCNC: 193 MG/DL (ref 120–199)
CHOLEST/HDLC SERPL: 3.3 {RATIO} (ref 2–5)
CO2 SERPL-SCNC: 32 MMOL/L (ref 23–29)
CREAT SERPL-MCNC: 0.9 MG/DL (ref 0.5–1.4)
DIFFERENTIAL METHOD BLD: ABNORMAL
EOSINOPHIL # BLD AUTO: 0.1 K/UL (ref 0–0.5)
EOSINOPHIL NFR BLD: 1.3 % (ref 0–8)
ERYTHROCYTE [DISTWIDTH] IN BLOOD BY AUTOMATED COUNT: 18.4 % (ref 11.5–14.5)
EST. GFR  (NO RACE VARIABLE): >60 ML/MIN/1.73 M^2
ESTIMATED AVG GLUCOSE: 123 MG/DL (ref 68–131)
FERRITIN SERPL-MCNC: 160 NG/ML (ref 20–300)
FOLATE SERPL-MCNC: 11 NG/ML (ref 4–24)
GLUCOSE SERPL-MCNC: 88 MG/DL (ref 70–110)
HBA1C MFR BLD: 5.9 % (ref 4–5.6)
HCT VFR BLD AUTO: 34.6 % (ref 37–48.5)
HDLC SERPL-MCNC: 58 MG/DL (ref 40–75)
HDLC SERPL: 30.1 % (ref 20–50)
HGB BLD-MCNC: 10.6 G/DL (ref 12–16)
IGA SERPL-MCNC: 123 MG/DL (ref 40–350)
IGG SERPL-MCNC: 1368 MG/DL (ref 650–1600)
IGM SERPL-MCNC: 117 MG/DL (ref 50–300)
IMM GRANULOCYTES # BLD AUTO: 0.01 K/UL (ref 0–0.04)
IMM GRANULOCYTES NFR BLD AUTO: 0.2 % (ref 0–0.5)
IRON SERPL-MCNC: 76 UG/DL (ref 30–160)
LDLC SERPL CALC-MCNC: 121 MG/DL (ref 63–159)
LYMPHOCYTES # BLD AUTO: 3 K/UL (ref 1–4.8)
LYMPHOCYTES NFR BLD: 49.2 % (ref 18–48)
MCH RBC QN AUTO: 19.1 PG (ref 27–31)
MCHC RBC AUTO-ENTMCNC: 30.6 G/DL (ref 32–36)
MCV RBC AUTO: 62 FL (ref 82–98)
MONOCYTES # BLD AUTO: 0.5 K/UL (ref 0.3–1)
MONOCYTES NFR BLD: 8.4 % (ref 4–15)
NEUTROPHILS # BLD AUTO: 2.5 K/UL (ref 1.8–7.7)
NEUTROPHILS NFR BLD: 40.4 % (ref 38–73)
NONHDLC SERPL-MCNC: 135 MG/DL
NRBC BLD-RTO: 0 /100 WBC
PLATELET # BLD AUTO: 285 K/UL (ref 150–450)
PMV BLD AUTO: 11 FL (ref 9.2–12.9)
POTASSIUM SERPL-SCNC: 4.1 MMOL/L (ref 3.5–5.1)
PROT SERPL-MCNC: 7.1 G/DL (ref 6–8.4)
RBC # BLD AUTO: 5.56 M/UL (ref 4–5.4)
SATURATED IRON: 25 % (ref 20–50)
SODIUM SERPL-SCNC: 139 MMOL/L (ref 136–145)
TOTAL IRON BINDING CAPACITY: 302 UG/DL (ref 250–450)
TRANSFERRIN SERPL-MCNC: 204 MG/DL (ref 200–375)
TRIGL SERPL-MCNC: 70 MG/DL (ref 30–150)
TSH SERPL DL<=0.005 MIU/L-ACNC: 1.45 UIU/ML (ref 0.4–4)
VIT B12 SERPL-MCNC: 1267 PG/ML (ref 210–950)
WBC # BLD AUTO: 6.1 K/UL (ref 3.9–12.7)

## 2024-05-22 PROCEDURE — 82728 ASSAY OF FERRITIN: CPT | Performed by: INTERNAL MEDICINE

## 2024-05-22 PROCEDURE — 83521 IG LIGHT CHAINS FREE EACH: CPT | Performed by: INTERNAL MEDICINE

## 2024-05-22 PROCEDURE — 3074F SYST BP LT 130 MM HG: CPT | Mod: CPTII,S$GLB,, | Performed by: STUDENT IN AN ORGANIZED HEALTH CARE EDUCATION/TRAINING PROGRAM

## 2024-05-22 PROCEDURE — 83036 HEMOGLOBIN GLYCOSYLATED A1C: CPT | Performed by: STUDENT IN AN ORGANIZED HEALTH CARE EDUCATION/TRAINING PROGRAM

## 2024-05-22 PROCEDURE — 84165 PROTEIN E-PHORESIS SERUM: CPT | Mod: 26,,, | Performed by: PATHOLOGY

## 2024-05-22 PROCEDURE — 1101F PT FALLS ASSESS-DOCD LE1/YR: CPT | Mod: CPTII,S$GLB,, | Performed by: STUDENT IN AN ORGANIZED HEALTH CARE EDUCATION/TRAINING PROGRAM

## 2024-05-22 PROCEDURE — 1126F AMNT PAIN NOTED NONE PRSNT: CPT | Mod: CPTII,S$GLB,, | Performed by: STUDENT IN AN ORGANIZED HEALTH CARE EDUCATION/TRAINING PROGRAM

## 2024-05-22 PROCEDURE — 80061 LIPID PANEL: CPT | Performed by: STUDENT IN AN ORGANIZED HEALTH CARE EDUCATION/TRAINING PROGRAM

## 2024-05-22 PROCEDURE — 86334 IMMUNOFIX E-PHORESIS SERUM: CPT | Mod: 26,,, | Performed by: PATHOLOGY

## 2024-05-22 PROCEDURE — 99999 PR PBB SHADOW E&M-EST. PATIENT-LVL IV: CPT | Mod: PBBFAC,,, | Performed by: STUDENT IN AN ORGANIZED HEALTH CARE EDUCATION/TRAINING PROGRAM

## 2024-05-22 PROCEDURE — 1159F MED LIST DOCD IN RCRD: CPT | Mod: CPTII,S$GLB,, | Performed by: STUDENT IN AN ORGANIZED HEALTH CARE EDUCATION/TRAINING PROGRAM

## 2024-05-22 PROCEDURE — 82607 VITAMIN B-12: CPT | Performed by: INTERNAL MEDICINE

## 2024-05-22 PROCEDURE — 83540 ASSAY OF IRON: CPT | Performed by: INTERNAL MEDICINE

## 2024-05-22 PROCEDURE — 80053 COMPREHEN METABOLIC PANEL: CPT | Performed by: INTERNAL MEDICINE

## 2024-05-22 PROCEDURE — 36415 COLL VENOUS BLD VENIPUNCTURE: CPT | Mod: PN | Performed by: INTERNAL MEDICINE

## 2024-05-22 PROCEDURE — 3288F FALL RISK ASSESSMENT DOCD: CPT | Mod: CPTII,S$GLB,, | Performed by: STUDENT IN AN ORGANIZED HEALTH CARE EDUCATION/TRAINING PROGRAM

## 2024-05-22 PROCEDURE — 1160F RVW MEDS BY RX/DR IN RCRD: CPT | Mod: CPTII,S$GLB,, | Performed by: STUDENT IN AN ORGANIZED HEALTH CARE EDUCATION/TRAINING PROGRAM

## 2024-05-22 PROCEDURE — 82746 ASSAY OF FOLIC ACID SERUM: CPT | Performed by: INTERNAL MEDICINE

## 2024-05-22 PROCEDURE — 84443 ASSAY THYROID STIM HORMONE: CPT | Performed by: STUDENT IN AN ORGANIZED HEALTH CARE EDUCATION/TRAINING PROGRAM

## 2024-05-22 PROCEDURE — 82784 ASSAY IGA/IGD/IGG/IGM EACH: CPT | Mod: 59 | Performed by: INTERNAL MEDICINE

## 2024-05-22 PROCEDURE — 85025 COMPLETE CBC W/AUTO DIFF WBC: CPT | Performed by: INTERNAL MEDICINE

## 2024-05-22 PROCEDURE — 86334 IMMUNOFIX E-PHORESIS SERUM: CPT | Performed by: INTERNAL MEDICINE

## 2024-05-22 PROCEDURE — 84165 PROTEIN E-PHORESIS SERUM: CPT | Performed by: INTERNAL MEDICINE

## 2024-05-22 PROCEDURE — 3078F DIAST BP <80 MM HG: CPT | Mod: CPTII,S$GLB,, | Performed by: STUDENT IN AN ORGANIZED HEALTH CARE EDUCATION/TRAINING PROGRAM

## 2024-05-22 PROCEDURE — 99214 OFFICE O/P EST MOD 30 MIN: CPT | Mod: S$GLB,,, | Performed by: STUDENT IN AN ORGANIZED HEALTH CARE EDUCATION/TRAINING PROGRAM

## 2024-05-22 RX ORDER — ALPRAZOLAM 0.25 MG/1
0.25 TABLET ORAL DAILY PRN
Qty: 7 TABLET | Refills: 0 | Status: SHIPPED | OUTPATIENT
Start: 2024-05-22

## 2024-05-22 NOTE — PROGRESS NOTES
"Valarie Colindres  1945        Subjective     Chief Complaint: Lesion Arm    History of Present Illness:  Ms. Valarie Colindres is a 79 y.o. female who presents to clinic for lesion of left forearm.     Noticed it about 6 weeks ago. Came out of nowhere. Not red.  Hurts to touch.   Starting to get smaller but has not resolved. No wrist or elbow pain.     HTN- On Amlodipine 5 mg, HCTZ 12.5 mg, and Valsartan 320 mg.   Has not taken any medications today.   Recently reporting orthostatic symptoms with standing.   Not in last week though. Feeling well today.  BP Readings from Last 5 Encounters:   05/22/24 122/70   02/20/24 129/67   02/20/24 129/67   01/03/24 (!) 153/88   12/28/23 (!) 144/78     On synthroid- 100 mcg.  Lab Results   Component Value Date    TSH 1.138 10/31/2023      Liver cysts- has f/u imaging ordered per GI Herb Lawrence) for 6/2024.  +pancreatic cyst. Saw Dr. Gonzalez in [facility] 11/2023.    "Pancreatic Cyst of now, the nature of the pancreatic cyst is indeterminate. We discussed various etiologies of pancreatic cysts including benign as well as precancerous types. Right now I would recommend continued imaging surveillance. Per international guidelines that our group follows, the next surveillance imaging can be performed at about 2 years from her last imaging. We will tentatively plan for an MRI of the pancreas to be done in September of 2025. I will arrange for her to be seen in our pancreatic cyst Clinic in the summer of 2025 to assess for any interval symptom changes as well as candidacy for continued surveillance imaging."    MGUS- has labs ordered per Dr. Kirk. Can do today.    Has few flights coming up, [place]. Has fear of flying. Would like something to help. Understands cannot mix with alcohol or any other sedating meds.    Low back pain-chronic.  Keratitis Sicca- sees eye doctor. Stable.     Review of Systems   Constitutional:  Negative for chills, fever and malaise/fatigue.   HENT:  Negative " for congestion.    Respiratory:  Negative for cough.    Cardiovascular:  Negative for palpitations.   Gastrointestinal:  Negative for nausea and vomiting.   Skin:  Negative for itching and rash.   Neurological:  Negative for speech change and focal weakness.   Psychiatric/Behavioral:  The patient is not nervous/anxious.         PAST HISTORY:     Past Medical History:   Diagnosis Date    Angle recession of right eye     Blunt trauma of both eyes     hit across eyes with bungee exercise band    Cataract     Cystocele, midline     Dry eye syndrome     Ectropion due to laxity of eyelid, right     GERD (gastroesophageal reflux disease)     Hyperlipidemia     Hypertension     PVD (posterior vitreous detachment), right eye     Rectocele     Retinal drusen of both eyes     Thalassemia major     Vaginal atrophy        Past Surgical History:   Procedure Laterality Date    ABDOMINAL ADHESION SURGERY  1978    ANKLE FRACTURE SURGERY Right 1996    COLONOSCOPY  11/09/2022    COLONOSCOPY N/A 12/5/2023    Procedure: COLONOSCOPY;  Surgeon: Xavier Lawrence MD;  Location: [facility] ([address] [place] [address]);  Service: Endoscopy;  Laterality: N/A;    ECTROPION REPAIR Bilateral 06/2018    Dr. Flaherty    ESOPHAGOGASTRODUODENOSCOPY N/A 12/5/2023    Procedure: EGD (ESOPHAGOGASTRODUODENOSCOPY);  Surgeon: Xavier Lawrence MD;  Location: [facility] ([address] [place] [address]);  Service: Endoscopy;  Laterality: N/A;  9/6- referred by Dr. Lawrence/Additional Scheduling Information:  Needs constipation bowel prep protocol / prep instructions handed to patient and to portal. AS  11/28-precall complete-MS    HYSTERECTOMY  1977    possible cancerous lesion       Family History   Problem Relation Name Age of Onset    Stroke Mother      Heart disease Mother      Prostate cancer Father      Colon cancer Sister Ladan     Hypertension Sister Ladan     No Known Problems Sister Maddy     No Known Problems Brother Ingomar     Prostate cancer Brother Gene     Diabetes Brother  Gabriele     No Known Problems Brother August     Crohn's disease Daughter Heidy     Breast cancer Maternal Aunt Dinosaur     Glaucoma Maternal Uncle August     Blindness Maternal Uncle August     Hypertension Maternal Grandmother      No Known Problems Maternal Grandfather      No Known Problems Paternal Grandmother      No Known Problems Paternal Grandfather      Cataracts Neg Hx      Macular degeneration Neg Hx           MEDICATIONS & ALLERGIES:     Current Outpatient Medications on File Prior to Visit   Medication Sig    acetaminophen (TYLENOL) 500 MG tablet Take 1-2 tablets (500-1,000 mg total) by mouth 3 (three) times daily as needed for Pain.    albuterol (PROVENTIL/VENTOLIN HFA) 90 mcg/actuation inhaler Inhale 1-2 puffs into the lungs every 6 (six) hours as needed for Wheezing or Shortness of Breath. (Cough) Rescue    alendronate (FOSAMAX) 70 MG tablet TK 1 T PO EVERY WEEK    amLODIPine (NORVASC) 5 MG tablet     aspirin (ECOTRIN) 81 MG EC tablet Take 1 tablet by mouth once daily.    atorvastatin (LIPITOR) 10 MG tablet Take 1 tablet (10 mg total) by mouth every evening.    azelastine (ASTELIN) 137 mcg (0.1 %) nasal spray Two sprays in each nostril, sniff until absorbed, then follow with 1 spray of fluticasone.  Use both sprays twice daily.    chlorhexidine (PERIDEX) 0.12 % solution RINSE AND SWISH ONE CAPFUL BID    chlorzoxazone (PARAFON FORTE) 500 mg Tab TAKE 1 TABLET(S) 3 TIMES A DAY BY ORAL ROUTE AS NEEDED.    clobetasoL (TEMOVATE) 0.05 % external solution     diclofenac sodium (VOLTAREN) 1 % Gel Apply 2 g topically 4 (four) times daily.    famotidine (PEPCID) 20 MG tablet Take 1 tablet (20 mg total) by mouth 2 (two) times daily.    levothyroxine (SYNTHROID) 100 MCG tablet Take 1 tablet (100 mcg total) by mouth once daily.    loratadine (CLARITIN) 10 mg tablet Take 1 tablet (10 mg total) by mouth once daily.    pantoprazole (PROTONIX) 40 MG tablet Take 40 mg by mouth every morning.    valsartan (DIOVAN) 320  "MG tablet Take 1 tablet (320 mg total) by mouth once daily.    [DISCONTINUED] diazePAM (VALIUM) 5 MG tablet     [DISCONTINUED] hydroCHLOROthiazide (HYDRODIURIL) 12.5 MG Tab Take 1 tablet (12.5 mg total) by mouth once daily.    [DISCONTINUED] guaiFENesin-codeine 100-10 mg/5 ml (TUSSI-ORGANIDIN NR)  mg/5 mL syrup TAKE 5 ML BY MOUTH EVERY 8 HOURS AS NEEDED FOR COUGH (Patient not taking: Reported on 5/22/2024)     No current facility-administered medications on file prior to visit.       Review of patient's allergies indicates:   Allergen Reactions    Sutures, catgut (chromic)        OBJECTIVE:     Vital Signs:  Vitals:    05/22/24 1037   BP: 122/70   BP Location: Left arm   Patient Position: Sitting   BP Method: Medium (Manual)   Pulse: 66   SpO2: 96%   Weight: 64.6 kg (142 lb 6.7 oz)   Height: 5' 4" (1.626 m)       Body mass index is 24.45 kg/m².     Physical Exam:  Physical Exam  Musculoskeletal:      Comments: Small, 1-2 mm, circular, very mobile, hard, lesion of left forearm.   No overlying skin changes.   No redness.   Mild to deep palpation.            Laboratory  Lab Results   Component Value Date    GLU 90 10/02/2023     10/02/2023    K 3.5 10/02/2023     10/02/2023    CO2 30 (H) 10/02/2023    BUN 14 10/02/2023    CREATININE 0.9 10/02/2023    CALCIUM 9.3 10/02/2023    MG 2.3 10/15/2020     Lab Results   Component Value Date    HGBA1C 6.4 (H) 10/31/2023     No results for input(s): "POCTGLUCOSE" in the last 72 hours.        ASSESSMENT & PLAN:   Ms. Valarie Colindres is a 79 y.o. female who was seen today in clinic for lesion. Possible ganglion cyst. Very mobile.   US ordered.  Stop HCTZ, monitor BP at home and in clinic during appt Friday.   If BP low, stop Amlodipine as well.   Suspect orthostasis.   Labs today.  RTC 2-3 weeks.    Valarie was seen today for mass.    Diagnoses and all orders for this visit:    Primary hypertension  -     HEMOGLOBIN A1C; Future  -     TSH; Future    MGUS " (monoclonal gammopathy of unknown significance)  -     HEMOGLOBIN A1C; Future  -     TSH; Future    Prediabetes  -     HEMOGLOBIN A1C; Future  -     TSH; Future    Acquired hypothyroidism  -     HEMOGLOBIN A1C; Future  -     TSH; Future    Cyst of skin and subcutaneous tissue  -     US Soft Tissue Upper Extremity, Right; Future    Mixed hyperlipidemia  -     Lipid Panel; Future    Fear of flying  -     ALPRAZolam (XANAX) 0.25 MG tablet; Take 1 tablet (0.25 mg total) by mouth daily as needed for Anxiety (FLIGHT ANXIETY). This medication can cause sedation. Try on a day you do not have to work the next day. Do not mix with alcohol or any other sedating meds (such as sleep aids, opioids, or benzos). Caution with driving or operating heavy machinery.    Disease of spinal cord, unspecified    Keratitis sicca, both eyes    Neural foraminal stenosis of thoracic spine    Liver cyst    Pancreatic cyst           Sriena Renner MD

## 2024-05-23 LAB
ALBUMIN SERPL ELPH-MCNC: 4.08 G/DL (ref 3.35–5.55)
ALPHA1 GLOB SERPL ELPH-MCNC: 0.24 G/DL (ref 0.17–0.41)
ALPHA2 GLOB SERPL ELPH-MCNC: 0.54 G/DL (ref 0.43–0.99)
B-GLOBULIN SERPL ELPH-MCNC: 0.55 G/DL (ref 0.5–1.1)
GAMMA GLOB SERPL ELPH-MCNC: 1.29 G/DL (ref 0.67–1.58)
INTERPRETATION SERPL IFE-IMP: NORMAL
KAPPA LC SER QL IA: 1.31 MG/DL (ref 0.33–1.94)
KAPPA LC/LAMBDA SER IA: 1.58 (ref 0.26–1.65)
LAMBDA LC SER QL IA: 0.83 MG/DL (ref 0.57–2.63)
PATHOLOGIST INTERPRETATION IFE: NORMAL
PATHOLOGIST INTERPRETATION SPE: NORMAL
PROT SERPL-MCNC: 6.7 G/DL (ref 6–8.4)

## 2024-05-24 ENCOUNTER — OFFICE VISIT (OUTPATIENT)
Dept: HEMATOLOGY/ONCOLOGY | Facility: CLINIC | Age: 79
End: 2024-05-24
Payer: MEDICARE

## 2024-05-24 VITALS
HEART RATE: 68 BPM | TEMPERATURE: 98 F | DIASTOLIC BLOOD PRESSURE: 57 MMHG | WEIGHT: 143.31 LBS | SYSTOLIC BLOOD PRESSURE: 108 MMHG | RESPIRATION RATE: 18 BRPM | HEIGHT: 64 IN | OXYGEN SATURATION: 95 % | BODY MASS INDEX: 24.46 KG/M2

## 2024-05-24 DIAGNOSIS — D56.3 THALASSEMIA TRAIT: ICD-10-CM

## 2024-05-24 DIAGNOSIS — D53.9 NUTRITIONAL ANEMIA: ICD-10-CM

## 2024-05-24 DIAGNOSIS — D47.2 MGUS (MONOCLONAL GAMMOPATHY OF UNKNOWN SIGNIFICANCE): Primary | ICD-10-CM

## 2024-05-24 PROCEDURE — 1160F RVW MEDS BY RX/DR IN RCRD: CPT | Mod: CPTII,S$GLB,, | Performed by: INTERNAL MEDICINE

## 2024-05-24 PROCEDURE — 3078F DIAST BP <80 MM HG: CPT | Mod: CPTII,S$GLB,, | Performed by: INTERNAL MEDICINE

## 2024-05-24 PROCEDURE — 1125F AMNT PAIN NOTED PAIN PRSNT: CPT | Mod: CPTII,S$GLB,, | Performed by: INTERNAL MEDICINE

## 2024-05-24 PROCEDURE — 99214 OFFICE O/P EST MOD 30 MIN: CPT | Mod: S$GLB,,, | Performed by: INTERNAL MEDICINE

## 2024-05-24 PROCEDURE — 3288F FALL RISK ASSESSMENT DOCD: CPT | Mod: CPTII,S$GLB,, | Performed by: INTERNAL MEDICINE

## 2024-05-24 PROCEDURE — 1101F PT FALLS ASSESS-DOCD LE1/YR: CPT | Mod: CPTII,S$GLB,, | Performed by: INTERNAL MEDICINE

## 2024-05-24 PROCEDURE — 1159F MED LIST DOCD IN RCRD: CPT | Mod: CPTII,S$GLB,, | Performed by: INTERNAL MEDICINE

## 2024-05-24 PROCEDURE — G2211 COMPLEX E/M VISIT ADD ON: HCPCS | Mod: S$GLB,,, | Performed by: INTERNAL MEDICINE

## 2024-05-24 PROCEDURE — 3074F SYST BP LT 130 MM HG: CPT | Mod: CPTII,S$GLB,, | Performed by: INTERNAL MEDICINE

## 2024-05-24 PROCEDURE — 99999 PR PBB SHADOW E&M-EST. PATIENT-LVL IV: CPT | Mod: PBBFAC,,, | Performed by: INTERNAL MEDICINE

## 2024-05-24 NOTE — PROGRESS NOTES
Patient ID: Valarie Colindres is an 79 y.o. female.     Chief Complaint: follow up for MGUS     Diagnosis:  1. Alpha and beta thalassemia trait  2. 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 Ms Colindres is a 73 yo woman who initially saw me on 7/16/19 for further evaluation of microcytic anemia. Her Hb was 11.4 on 9/24/18 with MCV of 61. CBC on 7/15/19 showed WBC 7.15, Hb 10.7, MCV 62, plt count 260. CMP on 5/30/19 was normal. TSH on 4/9/19 normal. Patient currently feels fine. 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 Was told that no treatment is needed. She has occasional fatigue but goes to the gym 3 times a week. She had chest pain after she ate Fritos which did not recur after she stopped eating Fritos. She just had a stress test done.   2. Labs on 7/16/19 showed creatinine 1.0, calcium 9.8, albumin 4.3, protein 7.5, ferritin 128, folate 7.2, vitamin B12 424, haptoglobin 66, iron 86, TIBC 340, iron saturation 25%, . SPEP showed a 0.77 g/dL M protein at IgG kappa. kappa light chain normal 1.11, lambda 0.97, K/L ratio 1.14. Hb electrophoresis showed 4.7% A2, 95.3% A, c/w beta thal trait. Alpha globulin-gene analysis showed one copy of gene deletion.     Interval History:   Ms Colindres returns for follow up. Feeling well. Going to [place] in July.         ROS:   A ten-point system review is obtained and negative except for what was stated in the Interval History.      Physical Examination:   Vital signs reviewed.   General: well hydrated, well developed, in no acute distress  HEENT: normocephalic, PERRLA, EOMI, anicteric sclerae  Neck: supple, no JVD, thyromegaly, cervical or supraclavicular lymphadenopathy  Lungs: clear breath sounds bilaterally, no wheezing, rales, or rhonchi  Heart: RRR, no M/R/G  Abdomen: soft, no tenderness, non-distended, no hepatosplenomegaly, mass, or hernia. BS present  Extremities: no clubbing, cyanosis, or edema  Skin: no  rash, ulcer, or open wounds  Neuro: alert and oriented x 4, no focal neuro deficit  Psych: pleasant and appropriate mood and affect     Objective:      Laboratory Data:  SPEP:Normal total protein. Normal gamma globulins are decreased. Two   immediately adjacent paraprotein peaks are identified:   1. Near-gamma = 0.60 g/dL (previously 0.68 g/dL)   2. Near-to-mid gamma = 0.23 g/dL (previously 0.23 g/dL     Serum free light chains normal    Hb 10.6, MCV 62. B12, folate, ferritin levels are normal    Cr 0.9. Ca normal    Assessment and Plan:      1. MGUS (monoclonal gammopathy of unknown significance)    2. Nutritional anemia    3. Thalassemia trait        1.  - IgG kappa monoclonal gammopathy. Low risk disease. No end organ damage.   - Patient feeling well. No symptoms  - reviewed lab results with patient. Stable small protein spikes.   - RTC in one year    2.2  - thalassemia trait with deletion in both beta and alpha globulin  - B12, folate, TIBC, ferritin levels are normal  - monitor in one year    Follow-up:      RTC in 12 months  Her questions were answered in the clinic. Encouraged to call should issues arise.          Route Chart for Scheduling    Med Onc Chart Routing      Follow up with physician . Please cancele her lab and visit with me in October. see me in one year with CBC, CMP, B12, folate, ferritin, iron/TIBC, SPEP, immunofixation, serum free light chains one week prior   Follow up with JETT    Infusion scheduling note    Injection scheduling note    Labs    Imaging    Pharmacy appointment    Other referrals

## 2024-06-11 ENCOUNTER — HOSPITAL ENCOUNTER (OUTPATIENT)
Dept: RADIOLOGY | Facility: OTHER | Age: 79
Discharge: HOME | End: 2024-06-11
Attending: STUDENT IN AN ORGANIZED HEALTH CARE EDUCATION/TRAINING PROGRAM
Payer: MEDICARE

## 2024-06-11 DIAGNOSIS — L72.0 CYST OF SKIN AND SUBCUTANEOUS TISSUE: ICD-10-CM

## 2024-06-11 PROCEDURE — 76882 US LMTD JT/FCL EVL NVASC XTR: CPT | Mod: 26,LT,, | Performed by: RADIOLOGY

## 2024-06-11 PROCEDURE — 76882 US LMTD JT/FCL EVL NVASC XTR: CPT | Mod: TC,LT

## 2024-06-17 ENCOUNTER — HOSPITAL ENCOUNTER (OUTPATIENT)
Dept: RADIOLOGY | Facility: HOSPITAL | Age: 79
Discharge: HOME | End: 2024-06-17
Attending: INTERNAL MEDICINE
Payer: MEDICARE

## 2024-06-17 DIAGNOSIS — K76.89 LIVER CYST: ICD-10-CM

## 2024-06-17 DIAGNOSIS — K76.89 LIVER CYST: Primary | ICD-10-CM

## 2024-06-17 PROCEDURE — 76705 ECHO EXAM OF ABDOMEN: CPT | Mod: TC

## 2024-06-17 PROCEDURE — 76705 ECHO EXAM OF ABDOMEN: CPT | Mod: 26,,, | Performed by: RADIOLOGY

## 2024-06-17 NOTE — PROGRESS NOTES
Pamela please schedule patient for a liver ultrasound in 12 months which would be June of 2025 for follow-up of liver cyst orders placed    Sedonia your liver cyst appear stable let us do a repeat ultrasound in 1 year which would be June of 2025 orders placed

## 2024-06-19 ENCOUNTER — PATIENT MESSAGE (OUTPATIENT)
Dept: PRIMARY CARE CLINIC | Facility: CLINIC | Age: 79
End: 2024-06-19
Payer: MEDICARE

## 2024-08-13 DIAGNOSIS — I10 ESSENTIAL HYPERTENSION: ICD-10-CM

## 2024-08-13 RX ORDER — HYDROCHLOROTHIAZIDE 12.5 MG/1
12.5 TABLET ORAL
Qty: 90 TABLET | Refills: 3 | OUTPATIENT
Start: 2024-08-13

## 2024-08-13 NOTE — TELEPHONE ENCOUNTER
No care due was identified.  [facility] Embedded Care Due Messages. Reference number: [id number].   8/13/2024 12:16:54 AM CDT

## 2024-08-13 NOTE — TELEPHONE ENCOUNTER
Refill Decision Note   Valarie Colindres  is requesting a refill authorization.  Brief Assessment and Rationale for Refill:  Quick Discontinue     Medication Therapy Plan: The original prescription was discontinued on 5/22/2024 by Sirena Renner MD.      Comments:     Note composed:9:20 AM 08/13/2024

## 2024-08-16 ENCOUNTER — PATIENT MESSAGE (OUTPATIENT)
Dept: PODIATRY | Facility: CLINIC | Age: 79
End: 2024-08-16
Payer: MEDICARE

## 2024-08-19 ENCOUNTER — TELEPHONE (OUTPATIENT)
Dept: PODIATRY | Facility: CLINIC | Age: 79
End: 2024-08-19
Payer: MEDICARE

## 2024-08-19 NOTE — TELEPHONE ENCOUNTER
Left Vm to assist patient with rescheduling appointment on 8/21 with Dr Paz. Was able to secure appointment for patient with reminder in the mail on today.

## 2024-08-30 ENCOUNTER — PATIENT MESSAGE (OUTPATIENT)
Dept: PRIMARY CARE CLINIC | Facility: CLINIC | Age: 79
End: 2024-08-30
Payer: MEDICARE

## 2024-08-30 DIAGNOSIS — Z12.31 SCREENING MAMMOGRAM FOR BREAST CANCER: Primary | ICD-10-CM

## 2024-09-07 ENCOUNTER — PATIENT MESSAGE (OUTPATIENT)
Dept: HEMATOLOGY/ONCOLOGY | Facility: CLINIC | Age: 79
End: 2024-09-07
Payer: MEDICARE

## 2024-09-12 NOTE — PROGRESS NOTES
GI MA team please schedule patient for liver ultrasound in December 2023 for 3 month follow-up of liver cyst orders have been placed.    Liver: Normal in size.  Multiple intrahepatic hypodensities, the larger in suggestive of cysts with several too small to characterize.  These appear overall similar in distribution to prior.  Some background liver parenchymal heterogeneity likely contributed by contrast-bolus timing.  Additional intermixed hyperenhancing foci, more-so at the liver margin, nonspecific and possibly transient nature.  Few punctate calcifications.      Sedonia - keep your scheduled EGD colonoscopy for further evaluation also I will refer you for an ultrasound of your liver in December of 2023 to follow-up of your liver cyst also I am going to refer you to advanced endoscopy pancreas cyst clinic to follow-up your small pancreatic cyst specialist.    Impression:     No convincing evidence for active inflammatory small bowel disease.     Wall thickening at the antro-pyloric level likely contributed by under-distension.  Further assessment including direct visualization as warranted.     Probable liver cysts with some too small to characterize though similar in distribution to prior.  Additional hyperenhancing liver foci, more-so at the hepatic margin and potentially transient/perfusional given contrast timing though technically indeterminate.     Nonspecific subcentimeter hypodensity at the pancreatic head which appears stable, nonspecific and possible IPMN.  No main pancreatic ductal dilatation.     Additional findings as above.     Electronically signed by resident: Kike Stevens  Date:                                            09/14/2023  Time:                                           08:33     Electronically signed by: Tomasz Varghese  Date:                                            09/14/2023
Pamela please schedule Sedabdulkadir in [facility] pancreas cyst clinic for follow-up evaluation of her cyst seen on CT scan Pancreas: Pancreatic head 0.4 cm hypodensity (axial series 2, image 87), seen on prior exam.  No pancreatic duct dilatation.  No peripancreatic inflammatory change.  Thank you
1 Hour(s)

## 2024-09-14 ENCOUNTER — HOSPITAL ENCOUNTER (OUTPATIENT)
Dept: RADIOLOGY | Facility: HOSPITAL | Age: 79
Discharge: HOME | End: 2024-09-14
Attending: STUDENT IN AN ORGANIZED HEALTH CARE EDUCATION/TRAINING PROGRAM
Payer: MEDICARE

## 2024-09-14 VITALS — WEIGHT: 143 LBS | HEIGHT: 64 IN | BODY MASS INDEX: 24.41 KG/M2

## 2024-09-14 DIAGNOSIS — Z12.31 SCREENING MAMMOGRAM FOR BREAST CANCER: ICD-10-CM

## 2024-09-14 PROCEDURE — 77067 SCR MAMMO BI INCL CAD: CPT | Mod: TC

## 2024-09-14 PROCEDURE — 77063 BREAST TOMOSYNTHESIS BI: CPT | Mod: 26,,, | Performed by: RADIOLOGY

## 2024-09-14 PROCEDURE — 77067 SCR MAMMO BI INCL CAD: CPT | Mod: 26,,, | Performed by: RADIOLOGY

## 2024-10-01 ENCOUNTER — OFFICE VISIT (OUTPATIENT)
Dept: URGENT CARE | Facility: CLINIC | Age: 79
End: 2024-10-01
Payer: MEDICARE

## 2024-10-01 VITALS
HEIGHT: 64 IN | RESPIRATION RATE: 16 BRPM | SYSTOLIC BLOOD PRESSURE: 174 MMHG | WEIGHT: 143.06 LBS | DIASTOLIC BLOOD PRESSURE: 86 MMHG | OXYGEN SATURATION: 98 % | TEMPERATURE: 98 F | HEART RATE: 61 BPM | BODY MASS INDEX: 24.42 KG/M2

## 2024-10-01 DIAGNOSIS — M25.562 ACUTE PAIN OF LEFT KNEE: Primary | ICD-10-CM

## 2024-10-01 PROCEDURE — 73562 X-RAY EXAM OF KNEE 3: CPT | Mod: LT,S$GLB,, | Performed by: RADIOLOGY

## 2024-10-01 PROCEDURE — 99213 OFFICE O/P EST LOW 20 MIN: CPT | Mod: S$GLB,,, | Performed by: FAMILY MEDICINE

## 2024-10-01 NOTE — PATIENT INSTRUCTIONS
Continue with ice and heat  Continue with tylenol as needed for pain  Voltaren gel OTC as needed for knee pain    Follow-up with orthopedist      You must understand that you have received treatment at an Urgent Care facility only, and that you may be  released before all of your medical problems are known or treated. Urgent Care facilities are not equipped to  handle life threatening emergencies. It is recommended that you seek care at an Emergency Department for  further evaluation of worsening or concerning symptoms, or possibly life threatening conditions as  discussed.      If you develop chest pain, shortness of breath, throat swelling, tongue swelling, lightheadedness or any other causes for concern, proceed to ER.

## 2024-10-01 NOTE — PROGRESS NOTES
"Subjective:      Patient ID: Valarie Colindres is a 79 y.o. female.    Vitals:  height is 5' 4" (1.626 m) and weight is 64.9 kg (143 lb 1.3 oz). Her temporal temperature is 98.4 °F (36.9 °C). Her blood pressure is 174/86 (abnormal) and her pulse is 61. Her respiration is 16 and oxygen saturation is 98%.     Chief Complaint: Pain (Located to the left leg. )    Patient is an 79 y.o. female with the compliant of left leg pain that generated from the knee initially that presented about 3 weeks ago, she reports with icing, using heating pads, and taking extra strength tylenol to help combat her pain however, she reports with relief.    Pt is an 78 yo F who presents with left knee pain that started 3 weeks ago.  She is having posterior knee pain and the pain shoot into her thigh and down to her calf. Patient is active.  She takes an exercise class a few times a week and has a .  She reports that her activity has decreased since the pain started. Tylenol helps when she takes it.     Pain  This is a new problem. The current episode started 1 to 4 weeks ago. The problem occurs intermittently. The problem has been unchanged. Associated symptoms include joint swelling and weakness. Pertinent negatives include no abdominal pain, anorexia, arthralgias, change in bowel habit, chest pain, chills, congestion, coughing, diaphoresis, fatigue, fever, headaches, myalgias, nausea, neck pain, numbness, rash, sore throat, swollen glands, urinary symptoms, vertigo, visual change or vomiting. Associated symptoms comments: An inability to bare weight. . The symptoms are aggravated by bending, walking and exertion. She has tried acetaminophen, ice and heat for the symptoms. The treatment provided no relief.       Constitution: Negative for chills, sweating, fatigue and fever.   HENT:  Negative for congestion and sore throat.    Neck: Negative for neck pain.   Cardiovascular:  Negative for chest pain.   Respiratory:  Negative " for cough.    Gastrointestinal:  Negative for abdominal pain, nausea and vomiting.   Musculoskeletal:  Positive for joint swelling. Negative for joint pain and muscle ache.   Skin:  Negative for rash.   Neurological:  Negative for history of vertigo, headaches and numbness.      Objective:     Physical Exam   Constitutional: She is oriented to person, place, and time. She appears well-developed. She is cooperative.   HENT:   Head: Normocephalic and atraumatic.   Ears:   Right Ear: Hearing, tympanic membrane, external ear and ear canal normal.   Left Ear: Hearing, tympanic membrane, external ear and ear canal normal.   Nose: Nose normal. No mucosal edema or nasal deformity. No epistaxis. Right sinus exhibits no maxillary sinus tenderness and no frontal sinus tenderness. Left sinus exhibits no maxillary sinus tenderness and no frontal sinus tenderness.   Mouth/Throat: Uvula is midline, oropharynx is clear and moist and mucous membranes are normal. No trismus in the jaw. Normal dentition. No uvula swelling.   Eyes: Conjunctivae and lids are normal.   Neck: Trachea normal and phonation normal. Neck supple.   Cardiovascular: Normal rate, regular rhythm, normal heart sounds and normal pulses.   Pulmonary/Chest: Effort normal and breath sounds normal.   Abdominal: Normal appearance and bowel sounds are normal. Soft.   Musculoskeletal: Normal range of motion.         General: Normal range of motion.      Left knee: She exhibits normal range of motion, no swelling, no effusion, no ecchymosis, no erythema and no bony tenderness. Tenderness found.   Neurological: She is alert and oriented to person, place, and time. She exhibits normal muscle tone.   Skin: Skin is warm, dry and intact.   Psychiatric: Her speech is normal and behavior is normal. Judgment and thought content normal.   Nursing note and vitals reviewed.  XR KNEE 3 VIEW LEFT    Result Date: 10/1/2024  EXAMINATION: XR KNEE 3 VIEW LEFT CLINICAL HISTORY: Pain in left  knee TECHNIQUE: AP, lateral, and Merchant views of the left knee were performed. COMPARISON: None FINDINGS: No acute fracture.  No narrowing of medial or lateral tibiofemoral compartments.  Mild narrowing lateral patellofemoral compartment.  Minimal spurring about tibial spines and posterior patella.  No definite suprapatellar bursal effusion or prepatellar soft tissue swelling.     As above Electronically signed by: Ochoa Barron Date:    10/01/2024 Time:    14:47    Mammo Digital Screening Bilat w/ Mahin    Result Date: 9/17/2024  Facility: [facility] [address] [place] [id number] [phone] Name: Valarie Colindres MRN: [id number] Result: Mammo Digital Screening Bilat w/ Mahin  History: Patient is 79 y.o. and is seen for screening mammogram for breast cancer. Films Compared: Compared to: 08/01/2023 Mammo Digital Screening Bilat w/ Mahin, 07/29/2022 Mammo Digital Screening Bilat w/ Mahin, 07/21/2021 Mammo Digital Diagnostic Right with Mahin, and 07/21/2021 US Breast Right Limited  Findings: This procedure was performed using tomosynthesis. Computer-aided detection was utilized in the interpretation of this examination. There are scattered areas of fibroglandular density. There is no evidence of suspicious masses, calcifications, or other abnormal findings.     Bilateral There is no mammographic evidence of malignancy. BI-RADS Category: Overall: 1 - Negative  Recommendation: Routine screening mammogram in 1 year, or as clinically indicated. Your estimated lifetime risk of breast cancer (to age 85) based on Tyrer-Cuzick risk assessment model is 0.87%.  According to the American Cancer Society, patients with a lifetime breast cancer risk of 20% or higher might benefit from supplemental screening tests, such as screening breast MRI. Chanel Magana MD       Assessment:     1. Acute pain of left knee        Plan:       Acute pain of left knee  -     XR KNEE 3 VIEW LEFT; Future;  Expected date: 10/01/2024  -     Ambulatory referral/consult to Orthopedics      Patient Instructions   Continue with ice and heat  Continue with tylenol as needed for pain  Voltaren gel OTC as needed for knee pain    Follow-up with orthopedist      You must understand that you have received treatment at an Urgent Care facility only, and that you may be  released before all of your medical problems are known or treated. Urgent Care facilities are not equipped to  handle life threatening emergencies. It is recommended that you seek care at an Emergency Department for  further evaluation of worsening or concerning symptoms, or possibly life threatening conditions as  discussed.      If you develop chest pain, shortness of breath, throat swelling, tongue swelling, lightheadedness or any other causes for concern, proceed to ER.

## 2024-10-02 ENCOUNTER — OFFICE VISIT (OUTPATIENT)
Dept: PODIATRY | Facility: CLINIC | Age: 79
End: 2024-10-02
Payer: MEDICARE

## 2024-10-02 ENCOUNTER — TELEPHONE (OUTPATIENT)
Dept: ORTHOPEDICS | Facility: CLINIC | Age: 79
End: 2024-10-02
Payer: MEDICARE

## 2024-10-02 VITALS
HEIGHT: 64 IN | DIASTOLIC BLOOD PRESSURE: 75 MMHG | SYSTOLIC BLOOD PRESSURE: 119 MMHG | HEART RATE: 64 BPM | WEIGHT: 139.31 LBS | BODY MASS INDEX: 23.78 KG/M2

## 2024-10-02 DIAGNOSIS — M54.17 LUMBOSACRAL RADICULOPATHY AT L5: ICD-10-CM

## 2024-10-02 DIAGNOSIS — M20.11 VALGUS DEFORMITY OF BOTH GREAT TOES: Primary | ICD-10-CM

## 2024-10-02 DIAGNOSIS — M20.12 VALGUS DEFORMITY OF BOTH GREAT TOES: Primary | ICD-10-CM

## 2024-10-02 DIAGNOSIS — M70.62 TROCHANTERIC BURSITIS OF LEFT HIP: ICD-10-CM

## 2024-10-02 DIAGNOSIS — I10 ESSENTIAL HYPERTENSION: ICD-10-CM

## 2024-10-02 PROCEDURE — 99999 PR PBB SHADOW E&M-EST. PATIENT-LVL IV: CPT | Mod: PBBFAC,,, | Performed by: PODIATRIST

## 2024-10-02 PROCEDURE — 1125F AMNT PAIN NOTED PAIN PRSNT: CPT | Mod: CPTII,S$GLB,, | Performed by: PODIATRIST

## 2024-10-02 PROCEDURE — 3074F SYST BP LT 130 MM HG: CPT | Mod: CPTII,S$GLB,, | Performed by: PODIATRIST

## 2024-10-02 PROCEDURE — 3288F FALL RISK ASSESSMENT DOCD: CPT | Mod: CPTII,S$GLB,, | Performed by: PODIATRIST

## 2024-10-02 PROCEDURE — 1101F PT FALLS ASSESS-DOCD LE1/YR: CPT | Mod: CPTII,S$GLB,, | Performed by: PODIATRIST

## 2024-10-02 PROCEDURE — 99214 OFFICE O/P EST MOD 30 MIN: CPT | Mod: S$GLB,,, | Performed by: PODIATRIST

## 2024-10-02 PROCEDURE — 3078F DIAST BP <80 MM HG: CPT | Mod: CPTII,S$GLB,, | Performed by: PODIATRIST

## 2024-10-02 RX ORDER — COVID-19 VACCINE, MRNA 0.04 MG/.418ML
INJECTION, SUSPENSION INTRAMUSCULAR
COMMUNITY
Start: 2024-06-04

## 2024-10-02 RX ORDER — DICLOFENAC SODIUM 10 MG/G
2 GEL TOPICAL 4 TIMES DAILY
Qty: 1 EACH | Refills: 2 | Status: SHIPPED | OUTPATIENT
Start: 2024-10-02

## 2024-10-02 RX ORDER — VALSARTAN 320 MG/1
320 TABLET ORAL
Qty: 90 TABLET | Refills: 2 | Status: SHIPPED | OUTPATIENT
Start: 2024-10-02

## 2024-10-02 RX ORDER — MELOXICAM 15 MG/1
15 TABLET ORAL DAILY
Qty: 30 TABLET | Refills: 0 | Status: SHIPPED | OUTPATIENT
Start: 2024-10-02

## 2024-10-02 NOTE — TELEPHONE ENCOUNTER
Spoke to pt in regards to her referral, the system would not allow me to schedule her with Dr. Marti so I scheduled pt with Ciarra Velasquez on tomorrow, October 03, 2024.    Sincerely,  Lisa Mcknight, [facility]   Certified Clinical Medical Assistant to Dr. Chau madison  Phone: [phone]  Fax: [phone]

## 2024-10-02 NOTE — TELEPHONE ENCOUNTER
No care due was identified.  [facility] Embedded Care Due Messages. Reference number: [id number].   10/02/2024 6:28:32 PM CDT

## 2024-10-03 ENCOUNTER — OFFICE VISIT (OUTPATIENT)
Dept: ORTHOPEDICS | Facility: CLINIC | Age: 79
End: 2024-10-03
Payer: MEDICARE

## 2024-10-03 DIAGNOSIS — M67.911 TENDINOPATHY OF RIGHT ROTATOR CUFF: ICD-10-CM

## 2024-10-03 DIAGNOSIS — M17.12 OSTEOARTHRITIS OF LEFT PATELLOFEMORAL JOINT: Primary | ICD-10-CM

## 2024-10-03 PROCEDURE — 99203 OFFICE O/P NEW LOW 30 MIN: CPT | Mod: S$GLB,,, | Performed by: PHYSICIAN ASSISTANT

## 2024-10-03 PROCEDURE — 99999 PR PBB SHADOW E&M-EST. PATIENT-LVL III: CPT | Mod: PBBFAC,,, | Performed by: PHYSICIAN ASSISTANT

## 2024-10-03 PROCEDURE — 1159F MED LIST DOCD IN RCRD: CPT | Mod: CPTII,S$GLB,, | Performed by: PHYSICIAN ASSISTANT

## 2024-10-03 PROCEDURE — 1160F RVW MEDS BY RX/DR IN RCRD: CPT | Mod: CPTII,S$GLB,, | Performed by: PHYSICIAN ASSISTANT

## 2024-10-03 NOTE — PROGRESS NOTES
SUBJECTIVE:     History of Present Illness:  Valarie Colindres is a 79 y.o. year old female here with complaints of constant right knee pain which started about 3 weeks ago.  There was not a recent injury. The pain is located in the anterior and posterior aspect of the knee.  The pain is described as achy.  Aggravating factors include standing and walking.  Previous treatments include rest, meloxicam and voltaren gel.  She was initially seen in urgent care.  There is not a history of previous injury or surgery to the knee.  The patient does not use an assistive device.    Review of patient's allergies indicates:   Allergen Reactions    Sutures, catgut (chromic)          Current Outpatient Medications   Medication Sig Dispense Refill    acetaminophen (TYLENOL) 500 MG tablet Take 1-2 tablets (500-1,000 mg total) by mouth 3 (three) times daily as needed for Pain.  0    albuterol (PROVENTIL/VENTOLIN HFA) 90 mcg/actuation inhaler Inhale 1-2 puffs into the lungs every 6 (six) hours as needed for Wheezing or Shortness of Breath. (Cough) Rescue (Patient not taking: Reported on 10/2/2024) 25.5 g 0    alendronate (FOSAMAX) 70 MG tablet TK 1 T PO EVERY WEEK      ALPRAZolam (XANAX) 0.25 MG tablet Take 1 tablet (0.25 mg total) by mouth daily as needed for Anxiety (FLIGHT ANXIETY). This medication can cause sedation. Try on a day you do not have to work the next day. Do not mix with alcohol or any other sedating meds (such as sleep aids, opioids, or benzos). Caution with driving or operating heavy machinery. (Patient not taking: Reported on 10/2/2024) 7 tablet 0    amLODIPine (NORVASC) 5 MG tablet       aspirin (ECOTRIN) 81 MG EC tablet Take 1 tablet by mouth once daily. (Patient not taking: Reported on 10/2/2024)      atorvastatin (LIPITOR) 10 MG tablet Take 1 tablet (10 mg total) by mouth every evening. 90 tablet 3    azelastine (ASTELIN) 137 mcg (0.1 %) nasal spray Two sprays in each nostril, sniff until absorbed, then  follow with 1 spray of fluticasone.  Use both sprays twice daily. (Patient not taking: Reported on 10/2/2024) 30 mL 11    chlorhexidine (PERIDEX) 0.12 % solution RINSE AND SWISH ONE CAPFUL BID  1    chlorzoxazone (PARAFON FORTE) 500 mg Tab TAKE 1 TABLET(S) 3 TIMES A DAY BY ORAL ROUTE AS NEEDED.      clobetasoL (TEMOVATE) 0.05 % external solution  (Patient not taking: Reported on 10/2/2024)      COMIRNATY 2023-24, 12Y UP,,PF, 30 mcg/0.3 mL inection       diclofenac sodium (VOLTAREN) 1 % Gel Apply 2 g topically 4 (four) times daily. 1 each 2    famotidine (PEPCID) 20 MG tablet Take 1 tablet (20 mg total) by mouth 2 (two) times daily. 180 tablet 3    levothyroxine (SYNTHROID) 100 MCG tablet Take 1 tablet (100 mcg total) by mouth once daily. 90 tablet 3    loratadine (CLARITIN) 10 mg tablet Take 1 tablet (10 mg total) by mouth once daily. 30 tablet 11    meloxicam (MOBIC) 15 MG tablet Take 1 tablet (15 mg total) by mouth once daily. 30 tablet 0    pantoprazole (PROTONIX) 40 MG tablet Take 40 mg by mouth every morning.      valsartan (DIOVAN) 320 MG tablet TAKE 1 TABLET BY MOUTH ONCE DAILY. 90 tablet 2     No current facility-administered medications for this visit.       Past Medical History:   Diagnosis Date    Angle recession of right eye     Blunt trauma of both eyes     hit across eyes with bungee exercise band    Cataract     Cystocele, midline     Dry eye syndrome     Ectropion due to laxity of eyelid, right     GERD (gastroesophageal reflux disease)     Hyperlipidemia     Hypertension     PVD (posterior vitreous detachment), right eye     Rectocele     Retinal drusen of both eyes     Thalassemia major     Vaginal atrophy        Past Surgical History:   Procedure Laterality Date    ABDOMINAL ADHESION SURGERY  1978    ANKLE FRACTURE SURGERY Right 1996    COLONOSCOPY  11/09/2022    COLONOSCOPY N/A 12/5/2023    Procedure: COLONOSCOPY;  Surgeon: Xavier Lawrence MD;  Location: [facility] ([address] [place] [address]);  Service:  Endoscopy;  Laterality: N/A;    ECTROPION REPAIR Bilateral 06/2018    Dr. Flaherty    ESOPHAGOGASTRODUODENOSCOPY N/A 12/5/2023    Procedure: EGD (ESOPHAGOGASTRODUODENOSCOPY);  Surgeon: Xavier Lawrence MD;  Location: [facility] ([address] [place] [address]);  Service: Endoscopy;  Laterality: N/A;  9/6- referred by Dr. Lawrence/Additional Scheduling Information:  Needs constipation bowel prep protocol / prep instructions handed to patient and to portal. AS  11/28-precall complete-MS    HYSTERECTOMY  1977    possible cancerous lesion         Review of Systems:  ROS:  Constitutional: no fever or chills  Eyes: no visual changes  ENT: no nasal congestion or sore throat  Respiratory: no cough or shortness of breath  Musculoskeletal: no arthralgias or myalgias      OBJECTIVE:     PHYSICAL EXAM:        General: Well developed, well nourished, in no acute distress.  Neurological: Mood & affect are normal.  HEENT: NCAT, sclera nonicteric   Lungs: Respirations are equal and unlabored.   CV: 2+ bilateral upper and lower extremity pulses.   Skin: Intact throughout with no rashes, erythema, or lesions  Extremities: No LE edema, no erythema or warmth of the skin in either lower extremity.    Left Knee Exam:  Knee Range of Motion: 0-130   Effusion: none  Condition of skin: intact  Location of tenderness: anterior knee  Strength: 5 of 5 quadriceps strength and 5 of 5 hamstring strength  Stability:  stable to testing  Varus /Valgus stress:   normal  Gisela:  negative      Left Hip Examination:  full painless range of motion, without tenderness    IMAGING:    X-rays of the left knee, personally reviewed by me, demonstrate mild degenerative changes with joint space narrowing , osteophyte formation and subchondral sclerosis.  No fracture or dislocation.       ASSESSMENT     1. Osteoarthritis of left patellofemoral joint    2. Tendinopathy of right rotator cuff        PLAN:     We discussed with the patient at length all the different treatment options  available for the knee including NSAIDS, acetaminophen, rest, ice, knee strengthening exercise, steroid injections for temporary relief, Viscosupplementation, and knee arthroplasty.     - Just started meloxicam yesterday- recommend try this for 10-14 days and continue rest  - Return to exercise as tolerated, recommend modifying and working with   - If no improvement, can return for CSI  - Follow up if symptoms worsen or fail to improve

## 2024-10-03 NOTE — TELEPHONE ENCOUNTER
Refill Decision Note   Valarie Colindres  is requesting a refill authorization.  Brief Assessment and Rationale for Refill:  Approve     Medication Therapy Plan:         Comments:     Note composed:7:34 PM 10/02/2024

## 2024-10-05 DIAGNOSIS — E03.9 ACQUIRED HYPOTHYROIDISM: ICD-10-CM

## 2024-10-05 NOTE — TELEPHONE ENCOUNTER
No care due was identified.  [facility] Embedded Care Due Messages. Reference number: [id number].   10/05/2024 7:52:06 AM CDT

## 2024-10-06 RX ORDER — LEVOTHYROXINE SODIUM 100 UG/1
100 TABLET ORAL
Qty: 90 TABLET | Refills: 2 | Status: SHIPPED | OUTPATIENT
Start: 2024-10-06

## 2024-10-06 NOTE — TELEPHONE ENCOUNTER
Refill Decision Note   Valarie Colindres  is requesting a refill authorization.  Brief Assessment and Rationale for Refill:  Approve     Medication Therapy Plan:         Comments:     Note composed:7:55 AM 10/06/2024

## 2024-10-09 ENCOUNTER — OFFICE VISIT (OUTPATIENT)
Dept: DERMATOLOGY | Facility: CLINIC | Age: 79
End: 2024-10-09
Payer: MEDICARE

## 2024-10-09 DIAGNOSIS — L81.9 HYPERPIGMENTATION OF SKIN: ICD-10-CM

## 2024-10-09 DIAGNOSIS — L82.1 SK (SEBORRHEIC KERATOSIS): Primary | ICD-10-CM

## 2024-10-09 DIAGNOSIS — L91.8 ST (SKIN TAG): ICD-10-CM

## 2024-10-09 DIAGNOSIS — D18.01 CHERRY ANGIOMA: ICD-10-CM

## 2024-10-09 DIAGNOSIS — I78.1 SPIDER VEINS: ICD-10-CM

## 2024-10-09 PROCEDURE — 1160F RVW MEDS BY RX/DR IN RCRD: CPT | Mod: CPTII,S$GLB,, | Performed by: DERMATOLOGY

## 2024-10-09 PROCEDURE — 99999 PR PBB SHADOW E&M-EST. PATIENT-LVL III: CPT | Mod: PBBFAC,,, | Performed by: DERMATOLOGY

## 2024-10-09 PROCEDURE — 99203 OFFICE O/P NEW LOW 30 MIN: CPT | Mod: S$GLB,,, | Performed by: DERMATOLOGY

## 2024-10-09 PROCEDURE — 1101F PT FALLS ASSESS-DOCD LE1/YR: CPT | Mod: CPTII,S$GLB,, | Performed by: DERMATOLOGY

## 2024-10-09 PROCEDURE — 3288F FALL RISK ASSESSMENT DOCD: CPT | Mod: CPTII,S$GLB,, | Performed by: DERMATOLOGY

## 2024-10-09 PROCEDURE — 1126F AMNT PAIN NOTED NONE PRSNT: CPT | Mod: CPTII,S$GLB,, | Performed by: DERMATOLOGY

## 2024-10-09 PROCEDURE — 1159F MED LIST DOCD IN RCRD: CPT | Mod: CPTII,S$GLB,, | Performed by: DERMATOLOGY

## 2024-10-09 NOTE — PATIENT INSTRUCTIONS
XEROSIS (DRY SKIN)        Definition    Xerosis is the term for dry skin.  We all have a natural oil coating over our skin produced by the skin oil glands.  If this oil is removed, the skin becomes dry which can lead to cracking, which can lead to inflammation.  Xerosis is usually a long-term problem that recurs often, especially in the winter.    Cause    Long hot baths or showers can remove our natural oil and lead to xerosis.  One should never take more than one bath or shower a day and for no longer than ten minutes.  Use of harsh soaps such as Zest, Dial, and Ivory can worsen and cause xerosis.  Cold winter weather worsens xerosis because the amount of moisture contained in cold air is much less than the amount of moisture in warm air.    Treatment    Treatment is intended to restore the natural oil to your skin.  Keep the skin lubricated.    Do not take more than one bath or shower a day.  Use lukewarm water, not hot.  Hot water dries out the skin.    Use a gentle moisturizing soap such as Cetaphil soap, Oil of Olay, Dove, Basis, Ivory moisture care, Restoraderm cleanser.    When toweling dry, dont rub.  Blot the skin so there is still some water left on the skin.  You should apply a moisturizing cream to all of the skin such as Cerave cream, Cetaphil cream, Lipikar [place] AP+ Intense Repair Moisturizing Cream or Restoraderm or Eucerin Original Formula cream.   Alpha hydroxyacid lotions, i.e., AmLactin, also work very well for preventing dry skin, but may burn when used on inflamed or reddened skin.    If you like to swim during the winter months, you should not use soap when getting out of the pool.  When you have finished swimming, rinse off the chlorine with cool to warm water.  If this will be the only shower of the day, then you may use Cetaphil or another mild soap to cleanse your skin.  After the shower, apply a moisturizing cream to all of the skin as above.        [address] [place] [address]/ [phone] FAX/ [email or web address]     SEBORRHEIC KERATOSES        What causes seborrheic keratoses?    Seborrheic keratoses are harmless, common skin growths that first appear during adult life.  As time goes by, more growths appear.  Some persons have a very large number of them.  Seborrheic keratoses appear on both covered and uncovered parts of the body; they are not caused by sunlight.  The tendency to develop seborrheic keratoses is inherited.    Seborrheic keratoses are harmless and never become malignant.  They begin as slightly raised, light brown spots.  Gradually they thicken and take on a rough wartlike surface.  They slowly darken and may turn black.  These color changes are harmless.  Seborrheic keratoses are superficial and look as if they were stuck on the skin.  Persons who have had several seborrheic keratoses can usually recognize this type of benign growth.  However, if you are concerned or unsure about any growth, consult me.    Treatment    Seborrheic keratoses can easily be removed in the office.  The only reason for removing a seborrheic keratosis is your wish to get rid of it.     Sun Protection      The [facility] would like to remind you of the importance of sun protection all year round and particularly during the summer when the suns rays are the strongest. It has been proven that both acute and chronic sun exposure damages our cells and leads to skin cancer. Beyond skin cancer, the sun causes 90% of the symptoms of premature skin aging, including wrinkles, lentigines (brown spots), and thin, easily bruised skin. Proper sun protection can help prevent these unwanted conditions.    Many patients report that they dont go in the sun. It has been shown that the average person receives 18 hours of incidental sun exposure per week during activities such as walking through parking lots, driving, or sitting next to windows. This accumulates to  several bad sunburns per year!    In choosing sunscreen, you want one that protects against both UVA and UVB rays (broad spectrum). It is recommended that you use one of SPF 30 or higher. It is important to apply the sunscreen about 20 minutes prior to sun exposure. Most sunscreens are chemical sunscreens and a reaction must take place in the skin so that they are effective. If they are applied and then you are immediately exposed to the sun or start sweating, this reaction has not had time to take place and you are therefore unprotected. Sunscreen needs to be reapplied every 2 hours if you are participating in water sports or sweating. We recommend Elta MD or CeraVe sunscreens for daily use; however there are many options and it is most important for you to find one that you will use on a consistent basis.    If you have sensitive skin, you may do best with a sunscreen that contains only physical blockers in the active ingredient section. The only physical blockers available in the [place] currently are titanium dioxide or zinc oxide. These are typically thicker and harder to apply, however they afford very good protection. Neutrogena Sensitive Skin, Blue Lizard Sensitive Skin (pink top) or Neutrogena Pure and Free are popular ones.     Aside from sunscreen, clothes with UV protection (UPF), wide brimmed hats, and sunglasses are other means of sun protection that we recommend.      Based on a recent study (6/2021) and out of an abundance of caution, we are recommending that you AVOID the following sunscreens as they may contain the carcinogen, benzene:    Spray and gel sunscreens  Any [employer] or [employer] brands as well as Max Block and TopCare brands   Neutrogena Ultra Sheer Dry-touch Water Resistant Sunscreen LOTION SPF 70   Neutrogena Sheer Zinc Dry-touch Face Sunscreen LOTION SPF 50   5.   Aveeno Baby Continuous Protection Sensitive Skin Sunscreen LOTION - Broad Spectrum SPF 50    Please note that Benzene is not an  ingredient or the degradation product of any ingredient in any sunscreen. This study suggested that the findings are a result of contamination in the manufacturing process. At this point, we don't know how effectively Benzene gets through the skin, if it gets absorbed systemically, and what effects it may have.     We do know that ultraviolet radiation is a well-established carcinogen. Please use daily sun protection/avoidance and use of at least SPF 30, broad-spectrum sunscreen not listed above.                       [facility] - DERMATOLOGY [address]  [address]  [place] [address]  Dept: [phone]  Dept Fax: [phone]

## 2024-10-09 NOTE — PROGRESS NOTES
Subjective:      Patient ID:  Valarie Colindres is a 79 y.o. female who presents for   Chief Complaint   Patient presents with    Skin Check     Tbse      History of Present Illness: The patient presents for follow up of skin check.    The patient was last seen on: 6/16/2021 for skin check.   No h/o of nmsc or mm.     Other skin complaints:   Patient with new area of concern:   Location: right arm , face  Previous treatments: none    Patient with new area of concern:   Location: bilateral legs + discoloration   Previous treatments: none          For hair loss, states she has had laser treatment by Dr. Damaris Vogt, hair transplant, and shots.  Also has had caps placed on her scalp. Is wondering if we have any other treatment options.    Review of Systems   Constitutional: Negative.    Respiratory: Negative.     Skin:  Positive for daily sunscreen use and activity-related sunscreen use. Negative for recent sunburn and wears hat.   Hematologic/Lymphatic: Bruises/bleeds easily (bruise).       Objective:   Physical Exam   Constitutional: She appears well-developed and well-nourished. No distress.   Eyes: No conjunctival no injection.   Cardiovascular:  There is no local extremity swelling.             Neurological: She is alert and oriented to person, place, and time. She is not disoriented.   Psychiatric: She has a normal mood and affect.   Skin:   Areas Examined (abnormalities noted in diagram):   Scalp / Hair Palpated and Inspected  Head / Face Inspection Performed  Neck Inspection Performed  Chest / Axilla Inspection Performed  Abdomen Inspection Performed  RUE Inspected  LUE Inspection Performed  RLE Inspected  LLE Inspection Performed  Nails and Digits Inspection Performed                     Diagram Legend     Erythematous scaling macule/papule c/w actinic keratosis       Vascular papule c/w angioma      Pigmented verrucoid papule/plaque c/w seborrheic keratosis      Yellow umbilicated papule c/w sebaceous  hyperplasia      Irregularly shaped tan macule c/w lentigo     1-2 mm smooth white papules consistent with Milia      Movable subcutaneous cyst with punctum c/w epidermal inclusion cyst      Subcutaneous movable cyst c/w pilar cyst      Firm pink to brown papule c/w dermatofibroma      Pedunculated fleshy papule(s) c/w skin tag(s)      Evenly pigmented macule c/w junctional nevus     Mildly variegated pigmented, slightly irregular-bordered macule c/w mildly atypical nevus      Flesh colored to evenly pigmented papule c/w intradermal nevus       Pink pearly papule/plaque c/w basal cell carcinoma      Erythematous hyperkeratotic cursted plaque c/w SCC      Surgical scar with no sign of skin cancer recurrence      Open and closed comedones      Inflammatory papules and pustules      Verrucoid papule consistent consistent with wart     Erythematous eczematous patches and plaques     Dystrophic onycholytic nail with subungual debris c/w onychomycosis     Umbilicated papule    Erythematous-base heme-crusted tan verrucoid plaque consistent with inflamed seborrheic keratosis     Erythematous Silvery Scaling Plaque c/w Psoriasis     See annotation      Assessment / Plan:        SK (seborrheic keratosis)  These are benign inherited growths without a malignant potential. Reassurance given to patient. No treatment is necessary.   Treatment of benign, asymptomatic lesions may be considered cosmetic.  Warned about risk of hypo- or hyperpigmentation with treatment and risk of recurrence.    Cherry angioma  This is a benign vascular lesion. Reassurance given. No treatment required. Treatment of benign, asymptomatic lesions may be considered cosmetic.    ST (skin tag)  Reassurance given to patient. No treatment is necessary.   Treatment of benign, asymptomatic lesions may be considered cosmetic.    Hyperpigmentation of skin  Rec: OTC hydroquinone BID  Patient instructed in importance of daily broad spectrum sunscreen use with spf at  least 30. Sun avoidance and topical protection/protective clothing discussed.    Spider veins  -     Ambulatory referral/consult to Vascular Surgery; Future; Expected date: 10/18/2024    Suggested continuing treatment for hair loss on scalp with Dr. Damaris Vogt as she is fellowship trained in hair loss and has access to more treatment options than we do.    RTC prn

## 2024-10-10 ENCOUNTER — PATIENT MESSAGE (OUTPATIENT)
Dept: ORTHOPEDICS | Facility: CLINIC | Age: 79
End: 2024-10-10
Payer: MEDICARE

## 2024-10-10 DIAGNOSIS — M17.11 PRIMARY OSTEOARTHRITIS OF RIGHT KNEE: Primary | ICD-10-CM

## 2024-10-11 ENCOUNTER — TELEPHONE (OUTPATIENT)
Dept: PRIMARY CARE CLINIC | Facility: CLINIC | Age: 79
End: 2024-10-11
Payer: MEDICARE

## 2024-10-11 NOTE — TELEPHONE ENCOUNTER
----- Message from Dontae sent at 10/11/2024  9:43 AM CDT -----  Regarding: Pt Confirming Apt for October 15th - Radha  Contact: Pt [phone]  [email or web address]     Patient is calling for Medical Advice regarding: Patient called to confirm the apt for October 15th at 1pm. Please call back to confirm with patient sister.    How long has patient had these symptoms:    Pharmacy name and phone#:    Patient wants a call back or thru [facility]: Call    Comments:    Please advise patient replies from provider may take up to 48 hours.

## 2024-10-11 NOTE — TELEPHONE ENCOUNTER
Spoke with Ms. Gonzalesis to confirm that the appt is supposed to be for her sister and not her.  Appt scheduled for sister Ms. Heaton on 10/15/24 at 1:00 pm.

## 2024-10-13 NOTE — PROGRESS NOTES
Subjective:      Patient ID: Valarie Colindres is a 79 y.o. female.    Chief Complaint:   Bunions (Bilateral foot)    Valarie is a 79 y.o. female who presents to the podiatry clinic  with complaint of  bilateral foot pain. Onset of the symptoms was several years ago. Precipitating event: none known. Current symptoms include: ability to bear weight, but with some pain. Aggravating factors: any weight bearing. Symptoms have gradually worsened. Patient has had no prior foot problems. Evaluation to date: plain films: abnormal Bilateral bunion deformity with arthritic changes . Treatment to date:  changes in shoe gear, anti-inflammatory and digital padding . Patients rates pain 7/10 on pain scale.  Ongoing issues with bunion pain.  Would like to discuss surgical and conservative options.  Also issues with left hip pain.    Review of Systems   Constitutional: Negative for chills, decreased appetite, fever and malaise/fatigue.   HENT:  Negative for congestion, hearing loss, nosebleeds and tinnitus.    Eyes:  Negative for double vision, pain, photophobia and visual disturbance.   Cardiovascular:  Negative for chest pain, claudication, cyanosis and leg swelling.   Respiratory:  Negative for cough, hemoptysis, shortness of breath and wheezing.    Endocrine: Negative for cold intolerance and heat intolerance.   Hematologic/Lymphatic: Negative for adenopathy and bleeding problem.   Skin:  Negative for color change, dry skin, itching, nail changes and suspicious lesions.   Musculoskeletal:  Positive for arthritis, joint pain, myalgias and stiffness.   Gastrointestinal:  Negative for abdominal pain, jaundice, nausea and vomiting.   Genitourinary:  Negative for dysuria, frequency and hematuria.   Neurological:  Negative for difficulty with concentration, loss of balance, numbness, paresthesias and sensory change.   Psychiatric/Behavioral:  Negative for altered mental status, hallucinations and suicidal ideas. The patient is not  nervous/anxious.    Allergic/Immunologic: Negative for environmental allergies and persistent infections.           Objective:      Physical Exam  Vitals reviewed.   Constitutional:       Appearance: She is well-developed.   HENT:      Head: Normocephalic and atraumatic.   Cardiovascular:      Pulses:           Dorsalis pedis pulses are 2+ on the right side and 2+ on the left side.        Posterior tibial pulses are 2+ on the right side and 2+ on the left side.   Pulmonary:      Effort: Pulmonary effort is normal.   Musculoskeletal:         General: Normal range of motion.      Comments: Inspection and palpation of the muscles joints and bones of both lower extremities reveal that muscle strength for the anterior lateral and posterior muscle groups and intrinsic muscle groups of the foot are all 5 over 5 symmetrical.    Painful medial 1st MTPJ exostosis. Lateral deviation of hallux, non trackbound. No pain w/ ROM to 1st or 2nd MTPJs. No First ray hypermobility or sub second MT head callus. No lesser toe deformities or pain.      Skin:     General: Skin is warm and dry.      Capillary Refill: Capillary refill takes 2 to 3 seconds.      Comments: Skin turgor is normal bilaterally.  Skin texture is well hydrated to both lower extremities.  No lesions or rashes or wounds appreciated bilaterally.  Nail plates 1 through 5 bilaterally are within normal limits for length and thickness.  No nail clubbing or incurvation noted.   Neurological:      Mental Status: She is alert and oriented to person, place, and time.      Comments: Sharp dull light touch vibratory proprioceptive sensation are intact bilaterally.  Deep tendon reflexes to patellar and Achilles tendon are symmetrical 2 over 4 bilaterally.  No ankle clonus or Babinski reflexes noted bilaterally.  Coordination is normal to both feet and lower extremities.   Psychiatric:         Behavior: Behavior normal.             Assessment:       Encounter Diagnoses   Name  Primary?    Trochanteric bursitis of left hip     Valgus deformity of both great toes Yes    Lumbosacral radiculopathy at L5      Independent visualization of imaging was performed.  Results were reviewed in detail with patient.       Plan:       Valarie was seen today for bunions.    Diagnoses and all orders for this visit:    Valgus deformity of both great toes    Trochanteric bursitis of left hip  -     diclofenac sodium (VOLTAREN) 1 % Gel; Apply 2 g topically 4 (four) times daily.  -     Ambulatory referral/consult to Orthopedics; Future    Lumbosacral radiculopathy at L5    Other orders  -     meloxicam (MOBIC) 15 MG tablet; Take 1 tablet (15 mg total) by mouth once daily.      I counseled the patient on her conditions, their implications and medical management.    The nature of the condition, options for management, as well as potential risks and complications were discussed in detail with patient. Patient was amenable to my recommendations and left my office fully informed and will follow up as instructed or sooner if necessary.       Conservative and surgical options discussed in detail regarding bunion deformity.  Appropriate shoe gear and orthotics discussed in detail.  Again discussed digital padding and conservative measures.  Radiographs ordered, follow-up to review to discuss possible surgical intervention if needed.    Follow-up with orthopedics left hip pain.

## 2024-10-16 ENCOUNTER — PATIENT MESSAGE (OUTPATIENT)
Dept: ORTHOPEDICS | Facility: CLINIC | Age: 79
End: 2024-10-16
Payer: MEDICARE

## 2024-10-29 ENCOUNTER — OFFICE VISIT (OUTPATIENT)
Dept: ORTHOPEDICS | Facility: CLINIC | Age: 79
End: 2024-10-29
Payer: MEDICARE

## 2024-10-29 DIAGNOSIS — M17.12 PRIMARY OSTEOARTHRITIS OF LEFT KNEE: Primary | ICD-10-CM

## 2024-10-29 PROCEDURE — 1160F RVW MEDS BY RX/DR IN RCRD: CPT | Mod: CPTII,S$GLB,, | Performed by: PHYSICIAN ASSISTANT

## 2024-10-29 PROCEDURE — 1125F AMNT PAIN NOTED PAIN PRSNT: CPT | Mod: CPTII,S$GLB,, | Performed by: PHYSICIAN ASSISTANT

## 2024-10-29 PROCEDURE — 99999 PR PBB SHADOW E&M-EST. PATIENT-LVL III: CPT | Mod: PBBFAC,,, | Performed by: PHYSICIAN ASSISTANT

## 2024-10-29 PROCEDURE — 99213 OFFICE O/P EST LOW 20 MIN: CPT | Mod: S$GLB,,, | Performed by: PHYSICIAN ASSISTANT

## 2024-10-29 PROCEDURE — 1159F MED LIST DOCD IN RCRD: CPT | Mod: CPTII,S$GLB,, | Performed by: PHYSICIAN ASSISTANT

## 2024-10-29 PROCEDURE — 1101F PT FALLS ASSESS-DOCD LE1/YR: CPT | Mod: CPTII,S$GLB,, | Performed by: PHYSICIAN ASSISTANT

## 2024-10-29 PROCEDURE — 3288F FALL RISK ASSESSMENT DOCD: CPT | Mod: CPTII,S$GLB,, | Performed by: PHYSICIAN ASSISTANT

## 2024-10-30 ENCOUNTER — CLINICAL SUPPORT (OUTPATIENT)
Dept: REHABILITATION | Facility: OTHER | Age: 79
End: 2024-10-30
Payer: MEDICARE

## 2024-10-30 DIAGNOSIS — R29.898 LEFT LEG WEAKNESS: Primary | ICD-10-CM

## 2024-10-30 DIAGNOSIS — M17.12 PRIMARY OSTEOARTHRITIS OF LEFT KNEE: ICD-10-CM

## 2024-10-30 PROCEDURE — 97161 PT EVAL LOW COMPLEX 20 MIN: CPT | Mod: PN

## 2024-10-30 PROCEDURE — 97110 THERAPEUTIC EXERCISES: CPT | Mod: PN

## 2024-10-31 PROBLEM — R29.898 LEFT LEG WEAKNESS: Status: ACTIVE | Noted: 2024-10-31

## 2024-11-05 ENCOUNTER — CLINICAL SUPPORT (OUTPATIENT)
Dept: REHABILITATION | Facility: OTHER | Age: 79
End: 2024-11-05
Payer: MEDICARE

## 2024-11-05 DIAGNOSIS — R29.898 LEFT LEG WEAKNESS: Primary | ICD-10-CM

## 2024-11-05 PROCEDURE — 97112 NEUROMUSCULAR REEDUCATION: CPT | Mod: PN

## 2024-11-05 NOTE — PROGRESS NOTES
[facility]   Physical Therapy Treatment Note     Name: Valarie Colindres  Clinic Number: [id number]    Therapy Diagnosis:   Encounter Diagnosis   Name Primary?    Left leg weakness Yes     Physician: Ciarra Velasquez PA-C    Visit Date: 11/5/2024       Physician Orders: Physical Therapy Evaluate and Treat  Medical Diagnosis from Referral: left knee OA  Evaluation Date: 10/30/24  Authorization Period Expiration: 10/29/25  Plan of Care Expiration: 10/30/2024 to 1/30/25  Visit # / Visits authorized: 1/10  FOTO: 0/3  on 10/30/24        Time In: 100p  Time Out: 205p  Total Billable Time: 65 minutes        SUBJECTIVE     Pt reports: that she is doing well today. Pt is compliant with her home program.  She was compliant with home exercise program.  Response to previous treatment: decreased leg pain  Functional change: improved tolerance to standing    Pain: 3/10  Location: left knee      OBJECTIVE     Objective Measures updated at progress report unless specified.       TREATMENT     Total Treatment time (time-based codes) separate from Evaluation: 60 minutes     Valarie received the treatments listed below:          Patient received manual therapeutic technique for 5 minutes for improved soft tissue and joint mobility including:  Application of TDN: Pt educated on benefits and potential side effects of dry needling. Educated pt on benefits, precautions, side effects following TDN. Educated pt to use heat following treatment sessions if pt is experiencing pain or soreness. Pt verbalized good understanding of education.  Pt signed written consent to dry needling. Pt gave verbal consent for DN    Pt received dry needling to the below listed muscles using 50mm needles.  L2-5 paraspinals          Neuromuscular re education for 60 minutes for improved balance and proprioception including:   SLR 3x10  SL hip abduction 3x10  SL clamshell 3x10  Prone femoral nerve glide 3x10  Prone hip extension  "3x10  Supine clams 30x5" hold red band  Seated sciatic nerve glide x10        PATIENT EDUCATION AND HOME EXERCISES     Home Exercises Provided and Patient Education Provided     Education provided:   PT educated pt on importance of compliance with their HEP this visit.     Written Home Exercises Provided: yes. Exercises were reviewed and Valarie was able to demonstrate them prior to the end of the session.  Valarie demonstrated good  understanding of the education provided. See EMR under Patient Instructions for exercises provided during therapy sessions    ASSESSMENT   Pt tolerated tx session well today and completed all therapeutic exercises with minimal/no increase in low back pain. Progressed hip and core strengthening exercises this visit.     Valarie Is progressing well towards her goals.   Pt prognosis is Good.     Pt will continue to benefit from skilled outpatient physical therapy to address the deficits listed in the problem list box on initial evaluation, provide pt/family education and to maximize pt's level of independence in the home and community environment.     Pt's spiritual, cultural and educational needs considered and pt agreeable to plan of care and goals.     Anticipated barriers to physical therapy: None    GOALS:  Short Term Goals (4 Weeks):  1. Patient will be compliant with home exercise program to supplement therapy in promoting functional mobility. (progressing, not met)    2. Patient will perform transfers with good control to demonstrate improved core strength. (progressing, not met)    3. Patient will report no pain during thoracolumbar active range of motion to promote functional mobility.  (progressing, not met)    4. Patient will improve impaired lower extremity manual muscle tests  to >/=4/5 to improve strength for functional tasks. (progressing, not met)          Long Term Goals (6 Weeks):   1. Patient will improve FOTO score by 10% limited to decrease perceived limitation with " maintaining/changing body position. (progressing, not met)    2. Patient will perform recreational activity with good control to demonstrate improved core strength.  (progressing, not met)    3. Patient will improve impaired lower extremity manual muscle tests to >/=4+/5 to improve strength for functional tasks.  (progressing, not met)    4. Patient will tolerate standing/walking for 30 minutes with no increase in low back pain to return to PLOF.  (progressing, not met)           Plan      Plan of care Certification: 10/30/2024 to 1/30/25     Outpatient Physical Therapy 2 times weekly for 12 weeks to include the following interventions: Therapeutic Exercises, Manual Therapeutic Technique, Neuromuscular Re Education, Therapeutic Activities. Modalities, Kinesiotape prn, and Functional Dry Needling as needed.      Char Cruz, PT

## 2024-11-08 ENCOUNTER — CLINICAL SUPPORT (OUTPATIENT)
Dept: REHABILITATION | Facility: OTHER | Age: 79
End: 2024-11-08
Payer: MEDICARE

## 2024-11-08 DIAGNOSIS — R29.898 LEFT LEG WEAKNESS: Primary | ICD-10-CM

## 2024-11-08 PROCEDURE — 97112 NEUROMUSCULAR REEDUCATION: CPT | Mod: PN

## 2024-11-08 NOTE — PROGRESS NOTES
[facility]   Physical Therapy Treatment Note     Name: Valarie Colindres  Clinic Number: [id number]    Therapy Diagnosis:   Encounter Diagnosis   Name Primary?    Left leg weakness Yes     Physician: Ciarra Velasquez PA-C    Visit Date: 11/8/2024       Physician Orders: Physical Therapy Evaluate and Treat  Medical Diagnosis from Referral: left knee OA  Evaluation Date: 10/30/24  Authorization Period Expiration: 10/29/25  Plan of Care Expiration: 10/30/2024 to 1/30/25  Visit # / Visits authorized: 2/10  FOTO: 0/3  on 10/30/24        Time In: 1000a  Time Out: 1100a  Total Billable Time: 60 minutes        SUBJECTIVE     Pt reports: that she feels stronger, and the left knee is feeling better.    She was compliant with home exercise program.  Response to previous treatment: decreased leg pain  Functional change: improved tolerance to standing    Pain: 3/10  Location: left knee      OBJECTIVE     Objective Measures updated at progress report unless specified.       TREATMENT     Total Treatment time (time-based codes) separate from Evaluation: 60 minutes     Valarie received the treatments listed below:          Patient received manual therapeutic technique for 0 minutes for improved soft tissue and joint mobility including:    Application of TDN: Pt educated on benefits and potential side effects of dry needling. Educated pt on benefits, precautions, side effects following TDN. Educated pt to use heat following treatment sessions if pt is experiencing pain or soreness. Pt verbalized good understanding of education.  Pt signed written consent to dry needling. Pt gave verbal consent for DN    Pt received dry needling to the below listed muscles using 50mm needles.  L2-5 paraspinals        Neuromuscular re education for 60 minutes for improved balance and proprioception including:     SLR 3x10  SL hip abduction 3x10  SL clamshell 3x10  Prone femoral nerve glide 3x10  Prone hip extension  "3x10  Supine clams 30x5" hold red band  Seated sciatic nerve glide x10        PATIENT EDUCATION AND HOME EXERCISES     Home Exercises Provided and Patient Education Provided     Education provided:   PT educated pt on importance of compliance with their HEP this visit.     Written Home Exercises Provided: yes. Exercises were reviewed and Valarie was able to demonstrate them prior to the end of the session.  Valarie demonstrated good  understanding of the education provided. See EMR under Patient Instructions for exercises provided during therapy sessions    ASSESSMENT     Pt tolerated therapeutic interventions well with no complaint of increased pain. Patient reported relief after physical therapy treatment today. She continues to take her time with the physical therapy exercises, but she was able to perform all without limitation. We will continue to progress as tolerated.    Valarie Is progressing well towards her goals.   Pt prognosis is Good.     Pt will continue to benefit from skilled outpatient physical therapy to address the deficits listed in the problem list box on initial evaluation, provide pt/family education and to maximize pt's level of independence in the home and community environment.     Pt's spiritual, cultural and educational needs considered and pt agreeable to plan of care and goals.     Anticipated barriers to physical therapy: None    GOALS:  Short Term Goals (4 Weeks):  1. Patient will be compliant with home exercise program to supplement therapy in promoting functional mobility. (progressing, not met)    2. Patient will perform transfers with good control to demonstrate improved core strength. (progressing, not met)    3. Patient will report no pain during thoracolumbar active range of motion to promote functional mobility.  (progressing, not met)    4. Patient will improve impaired lower extremity manual muscle tests  to >/=4/5 to improve strength for functional tasks. (progressing, not " met)          Long Term Goals (6 Weeks):   1. Patient will improve FOTO score by 10% limited to decrease perceived limitation with maintaining/changing body position. (progressing, not met)    2. Patient will perform recreational activity with good control to demonstrate improved core strength.  (progressing, not met)    3. Patient will improve impaired lower extremity manual muscle tests to >/=4+/5 to improve strength for functional tasks.  (progressing, not met)    4. Patient will tolerate standing/walking for 30 minutes with no increase in low back pain to return to PLOF.  (progressing, not met)           Plan      Plan of care Certification: 10/30/2024 to 1/30/25     Outpatient Physical Therapy 2 times weekly for 12 weeks to include the following interventions: Therapeutic Exercises, Manual Therapeutic Technique, Neuromuscular Re Education, Therapeutic Activities. Modalities, Kinesiotape prn, and Functional Dry Needling as needed.      Drake Cronin, PT

## 2024-11-11 ENCOUNTER — CLINICAL SUPPORT (OUTPATIENT)
Dept: REHABILITATION | Facility: OTHER | Age: 79
End: 2024-11-11
Payer: MEDICARE

## 2024-11-11 DIAGNOSIS — R29.898 LEFT LEG WEAKNESS: Primary | ICD-10-CM

## 2024-11-11 PROCEDURE — 97112 NEUROMUSCULAR REEDUCATION: CPT | Mod: PN

## 2024-11-11 NOTE — PROGRESS NOTES
"[facility]   Physical Therapy Treatment Note     Name: Valarie Colindres  Clinic Number: [id number]    Therapy Diagnosis:   Encounter Diagnosis   Name Primary?    Left leg weakness Yes     Physician: Ciarra Velasquez PA-C    Visit Date: 11/11/2024       Physician Orders: Physical Therapy Evaluate and Treat  Medical Diagnosis from Referral: left knee OA  Evaluation Date: 10/30/24  Authorization Period Expiration: 10/29/25  Plan of Care Expiration: 10/30/2024 to 1/30/25  Visit # / Visits authorized: 3/10  FOTO: 0/3  on 10/30/24        Time In: 330p  Time Out: 430p  Total Billable Time: 60 minutes        SUBJECTIVE     Pt reports: that she feels stronger, and the left knee is feeling better. She is able to move the knee with more flexibility although is still feels "swollen."    She was compliant with home exercise program.  Response to previous treatment: decreased leg pain  Functional change: improved tolerance to standing    Pain: 3/10  Location: left knee      OBJECTIVE     Objective Measures updated at progress report unless specified.       TREATMENT     Total Treatment time (time-based codes) separate from Evaluation: 60 minutes     Valarie received the treatments listed below:          Patient received manual therapeutic technique for 0 minutes for improved soft tissue and joint mobility including:    Application of TDN: Pt educated on benefits and potential side effects of dry needling. Educated pt on benefits, precautions, side effects following TDN. Educated pt to use heat following treatment sessions if pt is experiencing pain or soreness. Pt verbalized good understanding of education.  Pt signed written consent to dry needling. Pt gave verbal consent for DN    Pt received dry needling to the below listed muscles using 50mm needles.  L2-5 paraspinals        Neuromuscular re education for 55 minutes for improved balance and proprioception including:     NuStep L5 x 10 minutes for " "neural priming  [facility] with tball x20  Bridge on tball x20  SLR 3x10  Supine clams 30x5" hold red band  SL hip abduction 3x10  SL clamshell 3x10  Prone femoral nerve glide 3x10  Prone hip extension 3x10  Seated sciatic nerve glide x10        PATIENT EDUCATION AND HOME EXERCISES     Home Exercises Provided and Patient Education Provided     Education provided:   PT educated pt on importance of compliance with their HEP this visit.     Written Home Exercises Provided: yes. Exercises were reviewed and Valarie was able to demonstrate them prior to the end of the session.  Valarie demonstrated good  understanding of the education provided. See EMR under Patient Instructions for exercises provided during therapy sessions    ASSESSMENT     Pt tolerated therapeutic interventions well with no complaint of increased pain. Patient was able to perform exercises with minimal rest breaks. We will continue to progress as tolerated.    Valarie Is progressing well towards her goals.   Pt prognosis is Good.     Pt will continue to benefit from skilled outpatient physical therapy to address the deficits listed in the problem list box on initial evaluation, provide pt/family education and to maximize pt's level of independence in the home and community environment.     Pt's spiritual, cultural and educational needs considered and pt agreeable to plan of care and goals.     Anticipated barriers to physical therapy: None    GOALS:  Short Term Goals (4 Weeks):  1. Patient will be compliant with home exercise program to supplement therapy in promoting functional mobility. (progressing, not met)    2. Patient will perform transfers with good control to demonstrate improved core strength. (progressing, not met)    3. Patient will report no pain during thoracolumbar active range of motion to promote functional mobility.  (progressing, not met)    4. Patient will improve impaired lower extremity manual muscle tests  to >/=4/5 to improve strength " for functional tasks. (progressing, not met)          Long Term Goals (6 Weeks):   1. Patient will improve FOTO score by 10% limited to decrease perceived limitation with maintaining/changing body position. (progressing, not met)    2. Patient will perform recreational activity with good control to demonstrate improved core strength.  (progressing, not met)    3. Patient will improve impaired lower extremity manual muscle tests to >/=4+/5 to improve strength for functional tasks.  (progressing, not met)    4. Patient will tolerate standing/walking for 30 minutes with no increase in low back pain to return to PLOF.  (progressing, not met)           Plan      Plan of care Certification: 10/30/2024 to 1/30/25     Outpatient Physical Therapy 2 times weekly for 12 weeks to include the following interventions: Therapeutic Exercises, Manual Therapeutic Technique, Neuromuscular Re Education, Therapeutic Activities. Modalities, Kinesiotape prn, and Functional Dry Needling as needed.      Drake Cronin, PT

## 2024-11-13 ENCOUNTER — DOCUMENTATION ONLY (OUTPATIENT)
Dept: REHABILITATION | Facility: OTHER | Age: 79
End: 2024-11-13
Payer: MEDICARE

## 2024-11-13 ENCOUNTER — CLINICAL SUPPORT (OUTPATIENT)
Dept: REHABILITATION | Facility: OTHER | Age: 79
End: 2024-11-13
Payer: MEDICARE

## 2024-11-13 DIAGNOSIS — R29.898 LEFT LEG WEAKNESS: Primary | ICD-10-CM

## 2024-11-13 PROCEDURE — 97112 NEUROMUSCULAR REEDUCATION: CPT | Mod: PN,CQ

## 2024-11-13 PROCEDURE — 97140 MANUAL THERAPY 1/> REGIONS: CPT | Mod: PN,CQ

## 2024-11-13 NOTE — PROGRESS NOTES
Physical Therapist and Physical Therapist Assistant met face to face to discuss patient's treatment plan and progress towards established goals. Pt will be seen by a physical therapist minimally every 6th visit or every 30 days.    Darci Nobles, PTA  11/13/2024

## 2024-11-13 NOTE — PROGRESS NOTES
[facility]   Physical Therapy Treatment Note     Name: Valarie Colindres  Clinic Number: [id number]    Therapy Diagnosis:   Encounter Diagnosis   Name Primary?    Left leg weakness Yes       Physician: Ciarra Velasquez PA-C    Visit Date: 11/13/2024       Physician Orders: Physical Therapy Evaluate and Treat  Medical Diagnosis from Referral: left knee OA  Evaluation Date: 10/30/24  Authorization Period Expiration: 10/29/25  Plan of Care Expiration: 10/30/2024 to 1/30/25  Visit # / Visits authorized: 5/10  FOTO: 0/3  on 10/30/24        Time In: 1426  Time Out: 1526  Total Billable Time: 60 minutes        SUBJECTIVE     Pt reports: her knee bothers her mostly in the back/front. She feel more pain with weightbearing/bending her knee.  She feels like the exercises are challenging but not painful.     She was compliant with home exercise program.  Response to previous treatment: decreased leg pain  Functional change: improved tolerance to standing    Pain: 3/10  Location: left knee      OBJECTIVE     Objective Measures updated at progress report unless specified.       TREATMENT       Valarie received the treatments listed below:          Patient received manual therapeutic technique for 10 minutes for improved soft tissue and joint mobility including:    +L tibiofemoral JM AP glides, Gr III  +Seated distraction    Application of TDN: Pt educated on benefits and potential side effects of dry needling. Educated pt on benefits, precautions, side effects following TDN. Educated pt to use heat following treatment sessions if pt is experiencing pain or soreness. Pt verbalized good understanding of education.  Pt signed written consent to dry needling. Pt gave verbal consent for DN    Pt received dry needling to the below listed muscles using 50mm needles.  L2-5 paraspinals        Neuromuscular re education for 50 minutes for improved balance and proprioception including:     NuStep L5 x 10 minutes  "for neural priming  [facility] with tball x20  Bridge on tball x20  SLR 3x10  Supine clams 30x5" hold red band  SL hip abduction 3x10  SL clamshell 3x10  Prone femoral nerve glide 3x10  Prone hip extension 3x10  Seated sciatic nerve glide x10        PATIENT EDUCATION AND HOME EXERCISES     Home Exercises Provided and Patient Education Provided     Education provided:   PT educated pt on importance of compliance with their HEP this visit.     Written Home Exercises Provided: yes. Exercises were reviewed and Valarie was able to demonstrate them prior to the end of the session.  Valarie demonstrated good  understanding of the education provided. See EMR under Patient Instructions for exercises provided during therapy sessions    ASSESSMENT     Valarie tolerated session well today. Emphasis was focused on improving glute and quad strength to assist with functional performance/ADLs. Added manual interventions to improved capsular tightness and joint mobility. This is negatively impacting this individuals ability with ADL's. Will continue to address these deficits and promote improved strength, ROM and functional performance as tolerated.        Valarie Is progressing well towards her goals.   Pt prognosis is Good.     Pt will continue to benefit from skilled outpatient physical therapy to address the deficits listed in the problem list box on initial evaluation, provide pt/family education and to maximize pt's level of independence in the home and community environment.     Pt's spiritual, cultural and educational needs considered and pt agreeable to plan of care and goals.     Anticipated barriers to physical therapy: None    GOALS:  Short Term Goals (4 Weeks):  1. Patient will be compliant with home exercise program to supplement therapy in promoting functional mobility. (progressing, not met)    2. Patient will perform transfers with good control to demonstrate improved core strength. (progressing, not met)    3. Patient " will report no pain during thoracolumbar active range of motion to promote functional mobility.  (progressing, not met)    4. Patient will improve impaired lower extremity manual muscle tests  to >/=4/5 to improve strength for functional tasks. (progressing, not met)          Long Term Goals (6 Weeks):   1. Patient will improve FOTO score by 10% limited to decrease perceived limitation with maintaining/changing body position. (progressing, not met)    2. Patient will perform recreational activity with good control to demonstrate improved core strength.  (progressing, not met)    3. Patient will improve impaired lower extremity manual muscle tests to >/=4+/5 to improve strength for functional tasks.  (progressing, not met)    4. Patient will tolerate standing/walking for 30 minutes with no increase in low back pain to return to PLOF.  (progressing, not met)           Plan      Plan of care Certification: 10/30/2024 to 1/30/25    Focus on LE strength and endurance with emphasis on functional performance     Outpatient Physical Therapy 2 times weekly for 12 weeks to include the following interventions: Therapeutic Exercises, Manual Therapeutic Technique, Neuromuscular Re Education, Therapeutic Activities. Modalities, Kinesiotape prn, and Functional Dry Needling as needed.      Darci Nobles, PTA

## 2024-11-20 ENCOUNTER — CLINICAL SUPPORT (OUTPATIENT)
Dept: REHABILITATION | Facility: OTHER | Age: 79
End: 2024-11-20
Payer: MEDICARE

## 2024-11-20 DIAGNOSIS — R29.898 LEFT LEG WEAKNESS: Primary | ICD-10-CM

## 2024-11-20 PROCEDURE — 97140 MANUAL THERAPY 1/> REGIONS: CPT | Mod: KX,PN,CQ

## 2024-11-20 PROCEDURE — 97112 NEUROMUSCULAR REEDUCATION: CPT | Mod: KX,PN,CQ

## 2024-11-20 NOTE — PROGRESS NOTES
[facility]   Physical Therapy Treatment Note     Name: Valarie Colindres  Clinic Number: [id number]    Therapy Diagnosis:   Encounter Diagnosis   Name Primary?    Left leg weakness Yes       Physician: Ciarra Velasquez PA-C    Visit Date: 11/20/2024       Physician Orders: Physical Therapy Evaluate and Treat  Medical Diagnosis from Referral: left knee OA  Evaluation Date: 10/30/24  Authorization Period Expiration: 10/29/25  Plan of Care Expiration: 10/30/2024 to 1/30/25  Visit # / Visits authorized: 6/10  FOTO: 0/3  on 10/30/24        Time In: 1530  Time Out: 1630  Total Billable Time: 53 minutes        SUBJECTIVE     Pt reports: she felt better after her last PT session. [place] like the manual interventions really helped her the most.     She was compliant with home exercise program.  Response to previous treatment: felt better  Functional change: improved tolerance to standing    Pain: 3/10  Location: left knee      OBJECTIVE     Objective Measures updated at progress report unless specified.       TREATMENT       Valarie received the treatments listed below:          Patient received manual therapeutic technique for 10 minutes for improved soft tissue and joint mobility including:    L tibiofemoral JM AP glides, Gr III  Seated distraction    Application of TDN: Pt educated on benefits and potential side effects of dry needling. Educated pt on benefits, precautions, side effects following TDN. Educated pt to use heat following treatment sessions if pt is experiencing pain or soreness. Pt verbalized good understanding of education.  Pt signed written consent to dry needling. Pt gave verbal consent for DN    Pt received dry needling to the below listed muscles using 50mm needles.  L2-5 paraspinals        Neuromuscular re education for 43 minutes for improved balance and proprioception including:     NuStep L5 x 10 minutes for neural priming  [facility] with tball x20  Bridge on tball x20  SLR  "3x10  Supine clams 30x5" hold red band  SL hip abduction 3x10  SL clamshell 3x10  Prone femoral nerve glide 3x10  Prone hip extension 3x10  Seated sciatic nerve glide x10        PATIENT EDUCATION AND HOME EXERCISES     Home Exercises Provided and Patient Education Provided     Education provided:   PT educated pt on importance of compliance with their HEP this visit.     Written Home Exercises Provided: yes. Exercises were reviewed and Valarei was able to demonstrate them prior to the end of the session.  Valarie demonstrated good  understanding of the education provided. See EMR under Patient Instructions for exercises provided during therapy sessions    ASSESSMENT     Valarie tolerated session well today. Emphasis was focused on improving glute and quad strength to assist with functional performance/ADLs. Added manual interventions to improved capsular tightness and joint mobility. This is negatively impacting this individuals ability with ADL's. Will continue to address these deficits and promote improved strength, ROM and functional performance as tolerated.        Valarie Is progressing well towards her goals.   Pt prognosis is Good.     Pt will continue to benefit from skilled outpatient physical therapy to address the deficits listed in the problem list box on initial evaluation, provide pt/family education and to maximize pt's level of independence in the home and community environment.     Pt's spiritual, cultural and educational needs considered and pt agreeable to plan of care and goals.     Anticipated barriers to physical therapy: None    GOALS:  Short Term Goals (4 Weeks):  1. Patient will be compliant with home exercise program to supplement therapy in promoting functional mobility. (progressing, not met)    2. Patient will perform transfers with good control to demonstrate improved core strength. (progressing, not met)    3. Patient will report no pain during thoracolumbar active range of motion to " promote functional mobility.  (progressing, not met)    4. Patient will improve impaired lower extremity manual muscle tests  to >/=4/5 to improve strength for functional tasks. (progressing, not met)          Long Term Goals (6 Weeks):   1. Patient will improve FOTO score by 10% limited to decrease perceived limitation with maintaining/changing body position. (progressing, not met)    2. Patient will perform recreational activity with good control to demonstrate improved core strength.  (progressing, not met)    3. Patient will improve impaired lower extremity manual muscle tests to >/=4+/5 to improve strength for functional tasks.  (progressing, not met)    4. Patient will tolerate standing/walking for 30 minutes with no increase in low back pain to return to PLOF.  (progressing, not met)           Plan      Plan of care Certification: 10/30/2024 to 1/30/25    Focus on LE strength and endurance with emphasis on functional performance     Outpatient Physical Therapy 2 times weekly for 12 weeks to include the following interventions: Therapeutic Exercises, Manual Therapeutic Technique, Neuromuscular Re Education, Therapeutic Activities. Modalities, Kinesiotape prn, and Functional Dry Needling as needed.      Darci Nobles, PTA

## 2024-11-22 ENCOUNTER — CLINICAL SUPPORT (OUTPATIENT)
Dept: REHABILITATION | Facility: OTHER | Age: 79
End: 2024-11-22
Payer: MEDICARE

## 2024-11-22 DIAGNOSIS — R29.898 LEFT LEG WEAKNESS: Primary | ICD-10-CM

## 2024-11-22 PROCEDURE — 97140 MANUAL THERAPY 1/> REGIONS: CPT | Mod: KX,PN,CQ

## 2024-11-22 PROCEDURE — 97112 NEUROMUSCULAR REEDUCATION: CPT | Mod: KX,PN,CQ

## 2024-11-22 NOTE — PROGRESS NOTES
[facility]   Physical Therapy Treatment Note     Name: Valarie Colindres  Clinic Number: [id number]    Therapy Diagnosis:   Encounter Diagnosis   Name Primary?    Left leg weakness Yes       Physician: Ciarra Velasquez PA-C    Visit Date: 11/22/2024       Physician Orders: Physical Therapy Evaluate and Treat  Medical Diagnosis from Referral: left knee OA  Evaluation Date: 10/30/24  Authorization Period Expiration: 10/29/25  Plan of Care Expiration: 10/30/2024 to 1/30/25  Visit # / Visits authorized: 7/10  FOTO: 0/3  on 10/30/24        Time In: 1100  Time Out: 1210  Total Billable Time: 53 minutes        SUBJECTIVE     Pt reports: she is having some increased soreness after performing the leg press at the gym yesterday with her .     She was compliant with home exercise program.  Response to previous treatment:  felt well  Functional change: improved tolerance to standing    Pain: 3/10  Location: left knee      OBJECTIVE     Objective Measures updated at progress report unless specified.       TREATMENT       Valarie received the treatments listed below:          Patient received manual therapeutic technique for 10 minutes for improved soft tissue and joint mobility including:    L tibiofemoral JM AP glides, Gr III  Seated distraction    Application of TDN: Pt educated on benefits and potential side effects of dry needling. Educated pt on benefits, precautions, side effects following TDN. Educated pt to use heat following treatment sessions if pt is experiencing pain or soreness. Pt verbalized good understanding of education.  Pt signed written consent to dry needling. Pt gave verbal consent for DN    Pt received dry needling to the below listed muscles using 50mm needles.  L2-5 paraspinals        Neuromuscular re education for 43 minutes for improved balance and proprioception including:     NuStep L5 x 10 minutes for neural priming -  LE only  DKC with tball x20  Bridge on tball  "x20  SLR 3x10  Supine clams 30x5" hold red band  L SL hip abduction 3x10  L SL clamshell 3x10  L Prone femoral nerve glide 3x10  L Prone hip extension 3x10  Seated sciatic nerve glide x10        PATIENT EDUCATION AND HOME EXERCISES     Home Exercises Provided and Patient Education Provided     Education provided:   PT educated pt on importance of compliance with their HEP this visit.     Written Home Exercises Provided: Patient instructed to cont prior HEP. Exercises were reviewed and Valarie was able to demonstrate them prior to the end of the session.  Valarie demonstrated good  understanding of the education provided. See EMR under Patient Instructions for exercises provided during therapy sessions    ASSESSMENT     Valarie tolerated session well today. Capsular tightness was notable in her knee joint however appeared to improve after receiving manual interventions. She was able to demonstrate improved muscle endurance with decreased periods of rest between sets .  Despite improvements she continues to have  muscle weakness and decreased functional ability continue to remain notable at this time. This is negatively impacting this individuals ability with ADL's. Will continue to address these deficits and promote improved strength, ROM and functional performance as tolerated.        Valarie Is progressing well towards her goals.   Pt prognosis is Good.     Pt will continue to benefit from skilled outpatient physical therapy to address the deficits listed in the problem list box on initial evaluation, provide pt/family education and to maximize pt's level of independence in the home and community environment.     Pt's spiritual, cultural and educational needs considered and pt agreeable to plan of care and goals.     Anticipated barriers to physical therapy: None    GOALS:  Short Term Goals (4 Weeks):  1. Patient will be compliant with home exercise program to supplement therapy in promoting functional mobility. " (progressing, not met)    2. Patient will perform transfers with good control to demonstrate improved core strength. (progressing, not met)    3. Patient will report no pain during thoracolumbar active range of motion to promote functional mobility.  (progressing, not met)    4. Patient will improve impaired lower extremity manual muscle tests  to >/=4/5 to improve strength for functional tasks. (progressing, not met)          Long Term Goals (6 Weeks):   1. Patient will improve FOTO score by 10% limited to decrease perceived limitation with maintaining/changing body position. (progressing, not met)    2. Patient will perform recreational activity with good control to demonstrate improved core strength.  (progressing, not met)    3. Patient will improve impaired lower extremity manual muscle tests to >/=4+/5 to improve strength for functional tasks.  (progressing, not met)    4. Patient will tolerate standing/walking for 30 minutes with no increase in low back pain to return to PLOF.  (progressing, not met)           Plan      Plan of care Certification: 10/30/2024 to 1/30/25    Focus on LE strength and endurance with emphasis on functional performance     Outpatient Physical Therapy 2 times weekly for 12 weeks to include the following interventions: Therapeutic Exercises, Manual Therapeutic Technique, Neuromuscular Re Education, Therapeutic Activities. Modalities, Kinesiotape prn, and Functional Dry Needling as needed.      Darci Nobles, PTA

## 2024-11-25 ENCOUNTER — CLINICAL SUPPORT (OUTPATIENT)
Dept: REHABILITATION | Facility: OTHER | Age: 79
End: 2024-11-25
Payer: MEDICARE

## 2024-11-25 DIAGNOSIS — R29.898 LEFT LEG WEAKNESS: Primary | ICD-10-CM

## 2024-11-25 PROCEDURE — 97112 NEUROMUSCULAR REEDUCATION: CPT | Mod: KX,PN,CQ

## 2024-11-25 PROCEDURE — 97140 MANUAL THERAPY 1/> REGIONS: CPT | Mod: KX,PN,CQ

## 2024-11-25 NOTE — PROGRESS NOTES
"[facility]   Physical Therapy Treatment Note     Name: Valarie Colindres  Clinic Number: [id number]    Therapy Diagnosis:   Encounter Diagnosis   Name Primary?    Left leg weakness Yes       Physician: Ciarra Velasquez PA-C    Visit Date: 11/25/2024       Physician Orders: Physical Therapy Evaluate and Treat  Medical Diagnosis from Referral: left knee OA  Evaluation Date: 10/30/24  Authorization Period Expiration: 10/29/25  Plan of Care Expiration: 10/30/2024 to 1/30/25  Visit # / Visits authorized: 8/10  FOTO: 0/3  on 10/30/24        Time In: 1400  Time Out: 1510  Total Billable Time: 25 minutes        SUBJECTIVE     Pt reports: she is still having some pain/soreness in her L knee.     She was compliant with home exercise program.  Response to previous treatment:  felt well  Functional change: improved tolerance to standing    Pain: 3/10  Location: left knee      OBJECTIVE     Objective Measures updated at progress report unless specified.       TREATMENT       Valarie received the treatments listed below:          Patient received manual therapeutic technique for 10 minutes for improved soft tissue and joint mobility including:    L tibiofemoral JM AP glides, Gr III  Seated distraction    Application of TDN: Pt educated on benefits and potential side effects of dry needling. Educated pt on benefits, precautions, side effects following TDN. Educated pt to use heat following treatment sessions if pt is experiencing pain or soreness. Pt verbalized good understanding of education.  Pt signed written consent to dry needling. Pt gave verbal consent for DN    Pt received dry needling to the below listed muscles using 50mm needles.  L2-5 paraspinals        Neuromuscular re education for 15 minutes for improved balance and proprioception including:     NuStep L5 x 10 minutes for neural priming -  LE only  DKC with tball x20  Bridge on tball x20  SLR 3x10  Supine clams 30x5" hold red band  L SL " hip abduction 3x10  L SL clamshell 3x10  L Prone femoral nerve glide 3x10  L Prone hip extension 3x10  Seated sciatic nerve glide x10        PATIENT EDUCATION AND HOME EXERCISES     Home Exercises Provided and Patient Education Provided     Education provided:   PT educated pt on importance of compliance with their HEP this visit.     Written Home Exercises Provided: Patient instructed to cont prior HEP. Exercises were reviewed and Valarie was able to demonstrate them prior to the end of the session.  Valarie demonstrated good  understanding of the education provided. See EMR under Patient Instructions for exercises provided during therapy sessions    ASSESSMENT     Valarie tolerated session well today. Capsular tightness was notable in her knee joint however appeared to improve after receiving manual interventions.  Despite improvements she continues to have  muscle weakness and decreased functional ability continue to remain notable at this time. This is negatively impacting this individuals ability with ADL's. Will continue to address these deficits and promote improved strength, ROM and functional performance as tolerated.        Valarie Is progressing well towards her goals.   Pt prognosis is Good.     Pt will continue to benefit from skilled outpatient physical therapy to address the deficits listed in the problem list box on initial evaluation, provide pt/family education and to maximize pt's level of independence in the home and community environment.     Pt's spiritual, cultural and educational needs considered and pt agreeable to plan of care and goals.     Anticipated barriers to physical therapy: None    GOALS:  Short Term Goals (4 Weeks):  1. Patient will be compliant with home exercise program to supplement therapy in promoting functional mobility. (progressing, not met)    2. Patient will perform transfers with good control to demonstrate improved core strength. (progressing, not met)    3. Patient  will report no pain during thoracolumbar active range of motion to promote functional mobility.  (progressing, not met)    4. Patient will improve impaired lower extremity manual muscle tests  to >/=4/5 to improve strength for functional tasks. (progressing, not met)          Long Term Goals (6 Weeks):   1. Patient will improve FOTO score by 10% limited to decrease perceived limitation with maintaining/changing body position. (progressing, not met)    2. Patient will perform recreational activity with good control to demonstrate improved core strength.  (progressing, not met)    3. Patient will improve impaired lower extremity manual muscle tests to >/=4+/5 to improve strength for functional tasks.  (progressing, not met)    4. Patient will tolerate standing/walking for 30 minutes with no increase in low back pain to return to PLOF.  (progressing, not met)           Plan      Plan of care Certification: 10/30/2024 to 1/30/25    Focus on LE strength and endurance with emphasis on functional performance     Outpatient Physical Therapy 2 times weekly for 12 weeks to include the following interventions: Therapeutic Exercises, Manual Therapeutic Technique, Neuromuscular Re Education, Therapeutic Activities. Modalities, Kinesiotape prn, and Functional Dry Needling as needed.      Darci Nobles, PTA

## 2024-11-27 ENCOUNTER — CLINICAL SUPPORT (OUTPATIENT)
Dept: REHABILITATION | Facility: OTHER | Age: 79
End: 2024-11-27
Payer: MEDICARE

## 2024-11-27 DIAGNOSIS — R29.898 LEFT LEG WEAKNESS: Primary | ICD-10-CM

## 2024-11-27 PROCEDURE — 97112 NEUROMUSCULAR REEDUCATION: CPT | Mod: PN

## 2024-11-27 PROCEDURE — 97140 MANUAL THERAPY 1/> REGIONS: CPT | Mod: PN

## 2024-11-27 NOTE — PROGRESS NOTES
"[facility]   Physical Therapy Treatment Note     Name: Valarie Colindres  Clinic Number: [id number]    Therapy Diagnosis:   Encounter Diagnosis   Name Primary?    Left leg weakness Yes       Physician: Ciarra Velasquez PA-C    Visit Date: 11/27/2024       Physician Orders: Physical Therapy Evaluate and Treat  Medical Diagnosis from Referral: left knee OA  Evaluation Date: 10/30/24  Authorization Period Expiration: 10/29/25  Plan of Care Expiration: 10/30/2024 to 1/30/25  Visit # / Visits authorized: 9/10  FOTO: 0/3  on 10/30/24        Time In: 0130pm  Time Out: 0230pm  Total Billable Time: 60 minutes        SUBJECTIVE     Pt reports: that manual therapy has been helpful in decreasing her knee pain. She was compliant with home exercise program.  Response to previous treatment:  felt well  Functional change: improved tolerance to standing    Pain: 3/10  Location: left knee      OBJECTIVE     Objective Measures updated at progress report unless specified.       TREATMENT       Valarie received the treatments listed below:          Patient received manual therapeutic technique for 10 minutes for improved soft tissue and joint mobility including:    L tibiofemoral JM AP glides, Gr III  Seated distraction    Application of TDN: Pt educated on benefits and potential side effects of dry needling. Educated pt on benefits, precautions, side effects following TDN. Educated pt to use heat following treatment sessions if pt is experiencing pain or soreness. Pt verbalized good understanding of education.  Pt signed written consent to dry needling. Pt gave verbal consent for DN    Pt received dry needling to the below listed muscles using 50mm needles.  L2-5 paraspinals        Neuromuscular re education for 45 minutes for improved balance and proprioception including:     NuStep L5 x 10 minutes for neural priming -  LE only  DKC with tball x20  Bridge on tball x20  SLR 3x10  Supine clams 30x5" hold red " band  L SL hip abduction 3x10  L SL clamshell 3x10  L Prone femoral nerve glide 3x10  L Prone hip extension 3x10  Seated sciatic nerve glide x10        PATIENT EDUCATION AND HOME EXERCISES     Home Exercises Provided and Patient Education Provided     Education provided:   PT educated pt on importance of compliance with their HEP this visit.     Written Home Exercises Provided: Patient instructed to cont prior HEP. Exercises were reviewed and Valarie was able to demonstrate them prior to the end of the session.  Valarie demonstrated good  understanding of the education provided. See EMR under Patient Instructions for exercises provided during therapy sessions    ASSESSMENT   Pt tolerated treatment session well today. Pt continues to demonstrate improved terminal knee extension with quad strengthening exercises. Continue PT POC with emphasis on improving knee extension AROM.        Valarie Is progressing well towards her goals.   Pt prognosis is Good.     Pt will continue to benefit from skilled outpatient physical therapy to address the deficits listed in the problem list box on initial evaluation, provide pt/family education and to maximize pt's level of independence in the home and community environment.     Pt's spiritual, cultural and educational needs considered and pt agreeable to plan of care and goals.     Anticipated barriers to physical therapy: None    GOALS:  Short Term Goals (4 Weeks):  1. Patient will be compliant with home exercise program to supplement therapy in promoting functional mobility. (progressing, not met)    2. Patient will perform transfers with good control to demonstrate improved core strength. (progressing, not met)    3. Patient will report no pain during thoracolumbar active range of motion to promote functional mobility.  (progressing, not met)    4. Patient will improve impaired lower extremity manual muscle tests  to >/=4/5 to improve strength for functional tasks. (progressing,  not met)          Long Term Goals (6 Weeks):   1. Patient will improve FOTO score by 10% limited to decrease perceived limitation with maintaining/changing body position. (progressing, not met)    2. Patient will perform recreational activity with good control to demonstrate improved core strength.  (progressing, not met)    3. Patient will improve impaired lower extremity manual muscle tests to >/=4+/5 to improve strength for functional tasks.  (progressing, not met)    4. Patient will tolerate standing/walking for 30 minutes with no increase in low back pain to return to PLOF.  (progressing, not met)           Plan      Plan of care Certification: 10/30/2024 to 1/30/25    Focus on LE strength and endurance with emphasis on functional performance     Outpatient Physical Therapy 2 times weekly for 12 weeks to include the following interventions: Therapeutic Exercises, Manual Therapeutic Technique, Neuromuscular Re Education, Therapeutic Activities. Modalities, Kinesiotape prn, and Functional Dry Needling as needed.      Char Cruz, PT

## 2024-12-02 ENCOUNTER — CLINICAL SUPPORT (OUTPATIENT)
Dept: REHABILITATION | Facility: OTHER | Age: 79
End: 2024-12-02
Payer: MEDICARE

## 2024-12-02 DIAGNOSIS — R29.898 LEFT LEG WEAKNESS: Primary | ICD-10-CM

## 2024-12-02 PROCEDURE — 97112 NEUROMUSCULAR REEDUCATION: CPT | Mod: PN

## 2024-12-02 PROCEDURE — 97530 THERAPEUTIC ACTIVITIES: CPT | Mod: PN

## 2024-12-02 NOTE — PROGRESS NOTES
[facility]   Physical Therapy Updated Plan of Care     Name: Valarie Colindres  Clinic Number: [id number]    Therapy Diagnosis:   Encounter Diagnosis   Name Primary?    Left leg weakness Yes       Physician: Ciarra Velasquez PA-C    Visit Date: 12/2/2024       Physician Orders: Physical Therapy Evaluate and Treat  Medical Diagnosis from Referral: left knee OA  Evaluation Date: 10/30/24  Authorization Period Expiration: 12/31/24  Plan of Care Expiration: 12/2/24- 3/2/25  Visit # / Visits authorized: 9/10  FOTO: 0/3  on 10/30/24        Time In: 0110pm  Time Out: 0210pm  Total Billable Time: 60 minutes        SUBJECTIVE     Pt reports: that she is doing well today. Pt feels that her range of motion and pain have both improved.       She was compliant with home exercise program.  Response to previous treatment:  felt well  Functional change: improved tolerance to standing    Pain: 4/10  Location: left knee      OBJECTIVE     12/2/24      Lumbar Active range of motion (%) Pain/dysfunction with movement:   Flexion  100    Extension 50 Left posterior knee pain   Right side bending 75    Left side bending 75 Left posterior knee pain   Right rotation 100     Left rotation 100              Right LE     Left LE        Iliopsoas:  L2 5/5 Iliopsoas: L2 4/5    Quadriceps:  L3 - femoral nerve 4/5 Quadriceps: L3 - femoral nerve 4/5    Hip adduction:  L3 - obterator 5/5 Hip adduction: L3 - obterator 4/5    Hamstrings:  S2 4+/5 Hamstrings: S2 4/5    Ankle DF/EV:  L4 4+/5 Ankle DF/EV: L4 4+/5    GT Ext:  L5 4+/5 GT Ext L5 4/5    Hip Abduction:  L5-S1 - Superior gluteal nerve 4/5  Hip Abduction: L5-S1 - Superior gluteal nerve 3/5    Hip extension:  L5-S2 - Inferior gluteal nerve 4/5 Hip extension: L5-S2 - Inferior gluteal nerve 3/5    Ankle PF:   S1 - tibial nerve NT Ankle PF S1 - tibial nerve NT            Slump R: negative  Slump L: + for pain at posterior knee down to gastroc     SLR R: negative  SLR L:  "+ for pain at posterior knee and up to posterior thigh            TREATMENT       [place] received the treatments listed below:          Patient received manual therapeutic technique for 2 minutes for improved soft tissue and joint mobility including:    L tibiofemoral JM AP glides, Gr III  L LE LAD with A/P glide       Therapeutic Activities were provided for 28 minutes to improve tolerance to functional activities including:   +Updated POC measurements  NuStep L5 x 10 minutes for neural priming -  LE only  DKC with tball x20  Bridge on tball x20  SLR 3x10      Neuromuscular re education for 30 minutes for improved balance and proprioception including:       Supine clams 30x5" hold red band  L SL hip abduction 3x10  L SL clamshell 3x10 vs RTB  L Prone femoral nerve glide 3x10  L Prone hip extension 3x10  Seated sciatic nerve glide x10        PATIENT EDUCATION AND HOME EXERCISES     Home Exercises Provided and Patient Education Provided     Education provided:   PT educated pt on importance of compliance with their HEP this visit.     Written Home Exercises Provided: Patient instructed to cont prior HEP. Exercises were reviewed and Moabdulkadir was able to demonstrate them prior to the end of the session.  Moabdulkadir demonstrated good  understanding of the education provided. See EMR under Patient Instructions for exercises provided during therapy sessions    ASSESSMENT   Pt presents to physical therapy treatment with decreased active range of motion, decreased strength, poor posture, and increased pain due to left lower extremity and lumbar spine impairments. These factors are negatively impacting the patient's ability to complete their activities of daily living and work duties. Pt is progressing towards their short and long term goals as evidenced by decreased pain, improved strength and range of motion, and improved FOTO score. These goals remain appropriate for the plan of care. Pt would benefit from continued skilled " physical therapy treatment to address these deficits so that they may return to their prior level of function with improved quality of life.          Valarie Is progressing well towards her goals.   Pt prognosis is Good.     Pt will continue to benefit from skilled outpatient physical therapy to address the deficits listed in the problem list box on initial evaluation, provide pt/family education and to maximize pt's level of independence in the home and community environment.     Pt's spiritual, cultural and educational needs considered and pt agreeable to plan of care and goals.     Anticipated barriers to physical therapy: None    GOALS:  Short Term Goals (4 Weeks):  1. Patient will be compliant with home exercise program to supplement therapy in promoting functional mobility. (met)    2. Patient will perform transfers with good control to demonstrate improved core strength. (met)    3. Patient will report no pain during thoracolumbar active range of motion to promote functional mobility.  (progressing, not met)    4. Patient will improve impaired lower extremity manual muscle tests  to >/=4/5 to improve strength for functional tasks. (progressing, not met)          Long Term Goals (6 Weeks):   1. Patient will improve FOTO score by 10% limited to decrease perceived limitation with maintaining/changing body position. (progressing, not met)    2. Patient will perform recreational activity with good control to demonstrate improved core strength.  (progressing, not met)    3. Patient will improve impaired lower extremity manual muscle tests to >/=4+/5 to improve strength for functional tasks.  (progressing, not met)    4. Patient will tolerate standing/walking for 30 minutes with no increase in low back pain to return to PLOF.  (progressing, not met)           Plan      Plan of care Certification: 12/2/24- 3/2/25    Focus on LE strength and endurance with emphasis on functional performance     Outpatient Physical  Therapy 2 times weekly for 12 weeks to include the following interventions: Therapeutic Exercises, Manual Therapeutic Technique, Neuromuscular Re Education, Therapeutic Activities. Modalities, Kinesiotape prn, and Functional Dry Needling as needed.      Char Cruz, PT

## 2024-12-02 NOTE — PLAN OF CARE
[facility]   Physical Therapy Updated Plan of Care     Name: Valarie Colindres  Clinic Number: [id number]    Therapy Diagnosis:   Encounter Diagnosis   Name Primary?    Left leg weakness Yes       Physician: Ciarra Velasquez PA-C    Visit Date: 12/2/2024       Physician Orders: Physical Therapy Evaluate and Treat  Medical Diagnosis from Referral: left knee OA  Evaluation Date: 10/30/24  Authorization Period Expiration: 12/31/24  Plan of Care Expiration: 12/2/24- 3/2/25  Visit # / Visits authorized: 9/10  FOTO: 0/3  on 10/30/24        Time In: 0110pm  Time Out: 0210pm  Total Billable Time: 60 minutes        SUBJECTIVE     Pt reports: that she is doing well today. Pt feels that her range of motion and pain have both improved.       She was compliant with home exercise program.  Response to previous treatment:  felt well  Functional change: improved tolerance to standing    Pain: 4/10  Location: left knee      OBJECTIVE     12/2/24      Lumbar Active range of motion (%) Pain/dysfunction with movement:   Flexion  100    Extension 50 Left posterior knee pain   Right side bending 75    Left side bending 75 Left posterior knee pain   Right rotation 100     Left rotation 100              Right LE     Left LE        Iliopsoas:  L2 5/5 Iliopsoas: L2 4/5    Quadriceps:  L3 - femoral nerve 4/5 Quadriceps: L3 - femoral nerve 4/5    Hip adduction:  L3 - obterator 5/5 Hip adduction: L3 - obterator 4/5    Hamstrings:  S2 4+/5 Hamstrings: S2 4/5    Ankle DF/EV:  L4 4+/5 Ankle DF/EV: L4 4+/5    GT Ext:  L5 4+/5 GT Ext L5 4/5    Hip Abduction:  L5-S1 - Superior gluteal nerve 4/5  Hip Abduction: L5-S1 - Superior gluteal nerve 3/5    Hip extension:  L5-S2 - Inferior gluteal nerve 4/5 Hip extension: L5-S2 - Inferior gluteal nerve 3/5    Ankle PF:   S1 - tibial nerve NT Ankle PF S1 - tibial nerve NT            Slump R: negative  Slump L: + for pain at posterior knee down to gastroc     SLR R: negative  SLR L:  "+ for pain at posterior knee and up to posterior thigh            TREATMENT       [place] received the treatments listed below:          Patient received manual therapeutic technique for 2 minutes for improved soft tissue and joint mobility including:    L tibiofemoral JM AP glides, Gr III  L LE LAD with A/P glide       Therapeutic Activities were provided for 28 minutes to improve tolerance to functional activities including:   +Updated POC measurements  NuStep L5 x 10 minutes for neural priming -  LE only  DKC with tball x20  Bridge on tball x20  SLR 3x10      Neuromuscular re education for 30 minutes for improved balance and proprioception including:       Supine clams 30x5" hold red band  L SL hip abduction 3x10  L SL clamshell 3x10 vs RTB  L Prone femoral nerve glide 3x10  L Prone hip extension 3x10  Seated sciatic nerve glide x10        PATIENT EDUCATION AND HOME EXERCISES     Home Exercises Provided and Patient Education Provided     Education provided:   PT educated pt on importance of compliance with their HEP this visit.     Written Home Exercises Provided: Patient instructed to cont prior HEP. Exercises were reviewed and Moabdulkadir was able to demonstrate them prior to the end of the session.  Moabdulkadir demonstrated good  understanding of the education provided. See EMR under Patient Instructions for exercises provided during therapy sessions    ASSESSMENT   Pt presents to physical therapy treatment with decreased active range of motion, decreased strength, poor posture, and increased pain due to left lower extremity and lumbar spine impairments. These factors are negatively impacting the patient's ability to complete their activities of daily living and work duties. Pt is progressing towards their short and long term goals as evidenced by decreased pain, improved strength and range of motion, and improved FOTO score. These goals remain appropriate for the plan of care. Pt would benefit from continued skilled " physical therapy treatment to address these deficits so that they may return to their prior level of function with improved quality of life.          Valarie Is progressing well towards her goals.   Pt prognosis is Good.     Pt will continue to benefit from skilled outpatient physical therapy to address the deficits listed in the problem list box on initial evaluation, provide pt/family education and to maximize pt's level of independence in the home and community environment.     Pt's spiritual, cultural and educational needs considered and pt agreeable to plan of care and goals.     Anticipated barriers to physical therapy: None    GOALS:  Short Term Goals (4 Weeks):  1. Patient will be compliant with home exercise program to supplement therapy in promoting functional mobility. (met)    2. Patient will perform transfers with good control to demonstrate improved core strength. (met)    3. Patient will report no pain during thoracolumbar active range of motion to promote functional mobility.  (progressing, not met)    4. Patient will improve impaired lower extremity manual muscle tests  to >/=4/5 to improve strength for functional tasks. (progressing, not met)          Long Term Goals (6 Weeks):   1. Patient will improve FOTO score by 10% limited to decrease perceived limitation with maintaining/changing body position. (progressing, not met)    2. Patient will perform recreational activity with good control to demonstrate improved core strength.  (progressing, not met)    3. Patient will improve impaired lower extremity manual muscle tests to >/=4+/5 to improve strength for functional tasks.  (progressing, not met)    4. Patient will tolerate standing/walking for 30 minutes with no increase in low back pain to return to PLOF.  (progressing, not met)           Plan      Plan of care Certification: 12/2/24- 3/2/25    Focus on LE strength and endurance with emphasis on functional performance     Outpatient Physical  Therapy 2 times weekly for 12 weeks to include the following interventions: Therapeutic Exercises, Manual Therapeutic Technique, Neuromuscular Re Education, Therapeutic Activities. Modalities, Kinesiotape prn, and Functional Dry Needling as needed.      Char Cruz, PT

## 2024-12-04 ENCOUNTER — CLINICAL SUPPORT (OUTPATIENT)
Dept: REHABILITATION | Facility: OTHER | Age: 79
End: 2024-12-04
Payer: MEDICARE

## 2024-12-04 DIAGNOSIS — R29.898 LEFT LEG WEAKNESS: Primary | ICD-10-CM

## 2024-12-04 PROCEDURE — 97530 THERAPEUTIC ACTIVITIES: CPT | Mod: KX,PN

## 2024-12-04 PROCEDURE — 97112 NEUROMUSCULAR REEDUCATION: CPT | Mod: KX,PN

## 2024-12-04 NOTE — PROGRESS NOTES
[facility]   Physical Therapy Treatment Note    Name: Valarie Colindres  Clinic Number: [id number]    Therapy Diagnosis:   Encounter Diagnosis   Name Primary?    Left leg weakness Yes       Physician: Ciarra Velasquez PA-C    Visit Date: 12/4/2024       Physician Orders: Physical Therapy Evaluate and Treat  Medical Diagnosis from Referral: left knee OA  Evaluation Date: 10/30/24  Authorization Period Expiration: 12/31/24  Plan of Care Expiration: 12/2/24- 3/2/25  Visit # / Visits authorized: 10/10  FOTO: 0/3         Time In: 0100pm  Time Out: 0210pm  Total Billable Time: 30 minutes        SUBJECTIVE     Pt reports: she was able to take longer strides without increased pain.      She was compliant with home exercise program.  Response to previous treatment:  felt well  Functional change: improved tolerance to standing    Pain: 4/10  Location: left knee      OBJECTIVE     12/2/24      Lumbar Active range of motion (%) Pain/dysfunction with movement:   Flexion  100    Extension 50 Left posterior knee pain   Right side bending 75    Left side bending 75 Left posterior knee pain   Right rotation 100     Left rotation 100              Right LE     Left LE        Iliopsoas:  L2 5/5 Iliopsoas: L2 4/5    Quadriceps:  L3 - femoral nerve 4/5 Quadriceps: L3 - femoral nerve 4/5    Hip adduction:  L3 - obterator 5/5 Hip adduction: L3 - obterator 4/5    Hamstrings:  S2 4+/5 Hamstrings: S2 4/5    Ankle DF/EV:  L4 4+/5 Ankle DF/EV: L4 4+/5    GT Ext:  L5 4+/5 GT Ext L5 4/5    Hip Abduction:  L5-S1 - Superior gluteal nerve 4/5  Hip Abduction: L5-S1 - Superior gluteal nerve 3/5    Hip extension:  L5-S2 - Inferior gluteal nerve 4/5 Hip extension: L5-S2 - Inferior gluteal nerve 3/5    Ankle PF:   S1 - tibial nerve NT Ankle PF S1 - tibial nerve NT            Slump R: negative  Slump L: + for pain at posterior knee down to gastroc     SLR R: negative  SLR L: + for pain at posterior knee and up to posterior  "thigh            TREATMENT       Valarie received the treatments listed below:          Patient received manual therapeutic technique for 0 minutes for improved soft tissue and joint mobility including:    L tibiofemoral JM AP glides, Gr III  L LE LAD with A/P glide       Therapeutic Activities were provided for 28 minutes to improve tolerance to functional activities including:   +Updated POC measurements    NuStep L5 x 10 minutes for neural priming -  LE only  DKC with tball x20  Bridge on tball x20  SLR with TA bracing 3x10  PPT x20  Bridges with PPT 2x8  Standing knee flexion stretch at 2nd stair 10x10"  Step up 6" x10    Neuromuscular re education for 10 minutes for improved balance and proprioception including:     Supine clams 30x5" hold red band  L SL hip abduction 3x10  L SL clamshell 3x10 vs RTB  L Prone femoral nerve glide 3x10  L Prone hip extension 3x10  Seated sciatic nerve glide x10        PATIENT EDUCATION AND HOME EXERCISES     Home Exercises Provided and Patient Education Provided     Education provided:   PT educated pt on importance of compliance with their HEP this visit.     Written Home Exercises Provided: Patient instructed to cont prior HEP. Exercises were reviewed and Valarie was able to demonstrate them prior to the end of the session.  Valarie demonstrated good  understanding of the education provided. See EMR under Patient Instructions for exercises provided during therapy sessions    ASSESSMENT     Miss Sheppard tolerated therapeutic interventions well with no complaint of increased pain. Patient reported relief after physical therapy treatment today, and her left knee range of motion was improved. We will continue to progress as tolerated.        Valarie Is progressing well towards her goals.   Pt prognosis is Good.     Pt will continue to benefit from skilled outpatient physical therapy to address the deficits listed in the problem list box on initial evaluation, provide pt/family " education and to maximize pt's level of independence in the home and community environment.     Pt's spiritual, cultural and educational needs considered and pt agreeable to plan of care and goals.     Anticipated barriers to physical therapy: None    GOALS:  Short Term Goals (4 Weeks):  1. Patient will be compliant with home exercise program to supplement therapy in promoting functional mobility. (met)    2. Patient will perform transfers with good control to demonstrate improved core strength. (met)    3. Patient will report no pain during thoracolumbar active range of motion to promote functional mobility.  (progressing, not met)    4. Patient will improve impaired lower extremity manual muscle tests  to >/=4/5 to improve strength for functional tasks. (progressing, not met)          Long Term Goals (6 Weeks):   1. Patient will improve FOTO score by 10% limited to decrease perceived limitation with maintaining/changing body position. (progressing, not met)    2. Patient will perform recreational activity with good control to demonstrate improved core strength.  (progressing, not met)    3. Patient will improve impaired lower extremity manual muscle tests to >/=4+/5 to improve strength for functional tasks.  (progressing, not met)    4. Patient will tolerate standing/walking for 30 minutes with no increase in low back pain to return to PLOF.  (progressing, not met)           Plan      Plan of care Certification: 12/2/24- 3/2/25    Focus on LE strength and endurance with emphasis on functional performance     Outpatient Physical Therapy 2 times weekly for 12 weeks to include the following interventions: Therapeutic Exercises, Manual Therapeutic Technique, Neuromuscular Re Education, Therapeutic Activities. Modalities, Kinesiotape prn, and Functional Dry Needling as needed.      Drake Cronin, PT

## 2024-12-10 ENCOUNTER — CLINICAL SUPPORT (OUTPATIENT)
Dept: REHABILITATION | Facility: OTHER | Age: 79
End: 2024-12-10
Payer: MEDICARE

## 2024-12-10 DIAGNOSIS — R29.898 LEFT LEG WEAKNESS: Primary | ICD-10-CM

## 2024-12-10 PROCEDURE — 97530 THERAPEUTIC ACTIVITIES: CPT | Mod: PN

## 2024-12-10 PROCEDURE — 97112 NEUROMUSCULAR REEDUCATION: CPT | Mod: PN

## 2024-12-10 NOTE — PROGRESS NOTES
[facility]   Physical Therapy Treatment Note    Name: Valarie Colindres  Clinic Number: [id number]    Therapy Diagnosis:   Encounter Diagnosis   Name Primary?    Left leg weakness Yes         Physician: Ciarra Velasquez PA-C    Visit Date: 12/10/2024       Physician Orders: Physical Therapy Evaluate and Treat  Medical Diagnosis from Referral: left knee OA  Evaluation Date: 10/30/24  Authorization Period Expiration: 12/31/24  Plan of Care Expiration: 12/2/24- 3/2/25  Visit # / Visits authorized: 12/10  FOTO: 0/3         Time In: 0200pm  Time Out: 0300pm  Total Billable Time: 60 minutes        SUBJECTIVE     Pt reports: her knee range of motion has improved and she is having pain for shorter amounts of time.     She was compliant with home exercise program.  Response to previous treatment:  felt well  Functional change: improved tolerance to standing    Pain: 4/10  Location: left knee      OBJECTIVE     12/2/24      Lumbar Active range of motion (%) Pain/dysfunction with movement:   Flexion  100    Extension 50 Left posterior knee pain   Right side bending 75    Left side bending 75 Left posterior knee pain   Right rotation 100     Left rotation 100              Right LE     Left LE        Iliopsoas:  L2 5/5 Iliopsoas: L2 4/5    Quadriceps:  L3 - femoral nerve 4/5 Quadriceps: L3 - femoral nerve 4/5    Hip adduction:  L3 - obterator 5/5 Hip adduction: L3 - obterator 4/5    Hamstrings:  S2 4+/5 Hamstrings: S2 4/5    Ankle DF/EV:  L4 4+/5 Ankle DF/EV: L4 4+/5    GT Ext:  L5 4+/5 GT Ext L5 4/5    Hip Abduction:  L5-S1 - Superior gluteal nerve 4/5  Hip Abduction: L5-S1 - Superior gluteal nerve 3/5    Hip extension:  L5-S2 - Inferior gluteal nerve 4/5 Hip extension: L5-S2 - Inferior gluteal nerve 3/5    Ankle PF:   S1 - tibial nerve NT Ankle PF S1 - tibial nerve NT            Slump R: negative  Slump L: + for pain at posterior knee down to gastroc     SLR R: negative  SLR L: + for pain at  "posterior knee and up to posterior thigh            TREATMENT       Valarie received the treatments listed below:          Patient received manual therapeutic technique for 0 minutes for improved soft tissue and joint mobility including:    L tibiofemoral JM AP glides, Gr III  L LE LAD with A/P glide       Therapeutic Activities were provided for 45 minutes to improve tolerance to functional activities including:       NuStep L5 x 10 minutes for neural priming -  LE only  DKC with tball x20  Bridge on tball x20  SLR with TA bracing 3x10  PPT x20  Bridges with PPT 2x8  Standing knee flexion stretch at 2nd stair 10x10"  Step up 6" x10  +Pt education on new HEP/frequency of at home exercises    Neuromuscular re education for 15 minutes for improved balance and proprioception including:     Supine clams 30x5" hold green band  L SL hip abduction 3x10  L SL clamshell 3x10 vs RTB  L Prone femoral nerve glide 3x10  L Prone hip extension 3x10  Seated sciatic nerve glide x10        PATIENT EDUCATION AND HOME EXERCISES     Home Exercises Provided and Patient Education Provided     Education provided:   PT educated pt on importance of compliance with their HEP this visit.     Written Home Exercises Provided: Patient instructed to cont prior HEP. Exercises were reviewed and Valarie was able to demonstrate them prior to the end of the session.  Valarie demonstrated good  understanding of the education provided. See EMR under Patient Instructions for exercises provided during therapy sessions    ASSESSMENT     Miss Valarie tolerated therapeutic interventions well with no complaint of increased pain. Patient reported relief after physical therapy treatment today, and her left knee range of motion was improved. We will continue to progress as tolerated.        Valarie Is progressing well towards her goals.   Pt prognosis is Good.     Pt will continue to benefit from skilled outpatient physical therapy to address the deficits listed " in the problem list box on initial evaluation, provide pt/family education and to maximize pt's level of independence in the home and community environment.     Pt's spiritual, cultural and educational needs considered and pt agreeable to plan of care and goals.     Anticipated barriers to physical therapy: None    GOALS:  Short Term Goals (4 Weeks):  1. Patient will be compliant with home exercise program to supplement therapy in promoting functional mobility. (met)    2. Patient will perform transfers with good control to demonstrate improved core strength. (met)    3. Patient will report no pain during thoracolumbar active range of motion to promote functional mobility.  (progressing, not met)    4. Patient will improve impaired lower extremity manual muscle tests  to >/=4/5 to improve strength for functional tasks. (progressing, not met)          Long Term Goals (6 Weeks):   1. Patient will improve FOTO score by 10% limited to decrease perceived limitation with maintaining/changing body position. (progressing, not met)    2. Patient will perform recreational activity with good control to demonstrate improved core strength.  (progressing, not met)    3. Patient will improve impaired lower extremity manual muscle tests to >/=4+/5 to improve strength for functional tasks.  (progressing, not met)    4. Patient will tolerate standing/walking for 30 minutes with no increase in low back pain to return to PLOF.  (progressing, not met)           Plan      Plan of care Certification: 12/2/24- 3/2/25    Focus on LE strength and endurance with emphasis on functional performance     Outpatient Physical Therapy 2 times weekly for 12 weeks to include the following interventions: Therapeutic Exercises, Manual Therapeutic Technique, Neuromuscular Re Education, Therapeutic Activities. Modalities, Kinesiotape prn, and Functional Dry Needling as needed.      Char Chandra, PT

## 2024-12-12 ENCOUNTER — CLINICAL SUPPORT (OUTPATIENT)
Dept: REHABILITATION | Facility: OTHER | Age: 79
End: 2024-12-12
Payer: MEDICARE

## 2024-12-12 DIAGNOSIS — R29.898 LEFT LEG WEAKNESS: Primary | ICD-10-CM

## 2024-12-12 PROCEDURE — 97112 NEUROMUSCULAR REEDUCATION: CPT | Mod: PN

## 2024-12-12 PROCEDURE — 97530 THERAPEUTIC ACTIVITIES: CPT | Mod: PN

## 2024-12-12 NOTE — PROGRESS NOTES
[facility]   Physical Therapy Treatment Note    Name: Valarie Colindres  Clinic Number: [id number]    Therapy Diagnosis:   No diagnosis found.        Physician: Ciarra Velasquez PA-C    Visit Date: 12/12/2024       Physician Orders: Physical Therapy Evaluate and Treat  Medical Diagnosis from Referral: left knee OA  Evaluation Date: 10/30/24  Authorization Period Expiration: 12/31/24  Plan of Care Expiration: 12/2/24- 3/2/25  Visit # / Visits authorized: 12/10  FOTO: 0/3         Time In: 0200pm  Time Out: 0300pm  Total Billable Time: 60 minutes        SUBJECTIVE     Pt reports: her knee range of motion has improved and she is having pain for shorter amounts of time.     She was compliant with home exercise program.  Response to previous treatment:  felt well  Functional change: improved tolerance to standing    Pain: 4/10  Location: left knee      OBJECTIVE     12/2/24      Lumbar Active range of motion (%) Pain/dysfunction with movement:   Flexion  100    Extension 50 Left posterior knee pain   Right side bending 75    Left side bending 75 Left posterior knee pain   Right rotation 100     Left rotation 100              Right LE     Left LE        Iliopsoas:  L2 5/5 Iliopsoas: L2 4/5    Quadriceps:  L3 - femoral nerve 4/5 Quadriceps: L3 - femoral nerve 4/5    Hip adduction:  L3 - obterator 5/5 Hip adduction: L3 - obterator 4/5    Hamstrings:  S2 4+/5 Hamstrings: S2 4/5    Ankle DF/EV:  L4 4+/5 Ankle DF/EV: L4 4+/5    GT Ext:  L5 4+/5 GT Ext L5 4/5    Hip Abduction:  L5-S1 - Superior gluteal nerve 4/5  Hip Abduction: L5-S1 - Superior gluteal nerve 3/5    Hip extension:  L5-S2 - Inferior gluteal nerve 4/5 Hip extension: L5-S2 - Inferior gluteal nerve 3/5    Ankle PF:   S1 - tibial nerve NT Ankle PF S1 - tibial nerve NT            Slump R: negative  Slump L: + for pain at posterior knee down to gastroc     SLR R: negative  SLR L: + for pain at posterior knee and up to posterior thigh       "      TREATMENT       Valarie received the treatments listed below:          Patient received manual therapeutic technique for 0 minutes for improved soft tissue and joint mobility including:    L tibiofemoral JM AP glides, Gr III  L LE LAD with A/P glide       Therapeutic Activities were provided for 45 minutes to improve tolerance to functional activities including:       NuStep L5 x 10 minutes for neural priming -  LE only  DKC with tball x20  Bridge on tball x20  SLR with TA bracing 3x10  PPT x20  Bridges with PPT 2x8  Standing knee flexion stretch at 2nd stair 10x10"  Step up 6" x10  +Pt education on new HEP/frequency of at home exercises    Neuromuscular re education for 15 minutes for improved balance and proprioception including:     Supine clams 30x5" hold green band  L SL hip abduction 3x10  L SL clamshell 3x10 vs RTB  L Prone femoral nerve glide 3x10  L Prone hip extension 3x10  Seated sciatic nerve glide x10        PATIENT EDUCATION AND HOME EXERCISES     Home Exercises Provided and Patient Education Provided     Education provided:   PT educated pt on importance of compliance with their HEP this visit.     Written Home Exercises Provided: Patient instructed to cont prior HEP. Exercises were reviewed and Valarie was able to demonstrate them prior to the end of the session.  Valarie demonstrated good  understanding of the education provided. See EMR under Patient Instructions for exercises provided during therapy sessions    ASSESSMENT   Pt tolerated treatment session well today and demonstrated increased degrees of left knee flexion during standing stretch on stair today. Pt also experiences less pain during step up exercise demonstrating her improvements in tolerance to functional exercises. Continue PT POC.      Valarie Is progressing well towards her goals.   Pt prognosis is Good.     Pt will continue to benefit from skilled outpatient physical therapy to address the deficits listed in the problem " list box on initial evaluation, provide pt/family education and to maximize pt's level of independence in the home and community environment.     Pt's spiritual, cultural and educational needs considered and pt agreeable to plan of care and goals.     Anticipated barriers to physical therapy: None    GOALS:  Short Term Goals (4 Weeks):  1. Patient will be compliant with home exercise program to supplement therapy in promoting functional mobility. (met)    2. Patient will perform transfers with good control to demonstrate improved core strength. (met)    3. Patient will report no pain during thoracolumbar active range of motion to promote functional mobility.  (progressing, not met)    4. Patient will improve impaired lower extremity manual muscle tests  to >/=4/5 to improve strength for functional tasks. (progressing, not met)          Long Term Goals (6 Weeks):   1. Patient will improve FOTO score by 10% limited to decrease perceived limitation with maintaining/changing body position. (progressing, not met)    2. Patient will perform recreational activity with good control to demonstrate improved core strength.  (progressing, not met)    3. Patient will improve impaired lower extremity manual muscle tests to >/=4+/5 to improve strength for functional tasks.  (progressing, not met)    4. Patient will tolerate standing/walking for 30 minutes with no increase in low back pain to return to PLOF.  (progressing, not met)           Plan      Plan of care Certification: 12/2/24- 3/2/25    Focus on LE strength and endurance with emphasis on functional performance     Outpatient Physical Therapy 2 times weekly for 12 weeks to include the following interventions: Therapeutic Exercises, Manual Therapeutic Technique, Neuromuscular Re Education, Therapeutic Activities. Modalities, Kinesiotape prn, and Functional Dry Needling as needed.      Char Chandra, PT

## 2024-12-16 ENCOUNTER — CLINICAL SUPPORT (OUTPATIENT)
Dept: REHABILITATION | Facility: OTHER | Age: 79
End: 2024-12-16
Payer: MEDICARE

## 2024-12-16 DIAGNOSIS — R29.898 LEFT LEG WEAKNESS: Primary | ICD-10-CM

## 2024-12-16 PROCEDURE — 97530 THERAPEUTIC ACTIVITIES: CPT | Mod: KX,PN,CQ

## 2024-12-16 PROCEDURE — 97112 NEUROMUSCULAR REEDUCATION: CPT | Mod: KX,PN,CQ

## 2024-12-16 NOTE — PROGRESS NOTES
[facility]   Physical Therapy Treatment Note    Name: Valarie Colindres  Clinic Number: [id number]    Therapy Diagnosis:   Encounter Diagnosis   Name Primary?    Left leg weakness Yes           Physician: Ciarra Velasquez PA-C    Visit Date: 12/16/2024       Physician Orders: Physical Therapy Evaluate and Treat  Medical Diagnosis from Referral: left knee OA  Evaluation Date: 10/30/24  Authorization Period Expiration: 12/31/24  Plan of Care Expiration: 12/2/24- 3/2/25  Visit # / Visits authorized: 14/10  FOTO: 0/3         Time In: 1400  Time Out: 1500  Total Billable Time: 60 minutes        SUBJECTIVE     Pt reports: she was able to out on her pants with less pain for the first time in a while.     She was compliant with home exercise program.  Response to previous treatment:  felt well  Functional change: able to put on her pants.     Pain: 3/10  Location: left knee      OBJECTIVE     12/2/24      Lumbar Active range of motion (%) Pain/dysfunction with movement:   Flexion  100    Extension 50 Left posterior knee pain   Right side bending 75    Left side bending 75 Left posterior knee pain   Right rotation 100     Left rotation 100              Right LE     Left LE        Iliopsoas:  L2 5/5 Iliopsoas: L2 4/5    Quadriceps:  L3 - femoral nerve 4/5 Quadriceps: L3 - femoral nerve 4/5    Hip adduction:  L3 - obterator 5/5 Hip adduction: L3 - obterator 4/5    Hamstrings:  S2 4+/5 Hamstrings: S2 4/5    Ankle DF/EV:  L4 4+/5 Ankle DF/EV: L4 4+/5    GT Ext:  L5 4+/5 GT Ext L5 4/5    Hip Abduction:  L5-S1 - Superior gluteal nerve 4/5  Hip Abduction: L5-S1 - Superior gluteal nerve 3/5    Hip extension:  L5-S2 - Inferior gluteal nerve 4/5 Hip extension: L5-S2 - Inferior gluteal nerve 3/5    Ankle PF:   S1 - tibial nerve NT Ankle PF S1 - tibial nerve NT            Slump R: negative  Slump L: + for pain at posterior knee down to gastroc     SLR R: negative  SLR L: + for pain at posterior knee and up  "to posterior thigh            TREATMENT       Valarie received the treatments listed below:          Patient received manual therapeutic technique for 0 minutes for improved soft tissue and joint mobility including:    L tibiofemoral JM AP glides, Gr III  L LE LAD with A/P glide       Therapeutic Activities were provided for 40 minutes to improve tolerance to functional activities including:       NuStep L5 x 10 minutes for neural priming -  LE only  DKC with tball x20  Bridge on tball x20  SLR with TA bracing 3x10  PPT x20  Bridges with PPT 2x8  Standing knee flexion stretch at 2nd stair 10x10"  Step up 6" 3x10 R/L      Neuromuscular re education for 20 minutes for improved balance and proprioception including:     Supine clams 30x5" hold green band  L SL hip abduction 3x10  L SL clamshell 3x10 vs RTB  L Prone femoral nerve glide 3x10  L Prone hip extension 3x10  Seated sciatic nerve glide x10        PATIENT EDUCATION AND HOME EXERCISES     Home Exercises Provided and Patient Education Provided     Education provided:   PT educated pt on importance of compliance with their HEP this visit.     Written Home Exercises Provided: Patient instructed to cont prior HEP. Exercises were reviewed and Valarie was able to demonstrate them prior to the end of the session.  Valraie demonstrated good  understanding of the education provided. See EMR under Patient Instructions for exercises provided during therapy sessions    ASSESSMENT   Valarie tolerated session well today. She appears to be progressing well within her POC as her strength and endurance are improving. She continues to report posterolateral knee pain on the R which continues to be an obstacle at this time. Despite progress muscle weakness is also notable in core musculature, quads and glutes which is contributing to  decreased functional ability. This is negatively impacting this individuals ability with ADL's. Will continue to address these deficits and promote " improved strength, ROM and functional performance as tolerated.        [place] Is progressing well towards her goals.   Pt prognosis is Good.     Pt will continue to benefit from skilled outpatient physical therapy to address the deficits listed in the problem list box on initial evaluation, provide pt/family education and to maximize pt's level of independence in the home and community environment.     Pt's spiritual, cultural and educational needs considered and pt agreeable to plan of care and goals.     Anticipated barriers to physical therapy: None    GOALS:  Short Term Goals (4 Weeks):  1. Patient will be compliant with home exercise program to supplement therapy in promoting functional mobility. (met)    2. Patient will perform transfers with good control to demonstrate improved core strength. (met)    3. Patient will report no pain during thoracolumbar active range of motion to promote functional mobility.  (progressing, not met)    4. Patient will improve impaired lower extremity manual muscle tests  to >/=4/5 to improve strength for functional tasks. (progressing, not met)          Long Term Goals (6 Weeks):   1. Patient will improve FOTO score by 10% limited to decrease perceived limitation with maintaining/changing body position. (progressing, not met)    2. Patient will perform recreational activity with good control to demonstrate improved core strength.  (progressing, not met)    3. Patient will improve impaired lower extremity manual muscle tests to >/=4+/5 to improve strength for functional tasks.  (progressing, not met)    4. Patient will tolerate standing/walking for 30 minutes with no increase in low back pain to return to PLOF.  (progressing, not met)           Plan      Plan of care Certification: 12/2/24- 3/2/25    Focus on LE strength and endurance with emphasis on functional performance     Outpatient Physical Therapy 2 times weekly for 12 weeks to include the following interventions:  Therapeutic Exercises, Manual Therapeutic Technique, Neuromuscular Re Education, Therapeutic Activities. Modalities, Kinesiotape prn, and Functional Dry Needling as needed.      Darci Nobles, PTA

## 2024-12-19 ENCOUNTER — CLINICAL SUPPORT (OUTPATIENT)
Dept: REHABILITATION | Facility: OTHER | Age: 79
End: 2024-12-19
Payer: MEDICARE

## 2024-12-19 DIAGNOSIS — R29.898 LEFT LEG WEAKNESS: Primary | ICD-10-CM

## 2024-12-19 PROCEDURE — 97530 THERAPEUTIC ACTIVITIES: CPT | Mod: KX,PN

## 2024-12-19 PROCEDURE — 97164 PT RE-EVAL EST PLAN CARE: CPT | Mod: KX,PN

## 2024-12-19 PROCEDURE — 97112 NEUROMUSCULAR REEDUCATION: CPT | Mod: KX,PN

## 2024-12-19 NOTE — PROGRESS NOTES
[facility]   Physical Therapy Updated Plan of Care Note    Name: Valarie Colindres  Clinic Number: [id number]    Therapy Diagnosis:   Encounter Diagnosis   Name Primary?    Left leg weakness Yes         Physician: Ciarra Velasquez PA-C    Visit Date: 12/19/2024       Physician Orders: Physical Therapy Evaluate and Treat  Medical Diagnosis from Referral: left knee OA  Evaluation Date: 10/30/24  Authorization Period Expiration: 12/31/24  Plan of Care Expiration: 12/2/24- 3/2/25  Visit # / Visits authorized: 14/20  FOTO: 0/3         Time In: 115p  Time Out: 220p  Total Billable Time: 45 minutes        SUBJECTIVE     Pt reports: she was able to put her left foot on right knee today to don shoes.    She was compliant with home exercise program.  Response to previous treatment:  felt well  Functional change: able to put on her pants.     Pain: 3/10  Location: left knee      OBJECTIVE     12/19/24      Lumbar Active range of motion (%) Pain/dysfunction with movement:   Flexion  100    Extension 50 Left posterior knee pain   Right side bending 75    Left side bending 75 Left posterior knee pain   Right rotation 100     Left rotation 100              Right LE     Left LE        Iliopsoas:  L2 5/5 Iliopsoas: L2 4/5    Quadriceps:  L3 - femoral nerve 5/5 Quadriceps: L3 - femoral nerve 4/5    Hip adduction:  L3 - obterator 5/5 Hip adduction: L3 - obterator 4/5    Hamstrings:  S2 4+/5 Hamstrings: S2 4/5    Ankle DF/EV:  L4 5/5 Ankle DF/EV: L4 4+/5    GT Ext:  L5 4+/5 GT Ext L5 4/5    Hip Abduction:  L5-S1 - Superior gluteal nerve 4/5  Hip Abduction: L5-S1 - Superior gluteal nerve 3+/5    Hip extension:  L5-S2 - Inferior gluteal nerve 4/5 Hip extension: L5-S2 - Inferior gluteal nerve 3+/5    Ankle PF:   S1 - tibial nerve NT Ankle PF S1 - tibial nerve NT            Slump R: negative  Slump L: + for pain at posterior knee down to gastroc     SLR R: negative  SLR L: + for pain at posterior knee and  "up to posterior thigh       Observation: patient continues to ambulate with decreased knee extension at terminal swing phase. Step length on left is also shorter 2nd to decreased knee extension.      Range of Motion:   Knee Left active Left Passive Right Active R passive   Flexion 110 115 130 135   Extension -8 -6 0 5              TREATMENT       Valarie received the treatments listed below:          Patient received manual therapeutic technique for 0 minutes for improved soft tissue and joint mobility including:    L tibiofemoral JM AP glides, Gr III  L LE LAD with A/P glide       Therapeutic Activities were provided for 30 minutes to improve tolerance to functional activities including:       NuStep L5 x 10 minutes for neural priming -  LE only  DKC with tball x20  Bridge on tball x20  SLR with TA bracing 3x10  PPT x20  Bridges with PPT 2x8  Standing knee flexion stretch at 2nd stair 10x10"  Step up 6" 3x10 R/L      Neuromuscular re education for 15 minutes for improved balance and proprioception including:     Supine clams 30x5" hold green band  L SL hip abduction 3x10  L SL clamshell 3x10 vs RTB  L Prone femoral nerve glide 3x10  L Prone hip extension 3x10  Seated sciatic nerve glide x10  Prone press up x10        PATIENT EDUCATION AND HOME EXERCISES     Home Exercises Provided and Patient Education Provided     Education provided:   PT educated pt on importance of compliance with their HEP this visit.     Written Home Exercises Provided: Patient instructed to cont prior HEP. Exercises were reviewed and Valarie was able to demonstrate them prior to the end of the session.  Valarie demonstrated good  understanding of the education provided. See EMR under Patient Instructions for exercises provided during therapy sessions    ASSESSMENT     Valarie has made moderate improvement since last re-assessment. She demonstrates improved left knee range of motion, improved gait mechanics, and decreased pain. She continues " to lack left knee range of motion and she has positive nerve tension signs that indicate persisting proximal nerve irritation which is contributing to mechanical knee dysfunction. We will continue to progress as tolerated.        Valarie Is progressing well towards her goals.   Pt prognosis is Good.     Pt will continue to benefit from skilled outpatient physical therapy to address the deficits listed in the problem list box on initial evaluation, provide pt/family education and to maximize pt's level of independence in the home and community environment.     Pt's spiritual, cultural and educational needs considered and pt agreeable to plan of care and goals.     Anticipated barriers to physical therapy: None    GOALS:  Short Term Goals (4 Weeks):  1. Patient will be compliant with home exercise program to supplement therapy in promoting functional mobility. (met)    2. Patient will perform transfers with good control to demonstrate improved core strength. (met)    3. Patient will report no pain during thoracolumbar active range of motion to promote functional mobility.  (progressing, not met)    4. Patient will improve impaired lower extremity manual muscle tests  to >/=4/5 to improve strength for functional tasks. (progressing, not met)          Long Term Goals (6 Weeks):   1. Patient will improve FOTO score by 10% limited to decrease perceived limitation with maintaining/changing body position. (progressing, not met)    2. Patient will perform recreational activity with good control to demonstrate improved core strength.  (progressing, not met)    3. Patient will improve impaired lower extremity manual muscle tests to >/=4+/5 to improve strength for functional tasks.  (progressing, not met)    4. Patient will tolerate standing/walking for 30 minutes with no increase in low back pain to return to PLOF.  (progressing, not met)           Plan      Plan of care Certification: 12/2/24- 3/2/25    Focus on LE strength  and endurance with emphasis on functional performance     Outpatient Physical Therapy 2 times weekly for 12 weeks to include the following interventions: Therapeutic Exercises, Manual Therapeutic Technique, Neuromuscular Re Education, Therapeutic Activities. Modalities, Kinesiotape prn, and Functional Dry Needling as needed.      Drake Cronin, PT

## 2024-12-23 ENCOUNTER — CLINICAL SUPPORT (OUTPATIENT)
Dept: REHABILITATION | Facility: OTHER | Age: 79
End: 2024-12-23
Payer: MEDICARE

## 2024-12-23 DIAGNOSIS — R29.898 LEFT LEG WEAKNESS: Primary | ICD-10-CM

## 2024-12-23 PROCEDURE — 97530 THERAPEUTIC ACTIVITIES: CPT | Mod: PN

## 2024-12-23 PROCEDURE — 97112 NEUROMUSCULAR REEDUCATION: CPT | Mod: PN

## 2024-12-23 NOTE — PROGRESS NOTES
[facility]   Physical Therapy Treatment Note    Name: Valarie Colindres  Clinic Number: [id number]    Therapy Diagnosis:   Encounter Diagnosis   Name Primary?    Left leg weakness Yes         Physician: Ciarra Velasquez PA-C    Visit Date: 12/23/2024       Physician Orders: Physical Therapy Evaluate and Treat  Medical Diagnosis from Referral: left knee OA  Evaluation Date: 10/30/24  Authorization Period Expiration: 12/31/24  Plan of Care Expiration: 12/2/24- 3/2/25  Visit # / Visits authorized: 15/20  FOTO: 0/3         Time In: 1100a  Time Out: 1200p  Total Billable Time: 55 minutes        SUBJECTIVE     Pt reports: she had several days of minimal pain. She woke up hurting this morning.    She was compliant with home exercise program.  Response to previous treatment:  felt well  Functional change: able to put on her pants.     Pain: 3/10  Location: left knee      OBJECTIVE     12/19/24      Lumbar Active range of motion (%) Pain/dysfunction with movement:   Flexion  100    Extension 50 Left posterior knee pain   Right side bending 75    Left side bending 75 Left posterior knee pain   Right rotation 100     Left rotation 100              Right LE     Left LE        Iliopsoas:  L2 5/5 Iliopsoas: L2 4/5    Quadriceps:  L3 - femoral nerve 5/5 Quadriceps: L3 - femoral nerve 4/5    Hip adduction:  L3 - obterator 5/5 Hip adduction: L3 - obterator 4/5    Hamstrings:  S2 4+/5 Hamstrings: S2 4/5    Ankle DF/EV:  L4 5/5 Ankle DF/EV: L4 4+/5    GT Ext:  L5 4+/5 GT Ext L5 4/5    Hip Abduction:  L5-S1 - Superior gluteal nerve 4/5  Hip Abduction: L5-S1 - Superior gluteal nerve 3+/5    Hip extension:  L5-S2 - Inferior gluteal nerve 4/5 Hip extension: L5-S2 - Inferior gluteal nerve 3+/5    Ankle PF:   S1 - tibial nerve NT Ankle PF S1 - tibial nerve NT            Slump R: negative  Slump L: + for pain at posterior knee down to gastroc     SLR R: negative  SLR L: + for pain at posterior knee and up to  "posterior thigh       Observation: patient continues to ambulate with decreased knee extension at terminal swing phase. Step length on left is also shorter 2nd to decreased knee extension.      Range of Motion:   Knee Left active Left Passive Right Active R passive   Flexion 110 115 130 135   Extension -8 -6 0 5              TREATMENT       [place] received the treatments listed below:          Patient received manual therapeutic technique for 0 minutes for improved soft tissue and joint mobility including:    L tibiofemoral JM AP glides, Gr III  L LE LAD with A/P glide       Therapeutic Activities were provided for 30 minutes to improve tolerance to functional activities including:       NuStep L5 x 10 minutes for neural priming -  LE only  DKC with tball x20  Bridge on tball x20  SLR with TA bracing 3x10  PPT x20  Bridges with PPT 2x8  Standing knee flexion stretch at 2nd stair 10x10"  Step up 6" 3x10 R/left  Supine sciatic nerve glide with tball x20  +TRX squat 2x10      Neuromuscular re education for 25 minutes for improved balance and proprioception including:     Supine clams 30x5" hold green band  L SL hip abduction 3x10  L SL clamshell 3x10 vs RTB  L Prone femoral nerve glide 3x10  L Prone hip extension 3x10  Seated sciatic nerve glide x10  Prone press up x10  +Prone multifidi retraining 2x10 bilaterally **increased left lower extremity pain  +Bent over bird dog 2x10        PATIENT EDUCATION AND HOME EXERCISES     Home Exercises Provided and Patient Education Provided     Education provided:   PT educated pt on importance of compliance with their HEP this visit.     Written Home Exercises Provided: Patient instructed to cont prior HEP. Exercises were reviewed and Moonia was able to demonstrate them prior to the end of the session.  [place] demonstrated good  understanding of the education provided. See EMR under Patient Instructions for exercises provided during therapy sessions    ASSESSMENT     [place] " tolerated therapeutic interventions well with no complaint of increased pain. Patient reported relief after physical therapy treatment today. She appears to be progressing her left knee flexion AROM and gait mechanics. We will continue to progress as tolerated.        [place] Is progressing well towards her goals.   Pt prognosis is Good.     Pt will continue to benefit from skilled outpatient physical therapy to address the deficits listed in the problem list box on initial evaluation, provide pt/family education and to maximize pt's level of independence in the home and community environment.     Pt's spiritual, cultural and educational needs considered and pt agreeable to plan of care and goals.     Anticipated barriers to physical therapy: None    GOALS:  Short Term Goals (4 Weeks):  1. Patient will be compliant with home exercise program to supplement therapy in promoting functional mobility. (met)    2. Patient will perform transfers with good control to demonstrate improved core strength. (met)    3. Patient will report no pain during thoracolumbar active range of motion to promote functional mobility.  (progressing, not met)    4. Patient will improve impaired lower extremity manual muscle tests  to >/=4/5 to improve strength for functional tasks. (progressing, not met)          Long Term Goals (6 Weeks):   1. Patient will improve FOTO score by 10% limited to decrease perceived limitation with maintaining/changing body position. (progressing, not met)    2. Patient will perform recreational activity with good control to demonstrate improved core strength.  (progressing, not met)    3. Patient will improve impaired lower extremity manual muscle tests to >/=4+/5 to improve strength for functional tasks.  (progressing, not met)    4. Patient will tolerate standing/walking for 30 minutes with no increase in low back pain to return to PLOF.  (progressing, not met)           Plan      Plan of care Certification:  12/2/24- 3/2/25    Focus on LE strength and endurance with emphasis on functional performance     Outpatient Physical Therapy 2 times weekly for 12 weeks to include the following interventions: Therapeutic Exercises, Manual Therapeutic Technique, Neuromuscular Re Education, Therapeutic Activities. Modalities, Kinesiotape prn, and Functional Dry Needling as needed.      Drake Cronin, PT

## 2024-12-26 ENCOUNTER — CLINICAL SUPPORT (OUTPATIENT)
Dept: REHABILITATION | Facility: OTHER | Age: 79
End: 2024-12-26
Payer: MEDICARE

## 2024-12-26 DIAGNOSIS — R29.898 LEFT LEG WEAKNESS: Primary | ICD-10-CM

## 2024-12-26 PROCEDURE — 97112 NEUROMUSCULAR REEDUCATION: CPT | Mod: PN

## 2024-12-26 PROCEDURE — 97530 THERAPEUTIC ACTIVITIES: CPT | Mod: PN

## 2024-12-26 NOTE — PROGRESS NOTES
[facility]   Physical Therapy Treatment Note    Name: Valarie Colindres  Clinic Number: [id number]    Therapy Diagnosis:   Encounter Diagnosis   Name Primary?    Left leg weakness Yes         Physician: Ciarra Velasquez PA-C    Visit Date: 12/26/2024       Physician Orders: Physical Therapy Evaluate and Treat  Medical Diagnosis from Referral: left knee OA  Evaluation Date: 10/30/24  Authorization Period Expiration: 12/31/24  Plan of Care Expiration: 12/2/24- 3/2/25  Visit # / Visits authorized: 16/20  FOTO: 0/3         Time In: 1100a  Time Out: 1200p  Total Billable Time: 55 minutes        SUBJECTIVE     Pt reports: she continues to have increased left knee pain, but range of motion appears to be improving.    She was compliant with home exercise program.  Response to previous treatment:  felt well  Functional change: able to put on her pants.     Pain: 3/10  Location: left knee      OBJECTIVE     12/19/24      Lumbar Active range of motion (%) Pain/dysfunction with movement:   Flexion  100    Extension 50 Left posterior knee pain   Right side bending 75    Left side bending 75 Left posterior knee pain   Right rotation 100     Left rotation 100              Right LE     Left LE        Iliopsoas:  L2 5/5 Iliopsoas: L2 4/5    Quadriceps:  L3 - femoral nerve 5/5 Quadriceps: L3 - femoral nerve 4/5    Hip adduction:  L3 - obterator 5/5 Hip adduction: L3 - obterator 4/5    Hamstrings:  S2 4+/5 Hamstrings: S2 4/5    Ankle DF/EV:  L4 5/5 Ankle DF/EV: L4 4+/5    GT Ext:  L5 4+/5 GT Ext L5 4/5    Hip Abduction:  L5-S1 - Superior gluteal nerve 4/5  Hip Abduction: L5-S1 - Superior gluteal nerve 3+/5    Hip extension:  L5-S2 - Inferior gluteal nerve 4/5 Hip extension: L5-S2 - Inferior gluteal nerve 3+/5    Ankle PF:   S1 - tibial nerve NT Ankle PF S1 - tibial nerve NT            Slump R: negative  Slump L: + for pain at posterior knee down to gastroc     SLR R: negative  SLR L: + for pain at  "posterior knee and up to posterior thigh       Observation: patient continues to ambulate with decreased knee extension at terminal swing phase. Step length on left is also shorter 2nd to decreased knee extension.      Range of Motion:   Knee Left active Left Passive Right Active R passive   Flexion 110 115 130 135   Extension -8 -6 0 5              TREATMENT       [place] received the treatments listed below:          Patient received manual therapeutic technique for 0 minutes for improved soft tissue and joint mobility including:    L tibiofemoral JM AP glides, Gr III  L LE LAD with A/P glide       Therapeutic Activities were provided for 30 minutes to improve tolerance to functional activities including:       NuStep L5 x 10 minutes for neural priming -  LE only  DKC with tball x20  Bridge on tball x20  SLR with TA bracing and pilates ring 3x10  PPT x20  Bridges with PPT 2x8  Standing knee flexion stretch at 2nd stair 10x10"  Step up 6" 3x10 R/left  Supine sciatic nerve glide with tball x20  +TRX squat 2x10      Neuromuscular re education for 25 minutes for improved balance and proprioception including:     Squat with upper extremity assist x20 **increased posterior knee pain on left   Supine clams 30x5" hold green band  L SL hip abduction 3x10  L SL clamshell 3x10 vs RTB  L Prone femoral nerve glide 3x10  L Prone hip extension 3x10  Seated sciatic nerve glide x10  Prone press up x10  +Prone multifidi retraining 2x10 bilaterally **increased left lower extremity pain  +Bent over bird dog 2x10        PATIENT EDUCATION AND HOME EXERCISES     Home Exercises Provided and Patient Education Provided     Education provided:   PT educated pt on importance of compliance with their HEP this visit.     Written Home Exercises Provided: Patient instructed to cont prior HEP. Exercises were reviewed and Valarie was able to demonstrate them prior to the end of the session.  Valarie demonstrated good  understanding of the " education provided. See EMR under Patient Instructions for exercises provided during therapy sessions    ASSESSMENT     Valarie tolerated therapeutic interventions well with no complaint of increased pain. Patient continues to have difficulty with movements that increase sciatic nerve tension on the left. We will continue to progress as tolerated.        Valarie Is progressing well towards her goals.   Pt prognosis is Good.     Pt will continue to benefit from skilled outpatient physical therapy to address the deficits listed in the problem list box on initial evaluation, provide pt/family education and to maximize pt's level of independence in the home and community environment.     Pt's spiritual, cultural and educational needs considered and pt agreeable to plan of care and goals.     Anticipated barriers to physical therapy: None    GOALS:  Short Term Goals (4 Weeks):  1. Patient will be compliant with home exercise program to supplement therapy in promoting functional mobility. (met)    2. Patient will perform transfers with good control to demonstrate improved core strength. (met)    3. Patient will report no pain during thoracolumbar active range of motion to promote functional mobility.  (progressing, not met)    4. Patient will improve impaired lower extremity manual muscle tests  to >/=4/5 to improve strength for functional tasks. (progressing, not met)          Long Term Goals (6 Weeks):   1. Patient will improve FOTO score by 10% limited to decrease perceived limitation with maintaining/changing body position. (progressing, not met)    2. Patient will perform recreational activity with good control to demonstrate improved core strength.  (progressing, not met)    3. Patient will improve impaired lower extremity manual muscle tests to >/=4+/5 to improve strength for functional tasks.  (progressing, not met)    4. Patient will tolerate standing/walking for 30 minutes with no increase in low back pain to  return to PLOF.  (progressing, not met)           Plan      Plan of care Certification: 12/2/24- 3/2/25    Focus on LE strength and endurance with emphasis on functional performance     Outpatient Physical Therapy 2 times weekly for 12 weeks to include the following interventions: Therapeutic Exercises, Manual Therapeutic Technique, Neuromuscular Re Education, Therapeutic Activities. Modalities, Kinesiotape prn, and Functional Dry Needling as needed.      Drake Cronin, PT

## 2024-12-31 ENCOUNTER — CLINICAL SUPPORT (OUTPATIENT)
Dept: REHABILITATION | Facility: OTHER | Age: 79
End: 2024-12-31
Payer: MEDICARE

## 2024-12-31 DIAGNOSIS — R29.898 LEFT LEG WEAKNESS: Primary | ICD-10-CM

## 2024-12-31 PROCEDURE — 97112 NEUROMUSCULAR REEDUCATION: CPT | Mod: KX,PN,CQ

## 2024-12-31 PROCEDURE — 97530 THERAPEUTIC ACTIVITIES: CPT | Mod: KX,PN,CQ

## 2024-12-31 NOTE — PROGRESS NOTES
[facility]   Physical Therapy Treatment Note    Name: Valarie Colindres  Clinic Number: [id number]    Therapy Diagnosis:   Encounter Diagnosis   Name Primary?    Left leg weakness Yes         Physician: Ciarra Velasquez PA-C    Visit Date: 12/31/2024       Physician Orders: Physical Therapy Evaluate and Treat  Medical Diagnosis from Referral: left knee OA  Evaluation Date: 10/30/24  Authorization Period Expiration: 12/31/24  Plan of Care Expiration: 12/2/24- 3/2/25  Visit # / Visits authorized: 18/20  FOTO: 0/3         Time In: 1055  Time Out: 1205  Total Billable Time: 60 minutes        SUBJECTIVE     Pt reports: she is feeling well today. Slightly less pain overall.     She was compliant with home exercise program.  Response to previous treatment:  felt well  Functional change: able to put on her pants.     Pain: 2/10  Location: left knee      OBJECTIVE     12/19/24      Lumbar Active range of motion (%) Pain/dysfunction with movement:   Flexion  100    Extension 50 Left posterior knee pain   Right side bending 75    Left side bending 75 Left posterior knee pain   Right rotation 100     Left rotation 100              Right LE     Left LE        Iliopsoas:  L2 5/5 Iliopsoas: L2 4/5    Quadriceps:  L3 - femoral nerve 5/5 Quadriceps: L3 - femoral nerve 4/5    Hip adduction:  L3 - obterator 5/5 Hip adduction: L3 - obterator 4/5    Hamstrings:  S2 4+/5 Hamstrings: S2 4/5    Ankle DF/EV:  L4 5/5 Ankle DF/EV: L4 4+/5    GT Ext:  L5 4+/5 GT Ext L5 4/5    Hip Abduction:  L5-S1 - Superior gluteal nerve 4/5  Hip Abduction: L5-S1 - Superior gluteal nerve 3+/5    Hip extension:  L5-S2 - Inferior gluteal nerve 4/5 Hip extension: L5-S2 - Inferior gluteal nerve 3+/5    Ankle PF:   S1 - tibial nerve NT Ankle PF S1 - tibial nerve NT            Slump R: negative  Slump L: + for pain at posterior knee down to gastroc     SLR R: negative  SLR L: + for pain at posterior knee and up to posterior  "thigh       Observation: patient continues to ambulate with decreased knee extension at terminal swing phase. Step length on left is also shorter 2nd to decreased knee extension.      Range of Motion:   Knee Left active Left Passive Right Active R passive   Flexion 110 115 130 135   Extension -8 -6 0 5              TREATMENT       [place] received the treatments listed below:          Patient received manual therapeutic technique for 0 minutes for improved soft tissue and joint mobility including:    L tibiofemoral JM AP glides, Gr III  L LE LAD with A/P glide       Therapeutic Activities were provided for 30 minutes to improve tolerance to functional activities including:       NuStep L5 x 10 minutes for neural priming -  LE only  DKC with tball x20  Bridge on tball x20  SLR with TA bracing and pilates ring 3x10  PPT x20  Bridges with PPT 2x8  Standing knee flexion stretch at 2nd stair 10x10"  Step up 6" 3x10 R/left  Supine sciatic nerve glide with tball x20  +TRX squat 2x10      Neuromuscular re education for 30 minutes for improved balance and proprioception including:     Squat with upper extremity assist x20 **increased posterior knee pain on left   Supine clams 30x5" hold green band  L SL hip abduction 3x10  L SL clamshell 3x10 vs RTB  L Prone femoral nerve glide 3x10  L Prone hip extension 3x10  Seated sciatic nerve glide x10  Prone press up x10  +Prone multifidi retraining 2x10 bilaterally **increased left lower extremity pain  Bent over bird dog 2x10        PATIENT EDUCATION AND HOME EXERCISES     Home Exercises Provided and Patient Education Provided     Education provided:   PT educated pt on importance of compliance with their HEP this visit.     Written Home Exercises Provided: Patient instructed to cont prior HEP. Exercises were reviewed and Valarie was able to demonstrate them prior to the end of the session.  Moonia demonstrated good  understanding of the education provided. See EMR under Patient " Instructions for exercises provided during therapy sessions    ASSESSMENT     Vlaarie tolerated exercise well with minimal complaints of increased pain. Pt was able to complete exercises with decreased muscle guarding. Muscle weakness in paraspinals/core musculature were notable during exercises however she was able to demonstrate improved core/transverse abdominis activation after cuing.        Valarie Is progressing well towards her goals.   Pt prognosis is Good.     Pt will continue to benefit from skilled outpatient physical therapy to address the deficits listed in the problem list box on initial evaluation, provide pt/family education and to maximize pt's level of independence in the home and community environment.     Pt's spiritual, cultural and educational needs considered and pt agreeable to plan of care and goals.     Anticipated barriers to physical therapy: None    GOALS:  Short Term Goals (4 Weeks):  1. Patient will be compliant with home exercise program to supplement therapy in promoting functional mobility. (met)    2. Patient will perform transfers with good control to demonstrate improved core strength. (met)    3. Patient will report no pain during thoracolumbar active range of motion to promote functional mobility.  (progressing, not met)    4. Patient will improve impaired lower extremity manual muscle tests  to >/=4/5 to improve strength for functional tasks. (progressing, not met)          Long Term Goals (6 Weeks):   1. Patient will improve FOTO score by 10% limited to decrease perceived limitation with maintaining/changing body position. (progressing, not met)    2. Patient will perform recreational activity with good control to demonstrate improved core strength.  (progressing, not met)    3. Patient will improve impaired lower extremity manual muscle tests to >/=4+/5 to improve strength for functional tasks.  (progressing, not met)    4. Patient will tolerate standing/walking for 30  minutes with no increase in low back pain to return to PLOF.  (progressing, not met)           Plan      Plan of care Certification: 12/2/24- 3/2/25    Focus on LE strength and endurance with emphasis on functional performance     Outpatient Physical Therapy 2 times weekly for 12 weeks to include the following interventions: Therapeutic Exercises, Manual Therapeutic Technique, Neuromuscular Re Education, Therapeutic Activities. Modalities, Kinesiotape prn, and Functional Dry Needling as needed.      Darci Nobles, PTA

## 2025-01-03 ENCOUNTER — CLINICAL SUPPORT (OUTPATIENT)
Dept: REHABILITATION | Facility: OTHER | Age: 80
End: 2025-01-03
Payer: MEDICARE

## 2025-01-03 DIAGNOSIS — R29.898 LEFT LEG WEAKNESS: Primary | ICD-10-CM

## 2025-01-03 PROCEDURE — 97140 MANUAL THERAPY 1/> REGIONS: CPT | Mod: PN

## 2025-01-03 PROCEDURE — 97112 NEUROMUSCULAR REEDUCATION: CPT | Mod: PN

## 2025-01-03 PROCEDURE — 97530 THERAPEUTIC ACTIVITIES: CPT | Mod: PN

## 2025-01-03 NOTE — PROGRESS NOTES
[facility]   Physical Therapy Treatment Note    Name: Valarie Colindres  Clinic Number: [id number]    Therapy Diagnosis:   Encounter Diagnosis   Name Primary?    Left leg weakness Yes         Physician: Ciarra Velasquez PA-C    Visit Date: 1/3/2025       Physician Orders: Physical Therapy Evaluate and Treat  Medical Diagnosis from Referral: left knee OA  Evaluation Date: 10/30/24  Authorization Period Expiration: 12/31/24  Plan of Care Expiration: 12/2/24- 3/2/25  Visit # / Visits authorized: 1/10  FOTO: 0/3         Time In: 0930am  Time Out: 1031am  Total Billable Time: 61 minutes        SUBJECTIVE     Pt reports: she is doing well today. Pt states that she is having some knee pain, but her left knee flexion range of motion has improved overall.     She was compliant with home exercise program.  Response to previous treatment:  felt well  Functional change: able to put on her pants.     Pain: 2/10  Location: left knee      OBJECTIVE     12/19/24      Lumbar Active range of motion (%) Pain/dysfunction with movement:   Flexion  100    Extension 50 Left posterior knee pain   Right side bending 75    Left side bending 75 Left posterior knee pain   Right rotation 100     Left rotation 100              Right LE     Left LE        Iliopsoas:  L2 5/5 Iliopsoas: L2 4/5    Quadriceps:  L3 - femoral nerve 5/5 Quadriceps: L3 - femoral nerve 4/5    Hip adduction:  L3 - obterator 5/5 Hip adduction: L3 - obterator 4/5    Hamstrings:  S2 4+/5 Hamstrings: S2 4/5    Ankle DF/EV:  L4 5/5 Ankle DF/EV: L4 4+/5    GT Ext:  L5 4+/5 GT Ext L5 4/5    Hip Abduction:  L5-S1 - Superior gluteal nerve 4/5  Hip Abduction: L5-S1 - Superior gluteal nerve 3+/5    Hip extension:  L5-S2 - Inferior gluteal nerve 4/5 Hip extension: L5-S2 - Inferior gluteal nerve 3+/5    Ankle PF:   S1 - tibial nerve NT Ankle PF S1 - tibial nerve NT            Slump R: negative  Slump L: + for pain at posterior knee down to gastroc    "  SLR R: negative  SLR L: + for pain at posterior knee and up to posterior thigh       Observation: patient continues to ambulate with decreased knee extension at terminal swing phase. Step length on left is also shorter 2nd to decreased knee extension.      Range of Motion:   Knee Left active Left Passive Right Active R passive   Flexion 110 115 130 135   Extension -8 -6 0 5              TREATMENT       [place] received the treatments listed below:          Patient received manual therapeutic technique for 8 minutes for improved soft tissue and joint mobility including:    R/L tibiofemoral JM AP glides, Gr III  R/L LE LAD with A/P glide   R/L knee gapping with patient in hooklying       Therapeutic Activities were provided for 30 minutes to improve tolerance to functional activities including:       NuStep L5 x 12 minutes for neural priming -  LE only  DKC with tball x20  Bridge on tball x20  SLR with TA bracing and pilates ring 3x10  PPT x20  Bridges with PPT 2x8  Standing knee flexion stretch at 2nd stair 10x10"  Step up 6" 3x10 R/left  Supine sciatic nerve glide with tball x20  TRX squat 2x10      Neuromuscular re education for 22 minutes for improved balance and proprioception including:     Squat with upper extremity assist x20 **increased posterior knee pain on left   Supine clams 30x5" hold green band  L SL hip abduction 3x10  L SL clamshell 3x10 vs RTB  L Prone femoral nerve glide 3x10  L Prone hip extension 3x10  Seated sciatic nerve glide x10  Prone press up x10  Prone multifidi retraining 2x10 bilaterally **increased left lower extremity pain  Bent over bird dog 2x10        PATIENT EDUCATION AND HOME EXERCISES     Home Exercises Provided and Patient Education Provided     Education provided:   PT educated pt on importance of compliance with their HEP this visit.     Written Home Exercises Provided: Patient instructed to cont prior HEP. Exercises were reviewed and [place] was able to demonstrate them " prior to the end of the session.  Valarie demonstrated good  understanding of the education provided. See EMR under Patient Instructions for exercises provided during therapy sessions    ASSESSMENT   Pt tolerated treatment session well today and continues to experience mild knee pain with some exercises. However, her AROM and tolerance to exercise continues to improve. Continue PT POC.    Valarie Is progressing well towards her goals.   Pt prognosis is Good.     Pt will continue to benefit from skilled outpatient physical therapy to address the deficits listed in the problem list box on initial evaluation, provide pt/family education and to maximize pt's level of independence in the home and community environment.     Pt's spiritual, cultural and educational needs considered and pt agreeable to plan of care and goals.     Anticipated barriers to physical therapy: None    GOALS:  Short Term Goals (4 Weeks):  1. Patient will be compliant with home exercise program to supplement therapy in promoting functional mobility. (met)    2. Patient will perform transfers with good control to demonstrate improved core strength. (met)    3. Patient will report no pain during thoracolumbar active range of motion to promote functional mobility.  (progressing, not met)    4. Patient will improve impaired lower extremity manual muscle tests  to >/=4/5 to improve strength for functional tasks. (progressing, not met)          Long Term Goals (6 Weeks):   1. Patient will improve FOTO score by 10% limited to decrease perceived limitation with maintaining/changing body position. (progressing, not met)    2. Patient will perform recreational activity with good control to demonstrate improved core strength.  (progressing, not met)    3. Patient will improve impaired lower extremity manual muscle tests to >/=4+/5 to improve strength for functional tasks.  (progressing, not met)    4. Patient will tolerate standing/walking for 30 minutes with  no increase in low back pain to return to PLOF.  (progressing, not met)           Plan      Plan of care Certification: 12/2/24- 3/2/25    Focus on LE strength and endurance with emphasis on functional performance     Outpatient Physical Therapy 2 times weekly for 12 weeks to include the following interventions: Therapeutic Exercises, Manual Therapeutic Technique, Neuromuscular Re Education, Therapeutic Activities. Modalities, Kinesiotape prn, and Functional Dry Needling as needed.      Char Chandra, PT

## 2025-01-06 ENCOUNTER — CLINICAL SUPPORT (OUTPATIENT)
Dept: REHABILITATION | Facility: OTHER | Age: 80
End: 2025-01-06
Payer: MEDICARE

## 2025-01-06 DIAGNOSIS — R29.898 LEFT LEG WEAKNESS: Primary | ICD-10-CM

## 2025-01-06 PROCEDURE — 97530 THERAPEUTIC ACTIVITIES: CPT | Mod: PN

## 2025-01-06 PROCEDURE — 97112 NEUROMUSCULAR REEDUCATION: CPT | Mod: PN

## 2025-01-06 NOTE — PROGRESS NOTES
[facility]   Physical Therapy Treatment Note    Name: Valarie Colindres  Clinic Number: [id number]    Therapy Diagnosis:   Encounter Diagnosis   Name Primary?    Left leg weakness Yes         Physician: Ciarra Velasquez PA-C    Visit Date: 1/6/2025       Physician Orders: Physical Therapy Evaluate and Treat  Medical Diagnosis from Referral: left knee OA  Evaluation Date: 10/30/24  Authorization Period Expiration: 12/31/24  Plan of Care Expiration: 12/2/24- 3/2/25  Visit # / Visits authorized: 2/10  FOTO: 0/3         Time In: 1105am  Time Out: 1205am  Total Billable Time: 45 minutes        SUBJECTIVE     Pt reports: she is doing well today. Patient is concerned because the right knee has had some pain recently.     She was compliant with home exercise program.  Response to previous treatment:  felt well  Functional change: able to put on her pants.     Pain: 2/10  Location: left knee      OBJECTIVE     12/19/24      Lumbar Active range of motion (%) Pain/dysfunction with movement:   Flexion  100    Extension 50 Left posterior knee pain   Right side bending 75    Left side bending 75 Left posterior knee pain   Right rotation 100     Left rotation 100              Right LE     Left LE        Iliopsoas:  L2 5/5 Iliopsoas: L2 4/5    Quadriceps:  L3 - femoral nerve 5/5 Quadriceps: L3 - femoral nerve 4/5    Hip adduction:  L3 - obterator 5/5 Hip adduction: L3 - obterator 4/5    Hamstrings:  S2 4+/5 Hamstrings: S2 4/5    Ankle DF/EV:  L4 5/5 Ankle DF/EV: L4 4+/5    GT Ext:  L5 4+/5 GT Ext L5 4/5    Hip Abduction:  L5-S1 - Superior gluteal nerve 4/5  Hip Abduction: L5-S1 - Superior gluteal nerve 3+/5    Hip extension:  L5-S2 - Inferior gluteal nerve 4/5 Hip extension: L5-S2 - Inferior gluteal nerve 3+/5    Ankle PF:   S1 - tibial nerve NT Ankle PF S1 - tibial nerve NT            Slump R: negative  Slump L: + for pain at posterior knee down to gastroc     SLR R: negative  SLR L: + for pain  ----- Message from Kourtney Pike sent at 9/9/2021  9:36 AM EDT -----  Regarding: Young/telephone  Contact: [phone]  Level 1/Escalated Issue      Caller's first and last name and relationship (if not the patient): n/a      Best contact number(s): [phone]      What are the symptoms: Low blood sugar for past 3 mornings       Transfer successful - yes/no (include outcome): No, no answer      Transfer declined - yes/no (include reason): No, no answer      Was caller advised to seek appropriate level of care - yes/no:       Message from [facility] "at posterior knee and up to posterior thigh       Observation: patient continues to ambulate with decreased knee extension at terminal swing phase. Step length on left is also shorter 2nd to decreased knee extension.      Range of Motion:   Knee Left active Left Passive Right Active R passive   Flexion 110 115 130 135   Extension -8 -6 0 5              TREATMENT       [place] received the treatments listed below:          Patient received manual therapeutic technique for 0 minutes for improved soft tissue and joint mobility including:    R/L tibiofemoral JM AP glides, Gr III  R/L LE LAD with A/P glide   R/L knee gapping with patient in hooklying       Therapeutic Activities were provided for 25 minutes to improve tolerance to functional activities including:       NuStep L5 x 12 minutes for neural priming -  LE only  DKC with tball x20  Bridge on tball x20  SLR with TA bracing and pilates ring 3x10  PPT x20  Bridges with PPT 2x8  Standing knee flexion stretch at 2nd stair 10x10"  Step up 6" 3x10 R/left  Supine sciatic nerve glide with tball x20  TRX squat x30  +Palloff press vs RTB x20 bilaterally      Neuromuscular re education for 20 minutes for improved balance and proprioception including:     Squat with upper extremity assist x20 **increased posterior knee pain on left   Supine clams 30x5" hold green band  L SL hip abduction 3x10  SL clamshell 3x10 vs RTB  Bilateral SL reverse clams 2x10  L Prone femoral nerve glide 3x10  L Prone hip extension 3x10  Seated sciatic nerve glide x10  Prone press up x10  Prone multifidi retraining 2x10 bilaterally **increased left lower extremity pain  Bent over bird dog 2x10        PATIENT EDUCATION AND HOME EXERCISES     Home Exercises Provided and Patient Education Provided     Education provided:   PT educated pt on importance of compliance with their HEP this visit.     Written Home Exercises Provided: Patient instructed to cont prior HEP. Exercises were reviewed and [place] " was able to demonstrate them prior to the end of the session.  Valarie demonstrated good  understanding of the education provided. See EMR under Patient Instructions for exercises provided during therapy sessions    ASSESSMENT     Pt continues to progress well in physical therapy treatment. We added some therapeutic exercise on the right side today 2nd to patient complaint of increased right knee pain the past week. Continue PT POC.    Valarie Is progressing well towards her goals.   Pt prognosis is Good.     Pt will continue to benefit from skilled outpatient physical therapy to address the deficits listed in the problem list box on initial evaluation, provide pt/family education and to maximize pt's level of independence in the home and community environment.     Pt's spiritual, cultural and educational needs considered and pt agreeable to plan of care and goals.     Anticipated barriers to physical therapy: None    GOALS:  Short Term Goals (4 Weeks):  1. Patient will be compliant with home exercise program to supplement therapy in promoting functional mobility. (met)    2. Patient will perform transfers with good control to demonstrate improved core strength. (met)    3. Patient will report no pain during thoracolumbar active range of motion to promote functional mobility.  (progressing, not met)    4. Patient will improve impaired lower extremity manual muscle tests  to >/=4/5 to improve strength for functional tasks. (progressing, not met)          Long Term Goals (6 Weeks):   1. Patient will improve FOTO score by 10% limited to decrease perceived limitation with maintaining/changing body position. (progressing, not met)    2. Patient will perform recreational activity with good control to demonstrate improved core strength.  (progressing, not met)    3. Patient will improve impaired lower extremity manual muscle tests to >/=4+/5 to improve strength for functional tasks.  (progressing, not met)    4. Patient  will tolerate standing/walking for 30 minutes with no increase in low back pain to return to PLOF.  (progressing, not met)           Plan      Plan of care Certification: 12/2/24- 3/2/25    Focus on LE strength and endurance with emphasis on functional performance     Outpatient Physical Therapy 2 times weekly for 12 weeks to include the following interventions: Therapeutic Exercises, Manual Therapeutic Technique, Neuromuscular Re Education, Therapeutic Activities. Modalities, Kinesiotape prn, and Functional Dry Needling as needed.      Drake Cronin, PT

## 2025-01-08 ENCOUNTER — CLINICAL SUPPORT (OUTPATIENT)
Dept: REHABILITATION | Facility: OTHER | Age: 80
End: 2025-01-08
Payer: MEDICARE

## 2025-01-08 DIAGNOSIS — R29.898 LEFT LEG WEAKNESS: Primary | ICD-10-CM

## 2025-01-08 PROCEDURE — 97112 NEUROMUSCULAR REEDUCATION: CPT | Mod: PN,CQ

## 2025-01-08 PROCEDURE — 97530 THERAPEUTIC ACTIVITIES: CPT | Mod: PN,CQ

## 2025-01-08 NOTE — PROGRESS NOTES
[facility]   Physical Therapy Treatment Note    Name: Valarie Colindres  Clinic Number: [id number]    Therapy Diagnosis:   Encounter Diagnosis   Name Primary?    Left leg weakness Yes         Physician: Ciarra Velasquez PA-C    Visit Date: 1/8/2025       Physician Orders: Physical Therapy Evaluate and Treat  Medical Diagnosis from Referral: left knee OA  Evaluation Date: 10/30/24  Authorization Period Expiration: 12/31/24  Plan of Care Expiration: 12/2/24- 3/2/25  Visit # / Visits authorized: 4/10  FOTO: 0/3         Time In: 1100  Time Out: 1200  Total Billable Time: 60 minutes        SUBJECTIVE     Pt reports: she is doing well today. Slightly less pain in her knee today.     She was compliant with home exercise program.  Response to previous treatment:  felt well  Functional change: able to put on her pants.     Pain: 2/10  Location: left knee      OBJECTIVE     12/19/24      Lumbar Active range of motion (%) Pain/dysfunction with movement:   Flexion  100    Extension 50 Left posterior knee pain   Right side bending 75    Left side bending 75 Left posterior knee pain   Right rotation 100     Left rotation 100              Right LE     Left LE        Iliopsoas:  L2 5/5 Iliopsoas: L2 4/5    Quadriceps:  L3 - femoral nerve 5/5 Quadriceps: L3 - femoral nerve 4/5    Hip adduction:  L3 - obterator 5/5 Hip adduction: L3 - obterator 4/5    Hamstrings:  S2 4+/5 Hamstrings: S2 4/5    Ankle DF/EV:  L4 5/5 Ankle DF/EV: L4 4+/5    GT Ext:  L5 4+/5 GT Ext L5 4/5    Hip Abduction:  L5-S1 - Superior gluteal nerve 4/5  Hip Abduction: L5-S1 - Superior gluteal nerve 3+/5    Hip extension:  L5-S2 - Inferior gluteal nerve 4/5 Hip extension: L5-S2 - Inferior gluteal nerve 3+/5    Ankle PF:   S1 - tibial nerve NT Ankle PF S1 - tibial nerve NT            Slump R: negative  Slump L: + for pain at posterior knee down to gastroc     SLR R: negative  SLR L: + for pain at posterior knee and up to posterior  "thigh       Observation: patient continues to ambulate with decreased knee extension at terminal swing phase. Step length on left is also shorter 2nd to decreased knee extension.      Range of Motion:   Knee Left active Left Passive Right Active R passive   Flexion 110 115 130 135   Extension -8 -6 0 5              TREATMENT       [place] received the treatments listed below:      Patient received manual therapeutic technique for 0 minutes for improved soft tissue and joint mobility including:    R/L tibiofemoral JM AP glides, Gr III  R/L LE LAD with A/P glide   R/L knee gapping with patient in hooklying       Therapeutic Activities were provided for 40 minutes to improve tolerance to functional activities including:       NuStep L5 x 10 minutes for neural priming -  LE only  DKC with tball x20  Bridge on tball x20  SLR with TA bracing and pilates ring 3x10  PPT x20  Bridges with PPT 2x8  Standing knee flexion stretch at 2nd stair 10x10"  Step up 6" 3x10 R/left  Supine sciatic nerve glide with tball x20  TRX squat x30  Palloff press vs RTB x20 bilaterally      Neuromuscular re education for 20 minutes for improved balance and proprioception including:     Squat with upper extremity assist x20 **increased posterior knee pain on left   Supine clams 30x5" hold green band  L SL hip abduction 3x10  SL clamshell 3x10 vs RTB  Bilateral SL reverse clams 2x10  L Prone femoral nerve glide 3x10  L Prone hip extension 3x10  Seated sciatic nerve glide x10  Prone press up x10  Prone multifidi retraining 2x10 bilaterally **increased left lower extremity pain  Bent over bird dog 2x10        PATIENT EDUCATION AND HOME EXERCISES     Home Exercises Provided and Patient Education Provided     Education provided:   PT educated pt on importance of compliance with their HEP this visit.     Written Home Exercises Provided: Patient instructed to cont prior HEP. Exercises were reviewed and [place] was able to demonstrate them prior to the " end of the session.  Valarie demonstrated good  understanding of the education provided. See EMR under Patient Instructions for exercises provided during therapy sessions    ASSESSMENT     Valarie was able to demonstrate improved eccentric control with functional step ups.  Pt was able to complete trunk/back rotational exercises with decreased muscle guarding. Muscle weakness in paraspinals/core musculature were notable during exercises however she appears to be improving as she was able to demonstrate improved core/transverse abdominis activation after cuing.        Valarie Is progressing well towards her goals.   Pt prognosis is Good.     Pt will continue to benefit from skilled outpatient physical therapy to address the deficits listed in the problem list box on initial evaluation, provide pt/family education and to maximize pt's level of independence in the home and community environment.     Pt's spiritual, cultural and educational needs considered and pt agreeable to plan of care and goals.     Anticipated barriers to physical therapy: None    GOALS:  Short Term Goals (4 Weeks):  1. Patient will be compliant with home exercise program to supplement therapy in promoting functional mobility. (met)    2. Patient will perform transfers with good control to demonstrate improved core strength. (met)    3. Patient will report no pain during thoracolumbar active range of motion to promote functional mobility.  (progressing, not met)    4. Patient will improve impaired lower extremity manual muscle tests  to >/=4/5 to improve strength for functional tasks. (progressing, not met)          Long Term Goals (6 Weeks):   1. Patient will improve FOTO score by 10% limited to decrease perceived limitation with maintaining/changing body position. (progressing, not met)    2. Patient will perform recreational activity with good control to demonstrate improved core strength.  (progressing, not met)    3. Patient will improve  impaired lower extremity manual muscle tests to >/=4+/5 to improve strength for functional tasks.  (progressing, not met)    4. Patient will tolerate standing/walking for 30 minutes with no increase in low back pain to return to PLOF.  (progressing, not met)           Plan      Plan of care Certification: 12/2/24- 3/2/25    Focus on LE strength and endurance with emphasis on functional performance     Outpatient Physical Therapy 2 times weekly for 12 weeks to include the following interventions: Therapeutic Exercises, Manual Therapeutic Technique, Neuromuscular Re Education, Therapeutic Activities. Modalities, Kinesiotape prn, and Functional Dry Needling as needed.      Darci Nobles, PTA

## 2025-01-15 ENCOUNTER — CLINICAL SUPPORT (OUTPATIENT)
Dept: REHABILITATION | Facility: OTHER | Age: 80
End: 2025-01-15
Payer: MEDICARE

## 2025-01-15 DIAGNOSIS — R29.898 LEFT LEG WEAKNESS: Primary | ICD-10-CM

## 2025-01-15 PROCEDURE — 97530 THERAPEUTIC ACTIVITIES: CPT | Mod: PN,CQ

## 2025-01-15 PROCEDURE — 97112 NEUROMUSCULAR REEDUCATION: CPT | Mod: PN,CQ

## 2025-01-15 NOTE — PROGRESS NOTES
[facility]   Physical Therapy Treatment Note    Name: Valarie Colindres  Clinic Number: [id number]    Therapy Diagnosis:   Encounter Diagnosis   Name Primary?    Left leg weakness Yes         Physician: Ciarra Velasquez PA-C    Visit Date: 1/15/2025       Physician Orders: Physical Therapy Evaluate and Treat  Medical Diagnosis from Referral: left knee OA  Evaluation Date: 10/30/24  Authorization Period Expiration: 12/31/24  Plan of Care Expiration: 12/2/24- 3/2/25  Visit # / Visits authorized: 5/10  FOTO: 0/3        Time In: 1517  Time Out: 1633  Total Billable Time: 38 minutes        SUBJECTIVE     Pt reports: she is doing well today. She feels some progress in her knee pain overall. She feels like PT is helping.     She was compliant with home exercise program.  Response to previous treatment:  felt well, no issues  Functional change: able to put on her pants.     Pain: 2/10  Location: left knee      OBJECTIVE     12/19/24      Lumbar Active range of motion (%) Pain/dysfunction with movement:   Flexion  100    Extension 50 Left posterior knee pain   Right side bending 75    Left side bending 75 Left posterior knee pain   Right rotation 100     Left rotation 100              Right LE     Left LE        Iliopsoas:  L2 5/5 Iliopsoas: L2 4/5    Quadriceps:  L3 - femoral nerve 5/5 Quadriceps: L3 - femoral nerve 4/5    Hip adduction:  L3 - obterator 5/5 Hip adduction: L3 - obterator 4/5    Hamstrings:  S2 4+/5 Hamstrings: S2 4/5    Ankle DF/EV:  L4 5/5 Ankle DF/EV: L4 4+/5    GT Ext:  L5 4+/5 GT Ext L5 4/5    Hip Abduction:  L5-S1 - Superior gluteal nerve 4/5  Hip Abduction: L5-S1 - Superior gluteal nerve 3+/5    Hip extension:  L5-S2 - Inferior gluteal nerve 4/5 Hip extension: L5-S2 - Inferior gluteal nerve 3+/5    Ankle PF:   S1 - tibial nerve NT Ankle PF S1 - tibial nerve NT            Slump R: negative  Slump L: + for pain at posterior knee down to gastroc     SLR R: negative  SLR L: +  "for pain at posterior knee and up to posterior thigh       Observation: patient continues to ambulate with decreased knee extension at terminal swing phase. Step length on left is also shorter 2nd to decreased knee extension.      Range of Motion:   Knee Left active Left Passive Right Active R passive   Flexion 110 115 130 135   Extension -8 -6 0 5              TREATMENT       [place] received the treatments listed below:      Patient received manual therapeutic technique for 0 minutes for improved soft tissue and joint mobility including:    R/L tibiofemoral JM AP glides, Gr III  R/L LE LAD with A/P glide   R/L knee gapping with patient in hooklying       Therapeutic Activities were provided for 23 minutes to improve tolerance to functional activities including:       NuStep L5 x 10 minutes for neural priming -  LE only  DKC with tball x20  Bridge on tball x20  SLR with TA bracing and pilates ring 3x10  PPT x20  Bridges with PPT 2x8  Standing knee flexion stretch at 2nd stair 10x10"  Step up 6" 3x10 R/left  Supine sciatic nerve glide with tball x20  TRX squat x30  Palloff press vs RTB x20 bilaterally      Neuromuscular re education for 15 minutes for improved balance and proprioception including:     Squat with upper extremity assist x20 **increased posterior knee pain on left   Supine clams 30x5" hold green band  L SL hip abduction 3x10  SL clamshell 3x10 vs RTB  Bilateral SL reverse clams 2x10  L Prone femoral nerve glide 3x10  L Prone hip extension 3x10  Seated sciatic nerve glide x10  Prone press up x10  Prone multifidi retraining 2x10 bilaterally **increased left lower extremity pain  Bent over bird dog 2x10        PATIENT EDUCATION AND HOME EXERCISES     Home Exercises Provided and Patient Education Provided     Education provided:   PT educated pt on importance of compliance with their HEP this visit.     Written Home Exercises Provided: Patient instructed to cont prior HEP. Exercises were reviewed and " Valarie was able to demonstrate them prior to the end of the session.  Valarie demonstrated good  understanding of the education provided. See EMR under Patient Instructions for exercises provided during therapy sessions    ASSESSMENT     Valarie demonstrated improved TrA activation with exercises today as well as improved endurance with functional step ups.       Valarie Is progressing well towards her goals.   Pt prognosis is Good.     Pt will continue to benefit from skilled outpatient physical therapy to address the deficits listed in the problem list box on initial evaluation, provide pt/family education and to maximize pt's level of independence in the home and community environment.     Pt's spiritual, cultural and educational needs considered and pt agreeable to plan of care and goals.     Anticipated barriers to physical therapy: None    GOALS:  Short Term Goals (4 Weeks):  1. Patient will be compliant with home exercise program to supplement therapy in promoting functional mobility. (met)    2. Patient will perform transfers with good control to demonstrate improved core strength. (met)    3. Patient will report no pain during thoracolumbar active range of motion to promote functional mobility.  (progressing, not met)    4. Patient will improve impaired lower extremity manual muscle tests  to >/=4/5 to improve strength for functional tasks. (progressing, not met)          Long Term Goals (6 Weeks):   1. Patient will improve FOTO score by 10% limited to decrease perceived limitation with maintaining/changing body position. (progressing, not met)    2. Patient will perform recreational activity with good control to demonstrate improved core strength.  (progressing, not met)    3. Patient will improve impaired lower extremity manual muscle tests to >/=4+/5 to improve strength for functional tasks.  (progressing, not met)    4. Patient will tolerate standing/walking for 30 minutes with no increase in low back  pain to return to PLOF.  (progressing, not met)           Plan      Plan of care Certification: 12/2/24- 3/2/25    Focus on LE strength and endurance with emphasis on functional performance     Outpatient Physical Therapy 2 times weekly for 12 weeks to include the following interventions: Therapeutic Exercises, Manual Therapeutic Technique, Neuromuscular Re Education, Therapeutic Activities. Modalities, Kinesiotape prn, and Functional Dry Needling as needed.      Darci Nobles, PTA

## 2025-01-19 DIAGNOSIS — E78.2 MIXED HYPERLIPIDEMIA: ICD-10-CM

## 2025-01-19 RX ORDER — ATORVASTATIN CALCIUM 10 MG/1
10 TABLET, FILM COATED ORAL NIGHTLY
Qty: 90 TABLET | Refills: 1 | Status: SHIPPED | OUTPATIENT
Start: 2025-01-19

## 2025-01-19 NOTE — TELEPHONE ENCOUNTER
No care due was identified.  [facility] Embedded Care Due Messages. Reference number: [id number].   1/19/2025 3:24:26 PM CST

## 2025-01-19 NOTE — TELEPHONE ENCOUNTER
Refill Decision Note   Valarie Colindres  is requesting a refill authorization.  Brief Assessment and Rationale for Refill:  Approve     Medication Therapy Plan:        Comments:     Note composed:4:13 PM 01/19/2025

## 2025-01-24 ENCOUNTER — CLINICAL SUPPORT (OUTPATIENT)
Dept: REHABILITATION | Facility: OTHER | Age: 80
End: 2025-01-24
Payer: MEDICARE

## 2025-01-24 DIAGNOSIS — R29.898 LEFT LEG WEAKNESS: Primary | ICD-10-CM

## 2025-01-24 PROCEDURE — 97530 THERAPEUTIC ACTIVITIES: CPT | Mod: PN,CQ

## 2025-01-24 PROCEDURE — 97112 NEUROMUSCULAR REEDUCATION: CPT | Mod: PN,CQ

## 2025-01-24 NOTE — PROGRESS NOTES
[facility]   Physical Therapy Treatment Note        Name: Valarie Colindres  Clinic Number: [id number]    Therapy Diagnosis:   Encounter Diagnosis   Name Primary?    Left leg weakness Yes           Physician: Ciarra Velasquez PA-C    Visit Date: 1/24/2025       Physician Orders: Physical Therapy Evaluate and Treat  Medical Diagnosis from Referral: left knee OA  Evaluation Date: 10/30/24  Authorization Period Expiration: 12/31/24  Plan of Care Expiration: 12/2/24- 3/2/25  Visit # / Visits authorized: 6/10  FOTO: 0/3        Time In: 1300  Time Out: 1408  Total Billable Time: 53 minutes        SUBJECTIVE     Pt reports: she is a little stiff in her joints today. Her L hamstring is bothering her a little.   Response to previous treatment:  felt well, no issues  Functional change: able to put on her pants.     Pain: 2/10  Location: left knee      OBJECTIVE     12/19/24      Lumbar Active range of motion (%) Pain/dysfunction with movement:   Flexion  100    Extension 50 Left posterior knee pain   Right side bending 75    Left side bending 75 Left posterior knee pain   Right rotation 100     Left rotation 100              Right LE     Left LE        Iliopsoas:  L2 5/5 Iliopsoas: L2 4/5    Quadriceps:  L3 - femoral nerve 5/5 Quadriceps: L3 - femoral nerve 4/5    Hip adduction:  L3 - obterator 5/5 Hip adduction: L3 - obterator 4/5    Hamstrings:  S2 4+/5 Hamstrings: S2 4/5    Ankle DF/EV:  L4 5/5 Ankle DF/EV: L4 4+/5    GT Ext:  L5 4+/5 GT Ext L5 4/5    Hip Abduction:  L5-S1 - Superior gluteal nerve 4/5  Hip Abduction: L5-S1 - Superior gluteal nerve 3+/5    Hip extension:  L5-S2 - Inferior gluteal nerve 4/5 Hip extension: L5-S2 - Inferior gluteal nerve 3+/5    Ankle PF:   S1 - tibial nerve NT Ankle PF S1 - tibial nerve NT            Slump R: negative  Slump L: + for pain at posterior knee down to gastroc     SLR R: negative  SLR L: + for pain at posterior knee and up to posterior thigh        "Observation: patient continues to ambulate with decreased knee extension at terminal swing phase. Step length on left is also shorter 2nd to decreased knee extension.      Range of Motion:   Knee Left active Left Passive Right Active R passive   Flexion 110 115 130 135   Extension -8 -6 0 5              TREATMENT       [place] received the treatments listed below:      Patient received manual therapeutic technique for 0 minutes for improved soft tissue and joint mobility including:    R/L tibiofemoral JM AP glides, Gr III  R/L LE LAD with A/P glide   R/L knee gapping with patient in hooklying       Therapeutic Activities were provided for 43 minutes to improve tolerance to functional activities including:       NuStep L5 x 10 minutes for neural priming -  LE only  DKC with tball x20  Bridge on tball x20  SLR with TA bracing and pilates ring 3x10  PPT x20  Bridges with PPT 2x8  Standing knee flexion stretch at 2nd stair 10x10"  Step up 6" 3x10 R/left  Supine sciatic nerve glide with tball x20  TRX squat x30  Palloff press vs RTB x20 bilaterally      Neuromuscular re education for 10 minutes for improved balance and proprioception including:     Squat with upper extremity assist x20 **increased posterior knee pain on left   Supine clams 30x5" hold green band  L SL hip abduction 3x10  SL clamshell 3x10 vs RTB  Bilateral SL reverse clams 2x10  L Prone femoral nerve glide 3x10  L Prone hip extension 3x10  Seated sciatic nerve glide x10  Prone press up x10  Prone multifidi retraining 2x10 bilaterally **increased left lower extremity pain  Bent over bird dog 2x10        PATIENT EDUCATION AND HOME EXERCISES     Home Exercises Provided and Patient Education Provided     Education provided:   PT educated pt on importance of compliance with their HEP this visit.     Written Home Exercises Provided: Patient instructed to cont prior HEP. Exercises were reviewed and [place] was able to demonstrate them prior to the end of the " session.  Valarie demonstrated good  understanding of the education provided. See EMR under Patient Instructions for exercises provided during therapy sessions    ASSESSMENT     Valarie was slightly less guarded with trunk/back rotational exercises as well as with transitional movements. She was able to demonstrate improved ROM with back/trunk extension most notably with bridging.  She continues to report radicular symptoms in her L posterior knee which appears to be coming from her back. This is negatively impacting this individuals ability with ADL's. Will continue to address these deficits and promote improved strength, ROM and functional performance as tolerated.        Valarie Is progressing well towards her goals.   Pt prognosis is Good.     Pt will continue to benefit from skilled outpatient physical therapy to address the deficits listed in the problem list box on initial evaluation, provide pt/family education and to maximize pt's level of independence in the home and community environment.     Pt's spiritual, cultural and educational needs considered and pt agreeable to plan of care and goals.     Anticipated barriers to physical therapy: None    GOALS:  Short Term Goals (4 Weeks):  1. Patient will be compliant with home exercise program to supplement therapy in promoting functional mobility. (met)    2. Patient will perform transfers with good control to demonstrate improved core strength. (met)    3. Patient will report no pain during thoracolumbar active range of motion to promote functional mobility.  (progressing, not met)    4. Patient will improve impaired lower extremity manual muscle tests  to >/=4/5 to improve strength for functional tasks. (progressing, not met)          Long Term Goals (6 Weeks):   1. Patient will improve FOTO score by 10% limited to decrease perceived limitation with maintaining/changing body position. (progressing, not met)    2. Patient will perform recreational activity  with good control to demonstrate improved core strength.  (progressing, not met)    3. Patient will improve impaired lower extremity manual muscle tests to >/=4+/5 to improve strength for functional tasks.  (progressing, not met)    4. Patient will tolerate standing/walking for 30 minutes with no increase in low back pain to return to PLOF.  (progressing, not met)           Plan      Plan of care Certification: 12/2/24- 3/2/25    Focus on LE strength and endurance with emphasis on functional performance     Outpatient Physical Therapy 2 times weekly for 12 weeks to include the following interventions: Therapeutic Exercises, Manual Therapeutic Technique, Neuromuscular Re Education, Therapeutic Activities. Modalities, Kinesiotape prn, and Functional Dry Needling as needed.      Darci Nobles, PTA

## 2025-01-28 ENCOUNTER — CLINICAL SUPPORT (OUTPATIENT)
Dept: REHABILITATION | Facility: OTHER | Age: 80
End: 2025-01-28
Payer: MEDICARE

## 2025-01-28 DIAGNOSIS — R29.898 LEFT LEG WEAKNESS: Primary | ICD-10-CM

## 2025-01-28 PROCEDURE — 97112 NEUROMUSCULAR REEDUCATION: CPT | Mod: PN,CQ

## 2025-01-28 PROCEDURE — 97530 THERAPEUTIC ACTIVITIES: CPT | Mod: PN,CQ

## 2025-01-28 NOTE — PROGRESS NOTES
[facility]   Physical Therapy Treatment Note        Name: Valarie Colindres  Clinic Number: [id number]    Therapy Diagnosis:   Encounter Diagnosis   Name Primary?    Left leg weakness Yes       Physician: Ciarra Velasquez PA-C    Visit Date: 1/28/2025       Physician Orders: Physical Therapy Evaluate and Treat  Medical Diagnosis from Referral: left knee OA  Evaluation Date: 10/30/24  Authorization Period Expiration: 12/31/24  Plan of Care Expiration: 12/2/24- 3/2/25  Visit # / Visits authorized: 6/10  FOTO: 0/3        Time In: 1400  Time Out: 1510  Total Billable Time: 53 minutes        SUBJECTIVE     Pt reports: she is doing well today. She has been feeling some stiffness in her back today.   Response to previous treatment:  felt well, no issues  Functional change: able to put on her pants.     Pain: 2/10  Location: left knee      OBJECTIVE     12/19/24      Lumbar Active range of motion (%) Pain/dysfunction with movement:   Flexion  100    Extension 50 Left posterior knee pain   Right side bending 75    Left side bending 75 Left posterior knee pain   Right rotation 100     Left rotation 100              Right LE     Left LE        Iliopsoas:  L2 5/5 Iliopsoas: L2 4/5    Quadriceps:  L3 - femoral nerve 5/5 Quadriceps: L3 - femoral nerve 4/5    Hip adduction:  L3 - obterator 5/5 Hip adduction: L3 - obterator 4/5    Hamstrings:  S2 4+/5 Hamstrings: S2 4/5    Ankle DF/EV:  L4 5/5 Ankle DF/EV: L4 4+/5    GT Ext:  L5 4+/5 GT Ext L5 4/5    Hip Abduction:  L5-S1 - Superior gluteal nerve 4/5  Hip Abduction: L5-S1 - Superior gluteal nerve 3+/5    Hip extension:  L5-S2 - Inferior gluteal nerve 4/5 Hip extension: L5-S2 - Inferior gluteal nerve 3+/5    Ankle PF:   S1 - tibial nerve NT Ankle PF S1 - tibial nerve NT            Slump R: negative  Slump L: + for pain at posterior knee down to gastroc     SLR R: negative  SLR L: + for pain at posterior knee and up to posterior thigh       Observation:  "patient continues to ambulate with decreased knee extension at terminal swing phase. Step length on left is also shorter 2nd to decreased knee extension.      Range of Motion:   Knee Left active Left Passive Right Active R passive   Flexion 110 115 130 135   Extension -8 -6 0 5              TREATMENT       [place] received the treatments listed below:      Patient received manual therapeutic technique for 0 minutes for improved soft tissue and joint mobility including:    R/L tibiofemoral JM AP glides, Gr III  R/L LE LAD with A/P glide   R/L knee gapping with patient in hooklying       Therapeutic Activities were provided for 43 minutes to improve tolerance to functional activities including:       NuStep L5 x 10 minutes for neural priming -  LE only  DKC with tball x20  Bridge on tball x20  SLR with TA bracing and pilates ring 3x10  PPT x20  Bridges with PPT 2x8  Standing knee flexion stretch at 2nd stair 10x10"  Step up 6" 3x10 R/left  Supine sciatic nerve glide with tball x20  TRX squat x30  Palloff press vs RTB x20 bilaterally      Neuromuscular re education for 10 minutes for improved balance and proprioception including:     Squat with upper extremity assist x20 **increased posterior knee pain on left   Supine clams 30x5" hold green band  L SL hip abduction 3x10  SL clamshell 3x10 vs RTB  Bilateral SL reverse clams 2x10  L Prone femoral nerve glide 3x10  L Prone hip extension 3x10  Seated sciatic nerve glide x10  Prone press up x10  Prone multifidi retraining 2x10 bilaterally **increased left lower extremity pain  Bent over bird dog 2x10        PATIENT EDUCATION AND HOME EXERCISES     Home Exercises Provided and Patient Education Provided     Education provided:   PT educated pt on importance of compliance with their HEP this visit.     Written Home Exercises Provided: Patient instructed to cont prior HEP. Exercises were reviewed and [place] was able to demonstrate them prior to the end of the session.  " Valarie demonstrated good  understanding of the education provided. See EMR under Patient Instructions for exercises provided during therapy sessions    ASSESSMENT     Valarie tolerated session well today. Emphasis was focused on improving endurance and strength with pt demonstrating good training effect at challenge-point. We will continue to progress as tolerated towards goals set forth in POC as functional/strength deficits continue to remain notable at this time.         Valarie Is progressing well towards her goals.   Pt prognosis is Good.     Pt will continue to benefit from skilled outpatient physical therapy to address the deficits listed in the problem list box on initial evaluation, provide pt/family education and to maximize pt's level of independence in the home and community environment.     Pt's spiritual, cultural and educational needs considered and pt agreeable to plan of care and goals.     Anticipated barriers to physical therapy: None    GOALS:  Short Term Goals (4 Weeks):  1. Patient will be compliant with home exercise program to supplement therapy in promoting functional mobility. (met)    2. Patient will perform transfers with good control to demonstrate improved core strength. (met)    3. Patient will report no pain during thoracolumbar active range of motion to promote functional mobility.  (progressing, not met)    4. Patient will improve impaired lower extremity manual muscle tests  to >/=4/5 to improve strength for functional tasks. (progressing, not met)          Long Term Goals (6 Weeks):   1. Patient will improve FOTO score by 10% limited to decrease perceived limitation with maintaining/changing body position. (progressing, not met)    2. Patient will perform recreational activity with good control to demonstrate improved core strength.  (progressing, not met)    3. Patient will improve impaired lower extremity manual muscle tests to >/=4+/5 to improve strength for functional tasks.   (progressing, not met)    4. Patient will tolerate standing/walking for 30 minutes with no increase in low back pain to return to PLOF.  (progressing, not met)           Plan      Plan of care Certification: 12/2/24- 3/2/25    Focus on LE strength and endurance with emphasis on functional performance     Outpatient Physical Therapy 2 times weekly for 12 weeks to include the following interventions: Therapeutic Exercises, Manual Therapeutic Technique, Neuromuscular Re Education, Therapeutic Activities. Modalities, Kinesiotape prn, and Functional Dry Needling as needed.      Darci Nobles, PTA

## 2025-01-30 ENCOUNTER — OFFICE VISIT (OUTPATIENT)
Dept: URGENT CARE | Facility: CLINIC | Age: 80
End: 2025-01-30
Payer: MEDICARE

## 2025-01-30 VITALS
BODY MASS INDEX: 23.78 KG/M2 | RESPIRATION RATE: 20 BRPM | DIASTOLIC BLOOD PRESSURE: 95 MMHG | HEART RATE: 105 BPM | WEIGHT: 139.31 LBS | OXYGEN SATURATION: 98 % | TEMPERATURE: 101 F | HEIGHT: 64 IN | SYSTOLIC BLOOD PRESSURE: 162 MMHG

## 2025-01-30 DIAGNOSIS — U07.1 COVID-19 VIRUS DETECTED: ICD-10-CM

## 2025-01-30 DIAGNOSIS — R05.9 COUGH, UNSPECIFIED TYPE: ICD-10-CM

## 2025-01-30 DIAGNOSIS — U07.1 COVID-19: Primary | ICD-10-CM

## 2025-01-30 DIAGNOSIS — R03.0 ELEVATED BLOOD PRESSURE READING: ICD-10-CM

## 2025-01-30 DIAGNOSIS — H65.193 ACUTE EFFUSION OF BOTH MIDDLE EARS: ICD-10-CM

## 2025-01-30 LAB
CTP QC/QA: YES
CTP QC/QA: YES
POC MOLECULAR INFLUENZA A AGN: NEGATIVE
POC MOLECULAR INFLUENZA B AGN: NEGATIVE
SARS-COV-2 AG RESP QL IA.RAPID: POSITIVE

## 2025-01-30 PROCEDURE — 87502 INFLUENZA DNA AMP PROBE: CPT | Mod: QW,S$GLB,, | Performed by: PHYSICIAN ASSISTANT

## 2025-01-30 PROCEDURE — 87811 SARS-COV-2 COVID19 W/OPTIC: CPT | Mod: QW,S$GLB,, | Performed by: PHYSICIAN ASSISTANT

## 2025-01-30 PROCEDURE — 99214 OFFICE O/P EST MOD 30 MIN: CPT | Mod: S$GLB,,, | Performed by: PHYSICIAN ASSISTANT

## 2025-01-30 RX ORDER — ACETAMINOPHEN 500 MG
1000 TABLET ORAL
Status: COMPLETED | OUTPATIENT
Start: 2025-01-30 | End: 2025-01-30

## 2025-01-30 RX ORDER — BENZONATATE 100 MG/1
100 CAPSULE ORAL 3 TIMES DAILY PRN
Qty: 30 CAPSULE | Refills: 0 | Status: SHIPPED | OUTPATIENT
Start: 2025-01-30 | End: 2025-02-09

## 2025-01-30 RX ORDER — FLUTICASONE PROPIONATE 50 MCG
1 SPRAY, SUSPENSION (ML) NASAL DAILY
Qty: 16 G | Refills: 0 | Status: SHIPPED | OUTPATIENT
Start: 2025-01-30 | End: 2025-02-06

## 2025-01-30 RX ORDER — CETIRIZINE HYDROCHLORIDE 10 MG/1
10 TABLET ORAL DAILY
Qty: 7 TABLET | Refills: 0 | Status: SHIPPED | OUTPATIENT
Start: 2025-01-30 | End: 2025-02-06

## 2025-01-30 RX ADMIN — Medication 1000 MG: at 04:01

## 2025-01-30 NOTE — PROGRESS NOTES
"Subjective:      Patient ID: Valarie Colindres is a 79 y.o. female.    Vitals:  height is 5' 4" (1.626 m) and weight is 63.2 kg (139 lb 5.3 oz). Her temporal temperature is 100.8 °F (38.2 °C) (abnormal). Her blood pressure is 162/95 (abnormal) and her pulse is 105. Her respiration is 20 and oxygen saturation is 98%.     Chief Complaint: Cough (With headaches associated. )    Patient reports with a cough, fever, sore throat from the coughing, and some body aches that presented on Tuesday, she reports with taking Tylenol extra strength to help combat her multi-symptoms only on Tuesday with the initial start of her symptoms however, she reports with no relief and nothing else taken to help combat her symptoms.  Patient has not taken any Tylenol today    Cough  This is a new problem. The current episode started in the past 7 days. The problem has been gradually worsening. The problem occurs every few minutes. The cough is Productive of sputum. Associated symptoms include chills, a fever, headaches, myalgias, nasal congestion, postnasal drip, rhinorrhea, a sore throat (due to cough.) and sweats. Pertinent negatives include no chest pain, ear congestion, ear pain, heartburn, hemoptysis, rash, shortness of breath, weight loss or wheezing. Nothing aggravates the symptoms. Treatments tried: tylenol extra strenght. The treatment provided no relief. Her past medical history is significant for emphysema (before 2005 informed of early stage onset.) and environmental allergies. There is no history of asthma, bronchiectasis, bronchitis, COPD or pneumonia.       Constitution: Positive for appetite change, chills, fatigue and fever. Negative for sweating.   HENT:  Positive for postnasal drip, sinus pressure and sore throat (due to cough.). Negative for ear pain, ear discharge, hearing loss, dental problem, drooling, mouth sores, tongue pain, tongue lesion, congestion, sinus pain, trouble swallowing and voice change.    Neck: " Negative for neck pain and neck stiffness.   Cardiovascular:  Negative for chest pain.   Eyes:  Negative for eye discharge and eye itching.   Respiratory:  Positive for chest tightness, cough and sputum production. Negative for bloody sputum, shortness of breath and wheezing.    Gastrointestinal:  Negative for abdominal pain, nausea, vomiting, constipation, diarrhea and heartburn.   Musculoskeletal:  Positive for muscle ache.   Skin:  Negative for rash.   Allergic/Immunologic: Positive for environmental allergies.   Neurological:  Positive for headaches. Negative for dizziness.      Past Medical History:   Diagnosis Date    Angle recession of right eye     Blunt trauma of both eyes     hit across eyes with bungee exercise band    Cataract     Cystocele, midline     Dry eye syndrome     Ectropion due to laxity of eyelid, right     GERD (gastroesophageal reflux disease)     Hyperlipidemia     Hypertension     PVD (posterior vitreous detachment), right eye     Rectocele     Retinal drusen of both eyes     Thalassemia major     Vaginal atrophy        Past Surgical History:   Procedure Laterality Date    ABDOMINAL ADHESION SURGERY  1978    ANKLE FRACTURE SURGERY Right 1996    COLONOSCOPY  11/09/2022    COLONOSCOPY N/A 12/5/2023    Procedure: COLONOSCOPY;  Surgeon: Xavier Lawrence MD;  Location: [facility] ([address] [place] [address]);  Service: Endoscopy;  Laterality: N/A;    ECTROPION REPAIR Bilateral 06/2018    Dr. Flaherty    ESOPHAGOGASTRODUODENOSCOPY N/A 12/5/2023    Procedure: EGD (ESOPHAGOGASTRODUODENOSCOPY);  Surgeon: Xavier Lawrence MD;  Location: [address]);  Service: Endoscopy;  Laterality: N/A;  9/6- referred by Dr. Lawrence/Additional Scheduling Information:  Needs constipation bowel prep protocol / prep instructions handed to patient and to portal. AS  11/28-precall complete-MS    HYSTERECTOMY  1977    possible cancerous lesion       Family History   Problem Relation Name Age of Onset    Stroke Mother      Heart  disease Mother      Prostate cancer Father      Colon cancer Sister Ladan     Hypertension Sister Ladan     No Known Problems Sister Maddy     No Known Problems Brother Félix     Prostate cancer Brother Gene     Diabetes Brother Gabriele     No Known Problems Brother August     Crohn's disease Daughter Leisjayant     Breast cancer Maternal Aunt Hobgood     Glaucoma Maternal Uncle August     Blindness Maternal Uncle August     Hypertension Maternal Grandmother      No Known Problems Maternal Grandfather      No Known Problems Paternal Grandmother      No Known Problems Paternal Grandfather      Cataracts Neg Hx      Macular degeneration Neg Hx         Social History     Socioeconomic History    Marital status:    Occupational History     Comment: Retired in 2018 -    Tobacco Use    Smoking status: Never     Passive exposure: Never    Smokeless tobacco: Never   Substance and Sexual Activity    Alcohol use: Not Currently     Comment: none    Drug use: No    Sexual activity: Not Currently     Social Drivers of Health     Financial Resource Strain: Low Risk  (11/14/2023)    Overall Financial Resource Strain (CARDIA)     Difficulty of Paying Living Expenses: Not hard at all   Food Insecurity: No Food Insecurity (11/14/2023)    Hunger Vital Sign     Worried About Running Out of Food in the Last Year: Never true     Ran Out of Food in the Last Year: Never true   Transportation Needs: No Transportation Needs (11/14/2023)    PRAPARE - Transportation     Lack of Transportation (Medical): No     Lack of Transportation (Non-Medical): No   Physical Activity: Insufficiently Active (11/14/2023)    Exercise Vital Sign     Days of Exercise per Week: 2 days     Minutes of Exercise per Session: 60 min   Stress: No Stress Concern Present (11/14/2023)    [employer] [place] [employer] - Occupational Stress Questionnaire     Feeling of Stress : Not at all   Housing Stability: Low Risk  (11/14/2023)     Housing Stability Vital Sign     Unable to Pay for Housing in the Last Year: No     Number of Places Lived in the Last Year: 1     Unstable Housing in the Last Year: No       Current Outpatient Medications   Medication Sig Dispense Refill    acetaminophen (TYLENOL) 500 MG tablet Take 1-2 tablets (500-1,000 mg total) by mouth 3 (three) times daily as needed for Pain.  0    albuterol (PROVENTIL/VENTOLIN HFA) 90 mcg/actuation inhaler Inhale 1-2 puffs into the lungs every 6 (six) hours as needed for Wheezing or Shortness of Breath. (Cough) Rescue 25.5 g 0    alendronate (FOSAMAX) 70 MG tablet TK 1 T PO EVERY WEEK      ALPRAZolam (XANAX) 0.25 MG tablet Take 1 tablet (0.25 mg total) by mouth daily as needed for Anxiety (FLIGHT ANXIETY). This medication can cause sedation. Try on a day you do not have to work the next day. Do not mix with alcohol or any other sedating meds (such as sleep aids, opioids, or benzos). Caution with driving or operating heavy machinery. 7 tablet 0    amLODIPine (NORVASC) 5 MG tablet       aspirin (ECOTRIN) 81 MG EC tablet Take 1 tablet by mouth once daily.      atorvastatin (LIPITOR) 10 MG tablet TAKE 1 TABLET BY MOUTH EVERY DAY IN THE EVENING 90 tablet 1    azelastine (ASTELIN) 137 mcg (0.1 %) nasal spray Two sprays in each nostril, sniff until absorbed, then follow with 1 spray of fluticasone.  Use both sprays twice daily. 30 mL 11    chlorhexidine (PERIDEX) 0.12 % solution RINSE AND SWISH ONE CAPFUL BID  1    chlorzoxazone (PARAFON FORTE) 500 mg Tab TAKE 1 TABLET(S) 3 TIMES A DAY BY ORAL ROUTE AS NEEDED.      clobetasoL (TEMOVATE) 0.05 % external solution       COMIRNATY 2023-24, 12Y UP,,PF, 30 mcg/0.3 mL inection       diclofenac sodium (VOLTAREN) 1 % Gel Apply 2 g topically 4 (four) times daily. 1 each 2    famotidine (PEPCID) 20 MG tablet Take 1 tablet (20 mg total) by mouth 2 (two) times daily. 180 tablet 3    levothyroxine (SYNTHROID) 100 MCG tablet TAKE 1 TABLET BY MOUTH EVERY DAY 90  tablet 2    meloxicam (MOBIC) 15 MG tablet Take 1 tablet (15 mg total) by mouth once daily. 30 tablet 0    pantoprazole (PROTONIX) 40 MG tablet Take 40 mg by mouth every morning.      valsartan (DIOVAN) 320 MG tablet TAKE 1 TABLET BY MOUTH ONCE DAILY. 90 tablet 2    benzonatate (TESSALON) 100 MG capsule Take 1 capsule (100 mg total) by mouth 3 (three) times daily as needed for Cough. 30 capsule 0    cetirizine (ZYRTEC) 10 MG tablet Take 1 tablet (10 mg total) by mouth once daily. for 7 days 7 tablet 0    fluticasone propionate (FLONASE) 50 mcg/actuation nasal spray 1 spray (50 mcg total) by Each Nostril route once daily. for 7 days 16 g 0    nirmatrelvir-ritonavir 300 mg (150 mg x 2)-100 mg copackaged tablets (EUA) Take 3 tablets by mouth 2 (two) times daily for 5 days. Each dose contains 2 nirmatrelvir (pink tablets) and 1 ritonavir (white tablet). Take all 3 tablets together 30 tablet 0     No current facility-administered medications for this visit.       Review of patient's allergies indicates:   Allergen Reactions    Sutures, catgut (chromic)        Objective:     Physical Exam   Constitutional:  Non-toxic appearance. She does not appear ill. No distress.   HENT:   Head: Normocephalic and atraumatic.   Ears:   Right Ear: External ear and ear canal normal. Tympanic membrane is not injected, not erythematous, not retracted and not bulging. A middle ear effusion is present. no impacted cerumen  Left Ear: External ear and ear canal normal. Tympanic membrane is not injected, not erythematous, not retracted and not bulging. A middle ear effusion is present. no impacted cerumen  Nose: Rhinorrhea present. No congestion. Right sinus exhibits no maxillary sinus tenderness and no frontal sinus tenderness. Left sinus exhibits no maxillary sinus tenderness and no frontal sinus tenderness.   Mouth/Throat: Mucous membranes are moist. Posterior oropharyngeal erythema present. No oropharyngeal exudate. Tonsils are 1+ on the  right. Tonsils are 1+ on the left. No tonsillar exudate.   Eyes: Conjunctivae are normal. Right eye exhibits no discharge. Left eye exhibits no discharge.   Neck: Neck supple.   Cardiovascular: Normal rate, regular rhythm and normal heart sounds.   No murmur heard.Exam reveals no gallop and no friction rub.   Pulmonary/Chest: Effort normal and breath sounds normal. No stridor. No respiratory distress. She has no wheezes. She has no rhonchi. She has no rales.   Abdominal: Normal appearance.   Musculoskeletal:      Cervical back: She exhibits no tenderness.   Lymphadenopathy:     She has no cervical adenopathy.   Neurological: She is alert.   Skin: Skin is warm, dry, not diaphoretic and no rash.   Psychiatric: Her behavior is normal. Mood normal.   Nursing note and vitals reviewed.    Results for orders placed or performed in visit on 01/30/25   POCT Influenza A/B MOLECULAR    Collection Time: 01/30/25  3:18 PM   Result Value Ref Range    POC Molecular Influenza A Ag Negative Negative    POC Molecular Influenza B Ag Negative Negative     Acceptable Yes    SARS Coronavirus 2 Antigen, POCT Manual Read    Collection Time: 01/30/25  3:18 PM   Result Value Ref Range    SARS Coronavirus 2 Antigen Positive (A) Negative     Acceptable Yes        Assessment:     1. COVID-19    2. Cough, unspecified type    3. Elevated blood pressure reading    4. Acute effusion of both middle ears        Plan:       COVID-19  -     POCT Influenza A/B MOLECULAR  -     SARS Coronavirus 2 Antigen, POCT Manual Read  -     nirmatrelvir-ritonavir 300 mg (150 mg x 2)-100 mg copackaged tablets (EUA); Take 3 tablets by mouth 2 (two) times daily for 5 days. Each dose contains 2 nirmatrelvir (pink tablets) and 1 ritonavir (white tablet). Take all 3 tablets together  Dispense: 30 tablet; Refill: 0  -     benzonatate (TESSALON) 100 MG capsule; Take 1 capsule (100 mg total) by mouth 3 (three) times daily as needed for Cough.   Dispense: 30 capsule; Refill: 0  -     fluticasone propionate (FLONASE) 50 mcg/actuation nasal spray; 1 spray (50 mcg total) by Each Nostril route once daily. for 7 days  Dispense: 16 g; Refill: 0  -     cetirizine (ZYRTEC) 10 MG tablet; Take 1 tablet (10 mg total) by mouth once daily. for 7 days  Dispense: 7 tablet; Refill: 0  -     acetaminophen tablet 1,000 mg    Cough, unspecified type  -     POCT Influenza A/B MOLECULAR  -     SARS Coronavirus 2 Antigen, POCT Manual Read  -     benzonatate (TESSALON) 100 MG capsule; Take 1 capsule (100 mg total) by mouth 3 (three) times daily as needed for Cough.  Dispense: 30 capsule; Refill: 0    Elevated blood pressure reading    Acute effusion of both middle ears  -     cetirizine (ZYRTEC) 10 MG tablet; Take 1 tablet (10 mg total) by mouth once daily. for 7 days  Dispense: 7 tablet; Refill: 0        Results reviewed  I have reviewed the patient chart and pertinent past imaging/labs.  4 - covid risk score         Patient Instructions   You have tested positive for COVID-19 today.  Take Paxlovid as prescribed with food.  Discussed possible side effects including rebound COVID.  As we discussed Paxlovid does interact with 2 of her medications.  Please stop the atorvastatin cholesterol medication for 10 days once you start the Paxlovid.  After the 10 days you can safely restart it.  You also need to decrease your amlodipine to half a tablet for 8 days.  After the 8 days you can safely take the whole tablet.  Your blood pressure is elevated today however with the amlodipine it interacts with the Paxlovid it can make it go too low.  If your blood pressure is still elevated after your symptoms have resolved please follow up with your primary care provider.  Use Zyrtec as directed for fluid behind the ears  Use Tessalon as needed for cough.  Take tylenol/advil with food as needed for fever/pain. Flonase for nasal congestion. Stay hydrated, drink plenty of water.     ISOLATION  New  CDC guidelines state that you no longer have to quarantine as long as you were fever free for 24 hours without the use of ibuprofen or Tylenol.  It is recommended that you wear a mask for 5 days after you are 24 hours fever free to prevent spreading..

## 2025-01-30 NOTE — PATIENT INSTRUCTIONS
You have tested positive for COVID-19 today.  Take Paxlovid as prescribed with food.  Discussed possible side effects including rebound COVID.  As we discussed Paxlovid does interact with 2 of her medications.  Please stop the atorvastatin cholesterol medication for 10 days once you start the Paxlovid.  After the 10 days you can safely restart it.  You also need to decrease your amlodipine to half a tablet for 8 days.  After the 8 days you can safely take the whole tablet.  Your blood pressure is elevated today however with the amlodipine it interacts with the Paxlovid it can make it go too low.  If your blood pressure is still elevated after your symptoms have resolved please follow up with your primary care provider.  Use Zyrtec as directed for fluid behind the ears  Use Tessalon as needed for cough.  Take tylenol/advil with food as needed for fever/pain. Flonase for nasal congestion. Stay hydrated, drink plenty of water.     ISOLATION  New CDC guidelines state that you no longer have to quarantine as long as you were fever free for 24 hours without the use of ibuprofen or Tylenol.  It is recommended that you wear a mask for 5 days after you are 24 hours fever free to prevent spreading..

## 2025-02-19 ENCOUNTER — CLINICAL SUPPORT (OUTPATIENT)
Dept: REHABILITATION | Facility: OTHER | Age: 80
End: 2025-02-19
Payer: MEDICARE

## 2025-02-19 DIAGNOSIS — R29.898 LEFT LEG WEAKNESS: Primary | ICD-10-CM

## 2025-02-19 DIAGNOSIS — Z78.0 MENOPAUSE: ICD-10-CM

## 2025-02-19 PROCEDURE — 97164 PT RE-EVAL EST PLAN CARE: CPT | Mod: PN

## 2025-02-19 PROCEDURE — 97530 THERAPEUTIC ACTIVITIES: CPT | Mod: PN

## 2025-02-19 NOTE — PROGRESS NOTES
[facility]   Physical Therapy Updated Plan of Care Note        Name: Valarie Colindres  Clinic Number: [id number]    Therapy Diagnosis:   Encounter Diagnosis   Name Primary?    Left leg weakness Yes       Physician: Ciarra Velasquez PA-C    Visit Date: 2/19/2025       Physician Orders: Physical Therapy Evaluate and Treat  Medical Diagnosis from Referral: left knee OA  Evaluation Date: 10/30/24  Authorization Period Expiration: 12/31/24  Plan of Care Expiration: 2/19/25 to 3/19/25  Visit # / Visits authorized: 7/10  FOTO: 0/3        Time In: 1345  Time Out: 1430  Total Billable Time: 45 minutes        SUBJECTIVE     Pt reports: she is doing well today. She has been regular with home exercise program which helps decreased her pain in general.    Response to previous treatment:  felt well, no issues  Functional change: able to put on her pants.     Pain: 2/10  Location: left knee      OBJECTIVE     2/19/25      Lumbar Active range of motion (%) Pain/dysfunction with movement:   Flexion  100    Extension 50 Left posterior knee pain   Right side bending 75    Left side bending 75 Left posterior knee pain   Right rotation 100     Left rotation 100              Right LE     Left LE        Iliopsoas:  L2 5/5 Iliopsoas: L2 3+/5    Quadriceps:  L3 - femoral nerve 5/5 Quadriceps: L3 - femoral nerve 4+/5    Hip adduction:  L3 - obterator 5/5 Hip adduction: L3 - obterator 5/5    Hamstrings:  S2 4+/5 Hamstrings: S2 4+/5    Ankle DF/EV:  L4 5/5 Ankle DF/EV: L4 4+/5    GT Ext:  L5 4+/5 GT Ext L5 4/5    Hip Abduction:  L5-S1 - Superior gluteal nerve 4/5  Hip Abduction: L5-S1 - Superior gluteal nerve 3+/5    Hip extension:  L5-S2 - Inferior gluteal nerve 4/5 Hip extension: L5-S2 - Inferior gluteal nerve 3+/5    Ankle PF:   S1 - tibial nerve NT Ankle PF S1 - tibial nerve NT            Slump R: negative  Slump L: + for pain at posterior knee down to gastroc     SLR R: negative  SLR L: + for pain at  "posterior knee and up to posterior thigh       Observation: patient continues to ambulate with decreased knee extension at terminal swing phase. Step length on left is also shorter 2nd to decreased knee extension.      Range of Motion:   Knee Left active Left Passive Right Active R passive   Flexion 130 132 130 135   Extension -5 -3 0 5              TREATMENT       [place] received the treatments listed below:      Patient received manual therapeutic technique for 0 minutes for improved soft tissue and joint mobility including:    R/L tibiofemoral JM AP glides, Gr III  R/L LE LAD with A/P glide   R/L knee gapping with patient in hooklying       Therapeutic Activities were provided for 40 minutes to improve tolerance to functional activities including:       NuStep L5 x 10 minutes for neural priming -  LE only  DKC with tball x20  Bridge on tball x20  SLR with TA bracing and pilates ring 3x10  PPT x20  Bridges with belt 2x8  Standing knee flexion stretch at 2nd stair 10x10"  Step up 6" 3x10 R/left  Supine sciatic nerve glide with tball x20  TRX squat x30  Palloff press vs RTB x20 bilaterally      Neuromuscular re education for 10 minutes for improved balance and proprioception including:     Squat with upper extremity assist x20 **increased posterior knee pain on left   Supine clams 30x5" hold green band  L SL hip abduction 3x10  SL clamshell 3x10 vs RTB  Bilateral SL reverse clams 2x10  L Prone femoral nerve glide 3x10  L Prone hip extension 3x10  Seated sciatic nerve glide x10  Prone press up x10  Prone multifidi retraining 2x10 bilaterally **increased left lower extremity pain  Bent over bird dog 2x10        PATIENT EDUCATION AND HOME EXERCISES     Home Exercises Provided and Patient Education Provided     Education provided:   PT educated pt on importance of compliance with their HEP this visit.     Written Home Exercises Provided: Patient instructed to cont prior HEP. Exercises were reviewed and [place] was able " to demonstrate them prior to the end of the session.  Valarie demonstrated good  understanding of the education provided. See EMR under Patient Instructions for exercises provided during therapy sessions    ASSESSMENT     Valarie has made significant improvement since starting physical therapy treatment. She demonstrates improved left knee range of motion, strength, and decreased pain overall. She has been compliant with home exercise program which she reports decreases her left knee pain. We will plan to update her home exercise program and DC from physical therapy treatment.         Valarie Is progressing well towards her goals.   Pt prognosis is Good.     Pt will continue to benefit from skilled outpatient physical therapy to address the deficits listed in the problem list box on initial evaluation, provide pt/family education and to maximize pt's level of independence in the home and community environment.     Pt's spiritual, cultural and educational needs considered and pt agreeable to plan of care and goals.     Anticipated barriers to physical therapy: None    GOALS:  Short Term Goals (4 Weeks):  1. Patient will be compliant with home exercise program to supplement therapy in promoting functional mobility. (met)    2. Patient will perform transfers with good control to demonstrate improved core strength. (met)    3. Patient will report no pain during thoracolumbar active range of motion to promote functional mobility.  (progressing, not met)    4. Patient will improve impaired lower extremity manual muscle tests  to >/=4/5 to improve strength for functional tasks. (progressing, not met)          Long Term Goals (6 Weeks):   1. Patient will improve FOTO score by 10% limited to decrease perceived limitation with maintaining/changing body position. (progressing, not met)    2. Patient will perform recreational activity with good control to demonstrate improved core strength.  (progressing, not met)    3. Patient  will improve impaired lower extremity manual muscle tests to >/=4+/5 to improve strength for functional tasks.  (progressing, not met)    4. Patient will tolerate standing/walking for 30 minutes with no increase in low back pain to return to PLOF.  (progressing, not met)           Plan      Plan of care Certification: 2/19/25 to 3/19/25    Focus on LE strength and endurance with emphasis on functional performance     Outpatient Physical Therapy 2 times weekly for 12 weeks to include the following interventions: Therapeutic Exercises, Manual Therapeutic Technique, Neuromuscular Re Education, Therapeutic Activities. Modalities, Kinesiotape prn, and Functional Dry Needling as needed.      Drake Cronin, PT

## 2025-02-20 RX ORDER — FAMOTIDINE 20 MG/1
20 TABLET, FILM COATED ORAL 2 TIMES DAILY
Qty: 180 TABLET | Refills: 0 | Status: SHIPPED | OUTPATIENT
Start: 2025-02-20

## 2025-02-20 NOTE — TELEPHONE ENCOUNTER
I have not seen the patient in 1 year.  I will give her 1 three-month supply.  She needs to come in to the [facility] office to see me in person and have a flexible nasolaryngoscopy before more refills will be granted.

## 2025-02-21 ENCOUNTER — CLINICAL SUPPORT (OUTPATIENT)
Dept: REHABILITATION | Facility: OTHER | Age: 80
End: 2025-02-21
Payer: MEDICARE

## 2025-02-21 DIAGNOSIS — R29.898 LEFT LEG WEAKNESS: Primary | ICD-10-CM

## 2025-02-21 PROCEDURE — 97530 THERAPEUTIC ACTIVITIES: CPT | Mod: PN,CQ

## 2025-02-21 NOTE — PROGRESS NOTES
[facility]   Physical Therapy Daily Treatment Note        Name: Valarie Colindres  Clinic Number: [id number]    Therapy Diagnosis:   Encounter Diagnosis   Name Primary?    Left leg weakness Yes       Physician: Ciarra Velasquez PA-C    Visit Date: 2/21/2025       Physician Orders: Physical Therapy Evaluate and Treat  Medical Diagnosis from Referral: left knee OA  Evaluation Date: 10/30/24  Authorization Period Expiration: 12/31/24  Plan of Care Expiration: 2/19/25 to 3/19/25  Visit # / Visits authorized: 9/10  FOTO: 0/3        Time In: 1100  Time Out: 1200  Total Billable Time: 53 minutes        SUBJECTIVE     Pt reports: she is doing well today. She is slowly getting better and seeing improvement.     Response to previous treatment:  felt well, no issues  Functional change: able to put on her pants.     Pain: 2/10  Location: left knee      OBJECTIVE     2/19/25      Lumbar Active range of motion (%) Pain/dysfunction with movement:   Flexion  100    Extension 50 Left posterior knee pain   Right side bending 75    Left side bending 75 Left posterior knee pain   Right rotation 100     Left rotation 100              Right LE     Left LE        Iliopsoas:  L2 5/5 Iliopsoas: L2 3+/5    Quadriceps:  L3 - femoral nerve 5/5 Quadriceps: L3 - femoral nerve 4+/5    Hip adduction:  L3 - obterator 5/5 Hip adduction: L3 - obterator 5/5    Hamstrings:  S2 4+/5 Hamstrings: S2 4+/5    Ankle DF/EV:  L4 5/5 Ankle DF/EV: L4 4+/5    GT Ext:  L5 4+/5 GT Ext L5 4/5    Hip Abduction:  L5-S1 - Superior gluteal nerve 4/5  Hip Abduction: L5-S1 - Superior gluteal nerve 3+/5    Hip extension:  L5-S2 - Inferior gluteal nerve 4/5 Hip extension: L5-S2 - Inferior gluteal nerve 3+/5    Ankle PF:   S1 - tibial nerve NT Ankle PF S1 - tibial nerve NT            Slump R: negative  Slump L: + for pain at posterior knee down to gastroc     SLR R: negative  SLR L: + for pain at posterior knee and up to posterior thigh        "Observation: patient continues to ambulate with decreased knee extension at terminal swing phase. Step length on left is also shorter 2nd to decreased knee extension.      Range of Motion:   Knee Left active Left Passive Right Active R passive   Flexion 130 132 130 135   Extension -5 -3 0 5              TREATMENT       [place] received the treatments listed below:      Patient received manual therapeutic technique for 0 minutes for improved soft tissue and joint mobility including:    R/L tibiofemoral JM AP glides, Gr III  R/L LE LAD with A/P glide   R/L knee gapping with patient in hooklying       Therapeutic Activities were provided for 53 minutes to improve tolerance to functional activities including:       NuStep L5 x 10 minutes for neural priming -  LE only  DKC with tball x20  Bridge on tball x20  SLR with TA bracing and pilates ring 3x10  PPT x20  Bridges with belt 2x8  Standing knee flexion stretch at 2nd stair 10x10"  Step up 6" 2x10 R/left  Supine sciatic nerve glide with tball x20  TRX squat x30  Palloff press vs RTB x20 bilaterally      Neuromuscular re education for 00 minutes for improved balance and proprioception including:     Squat with upper extremity assist x20 **increased posterior knee pain on left   Supine clams 30x5" hold green band  L SL hip abduction 3x10  SL clamshell 3x10 vs RTB  Bilateral SL reverse clams 2x10  L Prone femoral nerve glide 3x10  L Prone hip extension 3x10  Seated sciatic nerve glide x10  Prone press up x10  Prone multifidi retraining 2x10 bilaterally **increased left lower extremity pain  Bent over bird dog 2x10        PATIENT EDUCATION AND HOME EXERCISES     Home Exercises Provided and Patient Education Provided     Education provided:   PT educated pt on importance of compliance with their HEP this visit.     Written Home Exercises Provided: Patient instructed to cont prior HEP. Exercises were reviewed and [place] was able to demonstrate them prior to the end of the " session.  Valarie demonstrated good  understanding of the education provided. See EMR under Patient Instructions for exercises provided during therapy sessions    ASSESSMENT     Valarie tolerated session well today. Emphasis was focused on improving endurance and strength with pt demonstrating good training effect at challenge-point. We will continue to progress as tolerated towards goals set forth in POC as functional/strength deficits continue to remain notable at this time.         Valarie Is progressing well towards her goals.   Pt prognosis is Good.     Pt will continue to benefit from skilled outpatient physical therapy to address the deficits listed in the problem list box on initial evaluation, provide pt/family education and to maximize pt's level of independence in the home and community environment.     Pt's spiritual, cultural and educational needs considered and pt agreeable to plan of care and goals.     Anticipated barriers to physical therapy: None    GOALS:  Short Term Goals (4 Weeks):  1. Patient will be compliant with home exercise program to supplement therapy in promoting functional mobility. (met)    2. Patient will perform transfers with good control to demonstrate improved core strength. (met)    3. Patient will report no pain during thoracolumbar active range of motion to promote functional mobility.  (progressing, not met)    4. Patient will improve impaired lower extremity manual muscle tests  to >/=4/5 to improve strength for functional tasks. (progressing, not met)          Long Term Goals (6 Weeks):   1. Patient will improve FOTO score by 10% limited to decrease perceived limitation with maintaining/changing body position. (progressing, not met)    2. Patient will perform recreational activity with good control to demonstrate improved core strength.  (progressing, not met)    3. Patient will improve impaired lower extremity manual muscle tests to >/=4+/5 to improve strength for  functional tasks.  (progressing, not met)    4. Patient will tolerate standing/walking for 30 minutes with no increase in low back pain to return to PLOF.  (progressing, not met)           Plan      Plan of care Certification: 2/19/25 to 3/19/25    Focus on LE strength and endurance with emphasis on functional performance     Outpatient Physical Therapy 2 times weekly for 12 weeks to include the following interventions: Therapeutic Exercises, Manual Therapeutic Technique, Neuromuscular Re Education, Therapeutic Activities. Modalities, Kinesiotape prn, and Functional Dry Needling as needed.      Darci Nobles, PTA

## 2025-03-05 ENCOUNTER — CLINICAL SUPPORT (OUTPATIENT)
Dept: REHABILITATION | Facility: OTHER | Age: 80
End: 2025-03-05
Payer: MEDICARE

## 2025-03-05 DIAGNOSIS — R29.898 LEFT LEG WEAKNESS: Primary | ICD-10-CM

## 2025-03-05 PROCEDURE — 97164 PT RE-EVAL EST PLAN CARE: CPT | Mod: PN

## 2025-03-05 PROCEDURE — 97530 THERAPEUTIC ACTIVITIES: CPT | Mod: PN

## 2025-03-24 DIAGNOSIS — Z00.00 ENCOUNTER FOR MEDICARE ANNUAL WELLNESS EXAM: ICD-10-CM

## 2025-04-23 ENCOUNTER — HOSPITAL ENCOUNTER (OUTPATIENT)
Dept: RADIOLOGY | Facility: OTHER | Age: 80
Discharge: HOME | End: 2025-04-23
Attending: STUDENT IN AN ORGANIZED HEALTH CARE EDUCATION/TRAINING PROGRAM
Payer: MEDICARE

## 2025-04-23 DIAGNOSIS — Z78.0 MENOPAUSE: ICD-10-CM

## 2025-04-23 PROCEDURE — 77080 DXA BONE DENSITY AXIAL: CPT | Mod: 26,,, | Performed by: RADIOLOGY

## 2025-04-23 PROCEDURE — 77080 DXA BONE DENSITY AXIAL: CPT | Mod: TC

## 2025-04-24 ENCOUNTER — RESULTS FOLLOW-UP (OUTPATIENT)
Dept: PRIMARY CARE CLINIC | Facility: CLINIC | Age: 80
End: 2025-04-24

## 2025-05-05 ENCOUNTER — TELEPHONE (OUTPATIENT)
Dept: ENDOSCOPY | Facility: HOSPITAL | Age: 80
End: 2025-05-05
Payer: MEDICARE

## 2025-05-05 VITALS — BODY MASS INDEX: 24.24 KG/M2 | HEIGHT: 64 IN | WEIGHT: 142 LBS

## 2025-05-05 DIAGNOSIS — R12 HEARTBURN: Primary | ICD-10-CM

## 2025-05-05 NOTE — TELEPHONE ENCOUNTER
Referral for procedure from  last procedure report - Pt due for follow up procedure    Spoke to Valarie Colindres to schedule Upper Endoscopy (EGD)       Physician to perform procedure(s) Dr. COLE Lawrence    Date of Procedure (s) 6/10/25  Arrival Time 12:15 PM  Time of Procedure(s) 1:15 PM   Location of Procedure(s) [place] [address]  Instructions provided to patient via [facility]  Patient denies use of blood thinners, GLP-1 medications, and weight loss medications.  The following information was discussed with patient, and patient verbalized understanding:  Screening questionnaire reviewed with patient and complete. If procedure requires anesthesia, a responsible adult needs to be present to accompany the patient home. Appointment details are tentative, especially check-in time. Patient will receive a pre-op call 7 days prior to appointment to confirm check-in time for procedure. If applicable the patient should contact their pharmacy to verify Rx for procedure prep is ready for pick-up. Patient was instructed to call the scheduling department at [phone] if pharmacy states no Rx is available. Patient was also advised to call the endoscopy scheduling department if any questions or concerns arise.      [facility] Scheduling Department

## 2025-05-06 ENCOUNTER — TELEPHONE (OUTPATIENT)
Dept: HEMATOLOGY/ONCOLOGY | Facility: CLINIC | Age: 80
End: 2025-05-06
Payer: MEDICARE

## 2025-05-06 NOTE — TELEPHONE ENCOUNTER
Called patient to schedule lab work one week prior to appointment with Dr. Kirk.  Left voicemail with lab appointment information and clinic number to return call if she has questions or wants to change lab location.

## 2025-05-09 ENCOUNTER — TELEPHONE (OUTPATIENT)
Dept: PRIMARY CARE CLINIC | Facility: CLINIC | Age: 80
End: 2025-05-09
Payer: MEDICARE

## 2025-05-09 RX ORDER — ACETAMINOPHEN 500 MG
5000 TABLET ORAL DAILY
COMMUNITY

## 2025-05-09 NOTE — TELEPHONE ENCOUNTER
Spoke with pt to give message regarding bmd.  Pt states that she has started taking a 5,000 IU dosage of D3. Added to pt's chart.

## 2025-05-09 NOTE — TELEPHONE ENCOUNTER
Your bone density test shows osteopenia. This is in between normal bone strength and weakened bone strength (osteoporosis). You are at slightly increased risk for fractures in the future. Weight bearing exercises (carrying light weights, yoga/pilates, resistance training, dance, walking, etc) is a great way to strengthen the bone to help prevent futurefractures.     Calcium and Vitamin D supplementation is recommended. If you have high calcium levels, please let me know before starting any additional supplements

## 2025-05-16 ENCOUNTER — LAB VISIT (OUTPATIENT)
Dept: LAB | Facility: HOSPITAL | Age: 80
End: 2025-05-16
Attending: INTERNAL MEDICINE
Payer: MEDICARE

## 2025-05-16 DIAGNOSIS — D53.9 NUTRITIONAL ANEMIA: ICD-10-CM

## 2025-05-16 DIAGNOSIS — D47.2 MGUS (MONOCLONAL GAMMOPATHY OF UNKNOWN SIGNIFICANCE): ICD-10-CM

## 2025-05-16 LAB
ABSOLUTE EOSINOPHIL (OHS): 0.1 K/UL
ABSOLUTE MONOCYTE (OHS): 0.53 K/UL (ref 0.3–1)
ABSOLUTE NEUTROPHIL COUNT (OHS): 2.46 K/UL (ref 1.8–7.7)
ALBUMIN SERPL BCP-MCNC: 3.6 G/DL (ref 3.5–5.2)
ALP SERPL-CCNC: 53 UNIT/L (ref 40–150)
ALT SERPL W/O P-5'-P-CCNC: 12 UNIT/L (ref 10–44)
ANION GAP (OHS): 5 MMOL/L (ref 8–16)
AST SERPL-CCNC: 17 UNIT/L (ref 11–45)
BASOPHILS # BLD AUTO: 0.04 K/UL
BASOPHILS NFR BLD AUTO: 0.6 %
BILIRUB SERPL-MCNC: 0.5 MG/DL (ref 0.1–1)
BUN SERPL-MCNC: 21 MG/DL (ref 8–23)
CALCIUM SERPL-MCNC: 8.9 MG/DL (ref 8.7–10.5)
CHLORIDE SERPL-SCNC: 102 MMOL/L (ref 95–110)
CO2 SERPL-SCNC: 30 MMOL/L (ref 23–29)
CREAT SERPL-MCNC: 0.7 MG/DL (ref 0.5–1.4)
ERYTHROCYTE [DISTWIDTH] IN BLOOD BY AUTOMATED COUNT: 18.3 % (ref 11.5–14.5)
FERRITIN SERPL-MCNC: 102 NG/ML (ref 20–300)
FOLATE SERPL-MCNC: 5.4 NG/ML (ref 4–24)
GFR SERPLBLD CREATININE-BSD FMLA CKD-EPI: >60 ML/MIN/1.73/M2
GLUCOSE SERPL-MCNC: 85 MG/DL (ref 70–110)
HCT VFR BLD AUTO: 36 % (ref 37–48.5)
HGB BLD-MCNC: 10.6 GM/DL (ref 12–16)
IGA SERPL-MCNC: 115 MG/DL (ref 40–350)
IGG SERPL-MCNC: 1426 MG/DL (ref 650–1600)
IGM SERPL-MCNC: 120 MG/DL (ref 50–300)
IMM GRANULOCYTES # BLD AUTO: 0.01 K/UL (ref 0–0.04)
IMM GRANULOCYTES NFR BLD AUTO: 0.2 % (ref 0–0.5)
IRON SATN MFR SERPL: 24 % (ref 20–50)
IRON SERPL-MCNC: 71 UG/DL (ref 30–160)
LYMPHOCYTES # BLD AUTO: 3.39 K/UL (ref 1–4.8)
MCH RBC QN AUTO: 18.2 PG (ref 27–31)
MCHC RBC AUTO-ENTMCNC: 29.4 G/DL (ref 32–36)
MCV RBC AUTO: 62 FL (ref 82–98)
NUCLEATED RBC (/100WBC) (OHS): 0 /100 WBC
PLATELET # BLD AUTO: 272 K/UL (ref 150–450)
PMV BLD AUTO: 10.7 FL (ref 9.2–12.9)
POTASSIUM SERPL-SCNC: 3.9 MMOL/L (ref 3.5–5.1)
PROT SERPL-MCNC: 6.9 GM/DL (ref 6–8.4)
RBC # BLD AUTO: 5.83 M/UL (ref 4–5.4)
RELATIVE EOSINOPHIL (OHS): 1.5 %
RELATIVE LYMPHOCYTE (OHS): 51.9 % (ref 18–48)
RELATIVE MONOCYTE (OHS): 8.1 % (ref 4–15)
RELATIVE NEUTROPHIL (OHS): 37.7 % (ref 38–73)
SODIUM SERPL-SCNC: 137 MMOL/L (ref 136–145)
TIBC SERPL-MCNC: 297 UG/DL (ref 250–450)
TRANSFERRIN SERPL-MCNC: 201 MG/DL (ref 200–375)
VIT B12 SERPL-MCNC: 562 PG/ML (ref 210–950)
WBC # BLD AUTO: 6.53 K/UL (ref 3.9–12.7)

## 2025-05-16 PROCEDURE — 84165 PROTEIN E-PHORESIS SERUM: CPT

## 2025-05-16 PROCEDURE — 84466 ASSAY OF TRANSFERRIN: CPT

## 2025-05-16 PROCEDURE — 85025 COMPLETE CBC W/AUTO DIFF WBC: CPT

## 2025-05-16 PROCEDURE — 82746 ASSAY OF FOLIC ACID SERUM: CPT

## 2025-05-16 PROCEDURE — 80053 COMPREHEN METABOLIC PANEL: CPT

## 2025-05-16 PROCEDURE — 36415 COLL VENOUS BLD VENIPUNCTURE: CPT | Mod: PN

## 2025-05-16 PROCEDURE — 82728 ASSAY OF FERRITIN: CPT

## 2025-05-16 PROCEDURE — 86334 IMMUNOFIX E-PHORESIS SERUM: CPT

## 2025-05-16 PROCEDURE — 83521 IG LIGHT CHAINS FREE EACH: CPT

## 2025-05-16 PROCEDURE — 82784 ASSAY IGA/IGD/IGG/IGM EACH: CPT

## 2025-05-16 PROCEDURE — 82607 VITAMIN B-12: CPT

## 2025-05-19 LAB
ALBUMIN, SPE (OHS): 4.04 G/DL (ref 3.35–5.55)
ALPHA 1 GLOB (OHS): 0.25 GM/DL (ref 0.17–0.41)
ALPHA 2 GLOB (OHS): 0.56 GM/DL (ref 0.43–0.99)
BETA GLOB (OHS): 0.56 GM/DL (ref 0.5–1.1)
GAMMA GLOBULIN (OHS): 1.29 GM/DL (ref 0.67–1.58)
KAPPA LC FREE SER-MCNC: 1.82 MG/L (ref 0.26–1.65)
KAPPA LC FREE/LAMBDA FREE SER: 1.6 MG/DL (ref 0.33–1.94)
LAMBDA LC FREE SERPL-MCNC: 0.88 MG/DL (ref 0.57–2.63)
PROT SERPL-MCNC: 6.7 GM/DL (ref 6–8.4)

## 2025-05-20 LAB
PATHOLOGIST INTERPRETATION - IFE SERUM (OHS): NORMAL
PATHOLOGIST REVIEW - SPE (OHS): NORMAL

## 2025-05-23 ENCOUNTER — OFFICE VISIT (OUTPATIENT)
Dept: HEMATOLOGY/ONCOLOGY | Facility: CLINIC | Age: 80
End: 2025-05-23
Payer: MEDICARE

## 2025-05-23 VITALS
SYSTOLIC BLOOD PRESSURE: 134 MMHG | RESPIRATION RATE: 16 BRPM | HEIGHT: 64 IN | OXYGEN SATURATION: 96 % | BODY MASS INDEX: 23.54 KG/M2 | HEART RATE: 73 BPM | WEIGHT: 137.88 LBS | DIASTOLIC BLOOD PRESSURE: 76 MMHG | TEMPERATURE: 98 F

## 2025-05-23 DIAGNOSIS — R51.9 NONINTRACTABLE EPISODIC HEADACHE, UNSPECIFIED HEADACHE TYPE: ICD-10-CM

## 2025-05-23 DIAGNOSIS — D47.2 MGUS (MONOCLONAL GAMMOPATHY OF UNKNOWN SIGNIFICANCE): Primary | ICD-10-CM

## 2025-05-23 DIAGNOSIS — D53.9 NUTRITIONAL ANEMIA: ICD-10-CM

## 2025-05-23 DIAGNOSIS — D56.3 THALASSEMIA TRAIT: ICD-10-CM

## 2025-05-23 PROCEDURE — 99999 PR PBB SHADOW E&M-EST. PATIENT-LVL IV: CPT | Mod: PBBFAC,,, | Performed by: INTERNAL MEDICINE

## 2025-05-23 NOTE — PROGRESS NOTES
Patient ID: Valarie Colindres is an 80 y.o. female.     Chief Complaint: follow up for MGUS     Diagnosis:  1. Alpha and beta thalassemia trait  2. 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 Ms Colindres is a 75 yo woman who initially saw me on 7/16/19 for further evaluation of microcytic anemia. Her Hb was 11.4 on 9/24/18 with MCV of 61. CBC on 7/15/19 showed WBC 7.15, Hb 10.7, MCV 62, plt count 260. CMP on 5/30/19 was normal. TSH on 4/9/19 normal. Patient currently feels fine. 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 Was told that no treatment is needed. She has occasional fatigue but goes to the gym 3 times a week. She had chest pain after she ate Fritos which did not recur after she stopped eating Fritos. She just had a stress test done.   2. Labs on 7/16/19 showed creatinine 1.0, calcium 9.8, albumin 4.3, protein 7.5, ferritin 128, folate 7.2, vitamin B12 424, haptoglobin 66, iron 86, TIBC 340, iron saturation 25%, . SPEP showed a 0.77 g/dL M protein at IgG kappa. kappa light chain normal 1.11, lambda 0.97, K/L ratio 1.14. Hb electrophoresis showed 4.7% A2, 95.3% A, c/w beta thal trait. Alpha globulin-gene analysis showed one copy of gene deletion.     Interval History:   Ms Colindres returns for follow up. Feeling well. Noted some headache in the left occipital area.         ROS:   A ten-point system review is obtained and negative except for what was stated in the Interval History.      Physical Examination:   Vital signs reviewed.   General: well hydrated, well developed, in no acute distress  HEENT: normocephalic, EOMI, anicteric sclerae  Neck: supple, no JVD, thyromegaly, cervical or supraclavicular lymphadenopathy  Lungs: clear breath sounds bilaterally, no wheezing, rales, or rhonchi  Heart: RRR, no M/R/G  Abdomen: soft, no tenderness, non-distended, no hepatosplenomegaly, mass, or hernia. BS present  Extremities: no clubbing, cyanosis, or  edema  Skin: no rash, ulcer, or open wounds  Neuro: alert and oriented x 4, no focal neuro deficit  Psych: pleasant and appropriate mood and affect     Objective:      Laboratory Data:  SPEP:    Normal total protein.  Normal gamma globulins are decreased.  Two immediately adjacent paraprotein peaks are identified:  1. Near-gamma = 0.74 g/dL (previously 0.60 g/dL)  2. Near-to-mid gamma = 0.21 g/dL (previously 0.23 g/dL)       Kappa 1.6, lambda 0.88, K/L ratio 1.82  Immunoglobulins normal    Hb 10.6,  B12, folate, ferritin levels are normal    Cr 0.7. Ca normal    Assessment and Plan:      1. MGUS (monoclonal gammopathy of unknown significance)    2. Nutritional anemia    3. Thalassemia trait    4. Nonintractable episodic headache, unspecified headache type        1.  - IgG kappa monoclonal gammopathy. Low risk disease. No end organ damage.   - Patient feeling well. No symptoms  - reviewed lab results with patient. Stable small protein spikes.   - RTC in one year    2.3  - thalassemia trait with deletion in both beta and alpha globulin  - B12, folate, TIBC, ferritin levels are normal  - monitor in one year    4.  - patient wonders it is from iron deficiency. Discussed with patient this is not from iron deficiency and she does not have iron deficiency. Asked patient to f/u with PCP. Patient understands and agrees with the plan.     Follow-up:      RTC in 12 months  Her questions were answered in the clinic. Encouraged to call should issues arise.          Route Chart for Scheduling    Med Onc Chart Routing      Follow up with physician 1 year. see me in one year with CBC, CMP, SPEP, immunofixation, immunoglobulins, free light chains, B12, folate, ferritin, iron/TIBC checked 1 week prior   Follow up with JETT    Infusion scheduling note    Injection scheduling note    Labs CBC, CMP, vitamin B12, ferritin, folate and free light chains   Scheduling:  Preferred lab:  Lab interval:     Imaging    Pharmacy appointment     Other referrals

## 2025-05-26 ENCOUNTER — OFFICE VISIT (OUTPATIENT)
Dept: INTERNAL MEDICINE | Facility: CLINIC | Age: 80
End: 2025-05-26
Payer: MEDICARE

## 2025-05-26 VITALS
TEMPERATURE: 97 F | BODY MASS INDEX: 23.78 KG/M2 | DIASTOLIC BLOOD PRESSURE: 88 MMHG | RESPIRATION RATE: 18 BRPM | WEIGHT: 139.31 LBS | HEART RATE: 72 BPM | OXYGEN SATURATION: 97 % | SYSTOLIC BLOOD PRESSURE: 142 MMHG | HEIGHT: 64 IN

## 2025-05-26 DIAGNOSIS — R09.89 BILATERAL CAROTID BRUITS: ICD-10-CM

## 2025-05-26 DIAGNOSIS — M54.2 NECK PAIN: Primary | ICD-10-CM

## 2025-05-26 PROBLEM — G95.9 DISEASE OF SPINAL CORD, UNSPECIFIED: Status: RESOLVED | Noted: 2022-06-17 | Resolved: 2025-05-26

## 2025-05-26 PROCEDURE — 3079F DIAST BP 80-89 MM HG: CPT | Mod: CPTII,S$GLB,, | Performed by: INTERNAL MEDICINE

## 2025-05-26 PROCEDURE — G2211 COMPLEX E/M VISIT ADD ON: HCPCS | Mod: S$GLB,,, | Performed by: INTERNAL MEDICINE

## 2025-05-26 PROCEDURE — 99999 PR PBB SHADOW E&M-EST. PATIENT-LVL IV: CPT | Mod: PBBFAC,,, | Performed by: INTERNAL MEDICINE

## 2025-05-26 PROCEDURE — 3077F SYST BP >= 140 MM HG: CPT | Mod: CPTII,S$GLB,, | Performed by: INTERNAL MEDICINE

## 2025-05-26 PROCEDURE — 1159F MED LIST DOCD IN RCRD: CPT | Mod: CPTII,S$GLB,, | Performed by: INTERNAL MEDICINE

## 2025-05-26 PROCEDURE — 3288F FALL RISK ASSESSMENT DOCD: CPT | Mod: CPTII,S$GLB,, | Performed by: INTERNAL MEDICINE

## 2025-05-26 PROCEDURE — 1160F RVW MEDS BY RX/DR IN RCRD: CPT | Mod: CPTII,S$GLB,, | Performed by: INTERNAL MEDICINE

## 2025-05-26 PROCEDURE — 1101F PT FALLS ASSESS-DOCD LE1/YR: CPT | Mod: CPTII,S$GLB,, | Performed by: INTERNAL MEDICINE

## 2025-05-26 PROCEDURE — 1125F AMNT PAIN NOTED PAIN PRSNT: CPT | Mod: CPTII,S$GLB,, | Performed by: INTERNAL MEDICINE

## 2025-05-26 PROCEDURE — 99213 OFFICE O/P EST LOW 20 MIN: CPT | Mod: S$GLB,,, | Performed by: INTERNAL MEDICINE

## 2025-05-26 RX ORDER — DICLOFENAC SODIUM 10 MG/G
2 GEL TOPICAL 4 TIMES DAILY
Qty: 1 EACH | Refills: 2 | Status: SHIPPED | OUTPATIENT
Start: 2025-05-26

## 2025-05-26 NOTE — PROGRESS NOTES
Subjective:     Valarie Colindres is a 80 y.o. female who presents for   Chief Complaint   Patient presents with    Neck Pain     Left side pain level 2    Eye Pain     Left side pain level 3        HPI    Valarie presents today for left-sided neck pain radiating behind ear and eye.    She reports neck pain that started 2.5-3 weeks ago, radiating from the left side of the neck, behind the ear, and up to the back of the eye. She describes the pain as feeling similar to a sore muscle, with pain never exceeding 5/10. Hot showers provided some relief. The pain has mostly resolved, currently at 2/10.    She has chronic sinus problems with symptoms including rhinorrhea. She takes Benadryl for symptom management, though its effectiveness has decreased over time. She uses prescribed Claritin and Flonase nasal spray periodically but reports limited efficacy.    She has a history of cervical arthritis, chronic back issues since adolescence, shoulder bursitis with prior steroid injection providing only 1-2 days of relief, Baker's cyst causing significant pain with movement, and chronic dyspnea managed with deep breathing exercises.    Mother had a stroke. Father had prostate cancer.    She exercises regularly, attending classes 3 times weekly. She participates in online exercise programs, including chair-based exercises designed for older adults.    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        Review of Systems   Constitutional:  Negative for chills, diaphoresis, fatigue and fever.   HENT:  Positive for congestion and rhinorrhea. Negative for ear pain and sinus pressure.    Eyes:  Positive for discharge (watery). Negative for redness.   Respiratory:  Positive for shortness of breath (chronic, mild). Negative for cough.    Cardiovascular:  Negative for chest pain, palpitations and leg swelling.   Gastrointestinal:  Negative for abdominal pain, constipation, diarrhea, nausea and vomiting.   Musculoskeletal:   Positive for back pain and neck pain. Negative for arthralgias and myalgias.   Skin:  Negative for rash and wound.   Neurological:  Positive for headaches. Negative for dizziness and tremors.          Objective:     Physical Exam  Vitals reviewed.   Constitutional:       General: She is awake. She is not in acute distress.     Appearance: Normal appearance. She is well-developed and well-groomed.   HENT:      Head: Normocephalic and atraumatic.      Right Ear: Hearing and external ear normal.      Left Ear: Hearing and external ear normal.      Nose: Nose normal. No congestion.      Mouth/Throat:      Mouth: Mucous membranes are moist.   Eyes:      General: Lids are normal. Vision grossly intact.   Neck:      Vascular: Carotid bruit (faint) present.   Cardiovascular:      Rate and Rhythm: Normal rate and regular rhythm.      Heart sounds: Normal heart sounds. No murmur heard.  Pulmonary:      Effort: Pulmonary effort is normal.      Breath sounds: Normal breath sounds. No decreased breath sounds or wheezing.   Abdominal:      General: Bowel sounds are normal. There is no distension.   Musculoskeletal:         General: Normal range of motion.      Cervical back: Normal range of motion. No pain with movement, spinous process tenderness or muscular tenderness.      Right lower leg: No edema.      Left lower leg: No edema.   Skin:     General: Skin is warm and dry.      Findings: No lesion or rash.   Neurological:      Mental Status: She is alert and oriented to person, place, and time.   Psychiatric:         Attention and Perception: Attention normal.         Mood and Affect: Mood normal.         Behavior: Behavior is cooperative.            Assessment:      1. Neck pain    2. Bilateral carotid bruits           Plan:       - Considered musculoskeletal etiology for neck pain and headache, given improvement with heat application and absence of neurological symptoms.  - Evaluated for potential carotid artery issues due  to bruit heard on exam and family history of stroke.  - Assessed sinus involvement as contributing factor to headache symptoms.  - Reviewed prior imaging showing degenerative changes in cervical spine.      ________________________________________    MUSCULOSKELETAL NECK PAIN:   Valarie reports left-sided neck pain for 2 weeks radiating behind the ear to the back of the eye, with history of sinus problems and neck arthritis.   CT from 2021 shows degenerative changes, mild to severe disc issues, and moderate spinal canal stenosis.   Assessment indicates musculoskeletal origin for neck pain.   Treatment plan includes Voltaren gel instead of muscle relaxants (avoided due to sedation concerns). If no improvement, may try heat patches as needed.    CHRONIC SINUSITIS:   Valarie reports ongoing sinus problems with symptoms including rhinorrhea, eye drainage, and sinus headaches.   Has periodically used loratadine and fluticasone nasal spray for sinus problems.   Advised patient to schedule an appointment with Dr. Solo for further evaluation of chronic sinus problems.    CAROTID BRUIT (FAINT):   Valarie is concerned about developing a thrombus due to family history of stroke (mother had a stroke).   Educated patient on carotid artery disease and its relation to stroke risk.   Ordered carotid ultrasound to evaluate for possible artery narrowing.        __________________________    Katty Flynn MD, PharmD  [facility]  American Board of Obesity Medicine diplomate  Office [phone]

## 2025-05-28 ENCOUNTER — HOSPITAL ENCOUNTER (OUTPATIENT)
Dept: RADIOLOGY | Facility: OTHER | Age: 80
Discharge: HOME | End: 2025-05-28
Attending: INTERNAL MEDICINE
Payer: MEDICARE

## 2025-05-28 DIAGNOSIS — R09.89 BILATERAL CAROTID BRUITS: ICD-10-CM

## 2025-05-28 PROCEDURE — 93880 EXTRACRANIAL BILAT STUDY: CPT | Mod: 26,,, | Performed by: RADIOLOGY

## 2025-05-28 PROCEDURE — 93880 EXTRACRANIAL BILAT STUDY: CPT | Mod: TC

## 2025-06-03 ENCOUNTER — RESULTS FOLLOW-UP (OUTPATIENT)
Dept: INTERNAL MEDICINE | Facility: CLINIC | Age: 80
End: 2025-06-03

## 2025-06-03 ENCOUNTER — TELEPHONE (OUTPATIENT)
Dept: INTERNAL MEDICINE | Facility: CLINIC | Age: 80
End: 2025-06-03
Payer: MEDICARE

## 2025-06-10 ENCOUNTER — ANESTHESIA (OUTPATIENT)
Dept: ENDOSCOPY | Facility: HOSPITAL | Age: 80
End: 2025-06-10
Payer: MEDICARE

## 2025-06-10 ENCOUNTER — HOSPITAL ENCOUNTER (OUTPATIENT)
Facility: HOSPITAL | Age: 80
Discharge: HOME | End: 2025-06-10
Attending: INTERNAL MEDICINE | Admitting: INTERNAL MEDICINE
Payer: MEDICARE

## 2025-06-10 ENCOUNTER — ANESTHESIA EVENT (OUTPATIENT)
Dept: ENDOSCOPY | Facility: HOSPITAL | Age: 80
End: 2025-06-10
Payer: MEDICARE

## 2025-06-10 ENCOUNTER — TELEPHONE (OUTPATIENT)
Dept: ENDOSCOPY | Facility: HOSPITAL | Age: 80
End: 2025-06-10
Payer: MEDICARE

## 2025-06-10 VITALS
HEIGHT: 64 IN | TEMPERATURE: 98 F | BODY MASS INDEX: 23.64 KG/M2 | HEART RATE: 70 BPM | RESPIRATION RATE: 15 BRPM | OXYGEN SATURATION: 96 % | DIASTOLIC BLOOD PRESSURE: 70 MMHG | SYSTOLIC BLOOD PRESSURE: 164 MMHG | WEIGHT: 138.44 LBS

## 2025-06-10 DIAGNOSIS — K21.9 GASTROESOPHAGEAL REFLUX DISEASE, UNSPECIFIED WHETHER ESOPHAGITIS PRESENT: ICD-10-CM

## 2025-06-10 DIAGNOSIS — K21.9 GERD (GASTROESOPHAGEAL REFLUX DISEASE): ICD-10-CM

## 2025-06-10 DIAGNOSIS — K44.9 HIATAL HERNIA: ICD-10-CM

## 2025-06-10 DIAGNOSIS — Z79.899 ENCOUNTER FOR MONITORING LONG-TERM PROTON PUMP INHIBITOR THERAPY: Primary | ICD-10-CM

## 2025-06-10 DIAGNOSIS — Z51.81 ENCOUNTER FOR MONITORING LONG-TERM PROTON PUMP INHIBITOR THERAPY: Primary | ICD-10-CM

## 2025-06-10 DIAGNOSIS — R12 HEARTBURN: ICD-10-CM

## 2025-06-10 PROCEDURE — 43239 EGD BIOPSY SINGLE/MULTIPLE: CPT | Mod: ,,, | Performed by: INTERNAL MEDICINE

## 2025-06-10 PROCEDURE — 27201012 HC FORCEPS, HOT/COLD, DISP: Performed by: INTERNAL MEDICINE

## 2025-06-10 PROCEDURE — 37000008 HC ANESTHESIA 1ST 15 MINUTES: Performed by: INTERNAL MEDICINE

## 2025-06-10 PROCEDURE — 88342 IMHCHEM/IMCYTCHM 1ST ANTB: CPT | Mod: TC,59 | Performed by: INTERNAL MEDICINE

## 2025-06-10 PROCEDURE — 63600175 PHARM REV CODE 636 W HCPCS

## 2025-06-10 PROCEDURE — 37000009 HC ANESTHESIA EA ADD 15 MINS: Performed by: INTERNAL MEDICINE

## 2025-06-10 PROCEDURE — 43239 EGD BIOPSY SINGLE/MULTIPLE: CPT | Performed by: INTERNAL MEDICINE

## 2025-06-10 PROCEDURE — 88305 TISSUE EXAM BY PATHOLOGIST: CPT | Mod: 26,,, | Performed by: PATHOLOGY

## 2025-06-10 PROCEDURE — 25000003 PHARM REV CODE 250: Performed by: INTERNAL MEDICINE

## 2025-06-10 PROCEDURE — 88342 IMHCHEM/IMCYTCHM 1ST ANTB: CPT | Mod: 26,,, | Performed by: PATHOLOGY

## 2025-06-10 RX ORDER — SODIUM CHLORIDE 9 MG/ML
INJECTION, SOLUTION INTRAVENOUS CONTINUOUS
Status: DISCONTINUED | OUTPATIENT
Start: 2025-06-10 | End: 2025-06-10 | Stop reason: HOSPADM

## 2025-06-10 RX ORDER — PROPOFOL 10 MG/ML
VIAL (ML) INTRAVENOUS CONTINUOUS PRN
Status: DISCONTINUED | OUTPATIENT
Start: 2025-06-10 | End: 2025-06-10

## 2025-06-10 RX ORDER — LIDOCAINE HYDROCHLORIDE 20 MG/ML
INJECTION, SOLUTION EPIDURAL; INFILTRATION; INTRACAUDAL; PERINEURAL
Status: DISCONTINUED | OUTPATIENT
Start: 2025-06-10 | End: 2025-06-10

## 2025-06-10 RX ORDER — PANTOPRAZOLE SODIUM 40 MG/1
40 TABLET, DELAYED RELEASE ORAL
Qty: 90 TABLET | Refills: 3 | Status: SHIPPED | OUTPATIENT
Start: 2025-06-10 | End: 2026-06-10

## 2025-06-10 RX ADMIN — PROPOFOL 100 MG: 10 INJECTION, EMULSION INTRAVENOUS at 01:06

## 2025-06-10 RX ADMIN — PROPOFOL 225 MCG/KG/MIN: 10 INJECTION, EMULSION INTRAVENOUS at 01:06

## 2025-06-10 RX ADMIN — SODIUM CHLORIDE: 0.9 INJECTION, SOLUTION INTRAVENOUS at 01:06

## 2025-06-10 RX ADMIN — LIDOCAINE HYDROCHLORIDE 60 MG: 20 INJECTION, SOLUTION EPIDURAL; INFILTRATION; INTRACAUDAL; PERINEURAL at 01:06

## 2025-06-10 NOTE — ANESTHESIA PREPROCEDURE EVALUATION
06/10/2025  Valarie Colindres is a 80 y.o., female present for an EGD.   Past Medical History:   Diagnosis Date    Angle recession of right eye     Blunt trauma of both eyes     hit across eyes with bungee exercise band    Cataract     Cystocele, midline     Dry eye syndrome     Ectropion due to laxity of eyelid, right     GERD (gastroesophageal reflux disease)     Hyperlipidemia     Hypertension     PVD (posterior vitreous detachment), right eye     Rectocele     Retinal drusen of both eyes     Thalassemia major     Vaginal atrophy      Past Surgical History:   Procedure Laterality Date    ABDOMINAL ADHESION SURGERY  1978    ANKLE FRACTURE SURGERY Right 1996    COLONOSCOPY  11/09/2022    COLONOSCOPY N/A 12/5/2023    Procedure: COLONOSCOPY;  Surgeon: Xavier Lawrence MD;  Location: [facility] (4TH FLR);  Service: Endoscopy;  Laterality: N/A;    ECTROPION REPAIR Bilateral 06/2018    Dr. Flaherty    ESOPHAGOGASTRODUODENOSCOPY N/A 12/5/2023    Procedure: EGD (ESOPHAGOGASTRODUODENOSCOPY);  Surgeon: Xavier Lawrence MD;  [facility]  Service: Endoscopy;  Laterality: N/A;  9/6- referred by Dr. Lawrence/Additional Scheduling Information:  Needs constipation bowel prep protocol / prep instructions handed to patient and to portal. AS  11/28-precall complete-MS    HYSTERECTOMY  1977    possible cancerous lesion           Pre-op Assessment    I have reviewed the Patient Summary Reports.     I have reviewed the Nursing Notes. I have reviewed the NPO Status.   I have reviewed the Medications.     Review of Systems  Anesthesia Hx:  No problems with previous Anesthesia   Neg history of prior surgery.          Denies Family Hx of Anesthesia complications.    Denies Personal Hx of Anesthesia complications.                    Social:  Non-Smoker, Social Alcohol Use       Hematology/Oncology:  Hematology  Normal   Oncology Normal                                   EENT/Dental:  EENT/Dental Normal  Hx of vocal cord paresis.No issues currently           Cardiovascular:     Hypertension                                    Hypertension         Pulmonary:  Pulmonary Normal   Denies COPD.  Denies Asthma.     Denies Sleep Apnea.                Renal/:  Renal/ Normal                 Hepatic/GI:     GERD Liver Disease,        Gerd       Liver Disease        Musculoskeletal:  Arthritis   Cervical spinal stenosis      Arthritis     Spine Disorders: cervical            Neurological:  Denies TIA.  Denies CVA. Neuromuscular Disease,   Denies Seizures.        Arthritis                         Neuromuscular Disease   Endocrine:   Hypothyroidism       Hypothyroidism        Denies Obesity / BMI > 30  Dermatological:  Skin Normal    Psych:  Psychiatric Normal                    Physical Exam  General: Well nourished, Cooperative, Alert and Oriented    Airway:  Mallampati: I   Mouth Opening: Normal  TM Distance: Normal  Tongue: Normal  Neck ROM: Normal ROM    Dental:  Intact    Chest/Lungs:  Normal Respiratory Rate    Heart:  Rate: Normal        Anesthesia Plan  Type of Anesthesia, risks & benefits discussed:    Anesthesia Type: Gen Natural Airway, Gen ETT  Intra-op Monitoring Plan: Standard ASA Monitors  Post Op Pain Control Plan: multimodal analgesia and IV/PO Opioids PRN  Induction:  IV  Informed Consent: Informed consent signed with the Patient and all parties understand the risks and agree with anesthesia plan.  All questions answered. Patient consented to blood products? No  ASA Score: 3  Day of Surgery Review of History & Physical: H&P Update referred to the surgeon/provider.    Ready For Surgery From Anesthesia Perspective.     .

## 2025-06-10 NOTE — PLAN OF CARE
ID band, allergy verified with  patient. Fall band explained and applied. Pre/post and POC  explained to patient, verbalizes understanding. CRNA to monitor VS and medication.

## 2025-06-10 NOTE — H&P
[facility]  History & Physical    Subjective:      Chief Complaint/Reason for Admission:     EGD for GERD    Valarie Colindres is a 80 y.o. female.    Past Medical History:   Diagnosis Date    Angle recession of right eye     Blunt trauma of both eyes     hit across eyes with bungee exercise band    Cataract     Cystocele, midline     Dry eye syndrome     Ectropion due to laxity of eyelid, right     GERD (gastroesophageal reflux disease)     Hyperlipidemia     Hypertension     PVD (posterior vitreous detachment), right eye     Rectocele     Retinal drusen of both eyes     Thalassemia major     Vaginal atrophy      Past Surgical History:   Procedure Laterality Date    ABDOMINAL ADHESION SURGERY  1978    ANKLE FRACTURE SURGERY Right 1996    COLONOSCOPY  11/09/2022    COLONOSCOPY N/A 12/5/2023    Procedure: COLONOSCOPY;  Surgeon: Xavier Lawrence MD;  Location: [facility] ([address] [place] [address]);  Service: Endoscopy;  Laterality: N/A;    ECTROPION REPAIR Bilateral 06/2018    Dr. Flaherty    ESOPHAGOGASTRODUODENOSCOPY N/A 12/5/2023    Procedure: EGD (ESOPHAGOGASTRODUODENOSCOPY);  Surgeon: Xavier Lawrence MD;  Location: [address]);  Service: Endoscopy;  Laterality: N/A;  9/6- referred by Dr. Lawrence/Additional Scheduling Information:  Needs constipation bowel prep protocol / prep instructions handed to patient and to portal. AS  11/28-precall complete-MS    HYSTERECTOMY  1977    possible cancerous lesion     Social History[1]    PTA Medications   Medication Sig    amLODIPine (NORVASC) 5 MG tablet     atorvastatin (LIPITOR) 10 MG tablet TAKE 1 TABLET BY MOUTH EVERY DAY IN THE EVENING    chlorhexidine (PERIDEX) 0.12 % solution RINSE AND SWISH ONE CAPFUL BID    cholecalciferol, vitamin D3, (VITAMIN D3) 125 mcg (5,000 unit) Tab Take 5,000 Units by mouth once daily.    famotidine (PEPCID) 20 MG tablet TAKE 1 TABLET BY MOUTH TWICE A DAY    levothyroxine (SYNTHROID) 100 MCG tablet TAKE 1 TABLET BY MOUTH  EVERY DAY    valsartan (DIOVAN) 320 MG tablet TAKE 1 TABLET BY MOUTH ONCE DAILY.    acetaminophen (TYLENOL) 500 MG tablet Take 1-2 tablets (500-1,000 mg total) by mouth 3 (three) times daily as needed for Pain.    albuterol (PROVENTIL/VENTOLIN HFA) 90 mcg/actuation inhaler Inhale 1-2 puffs into the lungs every 6 (six) hours as needed for Wheezing or Shortness of Breath. (Cough) Rescue    alendronate (FOSAMAX) 70 MG tablet TK 1 T PO EVERY WEEK    ALPRAZolam (XANAX) 0.25 MG tablet Take 1 tablet (0.25 mg total) by mouth daily as needed for Anxiety (FLIGHT ANXIETY). This medication can cause sedation. Try on a day you do not have to work the next day. Do not mix with alcohol or any other sedating meds (such as sleep aids, opioids, or benzos). Caution with driving or operating heavy machinery.    aspirin (ECOTRIN) 81 MG EC tablet Take 1 tablet by mouth once daily.    azelastine (ASTELIN) 137 mcg (0.1 %) nasal spray Two sprays in each nostril, sniff until absorbed, then follow with 1 spray of fluticasone.  Use both sprays twice daily.    cetirizine (ZYRTEC) 10 MG tablet Take 1 tablet (10 mg total) by mouth once daily. for 7 days    chlorzoxazone (PARAFON FORTE) 500 mg Tab TAKE 1 TABLET(S) 3 TIMES A DAY BY ORAL ROUTE AS NEEDED.    clobetasoL (TEMOVATE) 0.05 % external solution     [facility] 2023-24, 12Y UP,,PF, 30 mcg/0.3 mL inection     diclofenac sodium (VOLTAREN) 1 % Gel Apply 2 g topically 4 (four) times daily.     Review of patient's allergies indicates:   Allergen Reactions    Sutures, catgut (chromic)         Review of Systems   Constitutional:  Negative for fever.       Objective:      Vital Signs (Most Recent)  Temp: 97.7 °F (36.5 °C) (06/10/25 1230)  Pulse: 68 (06/10/25 1234)  Resp: 16 (06/10/25 1230)  BP: (!) 156/75 (Map 108) (06/10/25 1230)  SpO2: 98 % (06/10/25 1230)    Vital Signs Range (Last 24H):  Temp:  [97.7 °F (36.5 °C)]   Pulse:  [68]   Resp:  [16]   BP: (156)/(75)   SpO2:  [98 %]     Physical  Exam  Cardiovascular:      Rate and Rhythm: Normal rate.   Pulmonary:      Effort: Pulmonary effort is normal.   Neurological:      Mental Status: She is alert and oriented to person, place, and time.             Assessment:      EGD for GERD      Plan:    EGD for GERD           [1]   Social History  Tobacco Use    Smoking status: Never     Passive exposure: Never    Smokeless tobacco: Never   Substance Use Topics    Alcohol use: Not Currently     Comment: none    Drug use: No

## 2025-06-10 NOTE — ANESTHESIA POSTPROCEDURE EVALUATION
Anesthesia Post Evaluation    Patient: Valarie Colindres    Procedure(s) Performed: Procedure(s) (LRB):  EGD (ESOPHAGOGASTRODUODENOSCOPY) (N/A)    Final Anesthesia Type: general      Patient location during evaluation: GI PACU  Patient participation: Yes- Able to Participate  Level of consciousness: awake and alert  Post-procedure vital signs: reviewed and stable  Pain management: adequate  Airway patency: patent    PONV status at discharge: No PONV  Anesthetic complications: no      Cardiovascular status: hemodynamically stable  Respiratory status: spontaneous ventilation and unassisted  Hydration status: euvolemic  Follow-up not needed.              Vitals Value Taken Time   /70 06/10/25 14:00   Temp 36.5 °C (97.7 °F) 06/10/25 13:32   Pulse 70 06/10/25 14:00   Resp 15 06/10/25 14:00   SpO2 96 % 06/10/25 14:00         Event Time   Out of Recovery 14:09:28         Pain/Chauncey Score: No data recorded

## 2025-06-10 NOTE — TRANSFER OF CARE
"Anesthesia Transfer of Care Note    Patient: Valarie Colindres    Procedure(s) Performed: Procedure(s) (LRB):  EGD (ESOPHAGOGASTRODUODENOSCOPY) (N/A)    Patient location: GI    Anesthesia Type: general    Transport from OR: Transported from OR on 2-3 L/min O2 by NC with adequate spontaneous ventilation    Post pain: adequate analgesia    Post assessment: no apparent anesthetic complications    Post vital signs: stable    Level of consciousness: awake    Nausea/Vomiting: no nausea/vomiting    Complications: none    Transfer of care protocol was followed      Last vitals: Visit Vitals  BP (!) 156/75 (BP Location: Left arm, Patient Position: Lying)   Pulse 68   Temp 36.5 °C (97.7 °F) (Tympanic)   Resp 16   Ht 5' 4" (1.626 m)   Wt 62.8 kg (138 lb 7.2 oz)   SpO2 98%   Breastfeeding No   BMI 23.76 kg/m²     "

## 2025-06-10 NOTE — PROVATION PATIENT INSTRUCTIONS
Discharge Summary/Instructions after an Endoscopic Procedure  Patient Name: Valarie Prakash  Patient MRN: [id number]  Patient YOB: 1945  Tuesday, Letty 10, 2025  Xavier Lawrence MD  Dear patient,  As a result of recent federal legislation (The Federal Cures Act), you may   receive lab or pathology results from your procedure in your [facility]   account before your physician is able to contact you. Your physician or   their representative will relay the results to you with their   recommendations at their soonest availability.  Thank you,  RESTRICTIONS:  During your procedure today, you received medications for sedation.  These   medications may affect your judgment, balance and coordination.  Therefore,   for 24 hours, you have the following restrictions:   - DO NOT drive a car, operate machinery, make legal/financial decisions,   sign important papers or drink alcohol.    ACTIVITY:  Today: no heavy lifting, straining or running due to procedural   sedation/anesthesia.  The following day: return to full activity including work.  DIET:  Eat and drink normally unless instructed otherwise.     TREATMENT FOR COMMON SIDE EFFECTS:  - Mild abdominal pain, nausea, belching, bloating or excessive gas:  rest,   eat lightly and use a heating pad.  - Sore Throat: treat with throat lozenges and/or gargle with warm salt   water.  - Because air was used during the procedure, expelling large amounts of air   from your rectum or belching is normal.  - If a bowel prep was taken, you may not have a bowel movement for 1-3 days.    This is normal.  SYMPTOMS TO WATCH FOR AND REPORT TO YOUR PHYSICIAN:  1. Abdominal pain or bloating, other than gas cramps.  2. Chest pain.  3. Back pain.  4. Signs of infection such as: chills or fever occurring within 24 hours   after the procedure.  5. Rectal bleeding, which would show as bright red, maroon, or black stools.   (A tablespoon of blood from the rectum is not serious, especially  if   hemorrhoids are present.)  6. Vomiting.  7. Weakness or dizziness.  GO DIRECTLY TO THE NEAREST EMERGENCY ROOM IF YOU HAVE ANY OF THE FOLLOWING:      Difficulty breathing              Chills and/or fever over 101 F   Persistent vomiting and/or vomiting blood   Severe abdominal pain   Severe chest pain   Black, tarry stools   Bleeding- more than one tablespoon   Any other symptom or condition that you feel may need urgent attention  Your doctor recommends these additional instructions:  If any biopsies were taken, your doctors clinic will contact you in 1 to 2   weeks with any results.  - Discharge patient to home.   - Follow an antireflux regimen indefinitely.   - Await pathology results.   - Telephone endoscopist for pathology results in 3 weeks.   - Use Protonix 40 mg once daily (or any other full strength proton pump   inhibitor) - best taken 45 minutes before your first protein containing   meal. Rx sent.  - Repeat upper endoscopy in 8-12 weeks to check healing.   - Please avoid taking NSAIDs (nonsteroidal anti-inflammatory drugs), such as   ibuprofen (Advil, Motrin), naproxen (Aleve, Naprosyn), diclofenac   (Voltaren), meloxicam (Mobic), indomethacin (Indocin), celecoxib   (Celebrex), ketorolac (Toradol), etodolac (Lodine), piroxicam (Feldene),   sulindac (Clinoril), Goodys Powders, and Excedrin Migraine. However, it is   safe to continue taking aspirin if it is needed for cardiovascular   protection. Do NOT STOP your aspirin if your Aspirin has been prescribed   for heart or vascular health.  - Return to primary care physician.   - The findings and recommendations were discussed with the patient.  For questions, problems or results please call your physician - Xavier Lawrence MD at Work:  [phone].  [facility], EMERGENCY ROOM PHONE NUMBER: [phone]  IF A COMPLICATION OR EMERGENCY SITUATION ARISES AND YOU ARE UNABLE TO REACH   YOUR PHYSICIAN - GO DIRECTLY TO THE EMERGENCY  ROOM.  Xavier Lawrence MD  6/10/2025 1:31:57 PM  This report has been verified and signed electronically.  Dear patient,  As a result of recent federal legislation (The Federal Cures Act), you may   receive lab or pathology results from your procedure in your [facility]   account before your physician is able to contact you. Your physician or   their representative will relay the results to you with their   recommendations at their soonest availability.  Thank you,  PROVATION

## 2025-06-13 LAB
DHEA SERPL-MCNC: NORMAL
ESTROGEN SERPL-MCNC: NORMAL PG/ML
INSULIN SERPL-ACNC: NORMAL U[IU]/ML
LAB AP CLINICAL INFORMATION: NORMAL
LAB AP GROSS DESCRIPTION: NORMAL
LAB AP PERFORMING LOCATION(S): NORMAL
LAB AP REPORT FOOTNOTES: NORMAL

## 2025-06-16 ENCOUNTER — HOSPITAL ENCOUNTER (OUTPATIENT)
Dept: RADIOLOGY | Facility: HOSPITAL | Age: 80
Discharge: HOME | End: 2025-06-16
Attending: INTERNAL MEDICINE
Payer: MEDICARE

## 2025-06-16 DIAGNOSIS — K76.89 LIVER CYST: ICD-10-CM

## 2025-06-16 PROCEDURE — 76705 ECHO EXAM OF ABDOMEN: CPT | Mod: TC

## 2025-06-16 PROCEDURE — 76705 ECHO EXAM OF ABDOMEN: CPT | Mod: 26,,, | Performed by: STUDENT IN AN ORGANIZED HEALTH CARE EDUCATION/TRAINING PROGRAM

## 2025-06-20 ENCOUNTER — PATIENT MESSAGE (OUTPATIENT)
Dept: ADMINISTRATIVE | Facility: CLINIC | Age: 80
End: 2025-06-20
Payer: MEDICARE

## 2025-06-20 ENCOUNTER — TELEPHONE (OUTPATIENT)
Dept: ADMINISTRATIVE | Facility: CLINIC | Age: 80
End: 2025-06-20
Payer: MEDICARE

## 2025-06-23 ENCOUNTER — TELEPHONE (OUTPATIENT)
Dept: ADMINISTRATIVE | Facility: CLINIC | Age: 80
End: 2025-06-23
Payer: MEDICARE

## 2025-06-23 ENCOUNTER — OFFICE VISIT (OUTPATIENT)
Dept: HOME HEALTH SERVICES | Facility: CLINIC | Age: 80
End: 2025-06-23
Payer: MEDICARE

## 2025-06-23 VITALS — BODY MASS INDEX: 23.39 KG/M2 | HEIGHT: 64 IN | WEIGHT: 137 LBS

## 2025-06-23 DIAGNOSIS — E78.2 MIXED HYPERLIPIDEMIA: ICD-10-CM

## 2025-06-23 DIAGNOSIS — M81.0 AGE-RELATED OSTEOPOROSIS WITHOUT CURRENT PATHOLOGICAL FRACTURE: ICD-10-CM

## 2025-06-23 DIAGNOSIS — E03.9 ACQUIRED HYPOTHYROIDISM: ICD-10-CM

## 2025-06-23 DIAGNOSIS — R73.03 PREDIABETES: ICD-10-CM

## 2025-06-23 DIAGNOSIS — D47.2 MGUS (MONOCLONAL GAMMOPATHY OF UNKNOWN SIGNIFICANCE): ICD-10-CM

## 2025-06-23 DIAGNOSIS — Z00.00 ENCOUNTER FOR MEDICARE ANNUAL WELLNESS EXAM: Primary | ICD-10-CM

## 2025-06-23 DIAGNOSIS — I10 PRIMARY HYPERTENSION: ICD-10-CM

## 2025-06-23 DIAGNOSIS — D56.3 THALASSEMIA TRAIT: ICD-10-CM

## 2025-06-23 DIAGNOSIS — G60.3 IDIOPATHIC PROGRESSIVE POLYNEUROPATHY: ICD-10-CM

## 2025-06-23 DIAGNOSIS — M54.17 LUMBOSACRAL RADICULOPATHY AT L5: ICD-10-CM

## 2025-06-23 PROCEDURE — 1160F RVW MEDS BY RX/DR IN RCRD: CPT | Mod: CPTII,95,, | Performed by: NURSE PRACTITIONER

## 2025-06-23 PROCEDURE — 1170F FXNL STATUS ASSESSED: CPT | Mod: CPTII,95,, | Performed by: NURSE PRACTITIONER

## 2025-06-23 PROCEDURE — 3288F FALL RISK ASSESSMENT DOCD: CPT | Mod: CPTII,95,, | Performed by: NURSE PRACTITIONER

## 2025-06-23 PROCEDURE — 1159F MED LIST DOCD IN RCRD: CPT | Mod: CPTII,95,, | Performed by: NURSE PRACTITIONER

## 2025-06-23 PROCEDURE — 1101F PT FALLS ASSESS-DOCD LE1/YR: CPT | Mod: CPTII,95,, | Performed by: NURSE PRACTITIONER

## 2025-06-23 PROCEDURE — G0439 PPPS, SUBSEQ VISIT: HCPCS | Mod: 95,,, | Performed by: NURSE PRACTITIONER

## 2025-06-23 PROCEDURE — 1125F AMNT PAIN NOTED PAIN PRSNT: CPT | Mod: CPTII,95,, | Performed by: NURSE PRACTITIONER

## 2025-06-23 PROCEDURE — 1158F ADVNC CARE PLAN TLK DOCD: CPT | Mod: CPTII,95,, | Performed by: NURSE PRACTITIONER

## 2025-06-23 NOTE — PATIENT INSTRUCTIONS
Counseling and Referral of Other Preventative  (Italic type indicates deductible and co-insurance are waived)    Patient Name: Valarie Colindres  Today's Date: 6/23/2025    Health Maintenance       Date Due Completion Date    RSV Vaccine (Age 60+ and Pregnant patients) (1 - 1-dose 75+ series) Never done ---    COVID-19 Vaccine (6 - 2024-25 season) 09/01/2024 12/1/2022    Hemoglobin A1c (Prediabetes) 05/22/2025 5/22/2024    Influenza Vaccine (Season Ended) 09/01/2025 10/6/2023    DEXA Scan 04/23/2027 4/23/2025    Colonoscopy 12/05/2028 12/5/2023    Lipid Panel 05/22/2029 5/22/2024    TETANUS VACCINE 09/02/2032 9/2/2022    Override on 1/1/2004: Done        Orders Placed This Encounter   Procedures    Hemoglobin A1C     The following information is provided to all patients.  This information is to help you find resources for any of the problems found today that may be affecting your health:                  Living healthy guide: www.[facility].[place].gov      Understanding Diabetes: www.diabetes.org      Eating healthy: www.cdc.gov/healthyweight      CDC home safety checklist: www.cdc.gov/steadi/patient.html      Agency on Aging: www.goea.[place].gov      Alcoholics anonymous (AA): www.aa.org      Physical Activity: www.neela.nih.gov/fh0spbb      Tobacco use: www.quitwithusla.org

## 2025-06-23 NOTE — PROGRESS NOTES
"The patient location is: [place]  The chief complaint leading to consultation is: Medicare AWV    Visit type: audiovisual    Face to Face time with patient: 45  55 minutes of total time spent on the encounter, which includes face to face time and non-face to face time preparing to see the patient (eg, review of tests), Obtaining and/or reviewing separately obtained history, Documenting clinical information in the electronic or other health record, Independently interpreting results (not separately reported) and communicating results to the patient/family/caregiver, or Care coordination (not separately reported).         Each patient to whom he or she provides medical services by telemedicine is:  (1) informed of the relationship between the physician and patient and the respective role of any other health care provider with respect to management of the patient; and (2) notified that he or she may decline to receive medical services by telemedicine and may withdraw from such care at any time.    Notes:                         Valarie Colindres presented for a follow-up Medicare AWV today. The following components were reviewed and updated:    Medical history  Family History  Social history  Allergies and Current Medications  Health Risk Assessment  Health Maintenance  Care Team    **See Completed Assessments for Annual Wellness visit with in the encounter summary    The following assessments were completed:  Depression Screening  Cognitive function Screening  Timed Get Up Test  Whisper Test      Opioid documentation:      Patient does not have a current opioid prescription.          Vitals:    06/23/25 1059   Weight: 62.1 kg (137 lb)   Height: 5' 4" (1.626 m)     Body mass index is 23.52 kg/m².       Physical Exam  Constitutional:       Appearance: Normal appearance.   HENT:      Head: Normocephalic and atraumatic.   Eyes:      General: No scleral icterus.  Neurological:      Mental Status: She is alert. "   Psychiatric:         Mood and Affect: Mood normal.         Behavior: Behavior normal.           Diagnoses and health risks identified today and associated recommendations/orders:  1. Encounter for Medicare annual wellness exam  - Screenings performed, as noted above. Personal preventative testing needs reviewed.   - Referral to Enhanced Annual Wellness Visit (eAWV) W+1    2. Osteoarthritis of spine with radiculopathy, cervical region  - Stable, uses tylenol prn.     3. Idiopathic progressive polyneuropathy  - Stable, occasional numbness of left lateral leg. Followed by PCP and podiatry    4. Lumbosacral radiculopathy at L5  - Stable, mild back pain present today. Only using tylenol prn.     5. Primary hypertension  - Elevated at last office visit. On amlodipine and valsartan. Followed by PCP    6. Mixed hyperlipidemia  - Stable, on statin, followed by PCP    7. Thalassemia trait  - Stable, followed by heme/onc    8. MGUS (monoclonal gammopathy of unknown significance)  - Stable, followed by heme/onc    9. Age-related osteoporosis without current pathological fracture  - Stable, on fosamax.     10. Acquired hypothyroidism  - Stable, on levothyroxine.     11. Prediabetes  - Hemoglobin A1C; Future      Provided [place] with a 5-10 year written screening schedule and personal prevention plan. Recommendations were developed using the USPSTF age appropriate recommendations. Education, counseling, and referrals were provided as needed.  After Visit Summary printed and given to patient which includes a list of additional screenings\tests needed.    Follow up in about 1 year (around 6/23/2026) for your next annual wellness visit.      Jodie Casey, [place]  I offered to discuss advanced care planning, including how to pick a person who would make decisions for you if you were unable to make them for yourself, called a health care power of , and what kind of decisions you might make such as use of life sustaining  treatments such as ventilators and tube feeding when faced with a life limiting illness recorded on a living will that they will need to know. (How you want to be cared for as you near the end of your natural life)     X Patient is interested in learning more about how to make advanced directives.  I provided them paperwork and offered to discuss this with them.

## 2025-06-27 ENCOUNTER — LAB VISIT (OUTPATIENT)
Dept: LAB | Facility: HOSPITAL | Age: 80
End: 2025-06-27
Attending: NURSE PRACTITIONER
Payer: MEDICARE

## 2025-06-27 DIAGNOSIS — K44.9 HIATAL HERNIA: ICD-10-CM

## 2025-06-27 DIAGNOSIS — Z51.81 ENCOUNTER FOR MONITORING LONG-TERM PROTON PUMP INHIBITOR THERAPY: ICD-10-CM

## 2025-06-27 DIAGNOSIS — R73.03 PREDIABETES: ICD-10-CM

## 2025-06-27 DIAGNOSIS — K21.9 GASTROESOPHAGEAL REFLUX DISEASE, UNSPECIFIED WHETHER ESOPHAGITIS PRESENT: ICD-10-CM

## 2025-06-27 DIAGNOSIS — Z79.899 ENCOUNTER FOR MONITORING LONG-TERM PROTON PUMP INHIBITOR THERAPY: ICD-10-CM

## 2025-06-27 LAB
25(OH)D3+25(OH)D2 SERPL-MCNC: 53 NG/ML (ref 30–96)
EAG (OHS): 123 MG/DL (ref 68–131)
HBA1C MFR BLD: 5.9 % (ref 4–5.6)
VIT B12 SERPL-MCNC: 639 PG/ML (ref 210–950)

## 2025-06-27 PROCEDURE — 36415 COLL VENOUS BLD VENIPUNCTURE: CPT | Mod: PN

## 2025-06-27 PROCEDURE — 82607 VITAMIN B-12: CPT

## 2025-06-27 PROCEDURE — 82306 VITAMIN D 25 HYDROXY: CPT

## 2025-06-27 PROCEDURE — 83036 HEMOGLOBIN GLYCOSYLATED A1C: CPT

## 2025-07-05 ENCOUNTER — DOCUMENTATION ONLY (OUTPATIENT)
Dept: GASTROENTEROLOGY | Facility: CLINIC | Age: 80
End: 2025-07-05
Payer: MEDICARE

## 2025-07-05 ENCOUNTER — RESULTS FOLLOW-UP (OUTPATIENT)
Dept: GASTROENTEROLOGY | Facility: CLINIC | Age: 80
End: 2025-07-05

## 2025-07-05 ENCOUNTER — PATIENT MESSAGE (OUTPATIENT)
Dept: GASTROENTEROLOGY | Facility: CLINIC | Age: 80
End: 2025-07-05
Payer: MEDICARE

## 2025-07-05 NOTE — PROGRESS NOTES
"Referral placed endoscopy schedulers  Procedure: EGD    Diagnosis:  Erosive gastritis    Procedure Timing:in 8-12 weeks    *If within 4 weeks selected, please charisma as high priority*    *If greater than 12 weeks, please select "5-12 weeks" and delay sending until 3 months prior to requested date*     Location: Any Site    Additional Scheduling Information: No scheduling concerns    Prep Specifications:Standard prep    Is the patient taking a GLP-1 Agonist:no but please    Have you attached a patient to this message: yes     "

## 2025-07-06 ENCOUNTER — RESULTS FOLLOW-UP (OUTPATIENT)
Dept: GASTROENTEROLOGY | Facility: CLINIC | Age: 80
End: 2025-07-06

## 2025-07-06 ENCOUNTER — PATIENT MESSAGE (OUTPATIENT)
Dept: GASTROENTEROLOGY | Facility: CLINIC | Age: 80
End: 2025-07-06
Payer: MEDICARE

## 2025-07-06 DIAGNOSIS — K76.89 LIVER CYST: Primary | ICD-10-CM

## 2025-07-07 ENCOUNTER — TELEPHONE (OUTPATIENT)
Dept: ENDOSCOPY | Facility: HOSPITAL | Age: 80
End: 2025-07-07
Payer: MEDICARE

## 2025-07-07 ENCOUNTER — PATIENT MESSAGE (OUTPATIENT)
Dept: ENDOSCOPY | Facility: HOSPITAL | Age: 80
End: 2025-07-07
Payer: MEDICARE

## 2025-07-07 VITALS — BODY MASS INDEX: 23.37 KG/M2 | WEIGHT: 136.88 LBS | HEIGHT: 64 IN

## 2025-07-07 DIAGNOSIS — K22.10 EROSIVE ESOPHAGITIS: Primary | ICD-10-CM

## 2025-07-07 NOTE — TELEPHONE ENCOUNTER
"Patient is scheduled for a Upper Endoscopy (EGD) on 8/19/25 with Dr. COLE Lawrence  Referral for procedure from [facility]    ----- Message -----  From: Xavier Lawrence MD  Sent: 7/5/2025   6:10 PM CDT  To: [facility]     Procedure: EGD     Diagnosis:  Erosive gastritis     Procedure Timing:in 8-12 weeks     #If within 4 weeks selected, please charisma as high priority#     #If greater than 12 weeks, please select "5-12 weeks" and delay sending until 3 months prior to requested date#      Location: Any Site     Additional Scheduling Information: No scheduling concerns     Prep Specifications:Standard prep     Is the patient taking a GLP-1 Agonist:no but please     Have you attached a patient to this message: yes            "

## 2025-07-07 NOTE — TELEPHONE ENCOUNTER
"----- Message from Sheridan sent at 7/7/2025  8:40 AM CDT -----    ----- Message -----  From: Xavier Lawrence MD  Sent: 7/5/2025   6:10 PM CDT  To: [facility] Patients    Procedure: EGD    Diagnosis:  Erosive gastritis    Procedure Timing:in 8-12 weeks    #If within 4 weeks selected, please charisma as high priority#    #If greater than 12 weeks, please select "5-12 weeks" and delay sending until 3 months prior to requested date#     Location: Any Site    Additional Scheduling Information: No scheduling concerns    Prep Specifications:Standard prep    Is the patient taking a GLP-1 Agonist:no but please    Have you attached a patient to this message: yes  "

## 2025-07-07 NOTE — TELEPHONE ENCOUNTER
Patient is scheduled for a Upper Endoscopy (EGD) on 8/29/25 with Dr. COLE Lawrence  Referral for procedure from [facility]

## 2025-07-13 ENCOUNTER — PATIENT MESSAGE (OUTPATIENT)
Dept: GASTROENTEROLOGY | Facility: CLINIC | Age: 80
End: 2025-07-13
Payer: MEDICARE

## 2025-07-29 ENCOUNTER — PATIENT MESSAGE (OUTPATIENT)
Dept: PRIMARY CARE CLINIC | Facility: CLINIC | Age: 80
End: 2025-07-29

## 2025-07-29 ENCOUNTER — OFFICE VISIT (OUTPATIENT)
Dept: PRIMARY CARE CLINIC | Facility: CLINIC | Age: 80
End: 2025-07-29
Payer: MEDICARE

## 2025-07-29 ENCOUNTER — HOSPITAL ENCOUNTER (EMERGENCY)
Facility: OTHER | Age: 80
Discharge: HOME | End: 2025-07-29
Attending: EMERGENCY MEDICINE
Payer: MEDICARE

## 2025-07-29 ENCOUNTER — PATIENT OUTREACH (OUTPATIENT)
Facility: OTHER | Age: 80
End: 2025-07-29
Payer: MEDICARE

## 2025-07-29 ENCOUNTER — OCHSNER VIRTUAL EMERGENCY DEPARTMENT (OUTPATIENT)
Facility: CLINIC | Age: 80
End: 2025-07-29
Payer: MEDICARE

## 2025-07-29 VITALS
HEIGHT: 64 IN | RESPIRATION RATE: 18 BRPM | BODY MASS INDEX: 24.75 KG/M2 | SYSTOLIC BLOOD PRESSURE: 167 MMHG | OXYGEN SATURATION: 96 % | TEMPERATURE: 98 F | HEART RATE: 69 BPM | WEIGHT: 145 LBS | DIASTOLIC BLOOD PRESSURE: 81 MMHG

## 2025-07-29 VITALS
TEMPERATURE: 98 F | SYSTOLIC BLOOD PRESSURE: 156 MMHG | WEIGHT: 142.88 LBS | HEIGHT: 64 IN | BODY MASS INDEX: 24.39 KG/M2 | OXYGEN SATURATION: 98 % | DIASTOLIC BLOOD PRESSURE: 90 MMHG | HEART RATE: 66 BPM

## 2025-07-29 DIAGNOSIS — A05.9 FOOD POISONING: Primary | ICD-10-CM

## 2025-07-29 DIAGNOSIS — K52.9 GASTROENTERITIS: ICD-10-CM

## 2025-07-29 DIAGNOSIS — I10 PRIMARY HYPERTENSION: ICD-10-CM

## 2025-07-29 DIAGNOSIS — R10.9 ABDOMINAL PAIN, UNSPECIFIED ABDOMINAL LOCATION: Primary | ICD-10-CM

## 2025-07-29 DIAGNOSIS — R42 VERTIGO: ICD-10-CM

## 2025-07-29 DIAGNOSIS — K76.89 HEPATIC CYST: ICD-10-CM

## 2025-07-29 DIAGNOSIS — N20.0 LEFT NEPHROLITHIASIS: ICD-10-CM

## 2025-07-29 DIAGNOSIS — N28.1 RENAL CYST: ICD-10-CM

## 2025-07-29 DIAGNOSIS — R11.2 NAUSEA AND VOMITING, UNSPECIFIED VOMITING TYPE: ICD-10-CM

## 2025-07-29 DIAGNOSIS — K57.90 DIVERTICULOSIS: ICD-10-CM

## 2025-07-29 DIAGNOSIS — R19.7 DIARRHEA, UNSPECIFIED TYPE: ICD-10-CM

## 2025-07-29 LAB
ABSOLUTE EOSINOPHIL (OHS): 0.2 K/UL
ABSOLUTE MONOCYTE (OHS): 0.22 K/UL (ref 0.3–1)
ABSOLUTE NEUTROPHIL COUNT (OHS): 5.51 K/UL (ref 1.8–7.7)
ALBUMIN SERPL BCP-MCNC: 4.5 G/DL (ref 3.5–5.2)
ALP SERPL-CCNC: 54 UNIT/L (ref 40–150)
ALT SERPL W/O P-5'-P-CCNC: 8 UNIT/L (ref 10–44)
ANION GAP (OHS): 7 MMOL/L (ref 8–16)
AST SERPL-CCNC: 37 UNIT/L (ref 11–45)
BACTERIA #/AREA URNS AUTO: ABNORMAL /HPF
BASOPHILS # BLD AUTO: 0.02 K/UL
BASOPHILS NFR BLD AUTO: 0.3 %
BILIRUB SERPL-MCNC: 0.6 MG/DL (ref 0.1–1)
BILIRUB UR QL STRIP.AUTO: NEGATIVE
BUN SERPL-MCNC: 15 MG/DL (ref 8–23)
CALCIUM SERPL-MCNC: 9.3 MG/DL (ref 8.7–10.5)
CHLORIDE SERPL-SCNC: 103 MMOL/L (ref 95–110)
CLARITY UR: CLEAR
CO2 SERPL-SCNC: 29 MMOL/L (ref 23–29)
COLOR UR AUTO: COLORLESS
CREAT SERPL-MCNC: 0.7 MG/DL (ref 0.5–1.4)
CREAT SERPL-MCNC: 0.8 MG/DL (ref 0.5–1.4)
ERYTHROCYTE [DISTWIDTH] IN BLOOD BY AUTOMATED COUNT: 18.9 % (ref 11.5–14.5)
GFR SERPLBLD CREATININE-BSD FMLA CKD-EPI: >60 ML/MIN/1.73/M2
GLUCOSE SERPL-MCNC: 110 MG/DL (ref 70–110)
GLUCOSE UR QL STRIP: NEGATIVE
HCT VFR BLD AUTO: 36.8 % (ref 37–48.5)
HGB BLD-MCNC: 11.4 GM/DL (ref 12–16)
HGB UR QL STRIP: ABNORMAL
IMM GRANULOCYTES # BLD AUTO: 0.01 K/UL (ref 0–0.04)
IMM GRANULOCYTES NFR BLD AUTO: 0.1 % (ref 0–0.5)
KETONES UR QL STRIP: NEGATIVE
LDH SERPL L TO P-CCNC: 1.29 MMOL/L (ref 0.5–2.2)
LEUKOCYTE ESTERASE UR QL STRIP: NEGATIVE
LIPASE SERPL-CCNC: 11 U/L (ref 4–60)
LYMPHOCYTES # BLD AUTO: 1.44 K/UL (ref 1–4.8)
MCH RBC QN AUTO: 18.9 PG (ref 27–31)
MCHC RBC AUTO-ENTMCNC: 31 G/DL (ref 32–36)
MCV RBC AUTO: 61 FL (ref 82–98)
MICROSCOPIC COMMENT: ABNORMAL
NITRITE UR QL STRIP: NEGATIVE
NUCLEATED RBC (/100WBC) (OHS): 0 /100 WBC
PH UR STRIP: 7 [PH]
PLATELET # BLD AUTO: 229 K/UL (ref 150–450)
PMV BLD AUTO: ABNORMAL FL
POTASSIUM SERPL-SCNC: 3.8 MMOL/L (ref 3.5–5.1)
PROT SERPL-MCNC: 7.8 GM/DL (ref 6–8.4)
PROT UR QL STRIP: NEGATIVE
RBC # BLD AUTO: 6.04 M/UL (ref 4–5.4)
RBC #/AREA URNS AUTO: 20 /HPF (ref 0–4)
RELATIVE EOSINOPHIL (OHS): 2.7 %
RELATIVE LYMPHOCYTE (OHS): 19.5 % (ref 18–48)
RELATIVE MONOCYTE (OHS): 3 % (ref 4–15)
RELATIVE NEUTROPHIL (OHS): 74.4 % (ref 38–73)
SAMPLE: NORMAL
SAMPLE: NORMAL
SODIUM SERPL-SCNC: 139 MMOL/L (ref 136–145)
SP GR UR STRIP: 1.01
TROPONIN I SERPL HS-MCNC: 8 NG/L
UROBILINOGEN UR STRIP-ACNC: NEGATIVE EU/DL
WBC # BLD AUTO: 7.4 K/UL (ref 3.9–12.7)
WBC #/AREA URNS AUTO: 2 /HPF (ref 0–5)

## 2025-07-29 PROCEDURE — 63600175 PHARM REV CODE 636 W HCPCS

## 2025-07-29 PROCEDURE — 1159F MED LIST DOCD IN RCRD: CPT | Mod: CPTII,S$GLB,, | Performed by: STUDENT IN AN ORGANIZED HEALTH CARE EDUCATION/TRAINING PROGRAM

## 2025-07-29 PROCEDURE — 96360 HYDRATION IV INFUSION INIT: CPT | Mod: 59

## 2025-07-29 PROCEDURE — 85025 COMPLETE CBC W/AUTO DIFF WBC: CPT | Performed by: NURSE PRACTITIONER

## 2025-07-29 PROCEDURE — 84484 ASSAY OF TROPONIN QUANT: CPT

## 2025-07-29 PROCEDURE — 81003 URINALYSIS AUTO W/O SCOPE: CPT | Performed by: NURSE PRACTITIONER

## 2025-07-29 PROCEDURE — 82947 ASSAY GLUCOSE BLOOD QUANT: CPT | Performed by: NURSE PRACTITIONER

## 2025-07-29 PROCEDURE — 1125F AMNT PAIN NOTED PAIN PRSNT: CPT | Mod: CPTII,S$GLB,, | Performed by: STUDENT IN AN ORGANIZED HEALTH CARE EDUCATION/TRAINING PROGRAM

## 2025-07-29 PROCEDURE — 99215 OFFICE O/P EST HI 40 MIN: CPT | Mod: S$GLB,,, | Performed by: STUDENT IN AN ORGANIZED HEALTH CARE EDUCATION/TRAINING PROGRAM

## 2025-07-29 PROCEDURE — 25500020 PHARM REV CODE 255: Performed by: EMERGENCY MEDICINE

## 2025-07-29 PROCEDURE — 99999 PR PBB SHADOW E&M-EST. PATIENT-LVL IV: CPT | Mod: PBBFAC,,, | Performed by: STUDENT IN AN ORGANIZED HEALTH CARE EDUCATION/TRAINING PROGRAM

## 2025-07-29 PROCEDURE — 3077F SYST BP >= 140 MM HG: CPT | Mod: CPTII,S$GLB,, | Performed by: STUDENT IN AN ORGANIZED HEALTH CARE EDUCATION/TRAINING PROGRAM

## 2025-07-29 PROCEDURE — 96361 HYDRATE IV INFUSION ADD-ON: CPT

## 2025-07-29 PROCEDURE — 83690 ASSAY OF LIPASE: CPT | Performed by: NURSE PRACTITIONER

## 2025-07-29 PROCEDURE — 1160F RVW MEDS BY RX/DR IN RCRD: CPT | Mod: CPTII,S$GLB,, | Performed by: STUDENT IN AN ORGANIZED HEALTH CARE EDUCATION/TRAINING PROGRAM

## 2025-07-29 PROCEDURE — 3080F DIAST BP >= 90 MM HG: CPT | Mod: CPTII,S$GLB,, | Performed by: STUDENT IN AN ORGANIZED HEALTH CARE EDUCATION/TRAINING PROGRAM

## 2025-07-29 PROCEDURE — 1101F PT FALLS ASSESS-DOCD LE1/YR: CPT | Mod: CPTII,S$GLB,, | Performed by: STUDENT IN AN ORGANIZED HEALTH CARE EDUCATION/TRAINING PROGRAM

## 2025-07-29 PROCEDURE — 3288F FALL RISK ASSESSMENT DOCD: CPT | Mod: CPTII,S$GLB,, | Performed by: STUDENT IN AN ORGANIZED HEALTH CARE EDUCATION/TRAINING PROGRAM

## 2025-07-29 PROCEDURE — 99285 EMERGENCY DEPT VISIT HI MDM: CPT | Mod: 25

## 2025-07-29 RX ORDER — ONDANSETRON 4 MG/1
4 TABLET, FILM COATED ORAL EVERY 8 HOURS PRN
Qty: 10 TABLET | Refills: 0 | Status: SHIPPED | OUTPATIENT
Start: 2025-07-29 | End: 2025-08-06

## 2025-07-29 RX ADMIN — IOHEXOL 75 ML: 350 INJECTION, SOLUTION INTRAVENOUS at 03:07

## 2025-07-29 RX ADMIN — SODIUM CHLORIDE, POTASSIUM CHLORIDE, SODIUM LACTATE AND CALCIUM CHLORIDE 500 ML: 600; 310; 30; 20 INJECTION, SOLUTION INTRAVENOUS at 03:07

## 2025-07-29 NOTE — PROGRESS NOTES
Valarie Colindres  1945        Subjective     Chief Complaint: Vomiting    History of Present Illness:  Ms. Valarie Colindres is a 80 y.o. female who presents to clinic for gastroenteritis.     Concern for food poisoning. Ate leftover Chinese food-shrimp fried rice, sweet and sour chicken last night. Ordered this food Saturday, ate the leftovers 2 days later on Monday. Sister also ate this food and feels fine.  Started feeling sick early this AM. Ate around 8 pm the night before.   +Nausea, vomiting, diarrhea, fatigue. Has vomited 4-5x today.  No fever but +chills, +sweats.  +abdominal pain, diffuse. 8/10. Worsening.    Mild cough but no congestion.  Barely able keep water down, nauseous.  No blood in the stool or in the vomit.   No sick contacts. Here with family.    BP Readings from Last 5 Encounters:   07/29/25 (!) 183/82   07/29/25 (!) 156/90   06/10/25 (!) 164/70   05/26/25 (!) 142/88   05/23/25 134/76     Hx of MGUS.      Review of Systems   Constitutional:  Positive for chills and malaise/fatigue. Negative for fever.   Respiratory:  Positive for cough.    Gastrointestinal:  Positive for abdominal pain, diarrhea, nausea and vomiting. Negative for blood in stool.   Musculoskeletal:  Positive for myalgias.   Neurological:  Positive for dizziness, sensory change, weakness and headaches.   Psychiatric/Behavioral:  The patient is nervous/anxious.         PAST HISTORY:     Past Medical History:   Diagnosis Date    Angle recession of right eye     Blunt trauma of both eyes     hit across eyes with bungee exercise band    Cataract     Cystocele, midline     Dry eye syndrome     Ectropion due to laxity of eyelid, right     GERD (gastroesophageal reflux disease)     Hyperlipidemia     Hypertension     PVD (posterior vitreous detachment), right eye     Rectocele     Retinal drusen of both eyes     Thalassemia major     Vaginal atrophy        Past Surgical History:   Procedure Laterality Date    ABDOMINAL ADHESION  SURGERY  1978    ANKLE FRACTURE SURGERY Right 1996    COLONOSCOPY  11/09/2022    COLONOSCOPY N/A 12/5/2023    Procedure: COLONOSCOPY;  Surgeon: Xavier Lawrence MD;  Location: [facility] ([facility]  Service: Endoscopy;  Laterality: N/A;    ECTROPION REPAIR Bilateral 06/2018    Dr. Flaherty    ESOPHAGOGASTRODUODENOSCOPY N/A 12/5/2023    Procedure: EGD (ESOPHAGOGASTRODUODENOSCOPY);  Surgeon: Xavier Lawrence MD;  Location: [facility] (4TH FLR);  Service: Endoscopy;  Laterality: N/A;  9/6- referred by Dr. Lawrnece/Additional Scheduling Information:  Needs constipation bowel prep protocol / prep instructions handed to patient and to portal. AS  11/28-precall complete-MS    ESOPHAGOGASTRODUODENOSCOPY N/A 6/10/2025    Procedure: EGD (ESOPHAGOGASTRODUODENOSCOPY);  Surgeon: Xavier Lawrence MD;  Location: [facility]  Service: Endoscopy;  Laterality: N/A;  Dr. Lawrence pt / instr to portal. DBM  6/2 precall attempted/lvm/mleone  6-6-25- pre call attempted-LVM to call back and confirm appointment-MMG  6/9-pt called department- confirmed arrival time, precall complete-mkp    HYSTERECTOMY  1977    possible cancerous lesion       Family History   Problem Relation Name Age of Onset    Stroke Mother      Heart disease Mother      Prostate cancer Father      Cancer Sister Ladan         colon    Colon cancer Sister Ladan     Hypertension Sister Ladan     No Known Problems Sister Maddy     No Known Problems Brother Clanton     Prostate cancer Brother Gene     Diabetes Brother Force     No Known Problems Brother August     Cancer Brother Hugo         prostate    Crohn's disease Daughter Leisel     Breast cancer Maternal Aunt Lizzy     Glaucoma Maternal Uncle August     Blindness Maternal Uncle August     Hypertension Maternal Grandmother      No Known Problems Maternal Grandfather      No Known Problems Paternal Grandmother      No Known Problems Paternal Grandfather      Cataracts Neg Hx      Macular  degeneration Neg Hx           MEDICATIONS & ALLERGIES:     Current Outpatient Medications on File Prior to Visit   Medication Sig    acetaminophen (TYLENOL) 500 MG tablet Take 1-2 tablets (500-1,000 mg total) by mouth 3 (three) times daily as needed for Pain.    albuterol (PROVENTIL/VENTOLIN HFA) 90 mcg/actuation inhaler Inhale 1-2 puffs into the lungs every 6 (six) hours as needed for Wheezing or Shortness of Breath. (Cough) Rescue    alendronate (FOSAMAX) 70 MG tablet TK 1 T PO EVERY WEEK    ALPRAZolam (XANAX) 0.25 MG tablet Take 1 tablet (0.25 mg total) by mouth daily as needed for Anxiety (FLIGHT ANXIETY). This medication can cause sedation. Try on a day you do not have to work the next day. Do not mix with alcohol or any other sedating meds (such as sleep aids, opioids, or benzos). Caution with driving or operating heavy machinery.    amLODIPine (NORVASC) 5 MG tablet     atorvastatin (LIPITOR) 10 MG tablet TAKE 1 TABLET BY MOUTH EVERY DAY IN THE EVENING    azelastine (ASTELIN) 137 mcg (0.1 %) nasal spray Two sprays in each nostril, sniff until absorbed, then follow with 1 spray of fluticasone.  Use both sprays twice daily.    cetirizine (ZYRTEC) 10 MG tablet Take 1 tablet (10 mg total) by mouth once daily. for 7 days    chlorhexidine (PERIDEX) 0.12 % solution RINSE AND SWISH ONE CAPFUL BID    cholecalciferol, vitamin D3, (VITAMIN D3) 125 mcg (5,000 unit) Tab Take 5,000 Units by mouth once daily.    diclofenac sodium (VOLTAREN) 1 % Gel Apply 2 g topically 4 (four) times daily.    levothyroxine (SYNTHROID) 100 MCG tablet TAKE 1 TABLET BY MOUTH EVERY DAY    pantoprazole (PROTONIX) 40 MG tablet Take 1 tablet (40 mg total) by mouth before breakfast. Best taken 45-60 minutes before your first protein ronald of the day - Breakfast.    valsartan (DIOVAN) 320 MG tablet TAKE 1 TABLET BY MOUTH ONCE DAILY.     No current facility-administered medications on file prior to visit.       Review of patient's allergies indicates:  "  Allergen Reactions    Sutures, catgut (chromic)        OBJECTIVE:     Vital Signs:  Vitals:    07/29/25 1040   BP: (!) 156/90   BP Location: Left arm   Patient Position: Sitting   Pulse: 66   Temp: 98 °F (36.7 °C)   TempSrc: Oral   SpO2: 98%   Weight: 64.8 kg (142 lb 13.7 oz)   Height: 5' 4" (1.626 m)       Body mass index is 24.52 kg/m².     Physical Exam:  Physical Exam  Vitals and nursing note reviewed.   Constitutional:       Appearance: She is ill-appearing. She is not diaphoretic.   HENT:      Head: Normocephalic and atraumatic.   Eyes:      General: No scleral icterus.        Right eye: No discharge.         Left eye: No discharge.      Conjunctiva/sclera: Conjunctivae normal.   Pulmonary:      Effort: Pulmonary effort is normal. No respiratory distress.   Abdominal:      General: There is no distension.      Palpations: Abdomen is soft.      Tenderness: There is abdominal tenderness. There is no guarding or rebound.   Musculoskeletal:         General: Normal range of motion.   Neurological:      Mental Status: She is oriented to person, place, and time. Mental status is at baseline. She is lethargic.      Gait: Gait abnormal.            Laboratory  Lab Results   Component Value Date    GLU 85 05/16/2025     05/16/2025    K 3.9 05/16/2025     05/16/2025    CO2 30 (H) 05/16/2025    BUN 21 05/16/2025    CREATININE 0.7 05/16/2025    CALCIUM 8.9 05/16/2025    MG 2.3 10/15/2020     Lab Results   Component Value Date    HGBA1C 5.9 (H) 06/27/2025     No results for input(s): "POCTGLUCOSE" in the last 72 hours.        ASSESSMENT & PLAN:   Ms. Valarie Colindres is a 80 y.o. female who was seen today.  80yF w/ hx of [facility] [place]. Concern for viral gastroenteritis vs food poisoning. Ate chinese food last night and started with severe nausea/vomiting/diarrhea this AM. 8/10 abdominal pain, now also with new vertigo. Unsteady gait. BP not low but ill appearing and unable to keep PO down (even water). Given her age " and symptoms, referring to ED for IV antiemetics, IVF, possible abdominal imaging.  Offered EMS but family will bring her to ED.  Discussed with MARLENA.    1. Food poisoning  -     POCT COVID-19 Rapid Screening  -     POCT Influenza A/B Molecular  -     Occult blood x 1, stool; Future; Expected date: 07/29/2025  -     WBC, Stool; Future; Expected date: 07/29/2025  -     Rotavirus antigen, stool; Future; Expected date: 07/29/2025  -     Adenovirus Molecular Detection, PCR, Non-Blood Stool; Future; Expected date: 07/29/2025  -     Calprotectin, Stool; Future; Expected date: 07/29/2025  -     Giardia / Cryptosporidum, EIA; Future; Expected date: 07/29/2025  -     Stool Exam-Ova,Cysts,Parasites; Future; Expected date: 07/29/2025  -     Clostridium difficile EIA; Future; Expected date: 07/29/2025  -     Stool culture; Future; Expected date: 07/29/2025  -     Basic Metabolic Panel; Future; Expected date: 07/29/2025  -     Refer to Emergency Dept.    2. Gastroenteritis  -     POCT COVID-19 Rapid Screening  -     POCT Influenza A/B Molecular  -     Occult blood x 1, stool; Future; Expected date: 07/29/2025  -     WBC, Stool; Future; Expected date: 07/29/2025  -     Rotavirus antigen, stool; Future; Expected date: 07/29/2025  -     Adenovirus Molecular Detection, PCR, Non-Blood Stool; Future; Expected date: 07/29/2025  -     Calprotectin, Stool; Future; Expected date: 07/29/2025  -     Giardia / Cryptosporidum, EIA; Future; Expected date: 07/29/2025  -     Stool Exam-Ova,Cysts,Parasites; Future; Expected date: 07/29/2025  -     Clostridium difficile EIA; Future; Expected date: 07/29/2025  -     Stool culture; Future; Expected date: 07/29/2025  -     Basic Metabolic Panel; Future; Expected date: 07/29/2025  -     Refer to Emergency Dept.    3. Vertigo  -     Cancel: Hypertension Digital Medicine (HDMP) Enrollment Order  -     POCT COVID-19 Rapid Screening  -     POCT Influenza A/B Molecular  -     Occult blood x 1, stool;  Future; Expected date: 07/29/2025  -     WBC, Stool; Future; Expected date: 07/29/2025  -     Rotavirus antigen, stool; Future; Expected date: 07/29/2025  -     Adenovirus Molecular Detection, PCR, Non-Blood Stool; Future; Expected date: 07/29/2025  -     Calprotectin, Stool; Future; Expected date: 07/29/2025  -     Giardia / Cryptosporidum, EIA; Future; Expected date: 07/29/2025  -     Stool Exam-Ova,Cysts,Parasites; Future; Expected date: 07/29/2025  -     Clostridium difficile EIA; Future; Expected date: 07/29/2025  -     Stool culture; Future; Expected date: 07/29/2025  -     Basic Metabolic Panel; Future; Expected date: 07/29/2025  -     Refer to Emergency Dept.    4. Nausea and vomiting, unspecified vomiting type  -     POCT COVID-19 Rapid Screening  -     POCT Influenza A/B Molecular  -     Occult blood x 1, stool; Future; Expected date: 07/29/2025  -     WBC, Stool; Future; Expected date: 07/29/2025  -     Rotavirus antigen, stool; Future; Expected date: 07/29/2025  -     Adenovirus Molecular Detection, PCR, Non-Blood Stool; Future; Expected date: 07/29/2025  -     Calprotectin, Stool; Future; Expected date: 07/29/2025  -     Giardia / Cryptosporidum, EIA; Future; Expected date: 07/29/2025  -     Stool Exam-Ova,Cysts,Parasites; Future; Expected date: 07/29/2025  -     Clostridium difficile EIA; Future; Expected date: 07/29/2025  -     Stool culture; Future; Expected date: 07/29/2025  -     Basic Metabolic Panel; Future; Expected date: 07/29/2025  -     Refer to Emergency Dept.    5. Diarrhea, unspecified type  -     POCT COVID-19 Rapid Screening  -     POCT Influenza A/B Molecular  -     Occult blood x 1, stool; Future; Expected date: 07/29/2025  -     WBC, Stool; Future; Expected date: 07/29/2025  -     Rotavirus antigen, stool; Future; Expected date: 07/29/2025  -     Adenovirus Molecular Detection, PCR, Non-Blood Stool; Future; Expected date: 07/29/2025  -     Calprotectin, Stool; Future; Expected date:  07/29/2025  -     Giardia / Cryptosporidum, EIA; Future; Expected date: 07/29/2025  -     Stool Exam-Ova,Cysts,Parasites; Future; Expected date: 07/29/2025  -     Clostridium difficile EIA; Future; Expected date: 07/29/2025  -     Stool culture; Future; Expected date: 07/29/2025  -     Basic Metabolic Panel; Future; Expected date: 07/29/2025  -     Refer to Emergency Dept.    6. Primary hypertension  -     Hypertension Digital Medicine (HDMP) Enrollment Order           Sirena Renner MD

## 2025-07-29 NOTE — DISCHARGE INSTRUCTIONS
Diagnosis: Abdominal pain    Home Care Instructions:  - Medications: Continue taking your home medications as prescribed    Follow-Up Plan:  - Follow-up with: Primary care doctor within 3 - 5  days  -Discuss with your primary care doctor the incidental findings of diverticulosis, left kidney stone, liver cyst, kidney cyst.  - Additional testing and/or evaluation will be directed by your primary doctor    Return to the Emergency Department for symptoms including but not limited to: worsening symptoms, severe back pain, shortness of breath or chest pain, vomiting with inability to hold down fluids, blood in vomit or poop, fevers greater than 100.4°F, passing out/fainting/unconsciousness, or other concerning symptoms.

## 2025-07-29 NOTE — PROGRESS NOTES
Patient was seen in clinic per Sirena Renner MD Consulted Dr. Lennon, disposition ED. Otf follow up scheduled 7/30/25 to outreach for any additional questions or concerns

## 2025-07-29 NOTE — ED PROVIDER NOTES
Encounter Date: 7/29/2025       History     Chief Complaint   Patient presents with    Nausea     Pt reports nausea with vomiting and abdominal cramping that started this morning. Pt was seen this AM at PCP and told to come to ED for possible food poisoning.      Patient is a 80-year-old female with a past medical history of HTN, IBS, hypothyroidism, MGUS, HLD, osteoporosis, bronchiectasis, liver cyst who arrives for evaluation abdominal pain.  Patient states having epigastric abdominal pain since this morning.  Patient is ate Chinese food which was brought on Saturday, a more on Sunday and ate some more last night at 8:00 p.m..  Food has been stored in the Fridge and not left out for more than 1 hour.  States having epigastric abdominal pain.  Evaluated today by PCP who recommended evaluation at ED for which she suspects to be gastroenteritis.  Patient states having subjective fevers and chills.  States having had episode of dizziness this morning which has since improved.  Denies any dizziness at this moment.  Described episode of dizziness as vertigo sensation.  States having multiple episodes of vomiting (4-5).  Had episodes of diarrhea this morning.  Denies any dysuria.        Review of patient's allergies indicates:   Allergen Reactions    Sutures, catgut (chromic)      Past Medical History:   Diagnosis Date    Angle recession of right eye     Blunt trauma of both eyes     hit across eyes with bungee exercise band    Cataract     Cystocele, midline     Dry eye syndrome     Ectropion due to laxity of eyelid, right     GERD (gastroesophageal reflux disease)     Hyperlipidemia     Hypertension     PVD (posterior vitreous detachment), right eye     Rectocele     Retinal drusen of both eyes     Thalassemia major     Vaginal atrophy      Past Surgical History:   Procedure Laterality Date    ABDOMINAL ADHESION SURGERY  1978    ANKLE FRACTURE SURGERY Right 1996    COLONOSCOPY  11/09/2022    COLONOSCOPY N/A 12/5/2023     Procedure: COLONOSCOPY;  Surgeon: Xavier Lawrence MD;  Location: [facility] (4TH FLR);  Service: Endoscopy;  Laterality: N/A;    ECTROPION REPAIR Bilateral 06/2018    Dr. Flaherty    ESOPHAGOGASTRODUODENOSCOPY N/A 12/5/2023    Procedure: EGD (ESOPHAGOGASTRODUODENOSCOPY);  Surgeon: Xavier Lawrence MD;  Location: [facility] (4TH FLR);  Service: Endoscopy;  Laterality: N/A;  9/6- referred by Dr. Lawrence/Additional Scheduling Information:  Needs constipation bowel prep protocol / prep instructions handed to patient and to portal. AS  11/28-precall complete-MS    ESOPHAGOGASTRODUODENOSCOPY N/A 6/10/2025    Procedure: EGD (ESOPHAGOGASTRODUODENOSCOPY);  Surgeon: Xavier Lawrence MD;  Location: [facility] (4TH FLR);  Service: Endoscopy;  Laterality: N/A;  Dr. Lawrence pt / instr to portal. DBM  6/2 precall attempted/lvm/mleone  6-6-25- pre call attempted-LVM to call back and confirm appointment-MMG  6/9-pt called department- confirmed arrival time, precall complete-mkp    HYSTERECTOMY  1977    possible cancerous lesion     Family History   Problem Relation Name Age of Onset    Stroke Mother      Heart disease Mother      Prostate cancer Father      Cancer Sister Ladan         colon    Colon cancer Sister Ladan     Hypertension Sister Ladan     No Known Problems Sister Maddy     No Known Problems Brother Félix     Prostate cancer Brother Gene     Diabetes Brother Culebra     No Known Problems Brother August     Cancer Brother Hugo         prostate    Crohn's disease Daughter Leisel     Breast cancer Maternal Aunt Lizzy     Glaucoma Maternal Uncle August     Blindness Maternal Uncle August     Hypertension Maternal Grandmother      No Known Problems Maternal Grandfather      No Known Problems Paternal Grandmother      No Known Problems Paternal Grandfather      Cataracts Neg Hx      Macular degeneration Neg Hx       Social History[1]  Review of Systems    Physical Exam     Initial Vitals [07/29/25 1155]    BP Pulse Resp Temp SpO2   (!) 183/82 64 18 98 °F (36.7 °C) 99 %      MAP       --         Physical Exam    Nursing note and vitals reviewed.  Constitutional: She appears well-developed and well-nourished. She is not diaphoretic. No distress.   Nontoxic appearance   HENT:   Head: Normocephalic and atraumatic.   Right Ear: External ear normal.   Left Ear: External ear normal.   Nose: Nose normal.   Eyes: Conjunctivae are normal. Pupils are equal, round, and reactive to light. No scleral icterus.   Neck: Neck supple.   Cardiovascular:  Normal rate and regular rhythm.           Pulmonary/Chest: Breath sounds normal. No stridor. No respiratory distress. She has no wheezes.   Abdominal: Abdomen is soft. She exhibits no distension.   Slight tenderness to palpation of epigastric area   Musculoskeletal:         General: No edema.      Cervical back: Neck supple.     Neurological: She is alert. No cranial nerve deficit or sensory deficit. GCS score is 15. GCS eye subscore is 4. GCS verbal subscore is 5. GCS motor subscore is 6.   General: Patient is alert, attentive, and oriented.  Speech is clear and fluent. PERRL and there is no facial droop. There is no pronator drift of out-stretched arms.  Motor: Muscle bulk and tone are normal. Strength is full bilaterally in UEs and LEs  Sensation: SILT in all four extremities.  Coordination: No dysmetria on finger-to-nose    Skin: Skin is warm and dry. Capillary refill takes less than 2 seconds. No rash and no abscess noted. No erythema. No pallor.   Psychiatric: She has a normal mood and affect.         ED Course   Procedures  Labs Reviewed   COMPREHENSIVE METABOLIC PANEL - Abnormal       Result Value    Sodium 139      Potassium 3.8      Chloride 103      CO2 29      Glucose 110      BUN 15      Creatinine 0.7      Calcium 9.3      Protein Total 7.8      Albumin 4.5      Bilirubin Total 0.6      ALP 54      AST 37      ALT 8 (*)     Anion Gap 7 (*)     eGFR >60     URINALYSIS,  REFLEX TO URINE CULTURE - Abnormal    Color, UA Colorless (*)     Appearance, UA Clear      pH, UA 7.0      Spec Grav UA 1.010      Protein, UA Negative      Glucose, UA Negative      Ketones, UA Negative      Bilirubin, UA Negative      Blood, UA 2+ (*)     Nitrites, UA Negative      Urobilinogen, UA Negative      Leukocyte Esterase, UA Negative     CBC WITH DIFFERENTIAL - Abnormal    WBC 7.40      RBC 6.04 (*)     HGB 11.4 (*)     HCT 36.8 (*)     MCV 61 (*)     MCH 18.9 (*)     MCHC 31.0 (*)     RDW 18.9 (*)     Platelet Count 229      MPV        Nucleated RBC 0      Neut % 74.4 (*)     Lymph % 19.5      Mono % 3.0 (*)     Eos % 2.7      Basophil % 0.3      Imm Grans % 0.1      Neut # 5.51      Lymph # 1.44      Mono # 0.22 (*)     Eos # 0.20      Baso # 0.02      Imm Grans # 0.01     URINALYSIS MICROSCOPIC - Abnormal    RBC, UA 20 (*)     WBC, UA 2      Bacteria, UA Rare      Microscopic Comment       LIPASE - Normal    Lipase Level 11     TROPONIN I HIGH SENSITIVITY - Normal    Troponin High Sensitive 8     CBC W/ AUTO DIFFERENTIAL    Narrative:     The following orders were created for panel order CBC W/ AUTO DIFFERENTIAL.  Procedure                               Abnormality         Status                     ---------                               -----------         ------                     CBC with Differential[[id number]]       Abnormal            Final result                 Please view results for these tests on the individual orders.   GREY TOP URINE HOLD   ISTAT CREATININE    POC Creatinine 0.8      Sample VENOUS     ISTAT LACTATE    POC Lactate 1.29      Sample VENOUS            Imaging Results              CT Abdomen Pelvis With IV Contrast NO Oral Contrast (Final result)  Result time 07/29/25 16:16:54      Final result by Krishna Rodriguez MD (07/29/25 16:16:54)                   Impression:      1. Possible hepatic steatosis, correlation with LFTs recommended.  2. Numerous low attenuating  lesions within the liver and kidneys suggesting cysts, some of which however are too small for characterization.  Ultrasound on a nonemergent basis as warranted.  3. Colonic diverticulosis without findings to suggest diverticulitis.  4. Left nonobstructive nephrolithiasis, no findings to suggest obstructive uropathy.  5. Please see above for additional findings.      Electronically signed by: Krishna Rodriguez MD  Date:    07/29/2025  Time:    16:16               Narrative:    EXAMINATION:  CT ABDOMEN PELVIS WITH IV CONTRAST    CLINICAL HISTORY:  Epigastric pain;    TECHNIQUE:  Low dose axial images, sagittal and coronal reformations were obtained from the lung bases to the pubic symphysis following the IV administration of 75 mL of Omnipaque 350 .  Oral contrast was not given.    COMPARISON:  Ultrasound 06/16/2025, CT enterography 09/13/2023, CT abdomen and pelvis 04/28/2023    FINDINGS:  Images of the lower thorax are remarkable for bilateral dependent atelectasis.    The liver is hypoattenuating, likely related to phase of contrast however steatosis not excluded, correlation with LFTs recommended.  There are numerous low attenuating lesions throughout the hepatic parenchyma, the larger of which measure attenuation suggesting cysts, the smaller are too small for characterization.  The spleen, pancreas, adrenal glands and gallbladder are grossly unremarkable.  The stomach is relatively decompressed without wall thickening.  The portal vein, splenic vein, SMV, celiac axis and SMA all are patent.  There are several scattered prominent real hepatic lymph nodes.    The kidneys enhance symmetrically without hydronephrosis.  There is left nonobstructive nephrolithiasis.  Several low attenuating lesions arise from the kidneys, the larger of which measure attenuation suggesting cysts, the smaller are too small for characterization.  The bilateral ureters are unable to be followed in their entirety to the urinary bladder, no  definite calculi seen or secondary findings to suggest obstructive uropathy.  The urinary bladder is unremarkable.  The uterus is absent the adnexa is unremarkable.    There are a few scattered colonic diverticula without inflammation to suggest diverticulitis.  The terminal ileum is unremarkable.  The appendix or appendiceal stump is unremarkable.  The small bowel is grossly unremarkable.  There are a few scattered shotty periaortic, pericaval, and mesenteric lymph nodes.  There is atherosclerotic calcification of the aorta and its branches.    There is osteopenia.  There are degenerative changes of the spine.  No significant inguinal lymphadenopathy.                                       X-Ray Chest AP Portable (Final result)  Result time 07/29/25 14:54:14      Final result by Shawn Hammond MD (07/29/25 14:54:14)                   Impression:      See above      Electronically signed by: Shawn Hammond MD  Date:    07/29/2025  Time:    14:54               Narrative:    EXAMINATION:  XR CHEST AP PORTABLE    CLINICAL HISTORY:  abdominal pain;    TECHNIQUE:  Single frontal view of the chest was performed.    COMPARISON:  12/28/2023    FINDINGS:  Cardiac size is normal.  Lungs are clear and well aerated.  No infiltrate is identified.  No evidence of pleural effusion.                                       Medications   lactated ringers bolus 500 mL (0 mLs Intravenous Stopped 7/29/25 1654)   iohexoL (OMNIPAQUE 350) injection 75 mL (75 mLs Intravenous Given 7/29/25 1529)     Medical Decision Making  Patient is a 80 year old female  with history as stated above.    Differential diagnosis includes, but is not limited to:    -pancreatitis  -ACS  -UTI  -gastroenteritis    Management:     No significant tenderness upon palpation of abdomen. Given age of patient, ordered CT abdomen with iv contrast. Portable cxr shows no acute intrathoracic abnormalities.  CT abdomen and pelvis shows no acute intra-abdominal  findings.  Incidental findings discussed with the patient to which she states already having prior knowledge of.  Ordered 500 mL of isotonic IV fluids due to patient having episodes of vomiting and diarrhea.  Patient passed p.o. challenge in the ED. patient discharged with return precautions explained in face-to-face conversation.  Provided with prescription for Zofran.  Advised to follow up with PCP for discussion of incidental findings.    Amount and/or Complexity of Data Reviewed  External Data Reviewed: radiology.     Details: === Results for orders placed during the hospital encounter of 06/14/21 ===    Echo Saline Bubble? Yes    - Interpretation Summary -  · There is no evidence of intracardiac shunting.  · The left ventricle is normal in size with normal systolic function.  · Grade I left ventricular diastolic dysfunction.  · Normal right ventricular size with normal right ventricular systolic function.      Labs: ordered.  Radiology: ordered.    Risk  Prescription drug management.               ED Course as of 07/29/25 1931 Tue Jul 29, 2025   1530 Attending Attestation:   Physician Attestation Statement for Resident:  History, findings, plan discussed with the resident.  I have personally seen and examined this patient. If performed, I independently interpreted labs, EKGs, and imaging.     In brief, pt is a 80 y.o. female with pertinent history of MGUS who presents with nausea, vomiting after having shrimp fried rice yesterday left over from 4 days ago.  She reports associated fevers, chills, abdominal pain.   On exam, patient with no abdominal tenderness, no tachycardia, afebrile here   The initial differential included dehydration, gross metabolic abnormality, acute gastroenteritis, bowel obstruction.  Pain is screening labs, CT abdomen/pelvis, PO challenge, reassessment.    [RC]      ED Course User Index  [RC] [place] Grimaldo MD                               Clinical Impression:  Final  diagnoses:  [K76.89] Hepatic cyst  [N28.1] Renal cyst  [R10.9] Abdominal pain, unspecified abdominal location (Primary)  [N20.0] Left nephrolithiasis  [K57.90] Diverticulosis          ED Disposition Condition    Discharge Stable          ED Prescriptions       Medication Sig Dispense Start Date End Date Auth. Provider    ondansetron (ZOFRAN) 4 MG tablet Take 1 tablet (4 mg total) by mouth every 8 (eight) hours as needed for Nausea. 10 tablet 7/29/2025 -- Luis F Souza MD          Follow-up Information       Follow up With Specialties Details Why Contact Info    Sirena Renner MD Internal Medicine Schedule an appointment as soon as possible for a visit in 1 week As needed, If symptoms worsen [address]  [facility] [address]  [phone]      [facility] - Emergency Dept Emergency Medicine  As needed, If symptoms worsen [address] [facility] [id number] [phone]          Launch MDCalc MDM  MDCalc MDM Module  Jul 29 2025 4:24 PM [Luis F Souza]  Data:  - Independent interpretation: I independently reviewed the XR port Chest AP 1 view. See MDM section and/or ED Course for my interpretation. [Luis F Souza]  - Test/documents/historian: 3+ tests ordered  Problems: Abdominal pain, unspecified abdominal location  Additional encounter diagnoses: Hepatic cyst, Renal cyst, Left nephrolithiasis, Diverticulosis  Risk: CT Abd+Pelvis W contr IV (Iodinated IV contrast in patient w/ elevated risk)             [1]   Social History  Tobacco Use    Smoking status: Never     Passive exposure: Never    Smokeless tobacco: Never   Substance Use Topics    Alcohol use: Not Currently     Comment: none    Drug use: No        Luis F Souza MD  Resident  07/29/25 1932

## 2025-07-29 NOTE — PLAN OF CARE-OVED
[facility] Emergency Department Plan of Care Note  Referral Source: Primary Care Provider                               Chief Complaint   Patient presents with    Emesis       Recommendation: Emergency Department            Emergency Department: [facility]               80-year-old female with history of MGUS, hypertension seen by PCP today due to acute onset of vomiting and diarrhea since yesterday, with diffuse abdominal discomfort and mild tenderness on their exam.  Provider there requesting transfer to ER due to age and risk factors and concern for dehydration due to suspected gastroenteritis.  She also has new onset dizziness and difficulty ambulating at baseline.  Patient will be sent to [facility] ER, providers there notified.

## 2025-07-30 LAB — HOLD SPECIMEN: NORMAL

## 2025-08-06 ENCOUNTER — OFFICE VISIT (OUTPATIENT)
Dept: PRIMARY CARE CLINIC | Facility: CLINIC | Age: 80
End: 2025-08-06
Payer: MEDICARE

## 2025-08-06 VITALS
HEIGHT: 64 IN | WEIGHT: 139.31 LBS | SYSTOLIC BLOOD PRESSURE: 126 MMHG | DIASTOLIC BLOOD PRESSURE: 80 MMHG | HEART RATE: 73 BPM | OXYGEN SATURATION: 98 % | BODY MASS INDEX: 23.78 KG/M2

## 2025-08-06 DIAGNOSIS — R42 VERTIGO: ICD-10-CM

## 2025-08-06 DIAGNOSIS — K52.9 GASTROENTERITIS: ICD-10-CM

## 2025-08-06 DIAGNOSIS — K76.89 LIVER CYST: ICD-10-CM

## 2025-08-06 DIAGNOSIS — D47.2 MGUS (MONOCLONAL GAMMOPATHY OF UNKNOWN SIGNIFICANCE): ICD-10-CM

## 2025-08-06 DIAGNOSIS — I70.0 AORTIC ATHEROSCLEROSIS: ICD-10-CM

## 2025-08-06 DIAGNOSIS — I10 PRIMARY HYPERTENSION: ICD-10-CM

## 2025-08-06 DIAGNOSIS — M85.88 OSTEOPENIA OF LUMBAR SPINE: ICD-10-CM

## 2025-08-06 DIAGNOSIS — Z09 HOSPITAL DISCHARGE FOLLOW-UP: Primary | ICD-10-CM

## 2025-08-06 DIAGNOSIS — N28.1 KIDNEY CYSTS: ICD-10-CM

## 2025-08-06 DIAGNOSIS — R31.21 ASYMPTOMATIC MICROSCOPIC HEMATURIA: ICD-10-CM

## 2025-08-06 DIAGNOSIS — K76.0 STEATOSIS OF LIVER: ICD-10-CM

## 2025-08-06 LAB
BILIRUB UR QL STRIP.AUTO: NEGATIVE
CLARITY UR: CLEAR
COLOR UR AUTO: YELLOW
GLUCOSE UR QL STRIP: NEGATIVE
HGB UR QL STRIP: ABNORMAL
KETONES UR QL STRIP: NEGATIVE
LEUKOCYTE ESTERASE UR QL STRIP: NEGATIVE
MICROSCOPIC COMMENT: NORMAL
NITRITE UR QL STRIP: NEGATIVE
PH UR STRIP: 7 [PH]
PROT UR QL STRIP: NEGATIVE
RBC #/AREA URNS AUTO: 2 /HPF (ref 0–4)
SP GR UR STRIP: 1.01
UROBILINOGEN UR STRIP-ACNC: NEGATIVE EU/DL
WBC #/AREA URNS AUTO: 1 /HPF (ref 0–5)

## 2025-08-06 PROCEDURE — 99214 OFFICE O/P EST MOD 30 MIN: CPT | Mod: S$GLB,,, | Performed by: STUDENT IN AN ORGANIZED HEALTH CARE EDUCATION/TRAINING PROGRAM

## 2025-08-06 PROCEDURE — 1159F MED LIST DOCD IN RCRD: CPT | Mod: CPTII,S$GLB,, | Performed by: STUDENT IN AN ORGANIZED HEALTH CARE EDUCATION/TRAINING PROGRAM

## 2025-08-06 PROCEDURE — 1126F AMNT PAIN NOTED NONE PRSNT: CPT | Mod: CPTII,S$GLB,, | Performed by: STUDENT IN AN ORGANIZED HEALTH CARE EDUCATION/TRAINING PROGRAM

## 2025-08-06 PROCEDURE — 99999 PR PBB SHADOW E&M-EST. PATIENT-LVL III: CPT | Mod: PBBFAC,,, | Performed by: STUDENT IN AN ORGANIZED HEALTH CARE EDUCATION/TRAINING PROGRAM

## 2025-08-06 PROCEDURE — 3079F DIAST BP 80-89 MM HG: CPT | Mod: CPTII,S$GLB,, | Performed by: STUDENT IN AN ORGANIZED HEALTH CARE EDUCATION/TRAINING PROGRAM

## 2025-08-06 PROCEDURE — 3074F SYST BP LT 130 MM HG: CPT | Mod: CPTII,S$GLB,, | Performed by: STUDENT IN AN ORGANIZED HEALTH CARE EDUCATION/TRAINING PROGRAM

## 2025-08-06 PROCEDURE — 81003 URINALYSIS AUTO W/O SCOPE: CPT | Performed by: STUDENT IN AN ORGANIZED HEALTH CARE EDUCATION/TRAINING PROGRAM

## 2025-08-06 PROCEDURE — 1160F RVW MEDS BY RX/DR IN RCRD: CPT | Mod: CPTII,S$GLB,, | Performed by: STUDENT IN AN ORGANIZED HEALTH CARE EDUCATION/TRAINING PROGRAM

## 2025-08-06 PROCEDURE — 1101F PT FALLS ASSESS-DOCD LE1/YR: CPT | Mod: CPTII,S$GLB,, | Performed by: STUDENT IN AN ORGANIZED HEALTH CARE EDUCATION/TRAINING PROGRAM

## 2025-08-06 PROCEDURE — 3288F FALL RISK ASSESSMENT DOCD: CPT | Mod: CPTII,S$GLB,, | Performed by: STUDENT IN AN ORGANIZED HEALTH CARE EDUCATION/TRAINING PROGRAM

## 2025-08-06 RX ORDER — MECLIZINE HYDROCHLORIDE 25 MG/1
25 TABLET ORAL 3 TIMES DAILY PRN
Qty: 30 TABLET | Refills: 0 | Status: SHIPPED | OUTPATIENT
Start: 2025-08-06

## 2025-08-06 NOTE — PROGRESS NOTES
Valarie Colindres  1945        Subjective     Chief Complaint: ED f/u    History of Present Illness:  Ms. Valarie Colindres is a 80 y.o. female who presents to clinic for ED f/u.     Seen in ED 7/29 for gastroenteritis. Possible food poisoning from leftover Chinese food. Does think one of her family members felt off too afterwards. UA with RBC. Lipase neg. Labs in note reviewed.  In ED was given IVF and nausea meds.  D/c'd that night. Was able to drink lots of liquid, broth.   Still felt dizzy that night. Next few days was able to eat some toast, chicken breast, mild.  Now feeling much better. Has not really needed the zofran they sent her home with. No vomiting. No diarrhea. Abdominal pain is almost gone.    BP logs reviewed.  BP Readings from Last 5 Encounters:   08/06/25 126/80   07/29/25 (!) 167/81   07/29/25 (!) 156/90   06/10/25 (!) 164/70   05/26/25 (!) 142/88     Liver cysts noted on CT scan. F/u recommended. Had US 6/2025 with GI.  Has U/S 7/2026 already ordered by GI for that.  LFTs wnl. Steatosis on CT.    Impression:     1. Possible hepatic steatosis, correlation with LFTs recommended.  2. Numerous low attenuating lesions within the liver and kidneys suggesting cysts, some of which however are too small for characterization.  Ultrasound on a nonemergent basis as warranted.  3. Colonic diverticulosis without findings to suggest diverticulitis.  4. Left nonobstructive nephrolithiasis, no findings to suggest obstructive uropathy.  5. Please see above for additional findings.    Aortic atherosclerosis noted on CT scan along with osteopenia.   On statin.   +renal and liver cysts.    MGUS- seeing Heme/Onc.    Review of Systems   Constitutional:  Negative for chills, fever and malaise/fatigue.   Respiratory:  Negative for cough.    Gastrointestinal:  Negative for abdominal pain, diarrhea, nausea and vomiting.   Genitourinary:  Negative for dysuria and hematuria.   Neurological:  Negative for dizziness.    Psychiatric/Behavioral:  The patient is not nervous/anxious.         PAST HISTORY:     Past Medical History:   Diagnosis Date    Angle recession of right eye     Blunt trauma of both eyes     hit across eyes with bungee exercise band    Cataract     Cystocele, midline     Dry eye syndrome     Ectropion due to laxity of eyelid, right     GERD (gastroesophageal reflux disease)     Hyperlipidemia     Hypertension     PVD (posterior vitreous detachment), right eye     Rectocele     Retinal drusen of both eyes     Thalassemia major     Vaginal atrophy        Past Surgical History:   Procedure Laterality Date    ABDOMINAL ADHESION SURGERY  1978    ANKLE FRACTURE SURGERY Right 1996    COLONOSCOPY  11/09/2022    COLONOSCOPY N/A 12/5/2023    Procedure: COLONOSCOPY;  Surgeon: Xavier Lawrence MD;  Location: [facility] (4TH FLR);  Service: Endoscopy;  Laterality: N/A;    ECTROPION REPAIR Bilateral 06/2018    Dr. Flaherty    ESOPHAGOGASTRODUODENOSCOPY N/A 12/5/2023    Procedure: EGD (ESOPHAGOGASTRODUODENOSCOPY);  Surgeon: Xavier Lawrence MD;  Location: [facility] (4TH FLR);  Service: Endoscopy;  Laterality: N/A;  9/6- referred by Dr. Lawrence/Additional Scheduling Information:  Needs constipation bowel prep protocol / prep instructions handed to patient and to portal. AS  11/28-precall complete-MS    ESOPHAGOGASTRODUODENOSCOPY N/A 6/10/2025    Procedure: EGD (ESOPHAGOGASTRODUODENOSCOPY);  Surgeon: Xavier Lawrence MD;  [facility]  Service: Endoscopy;  Laterality: N/A;  Dr. Lawrence pt / instr to portal. DBM  6/2 precall attempted/lvm/mleone  6-6-25- pre call attempted-LVM to call back and confirm appointment-MMG  6/9-pt called department- confirmed arrival time, precall complete-mkp    HYSTERECTOMY  1977    possible cancerous lesion       Family History   Problem Relation Name Age of Onset    Stroke Mother      Heart disease Mother      Prostate cancer Father      Cancer Sister Ladan         colon     Colon cancer Sister Ladan     Hypertension Sister Ladan     No Known Problems Sister Maddy     No Known Problems Brother Félix     Prostate cancer Brother Gene     Diabetes Brother [place]     No Known Problems Brother August     Cancer Brother Hugo         prostate    Crohn's disease Daughter [place]     Breast cancer Maternal Aunt [place]     Glaucoma Maternal Uncle August     Blindness Maternal Uncle August     Hypertension Maternal Grandmother      No Known Problems Maternal Grandfather      No Known Problems Paternal Grandmother      No Known Problems Paternal Grandfather      Cataracts Neg Hx      Macular degeneration Neg Hx           MEDICATIONS & ALLERGIES:     Current Outpatient Medications on File Prior to Visit   Medication Sig    acetaminophen (TYLENOL) 500 MG tablet Take 1-2 tablets (500-1,000 mg total) by mouth 3 (three) times daily as needed for Pain.    albuterol (PROVENTIL/VENTOLIN HFA) 90 mcg/actuation inhaler Inhale 1-2 puffs into the lungs every 6 (six) hours as needed for Wheezing or Shortness of Breath. (Cough) Rescue    alendronate (FOSAMAX) 70 MG tablet TK 1 T PO EVERY WEEK    ALPRAZolam (XANAX) 0.25 MG tablet Take 1 tablet (0.25 mg total) by mouth daily as needed for Anxiety (FLIGHT ANXIETY). This medication can cause sedation. Try on a day you do not have to work the next day. Do not mix with alcohol or any other sedating meds (such as sleep aids, opioids, or benzos). Caution with driving or operating heavy machinery.    amLODIPine (NORVASC) 5 MG tablet     atorvastatin (LIPITOR) 10 MG tablet TAKE 1 TABLET BY MOUTH EVERY DAY IN THE EVENING    azelastine (ASTELIN) 137 mcg (0.1 %) nasal spray Two sprays in each nostril, sniff until absorbed, then follow with 1 spray of fluticasone.  Use both sprays twice daily.    cetirizine (ZYRTEC) 10 MG tablet Take 1 tablet (10 mg total) by mouth once daily. for 7 days    chlorhexidine (PERIDEX) 0.12 % solution RINSE AND SWISH ONE CAPFUL BID     "cholecalciferol, vitamin D3, (VITAMIN D3) 125 mcg (5,000 unit) Tab Take 5,000 Units by mouth once daily.    diclofenac sodium (VOLTAREN) 1 % Gel Apply 2 g topically 4 (four) times daily.    levothyroxine (SYNTHROID) 100 MCG tablet TAKE 1 TABLET BY MOUTH EVERY DAY    pantoprazole (PROTONIX) 40 MG tablet Take 1 tablet (40 mg total) by mouth before breakfast. Best taken 45-60 minutes before your first protein ronald of the day - Breakfast.    valsartan (DIOVAN) 320 MG tablet TAKE 1 TABLET BY MOUTH ONCE DAILY.    [DISCONTINUED] ondansetron (ZOFRAN) 4 MG tablet Take 1 tablet (4 mg total) by mouth every 8 (eight) hours as needed for Nausea.     No current facility-administered medications on file prior to visit.       Review of patient's allergies indicates:   Allergen Reactions    Sutures, catgut (chromic)        OBJECTIVE:     Vital Signs:  Vitals:    08/06/25 0910   BP: 126/80   BP Location: Left arm   Patient Position: Sitting   Pulse: 73   SpO2: 98%   Weight: 63.2 kg (139 lb 5.3 oz)   Height: 5' 4" (1.626 m)       Body mass index is 23.92 kg/m².     Physical Exam:  Physical Exam  Vitals and nursing note reviewed.   Constitutional:       General: She is not in acute distress.     Appearance: Normal appearance. She is not ill-appearing, toxic-appearing or diaphoretic.   HENT:      Head: Normocephalic and atraumatic.   Eyes:      General: No scleral icterus.        Right eye: No discharge.         Left eye: No discharge.      Conjunctiva/sclera: Conjunctivae normal.   Cardiovascular:      Rate and Rhythm: Normal rate and regular rhythm.      Heart sounds: Normal heart sounds. No murmur heard.  Pulmonary:      Effort: Pulmonary effort is normal. No respiratory distress.      Breath sounds: Normal breath sounds. No wheezing or rales.   Abdominal:      General: There is no distension.      Palpations: Abdomen is soft.   Musculoskeletal:         General: Normal range of motion.   Skin:     General: Skin is warm and dry. " "  Neurological:      Mental Status: She is alert and oriented to person, place, and time. Mental status is at baseline.      Gait: Gait normal.   Psychiatric:         Mood and Affect: Mood normal.         Behavior: Behavior normal.            Laboratory  Lab Results   Component Value Date     07/29/2025     07/29/2025    K 3.8 07/29/2025     07/29/2025    CO2 29 07/29/2025    BUN 15 07/29/2025    CREATININE 0.7 07/29/2025    CALCIUM 9.3 07/29/2025    MG 2.3 10/15/2020     Lab Results   Component Value Date    HGBA1C 5.9 (H) 06/27/2025     No results for input(s): "POCTGLUCOSE" in the last 72 hours.        ASSESSMENT & PLAN:   Ms. Valarie Colindres is a 80 y.o. female who was seen today in clinic for ED f/u. Overall doing much better.   [place] diet, lots of fluids.   Meclizine prn.   Repeat UA for RBCs.    1. Hospital discharge follow-up    2. Gastroenteritis    3. Asymptomatic microscopic hematuria  -     Urinalysis, Reflex to Urine Culture Urine, Clean Catch    4. Vertigo  -     meclizine (ANTIVERT) 25 mg tablet; Take 1 tablet (25 mg total) by mouth 3 (three) times daily as needed for Nausea or Dizziness.  Dispense: 30 tablet; Refill: 0    5. Primary hypertension  -     Hypertension Digital Medicine (HDMP) Enrollment Order    6. Steatosis of liver    7. Liver cyst    8. Kidney cysts    9. Osteopenia of lumbar spine    10. Aortic atherosclerosis    11. MGUS (monoclonal gammopathy of unknown significance)             Sirena Renner MD         "

## 2025-08-07 LAB — HOLD SPECIMEN: NORMAL

## 2025-08-12 ENCOUNTER — TELEPHONE (OUTPATIENT)
Dept: ENDOSCOPY | Facility: HOSPITAL | Age: 80
End: 2025-08-12
Payer: MEDICARE

## 2025-08-12 ENCOUNTER — TELEPHONE (OUTPATIENT)
Dept: PRIMARY CARE CLINIC | Facility: CLINIC | Age: 80
End: 2025-08-12
Payer: MEDICARE

## 2025-08-12 ENCOUNTER — OFFICE VISIT (OUTPATIENT)
Dept: UROGYNECOLOGY | Facility: CLINIC | Age: 80
End: 2025-08-12
Payer: MEDICARE

## 2025-08-12 VITALS
WEIGHT: 140.44 LBS | HEART RATE: 85 BPM | HEIGHT: 64 IN | SYSTOLIC BLOOD PRESSURE: 117 MMHG | DIASTOLIC BLOOD PRESSURE: 65 MMHG | BODY MASS INDEX: 23.98 KG/M2

## 2025-08-12 DIAGNOSIS — K59.00 CONSTIPATION, UNSPECIFIED CONSTIPATION TYPE: ICD-10-CM

## 2025-08-12 DIAGNOSIS — N81.11 MIDLINE CYSTOCELE: ICD-10-CM

## 2025-08-12 DIAGNOSIS — N89.8 VAGINAL DISCHARGE: Primary | ICD-10-CM

## 2025-08-12 DIAGNOSIS — K22.10 EROSIVE ESOPHAGITIS: Primary | ICD-10-CM

## 2025-08-12 DIAGNOSIS — N81.6 RECTOCELE: ICD-10-CM

## 2025-08-12 PROCEDURE — 99213 OFFICE O/P EST LOW 20 MIN: CPT | Mod: S$GLB,,, | Performed by: NURSE PRACTITIONER

## 2025-08-12 PROCEDURE — 3074F SYST BP LT 130 MM HG: CPT | Mod: CPTII,S$GLB,, | Performed by: NURSE PRACTITIONER

## 2025-08-12 PROCEDURE — 3078F DIAST BP <80 MM HG: CPT | Mod: CPTII,S$GLB,, | Performed by: NURSE PRACTITIONER

## 2025-08-12 PROCEDURE — 1160F RVW MEDS BY RX/DR IN RCRD: CPT | Mod: CPTII,S$GLB,, | Performed by: NURSE PRACTITIONER

## 2025-08-12 PROCEDURE — 1159F MED LIST DOCD IN RCRD: CPT | Mod: CPTII,S$GLB,, | Performed by: NURSE PRACTITIONER

## 2025-08-12 PROCEDURE — 99999 PR PBB SHADOW E&M-EST. PATIENT-LVL IV: CPT | Mod: PBBFAC,,, | Performed by: NURSE PRACTITIONER

## 2025-08-12 PROCEDURE — 81515 NFCT DS BV&VAGINITIS DNA ALG: CPT | Performed by: NURSE PRACTITIONER

## 2025-08-13 LAB
BACTERIAL VAGINOSIS DNA (OHS): NOT DETECTED
CANDIDA GLABRATA/KRUSEI DNA (OHS): NOT DETECTED
CANDIDA SPECIES DNA (OHS): NOT DETECTED
TRICHOMONAS VAGINALIS DNA (OHS): NOT DETECTED

## 2025-08-20 ENCOUNTER — TELEPHONE (OUTPATIENT)
Dept: ENDOSCOPY | Facility: HOSPITAL | Age: 80
End: 2025-08-20
Payer: MEDICARE